# Patient Record
Sex: MALE | Race: WHITE | NOT HISPANIC OR LATINO | Employment: OTHER | URBAN - METROPOLITAN AREA
[De-identification: names, ages, dates, MRNs, and addresses within clinical notes are randomized per-mention and may not be internally consistent; named-entity substitution may affect disease eponyms.]

---

## 2019-02-01 ENCOUNTER — OFFICE VISIT (OUTPATIENT)
Dept: FAMILY MEDICINE | Facility: CLINIC | Age: 64
End: 2019-02-01
Payer: COMMERCIAL

## 2019-02-01 VITALS
RESPIRATION RATE: 20 BRPM | HEART RATE: 71 BPM | WEIGHT: 190.2 LBS | HEIGHT: 68 IN | OXYGEN SATURATION: 98 % | DIASTOLIC BLOOD PRESSURE: 96 MMHG | TEMPERATURE: 98.1 F | SYSTOLIC BLOOD PRESSURE: 153 MMHG | BODY MASS INDEX: 28.82 KG/M2

## 2019-02-01 DIAGNOSIS — H01.021 SQUAMOUS BLEPHARITIS OF RIGHT UPPER EYELID: ICD-10-CM

## 2019-02-01 DIAGNOSIS — Z13.220 SCREENING FOR HYPERLIPIDEMIA: ICD-10-CM

## 2019-02-01 DIAGNOSIS — Z87.898 HISTORY OF PREDIABETES: ICD-10-CM

## 2019-02-01 DIAGNOSIS — Z13.9 SCREENING FOR CONDITION: ICD-10-CM

## 2019-02-01 DIAGNOSIS — I25.10 CORONARY ARTERY DISEASE INVOLVING NATIVE HEART WITHOUT ANGINA PECTORIS, UNSPECIFIED VESSEL OR LESION TYPE: ICD-10-CM

## 2019-02-01 DIAGNOSIS — L21.9 SEBORRHEIC DERMATITIS: Primary | ICD-10-CM

## 2019-02-01 DIAGNOSIS — R03.0 ELEVATED BLOOD PRESSURE READING WITHOUT DIAGNOSIS OF HYPERTENSION: ICD-10-CM

## 2019-02-01 LAB
BUN SERPL-MCNC: 14 MG/DL (ref 7–30)
CALCIUM SERPL-MCNC: 9.5 MG/DL (ref 8.5–10.4)
CHLORIDE SERPLBLD-SCNC: 102 MMOL/L (ref 94–109)
CHOLEST SERPL-MCNC: 238 MG/DL
CHOLEST/HDLC SERPL: 6.3 RATIO
CO2 SERPL-SCNC: 27 MMOL/L (ref 20–32)
CREAT SERPL-MCNC: 1 MG/DL (ref 0.8–1.5)
EGFR CALCULATED (BLACK REFERENCE): >90 ML/MIN
EGFR CALCULATED (NON BLACK REFERENCE): 80.2 ML/MIN
GLUCOSE SERPL-MCNC: 102 MG/DL (ref 60–109)
HBA1C MFR BLD: 5.7 % (ref 4.1–5.7)
HDLC SERPL-MCNC: 38 MG/DL
HIV 1+2 AB+HIV1 P24 AG SERPL QL IA: NEGATIVE
LDLC SERPL CALC-MCNC: 180 MG/DL (ref 0–99)
POTASSIUM SERPL-SCNC: 4 MMOL/L (ref 3.4–5.3)
SODIUM SERPL-SCNC: 143 MMOL/L (ref 133–144)
TRIGL SERPL-MCNC: 98 MG/DL
VLDL-CHOLESTEROL: 20 MG/DL (ref 7–32)

## 2019-02-01 RX ORDER — METOPROLOL SUCCINATE 25 MG/1
25 TABLET, EXTENDED RELEASE ORAL DAILY
Qty: 90 TABLET | Refills: 1 | Status: SHIPPED | OUTPATIENT
Start: 2019-02-01 | End: 2019-03-14

## 2019-02-01 RX ORDER — LISINOPRIL 10 MG/1
10 TABLET ORAL DAILY
Qty: 90 TABLET | Refills: 3 | Status: SHIPPED | OUTPATIENT
Start: 2019-02-01 | End: 2019-03-14

## 2019-02-01 RX ORDER — KETOCONAZOLE 20 MG/G
CREAM TOPICAL DAILY
Qty: 60 G | Refills: 1 | Status: SHIPPED | OUTPATIENT
Start: 2019-02-01 | End: 2019-02-15

## 2019-02-01 ASSESSMENT — MIFFLIN-ST. JEOR: SCORE: 1627.75

## 2019-02-01 NOTE — LETTER
February 6, 2019      Dudley Kiran  [address] [address]        Dear Dudley,    The test results from your last visit are back. You do not have diabetes, hepatitis C or HIV. Your kidney tests are normal. Your cholesterol is high, and you would benefit from being on a statin medication as we discussed, to decrease your chance of dying from a heart attack or stroke.     Please see below for your test results.    Resulted Orders   Basic Metabolic Panel ([facility]) - Results < 1 hr   Result Value Ref Range    Glucose 102.0 60.0 - 109.0 mg/dL    Urea Nitrogen 14.0 7.0 - 30.0 mg/dL    Creatinine 1.0 0.8 - 1.5 mg/dL    Sodium 143.0 133.0 - 144.0 mmol/L    Potassium 4.0 3.4 - 5.3 mmol/L    Chloride 102.0 94.0 - 109.0 mmol/L    Carbon Dioxide 27.0 20.0 - 32.0 mmol/L    Calcium 9.5 8.5 - 10.4 mg/dL    eGFR Calculated (Non Black Reference) 80.2 >60.0 mL/min    eGFR Calculated (Black Reference) >90 >60.0 mL/min   Lipid Panel ([facility]) - Results < 1 hr   Result Value Ref Range    Cholesterol 238.0 (H) <200.0 mg/dL    Triglycerides 98.0 <150.0 mg/dL    HDL Cholesterol 38.0 (L) >40.0 mg/dL      Comment:      If diabetic or CVD then reference range <100    VLDL-Cholesterol 20.0 7.0 - 32.0 mg/dL    LDL Cholesterol Direct 180.0 (H) 0.0 - 99.0 mg/dL    Cholesterol/HDL Ratio 6.3 (H) <5.0 RATIO   HIV Ag/Ab Screen [place] ([employer])   Result Value Ref Range    HIV Antigen/Antibody Negative Negative    Narrative    Test performed by:  [facility] [address] [place] [address]  Method is [employer] HIV Ag/Ab for the detection of HIV p24 antigen, HIV-1   antibodies and HIV-2 antibodies.   Hepatitis C Antibody ([employer])   Result Value Ref Range    Hepatitis C Antibody Screen Negative Negative    Narrative    Test performed by:  [facility] [address] [place] [address]   Hemoglobin A1c ([facility])   Result Value Ref Range    Hemoglobin A1C 5.7 4.1 - 5.7 %       If you  have any questions, please call the clinic to make an appointment.    Sincerely,    Veronica Lopez MD

## 2019-02-01 NOTE — NURSING NOTE
Due For  Zoster - recommend to go to pharmacy  Flu - declined  Colonoscopy - pt declined, but will think about it, offered fit will think about it

## 2019-02-01 NOTE — PATIENT INSTRUCTIONS
Use dandruff shampoo 2 times per day, leaving on for as long as possible, up to an hour, for the next 2 weeks. Use the ketoconazole cream 2 times daily on outside of eyes and ear canals.   Start taking aspirin again.   Start taking lisinopril 10 mg per day.   Start taking metoprolol again.

## 2019-02-01 NOTE — PROGRESS NOTES
"History   Dudley Kiran is a 63 year old male presenting for:  Eye Problem (Right eye swollen for 5 days, itchy, watery); Ear Problem (Ear tenderness - both); and Medication Reconciliation (completed)    Itchy watery eyes for months. Right eye swollen for 5 days. Painful to touch eyelid. No vision changes. Tried some kind of vaseline type of cream.   Right ear feels like something moving inside, itchy. Tender inside both ears. No ear pain or difficulty hearing.   Dandruff recently on scalp.     bp high. Stopped taking asa and metoprolol. Had nosebleeds last winter with waking and in shower. Not sure why he stopped metoprolol.     The patient speaks English, so no  was used for this visit.   Medical and social history and medications reviewed with patient.   Exam   BP (!) 153/96   Pulse 71   Temp 98.1  F (36.7  C) (Oral)   Resp 20   Ht 1.72 m (5' 7.72\")   Wt 86.3 kg (190 lb 3.2 oz)   SpO2 98%   BMI 29.16 kg/m    Gen: NAD  HEENT: white oily flakiness both eyebrows, mild on frontal scalp, and both external ears. Bilateral external ears have erythema, edema and mild tenderness. Left TM appears normal. Right TM unable to be visualized due to tortuous canal. Pupils equal and reactive. EOMI without pain. No conjunctivitis. Erythema and edema of right upper eyelid. Able to read 12 pt font at 15 inches clearly with each eye individually without blurriness or double vision.   Neuro: wrinkles of right side of forehead are more pronounced than left side but remainder of cranial nerve exam is normal.   Card: RRR, no murmurs  Resp: clear, no wheezing or crackles  Skin: no other rashes other than above.   Ext: no LE edema  Psych: mood normal, affect congruent  Medical Decision-Making     1. Seborrheic dermatitis  2. Squamous blepharitis of right upper eyelid  No evidence of eye involvement. Not cellulitic. Will try bid dandruff shampoo and ketoconazole cream. If not improved, would consider steroid cream.   - " ketoconazole (NIZORAL) 2 % external cream; Apply topically daily  Dispense: 60 g; Refill: 1    3. Elevated blood pressure reading without diagnosis of hypertension  - Basic Metabolic Panel ([facility]) - Results < 1 hr  - lisinopril (PRINIVIL/ZESTRIL) 10 MG tablet; Take 1 tablet (10 mg) by mouth daily  Dispense: 90 tablet; Refill: 3    4. Screening for hyperlipidemia  - Lipid Panel ([facility]) - Results < 1 hr    5. Coronary artery disease involving native heart without angina pectoris, unspecified vessel or lesion type  Agreeable to restarting asa and metoprolol. Also adding ace. Discussed statin at length and reviewed ASCVD risk of 20%. He was on pravastatin in the past and developed hip osteoarthritis about 6 months later, and attributes this to statin. precontemplative on statin initiation.   - metoprolol succinate ER (TOPROL-XL) 25 MG 24 hr tablet; Take 1 tablet (25 mg) by mouth daily  Dispense: 90 tablet; Refill: 1  - aspirin (ASA) 81 MG EC tablet; Take 1 tablet (81 mg) by mouth daily  Dispense: 90 tablet; Refill: 1    6. Screening for condition  - HIV Ag/Ab Screen [place] ([facility])  - Hepatitis C Antibody ([facility])    7. History of prediabetes  Last checked in 2016.   - Hemoglobin A1c ([facility])    The 10-year ASCVD risk score (Wolfgang SHAKIR Jr., et al., 2013) is: 20.1%    Values used to calculate the score:      Age: 63 years      Sex: Male      Is Non- : No      Diabetic: No      Tobacco smoker: No      Systolic Blood Pressure: 153 mmHg      Is BP treated: No      HDL Cholesterol: 34 mg/dL      Total Cholesterol: 224 mg/dL    Follow up: 2 wks. Skin, BMP, BP    Veronica Lopez MD, MPH  [facility] Medicine Resident     Precepted patient with Carlos Marmolejo MD    Options for treatment and follow-up care were reviewed with the patient and/or guardian. Dudley Kiran and/or guardian engaged in the decision making process and verbalized understanding of the options discussed and agreed with  the final plan.

## 2019-02-02 LAB — HCV AB SER QL: NEGATIVE

## 2019-02-04 NOTE — PROGRESS NOTES
I have personally reviewed the history and examination as documented by Dr. Lopez.  I was present during key portions of the visit and agree with the assessment and plan as documented for 63 yr old male with CAD, HT, pre-DM here for seborrheic dermatitis/ blepharitis. Topical tx given. BP elevated - meds adjusted. Declined statin. Precautions given. Anticipatory guidance given.     Carlos Marmolejo MD  February 4, 2019  9:38 AM

## 2019-02-15 ENCOUNTER — OFFICE VISIT (OUTPATIENT)
Dept: FAMILY MEDICINE | Facility: CLINIC | Age: 64
End: 2019-02-15
Payer: COMMERCIAL

## 2019-02-15 VITALS
SYSTOLIC BLOOD PRESSURE: 126 MMHG | HEART RATE: 62 BPM | BODY MASS INDEX: 29.03 KG/M2 | DIASTOLIC BLOOD PRESSURE: 86 MMHG | OXYGEN SATURATION: 97 % | HEIGHT: 67 IN | TEMPERATURE: 98.6 F | WEIGHT: 185 LBS | RESPIRATION RATE: 16 BRPM

## 2019-02-15 DIAGNOSIS — I25.10 ATHEROSCLEROSIS OF CORONARY ARTERY OF NATIVE HEART WITHOUT ANGINA PECTORIS, UNSPECIFIED VESSEL OR LESION TYPE: ICD-10-CM

## 2019-02-15 DIAGNOSIS — H01.021 SQUAMOUS BLEPHARITIS OF RIGHT UPPER EYELID: ICD-10-CM

## 2019-02-15 DIAGNOSIS — R03.0 ELEVATED BLOOD PRESSURE READING WITHOUT DIAGNOSIS OF HYPERTENSION: Primary | ICD-10-CM

## 2019-02-15 DIAGNOSIS — E78.00 ELEVATED CHOLESTEROL: ICD-10-CM

## 2019-02-15 LAB
BUN SERPL-MCNC: 11 MG/DL (ref 7–30)
CALCIUM SERPL-MCNC: 9.3 MG/DL (ref 8.5–10.4)
CHLORIDE SERPLBLD-SCNC: 105 MMOL/L (ref 94–109)
CO2 SERPL-SCNC: 29 MMOL/L (ref 20–32)
CREAT SERPL-MCNC: 1.1 MG/DL (ref 0.8–1.5)
EGFR CALCULATED (BLACK REFERENCE): 86.9 ML/MIN
EGFR CALCULATED (NON BLACK REFERENCE): 71.9 ML/MIN
GLUCOSE SERPL-MCNC: 100 MG/DL (ref 60–109)
POTASSIUM SERPL-SCNC: 4.2 MMOL/L (ref 3.4–5.3)
SODIUM SERPL-SCNC: 145 MMOL/L (ref 133–144)

## 2019-02-15 RX ORDER — CETIRIZINE HYDROCHLORIDE 10 MG/1
10 TABLET ORAL DAILY
Qty: 90 TABLET | Refills: 0 | Status: SHIPPED | OUTPATIENT
Start: 2019-02-15 | End: 2019-03-14

## 2019-02-15 RX ORDER — EMOLLIENT BASE
CREAM (GRAM) TOPICAL PRN
Qty: 453 G | Refills: 3 | Status: SHIPPED | OUTPATIENT
Start: 2019-02-15 | End: 2020-02-15

## 2019-02-15 ASSESSMENT — MIFFLIN-ST. JEOR: SCORE: 1588.52

## 2019-02-15 NOTE — NURSING NOTE
"Chief Complaint   Patient presents with     RECHECK     eye problem. Dried around the eyelids and feels tender swollen. Now on the left eye.      RECHECK     blood pressure     Medication Reconciliation     completed.        /86   Pulse 62   Temp 98.6  F (37  C) (Oral)   Resp 16   Ht 1.695 m (5' 6.73\")   Wt 83.9 kg (185 lb)   SpO2 97%   BMI 29.21 kg/m        "

## 2019-02-15 NOTE — PROGRESS NOTES
"History   Dudley Kiran is a 63 year old male presenting for:  RECHECK (eye problem. Dried around the eyelids and feels tender swollen. Now on the left eye. ); RECHECK (blood pressure); and Medication Reconciliation (completed. )    HTN  - compliance: good; took meds this morning  - denies orthostasis, headaches, blurry vision, LE edema  - doesn't think he has htn    Eyes: tried ketoconazole around right for a week: tredness and swelling worsened. Now both swollen. Now off it a week. wateromg all the time. Clear drainage. Not sticking. Itchy. No runny nose. No pain with eye movement.     The patient speaks English, so no  was used for this visit.   Medical and social history and medications reviewed with patient.   Exam   /86   Pulse 62   Temp 98.6  F (37  C) (Oral)   Resp 16   Ht 1.695 m (5' 6.73\")   Wt 83.9 kg (185 lb)   SpO2 97%   BMI 29.21 kg/m    Gen: NAD  HEENT: bilateral eyelid edema R>L, medial,>lateral. Slightly improved. No discharge or matting. No conjunctivits or pain with eye movement.   Card: RRR, no murmurs  Resp: clear, no wheezing or crackles  Ext: no LE edema  Medical Decision-Making     1. Elevated blood pressure reading without diagnosis of hypertension  At goal today. He does not think bp was persistently elevated last visit and says it was not rechecked. Would be unusual for lisinopril 10 mg to not have had any effect. Discussed cardioprotective role of ACE regardless of BP and he is tolerating it, so he agrees to continue.   - Basic Metabolic Panel ([facility]) - Results < 1 hr    2. Squamous blepharitis of right upper eyelid  Still consistent with seborrheic dermatitis though also possible allergic component. Does not look like contact dermatitis. May not have used ketoconazole long enough but not interested in restarting now. Also would avoid topical steroids so close to eye.   - cetirizine (ZYRTEC) 10 MG tablet; Take 1 tablet (10 mg) by mouth daily  Dispense: 90 " tablet; Refill: 0  - emollient (VANICREAM) external cream; Apply topically as needed for other  Dispense: 453 g; Refill: 3  - cold compresses    3. Atherosclerosis of coronary artery of native heart without angina pectoris, unspecified vessel or lesion type  ascvd 20% with bp last visit, 14 % today with improved BP. On asa and betablocker and ACE    4. Elevated cholesterol  Again recommended strongly for statin. Reviewed elevated cholesterol. He is precontemplative. Has heard about bad side effects.     Follow up: 2 wk for eyes    Veronica Lopez MD, MPH  [facility] Family Medicine Resident     Precepted patient with Nydia Ramirez DO    Options for treatment and follow-up care were reviewed with the patient and/or guardian. Dudley Kiran and/or guardian engaged in the decision making process and verbalized understanding of the options discussed and agreed with the final plan.

## 2019-02-15 NOTE — PATIENT INSTRUCTIONS
Start taking cetirizine daily.   Use vanicream or cerave or other thick moisturizer around eyes.   Cool compresses on eyes.

## 2019-02-15 NOTE — LETTER
February 18, 2019      Dudley Kiran  [address] [address]        Dear Dudley,    The test results from your last visit are back. Your kidney function and electrolytes are normal. I recommend continuing your current medications.    Please see below for your test results.    Resulted Orders   Basic Metabolic Panel ([facility]) - Results < 1 hr   Result Value Ref Range    Glucose 100.0 60.0 - 109.0 mg/dL    Urea Nitrogen 11.0 7.0 - 30.0 mg/dL    Creatinine 1.1 0.8 - 1.5 mg/dL    Sodium 145.0 (H) 133.0 - 144.0 mmol/L    Potassium 4.2 3.4 - 5.3 mmol/L    Chloride 105.0 94.0 - 109.0 mmol/L    Carbon Dioxide 29.0 20.0 - 32.0 mmol/L    Calcium 9.3 8.5 - 10.4 mg/dL    eGFR Calculated (Non Black Reference) 71.9 >60.0 mL/min    eGFR Calculated (Black Reference) 86.9 >60.0 mL/min       If you have any questions, please call the clinic to make an appointment.    Sincerely,    Veronica Lopez MD

## 2019-02-19 NOTE — PROGRESS NOTES
Preceptor Attestation:   Patient seen, evaluated and discussed with the resident. I have verified the content of the note, which accurately reflects my assessment of the patient and the plan of care.  Supervising Physician:Nydia Ramirez DO [facility]

## 2019-03-01 ENCOUNTER — TELEPHONE (OUTPATIENT)
Dept: FAMILY MEDICINE | Facility: CLINIC | Age: 64
End: 2019-03-01

## 2019-03-01 ENCOUNTER — OFFICE VISIT (OUTPATIENT)
Dept: FAMILY MEDICINE | Facility: CLINIC | Age: 64
End: 2019-03-01
Payer: COMMERCIAL

## 2019-03-01 VITALS
SYSTOLIC BLOOD PRESSURE: 125 MMHG | TEMPERATURE: 98.4 F | DIASTOLIC BLOOD PRESSURE: 81 MMHG | OXYGEN SATURATION: 98 % | HEIGHT: 67 IN | BODY MASS INDEX: 28.85 KG/M2 | RESPIRATION RATE: 16 BRPM | HEART RATE: 59 BPM | WEIGHT: 183.8 LBS

## 2019-03-01 DIAGNOSIS — S39.012A STRAIN OF LUMBAR REGION, INITIAL ENCOUNTER: Primary | ICD-10-CM

## 2019-03-01 DIAGNOSIS — R03.0 ELEVATED BLOOD PRESSURE READING WITHOUT DIAGNOSIS OF HYPERTENSION: ICD-10-CM

## 2019-03-01 ASSESSMENT — MIFFLIN-ST. JEOR: SCORE: 1579.4

## 2019-03-01 NOTE — TELEPHONE ENCOUNTER
[facility] phone call message- general phone call:    Reason for call: Patient states that in his last visit with Dr. Lopez they discussed that medication could affect his kidneys, so they did a lab in order to rule out this.   Patient stated he thinks lisinopril is affecting his kidneys, he reports lower back pain, no more symptoms.   Patient reports taking Cetrizine 10MG, Metoprolol succinate ER 25MG, and Lisinopril 10MG.   He would like a call back. Please call and advise.     Return call needed: Yes    OK to leave a message on voice mail? Yes    Primary language: English      needed? No    Call taken on March 1, 2019 at 9:52 AM by Lori Palmer

## 2019-03-01 NOTE — PROGRESS NOTES
"Pt is a 63 year old male last seen on 2/15/19 by Dr Lopez here today for:     HTN - well-controlled on lisinopril; was concerned that the medication was causing his back pain because Dr Lopez said it can affect his kidneys    BP Readings from Last 6 Encounters:   03/01/19 125/81   02/15/19 126/86   02/01/19 (!) 153/96   05/06/16 121/77   03/27/15 116/80   07/30/14 125/81     2) Back pain:   Location: lower - bilateral    Duration: 2 days; started as an ache;    Radiation: NO   Numbness/ Tingling? NO   Weakness? No   Flexion or Extension bias: flexion   Red flags? No   Meds tried? No   PT? NO   Imaging? 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     Past Medical History:   Diagnosis Date     HTN (hypertension)       Past Surgical History:   Procedure Laterality Date     OTHER SURGICAL HISTORY  2010    Bypass      Current Outpatient Medications   Medication Sig Dispense Refill     aspirin (ASA) 81 MG EC tablet Take 1 tablet (81 mg) by mouth daily 90 tablet 1     cetirizine (ZYRTEC) 10 MG tablet Take 1 tablet (10 mg) by mouth daily 90 tablet 0     emollient (VANICREAM) external cream Apply topically as needed for other 453 g 3     lisinopril (PRINIVIL/ZESTRIL) 10 MG tablet Take 1 tablet (10 mg) by mouth daily 90 tablet 3     metoprolol succinate ER (TOPROL-XL) 25 MG 24 hr tablet Take 1 tablet (25 mg) by mouth daily 90 tablet 1      No Known Allergies     ROS:   Gen- no weight change, no fevers/chills   Head/ Eyes- no blurred vision, no headaches   ENT- no cough, no congestion, no URI symptoms   Cardiac - no palpitations, no chest pain   Respiratory - no shortness of breath , no wheezing   GI - no nausea, no vomiting, no diarrhea, no constipation   Urinary - no dysuria, no polyuria   Neuro - no numbness, no tingling   Remainder of ROS negative.     Exam:   /81   Pulse 59   Temp 98.4  F (36.9  C) (Oral)   Resp 16   Ht 1.689 m (5' 6.5\")   Wt 83.4 kg (183 lb 12.8 oz)   SpO2 98%   BMI 29.22 kg/m   "   Alert and oriented x 3; No acute distress     BACK:   ROM: flexion -full /extension -limited/lateral rotation- full /side bend- full   Bony tenderness: None   Paraspinal tenderness: None.   Sensation: intact in b/l lower extremities.   Strength: 5/5 w/ dorsiflexion/ plantarflexion/ inversion/ eversion/ knee flexion/ extension.   Maneuvers: Neg straight leg raise b/l. Neg Slump b/l + ZAHIDA/FADIR on L        (S39.012A) Strain of lumbar region, initial encounter  (primary encounter diagnosis)  Comment:   Plan: reassurance given that his lisinopril was not causing his back pain; pt declined handout w/ exercises; f/u prn    (R03.0) Elevated blood pressure reading without diagnosis of hypertension  Comment:   Plan: stable; f/u prn    Carlos Marmolejo MD  March 1, 2019  11:40 AM

## 2019-03-01 NOTE — TELEPHONE ENCOUNTER
Recommend Dudley be seen in clinic for further assessment and recommendations. An appt has been scheduled for this morning. Magdaleno ABURTO

## 2019-03-08 ENCOUNTER — OFFICE VISIT (OUTPATIENT)
Dept: FAMILY MEDICINE | Facility: CLINIC | Age: 64
End: 2019-03-08
Payer: COMMERCIAL

## 2019-03-08 VITALS
WEIGHT: 180 LBS | HEIGHT: 67 IN | DIASTOLIC BLOOD PRESSURE: 75 MMHG | SYSTOLIC BLOOD PRESSURE: 118 MMHG | TEMPERATURE: 98.5 F | HEART RATE: 73 BPM | OXYGEN SATURATION: 96 % | BODY MASS INDEX: 28.25 KG/M2 | RESPIRATION RATE: 20 BRPM

## 2019-03-08 DIAGNOSIS — L30.9 PERIORBITAL DERMATITIS: Primary | ICD-10-CM

## 2019-03-08 LAB — ERYTHROCYTE [SEDIMENTATION RATE] IN BLOOD: 12 MM/HR (ref 0–15)

## 2019-03-08 RX ORDER — BETAMETHASONE VALERATE 1.2 MG/G
CREAM TOPICAL 2 TIMES DAILY
Qty: 45 G | Refills: 0 | Status: SHIPPED | OUTPATIENT
Start: 2019-03-08 | End: 2019-04-11

## 2019-03-08 ASSESSMENT — MIFFLIN-ST. JEOR: SCORE: 1562.16

## 2019-03-08 NOTE — PATIENT INSTRUCTIONS
Apply a thin layer of steroid cream 2 times per day around eye. Be careful not to get it inside your eye.

## 2019-03-08 NOTE — PROGRESS NOTES
"History   Dudley Kiran is a 63 year old male presenting for:  RECHECK (eye swelling, got worst) and Medication Reconciliation    eyes  - overall not better. People at work asking about it.   - vanicream several times per day, 5-6x. Feels good. No cold compresses.   - no other illness or joint pain.   - no blurry vision    The patient speaks English, so no  was used for this visit.   Medical and social history and medications reviewed with patient.   Exam   /75   Pulse 73   Temp 98.5  F (36.9  C)   Resp 20   Ht 1.689 m (5' 6.5\")   Wt 81.6 kg (180 lb)   SpO2 96%   BMI 28.62 kg/m    Gen: NAD  HEENT: bilateral periorbital erythema and darkening of skin with signfiicant edema of eyelids. No conjunctivitis. Mild flaking of eyelashes. No malar rash.  Card: RRR, no murmurs  Resp: clear, no wheezing or crackles  Psych: mood normal, affect congruent  Medical Decision-Making     1. Periorbital dermatitis  May still have initially been seborrheic dermatitis but currently appears more consistent with contact dermatitis. Will rule out systemic disease such as SLE or other rheumatologic condition. Have been hesitant to try steroid cream given proximity to eyes, but discussed risk of affecting vision and patient agrees to be cautious to avoid eyes. Will use low-moderate intensity steroid for short duration. If not effective at follow up next week, plan to refer to derm.   - betamethasone valerate (VALISONE) 0.1 % external cream; Apply topically 2 times daily  Dispense: 45 g; Refill: 0  - Erythrocyte Sedimentation Rate ([facility])  - Antinuclear Ab [place] ([facility])    Follow up: 1 wk for eyes    Veronica Lopez MD, MPH  [facility] Medicine Resident     Precepted patient with John Espitia MD    Options for treatment and follow-up care were reviewed with the patient and/or guardian. Dudley Kiran and/or guardian engaged in the decision making process and verbalized understanding of the options " discussed and agreed with the final plan.

## 2019-03-08 NOTE — PROGRESS NOTES
Preceptor Attestation:   Patient seen, evaluated and discussed with the resident. I have verified the content of the note, which accurately reflects my assessment of the patient and the plan of care.  Supervising Physician:John Espitia MD  [facility]

## 2019-03-11 LAB — ANA SER QL: 0.3 U

## 2019-03-14 ENCOUNTER — OFFICE VISIT (OUTPATIENT)
Dept: FAMILY MEDICINE | Facility: CLINIC | Age: 64
End: 2019-03-14
Payer: COMMERCIAL

## 2019-03-14 VITALS
SYSTOLIC BLOOD PRESSURE: 99 MMHG | RESPIRATION RATE: 17 BRPM | HEIGHT: 67 IN | WEIGHT: 186 LBS | HEART RATE: 60 BPM | DIASTOLIC BLOOD PRESSURE: 65 MMHG | TEMPERATURE: 98.4 F | BODY MASS INDEX: 29.19 KG/M2 | OXYGEN SATURATION: 98 %

## 2019-03-14 DIAGNOSIS — H01.021 SQUAMOUS BLEPHARITIS OF RIGHT UPPER EYELID: ICD-10-CM

## 2019-03-14 DIAGNOSIS — R03.0 ELEVATED BLOOD PRESSURE READING WITHOUT DIAGNOSIS OF HYPERTENSION: ICD-10-CM

## 2019-03-14 DIAGNOSIS — L30.9 PERIORBITAL DERMATITIS: Primary | ICD-10-CM

## 2019-03-14 RX ORDER — CICLOPIROX OLAMINE 7.7 MG/G
CREAM TOPICAL 2 TIMES DAILY
Qty: 30 G | Refills: 1 | Status: SHIPPED | OUTPATIENT
Start: 2019-03-14 | End: 2019-04-11

## 2019-03-14 RX ORDER — LISINOPRIL 5 MG/1
5 TABLET ORAL DAILY
Qty: 90 TABLET | Refills: 1 | Status: SHIPPED | OUTPATIENT
Start: 2019-03-14 | End: 2020-06-16

## 2019-03-14 RX ORDER — METOPROLOL SUCCINATE 25 MG/1
12.5 TABLET, EXTENDED RELEASE ORAL DAILY
COMMUNITY
Start: 2019-03-14 | End: 2020-06-15

## 2019-03-14 ASSESSMENT — MIFFLIN-ST. JEOR: SCORE: 1589.93

## 2019-03-14 NOTE — PATIENT INSTRUCTIONS
Decreased metoprolol to 12.5 mg (cut in half) and decrease lisinopril to 5 mg (new dose sent to pharmacy).   Use steroid cream 2 more days around eyes, then stop.   Start using ciclopirox cream around eyes and on scalp dandruff 2 times per day until flakiness is gone, then as needed.   Continue using dandruff shampoo.   Continue using vanicream for dry skin as often as needed.   Come back in 4-6 weeks, sooner if lightheaded/dizzy with standing.

## 2019-03-14 NOTE — PROGRESS NOTES
I have personally reviewed the history and examination as documented by Dr. Lopez.  I was present during key portions of the visit and agree with the assessment and plan as documented for 63 yr old male with HTN, periorbital dermatitis here for follow-up. Rash greatly improved w/ topical steroid crm. Will adjust BP meds as BP and HR are low today. Precautions given. Anticipatory guidance given.     Carlos Marmolejo MD  March 14, 2019  9:13 AM

## 2019-03-14 NOTE — PROGRESS NOTES
"History   Dudley Kiran is a 63 year old male presenting for:  Eye Problem (Here for follow up eye problem) and Medication Reconciliation (Completed)    Eyes improving compared to last week. Started steroid cream 4 days ago, 80% improvement by now. Careful not to get in eyes. No blurry vision. Very satisfied with this. Still lots of flaky dryness.     bp low today. Just got off working overnight. No orthostasis.     The patient speaks English, so no  was used for this visit.   Medical and social history and medications reviewed with patient.   Exam   BP 99/65 (BP Location: Right arm, Patient Position: Sitting, Cuff Size: Adult Large)   Pulse 60   Temp 98.4  F (36.9  C) (Oral)   Resp 17   Ht 1.69 m (5' 6.54\")   Wt 84.4 kg (186 lb)   SpO2 98%   BMI 29.54 kg/m    Gen: NAD  HEENT: periorbital erythema mostly resolved. No atrophy of skin noted. Prior discoloration slightly darker than skin tone is significantly improved since last visit. Periorbital edema nearly completely resolved. Some flaking of skin remains.   Card: RRR, no murmurs  Resp: clear, no wheezing or crackles  Psych: mood normal, affect congruent  Medical Decision-Making     1. Periorbital dermatitis  2. Squamous blepharitis of right upper eyelid  Consistent with seborrheic dermatitis. Suspect it either needed both steroid and antifungal from onset due to severity, may not have used ketoconazole cream for long enough initially, or may have had contact dermatitis from azole cream. Will change classes of antifungal cream.   - ciclopirox (LOPROX) 0.77 % cream; Apply topically 2 times daily  Dispense: 30 g; Refill: 1    3. Elevated blood pressure reading without diagnosis of hypertension  Low today. HR also low.   - lisinopril (PRINIVIL/ZESTRIL) 5 MG tablet; Take 1 tablet (5 mg) by mouth daily  Dispense: 90 tablet; Refill: 1    Patient Instructions   Decreased metoprolol to 12.5 mg (cut in half) and decrease lisinopril to 5 mg (new dose " sent to pharmacy).   Use steroid cream 2 more days around eyes, then stop.   Start using ciclopirox cream around eyes and on scalp dandruff 2 times per day until flakiness is gone, then as needed.   Continue using dandruff shampoo.   Continue using vanicream for dry skin as often as needed.   Come back in 4-6 weeks, sooner if lightheaded/dizzy with standing.     Follow up: 4-6 wks for  BP recheck, sooner if orthostasis    Veronica Lopez MD, MPH  [facility] Medicine Resident     Precepted patient with Carlos Marmolejo MD    Options for treatment and follow-up care were reviewed with the patient and/or guardian. uDdley Kiran and/or guardian engaged in the decision making process and verbalized understanding of the options discussed and agreed with the final plan.

## 2019-04-08 ENCOUNTER — OFFICE VISIT (OUTPATIENT)
Dept: FAMILY MEDICINE | Facility: CLINIC | Age: 64
End: 2019-04-08
Payer: COMMERCIAL

## 2019-04-08 VITALS
SYSTOLIC BLOOD PRESSURE: 109 MMHG | RESPIRATION RATE: 16 BRPM | TEMPERATURE: 97.8 F | OXYGEN SATURATION: 97 % | WEIGHT: 185 LBS | HEIGHT: 67 IN | DIASTOLIC BLOOD PRESSURE: 75 MMHG | BODY MASS INDEX: 29.03 KG/M2 | HEART RATE: 66 BPM

## 2019-04-08 DIAGNOSIS — E78.00 ELEVATED CHOLESTEROL: ICD-10-CM

## 2019-04-08 DIAGNOSIS — I25.10 ATHEROSCLEROSIS OF CORONARY ARTERY OF NATIVE HEART WITHOUT ANGINA PECTORIS, UNSPECIFIED VESSEL OR LESION TYPE: ICD-10-CM

## 2019-04-08 DIAGNOSIS — R03.0 ELEVATED BLOOD PRESSURE READING WITHOUT DIAGNOSIS OF HYPERTENSION: Primary | ICD-10-CM

## 2019-04-08 ASSESSMENT — MIFFLIN-ST. JEOR: SCORE: 1592.78

## 2019-04-08 NOTE — PATIENT INSTRUCTIONS
"Your blood pressure is great. Keep taking your medications at same dose. Let us know if you are having side effects.     If you are not willing to be on a statin medication for your cholesterol and to reduce risk of heart attack and stroke, please consider ezetimibe to lower cholesterol. It is usually very well tolerated.     You are due for a colonoscopy. Please call clinic if we can get this set up for you. If you are not willing to do a colonoscopy, then I recommend doing a \"Fit\" test, which looks for signs of blood in your stool that could be a sign of colon cancer.   "

## 2019-04-08 NOTE — PROGRESS NOTES
Preceptor Attestation:   Patient seen, evaluated and discussed with the resident. I have verified the content of the note, which accurately reflects my assessment of the patient and the plan of care.   Supervising Physician:  Henry Kingston MD

## 2019-12-18 ENCOUNTER — OFFICE VISIT - HEALTHEAST (OUTPATIENT)
Dept: FAMILY MEDICINE | Facility: CLINIC | Age: 64
End: 2019-12-18

## 2019-12-18 DIAGNOSIS — L30.9 PERIORBITAL DERMATITIS: ICD-10-CM

## 2019-12-18 DIAGNOSIS — R73.01 IMPAIRED FASTING GLUCOSE: ICD-10-CM

## 2019-12-18 DIAGNOSIS — E78.00 HYPERCHOLESTEREMIA: ICD-10-CM

## 2019-12-18 DIAGNOSIS — I25.10 CORONARY ARTERY DISEASE INVOLVING NATIVE CORONARY ARTERY OF NATIVE HEART WITHOUT ANGINA PECTORIS: ICD-10-CM

## 2019-12-18 DIAGNOSIS — Z23 NEED FOR VACCINATION: ICD-10-CM

## 2019-12-18 DIAGNOSIS — E66.3 OVERWEIGHT (BMI 25.0-29.9): ICD-10-CM

## 2019-12-18 DIAGNOSIS — I10 ESSENTIAL HYPERTENSION, BENIGN: ICD-10-CM

## 2019-12-18 DIAGNOSIS — M16.0 PRIMARY OSTEOARTHRITIS OF BOTH HIPS: ICD-10-CM

## 2019-12-18 LAB
ANION GAP SERPL CALCULATED.3IONS-SCNC: 5 MMOL/L (ref 5–18)
BUN SERPL-MCNC: 14 MG/DL (ref 8–22)
CALCIUM SERPL-MCNC: 9.7 MG/DL (ref 8.5–10.5)
CHLORIDE BLD-SCNC: 109 MMOL/L (ref 98–107)
CO2 SERPL-SCNC: 28 MMOL/L (ref 22–31)
CREAT SERPL-MCNC: 0.86 MG/DL (ref 0.7–1.3)
GFR SERPL CREATININE-BSD FRML MDRD: >60 ML/MIN/1.73M2
GLUCOSE BLD-MCNC: 100 MG/DL (ref 70–125)
HBA1C MFR BLD: 5.8 % (ref 3.5–6)
POTASSIUM BLD-SCNC: 4.3 MMOL/L (ref 3.5–5)
SODIUM SERPL-SCNC: 142 MMOL/L (ref 136–145)

## 2019-12-18 ASSESSMENT — MIFFLIN-ST. JEOR: SCORE: 1573.71

## 2019-12-19 ENCOUNTER — COMMUNICATION - HEALTHEAST (OUTPATIENT)
Dept: FAMILY MEDICINE | Facility: CLINIC | Age: 64
End: 2019-12-19

## 2020-06-15 DIAGNOSIS — R03.0 ELEVATED BLOOD PRESSURE READING WITHOUT DIAGNOSIS OF HYPERTENSION: ICD-10-CM

## 2020-06-16 RX ORDER — METOPROLOL SUCCINATE 25 MG/1
12.5 TABLET, EXTENDED RELEASE ORAL DAILY
Qty: 90 TABLET | Refills: 0 | Status: SHIPPED | OUTPATIENT
Start: 2020-06-16 | End: 2021-08-10

## 2020-06-16 RX ORDER — LISINOPRIL 5 MG/1
5 TABLET ORAL DAILY
Qty: 90 TABLET | Refills: 1 | Status: SHIPPED | OUTPATIENT
Start: 2020-06-16 | End: 2021-08-10

## 2021-05-27 ENCOUNTER — RECORDS - HEALTHEAST (OUTPATIENT)
Dept: ADMINISTRATIVE | Facility: CLINIC | Age: 66
End: 2021-05-27

## 2021-06-01 ENCOUNTER — RECORDS - HEALTHEAST (OUTPATIENT)
Dept: ADMINISTRATIVE | Facility: CLINIC | Age: 66
End: 2021-06-01

## 2021-06-04 VITALS
HEIGHT: 67 IN | SYSTOLIC BLOOD PRESSURE: 130 MMHG | BODY MASS INDEX: 28.72 KG/M2 | HEART RATE: 83 BPM | OXYGEN SATURATION: 97 % | DIASTOLIC BLOOD PRESSURE: 70 MMHG | WEIGHT: 183 LBS

## 2021-06-04 NOTE — PROGRESS NOTES
Assessment/Plan:    1. Coronary artery disease involving native coronary artery of native heart without angina pectoris  Establishment of [facility].  Coronary artery disease history with four-vessel bypass March 2010.  Has not seen cardiologist recently.  Continues metoprolol succinate 25 mg using half tablet daily plus lisinopril 5 mg daily.  No exertional chest pain etc.  No history of cardiomyopathy noted.  Patient non-smoker.  Has been hesitant to statin therapy after initially being on medicine for perhaps 6 months to a year and then noting hip pain that he thought perhaps was associated.  After long discussion does agree to atorvastatin 40 mg as high intensity statin therapy trial until upcoming welcome to Medicare physical next fall.  - metoprolol succinate (TOPROL-XL) 25 MG; Take 0.5 tablets (12.5 mg total) by mouth daily.  Dispense: 90 tablet; Refill: 1  - atorvastatin (LIPITOR) 40 MG tablet; Take 1 tablet (40 mg total) by mouth daily.  Dispense: 90 tablet; Refill: 3    2. Essential hypertension, benign  Hypertension stable.  Continues metoprolol succinate and lisinopril as noted above.  Base metabolic panel for med monitoring.  - Basic Metabolic Panel    3. Overweight (BMI 25.0-29.9)  Overweight status.  Weight goal less than 180 pounds initially, less than 175 pounds ideally.    4. Primary osteoarthritis of both hips  Left greater than right hip osteoarthritis described.  Considering hip replacement surgery after custodial at age 65.    5. Periorbital dermatitis  History of periorbital dermatitis.  Also has had nasal symptoms consistent with allergy.  Cetirizine 10 mg daily.  Instructed patient to avoid betamethasone use on face which had been used previously on as-needed basis.    6. Need for vaccination  Tetanus booster provided.  Declines pneumonia shot today and will offer Prevnar at age 65.  Declines flu shot.  - Td, Preservative Free (green label)    7. Impaired fasting glucose  History of impaired  fasting glucose.  Prior A1c improving from 5.9% down to 5.7% February 1, 2019.  Repeat A1c today.  Nonfasting.  Therapeutic lifestyle changes reviewed.  - Basic Metabolic Panel  - Glycosylated Hemoglobin A1c    8. Hypercholesteremia  Cholesterol results from February 1, 2019 with total cholesterol 238, triglycerides 98, HDL 38 and .  Initiate atorvastatin 40 mg daily as noted above for high intensity statin therapy with known history of CAD.      The following high BMI interventions were performed this visit: encouragement to exercise, weight monitoring, weight loss from baseline weight and lifestyle education regarding diet .  Ensure ongoing efforts to achieve weight goal < 180 pounds initially, < 175 pounds ideally.         Subjective:    Dudley Kiran is seen today for establishment of cares.  Known history of CAD.  Four-vessel coronary artery bypass grafting performed March 2010.  Was on metoprolol succinate 25 mg using half tablet daily.  Addition of lisinopril 5 mg daily around February 1, 2019 due to elevated blood pressure.  Has been resistant to cholesterol medicine because of prior concerns with associated left hip pain according to patient however now describes left greater than right hip osteoarthritic changes which will likely result in total hip arthroplasty sometime after longterm at age 65.  Does have some rash around eyes at times.  Runny nose.  Question allergy component.  Impaired fasting glucose history.  Has had a high cholesterol however has elected diet control.  Is never had a flu shot.  Resistant to pneumonia shot etc. at this time.  Needs tetanus booster however.  Past medical social and family history reviewed and updated as noted below.  Comprehensive review of systems as above otherwise all negative.       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 No pertinent surgical history.     No family history on file.     History reviewed. No pertinent past medical history.     Social History     Tobacco Use     Smoking status: Never Smoker     Smokeless tobacco: Never Used   Substance Use Topics     Alcohol use: Not on file     Drug use: Not on file        Current Outpatient Medications   Medication Sig Dispense Refill     aspirin 81 MG EC tablet Take 81 mg by mouth.       betamethasone valerate (VALISONE) 0.1 % cream ELENA EXT AA BID       cetirizine (ZYRTEC) 10 MG tablet TK 1 T PO ONCE DAILY.       lisinopril (PRINIVIL,ZESTRIL) 5 MG tablet Take 5 mg by mouth.       metoprolol succinate (TOPROL-XL) 25 MG Take 0.5 tablets (12.5 mg total) by mouth daily. 90 tablet 1     atorvastatin (LIPITOR) 40 MG tablet Take 1 tablet (40 mg total) by mouth daily. 90 tablet 3     No current facility-administered medications for this visit.           Objective:    Vitals:    19 1441   BP: 130/70   Pulse: 83   SpO2: 97%   Weight: 183 lb (83 kg)   Height: 5' 7\" (1.702 m)      Body mass index is 28.66 kg/m .    Alert.  No apparent distress.  HEENT exam with some cerumen right external auditory canal removed with curette.  No significant kelechi-orbital dermatitis.  HEENT exam otherwise appears unremarkable.  Neck supple.  Chest clear.  Cardiac exam regular.  Extremities warm and dry.  No rash.  No significant peripheral edema.      This note has been dictated using voice recognition software and as a result may contain minor grammatical errors and unintended word substitutions.   "

## 2021-06-20 NOTE — LETTER
"Letter by Tima Davis MD at      Author: Tima Davis MD Service: -- Author Type: --    Filed:  Encounter Date: 12/19/2019 Status: Signed         Dudley Kiran  [address] [place] [address]             December 19, 2019         Dear Mr. Kiran,    Below are the results from your recent visit:    Resulted Orders   Basic Metabolic Panel   Result Value Ref Range    Sodium 142 136 - 145 mmol/L    Potassium 4.3 3.5 - 5.0 mmol/L    Chloride 109 (H) 98 - 107 mmol/L    CO2 28 22 - 31 mmol/L    Anion Gap, Calculation 5 5 - 18 mmol/L    Glucose 100 70 - 125 mg/dL    Calcium 9.7 8.5 - 10.5 mg/dL    BUN 14 8 - 22 mg/dL    Creatinine 0.86 0.70 - 1.30 mg/dL    GFR MDRD Af Amer >60 >60 mL/min/1.73m2    GFR MDRD Non Af Amer >60 >60 mL/min/1.73m2    Narrative    Fasting Glucose reference range is 70-99 mg/dL per  American Diabetes Association (ADA) guidelines.   Glycosylated Hemoglobin A1c   Result Value Ref Range    Hemoglobin A1c 5.8 3.5 - 6.0 %       Your labs suggest borderline \"pre-diabetes\".  Goal fasting glucose is < 100.  Your fasting glucose was 100.  Goal \"average blood sugar\" (i.e. A1c) is < 5.7%.  Your A1c was 5.8%. Ensure regular exercise, healthy diet, and weight loss modifications in order to further improve.  Weight goal < 180 pounds initially, < 175 pounds ideally.  We will continue to follow closely.      Your kidney function tests (i.e. Basic Metabolic Panel) were normal.     Please call with questions or contact us using [employer].    Sincerely,        Electronically signed by Tima Davis MD       "

## 2021-07-25 ENCOUNTER — HEALTH MAINTENANCE LETTER (OUTPATIENT)
Age: 66
End: 2021-07-25

## 2021-08-10 ENCOUNTER — ANCILLARY PROCEDURE (OUTPATIENT)
Dept: GENERAL RADIOLOGY | Facility: CLINIC | Age: 66
End: 2021-08-10
Attending: FAMILY MEDICINE
Payer: COMMERCIAL

## 2021-08-10 ENCOUNTER — OFFICE VISIT (OUTPATIENT)
Dept: FAMILY MEDICINE | Facility: CLINIC | Age: 66
End: 2021-08-10
Payer: COMMERCIAL

## 2021-08-10 VITALS
DIASTOLIC BLOOD PRESSURE: 80 MMHG | SYSTOLIC BLOOD PRESSURE: 120 MMHG | HEART RATE: 84 BPM | WEIGHT: 208 LBS | BODY MASS INDEX: 32.58 KG/M2 | OXYGEN SATURATION: 95 %

## 2021-08-10 DIAGNOSIS — I10 ESSENTIAL HYPERTENSION, BENIGN: ICD-10-CM

## 2021-08-10 DIAGNOSIS — R03.0 ELEVATED BLOOD PRESSURE READING WITHOUT DIAGNOSIS OF HYPERTENSION: ICD-10-CM

## 2021-08-10 DIAGNOSIS — E66.811 CLASS 1 OBESITY DUE TO EXCESS CALORIES WITH SERIOUS COMORBIDITY AND BODY MASS INDEX (BMI) OF 32.0 TO 32.9 IN ADULT: ICD-10-CM

## 2021-08-10 DIAGNOSIS — M25.552 HIP PAIN, LEFT: ICD-10-CM

## 2021-08-10 DIAGNOSIS — M16.0 PRIMARY OSTEOARTHRITIS OF BOTH HIPS: ICD-10-CM

## 2021-08-10 DIAGNOSIS — R73.01 IMPAIRED FASTING GLUCOSE: ICD-10-CM

## 2021-08-10 DIAGNOSIS — E66.09 CLASS 1 OBESITY DUE TO EXCESS CALORIES WITH SERIOUS COMORBIDITY AND BODY MASS INDEX (BMI) OF 32.0 TO 32.9 IN ADULT: ICD-10-CM

## 2021-08-10 DIAGNOSIS — I25.10 CORONARY ARTERY DISEASE INVOLVING NATIVE CORONARY ARTERY OF NATIVE HEART WITHOUT ANGINA PECTORIS: ICD-10-CM

## 2021-08-10 DIAGNOSIS — M25.552 HIP PAIN, LEFT: Primary | ICD-10-CM

## 2021-08-10 DIAGNOSIS — E78.00 HYPERCHOLESTEREMIA: ICD-10-CM

## 2021-08-10 PROBLEM — E66.3 OVERWEIGHT (BMI 25.0-29.9): Status: ACTIVE | Noted: 2019-12-18

## 2021-08-10 PROCEDURE — 99214 OFFICE O/P EST MOD 30 MIN: CPT | Performed by: FAMILY MEDICINE

## 2021-08-10 PROCEDURE — 73522 X-RAY EXAM HIPS BI 3-4 VIEWS: CPT | Mod: FY | Performed by: RADIOLOGY

## 2021-08-10 RX ORDER — LISINOPRIL 5 MG/1
5 TABLET ORAL DAILY
Qty: 90 TABLET | Refills: 3 | Status: SHIPPED | OUTPATIENT
Start: 2021-08-10 | End: 2022-08-31

## 2021-08-10 RX ORDER — METOPROLOL SUCCINATE 25 MG/1
12.5 TABLET, EXTENDED RELEASE ORAL DAILY
Qty: 90 TABLET | Refills: 3 | Status: SHIPPED | OUTPATIENT
Start: 2021-08-10 | End: 2022-08-31

## 2021-08-10 RX ORDER — ATORVASTATIN CALCIUM 40 MG/1
40 TABLET, FILM COATED ORAL
COMMUNITY
Start: 2019-12-18 | End: 2021-08-10

## 2021-08-10 NOTE — PROGRESS NOTES
Assessment/Plan:    Hip pain, left  Left greater than right hip pain.  Advanced osteoarthritis bilateral hips left greater than right noted.  Referred to orthopedic specialist.  Patient has heard good things about Dr. Little with [facility] orthopedics previously.  Will need preoperative clearance and should complete at least 2 weeks prior to scheduled surgery in order to complete further assessment and cardiac clearance.  - XR Hip Bilateral 2 Views Each  - Orthopedic  Referral    Class 1 obesity due to excess calories with serious comorbidity and body mass index (BMI) of 32.0 to 32.9 in adult  Dietary and exercise modification for weight goal less than 200 pounds initially, less than 190 pounds ideally.    Coronary artery disease involving native coronary artery of native heart without angina pectoris  CAD history with four-vessel coronary artery bypass surgery March 2010 noted.  Can have episodes of shortness of breath in which he awakens or can happen during the day often associated with sneezing.  Did discuss need for further assessment however patient declines currently.    Essential hypertension, benign  We will resume metoprolol succinate 25 mg using half tablet daily and lisinopril 5 mg daily with CAD history.    Primary osteoarthritis of both hips  As above.  - XR Hip Bilateral 2 Views Each  - Orthopedic  Referral    Impaired fasting glucose  History of impaired fasting glucose with prior A1c of 5.8% December 18, 2019.    Hypercholesteremia  Patient declines statin therapy and is no longer on atorvastatin 40 mg daily stating that he had bright red blood in his stool as well as bloody nose.  Seem to make his arthritis worse.  Does admit to continuing aspirin however this never caused problems with bleeding per patient.  Bleeding resolved after discontinuation of statin in distant past.    Elevated blood pressure reading without diagnosis of hypertension  As above, refills provided.  -  "lisinopril (ZESTRIL) 5 MG tablet  Dispense: 90 tablet; Refill: 3  - metoprolol succinate ER (TOPROL-XL) 25 MG 24 hr tablet  Dispense: 90 tablet; Refill: 3      The following high BMI interventions were performed this visit: encouragement to exercise, weight monitoring, weight loss from baseline weight and lifestyle education regarding diet .  Ensure ongoing efforts to achieve weight goal < 200 pounds initially, < 190 pounds ideally.         Subjective:    Dudley Kiran is seen today for hip pain.  Left more so than right.  History of arthritis of hips likely.  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  Does have history of four-vessel CABG 2010.  Does have episodes of shortness of breath in which she might awaken feeling short of breath or have episodes during the day in which she will also sneeze several times.  Uncertain allergy history.  Has not been taking lisinopril 5 mg daily metoprolol succinate 25 mg using half tablet daily since running out.  Declines statin therapy after using previously and causing \"blood in my stool\" as well as bloody nose.  Pipestem that it made his arthritis worse.  Continues aspirin 81 mg daily.  No ankle swelling.  Comprehensive review of systems as above otherwise all negative.       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    Past Surgical History:   Procedure Laterality Date     OTHER SURGICAL HISTORY  2010    Bypass        Family History   Problem Relation Age of Onset     Diabetes No family hx of      Diabetes No family hx of      " Breast Cancer No family hx of      Colon Cancer No family hx of      Prostate Cancer No family hx of      Hypertension No family hx of         Past Medical History:   Diagnosis Date     HTN (hypertension)         Social History     Tobacco Use     Smoking status: Never Smoker     Smokeless tobacco: Never Used   Substance Use Topics     Alcohol use: No     Alcohol/week: 0.0 standard drinks     Drug use: None        Current Outpatient Medications   Medication Sig Dispense Refill     aspirin (ASA) 81 MG EC tablet Take 1 tablet (81 mg) by mouth daily 90 tablet 1     lisinopril (ZESTRIL) 5 MG tablet Take 1 tablet (5 mg) by mouth daily 90 tablet 3     metoprolol succinate ER (TOPROL-XL) 25 MG 24 hr tablet Take 0.5 tablets (12.5 mg) by mouth daily 90 tablet 3          Objective:    Vitals:    08/10/21 0822   BP: 120/80   Pulse: 84   SpO2: 95%   Weight: 94.3 kg (208 lb)      Body mass index is 32.58 kg/m .    Alert.  No apparent distress.  Chest clear.  Cardiac exam regular rate and rhythm.  No cardiac ectopy or murmur.  Extremities warm and dry without peripheral edema.  Significant restriction in hip flexion as well as internal and external range of motion of hips left greater than right.      This note has been dictated using voice recognition software and as a result may contain minor grammatical errors and unintended word substitutions.

## 2021-09-19 ENCOUNTER — HEALTH MAINTENANCE LETTER (OUTPATIENT)
Age: 66
End: 2021-09-19

## 2021-10-01 DIAGNOSIS — Z11.59 ENCOUNTER FOR SCREENING FOR OTHER VIRAL DISEASES: ICD-10-CM

## 2021-10-14 ENCOUNTER — OFFICE VISIT (OUTPATIENT)
Dept: FAMILY MEDICINE | Facility: CLINIC | Age: 66
End: 2021-10-14
Payer: COMMERCIAL

## 2021-10-14 VITALS
BODY MASS INDEX: 32.25 KG/M2 | TEMPERATURE: 98.6 F | SYSTOLIC BLOOD PRESSURE: 118 MMHG | RESPIRATION RATE: 20 BRPM | HEART RATE: 71 BPM | WEIGHT: 205.5 LBS | DIASTOLIC BLOOD PRESSURE: 70 MMHG | OXYGEN SATURATION: 95 % | HEIGHT: 67 IN

## 2021-10-14 DIAGNOSIS — E78.00 ELEVATED CHOLESTEROL: ICD-10-CM

## 2021-10-14 DIAGNOSIS — Z01.818 PREOPERATIVE EXAMINATION: Primary | ICD-10-CM

## 2021-10-14 DIAGNOSIS — I10 ESSENTIAL HYPERTENSION, BENIGN: ICD-10-CM

## 2021-10-14 DIAGNOSIS — Z23 HIGH PRIORITY FOR 2019-NCOV VACCINE: ICD-10-CM

## 2021-10-14 DIAGNOSIS — M16.12 PRIMARY OSTEOARTHRITIS OF LEFT HIP: ICD-10-CM

## 2021-10-14 DIAGNOSIS — E66.09 CLASS 1 OBESITY DUE TO EXCESS CALORIES WITH SERIOUS COMORBIDITY AND BODY MASS INDEX (BMI) OF 32.0 TO 32.9 IN ADULT: ICD-10-CM

## 2021-10-14 DIAGNOSIS — E66.811 CLASS 1 OBESITY DUE TO EXCESS CALORIES WITH SERIOUS COMORBIDITY AND BODY MASS INDEX (BMI) OF 32.0 TO 32.9 IN ADULT: ICD-10-CM

## 2021-10-14 DIAGNOSIS — I25.10 CORONARY ARTERY DISEASE INVOLVING NATIVE CORONARY ARTERY OF NATIVE HEART WITHOUT ANGINA PECTORIS: ICD-10-CM

## 2021-10-14 PROBLEM — R03.0 ELEVATED BLOOD PRESSURE READING WITHOUT DIAGNOSIS OF HYPERTENSION: Status: RESOLVED | Noted: 2019-02-01 | Resolved: 2021-10-14

## 2021-10-14 PROBLEM — E66.3 OVERWEIGHT (BMI 25.0-29.9): Status: RESOLVED | Noted: 2019-12-18 | Resolved: 2021-10-14

## 2021-10-14 LAB
ANION GAP SERPL CALCULATED.3IONS-SCNC: 9 MMOL/L (ref 5–18)
BUN SERPL-MCNC: 11 MG/DL (ref 8–22)
CALCIUM SERPL-MCNC: 9.7 MG/DL (ref 8.5–10.5)
CHLORIDE BLD-SCNC: 107 MMOL/L (ref 98–107)
CO2 SERPL-SCNC: 24 MMOL/L (ref 22–31)
CREAT SERPL-MCNC: 0.94 MG/DL (ref 0.7–1.3)
ERYTHROCYTE [DISTWIDTH] IN BLOOD BY AUTOMATED COUNT: 13 % (ref 10–15)
GFR SERPL CREATININE-BSD FRML MDRD: 85 ML/MIN/1.73M2
GLUCOSE BLD-MCNC: 102 MG/DL (ref 70–125)
HCT VFR BLD AUTO: 41.5 % (ref 40–53)
HGB BLD-MCNC: 14.6 G/DL (ref 13.3–17.7)
MCH RBC QN AUTO: 30.3 PG (ref 26.5–33)
MCHC RBC AUTO-ENTMCNC: 35.2 G/DL (ref 31.5–36.5)
MCV RBC AUTO: 86 FL (ref 78–100)
PLATELET # BLD AUTO: 310 10E3/UL (ref 150–450)
POTASSIUM BLD-SCNC: 4.3 MMOL/L (ref 3.5–5)
RBC # BLD AUTO: 4.82 10E6/UL (ref 4.4–5.9)
SODIUM SERPL-SCNC: 140 MMOL/L (ref 136–145)
WBC # BLD AUTO: 9.9 10E3/UL (ref 4–11)

## 2021-10-14 PROCEDURE — 91300 COVID-19,PF,PFIZER (12+ YRS): CPT | Performed by: FAMILY MEDICINE

## 2021-10-14 PROCEDURE — 93010 ELECTROCARDIOGRAM REPORT: CPT | Performed by: GENERAL ACUTE CARE HOSPITAL

## 2021-10-14 PROCEDURE — 93005 ELECTROCARDIOGRAM TRACING: CPT | Performed by: FAMILY MEDICINE

## 2021-10-14 PROCEDURE — 85027 COMPLETE CBC AUTOMATED: CPT | Performed by: FAMILY MEDICINE

## 2021-10-14 PROCEDURE — 0004A COVID-19,PF,PFIZER (12+ YRS): CPT | Performed by: FAMILY MEDICINE

## 2021-10-14 PROCEDURE — 36415 COLL VENOUS BLD VENIPUNCTURE: CPT | Performed by: FAMILY MEDICINE

## 2021-10-14 PROCEDURE — 99214 OFFICE O/P EST MOD 30 MIN: CPT | Performed by: FAMILY MEDICINE

## 2021-10-14 PROCEDURE — 80048 BASIC METABOLIC PNL TOTAL CA: CPT | Performed by: FAMILY MEDICINE

## 2021-10-14 ASSESSMENT — MIFFLIN-ST. JEOR: SCORE: 1675.77

## 2021-10-14 NOTE — H&P (VIEW-ONLY)
[facility]  [address]  [facility] [address]  Phone: [phone]  Fax: [phone]  Primary Provider: Charlene Loredo        PREOPERATIVE EVALUATION:  Today's date: 10/14/2021     Dudley Kiran is a 65 year old male who presents for a preoperative evaluation.    Surgical Information:  Surgery/Procedure: Left total hip arthroplasty  Surgery Location: [facility]  Surgeon: Dr Gray  Surgery Date: 10-27-21  Time of Surgery: TBD  Where patient plans to recover: At home with family  Fax number for surgical facility: Note does not need to be faxed, will be available electronically in Epic.    Type of Anesthesia Anticipated: to be determined    Preoperative examination  Preoperative examination completed.  Will complete COVID-19 PCR testing 2 to 4 days prior to scheduled surgery.  CBC completed today without evidence for anemia.  Okay for scheduled surgery as noted October 27, 2021.  - CBC with platelets  - CBC with platelets  - Asymptomatic COVID-19 Virus (Coronavirus) by PCR    Primary osteoarthritis of left hip  Bilateral hip osteoarthritis noted.  Scheduled left total hip arthroplasty secondary to primary osteoarthritis of left hip.    Coronary artery disease involving native coronary artery of native heart without angina pectoris  CAD history status post four-vessel CABG March 2010.  Asymptomatic.  Normal electrocardiogram today.  No activity restrictions noted.    Essential hypertension, benign  Continues use of metoprolol succinate 25 mg using half tablet daily and lisinopril 5 mg daily.  Okay to take morning of surgery.  Med monitoring completed.  - EKG 12-lead, tracing only  - Basic metabolic panel  - Basic metabolic panel    Elevated cholesterol  Patient declines statin therapy and is no longer on atorvastatin 40 mg daily stating that he had bright red blood in his stool as well as bloody nose.  Seem to make his arthritis worse.  Does admit to continuing aspirin  "however this never caused problems with bleeding per patient.  Bleeding resolved after discontinuation of statin in distant past.    Class 1 obesity due to excess calories with serious comorbidity and body mass index (BMI) of 32.0 to 32.9 in adult  Dietary and exercise modifications for weight goal less than 200 pounds initially, less than 190 pounds ideally.    High priority for 2019-nCoV vaccine  Covid-19 Pfizer third shot booster provided today.  - COVID-19,PF,PFIZER       Subjective     HPI related to upcoming procedure: Preoperative examination completed.  Primary osteoarthritis left hip with scheduled total hip arthroplasty as noted.  History of CAD reviewed with four-vessel CABG 2010.  Asymptomatic.  Doing well since this time.  Underlying hypertension and hypercholesterolemia reviewed with medications including lisinopril 5 mg daily and metoprolol succinate 25 mg using half tablet daily continuing for hypertension management.  Aspirin 81 mg daily continues.  Had been prescribed statin therapy previously however discontinued due to side effects described and elects to remain off statin therapy.  Patient unaware of concerns for obstructive sleep apnea.  Occasional lightheadedness lasting a few hours with 3 episodes in the last year without described palpitations.  No orthopnea or PND.  No peripheral edema concerns.  Past medical social and family history reviewed and updated as noted below.  Comprehensive review of systems as above otherwise all negative.       1 son - Dudley (31)   2 daughters - Stephanie (27) and Saundra (24)   Etoh - none   Smoking - never   Surgeries - 4 vessel bypass at [facility] in    Hospitalizations: for above surgery   Mother -  - unknown history   Father -  age 68 due to DM and EtOHism   5 sisters -   1 brother - [place]   Cousin - \"Guillaume\"   Retired (10/23/20) - [employer] - 40-50 hours a week      Preop Questions 10/8/2021   1. " Have you ever had a heart attack or stroke? No   2. Have you ever had surgery on your heart or blood vessels, such as a stent placement, a coronary artery bypass, or surgery on an artery in your head, neck, heart, or legs? YES - 4 vessel CABG March, 2010   3. Do you have chest pain with activity? No   4. Do you have a history of  heart failure? No   5. Do you currently have a cold, bronchitis or symptoms of other infection? No   6. Do you have a cough, shortness of breath, or wheezing? YES - occasional (? Allergy)   7. Do you or anyone in your family have previous history of blood clots? No   8. Do you or does anyone in your family have a serious bleeding problem such as prolonged bleeding following surgeries or cuts? No   9. Have you ever had problems with anemia or been told to take iron pills? No   10. Have you had any abnormal blood loss such as black, tarry or bloody stools? No   11. Have you ever had a blood transfusion? No   12. Are you willing to have a blood transfusion if it is medically needed before, during, or after your surgery? Yes   13. Have you or any of your relatives ever had problems with anesthesia? No   14. Do you have sleep apnea, excessive snoring or daytime drowsiness? UNKNOWN   15. Do you have any artifical heart valves or other implanted medical devices like a pacemaker, defibrillator, or continuous glucose monitor? No   16. Do you have artificial joints? No   17. Are you allergic to latex? No       Health Care Directive:  Patient does not have a Health Care Directive or Living Will: Discussed advance care planning with patient; however, patient declined at this time.    Preoperative Review of :   reviewed - no record of controlled substances prescribed.      Status of Chronic Conditions:  See problem list for active medical problems.  Problems all longstanding and stable, except as noted/documented.  See ROS for pertinent symptoms related to these conditions.    CAD - Patient has a  longstanding history of moderate-severe CAD. Patient denies recent chest pain or NTG use, denies exercise induced dyspnea or PND. Last Stress test unknown, EKG today.     HYPERTENSION - Patient has longstanding history of HTN , currently denies any symptoms referable to elevated blood pressure. Specifically denies chest pain, palpitations, dyspnea, orthopnea, PND or peripheral edema. Blood pressure readings have been in normal range. Current medication regimen is as listed below. Patient denies any side effects of medication.       Review of Systems  CONSTITUTIONAL: NEGATIVE for fever, chills, change in weight  INTEGUMENTARY/SKIN: NEGATIVE for worrisome rashes, moles or lesions  EYES: NEGATIVE for vision changes or irritation  ENT/MOUTH: NEGATIVE for ear, mouth and throat problems  RESP: NEGATIVE for significant cough or SOB  CV: NEGATIVE for chest pain, palpitations or peripheral edema  GI: NEGATIVE for nausea, abdominal pain, heartburn, or change in bowel habits  : NEGATIVE for frequency, dysuria, or hematuria  MUSCULOSKELETAL: NEGATIVE for significant arthralgias or myalgia  NEURO: NEGATIVE for weakness, dizziness or paresthesias  ENDOCRINE: NEGATIVE for temperature intolerance, skin/hair changes  HEME: NEGATIVE for bleeding problems  PSYCHIATRIC: NEGATIVE for changes in mood or affect    Patient Active Problem List    Diagnosis Date Noted     Class 1 obesity due to excess calories with serious comorbidity and body mass index (BMI) of 32.0 to 32.9 in adult 08/10/2021     Priority: Medium     Essential hypertension, benign 12/18/2019     Priority: Medium     Primary osteoarthritis of both hips 12/18/2019     Priority: Medium     Elevated cholesterol 02/15/2019     Priority: Medium     Squamous blepharitis of right upper eyelid 02/01/2019     Priority: Medium     Periorbital dermatitis 02/01/2019     Priority: Medium     History of prediabetes 02/01/2019     Priority: Medium     Coronary atherosclerosis  "12/28/2012     Priority: Medium     Problem list name updated by automated process. Provider to review       Health Care Home 12/28/2012     Priority: Medium     Tier 0  DX V65.8 REPLACED WITH [id number] [facility] CARE HOME (04/08/2013)        Past Medical History:   Diagnosis Date     HTN (hypertension)      Past Surgical History:   Procedure Laterality Date     OTHER SURGICAL HISTORY  2010    Bypass     Current Outpatient Medications   Medication Sig Dispense Refill     aspirin (ASA) 81 MG EC tablet Take 1 tablet (81 mg) by mouth daily 90 tablet 1     lisinopril (ZESTRIL) 5 MG tablet Take 1 tablet (5 mg) by mouth daily 90 tablet 3     metoprolol succinate ER (TOPROL-XL) 25 MG 24 hr tablet Take 0.5 tablets (12.5 mg) by mouth daily 90 tablet 3       Allergies   Allergen Reactions     No Known Allergies         Social History     Tobacco Use     Smoking status: Never Smoker     Smokeless tobacco: Never Used   Substance Use Topics     Alcohol use: No     Alcohol/week: 0.0 standard drinks     Family History   Problem Relation Age of Onset     Diabetes No family hx of      Diabetes No family hx of      Breast Cancer No family hx of      Colon Cancer No family hx of      Prostate Cancer No family hx of      Hypertension No family hx of      History   Drug Use Not on file         Objective     /70   Pulse 71   Temp 98.6  F (37  C) (Oral)   Resp 20   Ht 1.702 m (5' 7\")   Wt 93.2 kg (205 lb 8 oz)   SpO2 95%   BMI 32.19 kg/m      Physical Exam    GENERAL APPEARANCE: healthy, alert and no distress.  BMI 32.19.     EYES: EOMI,  PERRL     HENT: ear canals and TM's normal and nose and mouth without ulcers or lesions     NECK: no adenopathy, no asymmetry, masses, or scars and thyroid normal to palpation     RESP: lungs clear to auscultation - no rales, rhonchi or wheezes     CV: regular rates and rhythm, normal S1 S2, no S3 or S4 and no murmur, click or rub     ABDOMEN:  soft, nontender, no HSM or masses and bowel sounds " normal     MS: extremities normal- no gross deformities noted, no evidence of inflammation in joints, bilateral hip osteoarthritis with decreased range of motion previously noted.     SKIN: no suspicious lesions or rashes     NEURO: Normal strength and tone, sensory exam grossly normal, mentation intact and speech normal     PSYCH: mentation appears normal. and affect normal/bright     LYMPHATICS: No cervical adenopathy    Recent Labs   Lab Test 12/18/19  1550      POTASSIUM 4.3   CR 0.86   A1C 5.8        Diagnostics:  Labs pending at this time.  Results will be reviewed when available.  Recent Results (from the past 24 hour(s))   CBC with platelets    Collection Time: 10/14/21  4:55 PM   Result Value Ref Range    WBC Count 9.9 4.0 - 11.0 10e3/uL    RBC Count 4.82 4.40 - 5.90 10e6/uL    Hemoglobin 14.6 13.3 - 17.7 g/dL    Hematocrit 41.5 40.0 - 53.0 %    MCV 86 78 - 100 fL    MCH 30.3 26.5 - 33.0 pg    MCHC 35.2 31.5 - 36.5 g/dL    RDW 13.0 10.0 - 15.0 %    Platelet Count 310 150 - 450 10e3/uL      EKG required for known coronary heart disease and not completed in the last 90 days.     Revised Cardiac Risk Index (RCRI):  The patient has the following serious cardiovascular risks for perioperative complications:   - High risk surgery (>5% cardiac complication risk) = 1 point   - Coronary Artery Disease (MI, positive stress test, angina, Qs on EKG) = 1 point     RCRI Interpretation: 2 points: Class III (moderate risk - 6.6% complication rate)     Estimated Functional Capacity: Performs 4 METS exercise without symptoms (e.g., light housework, stairs, 4 mph walk, 7 mph bike, slow step dance)           Signed Electronically by: Tima Davis MD  Copy of this evaluation report is provided to requesting physician.

## 2021-10-14 NOTE — PATIENT INSTRUCTIONS

## 2021-10-14 NOTE — PROGRESS NOTES
[facility]  [address]  [facility] [address]  Phone: [phone]  Fax: [phone]  Primary Provider: Charlene Loredo        PREOPERATIVE EVALUATION:  Today's date: 10/14/2021     Dudley Kiran is a 65 year old male who presents for a preoperative evaluation.    Surgical Information:  Surgery/Procedure: Left total hip arthroplasty  Surgery Location: [facility]  Surgeon: Dr Gray  Surgery Date: 10-27-21  Time of Surgery: TBD  Where patient plans to recover: At home with family  Fax number for surgical facility: Note does not need to be faxed, will be available electronically in Epic.    Type of Anesthesia Anticipated: to be determined    Preoperative examination  Preoperative examination completed.  Will complete COVID-19 PCR testing 2 to 4 days prior to scheduled surgery.  CBC completed today without evidence for anemia.  Okay for scheduled surgery as noted October 27, 2021.  - CBC with platelets  - CBC with platelets  - Asymptomatic COVID-19 Virus (Coronavirus) by PCR    Primary osteoarthritis of left hip  Bilateral hip osteoarthritis noted.  Scheduled left total hip arthroplasty secondary to primary osteoarthritis of left hip.    Coronary artery disease involving native coronary artery of native heart without angina pectoris  CAD history status post four-vessel CABG March 2010.  Asymptomatic.  Normal electrocardiogram today.  No activity restrictions noted.    Essential hypertension, benign  Continues use of metoprolol succinate 25 mg using half tablet daily and lisinopril 5 mg daily.  Okay to take morning of surgery.  Med monitoring completed.  - EKG 12-lead, tracing only  - Basic metabolic panel  - Basic metabolic panel    Elevated cholesterol  Patient declines statin therapy and is no longer on atorvastatin 40 mg daily stating that he had bright red blood in his stool as well as bloody nose.  Seem to make his arthritis worse.  Does admit to continuing aspirin  "however this never caused problems with bleeding per patient.  Bleeding resolved after discontinuation of statin in distant past.    Class 1 obesity due to excess calories with serious comorbidity and body mass index (BMI) of 32.0 to 32.9 in adult  Dietary and exercise modifications for weight goal less than 200 pounds initially, less than 190 pounds ideally.    High priority for 2019-nCoV vaccine  Covid-19 Pfizer third shot booster provided today.  - COVID-19,PF,PFIZER       Subjective     HPI related to upcoming procedure: Preoperative examination completed.  Primary osteoarthritis left hip with scheduled total hip arthroplasty as noted.  History of CAD reviewed with four-vessel CABG 2010.  Asymptomatic.  Doing well since this time.  Underlying hypertension and hypercholesterolemia reviewed with medications including lisinopril 5 mg daily and metoprolol succinate 25 mg using half tablet daily continuing for hypertension management.  Aspirin 81 mg daily continues.  Had been prescribed statin therapy previously however discontinued due to side effects described and elects to remain off statin therapy.  Patient unaware of concerns for obstructive sleep apnea.  Occasional lightheadedness lasting a few hours with 3 episodes in the last year without described palpitations.  No orthopnea or PND.  No peripheral edema concerns.  Past medical social and family history reviewed and updated as noted below.  Comprehensive review of systems as above otherwise all negative.       1 son - Dudley (31)   2 daughters - Stephanie (27) and Saundra (24)   Etoh - none   Smoking - never   Surgeries - 4 vessel bypass at [facility] in    Hospitalizations: for above surgery   Mother -  - unknown history   Father -  age 68 due to DM and EtOHism   5 sisters -   1 brother - [place]   Cousin - \"Guillaume\"   Retired (10/23/20) - [employer] - 40-50 hours a week      Preop Questions 10/8/2021   1. " Have you ever had a heart attack or stroke? No   2. Have you ever had surgery on your heart or blood vessels, such as a stent placement, a coronary artery bypass, or surgery on an artery in your head, neck, heart, or legs? YES - 4 vessel CABG March, 2010   3. Do you have chest pain with activity? No   4. Do you have a history of  heart failure? No   5. Do you currently have a cold, bronchitis or symptoms of other infection? No   6. Do you have a cough, shortness of breath, or wheezing? YES - occasional (? Allergy)   7. Do you or anyone in your family have previous history of blood clots? No   8. Do you or does anyone in your family have a serious bleeding problem such as prolonged bleeding following surgeries or cuts? No   9. Have you ever had problems with anemia or been told to take iron pills? No   10. Have you had any abnormal blood loss such as black, tarry or bloody stools? No   11. Have you ever had a blood transfusion? No   12. Are you willing to have a blood transfusion if it is medically needed before, during, or after your surgery? Yes   13. Have you or any of your relatives ever had problems with anesthesia? No   14. Do you have sleep apnea, excessive snoring or daytime drowsiness? UNKNOWN   15. Do you have any artifical heart valves or other implanted medical devices like a pacemaker, defibrillator, or continuous glucose monitor? No   16. Do you have artificial joints? No   17. Are you allergic to latex? No       Health Care Directive:  Patient does not have a Health Care Directive or Living Will: Discussed advance care planning with patient; however, patient declined at this time.    Preoperative Review of :   reviewed - no record of controlled substances prescribed.      Status of Chronic Conditions:  See problem list for active medical problems.  Problems all longstanding and stable, except as noted/documented.  See ROS for pertinent symptoms related to these conditions.    CAD - Patient has a  longstanding history of moderate-severe CAD. Patient denies recent chest pain or NTG use, denies exercise induced dyspnea or PND. Last Stress test unknown, EKG today.     HYPERTENSION - Patient has longstanding history of HTN , currently denies any symptoms referable to elevated blood pressure. Specifically denies chest pain, palpitations, dyspnea, orthopnea, PND or peripheral edema. Blood pressure readings have been in normal range. Current medication regimen is as listed below. Patient denies any side effects of medication.       Review of Systems  CONSTITUTIONAL: NEGATIVE for fever, chills, change in weight  INTEGUMENTARY/SKIN: NEGATIVE for worrisome rashes, moles or lesions  EYES: NEGATIVE for vision changes or irritation  ENT/MOUTH: NEGATIVE for ear, mouth and throat problems  RESP: NEGATIVE for significant cough or SOB  CV: NEGATIVE for chest pain, palpitations or peripheral edema  GI: NEGATIVE for nausea, abdominal pain, heartburn, or change in bowel habits  : NEGATIVE for frequency, dysuria, or hematuria  MUSCULOSKELETAL: NEGATIVE for significant arthralgias or myalgia  NEURO: NEGATIVE for weakness, dizziness or paresthesias  ENDOCRINE: NEGATIVE for temperature intolerance, skin/hair changes  HEME: NEGATIVE for bleeding problems  PSYCHIATRIC: NEGATIVE for changes in mood or affect    Patient Active Problem List    Diagnosis Date Noted     Class 1 obesity due to excess calories with serious comorbidity and body mass index (BMI) of 32.0 to 32.9 in adult 08/10/2021     Priority: Medium     Essential hypertension, benign 12/18/2019     Priority: Medium     Primary osteoarthritis of both hips 12/18/2019     Priority: Medium     Elevated cholesterol 02/15/2019     Priority: Medium     Squamous blepharitis of right upper eyelid 02/01/2019     Priority: Medium     Periorbital dermatitis 02/01/2019     Priority: Medium     History of prediabetes 02/01/2019     Priority: Medium     Coronary atherosclerosis  "12/28/2012     Priority: Medium     Problem list name updated by automated process. Provider to review       Health Care Home 12/28/2012     Priority: Medium     Tier 0  DX V65.8 REPLACED WITH [id number] [facility] CARE HOME (04/08/2013)        Past Medical History:   Diagnosis Date     HTN (hypertension)      Past Surgical History:   Procedure Laterality Date     OTHER SURGICAL HISTORY  2010    Bypass     Current Outpatient Medications   Medication Sig Dispense Refill     aspirin (ASA) 81 MG EC tablet Take 1 tablet (81 mg) by mouth daily 90 tablet 1     lisinopril (ZESTRIL) 5 MG tablet Take 1 tablet (5 mg) by mouth daily 90 tablet 3     metoprolol succinate ER (TOPROL-XL) 25 MG 24 hr tablet Take 0.5 tablets (12.5 mg) by mouth daily 90 tablet 3       Allergies   Allergen Reactions     No Known Allergies         Social History     Tobacco Use     Smoking status: Never Smoker     Smokeless tobacco: Never Used   Substance Use Topics     Alcohol use: No     Alcohol/week: 0.0 standard drinks     Family History   Problem Relation Age of Onset     Diabetes No family hx of      Diabetes No family hx of      Breast Cancer No family hx of      Colon Cancer No family hx of      Prostate Cancer No family hx of      Hypertension No family hx of      History   Drug Use Not on file         Objective     /70   Pulse 71   Temp 98.6  F (37  C) (Oral)   Resp 20   Ht 1.702 m (5' 7\")   Wt 93.2 kg (205 lb 8 oz)   SpO2 95%   BMI 32.19 kg/m      Physical Exam    GENERAL APPEARANCE: healthy, alert and no distress.  BMI 32.19.     EYES: EOMI,  PERRL     HENT: ear canals and TM's normal and nose and mouth without ulcers or lesions     NECK: no adenopathy, no asymmetry, masses, or scars and thyroid normal to palpation     RESP: lungs clear to auscultation - no rales, rhonchi or wheezes     CV: regular rates and rhythm, normal S1 S2, no S3 or S4 and no murmur, click or rub     ABDOMEN:  soft, nontender, no HSM or masses and bowel sounds " normal     MS: extremities normal- no gross deformities noted, no evidence of inflammation in joints, bilateral hip osteoarthritis with decreased range of motion previously noted.     SKIN: no suspicious lesions or rashes     NEURO: Normal strength and tone, sensory exam grossly normal, mentation intact and speech normal     PSYCH: mentation appears normal. and affect normal/bright     LYMPHATICS: No cervical adenopathy    Recent Labs   Lab Test 12/18/19  1550      POTASSIUM 4.3   CR 0.86   A1C 5.8        Diagnostics:  Labs pending at this time.  Results will be reviewed when available.  Recent Results (from the past 24 hour(s))   CBC with platelets    Collection Time: 10/14/21  4:55 PM   Result Value Ref Range    WBC Count 9.9 4.0 - 11.0 10e3/uL    RBC Count 4.82 4.40 - 5.90 10e6/uL    Hemoglobin 14.6 13.3 - 17.7 g/dL    Hematocrit 41.5 40.0 - 53.0 %    MCV 86 78 - 100 fL    MCH 30.3 26.5 - 33.0 pg    MCHC 35.2 31.5 - 36.5 g/dL    RDW 13.0 10.0 - 15.0 %    Platelet Count 310 150 - 450 10e3/uL      EKG required for known coronary heart disease and not completed in the last 90 days.     Revised Cardiac Risk Index (RCRI):  The patient has the following serious cardiovascular risks for perioperative complications:   - High risk surgery (>5% cardiac complication risk) = 1 point   - Coronary Artery Disease (MI, positive stress test, angina, Qs on EKG) = 1 point     RCRI Interpretation: 2 points: Class III (moderate risk - 6.6% complication rate)     Estimated Functional Capacity: Performs 4 METS exercise without symptoms (e.g., light housework, stairs, 4 mph walk, 7 mph bike, slow step dance)           Signed Electronically by: Tima Davis MD  Copy of this evaluation report is provided to requesting physician.

## 2021-10-20 NOTE — PROVIDER NOTIFICATION
Plans to discharge on POD 1 in the morning with his son, Dudley. His son will be staying with him and he also lives with 2 cousins.       10/20/21 1434   Discharge Planning   Patient/Family Anticipates Transition to home   Concerns to be Addressed no discharge needs identified   Living Arrangements   People in home other relative(s)  (Cousins live with him)   Type of Residence Private Residence   Is your private residence a single family home or apartment? Single family home   Number of Stairs, Within Home, Primary 3   Stair Railings, Within Home, Primary railings safe and in good condition   Once home, are you able to live on one level? Yes   Which level? Main Level   Bathroom Shower/Tub Walk-in shower   Equipment Currently Used at Home grab bar, tub/shower;shower chair  (Has a cane and walker to use after surgery)   Support System   Support Systems Children;Family Members  (Son, Dudley, and Cousins, Schuyler and Dina)   Do you have someone available to stay with you one or two nights once you are home? Yes   Education   Patient attended total joint pre-op class/received pre-op teaching  email/phone call

## 2021-10-24 ENCOUNTER — LAB (OUTPATIENT)
Dept: URGENT CARE | Facility: URGENT CARE | Age: 66
End: 2021-10-24
Attending: ORTHOPAEDIC SURGERY
Payer: COMMERCIAL

## 2021-10-24 DIAGNOSIS — Z11.59 ENCOUNTER FOR SCREENING FOR OTHER VIRAL DISEASES: ICD-10-CM

## 2021-10-24 LAB — SARS-COV-2 RNA RESP QL NAA+PROBE: NEGATIVE

## 2021-10-24 PROCEDURE — U0003 INFECTIOUS AGENT DETECTION BY NUCLEIC ACID (DNA OR RNA); SEVERE ACUTE RESPIRATORY SYNDROME CORONAVIRUS 2 (SARS-COV-2) (CORONAVIRUS DISEASE [COVID-19]), AMPLIFIED PROBE TECHNIQUE, MAKING USE OF HIGH THROUGHPUT TECHNOLOGIES AS DESCRIBED BY CMS-2020-01-R: HCPCS

## 2021-10-24 PROCEDURE — U0005 INFEC AGEN DETEC AMPLI PROBE: HCPCS

## 2021-10-25 NOTE — TREATMENT PLAN
Orthopedic Surgery Pre-Op Plan: Dudley Kiran  pre-op review. This is NOT an H&P   Surgeon: Dr. Little   [facility]: [facility]  Name of Surgery: Left Total Hip Arthroplasty   Date of Surgery: 10/27/21   H&P: Completed 10/14/21 by Dr. Tima Davis at [facility]  History of ASA, NSAIDS, vitamin and/or herbal supplements within 10 days: Yes- on ASA 81 mg daily for CAD- S/P CABG in 2010. Instructed to hold ASA for 7 days before surgery (last took 10/21/21).   History of blood thinners: No    Plan:   1) Discharge Plan: Home morning of POD 1 with assist of his Son, Dudley and his Cousins (who live with him). Please see Discharge Planning section near bottom of this note for further details.     2) Coronary Artery Disease: S/P CABG (4 vessel) in March 2010: Doing well post-CABG. Denies any chest pain/chest discomfort, COONEY, PND, palpitations, dizziness or syncope. Reportedly had normal EKG at pre-op exam but we have not yet received the actual tracing- this has been requested. On ASA 81 mg daily (currently holding for surgery) but should be resumed afterwards when safe per Dr. Little. Not on statin due to side effects.    3) Hypercholesterolemia: Patient self-discontinued statin several years ago following side effects of bloody stools and bloody nose. Does not wish to restart on statin therapy.     4) Hypertension: well-controlled on lisinopril and metoprolol. Instructed to hold lisinopril on the morning of surgery but should continue taking metoprolol.     5) Prediabetes: Last A1C 5.8 on 12/18/2019. Blood glucose normal at 102 on 10/14/21 at pre-op exam. Appears well-controlled without any medications but will check BG and A1C on day of surgery.     Patient appears medically optimized for upcoming surgery. I would recommend Hospitalist Consult to assist with medical management. Please call me below with any questions on this patient.       Review of Systems Notable for: Coronary artery disease-s/p  CABG in 2010, hypercholesterolemia, hypertension, prediabetes.    Past Medical History:   Past Medical History:   Diagnosis Date     Arthritis      Coronary artery disease      HTN (hypertension)      Prediabetes      Past Surgical History:   Procedure Laterality Date     BYPASS GRAFT ARTERY CORONARY  2010       Current Medications:  Patient's Medications   New Prescriptions    No medications on file   Previous Medications    ASPIRIN (ASA) 81 MG EC TABLET    Take 1 tablet (81 mg) by mouth daily    LISINOPRIL (ZESTRIL) 5 MG TABLET    Take 1 tablet (5 mg) by mouth daily    METOPROLOL SUCCINATE ER (TOPROL-XL) 25 MG 24 HR TABLET    Take 0.5 tablets (12.5 mg) by mouth daily   Modified Medications    No medications on file   Discontinued Medications    No medications on file       ALLERGIES:  Allergies   Allergen Reactions     No Known Allergies        Social History  Social History     Tobacco Use     Smoking status: Never Smoker     Smokeless tobacco: Never Used   Substance Use Topics     Alcohol use: No     Alcohol/week: 0.0 standard drinks     Drug use: Never       Any Abnormal Recent Diagnostics? No  History of prediabetes but no A1C since 2019: Last A1C 5.8 on 12/18/2019.  on 10/14/21 at pre-op exam. Will check BG and A1C on day of surgery.     Discharge Planning:   Plans to discharge on POD 1 in the morning with his son, Dudley. His son will be staying with him and he also lives with 2 cousins.          10/20/21 1434   Discharge Planning   Patient/Family Anticipates Transition to home   Concerns to be Addressed no discharge needs identified   Living Arrangements   People in home other relative(s)  (Cousins live with him)   Type of Residence Private Residence   Is your private residence a single family home or apartment? Single family home   Number of Stairs, Within Home, Primary 3   Stair Railings, Within Home, Primary railings safe and in good condition   Once home, are you able to live on one level? Yes    Which level? Main Level   Bathroom Shower/Tub Walk-in shower   Equipment Currently Used at Home grab bar, tub/shower;shower chair  (Has a cane and walker to use after surgery)   Support System   Support Systems Children;Family Members  (Son, Dudley, and Cousins, Schuyler and Dina)   Do you have someone available to stay with you one or two nights once you are home? Yes   Education   Patient attended total joint pre-op class/received pre-op teaching  email/phone call            AMADA Celeste, CNP   Advanced Practice Nurse Navigator- Orthopedics  [facility]   Phone: [phone]

## 2021-10-27 ENCOUNTER — HOSPITAL ENCOUNTER (OUTPATIENT)
Facility: CLINIC | Age: 66
Discharge: HOME | End: 2021-10-28
Attending: ORTHOPAEDIC SURGERY | Admitting: ORTHOPAEDIC SURGERY
Payer: COMMERCIAL

## 2021-10-27 ENCOUNTER — APPOINTMENT (OUTPATIENT)
Dept: RADIOLOGY | Facility: CLINIC | Age: 66
End: 2021-10-27
Attending: ORTHOPAEDIC SURGERY
Payer: COMMERCIAL

## 2021-10-27 ENCOUNTER — ANESTHESIA EVENT (OUTPATIENT)
Dept: SURGERY | Facility: CLINIC | Age: 66
End: 2021-10-27
Payer: COMMERCIAL

## 2021-10-27 ENCOUNTER — ANESTHESIA (OUTPATIENT)
Dept: SURGERY | Facility: CLINIC | Age: 66
End: 2021-10-27
Payer: COMMERCIAL

## 2021-10-27 DIAGNOSIS — M16.12 PRIMARY OSTEOARTHRITIS OF LEFT HIP: Primary | ICD-10-CM

## 2021-10-27 PROBLEM — M19.90 OA (OSTEOARTHRITIS): Status: ACTIVE | Noted: 2021-10-27

## 2021-10-27 LAB
GLUCOSE BLDC GLUCOMTR-MCNC: 86 MG/DL (ref 70–99)
HBA1C MFR BLD: 5.7 %

## 2021-10-27 PROCEDURE — 250N000011 HC RX IP 250 OP 636: Performed by: ORTHOPAEDIC SURGERY

## 2021-10-27 PROCEDURE — 250N000013 HC RX MED GY IP 250 OP 250 PS 637: Performed by: PHYSICIAN ASSISTANT

## 2021-10-27 PROCEDURE — 360N000084 HC SURGERY LEVEL 4 W/ FLUORO, PER MIN: Performed by: ORTHOPAEDIC SURGERY

## 2021-10-27 PROCEDURE — C1713 ANCHOR/SCREW BN/BN,TIS/BN: HCPCS | Performed by: ORTHOPAEDIC SURGERY

## 2021-10-27 PROCEDURE — 999N000181 XR SURGERY CARM FLUORO GREATER THAN 5 MIN W STILLS

## 2021-10-27 PROCEDURE — 99207 PR CDG-CODE CATEGORY CHANGED: CPT | Performed by: INTERNAL MEDICINE

## 2021-10-27 PROCEDURE — 250N000011 HC RX IP 250 OP 636: Performed by: PHYSICIAN ASSISTANT

## 2021-10-27 PROCEDURE — 82962 GLUCOSE BLOOD TEST: CPT

## 2021-10-27 PROCEDURE — 710N000010 HC RECOVERY PHASE 1, LEVEL 2, PER MIN: Performed by: ORTHOPAEDIC SURGERY

## 2021-10-27 PROCEDURE — 999N000141 HC STATISTIC PRE-PROCEDURE NURSING ASSESSMENT: Performed by: ORTHOPAEDIC SURGERY

## 2021-10-27 PROCEDURE — 250N000013 HC RX MED GY IP 250 OP 250 PS 637: Performed by: ORTHOPAEDIC SURGERY

## 2021-10-27 PROCEDURE — C1776 JOINT DEVICE (IMPLANTABLE): HCPCS | Performed by: ORTHOPAEDIC SURGERY

## 2021-10-27 PROCEDURE — 250N000009 HC RX 250: Performed by: ANESTHESIOLOGY

## 2021-10-27 PROCEDURE — 250N000011 HC RX IP 250 OP 636: Performed by: NURSE ANESTHETIST, CERTIFIED REGISTERED

## 2021-10-27 PROCEDURE — 83036 HEMOGLOBIN GLYCOSYLATED A1C: CPT | Mod: GZ | Performed by: NURSE PRACTITIONER

## 2021-10-27 PROCEDURE — 258N000003 HC RX IP 258 OP 636: Performed by: ANESTHESIOLOGY

## 2021-10-27 PROCEDURE — 370N000017 HC ANESTHESIA TECHNICAL FEE, PER MIN: Performed by: ORTHOPAEDIC SURGERY

## 2021-10-27 PROCEDURE — 99204 OFFICE O/P NEW MOD 45 MIN: CPT | Performed by: INTERNAL MEDICINE

## 2021-10-27 PROCEDURE — 250N000009 HC RX 250: Performed by: PHYSICIAN ASSISTANT

## 2021-10-27 PROCEDURE — 272N000001 HC OR GENERAL SUPPLY STERILE: Performed by: ORTHOPAEDIC SURGERY

## 2021-10-27 PROCEDURE — 36415 COLL VENOUS BLD VENIPUNCTURE: CPT | Performed by: NURSE PRACTITIONER

## 2021-10-27 PROCEDURE — 258N000003 HC RX IP 258 OP 636: Performed by: NURSE ANESTHETIST, CERTIFIED REGISTERED

## 2021-10-27 DEVICE — BIOLOX DELTA CERAMIC FEMORAL HEAD +1.5 36MM DIA 12/14 TAPER
Type: IMPLANTABLE DEVICE | Site: HIP | Status: FUNCTIONAL
Brand: BIOLOX DELTA

## 2021-10-27 DEVICE — PINNACLE HIP SOLUTIONS ALTRX POLYETHYLENE ACETABULAR LINER +4 NEUTRAL 36MM ID 54MM OD
Type: IMPLANTABLE DEVICE | Site: HIP | Status: FUNCTIONAL
Brand: PINNACLE ALTRX

## 2021-10-27 DEVICE — PINNACLE CANCELLOUS BONE SCREW 6.5MM X 30MM
Type: IMPLANTABLE DEVICE | Site: HIP | Status: FUNCTIONAL
Brand: PINNACLE

## 2021-10-27 DEVICE — PINNACLE GRIPTION ACETABULAR SHELL SECTOR 54MM OD
Type: IMPLANTABLE DEVICE | Site: HIP | Status: FUNCTIONAL
Brand: PINNACLE GRIPTION

## 2021-10-27 DEVICE — IMP STEM FEM DEPUY ACTIS HI COLLAR OFFSET SZ 5MM 1010-11-050: Type: IMPLANTABLE DEVICE | Site: HIP | Status: FUNCTIONAL

## 2021-10-27 RX ORDER — CEFAZOLIN SODIUM 2 G/100ML
2 INJECTION, SOLUTION INTRAVENOUS
Status: COMPLETED | OUTPATIENT
Start: 2021-10-27 | End: 2021-10-27

## 2021-10-27 RX ORDER — ACETAMINOPHEN 325 MG/1
975 TABLET ORAL EVERY 8 HOURS
Status: DISCONTINUED | OUTPATIENT
Start: 2021-10-27 | End: 2021-10-28 | Stop reason: HOSPADM

## 2021-10-27 RX ORDER — FENTANYL CITRATE 50 UG/ML
25 INJECTION, SOLUTION INTRAMUSCULAR; INTRAVENOUS EVERY 5 MIN PRN
Status: DISCONTINUED | OUTPATIENT
Start: 2021-10-27 | End: 2021-10-27 | Stop reason: HOSPADM

## 2021-10-27 RX ORDER — SODIUM CHLORIDE, SODIUM LACTATE, POTASSIUM CHLORIDE, CALCIUM CHLORIDE 600; 310; 30; 20 MG/100ML; MG/100ML; MG/100ML; MG/100ML
INJECTION, SOLUTION INTRAVENOUS CONTINUOUS
Status: DISCONTINUED | OUTPATIENT
Start: 2021-10-27 | End: 2021-10-27 | Stop reason: HOSPADM

## 2021-10-27 RX ORDER — ACETAMINOPHEN 325 MG/1
650 TABLET ORAL EVERY 4 HOURS PRN
Qty: 60 TABLET | Refills: 0 | Status: SHIPPED | OUTPATIENT
Start: 2021-10-27 | End: 2022-02-11

## 2021-10-27 RX ORDER — POLYETHYLENE GLYCOL 3350 17 G/17G
17 POWDER, FOR SOLUTION ORAL DAILY
Status: DISCONTINUED | OUTPATIENT
Start: 2021-10-28 | End: 2021-10-28 | Stop reason: HOSPADM

## 2021-10-27 RX ORDER — CEFAZOLIN SODIUM 2 G/100ML
2 INJECTION, SOLUTION INTRAVENOUS SEE ADMIN INSTRUCTIONS
Status: DISCONTINUED | OUTPATIENT
Start: 2021-10-27 | End: 2021-10-27

## 2021-10-27 RX ORDER — ONDANSETRON 2 MG/ML
4 INJECTION INTRAMUSCULAR; INTRAVENOUS EVERY 6 HOURS PRN
Status: DISCONTINUED | OUTPATIENT
Start: 2021-10-27 | End: 2021-10-28 | Stop reason: HOSPADM

## 2021-10-27 RX ORDER — TRANEXAMIC ACID 650 MG/1
1950 TABLET ORAL ONCE
Status: COMPLETED | OUTPATIENT
Start: 2021-10-27 | End: 2021-10-27

## 2021-10-27 RX ORDER — OXYCODONE HYDROCHLORIDE 5 MG/1
5 TABLET ORAL EVERY 4 HOURS PRN
Status: DISCONTINUED | OUTPATIENT
Start: 2021-10-27 | End: 2021-10-28 | Stop reason: HOSPADM

## 2021-10-27 RX ORDER — ONDANSETRON 4 MG/1
4 TABLET, ORALLY DISINTEGRATING ORAL EVERY 30 MIN PRN
Status: DISCONTINUED | OUTPATIENT
Start: 2021-10-27 | End: 2021-10-27 | Stop reason: HOSPADM

## 2021-10-27 RX ORDER — BUPIVACAINE HYDROCHLORIDE 5 MG/ML
INJECTION, SOLUTION EPIDURAL; INTRACAUDAL
Status: COMPLETED | OUTPATIENT
Start: 2021-10-27 | End: 2021-10-27

## 2021-10-27 RX ORDER — NALOXONE HYDROCHLORIDE 0.4 MG/ML
0.4 INJECTION, SOLUTION INTRAMUSCULAR; INTRAVENOUS; SUBCUTANEOUS
Status: DISCONTINUED | OUTPATIENT
Start: 2021-10-27 | End: 2021-10-28 | Stop reason: HOSPADM

## 2021-10-27 RX ORDER — ASPIRIN 81 MG/1
81 TABLET ORAL 2 TIMES DAILY
Qty: 60 TABLET | Refills: 0 | Status: SHIPPED | OUTPATIENT
Start: 2021-10-27 | End: 2021-10-28

## 2021-10-27 RX ORDER — BISACODYL 10 MG
10 SUPPOSITORY, RECTAL RECTAL DAILY PRN
Status: DISCONTINUED | OUTPATIENT
Start: 2021-10-27 | End: 2021-10-28 | Stop reason: HOSPADM

## 2021-10-27 RX ORDER — PROPOFOL 10 MG/ML
INJECTION, EMULSION INTRAVENOUS CONTINUOUS PRN
Status: DISCONTINUED | OUTPATIENT
Start: 2021-10-27 | End: 2021-10-27

## 2021-10-27 RX ORDER — ONDANSETRON 2 MG/ML
4 INJECTION INTRAMUSCULAR; INTRAVENOUS EVERY 30 MIN PRN
Status: DISCONTINUED | OUTPATIENT
Start: 2021-10-27 | End: 2021-10-27 | Stop reason: HOSPADM

## 2021-10-27 RX ORDER — ONDANSETRON 2 MG/ML
INJECTION INTRAMUSCULAR; INTRAVENOUS PRN
Status: DISCONTINUED | OUTPATIENT
Start: 2021-10-27 | End: 2021-10-27

## 2021-10-27 RX ORDER — SODIUM CHLORIDE, SODIUM LACTATE, POTASSIUM CHLORIDE, CALCIUM CHLORIDE 600; 310; 30; 20 MG/100ML; MG/100ML; MG/100ML; MG/100ML
INJECTION, SOLUTION INTRAVENOUS CONTINUOUS
Status: DISCONTINUED | OUTPATIENT
Start: 2021-10-27 | End: 2021-10-28 | Stop reason: HOSPADM

## 2021-10-27 RX ORDER — LIDOCAINE 40 MG/G
CREAM TOPICAL
Status: DISCONTINUED | OUTPATIENT
Start: 2021-10-27 | End: 2021-10-28 | Stop reason: HOSPADM

## 2021-10-27 RX ORDER — HYDROMORPHONE HCL IN WATER/PF 6 MG/30 ML
0.4 PATIENT CONTROLLED ANALGESIA SYRINGE INTRAVENOUS
Status: DISCONTINUED | OUTPATIENT
Start: 2021-10-27 | End: 2021-10-28 | Stop reason: HOSPADM

## 2021-10-27 RX ORDER — CEFAZOLIN SODIUM 2 G/100ML
2 INJECTION, SOLUTION INTRAVENOUS EVERY 8 HOURS
Status: COMPLETED | OUTPATIENT
Start: 2021-10-27 | End: 2021-10-28

## 2021-10-27 RX ORDER — ACETAMINOPHEN 325 MG/1
650 TABLET ORAL EVERY 4 HOURS PRN
Status: DISCONTINUED | OUTPATIENT
Start: 2021-10-30 | End: 2021-10-28 | Stop reason: HOSPADM

## 2021-10-27 RX ORDER — NALOXONE HYDROCHLORIDE 0.4 MG/ML
0.2 INJECTION, SOLUTION INTRAMUSCULAR; INTRAVENOUS; SUBCUTANEOUS
Status: DISCONTINUED | OUTPATIENT
Start: 2021-10-27 | End: 2021-10-28 | Stop reason: HOSPADM

## 2021-10-27 RX ORDER — ASPIRIN 81 MG/1
81 TABLET ORAL 2 TIMES DAILY
Status: DISCONTINUED | OUTPATIENT
Start: 2021-10-27 | End: 2021-10-28 | Stop reason: HOSPADM

## 2021-10-27 RX ORDER — OXYCODONE HYDROCHLORIDE 5 MG/1
10 TABLET ORAL EVERY 4 HOURS PRN
Status: DISCONTINUED | OUTPATIENT
Start: 2021-10-27 | End: 2021-10-28 | Stop reason: HOSPADM

## 2021-10-27 RX ORDER — HYDROMORPHONE HCL IN WATER/PF 6 MG/30 ML
0.2 PATIENT CONTROLLED ANALGESIA SYRINGE INTRAVENOUS EVERY 5 MIN PRN
Status: DISCONTINUED | OUTPATIENT
Start: 2021-10-27 | End: 2021-10-27 | Stop reason: HOSPADM

## 2021-10-27 RX ORDER — ACETAMINOPHEN 325 MG/1
975 TABLET ORAL ONCE
Status: COMPLETED | OUTPATIENT
Start: 2021-10-27 | End: 2021-10-27

## 2021-10-27 RX ORDER — OXYCODONE HYDROCHLORIDE 5 MG/1
5 TABLET ORAL EVERY 4 HOURS PRN
Status: DISCONTINUED | OUTPATIENT
Start: 2021-10-27 | End: 2021-10-27

## 2021-10-27 RX ORDER — ONDANSETRON 4 MG/1
4 TABLET, ORALLY DISINTEGRATING ORAL EVERY 6 HOURS PRN
Status: DISCONTINUED | OUTPATIENT
Start: 2021-10-27 | End: 2021-10-28 | Stop reason: HOSPADM

## 2021-10-27 RX ORDER — HYDROMORPHONE HCL IN WATER/PF 6 MG/30 ML
0.2 PATIENT CONTROLLED ANALGESIA SYRINGE INTRAVENOUS
Status: DISCONTINUED | OUTPATIENT
Start: 2021-10-27 | End: 2021-10-28 | Stop reason: HOSPADM

## 2021-10-27 RX ORDER — AMOXICILLIN 250 MG
1 CAPSULE ORAL 2 TIMES DAILY
Status: DISCONTINUED | OUTPATIENT
Start: 2021-10-27 | End: 2021-10-28 | Stop reason: HOSPADM

## 2021-10-27 RX ORDER — PROCHLORPERAZINE MALEATE 5 MG
5 TABLET ORAL EVERY 6 HOURS PRN
Status: DISCONTINUED | OUTPATIENT
Start: 2021-10-27 | End: 2021-10-28 | Stop reason: HOSPADM

## 2021-10-27 RX ADMIN — SODIUM CHLORIDE, POTASSIUM CHLORIDE, SODIUM LACTATE AND CALCIUM CHLORIDE: 600; 310; 30; 20 INJECTION, SOLUTION INTRAVENOUS at 10:03

## 2021-10-27 RX ADMIN — LIDOCAINE HYDROCHLORIDE 50 MG: 10 INJECTION, SOLUTION INFILTRATION; PERINEURAL at 10:55

## 2021-10-27 RX ADMIN — SODIUM CHLORIDE, POTASSIUM CHLORIDE, SODIUM LACTATE AND CALCIUM CHLORIDE: 600; 310; 30; 20 INJECTION, SOLUTION INTRAVENOUS at 13:09

## 2021-10-27 RX ADMIN — ACETAMINOPHEN 975 MG: 325 TABLET ORAL at 09:43

## 2021-10-27 RX ADMIN — ACETAMINOPHEN 975 MG: 325 TABLET ORAL at 18:20

## 2021-10-27 RX ADMIN — PROPOFOL 100 MCG/KG/MIN: 10 INJECTION, EMULSION INTRAVENOUS at 10:55

## 2021-10-27 RX ADMIN — PHENYLEPHRINE HYDROCHLORIDE 0.4 MCG/KG/MIN: 10 INJECTION INTRAVENOUS at 11:11

## 2021-10-27 RX ADMIN — PHENYLEPHRINE HYDROCHLORIDE 100 MCG: 10 INJECTION INTRAVENOUS at 11:00

## 2021-10-27 RX ADMIN — TRANEXAMIC ACID 1950 MG: 650 TABLET ORAL at 09:43

## 2021-10-27 RX ADMIN — BUPIVACAINE HYDROCHLORIDE 3 ML: 5 INJECTION, SOLUTION EPIDURAL; INTRACAUDAL; PERINEURAL at 10:46

## 2021-10-27 RX ADMIN — ONDANSETRON 4 MG: 2 INJECTION INTRAMUSCULAR; INTRAVENOUS at 11:19

## 2021-10-27 RX ADMIN — MIDAZOLAM 2 MG: 1 INJECTION INTRAMUSCULAR; INTRAVENOUS at 10:33

## 2021-10-27 RX ADMIN — CEFAZOLIN SODIUM 2 G: 2 INJECTION, SOLUTION INTRAVENOUS at 10:33

## 2021-10-27 RX ADMIN — CEFAZOLIN SODIUM 2 G: 2 INJECTION, SOLUTION INTRAVENOUS at 18:20

## 2021-10-27 RX ADMIN — PHENYLEPHRINE HYDROCHLORIDE 100 MCG: 10 INJECTION INTRAVENOUS at 11:11

## 2021-10-27 RX ADMIN — DOCUSATE SODIUM 50MG AND SENNOSIDES 8.6MG 1 TABLET: 8.6; 5 TABLET, FILM COATED ORAL at 21:20

## 2021-10-27 RX ADMIN — ASPIRIN 81 MG: 81 TABLET, COATED ORAL at 21:20

## 2021-10-27 ASSESSMENT — MIFFLIN-ST. JEOR: SCORE: 1629.05

## 2021-10-27 NOTE — ANESTHESIA POSTPROCEDURE EVALUATION
Patient: Dudley Kiran    Procedure: Procedure(s):  LEFT TOTAL HIP ARTHROPLASTY DIRECT ANTERIOR APPROACH       Diagnosis:Osteoarthritis of left hip [M16.12]  Diagnosis Additional Information: No value filed.    Anesthesia Type:  Spinal    Note:  Disposition: Inpatient   Postop Pain Control: Uneventful            Sign Out: Well controlled pain   PONV: No   Neuro/Psych: Uneventful            Sign Out: Acceptable/Baseline neuro status   Airway/Respiratory: Uneventful            Sign Out: Acceptable/Baseline resp. status   CV/Hemodynamics: Uneventful            Sign Out: Acceptable CV status; No obvious hypovolemia; No obvious fluid overload   Other NRE: NONE   DID A NON-ROUTINE EVENT OCCUR? No           Last vitals:  Vitals Value Taken Time   /70 10/27/21 1301   Temp 36.4  C (97.6  F) 10/27/21 1219   Pulse 84 10/27/21 1307   Resp 18 10/27/21 1307   SpO2 92 % 10/27/21 1307   Vitals shown include unvalidated device data.    Electronically Signed By: Timmy Forrester MD  October 27, 2021  1:07 PM

## 2021-10-27 NOTE — CONSULTS
INTERNAL MEDICINE CONSULT    Physician requesting consult: Dr. Steven  Reason for consult: Hypertension, CAD    Assessment and Plan:  Hypertension:  Blood pressures are borderline  Risk of hypotension  Continue metoprolol with holding parameters  Hold off on lisinopril    CAD CABG 2010  Continue aspirin, metoprolol    Dyslipidemia  History of statin intolerance.    History of prediabetes  Follow Up outpatient.    Left hip osteoarthritis status post left SEDA direct anterior approach: Postop day #0.  Continue PT OT, pain control, DVT prophylaxis per orthopedic surgery.  Encourage incentive spirometry, monitor hemoglobin    HPI:     Dudley Kiran is a 65 year old old male with past history significant for hypertension, diabetes mellitus type 2, dyslipidemia admitted postoperatively after she underwent a total hip arthroplasty.  Tolerated procedure well.  Estimated blood loss 200ml. Currently denies any complaints such as nausea vomiting shortness of breath chest pain abdominal pain. Denies any previous history of blood clots, heart issues.  Preop history and physical reviewed.     Medical History    Past Medical History:   Diagnosis Date     Arthritis      Coronary artery disease      HTN (hypertension)      Prediabetes       Patient Active Problem List    Diagnosis Date Noted     OA (osteoarthritis) 10/27/2021     Priority: Medium     Class 1 obesity due to excess calories with serious comorbidity and body mass index (BMI) of 32.0 to 32.9 in adult 08/10/2021     Priority: Medium     Essential hypertension, benign 12/18/2019     Priority: Medium     Primary osteoarthritis of both hips 12/18/2019     Priority: Medium     Elevated cholesterol 02/15/2019     Priority: Medium     Squamous blepharitis of right upper eyelid 02/01/2019     Priority: Medium     Periorbital dermatitis 02/01/2019     Priority: Medium     History of prediabetes 02/01/2019     Priority: Medium     Coronary atherosclerosis 12/28/2012      "Priority: Medium     Problem list name updated by automated process. Provider to review       Health Care Home 12/28/2012     Priority: Medium     Tier 0  DX V65.8 REPLACED WITH [id number] (04/08/2013)          Surgical History  He  has a past surgical history that includes Bypass graft artery coronary (2010).     Past Surgical History:   Procedure Laterality Date     BYPASS GRAFT ARTERY CORONARY  2010       Allergies  Allergies   Allergen Reactions     No Known Allergies        Prior to Admission Medications   Medications Prior to Admission   Medication Sig Dispense Refill Last Dose     aspirin (ASA) 81 MG EC tablet Take 1 tablet (81 mg) by mouth daily (Patient taking differently: Take 81 mg by mouth daily Stopped 10/21/21) 90 tablet 1 10/21/2021     lisinopril (ZESTRIL) 5 MG tablet Take 1 tablet (5 mg) by mouth daily 90 tablet 3 10/26/2021 at Unknown time     metoprolol succinate ER (TOPROL-XL) 25 MG 24 hr tablet Take 0.5 tablets (12.5 mg) by mouth daily 90 tablet 3 10/26/2021 at Unknown time       Social History  Reviewed, and he  reports that he has never smoked. He has never used smokeless tobacco. He reports that he does not drink alcohol and does not use drugs.  Social History     Tobacco Use     Smoking status: Never Smoker     Smokeless tobacco: Never Used   Substance Use Topics     Alcohol use: No     Alcohol/week: 0.0 standard drinks       Family History  Reviewed, and father with history of alcoholism, diabetes    Review Of Systems  As per admission HPI, all others reviewed and negative.     Physical Exam:  /63   Pulse 70   Temp 98.1  F (36.7  C) (Oral)   Resp 16   Ht 1.702 m (5' 7\")   Wt 88.5 kg (195 lb 3.2 oz)   SpO2 95%   BMI 30.57 kg/m    General Appearance:  Awake Alert, orientedx3, not in any apparent distress   Head:  Normocephalic, without obvious abnormality   Eyes:  PERRL, conjunctiva/corneas clear   Throat: Oral mucosa moist   Neck: Supple,  no JVD   Lungs:   Clear to " auscultation bilaterally, respirations unlabored   Chest Wall:  No tenderness   Heart:  Regular rate and rhythm, S1, S2 normal,no murmur   Abdomen:   Soft, non-tender, bowel sounds present,  no masses, no organomegaly   Extremities:  Status post left SEDA   Skin: Skin color, texture, turgor normal, no rashes or lesions   Neurologic: Alert and oriented X 3, exam on operated extremity defer to surgery     Results:  Results for orders placed or performed during the hospital encounter of 10/27/21   Glucose by meter     Status: Normal   Result Value Ref Range    GLUCOSE BY METER POCT 86 70 - 99 mg/dL   Results for orders placed or performed in visit on 10/27/21   Spinal Block     Status: None    Narrative    Timmy Forrester MD     10/27/2021 10:52 AM  Intrathecal injection Procedure Note    Pre-Procedure   Staff -        Anesthesiologist:  Timmy Forrester MD       Performed By: anesthesiologist       Location: OR       Procedure Start/Stop Times: 10/27/2021 10:42 AM and 10/27/2021 10:47   AM       Pre-Anesthestic Checklist: patient identified, IV checked, risks and   benefits discussed, informed consent, monitors and equipment checked,   pre-op evaluation, at physician/surgeon's request and post-op pain   management  Timeout:       Correct Patient: Yes        Correct Procedure: Yes        Correct Site: Yes        Correct Position: Yes   Procedure Documentation  Procedure: intrathecal injection       Patient Position: sitting       Skin prep: Chloraprep       Insertion Site: L3-4. (midline approach).       Needle Gauge: 24.        Needle Length (Inches): 4        Spinal Needle Type: Pencan       Introducer used      # of attempts: 2 and  # of redirects:     Assessment/Narrative         Paresthesias: No.       CSF fluid: clear.    Medication(s) Administered   0.5% Bupivacaine PF (Intrathecal), 3 mL  Medication Administration Time: 10/27/2021 10:46 AM         Imaging:   No results found.      Lissy Sewell  M.D  [facility]  Internal Medicine    10/27/2021  2:29 PM

## 2021-10-27 NOTE — OP NOTE
DATE OF SERVICE:10/27/2021    PREOPERATIVE DIAGNOSIS: Osteoarthritis of left hip [M16.12]    POSTOPERATIVE DIAGNOSIS: Osteoarthritis of left hip [M16.12]     OPERATION: left total hip arthroplasty, direct anterior approach.     ANESTHESIA: Choice     PREP: Routine.     SURGEON: Bon Little MD.     ASSISTANT: Dudley Albright PA-C.     FITO was needed for this case secondary to exposure needs.     INDICATION: Dudley Kiran is a 65 year old year old  with extensive arthritis there left hip. They have failed nonsurgical treatment, tried injections, modification of   activities, as well as anti-inflammatory medications. They has night pain, ADLs symptoms daily, and limited range of motion and limited function.     Patient brought to the operating room, placed supine, given a spinal anesthetic, was given perioperative antibiotics. The patient then placed on a hana table after   Capps catheter had been placed. left hip was then prepped and draped in normal  sterile fashion. Appropriate timeout was completed. At this time, fluoroscopy was brought in the pelvis rotated appropriately and an AP x-ray taken of the left and right hip for postoperative templating. Next, a 10 cm incision was made distal and  lateral to the ASIS, carried down to the tensor fascia. Tensor retracted laterally. The circumflex was identified and coagulated. Retractor was placed over the anterior brim of the acetabulum and superior femoral neck and anterior capsulotomy was then completed. A capsule was then placed intracapsular and a femoral neck cut was completed and femoral head removed without difficulty. She had extensive osteophytes and grade 4 changes. At this point in time, retractors both anteriorly and posteriorly from the acetabulum showed relatively anteverted acetabulum.   Removed some osteophytes. Went ahead and reamed to 53 mm outer diameter, impacted a 54 mm Gription [place] cup in the appropriate amount of anteversion and    inclination, confirmed by fluoroscopy. Next a 30 mm screw was placed in a upper outer quadrant and a +4   neutral liner for a 36 mm head was impacted.        Next, attention was brought to the  femoral side. The leg was rotated 120 degrees, fully adducted and extended. Went   and broached to a size 5 broach with a size 5 broach and a  1.5 mm head. I reduced the hip. Offset and leg lengths were essentially identical   to her right hip. There was good fit and fill throughout. Trial implants were   removed. I went ahead and impacted a size 5 Actis stem. A 1.5 x 36   mm ceramic head was impacted. Hip was then located. Final fluoroscopy films showed concentric reduction, appropriate offset and leg lengths. No fracture identified. The wound was then copiously irrigated. The capsule repaired with a Vicryl suture.  The kelechi-capsular area was injected with a Orthopedic injection. The fascia with a PDS suture, 2-0 Vicryl and a Monocryl stitch. Sterile compressive dressing. The patient brought to recovery in stable condition. All sponge and needle counts correct. No intraoperative complication identified. No specimens.    Blood loss approximately 200 mL.     ISMAEL MCGUIRE MD

## 2021-10-27 NOTE — ANESTHESIA PREPROCEDURE EVALUATION
Anesthesia Pre-Procedure Evaluation    Patient: Dudley Kiran   MRN: [id number] : 1955        Preoperative Diagnosis: Osteoarthritis of left hip [M16.12]    Procedure : Procedure(s):  LEFT TOTAL HIP ARTHROPLASTY DIRECT ANTERIOR APPROACH          Past Medical History:   Diagnosis Date     Arthritis      Coronary artery disease      HTN (hypertension)      Prediabetes       Past Surgical History:   Procedure Laterality Date     BYPASS GRAFT ARTERY CORONARY  2010      Allergies   Allergen Reactions     No Known Allergies       Social History     Tobacco Use     Smoking status: Never Smoker     Smokeless tobacco: Never Used   Substance Use Topics     Alcohol use: No     Alcohol/week: 0.0 standard drinks      Wt Readings from Last 1 Encounters:   10/14/21 93.2 kg (205 lb 8 oz)        Anesthesia Evaluation   Pt has had prior anesthetic. Type: General.    No history of anesthetic complications       ROS/MED HX  ENT/Pulmonary:  - neg pulmonary ROS     Neurologic:  - neg neurologic ROS     Cardiovascular:     (+) hypertension--CAD -CABG--    METS/Exercise Tolerance:     Hematologic:  - neg hematologic  ROS     Musculoskeletal:  - neg musculoskeletal ROS     GI/Hepatic:  - neg GI/hepatic ROS     Renal/Genitourinary:  - neg Renal ROS     Endo:     (+) Obesity,     Psychiatric/Substance Use:       Infectious Disease:  - neg infectious disease ROS     Malignancy:  - neg malignancy ROS     Other:            Physical Exam    Airway  airway exam normal        Neck ROM: full     Respiratory Devices and Support         Dental           Cardiovascular   cardiovascular exam normal       Rhythm and rate: regular and normal     Pulmonary   pulmonary exam normal        breath sounds clear to auscultation           OUTSIDE LABS:  CBC:   Lab Results   Component Value Date    WBC 9.9 10/14/2021    HGB 14.6 10/14/2021    HCT 41.5 10/14/2021     10/14/2021     BMP:   Lab Results   Component Value Date     10/14/2021      12/18/2019    POTASSIUM 4.3 10/14/2021    POTASSIUM 4.3 12/18/2019    CHLORIDE 107 10/14/2021    CHLORIDE 109 (H) 12/18/2019    CO2 24 10/14/2021    CO2 28 12/18/2019    BUN 11 10/14/2021    BUN 14 12/18/2019    CR 0.94 10/14/2021    CR 0.86 12/18/2019    GLC 86 10/27/2021     10/14/2021     COAGS: No results found for: PTT, INR, FIBR  POC: No results found for: BGM, HCG, HCGS  HEPATIC:   Lab Results   Component Value Date    ALBUMIN 4.0 07/11/2011    PROTTOTAL 6.9 07/11/2011    BILITOTAL 0.7 07/11/2011    BILIDIRECT 0.2 07/11/2011     OTHER:   Lab Results   Component Value Date    A1C 5.8 12/18/2019    MELONY 9.7 10/14/2021    SED 12 03/08/2019       Anesthesia Plan    ASA Status:  3      Anesthesia Type: Spinal.              Consents    Anesthesia Plan(s) and associated risks, benefits, and realistic alternatives discussed. Questions answered and patient/representative(s) expressed understanding.     - Discussed with:  Patient         Postoperative Care            Comments:                Timmy Forrester MD

## 2021-10-27 NOTE — PHARMACY-ADMISSION MEDICATION HISTORY
Pharmacy Note - Admission Medication History    Pertinent Provider Information: none     ______________________________________________________________________    Prior To Admission (PTA) med list completed and updated in EMR.       PTA Med List   Medication Sig Last Dose     aspirin (ASA) 81 MG EC tablet Take 1 tablet (81 mg) by mouth daily (Patient taking differently: Take 81 mg by mouth daily Stopped 10/21/21) 10/21/2021     lisinopril (ZESTRIL) 5 MG tablet Take 1 tablet (5 mg) by mouth daily 10/26/2021 at Unknown time     metoprolol succinate ER (TOPROL-XL) 25 MG 24 hr tablet Take 0.5 tablets (12.5 mg) by mouth daily 10/26/2021 at Unknown time       Information source(s): Patient  Method of interview communication: in-person    Summary of Changes to PTA Med List  New: none      Patient was asked about OTC/herbal products specifically.  PTA med list reflects this.    In the past week, patient estimated taking medication this percent of the time:  greater than 90%.    Allergies were reviewed, assessed, and updated with the patient.      Patient does not anticipate needing any multi-use medications during admission.    The information provided in this note is only as accurate as the sources available at the time of the update(s).    Thank you for the opportunity to participate in the care of this patient.    Evelio Kingsley RPH  10/27/2021 9:07 AM

## 2021-10-27 NOTE — ANESTHESIA CARE TRANSFER NOTE
Patient: Dudley Kiran    Procedure: Procedure(s):  LEFT TOTAL HIP ARTHROPLASTY DIRECT ANTERIOR APPROACH       Diagnosis: Osteoarthritis of left hip [M16.12]  Diagnosis Additional Information: No value filed.    Anesthesia Type:   Spinal     Note:    Oropharynx: oropharynx clear of all foreign objects and spontaneously breathing  Level of Consciousness: drowsy  Oxygen Supplementation: face mask  Level of Supplemental Oxygen (L/min / FiO2): 8  Independent Airway: airway patency satisfactory and stable  Dentition: dentition unchanged  Vital Signs Stable: post-procedure vital signs reviewed and stable  Report to RN Given: handoff report given  Patient transferred to: PACU    Handoff Report: Identifed the Patient, Identified the Reponsible Provider, Reviewed the pertinent medical history, Discussed the surgical course, Reviewed Intra-OP anesthesia mangement and issues during anesthesia, Set expectations for post-procedure period and Allowed opportunity for questions and acknowledgement of understanding      Vitals:  Vitals Value Taken Time   BP 90/52 10/27/21 1221   Temp 36.4  C (97.6  F) 10/27/21 1219   Pulse 92 10/27/21 1222   Resp 17 10/27/21 1222   SpO2 97 % 10/27/21 1222   Vitals shown include unvalidated device data.    Electronically Signed By: AMADA Adorno CRNA  October 27, 2021  12:22 PM

## 2021-10-27 NOTE — ANESTHESIA PROCEDURE NOTES
Intrathecal injection Procedure Note    Pre-Procedure   Staff -        Anesthesiologist:  Timmy Forrester MD       Performed By: anesthesiologist       Location: OR       Procedure Start/Stop Times: 10/27/2021 10:42 AM and 10/27/2021 10:47 AM       Pre-Anesthestic Checklist: patient identified, IV checked, risks and benefits discussed, informed consent, monitors and equipment checked, pre-op evaluation, at physician/surgeon's request and post-op pain management  Timeout:       Correct Patient: Yes        Correct Procedure: Yes        Correct Site: Yes        Correct Position: Yes   Procedure Documentation  Procedure: intrathecal injection       Patient Position: sitting       Skin prep: Chloraprep       Insertion Site: L3-4. (midline approach).       Needle Gauge: 24.        Needle Length (Inches): 4        Spinal Needle Type: Pencan       Introducer used      # of attempts: 2 and  # of redirects:     Assessment/Narrative         Paresthesias: No.       CSF fluid: clear.    Medication(s) Administered   0.5% Bupivacaine PF (Intrathecal), 3 mL  Medication Administration Time: 10/27/2021 10:46 AM

## 2021-10-27 NOTE — PLAN OF CARE
Patient vital signs are at baseline: Yes  Patient able to ambulate as they were prior to admission or with assist devices provided by therapies during their stay:  Yes  Patient MUST void prior to discharge:  Yes  Patient able to tolerate oral intake:  Yes  Pain has adequate pain control using Oral analgesics:  Yes    Pt complains of mild pain to the left hip described as aching; controlled with scheduled medications. No new skin issues noted. Dressing is CDI. Assist of 1 with walker, and gait belt for transferring. Voiding adequate amounts of urine. Education on medication administration and use of call-light to reduce risk for falls and injury.  No chest pain, shortness of breath, lightheadedness, or dizziness.

## 2021-10-28 ENCOUNTER — APPOINTMENT (OUTPATIENT)
Dept: OCCUPATIONAL THERAPY | Facility: CLINIC | Age: 66
End: 2021-10-28
Attending: ORTHOPAEDIC SURGERY
Payer: COMMERCIAL

## 2021-10-28 ENCOUNTER — APPOINTMENT (OUTPATIENT)
Dept: PHYSICAL THERAPY | Facility: CLINIC | Age: 66
End: 2021-10-28
Attending: ORTHOPAEDIC SURGERY
Payer: COMMERCIAL

## 2021-10-28 VITALS
OXYGEN SATURATION: 93 % | WEIGHT: 195.2 LBS | HEIGHT: 67 IN | DIASTOLIC BLOOD PRESSURE: 61 MMHG | RESPIRATION RATE: 16 BRPM | TEMPERATURE: 99 F | SYSTOLIC BLOOD PRESSURE: 99 MMHG | HEART RATE: 86 BPM | BODY MASS INDEX: 30.64 KG/M2

## 2021-10-28 LAB
GLUCOSE BLDC GLUCOMTR-MCNC: 142 MG/DL (ref 70–99)
HGB BLD-MCNC: 11.3 G/DL (ref 13.3–17.7)

## 2021-10-28 PROCEDURE — 97110 THERAPEUTIC EXERCISES: CPT | Mod: GP

## 2021-10-28 PROCEDURE — 97166 OT EVAL MOD COMPLEX 45 MIN: CPT | Mod: GO

## 2021-10-28 PROCEDURE — 97116 GAIT TRAINING THERAPY: CPT | Mod: GP

## 2021-10-28 PROCEDURE — 36415 COLL VENOUS BLD VENIPUNCTURE: CPT | Performed by: ORTHOPAEDIC SURGERY

## 2021-10-28 PROCEDURE — 250N000013 HC RX MED GY IP 250 OP 250 PS 637: Performed by: ORTHOPAEDIC SURGERY

## 2021-10-28 PROCEDURE — 99213 OFFICE O/P EST LOW 20 MIN: CPT | Performed by: INTERNAL MEDICINE

## 2021-10-28 PROCEDURE — 99207 PR CDG-CODE CATEGORY CHANGED: CPT | Performed by: INTERNAL MEDICINE

## 2021-10-28 PROCEDURE — 97161 PT EVAL LOW COMPLEX 20 MIN: CPT | Mod: GP

## 2021-10-28 PROCEDURE — 250N000011 HC RX IP 250 OP 636: Performed by: ORTHOPAEDIC SURGERY

## 2021-10-28 PROCEDURE — 85018 HEMOGLOBIN: CPT | Performed by: ORTHOPAEDIC SURGERY

## 2021-10-28 PROCEDURE — 82962 GLUCOSE BLOOD TEST: CPT

## 2021-10-28 PROCEDURE — 97535 SELF CARE MNGMENT TRAINING: CPT | Mod: GO

## 2021-10-28 RX ORDER — ONDANSETRON 4 MG/1
4 TABLET, ORALLY DISINTEGRATING ORAL EVERY 6 HOURS PRN
Qty: 15 TABLET | Refills: 0 | Status: SHIPPED | OUTPATIENT
Start: 2021-10-28 | End: 2021-11-09

## 2021-10-28 RX ORDER — OXYCODONE HYDROCHLORIDE 5 MG/1
5-10 TABLET ORAL EVERY 4 HOURS PRN
Qty: 28 TABLET | Refills: 0 | Status: SHIPPED | OUTPATIENT
Start: 2021-10-28 | End: 2021-11-09

## 2021-10-28 RX ORDER — ASPIRIN 81 MG/1
81 TABLET ORAL 2 TIMES DAILY
Qty: 60 TABLET | Refills: 0 | COMMUNITY
Start: 2021-10-28

## 2021-10-28 RX ORDER — POLYETHYLENE GLYCOL 3350 17 G/17G
17 POWDER, FOR SOLUTION ORAL DAILY
Qty: 510 G | Refills: 0 | COMMUNITY
Start: 2021-10-28 | End: 2022-02-11

## 2021-10-28 RX ORDER — AMOXICILLIN 250 MG
1 CAPSULE ORAL 2 TIMES DAILY
COMMUNITY
Start: 2021-10-28 | End: 2022-02-11

## 2021-10-28 RX ADMIN — DOCUSATE SODIUM 50MG AND SENNOSIDES 8.6MG 1 TABLET: 8.6; 5 TABLET, FILM COATED ORAL at 09:27

## 2021-10-28 RX ADMIN — POLYETHYLENE GLYCOL 3350 17 G: 17 POWDER, FOR SOLUTION ORAL at 09:26

## 2021-10-28 RX ADMIN — ACETAMINOPHEN 975 MG: 325 TABLET ORAL at 03:01

## 2021-10-28 RX ADMIN — OXYCODONE HYDROCHLORIDE 5 MG: 5 TABLET ORAL at 05:49

## 2021-10-28 RX ADMIN — ASPIRIN 81 MG: 81 TABLET, COATED ORAL at 09:27

## 2021-10-28 RX ADMIN — CEFAZOLIN SODIUM 2 G: 2 INJECTION, SOLUTION INTRAVENOUS at 03:00

## 2021-10-28 RX ADMIN — ACETAMINOPHEN 975 MG: 325 TABLET ORAL at 09:27

## 2021-10-28 NOTE — PLAN OF CARE
Physical Therapy Discharge Summary    Reason for therapy discharge:    Discharged to home.    Progress towards therapy goal(s). See goals on Care Plan in [facility] electronic health record for goal details.  Goals met    Therapy recommendation(s):    Continue home exercise program.

## 2021-10-28 NOTE — PROGRESS NOTES
"[facility] Orthopaedics Progress Note      Post-operative Day: POD 1  Procedure(s):  LEFT TOTAL HIP ARTHROPLASTY DIRECT ANTERIOR APPROACH      Plan: Anticoagulation protocol: ASA 81mg BID x 30 days            Pain medications: Tyelnol, oxycodone.             Weight bearing status:  WBAT             Disposition:  Home today after therapies             Continue cares and rehabilitation     Subjective:  Patient reports mild lightheadedness with motion today. Seen lying in bed, fully clothed. Denies lightheadedness at rest.   Pain: moderate and severe  Chest pain, SOB:  No  Denies nausea/ vomiting  Passing flatus  voiding well    Objective:  Blood pressure 118/68, pulse 88, temperature 98.2  F (36.8  C), temperature source Oral, resp. rate 16, height 1.702 m (5' 7\"), weight 88.5 kg (195 lb 3.2 oz), SpO2 94 %.    BP 96/55 (BP Location: Left arm)   Pulse 97   Temp 99.1  F (37.3  C)   Resp 16   Ht 1.702 m (5' 7\")   Wt 88.5 kg (195 lb 3.2 oz)   SpO2 92%   BMI 30.57 kg/m      Wt Readings from Last 4 Encounters:   10/27/21 88.5 kg (195 lb 3.2 oz)   10/14/21 93.2 kg (205 lb 8 oz)   08/10/21 94.3 kg (208 lb)   12/18/19 83 kg (183 lb)         Motor function, sensation, and circulation intact   Yes  Wound status: Aquacel is clean, without shadowing, dry, and intact. Yes  Calf tenderness: Bilateral  No    Pertinent Labs   Lab Results: personally reviewed.     Recent Labs   Lab Test 10/14/21  1655 10/14/21  1654 12/18/19  1550 02/15/19  0913   HGB 14.6  --   --   --    HCT 41.5  --   --   --    MCV 86  --   --   --      --   --   --    NA  --  140 142 145.0*       Report completed by:  Francisco Hartley NP  Date: 10/28/21      "

## 2021-10-28 NOTE — PROGRESS NOTES
[facility]      OUTPATIENT OCCUPATIONAL THERAPY  EVALUATION  PLAN OF TREATMENT FOR OUTPATIENT REHABILITATION  (COMPLETE FOR INITIAL CLAIMS ONLY)  Patient's Last Name, First Name, M.I.  YOB: 1955  MagnoDudley DE LA TORRE                          Provider's Name  [facility] Medical Record No.  [id number]                               Onset Date:  10/27/21   Start of Care Date:  10/28/21     Type:     ___PT   _X_OT   ___SLP Medical Diagnosis:  SEDA                        OT Diagnosis:  decreased indep with ADLs due to SEDA   Visits from SOC:  1   _________________________________________________________________________________  Plan of Treatment/Functional Goals    Planned Interventions: ADL retraining   Goals: See Occupational Therapy Goals on Care Plan in [facility] electronic health record.    Therapy Frequency: Daily  Predicted Duration of Therapy Intervention: 1 day  _________________________________________________________________________________    I CERTIFY THE NEED FOR THESE SERVICES FURNISHED UNDER        THIS PLAN OF TREATMENT AND WHILE UNDER MY CARE     (Physician co-signature of this document indicates review and certification of the therapy plan).                Certification date from: 10/28/21, Certification date to: 11/28/21    Referring Physician: Dr. Bon Little            Initial Assessment        See Occupational Therapy evaluation dated 10/28/21 in Epic electronic health record.

## 2021-10-28 NOTE — PLAN OF CARE
Discharge instructions discussed with patient at bedside by discharge RN. Discharging to Home with Family to transport. Patient is stable at the time of discharge.

## 2021-10-28 NOTE — PROGRESS NOTES
[facility] Daily Progress Note    Assessment/Plan:  Hypertension:  Resume home meds     CAD CABG 2010  Continue aspirin, metoprolol     Dyslipidemia  History of statin intolerance.     History of prediabetes  Follow Up outpatient.    Fever:  No Sign of infection  Likely Reactive fever    Left hip osteoarthritis status post left SEDA direct anterior approach: Postop day #1.  Continue PT OT, pain control, DVT prophylaxis per orthopedic surgery.  Encourage incentive spirometry,    Acute blood loss anemia  Monitor hemoglobin      Active Problems:    OA (osteoarthritis)     LOS: 0 days     Subjective:  Doing well.  Complains of pain.  No other shortness of breath chest pain dizziness nausea vomiting abdominal pain, urinary complaints.    acetaminophen  975 mg Oral Q8H     aspirin  81 mg Oral BID     metoprolol succinate ER  12.5 mg Oral Daily     polyethylene glycol  17 g Oral Daily     senna-docusate  1 tablet Oral BID     sodium chloride (PF)  3 mL Intracatheter Q8H     sodium chloride (PF)  3 mL Intracatheter Q8H       Objective:  Vital signs in last 24 hours:  Temp:  [97  F (36.1  C)-101  F (38.3  C)] 99.1  F (37.3  C)  Pulse:  [70-99] 97  Resp:  [16-24] 16  BP: ()/(52-83) 96/55  SpO2:  [92 %-98 %] 92 %  Weight:   [unfilled]    Intake/Output last 3 shifts:  I/O last 3 completed shifts:  In: 100 [I.V.:100]  Out: 1000 [Urine:600; Blood:400]  Intake/Output this shift:  No intake/output data recorded.    Review of Systems:   As per subjective, all others negative.    Physical Exam:    General Appearance:  Alert, cooperative, no distress, appears stated age   Head:  Normocephalic, without obvious abnormality, atraumatic   Lungs:   Clear to auscultation bilaterally, respirations unlabored   Chest Wall:  No tenderness or deformity   Heart:  Regular rate and rhythm, S1, S2 normal,no murmur   Abdomen:   Soft, non tender, non distended, bowel sounds present, no guarding   Extremities:  left SEDA   Skin: Skin color, texture,  turgor normal, no rashes or lesions   Neurologic: Alert and oriented X 3, Moves all 4 extremities       Lab Results:  Personally Reviewed.   Recent Results (from the past 24 hour(s))   Hemoglobin A1c    Collection Time: 10/27/21  9:40 AM   Result Value Ref Range    Hemoglobin A1C 5.7 (H) <=5.6 %   Glucose by meter    Collection Time: 10/27/21  9:41 AM   Result Value Ref Range    GLUCOSE BY METER POCT 86 70 - 99 mg/dL   Hemoglobin    Collection Time: 10/28/21  5:28 AM   Result Value Ref Range    Hemoglobin 11.3 (L) 13.3 - 17.7 g/dL   Glucose by meter    Collection Time: 10/28/21  6:31 AM   Result Value Ref Range    GLUCOSE BY METER POCT 142 (H) 70 - 99 mg/dL         Lissy Sewell M.D  [facility] Service  Internal Medicine

## 2021-10-28 NOTE — PROGRESS NOTES
10/28/21 0730   Quick Adds   Type of Visit Initial Occupational Therapy Evaluation   Living Environment   People in home child(fariha), adult;other (see comments)  (cousins.)   Current Living Arrangements house  (1 level)   Living Environment Comments WIS with GB and shower chair, Std toilet with vanity on the R.   Self-Care   Usual Activity Tolerance good   Current Activity Tolerance moderate   Equipment Currently Used at Home walker, rolling   Disability/Function   Hearing Difficulty or Deaf no   Wear Glasses or Blind yes   Vision Management reading glasses   General Information   Onset of Illness/Injury or Date of Surgery 10/27/21   Referring Physician Dr. Bon Little   Patient/Family Therapy Goal Statement (OT) To return home with help from family   Left Lower Extremity (Weight-bearing Status) weight-bearing as tolerated (WBAT)   Cognitive Status Examination   Orientation Status orientation to person, place and time   Follows Commands WNL   Bed Mobility   Bed Mobility supine-sit;sit-supine   Supine-Sit [place] (Bed Mobility) contact guard   Sit-Supine [place] (Bed Mobility) contact guard   Assistive Device (Bed Mobility) lift device   Transfers   Transfers bed-chair transfer;sit-stand transfer;toilet transfer;shower transfer   Transfer Skill: Bed to Chair/Chair to Bed   Bed-Chair [place] (Transfers) supervision;verbal cues  (hand placement)   Assistive Device (Bed-Chair Transfers) rolling walker   Sit-Stand Transfer   Sit-Stand [place] (Transfers) supervision;verbal cues  (hand placement)   Assistive Device (Sit-Stand Transfers) walker, front-wheeled   Shower Transfer   Type (Shower Transfer)   ( ther demo using shower chair.  Pt understood technique)   Toilet Transfer   Type (Toilet Transfer) sit-stand;stand-sit   [place] Level (Toilet Transfer) supervision;verbal cues  (hand placement)   Assistive Device (Toilet Transfer) walker, front-wheeled   Balance   Balance Assessment  standing balance: dynamic   Standing Balance: Dynamic WFL   Activities of Daily Living   BADL Assessment upper body dressing;lower body dressing;grooming   Upper Body Dressing Assessment   [place] Level (Upper Body Dressing) independent   Position (Upper Body Dressing) supported sitting   Lower Body Dressing Assessment   [place] Level (Lower Body Dressing) minimum assist (75% patient effort)   Assistive Devices (Lower Body Dressing) reacher;sock-aid   Position (Lower Body Dressing) supported sitting;supported standing   Grooming Assessment   [place] Level (Grooming) supervision;verbal cues   Position (Grooming) sink side;supported standing   Clinical Impression   Criteria for Skilled Therapeutic Interventions Met (OT) yes   OT Diagnosis decreased indep with ADLs due to SEDA   OT Problem List-Impairments impacting ADL mobility   Assessment of Occupational Performance 1-3 Performance Deficits   Identified Performance Deficits dressing, toileting, G/H   Planned Therapy Interventions (OT) ADL retraining   Clinical Decision Making Complexity (OT) moderate complexity   Therapy Frequency (OT) Daily   Predicted Duration of Therapy 1 day   Risk & Benefits of therapy have been explained patient   OT Discharge Planning    OT Discharge Recommendation (DC Rec) Home with assist   OT Rationale for DC Rec Will have assist from family at home.   Therapy Certification   Start of Care Date 10/28/21   Certification date from 10/28/21   Certification date to 11/28/21   Medical Diagnosis SEDA   Total Evaluation Time (Minutes)   Total Evaluation Time (Minutes) 15

## 2021-10-28 NOTE — PLAN OF CARE
Problem: Pain (Hip Arthroplasty)  Goal: Acceptable Pain Control  Outcome: Improving   Pt is alert and oriented, voiding and using the call light appropriately.  Vital signs are stable.  Pt has both scheduled and prn medications available.  While he is not moving, he really is having no pain.  We will continue to assess and monitor .

## 2021-10-28 NOTE — PROGRESS NOTES
10/28/21 0820   Quick Adds   Quick Adds Certification   Type of Visit Initial PT Evaluation   Living Environment   People in home child(fariha), adult   Current Living Arrangements house   Number of Stairs, Within Home, Primary 3  (platform)   Stair Railings, Within Home, Primary railing on left side (ascending)   Self-Care   Equipment Currently Used at Home walker, rolling   General Information   Onset of Illness/Injury or Date of Surgery 10/27/21   Patient/Family Therapy Goals Statement (PT) go home   Existing Precautions/Restrictions no hip hyperextension   Weight-Bearing Status - LLE weight-bearing as tolerated   Transfers   Transfers No deficits identified   Gait/Stairs (Locomotion)   [place] Level (Gait) modified independence   Assistive Device (Gait) walker, front-wheeled   Distance in Feet (Required for LE Total Joints) 125   Pattern (Gait) step-through   Deviations/Abnormal Patterns (Gait) gait speed decreased;stride length decreased;weight shifting decreased   Negotiation (Stairs) stairs independence;stairs assistive device;handrail location;number of steps;ascending technique;descending technique   [place] Level (Stairs) modified independence   Assistive Device (Stairs)   (FWW on platform steps)   Handrail Location (Stairs) left side (descending)   Number of Steps (Stairs) 4   Ascending Technique (Stairs) step-to-step   Descending Technique (Stairs) step-to-step   Clinical Impression   Criteria for Skilled Therapeutic Intervention yes, treatment indicated   PT Diagnosis (PT) impaired functional mobility   Influenced by the following impairments gait, stairs   Functional limitations due to impairments weakness   Clinical Presentation Stable/Uncomplicated   Clinical Presentation Rationale pt presents as medically diagnosed   Clinical Decision Making (Complexity) low complexity   Therapy Frequency (PT) One time eval and treatment only   Predicted Duration of Therapy Intervention (days/wks) 1 day    Planned Therapy Interventions (PT) gait training;home exercise program;stair training;strengthening;transfer training   Anticipated Equipment Needs at Discharge (PT) walker, rolling   Risk & Benefits of therapy have been explained evaluation/treatment results reviewed;care plan/treatment goals reviewed;patient   PT Discharge Planning    PT Discharge Recommendation (DC Rec) home with assist   PT Rationale for DC Rec TH rehab   Therapy Certification   Start of care date 10/28/21   Certification date from 10/28/21   Certification date to 11/27/21   Medical Diagnosis TH   Total Evaluation Time   Total Evaluation Time (Minutes) 10

## 2021-10-28 NOTE — DISCHARGE INSTRUCTIONS
Bon Little MD    Attending    Since 10/27/2021    [phone]   To be seen in 2 weeks      Charlene Loredo MD    [facility]    [phone]   It is recommended that you follow up with your primary care provider in 7 days

## 2021-10-28 NOTE — DISCHARGE SUMMARY
[facility] Orthopedics Discharge Summary                                  [facility]     JASON SUE [id number]   Age: 65 year old  PCP: Charlene Loredo, [phone] 1955     Date of Admission:  10/27/2021  Date of Discharge::  10/28/2021  Discharge Provider:  Francisco Hartley NP    Code status:  Full Code    Admission Information:  Admission Diagnosis:  Osteoarthritis of left hip [M16.12]  OA (osteoarthritis) [M19.90]    Post-Operative Day: Day of Surgery     Reason for admission:  The patient was admitted for the following:Procedure(s) (LRB):  LEFT TOTAL HIP ARTHROPLASTY DIRECT ANTERIOR APPROACH (Left)    Active Problems:    OA (osteoarthritis)      Allergies:  No known allergies    Following the procedure noted above the patient was transferred to the post-op floor and started on:    Therapy:  physical therapy and occupational therapy  Anticoagulation Plan:ASA 81mg BID x 30 days   Pain Management: oxycodone, Tylenol.   Weight Bearing status: WBAT.   The patient was followed by Orthopedics during the inpatient treatment course:  Complications: Mild nausea and lightheadedness after oxycodone overnight. Mild Acute Blood Loss Anemia: no longer symptomatic    Additional consultations:  [facility]     Pertinent Labs   Lab Results: personally reviewed.     Recent Labs   Lab Test 10/14/21  1655 10/14/21  1654 12/18/19  1550 02/15/19  0913   HGB 14.6  --   --   --    HCT 41.5  --   --   --    MCV 86  --   --   --      --   --   --    NA  --  140 142 145.0*          Discharge Information:  Condition at discharge: Stable  Discharge destination:  Discharged to home      Medications at discharge:  Current Discharge Medication List        START taking these medications    Details   acetaminophen (TYLENOL) 325 MG tablet Take 2 tablets (650 mg) by mouth every 4 hours as needed for other (mild pain)  Qty: 60 tablet, Refills: 0    Associated Diagnoses: Primary osteoarthritis of left hip      ondansetron  (ZOFRAN-ODT) 4 MG ODT tab Take 1 tablet (4 mg) by mouth every 6 hours as needed for nausea or vomiting  Qty: 15 tablet, Refills: 0      oxyCODONE (ROXICODONE) 5 MG tablet Take 1-2 tablets (5-10 mg) by mouth every 4 hours as needed for breakthrough pain or moderate to severe pain  Qty: 28 tablet, Refills: 0    Comments: Take 1 pill for moderate pain (4-6/10) and 2 pills for severe pain (7-10/10). Alternate with extra strength tylenol.      polyethylene glycol (MIRALAX) 17 GM/Dose powder Take 17 g by mouth daily  Qty: 510 g, Refills: 0    Comments: Take while taking narcotics medications and until bowels are back to normal routine. Hold for loose stools      senna-docusate (SENOKOT-S/PERICOLACE) 8.6-50 MG tablet Take 1 tablet by mouth 2 times daily    Comments: Take while taking narcotics medications and until bowels are back to normal routine. Hold for loose stools           CONTINUE these medications which have CHANGED    Details   aspirin 81 MG EC tablet Take 1 tablet (81 mg) by mouth 2 times daily  Qty: 60 tablet, Refills: 0    Comments: Take 1 aspirin twice a day for 30 days to prevent blood clots. Take with food. Do not take at same time as other anti-inflammatories, like ibuprofen or celebrex. After 30 days, resume 81 mg aspirin daily.  Associated Diagnoses: Primary osteoarthritis of left hip           CONTINUE these medications which have NOT CHANGED    Details   lisinopril (ZESTRIL) 5 MG tablet Take 1 tablet (5 mg) by mouth daily  Qty: 90 tablet, Refills: 3    Associated Diagnoses: Elevated blood pressure reading without diagnosis of hypertension      metoprolol succinate ER (TOPROL-XL) 25 MG 24 hr tablet Take 0.5 tablets (12.5 mg) by mouth daily  Qty: 90 tablet, Refills: 3    Associated Diagnoses: Elevated blood pressure reading without diagnosis of hypertension                      Follow-Up Care:  Patient should be seen in the office in 10-14 days by the Orthopedic Surgeon/Physician Assistant.  Call  [phone] for appointment or questions.    It was my pleasure to take care of the above patient.  Francisco Hartley NP

## 2021-10-28 NOTE — PROGRESS NOTES
[facility]      OUTPATIENT PHYSICAL THERAPY EVALUATION  PLAN OF TREATMENT FOR OUTPATIENT REHABILITATION  (COMPLETE FOR INITIAL CLAIMS ONLY)  Patient's Last Name, First Name, M.I.  YOB: 1955  Dudley Kiran                        Provider's Name  [facility] Medical Record No.  [id number]                               Onset Date:  (P) 10/27/21   Start of Care Date:  (P) 10/28/21      Type:     _X_PT   ___OT   ___SLP Medical Diagnosis:  (P) TH                        PT Diagnosis:  (P) impaired functional mobility   Visits from SOC:  1   _________________________________________________________________________________  Plan of Treatment/Functional Goals    Planned Interventions: (P) gait training, home exercise program, stair training, strengthening, transfer training     Goals: See Physical Therapy Goals on Care Plan in [employer] electronic health record.    Therapy Frequency: (P) One time eval and treatment only  Predicted Duration of Therapy Intervention: (P) 1 day  _________________________________________________________________________________    I CERTIFY THE NEED FOR THESE SERVICES FURNISHED UNDER        THIS PLAN OF TREATMENT AND WHILE UNDER MY CARE     (Physician co-signature of this document indicates review and certification of the therapy plan).                Certification date from: (P) 10/28/21, Certification date to: (P) 11/27/21                 Initial Assessment        See Physical Therapy evaluation dated (P) 10/28/21 in Epic electronic health record.

## 2021-10-28 NOTE — PLAN OF CARE
Occupational Therapy Discharge Summary    Reason for therapy discharge:    All goals and outcomes met, no further needs identified.    Progress towards therapy goal(s). See goals on Care Plan in [facility] electronic health record for goal details.  Goals met    Therapy recommendation(s):    No further therapy is recommended.

## 2021-10-29 ENCOUNTER — MYC MEDICAL ADVICE (OUTPATIENT)
Dept: FAMILY MEDICINE | Facility: CLINIC | Age: 66
End: 2021-10-29

## 2021-11-04 LAB
ATRIAL RATE - MUSE: 65 BPM
DIASTOLIC BLOOD PRESSURE - MUSE: NORMAL MMHG
INTERPRETATION ECG - MUSE: NORMAL
P AXIS - MUSE: -1 DEGREES
PR INTERVAL - MUSE: 176 MS
QRS DURATION - MUSE: 108 MS
QT - MUSE: 404 MS
QTC - MUSE: 420 MS
R AXIS - MUSE: -15 DEGREES
SYSTOLIC BLOOD PRESSURE - MUSE: NORMAL MMHG
T AXIS - MUSE: 2 DEGREES
VENTRICULAR RATE- MUSE: 65 BPM

## 2021-11-04 NOTE — PLAN OF CARE
Problem: Pain (Hip Arthroplasty)  Goal: Acceptable Pain Control  10/28/2021 0516 by Kimberly Siu, RN  Outcome: Improving  10/27/2021 2250 by Kimberly Siu RN  Outcome: Improving  Intervention: Prevent or Manage Pain  Recent Flowsheet Documentation  Taken 10/27/2021 2140 by Kimberly Siu RN  Pain Management Interventions: medication (see MAR)    Pt is doing well .  He is alert and oriented, voiding and using the call light appropriately. Pain is being managed by scheduled meds at this time. Vitals have been stable except a temp of 101.0 earlier in the shift. Pt is planning on discharging today.   Patient vital signs are at baseline: Yes  Patient able to ambulate as they were prior to admission or with assist devices provided by therapies during their stay:  Yes  Patient MUST void prior to discharge:  Yes  Patient able to tolerate oral intake:  Yes  Pain has adequate pain control using Oral analgesics:  Yes     Include Body Diagram: no Include Summary: yes Letter Template: [id number]

## 2021-11-09 ENCOUNTER — OFFICE VISIT (OUTPATIENT)
Dept: FAMILY MEDICINE | Facility: CLINIC | Age: 66
End: 2021-11-09
Payer: COMMERCIAL

## 2021-11-09 VITALS
WEIGHT: 197 LBS | OXYGEN SATURATION: 96 % | BODY MASS INDEX: 30.85 KG/M2 | DIASTOLIC BLOOD PRESSURE: 72 MMHG | HEART RATE: 100 BPM | SYSTOLIC BLOOD PRESSURE: 136 MMHG

## 2021-11-09 DIAGNOSIS — R73.01 IMPAIRED FASTING GLUCOSE: ICD-10-CM

## 2021-11-09 DIAGNOSIS — D62 ANEMIA DUE TO BLOOD LOSS, ACUTE: ICD-10-CM

## 2021-11-09 DIAGNOSIS — I10 ESSENTIAL HYPERTENSION, BENIGN: ICD-10-CM

## 2021-11-09 DIAGNOSIS — R11.0 NAUSEA: ICD-10-CM

## 2021-11-09 DIAGNOSIS — I25.10 CORONARY ARTERY DISEASE INVOLVING NATIVE CORONARY ARTERY OF NATIVE HEART WITHOUT ANGINA PECTORIS: ICD-10-CM

## 2021-11-09 DIAGNOSIS — M16.12 PRIMARY OSTEOARTHRITIS OF LEFT HIP: Primary | ICD-10-CM

## 2021-11-09 PROCEDURE — 99214 OFFICE O/P EST MOD 30 MIN: CPT | Performed by: FAMILY MEDICINE

## 2021-11-09 RX ORDER — OXYCODONE HYDROCHLORIDE 5 MG/1
5-10 TABLET ORAL EVERY 4 HOURS PRN
Qty: 12 TABLET | Refills: 0 | Status: SHIPPED | OUTPATIENT
Start: 2021-11-09 | End: 2022-02-11

## 2021-11-09 RX ORDER — ONDANSETRON 4 MG/1
4 TABLET, ORALLY DISINTEGRATING ORAL EVERY 6 HOURS PRN
Qty: 15 TABLET | Refills: 0 | Status: SHIPPED | OUTPATIENT
Start: 2021-11-09 | End: 2022-02-11

## 2021-11-09 NOTE — PROGRESS NOTES
Hospital Follow-up Visit:    Hospital/Nursing Home/IP Rehab Facility: [facility]  Date of Admission: 10/27/21  Date of Discharge: 10/28/21  Reason(s) for Admission: left hip replacement      Was your hospitalization related to COVID-19? No   Problems taking medications regularly:  None  Medication changes since discharge: None  Problems adhering to non-medication therapy:  None    Summary of hospitalization:  [facility] discharge summary reviewed      Hospital follow-up reviewed.  Left total hip arthroplasty 2021 with Dr. Little.  Initial sharp postoperative pain has resolved.  Moderate left lateral hip and thigh discomfort remain however ongoing improvement noted.  Using a walker to assist with ambulation.  Able to shower.  Family is helping with home care needs.  Preop hemoglobin 14.6 decrease in the 11.3 following surgery.  No ongoing bleeding concerns identified.  Remains on lisinopril 5 mg daily metoprolol succinate 25 mg using half tablet daily for hypertension management.  Told to use aspirin 81 mg twice daily x30 days then resume once daily dosing.      Following the procedure noted above the patient was transferred to the post-op floor and started on:     Therapy:  physical therapy and occupational therapy  Anticoagulation Plan:ASA 81mg BID x 30 days   Pain Management: oxycodone, Tylenol.   Weight Bearing status: WBAT.   The patient was followed by Orthopedics during the inpatient treatment course:  Complications: Mild nausea and lightheadedness after oxycodone overnight. 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  Follow up needed: f/u Dr. Little on 11/11/21  Other Healthcare Providers Involved in Patient s Care:         None  Update since discharge: improved.       Primary osteoarthritis of left hip  Status post left total hip arthroplasty October 27, 2021 doing well.  Did provide refill on oxycodone 5 mg to use as directed number 12 tablets without additional refill due to acute postoperative pain.  - oxyCODONE (ROXICODONE) 5 MG tablet  Dispense: 12 tablet; Refill: 0    Essential hypertension, benign  Hypertension with blood pressure 136/72 on recheck.  Continues lisinopril 5 mg daily metoprolol succinate 25 mg using half tablet daily.  Reassess at annual wellness visit in 3 months.    Coronary artery disease involving native coronary artery of native heart without angina pectoris  CAD history, stable.    Impaired fasting glucose  Impaired fasting glucose reviewed.  Recent A1c improving from 5.9% down to 5.7% October 27, 2021.    Nausea  Ondansetron on as-needed basis for nausea associated with postoperative pain management.  - ondansetron (ZOFRAN-ODT) 4 MG ODT tab  Dispense: 15 tablet; Refill: 0    Anemia due to blood loss, acute  Hemoglobin decreasing from 14.6 down to 11.3 following left total hip arthroplasty and will recheck at annual wellness visit in 3 months in order to ensure that it resolved.         Post Discharge Medication Reconciliation: discharge medications reconciled, continue medications without change.  Plan of care communicated with patient and family (Dudley - son)              "

## 2022-02-08 ASSESSMENT — ACTIVITIES OF DAILY LIVING (ADL): CURRENT_FUNCTION: NO ASSISTANCE NEEDED

## 2022-02-11 ENCOUNTER — OFFICE VISIT (OUTPATIENT)
Dept: FAMILY MEDICINE | Facility: CLINIC | Age: 67
End: 2022-02-11
Payer: COMMERCIAL

## 2022-02-11 VITALS
DIASTOLIC BLOOD PRESSURE: 70 MMHG | WEIGHT: 199 LBS | HEART RATE: 72 BPM | BODY MASS INDEX: 31.23 KG/M2 | OXYGEN SATURATION: 96 % | SYSTOLIC BLOOD PRESSURE: 100 MMHG | HEIGHT: 67 IN

## 2022-02-11 DIAGNOSIS — R20.2 ARM PARESTHESIA, LEFT: ICD-10-CM

## 2022-02-11 DIAGNOSIS — I25.10 CORONARY ARTERY DISEASE INVOLVING NATIVE CORONARY ARTERY OF NATIVE HEART WITHOUT ANGINA PECTORIS: ICD-10-CM

## 2022-02-11 DIAGNOSIS — E66.09 CLASS 1 OBESITY DUE TO EXCESS CALORIES WITH SERIOUS COMORBIDITY AND BODY MASS INDEX (BMI) OF 30.0 TO 30.9 IN ADULT: ICD-10-CM

## 2022-02-11 DIAGNOSIS — R73.01 IMPAIRED FASTING GLUCOSE: ICD-10-CM

## 2022-02-11 DIAGNOSIS — E66.811 CLASS 1 OBESITY DUE TO EXCESS CALORIES WITH SERIOUS COMORBIDITY AND BODY MASS INDEX (BMI) OF 30.0 TO 30.9 IN ADULT: ICD-10-CM

## 2022-02-11 DIAGNOSIS — I10 ESSENTIAL HYPERTENSION, BENIGN: ICD-10-CM

## 2022-02-11 DIAGNOSIS — D62 ANEMIA DUE TO BLOOD LOSS, ACUTE: ICD-10-CM

## 2022-02-11 DIAGNOSIS — Z23 ENCOUNTER FOR IMMUNIZATION: ICD-10-CM

## 2022-02-11 DIAGNOSIS — Z00.00 ENCOUNTER FOR MEDICARE ANNUAL WELLNESS EXAM: Primary | ICD-10-CM

## 2022-02-11 DIAGNOSIS — Z12.5 SCREENING FOR PROSTATE CANCER: ICD-10-CM

## 2022-02-11 DIAGNOSIS — E78.00 HYPERCHOLESTEREMIA: ICD-10-CM

## 2022-02-11 DIAGNOSIS — Z12.11 COLON CANCER SCREENING: ICD-10-CM

## 2022-02-11 LAB
ANION GAP SERPL CALCULATED.3IONS-SCNC: 9 MMOL/L (ref 5–18)
BUN SERPL-MCNC: 9 MG/DL (ref 8–22)
CALCIUM SERPL-MCNC: 9.7 MG/DL (ref 8.5–10.5)
CHLORIDE BLD-SCNC: 105 MMOL/L (ref 98–107)
CHOLEST SERPL-MCNC: 245 MG/DL
CO2 SERPL-SCNC: 27 MMOL/L (ref 22–31)
CREAT SERPL-MCNC: 0.93 MG/DL (ref 0.7–1.3)
ERYTHROCYTE [DISTWIDTH] IN BLOOD BY AUTOMATED COUNT: 13.8 % (ref 10–15)
FASTING STATUS PATIENT QL REPORTED: YES
GFR SERPL CREATININE-BSD FRML MDRD: >90 ML/MIN/1.73M2
GLUCOSE BLD-MCNC: 99 MG/DL (ref 70–125)
HBA1C MFR BLD: 5.7 % (ref 0–5.6)
HCT VFR BLD AUTO: 43.5 % (ref 40–53)
HDLC SERPL-MCNC: 28 MG/DL
HGB BLD-MCNC: 14.7 G/DL (ref 13.3–17.7)
LDLC SERPL CALC-MCNC: 185 MG/DL
MCH RBC QN AUTO: 28.4 PG (ref 26.5–33)
MCHC RBC AUTO-ENTMCNC: 33.8 G/DL (ref 31.5–36.5)
MCV RBC AUTO: 84 FL (ref 78–100)
PLATELET # BLD AUTO: 303 10E3/UL (ref 150–450)
POTASSIUM BLD-SCNC: 4.6 MMOL/L (ref 3.5–5)
PSA SERPL-MCNC: 0.58 UG/L (ref 0–4.5)
RBC # BLD AUTO: 5.17 10E6/UL (ref 4.4–5.9)
SODIUM SERPL-SCNC: 141 MMOL/L (ref 136–145)
TRIGL SERPL-MCNC: 161 MG/DL
WBC # BLD AUTO: 10.3 10E3/UL (ref 4–11)

## 2022-02-11 PROCEDURE — 90471 IMMUNIZATION ADMIN: CPT | Performed by: FAMILY MEDICINE

## 2022-02-11 PROCEDURE — 90670 PCV13 VACCINE IM: CPT | Performed by: FAMILY MEDICINE

## 2022-02-11 PROCEDURE — 85027 COMPLETE CBC AUTOMATED: CPT | Performed by: FAMILY MEDICINE

## 2022-02-11 PROCEDURE — 36415 COLL VENOUS BLD VENIPUNCTURE: CPT | Performed by: FAMILY MEDICINE

## 2022-02-11 PROCEDURE — 83036 HEMOGLOBIN GLYCOSYLATED A1C: CPT | Performed by: FAMILY MEDICINE

## 2022-02-11 PROCEDURE — 80061 LIPID PANEL: CPT | Performed by: FAMILY MEDICINE

## 2022-02-11 PROCEDURE — G0438 PPPS, INITIAL VISIT: HCPCS | Performed by: FAMILY MEDICINE

## 2022-02-11 PROCEDURE — 80048 BASIC METABOLIC PNL TOTAL CA: CPT | Performed by: FAMILY MEDICINE

## 2022-02-11 PROCEDURE — 99214 OFFICE O/P EST MOD 30 MIN: CPT | Mod: 25 | Performed by: FAMILY MEDICINE

## 2022-02-11 PROCEDURE — G0103 PSA SCREENING: HCPCS | Performed by: FAMILY MEDICINE

## 2022-02-11 RX ORDER — METHYLPREDNISOLONE 4 MG
TABLET, DOSE PACK ORAL
Qty: 21 TABLET | Refills: 0 | Status: SHIPPED | OUTPATIENT
Start: 2022-02-11 | End: 2022-08-11

## 2022-02-11 ASSESSMENT — MIFFLIN-ST. JEOR: SCORE: 1645.25

## 2022-02-11 ASSESSMENT — ACTIVITIES OF DAILY LIVING (ADL): CURRENT_FUNCTION: NO ASSISTANCE NEEDED

## 2022-02-11 NOTE — PROGRESS NOTES
SUBJECTIVE:     Dudley Kiran is a 66 year old male who presents for Preventive Visit.      Annual wellness visit completed.  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  Patient has had coronary artery disease history of four-vessel bypass at [facility] 2010.  No longer being followed by cardiologist.  Does describe some left posterior lateral neck pain and some arm pain in the left axilla.  Some numbness involving left index finger distally.  Happens three quarters of the time over the past couple weeks and not associated with shortness of breath, nausea, diaphoresis etc.  Patient has underlying hypertension managed with lisinopril 5 mg daily as well as benefits of metoprolol succinate 25 mg using half tablet daily.  Continues aspirin 81 mg daily.  Impaired fasting glucose.  Has lost some weight since prior exam.  Acute blood loss anemia following prior left total hip arthroplasty.  Does have right hip osteoarthritis and likely will need future hip replacement as well.  Patient feels that statin therapy historically caused arthritis in his hips as well as nosebleeds and bleeding with bowel movement.  Admits that he was on aspirin at that time as well and that arthritis often seen in patients not on statins of course.  Has not had Prevnar immunization previously.  Has not completed colonoscopy or colon cancer screening but does agree to Cologuard today.  Has not completed healthcare directives historically but does agree to take paper and review and complete and return to this office at earliest convenience.  Comprehensive review of systems as above otherwise all negative.         1 son - Dudley (31)   2 daughters - Stephanie (27) and Saundra (24)   Etoh - none   Smoking - never   Surgeries - 4 vessel bypass at [facility] in    Hospitalizations: for above surgery   Mother -  - unknown history   Father -  age 68 due to  "DM and EtOHism   5 sisters -   1 brother - Cuate Holdersin - \"Guillaume\"   Retired (10/23/20) - [employer] - 40-50 hours a week      Patient has been advised of split billing requirements and indicates understanding: Yes  Are you in the first 12 months of your Medicare coverage?  No    Healthy Habits:     In general, how would you rate your overall health?  Fair    Frequency of exercise:  1 day/week    Duration of exercise:  Less than 15 minutes    Do you usually eat at least 4 servings of fruit and vegetables a day, include whole grains    & fiber and avoid regularly eating high fat or \"junk\" foods?  No    Taking medications regularly:  Yes    Ability to successfully perform activities of daily living:  No assistance needed    Home Safety:  No safety concerns identified    Hearing Impairment:  No hearing concerns    In the past 6 months, have you been bothered by leaking of urine?  No    In general, how would you rate your overall mental or emotional health?  Good      PHQ-2 Total Score: 0    Additional concerns today:  Yes    Do you feel safe in your environment? Yes    Have you ever done Advance Care Planning? (For example, a Health Directive, POLST, or a discussion with a medical provider or your loved ones about your wishes): No, advance care planning information given to patient to review.  Advanced care planning was discussed at today's visit.       Fall risk  Fallen 2 or more times in the past year?: No  Any fall with injury in the past year?: No    Cognitive Screening   1) Repeat 3 items (Leader, Season, Table)    2) Clock draw: NORMAL  3) 3 item recall: Recalls 2 objects   Results: NORMAL clock, 1-2 items recalled: COGNITIVE IMPAIRMENT LESS LIKELY    Mini-CogTM Copyright ANDREW Castro. Licensed by the author for use in [facility]; reprinted with permission (luda@.[facility]). All rights reserved.      Do you have sleep apnea, excessive snoring or daytime drowsiness?: no    Reviewed and updated as " needed this visit by clinical staff  Tobacco  Allergies  Meds  Problems  Med Hx  Surg Hx  Fam Hx         Reviewed and updated as needed this visit by Provider  Tobacco  Allergies  Meds  Problems  Med Hx  Surg Hx  Fam Hx        Social History     Tobacco Use     Smoking status: Never Smoker     Smokeless tobacco: Never Used   Substance Use Topics     Alcohol use: No     Alcohol/week: 0.0 standard drinks     If you drink alcohol do you typically have >3 drinks per day or >7 drinks per week? No    Alcohol Use 2/11/2022   Prescreen: >3 drinks/day or >7 drinks/week? -   Prescreen: >3 drinks/day or >7 drinks/week? No               Current providers sharing in care for this patient include:   Patient Care Team:  Tima Davis MD as PCP - General (Family Medicine)  Tima Davis MD as Assigned PCP    The following health maintenance items are reviewed in Epic and correct as of today:  Health Maintenance Due   Topic Date Due     COLORECTAL CANCER SCREENING  Never done     ZOSTER IMMUNIZATION (1 of 2) Never done     AORTIC ANEURYSM SCREENING (SYSTEM ASSIGNED)  Never done     Lab work is in process  Labs reviewed in EPIC  BP Readings from Last 3 Encounters:   02/11/22 100/70   11/09/21 136/72   10/28/21 99/61    Wt Readings from Last 3 Encounters:   02/11/22 90.3 kg (199 lb)   11/09/21 89.4 kg (197 lb)   10/27/21 88.5 kg (195 lb 3.2 oz)                  Patient Active Problem List   Diagnosis     Coronary atherosclerosis     Health Care Home     Squamous blepharitis of right upper eyelid     Periorbital dermatitis     History of prediabetes     Elevated cholesterol     Essential hypertension, benign     Primary osteoarthritis of both hips     Class 1 obesity due to excess calories with serious comorbidity and body mass index (BMI) of 32.0 to 32.9 in adult     OA (osteoarthritis)     Past Surgical History:   Procedure Laterality Date     ARTHROPLASTY, HIP, TOTAL, DIRECT ANTERIOR APPROACH, USING HANA TABLE  "Left 10/27/2021    Procedure: LEFT TOTAL HIP ARTHROPLASTY DIRECT ANTERIOR APPROACH;  Surgeon: Bon Little MD;  Location: [facility] Main OR     BYPASS GRAFT ARTERY CORONARY  2010       Social History     Tobacco Use     Smoking status: Never Smoker     Smokeless tobacco: Never Used   Substance Use Topics     Alcohol use: No     Alcohol/week: 0.0 standard drinks     Family History   Problem Relation Age of Onset     Diabetes No family hx of      Diabetes No family hx of      Breast Cancer No family hx of      Colon Cancer No family hx of      Prostate Cancer No family hx of      Hypertension No family hx of          Current Outpatient Medications   Medication Sig Dispense Refill     aspirin 81 MG EC tablet Take 1 tablet (81 mg) by mouth 2 times daily 60 tablet 0     lisinopril (ZESTRIL) 5 MG tablet Take 1 tablet (5 mg) by mouth daily 90 tablet 3     methylPREDNISolone (MEDROL DOSEPAK) 4 MG tablet therapy pack Follow Package Directions 21 tablet 0     metoprolol succinate ER (TOPROL-XL) 25 MG 24 hr tablet Take 0.5 tablets (12.5 mg) by mouth daily 90 tablet 3     Allergies   Allergen Reactions     No Known Allergies        Prevnar 13 immunization provided.  Recommend Pneumovax immunization greater than 1 year from now.  Patient declines seasonal flu shot.  Received third shot Pfizer Covid-19 booster October 14, 2021.        Review of Systems  Constitutional, HEENT, cardiovascular, pulmonary, GI, , musculoskeletal, neuro, skin, endocrine and psych systems are negative, except as otherwise noted.    OBJECTIVE:   /70   Pulse 72   Ht 1.708 m (5' 7.25\")   Wt 90.3 kg (199 lb)   SpO2 96%   BMI 30.94 kg/m   Estimated body mass index is 30.94 kg/m  as calculated from the following:    Height as of this encounter: 1.708 m (5' 7.25\").    Weight as of this encounter: 90.3 kg (199 lb).     Physical Exam  GENERAL: healthy, alert and no distress.  BMI - 30.94.  EYES: Eyes grossly normal to inspection, PERRL " and conjunctivae and sclerae normal  HENT: ear canals and TM's normal, nose and mouth without ulcers or lesions  NECK: no adenopathy, no asymmetry, masses, or scars and thyroid normal to palpation  RESP: lungs clear to auscultation - no rales, rhonchi or wheezes  CV: regular rate and rhythm, normal S1 S2, no S3 or S4, no murmur, click or rub, no peripheral edema and peripheral pulses strong  ABDOMEN: soft, nontender, no hepatosplenomegaly, no masses and bowel sounds normal   (male): normal male genitalia without lesions or urethral discharge, no hernia  RECTAL: normal sphincter tone, no rectal masses, prostate normal size, smooth, nontender without nodules or masses  MS: no gross musculoskeletal defects noted, no edema  SKIN: no suspicious lesions or rashes  NEURO: Normal strength and tone, mentation intact and speech normal  PSYCH: mentation appears normal, affect normal/bright    Diagnostic Test Results:  Labs reviewed in Epic  No results found for this or any previous visit (from the past 24 hour(s)).      EXAM: XR HIP BILATERAL 2 VIEWS EACH  LOCATION: [facility]  DATE/TIME: 8/10/2021 8:59 AM     INDICATION: Hip pain.  COMPARISON: None.                                                                      IMPRESSION: Severe end-stage degenerative change at the left hip joint with bone-on-bone contact and extensive sclerosis and subchondral cystic change on both sides of the joint. Less advanced but still significant degenerative change right hip joint. No   evidence for fracture. Pelvis negative for fracture.       ASSESSMENT / PLAN:     Encounter for Medicare annual wellness exam  Annual wellness visit completed.  Risk associated with fair health, lack of consistent exercise and suboptimal diet.  Annual wellness visits to continue.  Provided healthcare directive paperwork for patient to review, complete and return to this office.  - REVIEW OF HEALTH MAINTENANCE PROTOCOL ORDERS    Coronary  artery disease involving native coronary artery of native heart without angina pectoris  CAD history status post four-vessel CABG 2010.  Has not been followed by cardiologist recently.  Most recent EKG normal October 14, 2021.  Describes current achiness involving left posterior lateral neck and left axillary as well as left index finger paresthesias.  Declines repeat stress testing at this time and will notify of persistent concerns or if activity related correlation etc.  Continues metoprolol succinate 25 mg using half tablet daily along with aspirin 81 mg daily.    Essential hypertension, benign  Lisinopril 5 mg daily metoprolol succinate 25 mg using half tablet daily continues.  - Basic metabolic panel  - Basic metabolic panel    Hypercholesteremia  Patient elects to remain off statin therapy due to prior described intolerance with associated nosebleeds and blood in stool as well as subsequent arthritis in hips per patient.  Check lipid cascade today while fasting.  - Lipid panel reflex to direct LDL Fasting  - Lipid panel reflex to direct LDL Fasting    Impaired fasting glucose  A1c and fasting glucose obtained with weight goal remaining less than 190 pounds initially, less than 185 pounds ideally.  - Basic metabolic panel  - Hemoglobin A1c  - Basic metabolic panel  - Hemoglobin A1c    Anemia due to blood loss, acute  Update CBC to ensure resolved acute blood loss anemia following prior total hip arthroplasty as noted on October 27, 2021.  - CBC with platelets  - CBC with platelets    Class 1 obesity due to excess calories with serious comorbidity and body mass index (BMI) of 30.0 to 30.9 in adult  Dietary and exercise modification for weight goal less than 190 pounds initially, less than 185 pounds ideally.    Encounter for immunization  Prevnar immunization provided today.  Declines seasonal flu shot.  Would recommend Pneumovax immunization greater than 1 year from now.  - PCV13, IM (6+ WK) -  "[id number]    Colon cancer screening  Patient declines colonoscopy.  Does agree to Cologuard completion in order was placed.  - COLOGUARD([employer] SCIENC[employer])    Screening for prostate cancer  PSA for prostate cancer screening based on age criteria.  - Prostate Specific Antigen Screen  - Prostate Specific Antigen Screen    Arm paresthesia, left  Describes current achiness involving left posterior lateral neck and left axillary as well as left index finger paresthesias.  Declines repeat stress testing at this time and will notify of persistent concerns or if activity related correlation etc. will utilize Medrol Dosepak.  Notify persistent concern or if worsening.  - methylPREDNISolone (MEDROL DOSEPAK) 4 MG tablet therapy pack  Dispense: 21 tablet; Refill: 0       Patient has been advised of split billing requirements and indicates understanding: No    COUNSELING:  Reviewed preventive health counseling, as reflected in patient instructions       Regular exercise       Healthy diet/nutrition       Vision screening       Hearing screening       Dental care       Bladder control       Fall risk prevention       Immunizations    Vaccinated for: Pneumococcal             Aspirin prophylaxis        Colon cancer screening       Prostate cancer screening    Estimated body mass index is 30.94 kg/m  as calculated from the following:    Height as of this encounter: 1.708 m (5' 7.25\").    Weight as of this encounter: 90.3 kg (199 lb).    Weight management plan: Discussed healthy diet and exercise guidelines    He reports that he has never smoked. He has never used smokeless tobacco.      Appropriate preventive services were discussed with this patient, including applicable screening as appropriate for cardiovascular disease, diabetes, osteopenia/osteoporosis, and glaucoma.  As appropriate for age/gender, discussed screening for colorectal cancer, prostate cancer, breast cancer, and cervical cancer. Checklist reviewing preventive " services available has been given to the patient.    Reviewed patients plan of care and provided an AVS. The Intermediate Care Plan ( asthma action plan, low back pain action plan, and migraine action plan) for Dudley meets the Care Plan requirement. This Care Plan has been established and reviewed with the Patient.    Counseling Resources:  ATP IV Guidelines  Pooled Cohorts Equation Calculator  Breast Cancer Risk Calculator  Breast Cancer: Medication to Reduce Risk  FRAX Risk Assessment  ICSI Preventive Guidelines  Dietary Guidelines for Americans, 2010  [facility]'s MyPlate  ASA Prophylaxis  Lung CA Screening    Tima Davis MD  [facility]    Identified Health Risks:    The patient was provided with suggestions to help him develop a healthy physical lifestyle.  He is at risk for lack of exercise and has been provided with information to increase physical activity for the benefit of his well-being.  The patient was counseled and encouraged to consider modifying their diet and eating habits. He was provided with information on recommended healthy diet options.

## 2022-02-11 NOTE — PATIENT INSTRUCTIONS
Patient Education   Personalized Prevention Plan  You are due for the preventive services outlined below.  Your care team is available to assist you in scheduling these services.  If you have already completed any of these items, please share that information with your care team to update in your medical record.  Health Maintenance Due   Topic Date Due     ANNUAL REVIEW OF HM ORDERS  Never done     Discuss Advance Care Planning  Never done     Colorectal Cancer Screening  Never done     Zoster (Shingles) Vaccine (1 of 2) Never done     FALL RISK ASSESSMENT  Never done     Pneumococcal Vaccine (1 of 1 - PPSV23) Never done     AORTIC ANEURYSM SCREENING (SYSTEM ASSIGNED)  Never done     Flu Vaccine (1) Never done     Your Health Risk Assessment indicates you feel you are not in good health    A healthy lifestyle helps keep the body fit and the mind alert. It helps protect you from disease, helps you fight disease, and helps prevent chronic disease (disease that doesn't go away) from getting worse. This is important as you get older and begin to notice twinges in muscles and joints and a decline in the strength and stamina you once took for granted. A healthy lifestyle includes good healthcare, good nutrition, weight control, recreation, and regular exercise. Avoid harmful substances and do what you can to keep safe. Another part of a healthy lifestyle is stay mentally active and socially involved.    Good healthcare     Have a wellness visit every year.     If you have new symptoms, let us know right away. Don't wait until the next checkup.     Take medicines exactly as prescribed and keep your medicines in a safe place. Tell us if your medicine causes problems.   Healthy diet and weight control     Eat 3 or 4 small, nutritious, low-fat, high-fiber meals a day. Include a variety of fruits, vegetables, and whole-grain foods.     Make sure you get enough calcium in your diet. Calcium, vitamin D, and exercise help  prevent osteoporosis (bone thinning).     If you live alone, try eating with others when you can. That way you get a good meal and have company while you eat it.     Try to keep a healthy weight. If you eat more calories than your body uses for energy, it will be stored as fat and you will gain weight.     Recreation   Recreation is not limited to sports and team events. It includes any activity that provides relaxation, interest, enjoyment, and exercise. Recreation provides an outlet for physical, mental, and social energy. It can give a sense of worth and achievement. It can help you stay healthy.    Mental Exercise and Social Involvement  Mental and emotional health is as important as physical health. Keep in touch with friends and family. Stay as active as possible. Continue to learn and challenge yourself.   Things you can do to stay mentally active are:    Learn something new, like a foreign language or musical instrument.     Play SCRABBLE or do crossword puzzles. If you cannot find people to play these games with you at home, you can play them with others on your computer through the Internet.     Join a games club--anything from card games to chess or checkers or lawn bowling.     Start a new hobby.     Go back to school.     Volunteer.     Read.   Keep up with world events.    Exercise for a Healthier Heart  You may wonder how you can improve the health of your heart. If you re thinking about exercise, you re on the right track. You don t need to become an athlete. But you do need a certain amount of brisk exercise to help strengthen your heart. If you have been diagnosed with a heart condition, your healthcare provider may advise exercise to help stabilize your condition. To help make exercise a habit, choose safe, fun activities.      Exercise with a friend. When activity is fun, you're more likely to stick with it.   Before you start  Check with your healthcare provider before starting an exercise  program. This is especially important if you have not been active for a while. It's also important if you have a long-term (chronic) health problem such as heart disease, diabetes, or obesity. Or if you are at high risk for having these problems.   Why exercise?  Exercising regularly offers many healthy rewards. It can help you do all of the following:     Improve your blood cholesterol level to help prevent further heart trouble    Lower your blood pressure to help prevent a stroke or heart attack    Control diabetes, or reduce your risk of getting this disease    Improve your heart and lung function    Reach and stay at a healthy weight    Make your muscles stronger so you can stay active    Prevent falls and fractures by slowing the loss of bone mass (osteoporosis)    Manage stress better    Reduce your blood pressure    Improve your sense of self and your body image  Exercise tips      Ease into your routine. Set small goals. Then build on them. If you are not sure what your activity level should be, talk with your healthcare provider first before starting an exercise routine.    Exercise on most days. Aim for a total of 150 minutes (2 hours and 30 minutes) or more of moderate-intensity aerobic activity each week. Or 75 minutes (1 hour and 15 minutes) or more of vigorous-intensity aerobic activity each week. Or try for a combination of both. Moderate activity means that you breathe heavier and your heart rate increases but you can still talk. Think about doing 40 minutes of moderate exercise, 3 to 4 times a week. For best results, activity should last for about 40 minutes to lower blood pressure and cholesterol. It's OK to work up to the 40-minute period over time. Examples of moderate-intensity activity are walking 1 mile in 15 minutes. Or doing 30 to 45 minutes of yard work.    Step up your daily activity level.  Along with your exercise program, try being more active the whole day. Walk instead of drive. Or  park further away so that you take more steps each day. Do more household tasks or yard work. You may not be able to meet the advised mount of physical activity. But doing some moderate- or vigorous-intensity aerobic activity can help reduce your risk for heart disease. Your healthcare provider can help you figure out what is best for you.    Choose 1 or more activities you enjoy.  Walking is one of the easiest things you can do. You can also try swimming, riding a bike, dancing, or taking an exercise class.    When to call your healthcare provider  Call your healthcare provider if you have any of these:     Chest pain or feel dizzy or lightheaded    Burning, tightness, pressure, or heaviness in your chest, neck, shoulders, back, or arms    Abnormal shortness of breath    More joint or muscle pain    A very fast or irregular heartbeat (palpitations)  [employer] last reviewed this educational content on 7/1/2019 2000-2021 [employer]. All rights reserved. This information is not intended as a substitute for professional medical care. Always follow your healthcare professional's instructions.          Understanding USDA MyPlate  The USDA has guidelines to help you make healthy food choices. These are called MyPlate. MyPlate shows the food groups that make up healthy meals using the image of a place setting. Before you eat, think about the healthiest choices for what to put on your plate or in your cup or bowl. To learn more about building a healthy plate, visit www.choosemyplate.gov.    The food groups    Fruits. Any fruit or 100% fruit juice counts as part of the Fruit Group. Fruits may be fresh, canned, frozen, or dried, and may be whole, cut-up, or pureed. Make 1/2 of your plate fruits and vegetables.    Vegetables. Any vegetable or 100% vegetable juice counts as a member of the Vegetable Group. Vegetables may be fresh, frozen, canned, or dried. They can be served raw or cooked and may be whole,  cut-up, or mashed. Make 1/2 of your plate fruits and vegetables.    Grains. All foods made from grains are part of the Grains Group. These include wheat, rice, oats, cornmeal, and barley. Grains are often used to make foods such as bread, pasta, oatmeal, cereal, tortillas, and grits. Grains should be no more than 1/4 of your plate. At least half of your grains should be whole grains.    Protein. This group includes meat, poultry, seafood, beans and peas, eggs, processed soy products (such as tofu), nuts (including nut butters), and seeds. Make protein choices no more than 1/4 of your plate. Meat and poultry choices should be lean or low fat.    Dairy. The Dairy Group includes all fluid milk products and foods made from milk that contain calcium, such as yogurt and cheese. (Foods that have little calcium, such as cream, butter, and cream cheese, are not part of this group.) Most dairy choices should be low-fat or fat-free.    Oils. Oils aren't a food group, but they do contain essential nutrients. However it's important to watch your intake of oils. These are fats that are liquid at room temperature. They include canola, corn, olive, soybean, vegetable, and sunflower oil. Foods that are mainly oil include mayonnaise, certain salad dressings, and soft margarines. You likely already get your daily oil allowance from the foods you eat.  Things to limit  Eating healthy also means limiting these things in your diet:       Salt (sodium). Many processed foods have a lot of sodium. To keep sodium intake down, eat fresh vegetables, meats, poultry, and seafood when possible. Purchase low-sodium, reduced-sodium, or no-salt-added food products at the store. And don't add salt to your meals at home. Instead, season them with herbs and spices such as dill, oregano, cumin, and paprika. Or try adding flavor with lemon or lime zest and juice.    Saturated fat. Saturated fats are most often found in animal products such as beef, pork,  and chicken. They are often solid at room temperature, such as butter. To reduce your saturated fat intake, choose leaner cuts of meat and poultry. And try healthier cooking methods such as grilling, broiling, roasting, or baking. For a simple lower-fat swap, use plain nonfat yogurt instead of mayonnaise when making potato salad or macaroni salad.    Added sugars. These are sugars added to foods. They are in foods such as ice cream, candy, soda, fruit drinks, sports drinks, energy drinks, cookies, pastries, jams, and syrups. Cut down on added sugars by sharing sweet treats with a family member or friend. You can also choose fruit for dessert, and drink water or other unsweetened beverages.     [employer] last reviewed this educational content on 6/1/2020 2000-2021 [employer]. All rights reserved. This information is not intended as a substitute for professional medical care. Always follow your healthcare professional's instructions.

## 2022-08-11 ENCOUNTER — OFFICE VISIT (OUTPATIENT)
Dept: FAMILY MEDICINE | Facility: CLINIC | Age: 67
End: 2022-08-11
Payer: COMMERCIAL

## 2022-08-11 VITALS
SYSTOLIC BLOOD PRESSURE: 120 MMHG | WEIGHT: 212 LBS | OXYGEN SATURATION: 95 % | BODY MASS INDEX: 32.96 KG/M2 | HEART RATE: 91 BPM | DIASTOLIC BLOOD PRESSURE: 80 MMHG

## 2022-08-11 DIAGNOSIS — E78.00 HYPERCHOLESTEREMIA: ICD-10-CM

## 2022-08-11 DIAGNOSIS — E66.09 CLASS 1 OBESITY DUE TO EXCESS CALORIES WITH SERIOUS COMORBIDITY AND BODY MASS INDEX (BMI) OF 33.0 TO 33.9 IN ADULT: ICD-10-CM

## 2022-08-11 DIAGNOSIS — I10 ESSENTIAL HYPERTENSION, BENIGN: ICD-10-CM

## 2022-08-11 DIAGNOSIS — R73.01 IMPAIRED FASTING GLUCOSE: ICD-10-CM

## 2022-08-11 DIAGNOSIS — Z12.11 SCREEN FOR COLON CANCER: ICD-10-CM

## 2022-08-11 DIAGNOSIS — E66.811 CLASS 1 OBESITY DUE TO EXCESS CALORIES WITH SERIOUS COMORBIDITY AND BODY MASS INDEX (BMI) OF 33.0 TO 33.9 IN ADULT: ICD-10-CM

## 2022-08-11 DIAGNOSIS — I25.10 CORONARY ARTERY DISEASE INVOLVING NATIVE CORONARY ARTERY OF NATIVE HEART WITHOUT ANGINA PECTORIS: Primary | ICD-10-CM

## 2022-08-11 LAB
ANION GAP SERPL CALCULATED.3IONS-SCNC: 9 MMOL/L (ref 7–15)
BUN SERPL-MCNC: 12.7 MG/DL (ref 8–23)
CALCIUM SERPL-MCNC: 9.2 MG/DL (ref 8.8–10.2)
CHLORIDE SERPL-SCNC: 105 MMOL/L (ref 98–107)
CREAT SERPL-MCNC: 0.95 MG/DL (ref 0.67–1.17)
DEPRECATED HCO3 PLAS-SCNC: 24 MMOL/L (ref 22–29)
GFR SERPL CREATININE-BSD FRML MDRD: 88 ML/MIN/1.73M2
GLUCOSE SERPL-MCNC: 104 MG/DL (ref 70–99)
HBA1C MFR BLD: 5.7 % (ref 0–5.6)
POTASSIUM SERPL-SCNC: 4.2 MMOL/L (ref 3.4–5.3)
SODIUM SERPL-SCNC: 138 MMOL/L (ref 136–145)

## 2022-08-11 PROCEDURE — 80048 BASIC METABOLIC PNL TOTAL CA: CPT | Performed by: FAMILY MEDICINE

## 2022-08-11 PROCEDURE — 83036 HEMOGLOBIN GLYCOSYLATED A1C: CPT | Performed by: FAMILY MEDICINE

## 2022-08-11 PROCEDURE — 36415 COLL VENOUS BLD VENIPUNCTURE: CPT | Performed by: FAMILY MEDICINE

## 2022-08-11 PROCEDURE — 99214 OFFICE O/P EST MOD 30 MIN: CPT | Performed by: FAMILY MEDICINE

## 2022-08-11 ASSESSMENT — PAIN SCALES - GENERAL: PAINLEVEL: NO PAIN (0)

## 2022-08-11 NOTE — PROGRESS NOTES
Assessment/Plan:    Coronary artery disease involving native coronary artery of native heart without angina pectoris  CAD history status post four-vessel CABG in 2010.  Continuing to do well without recurrent exertional chest pain etc.  Reassess at annual wellness visit in 6 months.  Continuing aspirin 81 mg daily and metoprolol succinate 25 mg using half tablet daily.    Essential hypertension, benign  Hypertension appears well controlled with lisinopril 5 mg daily metoprolol succinate 25 mg using half tablet daily.  - Basic metabolic panel    Hypercholesteremia  Patient declined statin therapy.  States statins caused excessive bleeding and arthritis etc.  - STATIN NOT PRESCRIBED (INTENTIONAL)    Impaired fasting glucose  Impaired fasting glucose.  13 pound weight gain since February 11, 2022 wellness visit.  Repeat fasting glucose and A1c.  Weight goal remains less than 200 pounds initially, less than 190 pounds ideally.  - Basic metabolic panel  - Hemoglobin A1c    Class 1 obesity due to excess calories with serious comorbidity and body mass index (BMI) of 33.0 to 33.9 in adult  Therapeutic lifestyle changes reviewed for weight goal less than 200 pounds initially, less than 190 pounds ideally.    Screen for colon cancer  Patient declines colonoscopy.  Does agree to consider Cologuard.  Order placed.  - COLOGUARD(EXACT SCIENCES)          Subjective:    Dudley Kiran is seen today for follow-up assessment.  CAD history.  Four-vessel CABG dating back to 2010.  Asymptomatic.  Did have a left hip replacement October 27, 2021 and continuing to do well.  Feels that statins caused arthritis as well as excess bleeding previously and declines further use of.  Has history of hyperlipidemia.  Hypertension treated with lisinopril 5 mg daily metoprolol succinate 25 mg using half tablet daily.  Denies recent illness.  Did have COVID-19 Pfizer first booster October 14, 2021 and does agree to receive updated COVID-19  "vaccination once available this fall.  Comprehensive review of systems as above otherwise all negative.         1 son - Dudley (31)   2 daughters - Stephanie (27) and Saundra (24)   Etoh - none   Smoking - never   Surgeries - 4 vessel bypass at [facility] in    Hospitalizations: for above surgery   Mother -  - unknown history   Father -  age 68 due to DM and EtOHism   5 sisters -   1 brother - [place]   Cousin - \"Guillaume\"   Retired (10/23/20) - [employer] - 40-50 hours a week      Past Surgical History:   Procedure Laterality Date     ARTHROPLASTY, HIP, TOTAL, DIRECT ANTERIOR APPROACH, USING HANA TABLE Left 10/27/2021    Procedure: LEFT TOTAL HIP ARTHROPLASTY DIRECT ANTERIOR APPROACH;  Surgeon: Bon Little MD;  Location: [facility] Main OR     BYPASS GRAFT ARTERY CORONARY          Family History   Problem Relation Age of Onset     Diabetes No family hx of      Diabetes No family hx of      Breast Cancer No family hx of      Colon Cancer No family hx of      Prostate Cancer No family hx of      Hypertension No family hx of         Past Medical History:   Diagnosis Date     Arthritis      Coronary artery disease      HTN (hypertension)      Prediabetes         Social History     Tobacco Use     Smoking status: Never Smoker     Smokeless tobacco: Never Used   Substance Use Topics     Alcohol use: No     Alcohol/week: 0.0 standard drinks     Drug use: Never        Current Outpatient Medications   Medication Sig Dispense Refill     aspirin 81 MG EC tablet Take 1 tablet (81 mg) by mouth 2 times daily 60 tablet 0     lisinopril (ZESTRIL) 5 MG tablet Take 1 tablet (5 mg) by mouth daily 90 tablet 3     metoprolol succinate ER (TOPROL-XL) 25 MG 24 hr tablet Take 0.5 tablets (12.5 mg) by mouth daily 90 tablet 3     STATIN NOT PRESCRIBED (INTENTIONAL) Please choose reason not prescribed from choices below.       methylPREDNISolone (MEDROL DOSEPAK) 4 MG tablet therapy " pack Follow Package Directions 21 tablet 0          Objective:    Vitals:    08/11/22 0906   BP: 120/80   Pulse: 91   SpO2: 95%   Weight: 96.2 kg (212 lb)      Body mass index is 32.96 kg/m .    Alert.  No apparent distress.  Chest clear.  Cardiac exam regular.  Extremities warm and dry.  No evidence for peripheral edema.      This note has been dictated using voice recognition software and as a result may contain minor grammatical errors and unintended word substitutions.       Answers for HPI/ROS submitted by the patient on 8/4/2022  What is the reason for your visit today? : Health check up  How many servings of fruits and vegetables do you eat daily?: 0-1  On average, how many sweetened beverages do you drink each day (Examples: soda, juice, sweet tea, etc.  Do NOT count diet or artificially sweetened beverages)?: 3  How many minutes a day do you exercise enough to make your heart beat faster?: 9 or less  How many days a week do you exercise enough to make your heart beat faster?: 3 or less  How many days per week do you miss taking your medication?: 2  What makes it hard for you to take your medication every day?: remembering to take

## 2022-08-31 DIAGNOSIS — R03.0 ELEVATED BLOOD PRESSURE READING WITHOUT DIAGNOSIS OF HYPERTENSION: ICD-10-CM

## 2022-08-31 RX ORDER — METOPROLOL SUCCINATE 25 MG/1
TABLET, EXTENDED RELEASE ORAL
Qty: 90 TABLET | Refills: 3 | Status: SHIPPED | OUTPATIENT
Start: 2022-08-31 | End: 2024-03-13

## 2022-08-31 RX ORDER — LISINOPRIL 5 MG/1
TABLET ORAL
Qty: 90 TABLET | Refills: 3 | Status: SHIPPED | OUTPATIENT
Start: 2022-08-31 | End: 2024-03-13

## 2022-08-31 NOTE — TELEPHONE ENCOUNTER
"Last Written Prescription Date:  8/10/21  Last Fill Quantity: 90,  # refills: 3   Last office visit provider:  8/11/22     Requested Prescriptions   Pending Prescriptions Disp Refills     metoprolol succinate ER (TOPROL XL) 25 MG 24 hr tablet [Pharmacy Med Name: METOPROLOL ER SUCCINATE 25MG TABS] 90 tablet 3     Sig: TAKE 0.5 TABLETS(12.5 MG) BY MOUTH DAILY       Beta-Blockers Protocol Passed - 8/31/2022  3:07 PM        Passed - Blood pressure under 140/90 in past 12 months     BP Readings from Last 3 Encounters:   08/11/22 120/80   02/11/22 100/70   11/09/21 136/72                 Passed - Patient is age 6 or older        Passed - Recent (12 mo) or future (30 days) visit within the authorizing provider's specialty     Patient has had an office visit with the authorizing provider or a provider within the authorizing providers department within the previous 12 mos or has a future within next 30 days. See \"Patient Info\" tab in inbasket, or \"Choose Columns\" in Meds & Orders section of the refill encounter.              Passed - Medication is active on med list           lisinopril (ZESTRIL) 5 MG tablet [Pharmacy Med Name: LISINOPRIL 5MG TABLETS] 90 tablet 3     Sig: TAKE 1 TABLET(5 MG) BY MOUTH DAILY       ACE Inhibitors (Including Combos) Protocol Passed - 8/31/2022  3:07 PM        Passed - Blood pressure under 140/90 in past 12 months     BP Readings from Last 3 Encounters:   08/11/22 120/80   02/11/22 100/70   11/09/21 136/72                 Passed - Recent (12 mo) or future (30 days) visit within the authorizing provider's specialty     Patient has had an office visit with the authorizing provider or a provider within the authorizing providers department within the previous 12 mos or has a future within next 30 days. See \"Patient Info\" tab in inbasket, or \"Choose Columns\" in Meds & Orders section of the refill encounter.              Passed - Medication is active on med list        Passed - Patient is age 18 or " older        Passed - Normal serum creatinine on file in past 12 months     Recent Labs   Lab Test 08/11/22  0938   CR 0.95       Ok to refill medication if creatinine is low          Passed - Normal serum potassium on file in past 12 months     Recent Labs   Lab Test 08/11/22  0938   POTASSIUM 4.2                  Ramos Olea RN 08/31/22 3:07 PM

## 2022-10-05 ENCOUNTER — IMMUNIZATION (OUTPATIENT)
Dept: FAMILY MEDICINE | Facility: CLINIC | Age: 67
End: 2022-10-05
Payer: COMMERCIAL

## 2022-10-05 PROCEDURE — 91312 COVID-19,PF,PFIZER BOOSTER BIVALENT: CPT

## 2022-10-05 PROCEDURE — 0124A COVID-19,PF,PFIZER BOOSTER BIVALENT: CPT

## 2022-11-21 ENCOUNTER — HEALTH MAINTENANCE LETTER (OUTPATIENT)
Age: 67
End: 2022-11-21

## 2023-03-01 ASSESSMENT — ENCOUNTER SYMPTOMS
PARESTHESIAS: 0
JOINT SWELLING: 0
ABDOMINAL PAIN: 0
FEVER: 0
NAUSEA: 0
MYALGIAS: 0
NERVOUS/ANXIOUS: 0
HEARTBURN: 0
PALPITATIONS: 0
HEMATOCHEZIA: 0
CHILLS: 0
DIZZINESS: 0
SORE THROAT: 0
FREQUENCY: 0
HEADACHES: 0
HEMATURIA: 0
WEAKNESS: 0
CONSTIPATION: 0
ARTHRALGIAS: 0
SHORTNESS OF BREATH: 0
DIARRHEA: 0
EYE PAIN: 0
DYSURIA: 0
COUGH: 1

## 2023-03-01 ASSESSMENT — ACTIVITIES OF DAILY LIVING (ADL): CURRENT_FUNCTION: NO ASSISTANCE NEEDED

## 2023-03-07 ENCOUNTER — ANCILLARY PROCEDURE (OUTPATIENT)
Dept: GENERAL RADIOLOGY | Facility: CLINIC | Age: 68
End: 2023-03-07
Attending: FAMILY MEDICINE
Payer: COMMERCIAL

## 2023-03-07 ENCOUNTER — LAB (OUTPATIENT)
Dept: FAMILY MEDICINE | Facility: CLINIC | Age: 68
End: 2023-03-07

## 2023-03-07 ENCOUNTER — OFFICE VISIT (OUTPATIENT)
Dept: FAMILY MEDICINE | Facility: CLINIC | Age: 68
End: 2023-03-07
Attending: FAMILY MEDICINE
Payer: COMMERCIAL

## 2023-03-07 VITALS
DIASTOLIC BLOOD PRESSURE: 70 MMHG | BODY MASS INDEX: 32.49 KG/M2 | HEART RATE: 82 BPM | RESPIRATION RATE: 15 BRPM | HEIGHT: 67 IN | WEIGHT: 207 LBS | TEMPERATURE: 98.4 F | OXYGEN SATURATION: 97 % | SYSTOLIC BLOOD PRESSURE: 110 MMHG

## 2023-03-07 DIAGNOSIS — I10 ESSENTIAL HYPERTENSION, BENIGN: ICD-10-CM

## 2023-03-07 DIAGNOSIS — Z23 ENCOUNTER FOR IMMUNIZATION: ICD-10-CM

## 2023-03-07 DIAGNOSIS — N52.9 ERECTILE DYSFUNCTION, UNSPECIFIED ERECTILE DYSFUNCTION TYPE: ICD-10-CM

## 2023-03-07 DIAGNOSIS — I25.10 ATHEROSCLEROSIS OF CORONARY ARTERY OF NATIVE HEART WITHOUT ANGINA PECTORIS, UNSPECIFIED VESSEL OR LESION TYPE: ICD-10-CM

## 2023-03-07 DIAGNOSIS — R05.2 SUBACUTE COUGH: ICD-10-CM

## 2023-03-07 DIAGNOSIS — R73.01 IMPAIRED FASTING GLUCOSE: ICD-10-CM

## 2023-03-07 DIAGNOSIS — Z12.5 SCREENING FOR PROSTATE CANCER: ICD-10-CM

## 2023-03-07 DIAGNOSIS — Z12.11 SCREEN FOR COLON CANCER: ICD-10-CM

## 2023-03-07 DIAGNOSIS — E78.00 ELEVATED CHOLESTEROL: ICD-10-CM

## 2023-03-07 DIAGNOSIS — Z87.898 HISTORY OF PREDIABETES: ICD-10-CM

## 2023-03-07 DIAGNOSIS — E66.811 CLASS 1 OBESITY DUE TO EXCESS CALORIES WITH SERIOUS COMORBIDITY AND BODY MASS INDEX (BMI) OF 32.0 TO 32.9 IN ADULT: ICD-10-CM

## 2023-03-07 DIAGNOSIS — E66.09 CLASS 1 OBESITY DUE TO EXCESS CALORIES WITH SERIOUS COMORBIDITY AND BODY MASS INDEX (BMI) OF 32.0 TO 32.9 IN ADULT: ICD-10-CM

## 2023-03-07 DIAGNOSIS — Z00.00 ENCOUNTER FOR MEDICARE ANNUAL WELLNESS EXAM: Primary | ICD-10-CM

## 2023-03-07 PROCEDURE — 99214 OFFICE O/P EST MOD 30 MIN: CPT | Mod: 25 | Performed by: FAMILY MEDICINE

## 2023-03-07 PROCEDURE — G0439 PPPS, SUBSEQ VISIT: HCPCS | Performed by: FAMILY MEDICINE

## 2023-03-07 PROCEDURE — 71046 X-RAY EXAM CHEST 2 VIEWS: CPT | Mod: TC | Performed by: RADIOLOGY

## 2023-03-07 PROCEDURE — G0009 ADMIN PNEUMOCOCCAL VACCINE: HCPCS | Performed by: FAMILY MEDICINE

## 2023-03-07 PROCEDURE — 90732 PPSV23 VACC 2 YRS+ SUBQ/IM: CPT | Performed by: FAMILY MEDICINE

## 2023-03-07 RX ORDER — TADALAFIL 10 MG/1
10 TABLET ORAL DAILY PRN
Qty: 30 TABLET | Refills: 5 | Status: SHIPPED | OUTPATIENT
Start: 2023-03-07 | End: 2024-03-13

## 2023-03-07 RX ORDER — TADALAFIL 10 MG/1
10 TABLET ORAL DAILY PRN
Qty: 30 TABLET | Refills: 5 | Status: SHIPPED | OUTPATIENT
Start: 2023-03-07 | End: 2023-03-07

## 2023-03-07 RX ORDER — AZITHROMYCIN 250 MG/1
TABLET, FILM COATED ORAL
Qty: 6 TABLET | Refills: 0 | Status: SHIPPED | OUTPATIENT
Start: 2023-03-07 | End: 2023-03-12

## 2023-03-07 ASSESSMENT — ENCOUNTER SYMPTOMS
HEADACHES: 0
SHORTNESS OF BREATH: 0
PALPITATIONS: 0
HEMATURIA: 0
JOINT SWELLING: 0
SORE THROAT: 0
NERVOUS/ANXIOUS: 0
ABDOMINAL PAIN: 0
PARESTHESIAS: 0
FEVER: 0
DIARRHEA: 0
HEMATOCHEZIA: 0
CHILLS: 0
CONSTIPATION: 0
HEARTBURN: 0
MYALGIAS: 0
ARTHRALGIAS: 0
DIZZINESS: 0
COUGH: 1
WEAKNESS: 0
EYE PAIN: 0
FREQUENCY: 0
NAUSEA: 0
DYSURIA: 0

## 2023-03-07 ASSESSMENT — PAIN SCALES - GENERAL: PAINLEVEL: NO PAIN (0)

## 2023-03-07 ASSESSMENT — ACTIVITIES OF DAILY LIVING (ADL): CURRENT_FUNCTION: NO ASSISTANCE NEEDED

## 2023-03-07 NOTE — PROGRESS NOTES
SUBJECTIVE:     Lobo is a 67 year old who presents for Preventive Visit.    Patient has been advised of split billing requirements and indicates understanding: Yes  Are you in the first 12 months of your Medicare coverage?  No      Annual wellness visit completed.  Risk questionnaire reviewed noted for lack of consistent exercise, hearing loss as well as suboptimal diet.  CAD history.  Four-vessel CABG dating back to 2010.  Asymptomatic.  Did have a left hip replacement October 27, 2021 and continuing to do well. Does have right hip osteoarthritis and likely will need future hip replacement as well.  Feels that statins caused arthritis as well as excess bleeding previously and declines further use of.  Has history of hyperlipidemia.  Non-fasting today.  Patient has had coronary artery disease history of four-vessel bypass at [facility] March 2010.  No longer being followed by cardiologist.  Patient has underlying hypertension managed with lisinopril 5 mg daily as well as benefits of metoprolol succinate 25 mg using half tablet daily.  Continues aspirin 81 mg daily.  Impaired fasting glucose.  Patient feels that statin therapy historically caused arthritis in his hips as well as nosebleeds and bleeding with bowel movement.  Admits that he was on aspirin at that time as well and that arthritis often seen in patients not on statins of course.  Had Prevnar immunization previously and needs pneumococcal 23 booster today.  Has not completed colonoscopy or colon cancer screening but does agree to Cologuard today.  Has not completed healthcare directives historically but does agree to take paper and review and complete and return to this office at earliest convenience.  Comprehensive review of systems as above otherwise all negative.           1 son - Dudley (31)   2 daughters - Stephanie (27) and Saundra (24)   Etoh - none   Smoking - never   Surgeries - 4 vessel bypass at [facility] in March, 2010  "  Hospitalizations: for above surgery   Mother -  - unknown history   Father -  age 68 due to DM and EtOHism   5 sisters -   1 brother - [place]   Cousin - \"Guillaume\"   Retired (10/23/20) - [employer] - 40-50 hours a week        Healthy Habits:     In general, how would you rate your overall health?  Good    Frequency of exercise:  None    Do you usually eat at least 4 servings of fruit and vegetables a day, include whole grains    & fiber and avoid regularly eating high fat or \"junk\" foods?  No    Taking medications regularly:  Yes    Medication side effects:  None    Ability to successfully perform activities of daily living:  No assistance needed    Home Safety:  No safety concerns identified    Hearing Impairment:  Difficulty understanding soft or whispered speech    In the past 6 months, have you been bothered by leaking of urine?  No    In general, how would you rate your overall mental or emotional health?  Good      PHQ-2 Total Score: 0    Additional concerns today:  No      Have you ever done Advance Care Planning? (For example, a Health Directive, POLST, or a discussion with a medical provider or your loved ones about your wishes): No, advance care planning information given to patient to review.  Advanced care planning was discussed at today's visit.       Fall risk  Fallen 2 or more times in the past year?: No  Any fall with injury in the past year?: No    Cognitive Screening   1) Repeat 3 items (Leader, Season, Table)    2) Clock draw: NORMAL  3) 3 item recall: Recalls 2 objects   Results: NORMAL clock, 1-2 items recalled: COGNITIVE IMPAIRMENT LESS LIKELY    Mini-CogTM Copyright ANDREW Castro. Licensed by the author for use in [facility]; reprinted with permission (luda@.[facility]). All rights reserved.      Do you have sleep apnea, excessive snoring or daytime drowsiness?: no    Reviewed and updated as needed this visit by clinical staff   Tobacco  Allergies  Meds  Problems  " Med Hx  Surg Hx  Fam Hx          Reviewed and updated as needed this visit by Provider   Tobacco  Allergies  Meds  Problems  Med Hx  Surg Hx  Fam Hx         Social History     Tobacco Use     Smoking status: Never     Smokeless tobacco: Never   Substance Use Topics     Alcohol use: No     Alcohol/week: 0.0 standard drinks         Alcohol Use 3/1/2023   Prescreen: >3 drinks/day or >7 drinks/week? Not Applicable   No flowsheet data found.            Current providers sharing in care for this patient include:   Patient Care Team:  Tima Davis MD as PCP - General (Family Medicine)  Tima Davis MD as Assigned PCP    The following health maintenance items are reviewed in Epic and correct as of today:  Health Maintenance   Topic Date Due     COLORECTAL CANCER SCREENING  Never done     ZOSTER IMMUNIZATION (1 of 2) Never done     AORTIC ANEURYSM SCREENING (SYSTEM ASSIGNED)  Never done     INFLUENZA VACCINE (1) Never done     MEDICARE ANNUAL WELLNESS VISIT  03/07/2024     ANNUAL REVIEW OF HM ORDERS  03/07/2024     FALL RISK ASSESSMENT  03/07/2024     LIPID  02/11/2027     ADVANCE CARE PLANNING  03/07/2028     DTAP/TDAP/TD IMMUNIZATION (3 - Td or Tdap) 12/18/2029     HEPATITIS C SCREENING  Completed     PHQ-2 (once per calendar year)  Completed     Pneumococcal Vaccine: 65+ Years  Completed     COVID-19 Vaccine  Completed     IPV IMMUNIZATION  Aged Out     MENINGITIS IMMUNIZATION  Aged Out     Lab work is in process  Labs reviewed in EPIC  BP Readings from Last 3 Encounters:   03/07/23 110/70   08/11/22 120/80   02/11/22 100/70    Wt Readings from Last 3 Encounters:   03/07/23 93.9 kg (207 lb)   08/11/22 96.2 kg (212 lb)   02/11/22 90.3 kg (199 lb)                  Patient Active Problem List   Diagnosis     Coronary atherosclerosis     Health Care Home     Squamous blepharitis of right upper eyelid     Periorbital dermatitis     History of prediabetes     Elevated cholesterol     Essential hypertension,  benign     Primary osteoarthritis of both hips     Class 1 obesity due to excess calories with serious comorbidity and body mass index (BMI) of 32.0 to 32.9 in adult     OA (osteoarthritis)     Past Surgical History:   Procedure Laterality Date     ARTHROPLASTY, HIP, TOTAL, DIRECT ANTERIOR APPROACH, USING HANA TABLE Left 10/27/2021    Procedure: LEFT TOTAL HIP ARTHROPLASTY DIRECT ANTERIOR APPROACH;  Surgeon: Bon Little MD;  Location: [facility] Main OR     BYPASS GRAFT ARTERY CORONARY  2010       Social History     Tobacco Use     Smoking status: Never     Smokeless tobacco: Never   Substance Use Topics     Alcohol use: No     Alcohol/week: 0.0 standard drinks     Family History   Problem Relation Age of Onset     Diabetes No family hx of      Diabetes No family hx of      Breast Cancer No family hx of      Colon Cancer No family hx of      Prostate Cancer No family hx of      Hypertension No family hx of          Current Outpatient Medications   Medication Sig Dispense Refill     aspirin 81 MG EC tablet Take 1 tablet (81 mg) by mouth 2 times daily 60 tablet 0     azithromycin (ZITHROMAX) 250 MG tablet Take 2 tablets (500 mg) by mouth daily for 1 day, THEN 1 tablet (250 mg) daily for 4 days. 6 tablet 0     lisinopril (ZESTRIL) 5 MG tablet TAKE 1 TABLET(5 MG) BY MOUTH DAILY 90 tablet 3     metoprolol succinate ER (TOPROL XL) 25 MG 24 hr tablet TAKE 0.5 TABLETS(12.5 MG) BY MOUTH DAILY 90 tablet 3     STATIN NOT PRESCRIBED (INTENTIONAL) Please choose reason not prescribed from choices below.       tadalafil (CIALIS) 10 MG tablet Take 1 tablet (10 mg) by mouth daily as needed (erectile difficulties) 30 tablet 5     Allergies   Allergen Reactions     No Known Allergies      Pneumococcal 23 booster provided today.  Declines flu shot which she has never had previously.  Immunizations reviewed and otherwise up-to-date.        Review of Systems   Constitutional: Negative for chills and fever.   HENT: Positive  "for congestion. Negative for ear pain, hearing loss and sore throat.    Eyes: Negative for pain and visual disturbance.   Respiratory: Positive for cough. Negative for shortness of breath.    Cardiovascular: Negative for chest pain, palpitations and peripheral edema.   Gastrointestinal: Negative for abdominal pain, constipation, diarrhea, heartburn, hematochezia and nausea.   Genitourinary: Positive for impotence. Negative for dysuria, frequency, genital sores, hematuria, penile discharge and urgency.   Musculoskeletal: Negative for arthralgias, joint swelling and myalgias.   Skin: Negative for rash.   Neurological: Negative for dizziness, weakness, headaches and paresthesias.   Psychiatric/Behavioral: Negative for mood changes. The patient is not nervous/anxious.      Constitutional, HEENT, cardiovascular, pulmonary, GI, , musculoskeletal, neuro, skin, endocrine and psych systems are negative, except as otherwise noted.    OBJECTIVE:   /70   Pulse 82   Temp 98.4  F (36.9  C)   Resp 15   Ht 1.702 m (5' 7\")   Wt 93.9 kg (207 lb)   SpO2 97%   BMI 32.42 kg/m   Estimated body mass index is 32.42 kg/m  as calculated from the following:    Height as of this encounter: 1.702 m (5' 7\").    Weight as of this encounter: 93.9 kg (207 lb).       Physical Exam  GENERAL: healthy, alert and no distress  EYES: Eyes grossly normal to inspection, PERRL and conjunctivae and sclerae normal  HENT: ear canals and TM's normal, nose and mouth without ulcers or lesions  NECK: no adenopathy, no asymmetry, masses, or scars and thyroid normal to palpation  RESP: lungs clear to auscultation - no rales, rhonchi or wheezes  CV: regular rate and rhythm, normal S1 S2, no S3 or S4, no murmur, click or rub, no peripheral edema and peripheral pulses strong  ABDOMEN: soft, nontender, no hepatosplenomegaly, no masses and bowel sounds normal   (male): normal male genitalia without lesions or urethral discharge, no hernia  RECTAL: normal " sphincter tone, no rectal masses, prostate normal size, smooth, nontender without nodules or masses  MS: no gross musculoskeletal defects noted, no edema  SKIN: no suspicious lesions or rashes  NEURO: Normal strength and tone, mentation intact and speech normal  PSYCH: mentation appears normal, affect normal/bright    Diagnostic Test Results:  Labs reviewed in Epic  No results found for this or any previous visit (from the past 24 hour(s)).    ASSESSMENT / PLAN:     Encounter for Medicare annual wellness exam  Annual wellness visit completed.  Risk questionnaire reviewed.  Lack of consistent exercise, suboptimal diet as well as hearing loss.  Annual wellness visit to continue.    Screen for colon cancer  Cologuard for colon cancer screening to be completed at earliest convenience  - [employer])    Class 1 obesity due to excess calories with serious comorbidity and body mass index (BMI) of 32.0 to 32.9 in adult  Dietary and exercise modification for weight goal less than 200 pounds initially, 190 pounds ideally.    Atherosclerosis of coronary artery of native heart without angina pectoris, unspecified vessel or lesion type  History of ASCVD status post four-vessel CABG in 2010.  Continues aspirin 81 mg daily metoprolol succinate 25 mg using half tablet daily.  No longer seeing cardiologist on a consistent basis.  Asymptomatic.  Continue cardiovascular risk factor reduction.  Has not tolerated statins historically and therefore remains off high intensity statin therapy.    Essential hypertension, benign  Blood pressure appears well controlled with lisinopril 5 mg daily metoprolol succinate 25 mg using half tablet daily.  - Comprehensive metabolic panel    Elevated cholesterol  Does not tolerate statin therapy.  Check lipid cascade likely tomorrow morning while fasting  - Lipid panel reflex to direct LDL Fasting    History of prediabetes  We will check fasting glucose and A1c in a.m.  - Hemoglobin  A1c    Encounter for immunization  Pneumococcal 23 immunization provided as booster today having previously received Prevnar 13.  - PPSV23, IM/SUBQ (2+ YRS) - [id number]    Subacute cough  Subacute cough noted.  Chest x-ray reviewed.  Will have radiologist confirm.  Question left upper lobe infiltrate versus osteoarthritis findings.  Z-Enrrique was prescribed due to persistent concerns over past 4 to 5 months with pulmonary referral placed.  - XR Chest 2 Views  - CBC with platelets  - Adult Pulmonary Medicine Referral  - azithromycin (ZITHROMAX) 250 MG tablet  Dispense: 6 tablet; Refill: 0    Screening for prostate cancer  PSA for prostate cancer screening.  - Prostate Specific Antigen Screen    Impaired fasting glucose  A1c as above.  Dietary and exercise modification for weight goal less than 200 pounds initially, less than 190 pounds ideally.  - Hemoglobin A1c    Erectile dysfunction, unspecified erectile dysfunction type  Trial Cialis 10 mg use half tablet to 1 tablet daily on as-needed basis.  Good Rx coupon sent to patient's cell phone for fill at local [facility] pharmacy.  - tadalafil (CIALIS) 10 MG tablet  Dispense: 30 tablet; Refill: 5       Patient has been advised of split billing requirements and indicates understanding: Yes      COUNSELING:  Reviewed preventive health counseling, as reflected in patient instructions       Regular exercise       Healthy diet/nutrition       Vision screening       Hearing screening       Dental care       Bladder control       Fall risk prevention       Aspirin prophylaxis        Colon cancer screening       Prostate cancer screening        He reports that he has never smoked. He has never used smokeless tobacco.      Appropriate preventive services were discussed with this patient, including applicable screening as appropriate for cardiovascular disease, diabetes, osteopenia/osteoporosis, and glaucoma.  As appropriate for age/gender, discussed screening for colorectal cancer,  prostate cancer, breast cancer, and cervical cancer. Checklist reviewing preventive services available has been given to the patient.    Reviewed patients plan of care and provided an AVS. The Intermediate Care Plan ( asthma action plan, low back pain action plan, and migraine action plan) for Dudley meets the Care Plan requirement. This Care Plan has been established and reviewed with the Patient.          Tima Davis MD  [facility]    Identified Health Risks:    He is at risk for lack of exercise and has been provided with information to increase physical activity for the benefit of his well-being.  The patient was counseled and encouraged to consider modifying their diet and eating habits. He was provided with information on recommended healthy diet options.  The patient was provided with written information regarding signs of hearing loss.

## 2023-03-07 NOTE — PATIENT INSTRUCTIONS
Patient Education   Personalized Prevention Plan  You are due for the preventive services outlined below.  Your care team is available to assist you in scheduling these services.  If you have already completed any of these items, please share that information with your care team to update in your medical record.  Health Maintenance Due   Topic Date Due     Colorectal Cancer Screening  Never done     Zoster (Shingles) Vaccine (1 of 2) Never done     AORTIC ANEURYSM SCREENING (SYSTEM ASSIGNED)  Never done     Pneumococcal Vaccine (2 - PPSV23 if available, else PCV20) 04/08/2022     Flu Vaccine (1) Never done     ANNUAL REVIEW OF HM ORDERS  02/11/2023       Exercise for a Healthier Heart  You may wonder how you can improve the health of your heart. If you re thinking about exercise, you re on the right track. You don t need to become an athlete. But you do need a certain amount of brisk exercise to help strengthen your heart. If you have been diagnosed with a heart condition, your healthcare provider may advise exercise to help stabilize your condition. To help make exercise a habit, choose safe, fun activities.      Exercise with a friend. When activity is fun, you're more likely to stick with it.   Before you start  Check with your healthcare provider before starting an exercise program. This is especially important if you have not been active for a while. It's also important if you have a long-term (chronic) health problem such as heart disease, diabetes, or obesity. Or if you are at high risk for having these problems.   Why exercise?  Exercising regularly offers many healthy rewards. It can help you do all of the following:     Improve your blood cholesterol level to help prevent further heart trouble    Lower your blood pressure to help prevent a stroke or heart attack    Control diabetes, or reduce your risk of getting this disease    Improve your heart and lung function    Reach and stay at a healthy  weight    Make your muscles stronger so you can stay active    Prevent falls and fractures by slowing the loss of bone mass (osteoporosis)    Manage stress better    Reduce your blood pressure    Improve your sense of self and your body image  Exercise tips      Ease into your routine. Set small goals. Then build on them. If you are not sure what your activity level should be, talk with your healthcare provider first before starting an exercise routine.    Exercise on most days. Aim for a total of 150 minutes (2 hours and 30 minutes) or more of moderate-intensity aerobic activity each week. Or 75 minutes (1 hour and 15 minutes) or more of vigorous-intensity aerobic activity each week. Or try for a combination of both. Moderate activity means that you breathe heavier and your heart rate increases but you can still talk. Think about doing 40 minutes of moderate exercise, 3 to 4 times a week. For best results, activity should last for about 40 minutes to lower blood pressure and cholesterol. It's OK to work up to the 40-minute period over time. Examples of moderate-intensity activity are walking 1 mile in 15 minutes. Or doing 30 to 45 minutes of yard work.    Step up your daily activity level.  Along with your exercise program, try being more active the whole day. Walk instead of drive. Or park further away so that you take more steps each day. Do more household tasks or yard work. You may not be able to meet the advised mount of physical activity. But doing some moderate- or vigorous-intensity aerobic activity can help reduce your risk for heart disease. Your healthcare provider can help you figure out what is best for you.    Choose 1 or more activities you enjoy.  Walking is one of the easiest things you can do. You can also try swimming, riding a bike, dancing, or taking an exercise class.    When to call your healthcare provider  Call your healthcare provider if you have any of these:     Chest pain or feel dizzy  or lightheaded    Burning, tightness, pressure, or heaviness in your chest, neck, shoulders, back, or arms    Abnormal shortness of breath    More joint or muscle pain    A very fast or irregular heartbeat (palpitations)  [employer] last reviewed this educational content on 7/1/2019 2000-2021 [employer]. All rights reserved. This information is not intended as a substitute for professional medical care. Always follow your healthcare professional's instructions.          Understanding USDA MyPlate  The USDA has guidelines to help you make healthy food choices. These are called MyPlate. MyPlate shows the food groups that make up healthy meals using the image of a place setting. Before you eat, think about the healthiest choices for what to put on your plate or in your cup or bowl. To learn more about building a healthy plate, visit www.choosemyplate.gov.    The food groups    Fruits. Any fruit or 100% fruit juice counts as part of the Fruit Group. Fruits may be fresh, canned, frozen, or dried, and may be whole, cut-up, or pureed. Make 1/2 of your plate fruits and vegetables.    Vegetables. Any vegetable or 100% vegetable juice counts as a member of the Vegetable Group. Vegetables may be fresh, frozen, canned, or dried. They can be served raw or cooked and may be whole, cut-up, or mashed. Make 1/2 of your plate fruits and vegetables.    Grains. All foods made from grains are part of the Grains Group. These include wheat, rice, oats, cornmeal, and barley. Grains are often used to make foods such as bread, pasta, oatmeal, cereal, tortillas, and grits. Grains should be no more than 1/4 of your plate. At least half of your grains should be whole grains.    Protein. This group includes meat, poultry, seafood, beans and peas, eggs, processed soy products (such as tofu), nuts (including nut butters), and seeds. Make protein choices no more than 1/4 of your plate. Meat and poultry choices should be lean or low  fat.    Dairy. The Dairy Group includes all fluid milk products and foods made from milk that contain calcium, such as yogurt and cheese. (Foods that have little calcium, such as cream, butter, and cream cheese, are not part of this group.) Most dairy choices should be low-fat or fat-free.    Oils. Oils aren't a food group, but they do contain essential nutrients. However it's important to watch your intake of oils. These are fats that are liquid at room temperature. They include canola, corn, olive, soybean, vegetable, and sunflower oil. Foods that are mainly oil include mayonnaise, certain salad dressings, and soft margarines. You likely already get your daily oil allowance from the foods you eat.  Things to limit  Eating healthy also means limiting these things in your diet:       Salt (sodium). Many processed foods have a lot of sodium. To keep sodium intake down, eat fresh vegetables, meats, poultry, and seafood when possible. Purchase low-sodium, reduced-sodium, or no-salt-added food products at the store. And don't add salt to your meals at home. Instead, season them with herbs and spices such as dill, oregano, cumin, and paprika. Or try adding flavor with lemon or lime zest and juice.    Saturated fat. Saturated fats are most often found in animal products such as beef, pork, and chicken. They are often solid at room temperature, such as butter. To reduce your saturated fat intake, choose leaner cuts of meat and poultry. And try healthier cooking methods such as grilling, broiling, roasting, or baking. For a simple lower-fat swap, use plain nonfat yogurt instead of mayonnaise when making potato salad or macaroni salad.    Added sugars. These are sugars added to foods. They are in foods such as ice cream, candy, soda, fruit drinks, sports drinks, energy drinks, cookies, pastries, jams, and syrups. Cut down on added sugars by sharing sweet treats with a family member or friend. You can also choose fruit for  dessert, and drink water or other unsweetened beverages.     [employer] last reviewed this educational content on 6/1/2020 2000-2021 [employer]. All rights reserved. This information is not intended as a substitute for professional medical care. Always follow your healthcare professional's instructions.          Signs of Hearing Loss      Hearing much better with one ear can be a sign of hearing loss.   Hearing loss is a problem shared by many people. In fact, it is one of the most common health problems, particularly as people age. Most people age 65 and older have some hearing loss. By age 80, almost everyone does. Hearing loss often occurs slowly over the years. So you may not realize your hearing has gotten worse.  Have your hearing checked  Call your healthcare provider if you:    Have to strain to hear normal conversation    Have to watch other people s faces very carefully to follow what they re saying    Need to ask people to repeat what they ve said    Often misunderstand what people are saying    Turn the volume of the television or radio up so high that others complain    Feel that people are mumbling when they re talking to you    Find that the effort to hear leaves you feeling tired and irritated    Notice, when using the phone, that you hear better with one ear than the other  [employer] last reviewed this educational content on 1/1/2020 2000-2021 [employer]. All rights reserved. This information is not intended as a substitute for professional medical care. Always follow your healthcare professional's instructions.

## 2023-03-08 ENCOUNTER — LAB (OUTPATIENT)
Dept: LAB | Facility: CLINIC | Age: 68
End: 2023-03-08
Payer: COMMERCIAL

## 2023-03-08 DIAGNOSIS — R05.2 SUBACUTE COUGH: ICD-10-CM

## 2023-03-08 DIAGNOSIS — I10 ESSENTIAL HYPERTENSION, BENIGN: ICD-10-CM

## 2023-03-08 DIAGNOSIS — Z87.898 HISTORY OF PREDIABETES: ICD-10-CM

## 2023-03-08 DIAGNOSIS — R73.01 IMPAIRED FASTING GLUCOSE: ICD-10-CM

## 2023-03-08 DIAGNOSIS — E78.00 ELEVATED CHOLESTEROL: ICD-10-CM

## 2023-03-08 DIAGNOSIS — R05.2 SUBACUTE COUGH: Primary | ICD-10-CM

## 2023-03-08 DIAGNOSIS — Z12.5 SCREENING FOR PROSTATE CANCER: ICD-10-CM

## 2023-03-08 LAB
ALBUMIN SERPL BCG-MCNC: 4.4 G/DL (ref 3.5–5.2)
ALP SERPL-CCNC: 85 U/L (ref 40–129)
ALT SERPL W P-5'-P-CCNC: 31 U/L (ref 10–50)
ANION GAP SERPL CALCULATED.3IONS-SCNC: 12 MMOL/L (ref 7–15)
AST SERPL W P-5'-P-CCNC: 30 U/L (ref 10–50)
BILIRUB SERPL-MCNC: 0.6 MG/DL
BUN SERPL-MCNC: 12 MG/DL (ref 8–23)
CALCIUM SERPL-MCNC: 9.2 MG/DL (ref 8.8–10.2)
CHLORIDE SERPL-SCNC: 108 MMOL/L (ref 98–107)
CHOLEST SERPL-MCNC: 247 MG/DL
CREAT SERPL-MCNC: 1.04 MG/DL (ref 0.67–1.17)
DEPRECATED HCO3 PLAS-SCNC: 23 MMOL/L (ref 22–29)
ERYTHROCYTE [DISTWIDTH] IN BLOOD BY AUTOMATED COUNT: 13.2 % (ref 10–15)
GFR SERPL CREATININE-BSD FRML MDRD: 79 ML/MIN/1.73M2
GLUCOSE SERPL-MCNC: 111 MG/DL (ref 70–99)
HBA1C MFR BLD: 5.8 % (ref 0–5.6)
HCT VFR BLD AUTO: 41.4 % (ref 40–53)
HDLC SERPL-MCNC: 26 MG/DL
HGB BLD-MCNC: 14.4 G/DL (ref 13.3–17.7)
LDLC SERPL CALC-MCNC: 203 MG/DL
MCH RBC QN AUTO: 29.8 PG (ref 26.5–33)
MCHC RBC AUTO-ENTMCNC: 34.8 G/DL (ref 31.5–36.5)
MCV RBC AUTO: 86 FL (ref 78–100)
NONHDLC SERPL-MCNC: 221 MG/DL
PLATELET # BLD AUTO: 275 10E3/UL (ref 150–450)
POTASSIUM SERPL-SCNC: 4 MMOL/L (ref 3.4–5.3)
PROT SERPL-MCNC: 7.7 G/DL (ref 6.4–8.3)
PSA SERPL-MCNC: 0.86 NG/ML (ref 0–4.5)
RBC # BLD AUTO: 4.83 10E6/UL (ref 4.4–5.9)
SODIUM SERPL-SCNC: 143 MMOL/L (ref 136–145)
TRIGL SERPL-MCNC: 90 MG/DL
WBC # BLD AUTO: 8.5 10E3/UL (ref 4–11)

## 2023-03-08 PROCEDURE — 85027 COMPLETE CBC AUTOMATED: CPT

## 2023-03-08 PROCEDURE — 80061 LIPID PANEL: CPT

## 2023-03-08 PROCEDURE — 36415 COLL VENOUS BLD VENIPUNCTURE: CPT

## 2023-03-08 PROCEDURE — 80053 COMPREHEN METABOLIC PANEL: CPT

## 2023-03-08 PROCEDURE — 83036 HEMOGLOBIN GLYCOSYLATED A1C: CPT

## 2023-03-08 PROCEDURE — G0103 PSA SCREENING: HCPCS

## 2023-04-03 RX ORDER — ALBUTEROL SULFATE 0.83 MG/ML
2.5 SOLUTION RESPIRATORY (INHALATION) ONCE
Status: COMPLETED | OUTPATIENT
Start: 2023-04-04 | End: 2023-04-04

## 2023-04-04 ENCOUNTER — HOSPITAL ENCOUNTER (OUTPATIENT)
Dept: RESPIRATORY THERAPY | Facility: CLINIC | Age: 68
Discharge: HOME | End: 2023-04-04
Attending: FAMILY MEDICINE | Admitting: FAMILY MEDICINE
Payer: COMMERCIAL

## 2023-04-04 DIAGNOSIS — R05.2 SUBACUTE COUGH: ICD-10-CM

## 2023-04-04 LAB
DLCOCOR-%PRED-PRE: 99 %
DLCOCOR-PRE: 23.89 ML/MIN/MMHG
DLCOUNC-%PRED-PRE: 98 %
DLCOUNC-PRE: 23.75 ML/MIN/MMHG
DLCOUNC-PRED: 24.05 ML/MIN/MMHG
ERV-%PRED-PRE: 70 %
ERV-PRE: 0.44 L
ERV-PRED: 0.63 L
EXPTIME-PRE: 6.58 SEC
FEF2575-%PRED-POST: 145 %
FEF2575-%PRED-PRE: 95 %
FEF2575-POST: 3.34 L/SEC
FEF2575-PRE: 2.18 L/SEC
FEF2575-PRED: 2.29 L/SEC
FEFMAX-%PRED-PRE: 93 %
FEFMAX-PRE: 7.44 L/SEC
FEFMAX-PRED: 7.98 L/SEC
FEV1-%PRED-PRE: 92 %
FEV1-PRE: 2.61 L
FEV1FEV6-PRE: 77 %
FEV1FEV6-PRED: 78 %
FEV1FVC-PRE: 77 %
FEV1FVC-PRED: 78 %
FEV1SVC-PRE: 76 %
FEV1SVC-PRED: 65 %
FIFMAX-PRE: 3.86 L/SEC
FRCPLETH-%PRED-PRE: 87 %
FRCPLETH-PRE: 3.07 L
FRCPLETH-PRED: 3.5 L
FVC-%PRED-PRE: 93 %
FVC-PRE: 3.4 L
FVC-PRED: 3.62 L
IC-%PRED-PRE: 80 %
IC-PRE: 2.95 L
IC-PRED: 3.68 L
RVPLETH-%PRED-PRE: 104 %
RVPLETH-PRE: 2.58 L
RVPLETH-PRED: 2.47 L
TLCPLETH-%PRED-PRE: 92 %
TLCPLETH-PRE: 6.02 L
TLCPLETH-PRED: 6.52 L
VA-%PRED-PRE: 99 %
VA-PRE: 5.78 L
VC-%PRED-PRE: 79 %
VC-PRE: 3.43 L
VC-PRED: 4.3 L

## 2023-04-04 PROCEDURE — 250N000009 HC RX 250: Performed by: INTERNAL MEDICINE

## 2023-04-04 PROCEDURE — 94729 DIFFUSING CAPACITY: CPT

## 2023-04-04 PROCEDURE — 94726 PLETHYSMOGRAPHY LUNG VOLUMES: CPT

## 2023-04-04 PROCEDURE — 94060 EVALUATION OF WHEEZING: CPT

## 2023-04-04 PROCEDURE — 94729 DIFFUSING CAPACITY: CPT | Mod: 26 | Performed by: INTERNAL MEDICINE

## 2023-04-04 PROCEDURE — 999N000157 HC STATISTIC RCP TIME EA 10 MIN

## 2023-04-04 PROCEDURE — 94060 EVALUATION OF WHEEZING: CPT | Mod: 26 | Performed by: INTERNAL MEDICINE

## 2023-04-04 PROCEDURE — 94726 PLETHYSMOGRAPHY LUNG VOLUMES: CPT | Mod: 26 | Performed by: INTERNAL MEDICINE

## 2023-04-04 RX ADMIN — ALBUTEROL SULFATE 2.5 MG: 2.5 SOLUTION RESPIRATORY (INHALATION) at 08:55

## 2023-04-04 NOTE — PROGRESS NOTES
.   RESPIRATORY CARE NOTE    Complete Pulmonary Function Test completed by patient.  Good patient effort and cooperation. Albuterol 2.5 mg neb given for bronchodilation.  The results of this test meet the ATS standards for acceptability and repeatability. PT returned to baseline and left in no distress.    Radha Bucio, RT  4/4/2023

## 2023-05-04 ENCOUNTER — LAB (OUTPATIENT)
Dept: LAB | Facility: HOSPITAL | Age: 68
End: 2023-05-04
Payer: COMMERCIAL

## 2023-05-04 ENCOUNTER — OFFICE VISIT (OUTPATIENT)
Dept: PULMONOLOGY | Facility: CLINIC | Age: 68
End: 2023-05-04
Attending: FAMILY MEDICINE
Payer: COMMERCIAL

## 2023-05-04 VITALS
HEIGHT: 67 IN | WEIGHT: 211.2 LBS | SYSTOLIC BLOOD PRESSURE: 122 MMHG | DIASTOLIC BLOOD PRESSURE: 64 MMHG | HEART RATE: 76 BPM | BODY MASS INDEX: 33.15 KG/M2 | OXYGEN SATURATION: 95 %

## 2023-05-04 DIAGNOSIS — R05.2 SUBACUTE COUGH: Primary | ICD-10-CM

## 2023-05-04 DIAGNOSIS — J34.89 RHINORRHEA: ICD-10-CM

## 2023-05-04 DIAGNOSIS — R05.2 SUBACUTE COUGH: ICD-10-CM

## 2023-05-04 DIAGNOSIS — J45.30 MILD PERSISTENT REACTIVE AIRWAY DISEASE WITHOUT COMPLICATION: ICD-10-CM

## 2023-05-04 PROCEDURE — 99214 OFFICE O/P EST MOD 30 MIN: CPT | Performed by: NURSE PRACTITIONER

## 2023-05-04 PROCEDURE — 82785 ASSAY OF IGE: CPT

## 2023-05-04 PROCEDURE — 86003 ALLG SPEC IGE CRUDE XTRC EA: CPT

## 2023-05-04 PROCEDURE — 36415 COLL VENOUS BLD VENIPUNCTURE: CPT

## 2023-05-04 RX ORDER — IPRATROPIUM BROMIDE 42 UG/1
2 SPRAY, METERED NASAL 2 TIMES DAILY
Qty: 15 ML | Refills: 4 | Status: SHIPPED | OUTPATIENT
Start: 2023-05-04

## 2023-05-04 RX ORDER — FLUTICASONE PROPIONATE AND SALMETEROL 232; 14 UG/1; UG/1
1 POWDER, METERED RESPIRATORY (INHALATION) 2 TIMES DAILY
Qty: 1 EACH | Refills: 11 | Status: SHIPPED | OUTPATIENT
Start: 2023-05-04

## 2023-05-04 RX ORDER — ALBUTEROL SULFATE 90 UG/1
2 AEROSOL, METERED RESPIRATORY (INHALATION) EVERY 6 HOURS PRN
Qty: 18 G | Refills: 4 | Status: SHIPPED | OUTPATIENT
Start: 2023-05-04

## 2023-05-04 NOTE — PATIENT INSTRUCTIONS
It was a pleasure to see you in clinic today.   Here is what we discussed:    Start Airduo inhaler, one puff twice daily, rinse/gargle after use.  Start Albuterol inhaler, every 6 hours as needed for shortness of breath or wheezing.  Start Atrovent nasal spray twice daily, to help with sinus drainage.  Please have your blood work done today, we will call you with results.  Call us with any change or worsening of your breathing.  Follow-up in one month.    Yi Melendrez, CNP  Pulmonary Medicine  [facility]  [phone]

## 2023-05-04 NOTE — PROGRESS NOTES
Patient instructed in use of AirDuo Respiclick inhaler and albuterol HFA.  Patient states good understanding of how to use each inhaler device.  Has phone numbers to call if any questions.   Instructed to rinse mouth with water, gargle, and spit after each use of the AirDuo.

## 2023-05-04 NOTE — PROGRESS NOTES
Pulmonary Clinic Follow-Up          Assessment/Plan:     67 year old male with a history of CAD s/p CABG 2010, HTN, presenting for evaluation of chronic cough.      Chronic cough  Possible reactive airways  Rhinorrhea  Chronic cough x6 months with symptoms of allergic rhinitis, rhinorrhea and reactive airways.    PFTs normal, although improvement in spirometry mid-flow rates after albuterol, and he notes feeling improvement in breathing at that time as well.  CXR WNL.  Plan:  - start Airduo (fluticasone/salmeterol) 232/14, one puff BID, rinse/gargle after use.  Nursing provided education on how to use inhaler today.  - start Albuterol q6h PRN for SOB, wheezing.  - start Atrovent nasal spray BID to assist with rhinorrhea.  - [place] Allergen Panel and IgE blood draw, we will call or message him with results.    Follow-up  - one month, okay for video visit if he elects      Yi Melendrez [facility]  [phone]       CC:     Chronic cough     HPI:     67 year old male with a history of CAD s/p CABG 2010, HTN, presenting for evaluation of chronic cough.      Cough x6 months, no URI prior.  Started as itchy ears, watery eyes, then cough.  Never had these symptoms prior.  Every morning: wakes up, coughs up mucous, starts sneezing, then runny nose, then symptoms slow down, but ramps up again at night.   Intermittent SOB at night that wakes him up.    Feels sinus drainage constantly, and can feel it moving depending on which way he is laying in bed.  Wheezing in morning and with exertion, sometimes at night.  Denies chest pain, muscle aches, fever, night sweats.  Had cough with exertion (while shoveling snow).  Endorses mucous production with this cough.  No symptoms of asthma (wheezing, chronic bronchitis) as a child, or allergy symptoms in past.  No symptoms with change of seasons.  Never smoker.  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  No hobbies with inhalation exposure.  One cat, had about one year, no noted allergies/symptoms to this cat.  Lives in trailer house, no concern for mold.  No issues with acid reflux.  Felt better with albuterol during PFTs.    He saw PCP for these symptoms, CXR done but was negative aside from upper rib abnormality (confirmed this with radiologist today).  He was treated w/azithromycin at 3/7/23 PCP appt.  Didn't feel any improvement.  He has never tried any inhalers.    Medical records reviewed for this visit include PCP notes.     ROS:     A 12-system review was obtained and was negative with the exception of the symptoms endorsed in the HPI.       Medical history:       PMH:  Past Medical History:   Diagnosis Date     Arthritis      Coronary artery disease      HTN (hypertension)      Prediabetes        PSH:  Past Surgical History:   Procedure Laterality Date     ARTHROPLASTY, HIP, TOTAL, DIRECT ANTERIOR APPROACH, USING HANA TABLE Left 10/27/2021    Procedure: LEFT TOTAL HIP ARTHROPLASTY DIRECT ANTERIOR APPROACH;  Surgeon: Bon Little MD;  Location: [facility] Main OR     BYPASS GRAFT ARTERY CORONARY  2010       Allergies:  Allergies   Allergen Reactions     No Known Allergies        Family Hx:  Family History   Problem Relation Age of Onset     Diabetes No family hx of      Diabetes No family hx of      Breast Cancer No family hx of      Colon Cancer No family hx of      Prostate Cancer No family hx of      Hypertension No family hx of        Social Hx:  Social History     Socioeconomic History     Marital status:      Spouse name: Not on file     Number of children: Not on file     Years of education: Not on file     Highest education level: Not on file   Occupational History     Not on file   Tobacco Use     Smoking status: Never     Smokeless tobacco: Never   Vaping Use     Vaping status: Not on file   Substance and Sexual Activity     Alcohol use: No     Alcohol/week: 0.0  "standard drinks of alcohol     Drug use: Never     Sexual activity: Not on file   Other Topics Concern     Not on file   Social History Narrative     Not on file     Social Determinants of Health     Financial Resource Strain: Not on file   Food Insecurity: Not on file   Transportation Needs: Not on file   Physical Activity: Not on file   Stress: Not on file   Social Connections: Not on file   Intimate Partner Violence: Not on file   Housing Stability: Not on file       Current Meds:  Current Outpatient Medications   Medication Sig Dispense Refill     aspirin 81 MG EC tablet Take 1 tablet (81 mg) by mouth 2 times daily 60 tablet 0     lisinopril (ZESTRIL) 5 MG tablet TAKE 1 TABLET(5 MG) BY MOUTH DAILY 90 tablet 3     metoprolol succinate ER (TOPROL XL) 25 MG 24 hr tablet TAKE 0.5 TABLETS(12.5 MG) BY MOUTH DAILY 90 tablet 3     tadalafil (CIALIS) 10 MG tablet Take 1 tablet (10 mg) by mouth daily as needed (erectile difficulties) 30 tablet 5     STATIN NOT PRESCRIBED (INTENTIONAL) Please choose reason not prescribed from choices below.            Physical Exam:     /64 (BP Location: Left arm, Patient Position: Chair, Cuff Size: Adult Large)   Pulse 76   Ht 1.702 m (5' 7\")   Wt 95.8 kg (211 lb 3.2 oz)   SpO2 95%   BMI 33.08 kg/m    Gen: adult male , appears in NAD  HEENT: clear conjunctivae, moist mucous membranes, nasal turbinates are unremarkable, no oropharyngeal lesions  CV: RRR, no M/G/R  Resp: CTAB, scant intermittent expiratory wheezes in bilateral bases.  Respirations even and unlabored.  On RA.  Rare cough during exam, clear/white mucous.  Skin: no apparent rashes on visible skin  Ext: no cyanosis, clubbing or edema  Neuro: alert and answering questions appropriately       Data:     Labs:  reviewed    Imaging studies:  I have personally reviewed all pertinent imaging studies and PFT results unless otherwise noted.    EXAM: XR CHEST 2 VIEWS  LOCATION: [facility]  DATE/TIME: " 3/7/2023 3:39 PM  INDICATION:  Subacute cough  COMPARISON: 03/11/2010                                                  IMPRESSION: The heart is normal in size. Median sternotomy wires. No infiltrate, pleural effusion, or pneumothorax.      Pulmonary Function Testing 4/4/23:    IMPRESSION:   Normal pulmonary function.

## 2023-05-09 LAB — IGE SERPL-ACNC: 78 KU/L (ref 0–114)

## 2023-05-16 LAB
A ALTERNATA IGE QN: <0.1 KU(A)/L
A FUMIGATUS IGE QN: <0.1 KU(A)/L
BERMUDA GRASS IGE QN: <0.1 KU(A)/L
C HERBARUM IGE QN: <0.1 KU(A)/L
CAT DANDER IGG QN: 8.15 KU(A)/L
CEDAR IGE QN: <0.1 KU(A)/L
COMMON RAGWEED IGE QN: <0.1 KU(A)/L
COTTONWOOD IGE QN: <0.1 KU(A)/L
D FARINAE IGE QN: 4.16 KU(A)/L
D PTERONYSS IGE QN: 3.54 KU(A)/L
DOG DANDER+EPITH IGE QN: 0.28 KU(A)/L
IGE SERPL-ACNC: 78 KU/L (ref 0–114)
MAPLE IGE QN: <0.1 KU(A)/L
MARSH ELDER IGE QN: <0.1 KU(A)/L
MOUSE URINE PROT IGE QN: <0.1 KU(A)/L
NETTLE IGE QN: <0.1 KU(A)/L
P NOTATUM IGE QN: <0.1 KU(A)/L
ROACH IGE QN: <0.1 KU(A)/L
SALTWORT IGE QN: <0.1 KU(A)/L
SILVER BIRCH IGE QN: <0.1 KU(A)/L
TIMOTHY IGE QN: <0.1 KU(A)/L
WHITE ASH IGE QN: <0.1 KU(A)/L
WHITE ELM IGE QN: <0.1 KU(A)/L
WHITE MULBERRY IGE QN: <0.1 KU(A)/L
WHITE OAK IGE QN: <0.1 KU(A)/L

## 2023-05-31 NOTE — PROGRESS NOTES
Pulmonary Clinic Follow-Up          Assessment/Plan:     67 year old male with a history of CAD s/p CABG 2010, HTN, presenting for follow-up of chronic cough.      Chronic cough  Reactive airways  Rhinorrhea  Chronic cough x6 months with symptoms of allergic rhinitis, rhinorrhea and reactive airways.    PFTs normal, although improvement in spirometry mid-flow rates after albuterol, and he notes feeling improvement in breathing at that time as well.    CXR WNL.  Last visit initiated Airduo, Albuterol PRN, and Atrovent nasal spray.  [place] Allergen panel shows reactivity to cat and dog dander, as well as dust mites.  IgE 78.  He reports vast improvement in symptoms since initiating Airduo and nasal spray.  Plan:  - continue Airduo (fluticasone/salmeterol) 232/14, one puff BID, rinse/gargle after use.   - continue Albuterol q6h PRN for SOB, wheezing.  - continue Atrovent nasal spray BID to assist with rhinorrhea.    Follow-up  - 6 months      Yi Melendrez [facility]  [phone]       CC:     Chronic cough     HPI:     67 year old male with a history of CAD s/p CABG 2010, HTN, presenting for follow-up of chronic cough.      Previous HPI:  Cough x6 months, no URI prior.  Started as itchy ears, watery eyes, then cough.  Never had these symptoms prior.  Every morning: wakes up, coughs up mucous, starts sneezing, then runny nose, then symptoms slow down, but ramps up again at night.   Intermittent SOB at night that wakes him up.    Feels sinus drainage constantly, and can feel it moving depending on which way he is laying in bed.  Wheezing in morning and with exertion, sometimes at night.  Denies chest pain, muscle aches, fever, night sweats.  Had cough with exertion (while shoveling snow).  Endorses mucous production with this cough.  No symptoms of asthma (wheezing, chronic bronchitis) as a child, or allergy symptoms in past.  No symptoms with change of seasons.  Never  smoker.  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  No hobbies with inhalation exposure.  One cat, had about one year, no noted allergies/symptoms to this cat.  Lives in trailer house, no concern for mold.  No issues with acid reflux.  Felt better with albuterol during PFTs.  He saw PCP for these symptoms, CXR done but was negative aside from upper rib abnormality (confirmed this with radiologist today).  He was treated w/azithromycin at 3/7/23 PCP appt.  Didn't feel any improvement.  He has never tried any inhalers.    Since last visit (5/4/23), cough and wheezing has vastly improved.    He continues on Airduo twice daily and rinses mouth.  He has not required any Albuterol use since last visit.  He is now able to take longer walks and denies any COONEY.       ROS:     A 6-system review was obtained and was negative with the exception of the symptoms endorsed in the HPI.       Medical history:       PMH:  Past Medical History:   Diagnosis Date     Arthritis      Coronary artery disease      HTN (hypertension)      Prediabetes        PSH:  Past Surgical History:   Procedure Laterality Date     ARTHROPLASTY, HIP, TOTAL, DIRECT ANTERIOR APPROACH, USING HANA TABLE Left 10/27/2021    Procedure: LEFT TOTAL HIP ARTHROPLASTY DIRECT ANTERIOR APPROACH;  Surgeon: Bon Little MD;  Location: [facility] Main OR     BYPASS GRAFT ARTERY CORONARY  2010       Allergies:  Allergies   Allergen Reactions     No Known Allergies        Family Hx:  Family History   Problem Relation Age of Onset     Diabetes No family hx of      Diabetes No family hx of      Breast Cancer No family hx of      Colon Cancer No family hx of      Prostate Cancer No family hx of      Hypertension No family hx of        Social Hx:  Social History     Socioeconomic History     Marital status:      Spouse name: Not on file     Number of children: Not on file     Years of education: Not on file     Highest  education level: Not on file   Occupational History     Not on file   Tobacco Use     Smoking status: Never     Smokeless tobacco: Never   Vaping Use     Vaping status: Never Used   Substance and Sexual Activity     Alcohol use: No     Alcohol/week: 0.0 standard drinks of alcohol     Drug use: Never     Sexual activity: Not on file   Other Topics Concern     Not on file   Social History Narrative     Not on file     Social Determinants of Health     Financial Resource Strain: Not on file   Food Insecurity: Not on file   Transportation Needs: Not on file   Physical Activity: Not on file   Stress: Not on file   Social Connections: Not on file   Intimate Partner Violence: Not on file   Housing Stability: Not on file       Current Meds:  Current Outpatient Medications   Medication Sig Dispense Refill     albuterol (PROAIR HFA/PROVENTIL HFA/VENTOLIN HFA) 108 (90 Base) MCG/ACT inhaler Inhale 2 puffs into the lungs every 6 hours as needed for shortness of breath, wheezing or cough 18 g 4     aspirin 81 MG EC tablet Take 1 tablet (81 mg) by mouth 2 times daily 60 tablet 0     fluticasone-salmeterol (AIRDUO RESPICLICK) 232-14 MCG/ACT inhaler Inhale 1 puff into the lungs 2 times daily 1 each 11     ipratropium (ATROVENT) 0.06 % nasal spray Spray 2 sprays into both nostrils 2 times daily 15 mL 4     lisinopril (ZESTRIL) 5 MG tablet TAKE 1 TABLET(5 MG) BY MOUTH DAILY 90 tablet 3     metoprolol succinate ER (TOPROL XL) 25 MG 24 hr tablet TAKE 0.5 TABLETS(12.5 MG) BY MOUTH DAILY 90 tablet 3     tadalafil (CIALIS) 10 MG tablet Take 1 tablet (10 mg) by mouth daily as needed (erectile difficulties) 30 tablet 5          Physical Exam:     /70 (BP Location: Left arm, Patient Position: Chair, Cuff Size: Adult Regular)   Pulse 72   Wt 95.3 kg (210 lb)   SpO2 93%   BMI 32.89 kg/m    Gen: adult male , appears in NAD  HEENT: clear conjunctivae, moist mucous membranes  CV: RRR, no M/G/R  Resp: CTAB, no wheezes or rhonchi.  Respirations even and unlabored.  On RA.  No cough.  Skin: no apparent rashes on visible skin  Ext: no cyanosis, clubbing or edema  Neuro: alert and answering questions appropriately       Data:     Imaging studies:  I have personally reviewed all pertinent imaging studies and PFT results unless otherwise noted.    EXAM: XR CHEST 2 VIEWS  LOCATION: [facility]  DATE/TIME: 3/7/2023 3:39 PM  INDICATION:  Subacute cough  COMPARISON: 03/11/2010                                                  IMPRESSION: The heart is normal in size. Median sternotomy wires. No infiltrate, pleural effusion, or pneumothorax.      Pulmonary Function Testing 4/4/23:    IMPRESSION:   Normal pulmonary function.

## 2023-06-05 ENCOUNTER — OFFICE VISIT (OUTPATIENT)
Dept: PULMONOLOGY | Facility: CLINIC | Age: 68
End: 2023-06-05
Payer: COMMERCIAL

## 2023-06-05 VITALS
SYSTOLIC BLOOD PRESSURE: 116 MMHG | BODY MASS INDEX: 32.89 KG/M2 | WEIGHT: 210 LBS | OXYGEN SATURATION: 93 % | DIASTOLIC BLOOD PRESSURE: 70 MMHG | HEART RATE: 72 BPM

## 2023-06-05 DIAGNOSIS — J45.30 MILD PERSISTENT REACTIVE AIRWAY DISEASE WITHOUT COMPLICATION: ICD-10-CM

## 2023-06-05 DIAGNOSIS — J34.89 RHINORRHEA: ICD-10-CM

## 2023-06-05 DIAGNOSIS — R05.2 SUBACUTE COUGH: Primary | ICD-10-CM

## 2023-06-05 PROCEDURE — 99213 OFFICE O/P EST LOW 20 MIN: CPT | Performed by: NURSE PRACTITIONER

## 2023-06-05 NOTE — PATIENT INSTRUCTIONS
It was a pleasure to see you in clinic today.   Here is what we discussed:    Continue Airduo inhaler, one puff twice daily, rinse/gargle after use.  Continue Albuterol inhaler every 6 hours as needed for shortness of breath or wheezing.  Continue Atrovent nasal spray once-twice daily for runny nose.  Call us with any change or worsening of your breathing.  Follow-up in 6 months.    Yi Melendrez, CNP  Pulmonary Medicine  [facility]  [phone]

## 2023-09-03 ASSESSMENT — ASTHMA QUESTIONNAIRES: ACT_TOTALSCORE: 23

## 2023-09-07 PROBLEM — L30.9 PERIORBITAL DERMATITIS: Status: RESOLVED | Noted: 2019-02-01 | Resolved: 2023-09-07

## 2023-09-07 PROBLEM — H01.021 SQUAMOUS BLEPHARITIS OF RIGHT UPPER EYELID: Status: RESOLVED | Noted: 2019-02-01 | Resolved: 2023-09-07

## 2023-09-08 ENCOUNTER — OFFICE VISIT (OUTPATIENT)
Dept: FAMILY MEDICINE | Facility: CLINIC | Age: 68
End: 2023-09-08
Payer: COMMERCIAL

## 2023-09-08 VITALS
WEIGHT: 209.3 LBS | SYSTOLIC BLOOD PRESSURE: 96 MMHG | RESPIRATION RATE: 16 BRPM | HEIGHT: 68 IN | TEMPERATURE: 98.4 F | DIASTOLIC BLOOD PRESSURE: 68 MMHG | HEART RATE: 76 BPM | BODY MASS INDEX: 31.72 KG/M2 | OXYGEN SATURATION: 93 %

## 2023-09-08 DIAGNOSIS — I25.10 ATHEROSCLEROSIS OF CORONARY ARTERY OF NATIVE HEART WITHOUT ANGINA PECTORIS, UNSPECIFIED VESSEL OR LESION TYPE: Primary | ICD-10-CM

## 2023-09-08 DIAGNOSIS — E66.811 CLASS 1 OBESITY DUE TO EXCESS CALORIES WITH SERIOUS COMORBIDITY AND BODY MASS INDEX (BMI) OF 31.0 TO 31.9 IN ADULT: ICD-10-CM

## 2023-09-08 DIAGNOSIS — I10 ESSENTIAL HYPERTENSION, BENIGN: ICD-10-CM

## 2023-09-08 DIAGNOSIS — H60.391 INFECTIVE OTITIS EXTERNA, RIGHT: ICD-10-CM

## 2023-09-08 DIAGNOSIS — E78.00 ELEVATED CHOLESTEROL: ICD-10-CM

## 2023-09-08 DIAGNOSIS — R73.01 IMPAIRED FASTING GLUCOSE: ICD-10-CM

## 2023-09-08 DIAGNOSIS — E66.09 CLASS 1 OBESITY DUE TO EXCESS CALORIES WITH SERIOUS COMORBIDITY AND BODY MASS INDEX (BMI) OF 31.0 TO 31.9 IN ADULT: ICD-10-CM

## 2023-09-08 DIAGNOSIS — Z12.11 SCREEN FOR COLON CANCER: ICD-10-CM

## 2023-09-08 LAB
ANION GAP SERPL CALCULATED.3IONS-SCNC: 11 MMOL/L (ref 7–15)
BUN SERPL-MCNC: 12.6 MG/DL (ref 8–23)
CALCIUM SERPL-MCNC: 9.3 MG/DL (ref 8.8–10.2)
CHLORIDE SERPL-SCNC: 106 MMOL/L (ref 98–107)
CHOLEST SERPL-MCNC: 247 MG/DL
CREAT SERPL-MCNC: 1.01 MG/DL (ref 0.67–1.17)
DEPRECATED HCO3 PLAS-SCNC: 23 MMOL/L (ref 22–29)
EGFRCR SERPLBLD CKD-EPI 2021: 82 ML/MIN/1.73M2
GLUCOSE SERPL-MCNC: 107 MG/DL (ref 70–99)
HBA1C MFR BLD: 5.8 % (ref 0–5.6)
HDLC SERPL-MCNC: 26 MG/DL
LDLC SERPL CALC-MCNC: 194 MG/DL
NONHDLC SERPL-MCNC: 221 MG/DL
POTASSIUM SERPL-SCNC: 4.5 MMOL/L (ref 3.4–5.3)
SODIUM SERPL-SCNC: 140 MMOL/L (ref 136–145)
TRIGL SERPL-MCNC: 133 MG/DL

## 2023-09-08 PROCEDURE — 80048 BASIC METABOLIC PNL TOTAL CA: CPT | Performed by: FAMILY MEDICINE

## 2023-09-08 PROCEDURE — 36415 COLL VENOUS BLD VENIPUNCTURE: CPT | Performed by: FAMILY MEDICINE

## 2023-09-08 PROCEDURE — 99214 OFFICE O/P EST MOD 30 MIN: CPT | Performed by: FAMILY MEDICINE

## 2023-09-08 PROCEDURE — 83036 HEMOGLOBIN GLYCOSYLATED A1C: CPT | Performed by: FAMILY MEDICINE

## 2023-09-08 PROCEDURE — 80061 LIPID PANEL: CPT | Performed by: FAMILY MEDICINE

## 2023-09-08 RX ORDER — CIPROFLOXACIN AND DEXAMETHASONE 3; 1 MG/ML; MG/ML
4 SUSPENSION/ DROPS AURICULAR (OTIC) 2 TIMES DAILY
Qty: 7.5 ML | Refills: 0 | Status: SHIPPED | OUTPATIENT
Start: 2023-09-08 | End: 2023-09-15

## 2023-09-08 ASSESSMENT — PAIN SCALES - GENERAL: PAINLEVEL: NO PAIN (0)

## 2023-09-08 NOTE — PROGRESS NOTES
Assessment/Plan:    Atherosclerosis of coronary artery of native heart without angina pectoris, unspecified vessel or lesion type  CAD status post four-vessel CABG in 2010.  Patient continues aspirin 81 mg daily along with use of metoprolol succinate 25 mg using half tablet daily.  Did suggest seeing cardiologist for establishment of care and further evaluation for cardiovascular risk factor reduction including cholesterol elevation with prior statin intolerance noted.  Consider PCSK9 inhibitor like Repatha 140 mg subcutaneous every 2 weeks.  - Adult Cardiology Eval  Referral    Screen for colon cancer  Patient declines colonoscopy however will complete Cologuard at earliest convenience.  Patient has it at home and just needs encouragement to complete this.    Essential hypertension, benign  Utilizing lisinopril 5 mg daily along with use of metoprolol succinate 25 mg using half tablet daily.  - Basic metabolic panel    Elevated cholesterol  Prior intolerance to statin therapy.  Consider PCSK9 inhibitor like Repatha 140 mg subcutaneous every 2 weeks.  Will review options with cardiologist.  - Adult Cardiology Eval  Referral  - Lipid panel reflex to direct LDL Fasting    Impaired fasting glucose  Impaired fasting glucose with A1c and fasting glucose obtained.  - Hemoglobin A1c  - Basic metabolic panel    Class 1 obesity due to excess calories with serious comorbidity and body mass index (BMI) of 31.0 to 31.9 in adult  Dietary and exercise modifications ongoing for weight goal less than 200 pounds initially, less than 190 pounds ideally.    Infective otitis externa, right  Ciprodex 0.3/0.1 otic suspension using 4 drops right ear twice daily x7 days.  - ciprofloxacin-dexAMETHasone (CIPRODEX) 0.3-0.1 % otic suspension  Dispense: 7.5 mL; Refill: 0               Subjective:    Dudley Kiran is seen today for follow-up assessment.  CAD history status post four-vessel CABG in 2010.  Asymptomatic  "currently.  Continues aspirin and metoprolol succinate 25 mg using half tablet daily.  Lisinopril 5 mg daily as well for hypertension management.  Prior intolerance to statin therapy for cholesterol elevation.  Impaired fasting glucose historically.  Prior fasting glucose 111 and A1c of 5.8% 2023.  Patient also has right ear drainage and mild discomfort.  Some pruritus associated with it as well.  Symptoms over past couple months.  BMI 31.82 with weight goal less than 200 pounds initially, less than 190 pounds ideally.  Comprehensive review of systems as above otherwise all negative.         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       Past Surgical History:   Procedure Laterality Date    ARTHROPLASTY, HIP, TOTAL, DIRECT ANTERIOR APPROACH, USING HANA TABLE Left 10/27/2021    Procedure: LEFT TOTAL HIP ARTHROPLASTY DIRECT ANTERIOR APPROACH;  Surgeon: Bon Little MD;  Location: [facility] Main OR    BYPASS GRAFT ARTERY CORONARY          Family History   Problem Relation Age of Onset    Diabetes No family hx of     Diabetes No family hx of     Breast Cancer No family hx of     Colon Cancer No family hx of     Prostate Cancer No family hx of     Hypertension No family hx of         Past Medical History:   Diagnosis Date    Arthritis     Coronary artery disease     HTN (hypertension)     Prediabetes         Social History     Tobacco Use    Smoking status: Never    Smokeless tobacco: Never   Vaping Use    Vaping Use: Never used   Substance Use Topics    Alcohol use: No     Alcohol/week: 0.0 standard drinks of alcohol    Drug use: " "Never        Current Outpatient Medications   Medication Sig Dispense Refill    aspirin 81 MG EC tablet Take 1 tablet (81 mg) by mouth 2 times daily 60 tablet 0    ciprofloxacin-dexAMETHasone (CIPRODEX) 0.3-0.1 % otic suspension Place 4 drops into the right ear 2 times daily for 7 days 7.5 mL 0    fluticasone-salmeterol (AIRDUO RESPICLICK) 232-14 MCG/ACT inhaler Inhale 1 puff into the lungs 2 times daily 1 each 11    ipratropium (ATROVENT) 0.06 % nasal spray Spray 2 sprays into both nostrils 2 times daily 15 mL 4    lisinopril (ZESTRIL) 5 MG tablet TAKE 1 TABLET(5 MG) BY MOUTH DAILY 90 tablet 3    metoprolol succinate ER (TOPROL XL) 25 MG 24 hr tablet TAKE 0.5 TABLETS(12.5 MG) BY MOUTH DAILY 90 tablet 3    tadalafil (CIALIS) 10 MG tablet Take 1 tablet (10 mg) by mouth daily as needed (erectile difficulties) 30 tablet 5    albuterol (PROAIR HFA/PROVENTIL HFA/VENTOLIN HFA) 108 (90 Base) MCG/ACT inhaler Inhale 2 puffs into the lungs every 6 hours as needed for shortness of breath, wheezing or cough (Patient not taking: Reported on 9/8/2023) 18 g 4          Objective:    Vitals:    09/08/23 1109   BP: 96/68   BP Location: Left arm   Patient Position: Sitting   Cuff Size: Adult Large   Pulse: 76   Resp: 16   Temp: 98.4  F (36.9  C)   TempSrc: Temporal   SpO2: 93%   Weight: 94.9 kg (209 lb 4.8 oz)   Height: 1.727 m (5' 8\")      Body mass index is 31.82 kg/m .    Alert.  No apparent distress.  HEENT exam with mild otorrhea right external auditory canal with mild external canal stenosis only.  TM appears otherwise normal.  Left external auditory canal and TM appear normal.  Oropharynx moist mucous membranes.  Cardiac exam regular.  Chest clear.      This note has been dictated using voice recognition software and as a result may contain minor grammatical errors and unintended word substitutions.       Answers submitted by the patient for this visit:  General Questionnaire (Submitted on 9/3/2023)  Chief Complaint: Chronic " problems general questions HPI Form  What is the reason for your visit today? : Follow up  How many servings of fruits and vegetables do you eat daily?: 0-1  On average, how many sweetened beverages do you drink each day (Examples: soda, juice, sweet tea, etc.  Do NOT count diet or artificially sweetened beverages)?: 3  How many minutes a day do you exercise enough to make your heart beat faster?: 10 to 19  How many days a week do you exercise enough to make your heart beat faster?: 3 or less  How many days per week do you miss taking your medication?: 2  What makes it hard for you to take your medication every day?: remembering to take

## 2023-09-12 ENCOUNTER — TELEPHONE (OUTPATIENT)
Dept: FAMILY MEDICINE | Facility: CLINIC | Age: 68
End: 2023-09-12
Payer: COMMERCIAL

## 2023-09-12 DIAGNOSIS — H60.391 INFECTIVE OTITIS EXTERNA, RIGHT: Primary | ICD-10-CM

## 2023-09-12 NOTE — TELEPHONE ENCOUNTER
PA Initiation    Medication: ciprofloxacin-dexAMETHasone (CIPRODEX) 0.3-0.1 % otic suspension   Insurance Company:  [employer] MED[employer]ARE [employer]AGE   Pharmacy Filling the Rx:  [facility]   Filling Pharmacy Phone:  [phone]  Filling Pharmacy Fax:  [phone]  Start Date:  09/12/2023

## 2023-09-14 NOTE — TELEPHONE ENCOUNTER
Central Prior Authorization Team   Phone: [phone]    PA Initiation    Medication: ciprofloxacin-dexAMETHasone (CIPRODEX) 0.3-0.1 % otic suspension  Insurance Company: [employer] [place] - Phone [phone] Fax [phone]  Pharmacy Filling the Rx: [id number] - [place], [place] - [address] AT [facility] [address] [facility] [address]  Filling Pharmacy Phone: [phone]  Filling Pharmacy Fax:    Start Date: 9/14/2023

## 2023-09-15 NOTE — TELEPHONE ENCOUNTER
PRIOR AUTHORIZATION DENIED    Medication: ciprofloxacin-dexAMETHasone (CIPRODEX) 0.3-0.1 % otic suspension    Denial Date: 9/15/2023    Denial Rational: Patient needs to try and fail alternatives listed below:       Appeal Information: Review the plan's reasons for denial listed above. Please utilize that information to complete letter and provide specific, detailed clinical information/rationale of your patient's health status to address their denial reasons.

## 2023-09-18 RX ORDER — NEOMYCIN SULFATE, POLYMYXIN B SULFATE, HYDROCORTISONE 3.5; 10000; 1 MG/ML; [USP'U]/ML; MG/ML
3 SOLUTION/ DROPS AURICULAR (OTIC) 4 TIMES DAILY
Qty: 10 ML | Refills: 0 | Status: SHIPPED | OUTPATIENT
Start: 2023-09-18

## 2023-09-18 NOTE — TELEPHONE ENCOUNTER
Will use Cortisporin otic in place of noncovered treatment.  New prescription for Cortisporin otic sent to local pharmacy.

## 2023-10-04 ENCOUNTER — OFFICE VISIT (OUTPATIENT)
Dept: CARDIOLOGY | Facility: CLINIC | Age: 68
End: 2023-10-04
Payer: COMMERCIAL

## 2023-10-04 VITALS
BODY MASS INDEX: 31.81 KG/M2 | RESPIRATION RATE: 20 BRPM | HEART RATE: 82 BPM | HEIGHT: 68 IN | DIASTOLIC BLOOD PRESSURE: 76 MMHG | OXYGEN SATURATION: 98 % | SYSTOLIC BLOOD PRESSURE: 116 MMHG | WEIGHT: 209.9 LBS

## 2023-10-04 DIAGNOSIS — I10 ESSENTIAL HYPERTENSION, BENIGN: ICD-10-CM

## 2023-10-04 DIAGNOSIS — E66.09 CLASS 1 OBESITY DUE TO EXCESS CALORIES WITH SERIOUS COMORBIDITY AND BODY MASS INDEX (BMI) OF 32.0 TO 32.9 IN ADULT: ICD-10-CM

## 2023-10-04 DIAGNOSIS — Z95.1 S/P CABG (CORONARY ARTERY BYPASS GRAFT): ICD-10-CM

## 2023-10-04 DIAGNOSIS — I25.83 CORONARY ARTERIOSCLEROSIS DUE TO LIPID RICH PLAQUE: Primary | ICD-10-CM

## 2023-10-04 DIAGNOSIS — E66.811 CLASS 1 OBESITY DUE TO EXCESS CALORIES WITH SERIOUS COMORBIDITY AND BODY MASS INDEX (BMI) OF 32.0 TO 32.9 IN ADULT: ICD-10-CM

## 2023-10-04 DIAGNOSIS — E78.00 ELEVATED CHOLESTEROL: ICD-10-CM

## 2023-10-04 PROCEDURE — 99204 OFFICE O/P NEW MOD 45 MIN: CPT | Performed by: INTERNAL MEDICINE

## 2023-10-04 NOTE — LETTER
10/4/2023    Tima Davis MD  [address]  [facility] [address]    RE: Dudley Kiran       Dear Colleague,     I had the pleasure of seeing Dudley Kiran in the [facility].    [facility] Office Consult     Assessment:   (I25.10,  I25.83) Coronary arteriosclerosis due to lipid rich plaque  (primary encounter diagnosis)  Comment: Angiography March 2010 showed normal left main, proximal LAD 99% with the first diagonal ostial 99% lesion.  Circumflex had an ostial 90% lesion with the first obtuse marginal artery 95% stenosis in the third obtuse marginal artery 80% stenosis.  Right coronary artery had a distal 95% stenosis with sequential PDA 95% stenoses in 2 separate locations.  This prompted coronary artery bypass grafting.    (Z95.1) S/P CABG (coronary artery bypass graft)  Comment: March 8, 2010 he had a LIMA to the LAD, vein graft to first diagonal, vein graft to the obtuse marginal artery, and vein graft to right posterior lateral branch.  Given that the bypass was over 10 years ago we will arrange for exercise stress nuclear with him holding metoprolol the day of and the day before.    (E78.00) Elevated cholesterol  Comment: Looks to be familial hypercholesterolemia, possibly polygenic.  Total cholesterol is 247 and LDL acceptable at 194.  He vehemently refuses any statin.  If he needs repeat stenting would strongly urged PCSK9 inhibitor.      (I10) Essential hypertension, benign  Comment: Under good control currently on lisinopril as well as metoprolol.    (E66.09,  Z68.32) Class 1 obesity due to excess calories with serious comorbidity and body mass index (BMI) of 32.0 to 32.9 in adult  Comment: Continue to work on weight loss and monitor sugars.  His hemoglobin A1c was 5.8.     Plan:   1.  Exercise stress nuclear off metoprolol the day of and the day before.  If medium or large sized area ischemia will pursue angiography.  2.  Follow-up with me in 3  months, at that point time if he needs revascularization strongly urged PCSK9 inhibitor.    History of Present Illness:   Thank you for asking the [facility] team to see Dudley Kiran a 67 year old male  in consultation  to evaluate coronary artery disease status.   Patient has been in his usual state, retired, trying to be physically active going for walks at which point time he might get a little short of breath after the walk.  There is no effort related shortness of breath, syncope, dizziness, chest pains, palpitations, PND, orthopnea or peripheral edema.    Past Medical History:     Past Medical History:   Diagnosis Date    Arthritis-bilateral hip replacements     Coronary artery disease     HTN (hypertension)     Prediabetes  Pure hypercholesterolemia      Past history is negative for cancer, tuberculosis, diabetes mellitus, myocardial infarction,  rheumatic fever, cerebrovascular accident, chronic kidney disease, peptic ulcer disease, chronic obstructive pulmonary disease, or thyroid disorder.     Past Surgical History:     Past Surgical History:   Procedure Laterality Date    ARTHROPLASTY, HIP, TOTAL, DIRECT ANTERIOR APPROACH, USING HANA TABLE Left 10/27/2021    Procedure: LEFT TOTAL HIP ARTHROPLASTY DIRECT ANTERIOR APPROACH;  Surgeon: Bon Little MD;  Location: [facility] Main OR    BYPASS GRAFT ARTERY CORONARY  2010     Family History:   Family history negative for coronary artery disease.  Social History:   Retired, lives at home independently with family, reports that he has never smoked. He has never used smokeless tobacco. He reports that he does not drink alcohol and does not use drugs.  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 The primary care physician is Tima Davis    Meds:   Scheduled Meds:  Current Outpatient Medications   Medication Sig Dispense Refill    aspirin 81 MG EC tablet Take 1 tablet (81 mg) by mouth 2 times  "daily 60 tablet 0    fluticasone-salmeterol (AIRDUO RESPICLICK) 232-14 MCG/ACT inhaler Inhale 1 puff into the lungs 2 times daily 1 each 11    ipratropium (ATROVENT) 0.06 % nasal spray Spray 2 sprays into both nostrils 2 times daily 15 mL 4    lisinopril (ZESTRIL) 5 MG tablet TAKE 1 TABLET(5 MG) BY MOUTH DAILY 90 tablet 3    metoprolol succinate ER (TOPROL XL) 25 MG 24 hr tablet TAKE 0.5 TABLETS(12.5 MG) BY MOUTH DAILY 90 tablet 3    neomycin-polymyxin-hydrocortisone (CORTISPORIN) 3.5-70996-8 otic solution Place 3 drops into the right ear 4 times daily 10 mL 0    tadalafil (CIALIS) 10 MG tablet Take 1 tablet (10 mg) by mouth daily as needed (erectile difficulties) 30 tablet 5    albuterol (PROAIR HFA/PROVENTIL HFA/VENTOLIN HFA) 108 (90 Base) MCG/ACT inhaler Inhale 2 puffs into the lungs every 6 hours as needed for shortness of breath, wheezing or cough (Patient not taking: Reported on 10/4/2023) 18 g 4       PRN Meds:.    Allergies:   No known allergies    Objective:      Physical Exam  95.2 kg (209 lb 14.4 oz)  1.727 m (5' 8\")  Body mass index is 31.92 kg/m .  /76 (BP Location: Left arm, Patient Position: Sitting, Cuff Size: Adult Regular)   Pulse 82   Resp 20   Ht 1.727 m (5' 8\")   Wt 95.2 kg (209 lb 14.4 oz)   SpO2 98%   BMI 31.92 kg/m      General Appearance:   Alert, cooperative and in no acute distress.   HEENT:  No scleral icterus; the mucous membranes were pink and moist.   Neck: JVP normal. No thyromegaly. No HJR   Chest: The spine was straight. The chest was symmetric.  Midline sternotomy scar   Lungs:   Respirations unlabored; the lungs are clear to auscultation.   Cardiovascular:   S1 and S2 without murmur, clicks or rubs. Brachial, radial, carotid and posterior tibial pulses are intact and symetrical.  No carotid bruits noted   Abdomen:  No organomegaly, masses, bruits, or tenderness. Bowels sounds are present   Extremities: No cyanosis, clubbing, or edema.   Skin: No xanthelasma. " "  Neurologic: Mood and affect are appropriate.         Lab Reviewed Personally by myself  Lab Results   Component Value Date     09/08/2023    .0 02/15/2019    CO2 23 09/08/2023    CO2 27 02/11/2022    CO2 29.0 02/15/2019    BUN 12.6 09/08/2023    BUN 9 02/11/2022    BUN 11.0 02/15/2019     Lab Results   Component Value Date    WBC 8.5 03/08/2023    HGB 14.4 03/08/2023    HCT 41.4 03/08/2023    MCV 86 03/08/2023     03/08/2023     Lab Results   Component Value Date    CHOL 247 09/08/2023    CHOL 238.0 02/01/2019    TRIG 133 09/08/2023    TRIG 98.0 02/01/2019    HDL 26 09/08/2023    HDL 38.0 02/01/2019     No results found for: BNP    ECG personally reviewed by myself shows bradycardia, within normal limits.     Review of Systems:     Review of Systems:   Enc Vitals  BP: 116/76  Pulse: 82  Resp: 20  SpO2: 98 %  Weight: 95.2 kg (209 lb 14.4 oz)  Height: 172.7 cm (5' 8\")                       Thank you for allowing me to participate in the care of your patient.      Sincerely,     JUNIOR GALAVIZ MD     [facility]  cc:   Tima Davis MD  [address]  [place] [address]  "

## 2023-10-04 NOTE — PROGRESS NOTES
[facility] Office Consult     Assessment:   (I25.10,  I25.83) Coronary arteriosclerosis due to lipid rich plaque  (primary encounter diagnosis)  Comment: Angiography March 2010 showed normal left main, proximal LAD 99% with the first diagonal ostial 99% lesion.  Circumflex had an ostial 90% lesion with the first obtuse marginal artery 95% stenosis in the third obtuse marginal artery 80% stenosis.  Right coronary artery had a distal 95% stenosis with sequential PDA 95% stenoses in 2 separate locations.  This prompted coronary artery bypass grafting.    (Z95.1) S/P CABG (coronary artery bypass graft)  Comment: March 8, 2010 he had a LIMA to the LAD, vein graft to first diagonal, vein graft to the obtuse marginal artery, and vein graft to right posterior lateral branch.  Given that the bypass was over 10 years ago we will arrange for exercise stress nuclear with him holding metoprolol the day of and the day before.    (E78.00) Elevated cholesterol  Comment: Looks to be familial hypercholesterolemia, possibly polygenic.  Total cholesterol is 247 and LDL acceptable at 194.  He vehemently refuses any statin.  If he needs repeat stenting would strongly urged PCSK9 inhibitor.      (I10) Essential hypertension, benign  Comment: Under good control currently on lisinopril as well as metoprolol.    (E66.09,  Z68.32) Class 1 obesity due to excess calories with serious comorbidity and body mass index (BMI) of 32.0 to 32.9 in adult  Comment: Continue to work on weight loss and monitor sugars.  His hemoglobin A1c was 5.8.     Plan:   1.  Exercise stress nuclear off metoprolol the day of and the day before.  If medium or large sized area ischemia will pursue angiography.  2.  Follow-up with me in 3 months, at that point time if he needs revascularization strongly urged PCSK9 inhibitor.    History of Present Illness:   Thank you for asking the [facility] team to see Dudley Kiran a 67 year old male   in consultation  to evaluate coronary artery disease status.   Patient has been in his usual state, retired, trying to be physically active going for walks at which point time he might get a little short of breath after the walk.  There is no effort related shortness of breath, syncope, dizziness, chest pains, palpitations, PND, orthopnea or peripheral edema.    Past Medical History:     Past Medical History:   Diagnosis Date    Arthritis-bilateral hip replacements     Coronary artery disease     HTN (hypertension)     Prediabetes  Pure hypercholesterolemia      Past history is negative for cancer, tuberculosis, diabetes mellitus, myocardial infarction,  rheumatic fever, cerebrovascular accident, chronic kidney disease, peptic ulcer disease, chronic obstructive pulmonary disease, or thyroid disorder.     Past Surgical History:     Past Surgical History:   Procedure Laterality Date    ARTHROPLASTY, HIP, TOTAL, DIRECT ANTERIOR APPROACH, USING HANA TABLE Left 10/27/2021    Procedure: LEFT TOTAL HIP ARTHROPLASTY DIRECT ANTERIOR APPROACH;  Surgeon: Bon Little MD;  Location: [facility] Main OR    BYPASS GRAFT ARTERY CORONARY  2010     Family History:   Family history negative for coronary artery disease.  Social History:   Retired, lives at home independently with family, reports that he has never smoked. He has never used smokeless tobacco. He reports that he does not drink alcohol and does not use drugs.  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 The primary care physician is Tima Davis    Meds:   Scheduled Meds:  Current Outpatient Medications   Medication Sig Dispense Refill    aspirin 81 MG EC tablet Take 1 tablet (81 mg) by mouth 2 times daily 60 tablet 0    fluticasone-salmeterol (AIRDUO RESPICLICK) 232-14 MCG/ACT inhaler Inhale 1 puff into the lungs 2 times daily 1 each 11    ipratropium (ATROVENT) 0.06 % nasal spray Spray 2 sprays into both nostrils 2  "times daily 15 mL 4    lisinopril (ZESTRIL) 5 MG tablet TAKE 1 TABLET(5 MG) BY MOUTH DAILY 90 tablet 3    metoprolol succinate ER (TOPROL XL) 25 MG 24 hr tablet TAKE 0.5 TABLETS(12.5 MG) BY MOUTH DAILY 90 tablet 3    neomycin-polymyxin-hydrocortisone (CORTISPORIN) 3.5-16646-1 otic solution Place 3 drops into the right ear 4 times daily 10 mL 0    tadalafil (CIALIS) 10 MG tablet Take 1 tablet (10 mg) by mouth daily as needed (erectile difficulties) 30 tablet 5    albuterol (PROAIR HFA/PROVENTIL HFA/VENTOLIN HFA) 108 (90 Base) MCG/ACT inhaler Inhale 2 puffs into the lungs every 6 hours as needed for shortness of breath, wheezing or cough (Patient not taking: Reported on 10/4/2023) 18 g 4       PRN Meds:.    Allergies:   No known allergies    Objective:      Physical Exam  95.2 kg (209 lb 14.4 oz)  1.727 m (5' 8\")  Body mass index is 31.92 kg/m .  /76 (BP Location: Left arm, Patient Position: Sitting, Cuff Size: Adult Regular)   Pulse 82   Resp 20   Ht 1.727 m (5' 8\")   Wt 95.2 kg (209 lb 14.4 oz)   SpO2 98%   BMI 31.92 kg/m      General Appearance:   Alert, cooperative and in no acute distress.   HEENT:  No scleral icterus; the mucous membranes were pink and moist.   Neck: JVP normal. No thyromegaly. No HJR   Chest: The spine was straight. The chest was symmetric.  Midline sternotomy scar   Lungs:   Respirations unlabored; the lungs are clear to auscultation.   Cardiovascular:   S1 and S2 without murmur, clicks or rubs. Brachial, radial, carotid and posterior tibial pulses are intact and symetrical.  No carotid bruits noted   Abdomen:  No organomegaly, masses, bruits, or tenderness. Bowels sounds are present   Extremities: No cyanosis, clubbing, or edema.   Skin: No xanthelasma.   Neurologic: Mood and affect are appropriate.         Lab Reviewed Personally by myself  Lab Results   Component Value Date     09/08/2023    .0 02/15/2019    CO2 23 09/08/2023    CO2 27 02/11/2022    CO2 29.0 " "02/15/2019    BUN 12.6 09/08/2023    BUN 9 02/11/2022    BUN 11.0 02/15/2019     Lab Results   Component Value Date    WBC 8.5 03/08/2023    HGB 14.4 03/08/2023    HCT 41.4 03/08/2023    MCV 86 03/08/2023     03/08/2023     Lab Results   Component Value Date    CHOL 247 09/08/2023    CHOL 238.0 02/01/2019    TRIG 133 09/08/2023    TRIG 98.0 02/01/2019    HDL 26 09/08/2023    HDL 38.0 02/01/2019     No results found for: BNP    ECG personally reviewed by myself shows bradycardia, within normal limits.     Review of Systems:     Review of Systems:   Enc Vitals  BP: 116/76  Pulse: 82  Resp: 20  SpO2: 98 %  Weight: 95.2 kg (209 lb 14.4 oz)  Height: 172.7 cm (5' 8\")                                          "

## 2023-10-04 NOTE — PATIENT INSTRUCTIONS
Mr. Dduley Kiran,  It certainly was nice to meet you today.  Per our conversation you had heart bypass over 10 years ago and the reason I am checking the stress test of the heart. For the stress test hold the METOPROLOL the day of and the day before.  We will call you the results of these tests.    I will plan on seeing you in 1-2 months or sooner if need be.  Maximus Schmidt

## 2023-10-12 ENCOUNTER — HOSPITAL ENCOUNTER (OUTPATIENT)
Dept: NUCLEAR MEDICINE | Facility: CLINIC | Age: 68
Discharge: HOME | End: 2023-10-12
Attending: INTERNAL MEDICINE
Payer: COMMERCIAL

## 2023-10-12 ENCOUNTER — HOSPITAL ENCOUNTER (OUTPATIENT)
Dept: CARDIOLOGY | Facility: CLINIC | Age: 68
Discharge: HOME | End: 2023-10-12
Attending: INTERNAL MEDICINE
Payer: COMMERCIAL

## 2023-10-12 DIAGNOSIS — I25.83 CORONARY ARTERIOSCLEROSIS DUE TO LIPID RICH PLAQUE: ICD-10-CM

## 2023-10-12 LAB
CV STRESS CURRENT BP HE: NORMAL
CV STRESS CURRENT HR HE: 101
CV STRESS CURRENT HR HE: 101
CV STRESS CURRENT HR HE: 105
CV STRESS CURRENT HR HE: 109
CV STRESS CURRENT HR HE: 113
CV STRESS CURRENT HR HE: 116
CV STRESS CURRENT HR HE: 119
CV STRESS CURRENT HR HE: 122
CV STRESS CURRENT HR HE: 130
CV STRESS CURRENT HR HE: 133
CV STRESS CURRENT HR HE: 134
CV STRESS CURRENT HR HE: 134
CV STRESS CURRENT HR HE: 135
CV STRESS CURRENT HR HE: 147
CV STRESS CURRENT HR HE: 147
CV STRESS CURRENT HR HE: 148
CV STRESS CURRENT HR HE: 75
CV STRESS CURRENT HR HE: 76
CV STRESS CURRENT HR HE: 91
CV STRESS CURRENT HR HE: 92
CV STRESS CURRENT HR HE: 94
CV STRESS CURRENT HR HE: 95
CV STRESS CURRENT HR HE: 97
CV STRESS CURRENT HR HE: 98
CV STRESS CURRENT HR HE: 98
CV STRESS CURRENT HR HE: 99
CV STRESS DEVIATION TIME HE: NORMAL
CV STRESS ECHO PERCENT HR HE: NORMAL
CV STRESS EXERCISE STAGE HE: NORMAL
CV STRESS EXERCISE STAGE REACHED HE: NORMAL
CV STRESS FINAL RESTING BP HE: NORMAL
CV STRESS FINAL RESTING HR HE: 94
CV STRESS MAX HR HE: 147
CV STRESS MAX TREADMILL GRADE HE: 14
CV STRESS MAX TREADMILL SPEED HE: 3.4
CV STRESS PEAK DIA BP HE: NORMAL
CV STRESS PEAK SYS BP HE: NORMAL
CV STRESS PHASE HE: NORMAL
CV STRESS PROTOCOL HE: NORMAL
CV STRESS RESTING PT POSITION HE: NORMAL
CV STRESS RESTING PT POSITION HE: NORMAL
CV STRESS ST DEVIATION AMOUNT HE: NORMAL
CV STRESS ST DEVIATION ELEVATION HE: NORMAL
CV STRESS ST EVELATION AMOUNT HE: NORMAL
CV STRESS TEST TYPE HE: NORMAL
CV STRESS TOTAL STAGE TIME MIN 1 HE: NORMAL
NUC STRESS EJECTION FRACTION: 64 %
RATE PRESSURE PRODUCT: NORMAL
STRESS ECHO BASELINE DIASTOLIC HE: 83
STRESS ECHO BASELINE HR: 74
STRESS ECHO BASELINE SYSTOLIC BP: 142
STRESS ECHO CALCULATED PERCENT HR: 96 %
STRESS ECHO LAST STRESS DIASTOLIC BP: 86
STRESS ECHO LAST STRESS HR: 147
STRESS ECHO LAST STRESS SYSTOLIC BP: 138
STRESS ECHO POST ESTIMATED WORKLOAD: 9.7
STRESS ECHO POST EXERCISE DUR MIN: 8
STRESS ECHO POST EXERCISE DUR SEC: 0
STRESS ECHO TARGET HR: 153

## 2023-10-12 PROCEDURE — 78452 HT MUSCLE IMAGE SPECT MULT: CPT

## 2023-10-12 PROCEDURE — 78452 HT MUSCLE IMAGE SPECT MULT: CPT | Mod: 26 | Performed by: INTERNAL MEDICINE

## 2023-10-12 PROCEDURE — A9500 TC99M SESTAMIBI: HCPCS | Performed by: INTERNAL MEDICINE

## 2023-10-12 PROCEDURE — 343N000001 HC RX 343: Performed by: INTERNAL MEDICINE

## 2023-10-12 PROCEDURE — 93016 CV STRESS TEST SUPVJ ONLY: CPT | Performed by: INTERNAL MEDICINE

## 2023-10-12 PROCEDURE — 93017 CV STRESS TEST TRACING ONLY: CPT

## 2023-10-12 PROCEDURE — 93018 CV STRESS TEST I&R ONLY: CPT | Performed by: INTERNAL MEDICINE

## 2023-10-12 RX ADMIN — Medication 8.2 MILLICURIE: at 08:35

## 2023-10-12 RX ADMIN — Medication 32 MILLICURIE: at 09:35

## 2023-10-12 NOTE — PROGRESS NOTES
Pt has cardiac hx with CABG over 13 years ago.  States starting to have COONEY.  F/U to CAD hx and new sx.  Pt denies chest pain.  Christina Johns RN

## 2024-03-08 SDOH — HEALTH STABILITY: PHYSICAL HEALTH: ON AVERAGE, HOW MANY MINUTES DO YOU ENGAGE IN EXERCISE AT THIS LEVEL?: 0 MIN

## 2024-03-08 SDOH — HEALTH STABILITY: PHYSICAL HEALTH: ON AVERAGE, HOW MANY DAYS PER WEEK DO YOU ENGAGE IN MODERATE TO STRENUOUS EXERCISE (LIKE A BRISK WALK)?: 0 DAYS

## 2024-03-08 ASSESSMENT — ASTHMA QUESTIONNAIRES
QUESTION_5 LAST FOUR WEEKS HOW WOULD YOU RATE YOUR ASTHMA CONTROL: SOMEWHAT CONTROLLED
QUESTION_3 LAST FOUR WEEKS HOW OFTEN DID YOUR ASTHMA SYMPTOMS (WHEEZING, COUGHING, SHORTNESS OF BREATH, CHEST TIGHTNESS OR PAIN) WAKE YOU UP AT NIGHT OR EARLIER THAN USUAL IN THE MORNING: TWO OR THREE NIGHTS A WEEK
QUESTION_4 LAST FOUR WEEKS HOW OFTEN HAVE YOU USED YOUR RESCUE INHALER OR NEBULIZER MEDICATION (SUCH AS ALBUTEROL): NOT AT ALL
ACT_TOTALSCORE: 19
QUESTION_1 LAST FOUR WEEKS HOW MUCH OF THE TIME DID YOUR ASTHMA KEEP YOU FROM GETTING AS MUCH DONE AT WORK, SCHOOL OR AT HOME: NONE OF THE TIME
ACT_TOTALSCORE: 19
QUESTION_2 LAST FOUR WEEKS HOW OFTEN HAVE YOU HAD SHORTNESS OF BREATH: ONCE OR TWICE A WEEK

## 2024-03-08 ASSESSMENT — SOCIAL DETERMINANTS OF HEALTH (SDOH): HOW OFTEN DO YOU GET TOGETHER WITH FRIENDS OR RELATIVES?: ONCE A WEEK

## 2024-03-09 NOTE — COMMUNITY RESOURCES LIST (ENGLISH)
03/09/2024    [employer]  N/A  For questions about this resource list or additional care needs, please contact your primary care clinic or care manager.  Phone: [phone]   Email: N/A   Address: [address] [place] [address]   Hours: N/A        Exercise and Recreation       Gym or workout facility  1  [employer] [facility] Distance: 3.17 miles      In-Person   [address] [place] [address]  Language: English  Hours: Mon - Sun Open 24 Hours  Fees: Free, Insurance, Self Pay   Phone: [phone]) [phone] Website: [email or web address]://www.[employer].com/     2  [facility] Distance: 4.88 miles      In-Person   [address] [place] [address]  Language: English  Hours: Mon - Fri 9:00 AM - 9:00 PM , Sat 9:00 AM - 7:00 PM  Fees: Free, Self Pay   Phone: [phone]) [phone] Email: [email or web address]. Website: [email or web address]/facilities/[facility]          Important Numbers & Websites       Emergency Services   911  [facility] Services   311  Poison Control   (987) 954-8195  Suicide Prevention Lifeline   (590) 374-5126 (TALK)  Child Abuse Hotline   (607) 755-1521 (4-A-Child)  Sexual Assault Hotline   (627) 982-6569 (HOPE)  National Runaway Safeline   (882) 718-4261 (RUNAWAY)  All-Options Talkline   (247) 340-4889  Substance Abuse Referral   (704) 394-1549 (HELP)

## 2024-03-13 ENCOUNTER — OFFICE VISIT (OUTPATIENT)
Dept: FAMILY MEDICINE | Facility: CLINIC | Age: 69
End: 2024-03-13
Payer: COMMERCIAL

## 2024-03-13 ENCOUNTER — ORDERS ONLY (AUTO-RELEASED) (OUTPATIENT)
Dept: FAMILY MEDICINE | Facility: CLINIC | Age: 69
End: 2024-03-13

## 2024-03-13 VITALS
BODY MASS INDEX: 28.44 KG/M2 | OXYGEN SATURATION: 95 % | HEART RATE: 72 BPM | WEIGHT: 210 LBS | SYSTOLIC BLOOD PRESSURE: 116 MMHG | HEIGHT: 72 IN | DIASTOLIC BLOOD PRESSURE: 70 MMHG | RESPIRATION RATE: 14 BRPM | TEMPERATURE: 97.7 F

## 2024-03-13 DIAGNOSIS — N52.9 ERECTILE DYSFUNCTION, UNSPECIFIED ERECTILE DYSFUNCTION TYPE: ICD-10-CM

## 2024-03-13 DIAGNOSIS — Z12.11 SCREEN FOR COLON CANCER: ICD-10-CM

## 2024-03-13 DIAGNOSIS — I10 ESSENTIAL HYPERTENSION, BENIGN: ICD-10-CM

## 2024-03-13 DIAGNOSIS — Z12.5 SCREENING FOR PROSTATE CANCER: ICD-10-CM

## 2024-03-13 DIAGNOSIS — R05.3 CHRONIC COUGH: ICD-10-CM

## 2024-03-13 DIAGNOSIS — Z23 ENCOUNTER FOR IMMUNIZATION: ICD-10-CM

## 2024-03-13 DIAGNOSIS — Z95.1 S/P CABG (CORONARY ARTERY BYPASS GRAFT): ICD-10-CM

## 2024-03-13 DIAGNOSIS — R73.01 IMPAIRED FASTING GLUCOSE: ICD-10-CM

## 2024-03-13 DIAGNOSIS — E78.00 HYPERCHOLESTEREMIA: ICD-10-CM

## 2024-03-13 DIAGNOSIS — Z00.00 MEDICARE ANNUAL WELLNESS VISIT, SUBSEQUENT: Primary | ICD-10-CM

## 2024-03-13 DIAGNOSIS — I25.83 CORONARY ARTERIOSCLEROSIS DUE TO LIPID RICH PLAQUE: ICD-10-CM

## 2024-03-13 DIAGNOSIS — E66.3 OVERWEIGHT: ICD-10-CM

## 2024-03-13 LAB
ERYTHROCYTE [DISTWIDTH] IN BLOOD BY AUTOMATED COUNT: 13 % (ref 10–15)
HBA1C MFR BLD: 5.9 % (ref 0–5.6)
HCT VFR BLD AUTO: 41.5 % (ref 40–53)
HGB BLD-MCNC: 14.2 G/DL (ref 13.3–17.7)
MCH RBC QN AUTO: 29.9 PG (ref 26.5–33)
MCHC RBC AUTO-ENTMCNC: 34.2 G/DL (ref 31.5–36.5)
MCV RBC AUTO: 87 FL (ref 78–100)
PLATELET # BLD AUTO: 318 10E3/UL (ref 150–450)
RBC # BLD AUTO: 4.75 10E6/UL (ref 4.4–5.9)
WBC # BLD AUTO: 8.7 10E3/UL (ref 4–11)

## 2024-03-13 PROCEDURE — 36415 COLL VENOUS BLD VENIPUNCTURE: CPT | Performed by: FAMILY MEDICINE

## 2024-03-13 PROCEDURE — 90480 ADMN SARSCOV2 VAC 1/ONLY CMP: CPT | Performed by: FAMILY MEDICINE

## 2024-03-13 PROCEDURE — 99214 OFFICE O/P EST MOD 30 MIN: CPT | Mod: 25 | Performed by: FAMILY MEDICINE

## 2024-03-13 PROCEDURE — G0103 PSA SCREENING: HCPCS | Performed by: FAMILY MEDICINE

## 2024-03-13 PROCEDURE — 91320 SARSCV2 VAC 30MCG TRS-SUC IM: CPT | Performed by: FAMILY MEDICINE

## 2024-03-13 PROCEDURE — 85027 COMPLETE CBC AUTOMATED: CPT | Performed by: FAMILY MEDICINE

## 2024-03-13 PROCEDURE — 80053 COMPREHEN METABOLIC PANEL: CPT | Performed by: FAMILY MEDICINE

## 2024-03-13 PROCEDURE — G0439 PPPS, SUBSEQ VISIT: HCPCS | Performed by: FAMILY MEDICINE

## 2024-03-13 PROCEDURE — 83036 HEMOGLOBIN GLYCOSYLATED A1C: CPT | Performed by: FAMILY MEDICINE

## 2024-03-13 PROCEDURE — 80061 LIPID PANEL: CPT | Performed by: FAMILY MEDICINE

## 2024-03-13 RX ORDER — RESPIRATORY SYNCYTIAL VIRUS VACCINE 120MCG/0.5
0.5 KIT INTRAMUSCULAR ONCE
Qty: 1 EACH | Refills: 0 | Status: CANCELLED | OUTPATIENT
Start: 2024-03-13 | End: 2024-03-13

## 2024-03-13 RX ORDER — TADALAFIL 10 MG/1
10 TABLET ORAL DAILY PRN
Qty: 90 TABLET | Refills: 2 | Status: SHIPPED | OUTPATIENT
Start: 2024-03-13

## 2024-03-13 RX ORDER — LISINOPRIL 5 MG/1
5 TABLET ORAL DAILY
Qty: 90 TABLET | Refills: 3 | Status: SHIPPED | OUTPATIENT
Start: 2024-03-13

## 2024-03-13 RX ORDER — METOPROLOL SUCCINATE 25 MG/1
TABLET, EXTENDED RELEASE ORAL
Qty: 45 TABLET | Refills: 3 | Status: SHIPPED | OUTPATIENT
Start: 2024-03-13

## 2024-03-13 ASSESSMENT — PAIN SCALES - GENERAL: PAINLEVEL: NO PAIN (0)

## 2024-03-13 NOTE — PROGRESS NOTES
Preventive Care Visit  [facility]  Tima Davis MD, Family Medicine  Mar 13, 2024      Assessment & Plan     Medicare annual wellness visit, subsequent  Annual wellness visit completed.  Risk questionnaire reviewed in detail.  Annual wellness visits to continue.    Coronary arteriosclerosis due to lipid rich plaque  History of coronary artery disease.  Status post four-vessel CABG in 2010.  Continues aspirin 81 mg daily metoprolol succinate 25 mg using half tablet daily and lisinopril 5 mg daily.  Asymptomatic.  Had nuclear medicine stress test October 12, 2023 negative for inducible ischemia.  Remains asymptomatic.  Patient intolerant to statin therapy.  Declines Repatha utilization as PCSK9 agents.  Continue cardiovascular risk factor reduction.  Weight goal remains less than 200 pounds initially, less than 190 pounds ideally.  - CBC with platelets  - Comprehensive metabolic panel    S/P CABG (coronary artery bypass graft)  As above.  Status post four-vessel CABG in 2010.    Essential hypertension, benign  Benefits of lisinopril 5 mg daily metoprolol succinate 25 mg using half tablet daily.  Med monitoring completed.  - lisinopril (ZESTRIL) 5 MG tablet  Dispense: 90 tablet; Refill: 3  - metoprolol succinate ER (TOPROL XL) 25 MG 24 hr tablet  Dispense: 45 tablet; Refill: 3  - Comprehensive metabolic panel    Erectile dysfunction, unspecified erectile dysfunction type  Tadalafil refilled on as-needed basis.  - tadalafil (CIALIS) 10 MG tablet  Dispense: 90 tablet; Refill: 2    Impaired fasting glucose  Check A1c and fasting glucose with prior A1c of 5.8% and fasting glucose 107 September 8, 2023 with weight goal remaining less than 200 pounds initially, less than 190 pounds ideally.  - Comprehensive metabolic panel  - Hemoglobin A1c    Overweight  Overweight status as above with weight goal less than 200 pounds initially, less than 190 pounds ideally.    Screen for colon cancer  Cologuard to  be updated.  Never completed.  New order placed.  - CHERI([employer])    Encounter for immunization  Updated COVID booster provided.  - COVID-19 12+ (2023-24) (PFIZER)    Hypercholesteremia  As above, intolerant to statin will update lipid cascade with patient declining use of Repatha etc.  - Lipid panel reflex to direct LDL Fasting    Screening for prostate cancer  PSA for prostate cancer screening based on age criteria.  - Prostate Specific Antigen Screen    Chronic cough  Chronic cough and had been seen June 5, 2023 by pulmonology clinic with noted dust mite and cat and dog allergy.  Has a cat at home.  Prefers dogs.  Pulmonary function testing was normal.  Chest x-ray had been normal.  Had started AirDuo 232/14 using 1 inhalation twice daily.  Misses the second dose at times otherwise significant improvement still has some phlegm production.  Will try stopping lisinopril over next 4 weeks to see if any additional benefit otherwise did recommend following up with pulmonary clinic per prior 6-month follow-up recommendation.      Patient has been advised of split billing requirements and indicates understanding: Yes          BMI  Estimated body mass index is 28.48 kg/m  as calculated from the following:    Height as of this encounter: 1.829 m (6').    Weight as of this encounter: 95.3 kg (210 lb).       Counseling  Appropriate preventive services were discussed with this patient, including applicable screening as appropriate for fall prevention, nutrition, physical activity, Tobacco-use cessation, weight loss and cognition.  Checklist reviewing preventive services available has been given to the patient.  Reviewed patient's diet, addressing concerns and/or questions.   The patient was provided with written information regarding signs of hearing loss.           Jason   Lobo is a 68 year old, presenting for the following:  Medicare Visit (Pt is fasting)        3/13/2024     8:29 AM   Additional Questions  "  Roomed by          Health Care Directive  Patient does not have a Health Care Directive or Living Will: Discussed advance care planning with patient; information given to patient to review.      HPI    Patient seen for annual wellness visit.  In general doing well.  CAD status post four-vessel CABG in .  Low-dose aspirin plus metoprolol succinate 25 mg using half tablet daily and lisinopril 5 mg daily continuing.  Intolerant to statin therapy.  Does not want to try Repatha at this time due to prior side effects with statin use and hesitation with new medications.  Does need updated COVID booster.  Never completed Cologuard and needs updated order sent.  Impaired fasting glucose historically.  Chronic cough has improved significantly since seeing pulmonary clinic 2023 and started on Atrovent nasal spray as well as air duo 232/14 using 1 inhalation twice daily but does miss evening dose at times.  Does not require albuterol MDI.  Pulmonary function testing was normal chest x-ray normal.  Allergy testing with dust mites, cat dander and dog dander noted.  Comprehensive review of systems as above otherwise all negative.  No hemoptysis.  No shortness of breath.         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 Good   Feel stress (tense, anxious, or unable to sleep) Not at all         3/8/2024   Nutrition   Diet: Regular (no restrictions)         3/8/2024   Exercise   Days per week of moderate/strenous exercise 0 days   Average minutes spent exercising at " this level 0 min   (!) EXERCISE CONCERN      3/8/2024   Social Factors   Frequency of gathering with friends or relatives Once a week   Worry food won't last until get money to buy more No   Food not last or not have enough money for food? No   Do you have housing?  Yes   Are you worried about losing your housing? No   Lack of transportation? No   Unable to get utilities (heat,electricity)? No         3/8/2024   Activities of Daily Living- Home Safety   Needs help with the following daily activites None of the above   Safety concerns in the home None of the above         3/8/2024   Dental   Dentist two times every year? Yes         3/8/2024   Hearing Screening   Hearing concerns? (!) TROUBLE UNDERSTANDING SOFT OR WHISPERED SPEECH.         3/8/2024   Driving Risk Screening   Patient/family members have concerns about driving No         3/8/2024   General Alertness/Fatigue Screening   Have you been more tired than usual lately? No         3/8/2024   Urinary Incontinence Screening   Bothered by leaking urine in past 6 months No         3/8/2024   TB Screening   Were you born outside of [place]?  No         Today's PHQ-2 Score:       3/13/2024     8:34 AM   PHQ-2 ( 1999 Pf[employer])   Q1: Little interest or pleasure in doing things 0   Q2: Feeling down, depressed or hopeless 0   PHQ-2 Score 0   Q1: Little interest or pleasure in doing things Not at all   Q2: Feeling down, depressed or hopeless Not at all   PHQ-2 Score 0           3/8/2024   Substance Use   Alcohol more than 3/day or more than 7/wk Not Applicable   Do you have a current opioid prescription? No   How severe/bad is pain from 1 to 10? 0/10 (No Pain)   Do you use any other substances recreationally? No     Social History     Tobacco Use    Smoking status: Never    Smokeless tobacco: Never   Vaping Use    Vaping Use: Never used   Substance Use Topics    Alcohol use: No     Alcohol/week: 0.0 standard drinks of alcohol    Drug use: Never           3/8/2024   AAA  Screening   Family history of Abdominal Aortic Aneurysm (AAA)? Unsure   Last PSA:   Prostate Specific Antigen Screen   Date Value Ref Range Status   2023 0.86 0.00 - 4.50 ng/mL Final   2022 0.58 0.00 - 4.50 ug/L Final     ASCVD Risk   The 10-year ASCVD risk score (Arianna KIRBY, et al., 2019) is: 23.7%    Values used to calculate the score:      Age: 68 years      Sex: Male      Is Non- : No      Diabetic: No      Tobacco smoker: No      Systolic Blood Pressure: 116 mmHg      Is BP treated: Yes      HDL Cholesterol: 26 mg/dL      Total Cholesterol: 247 mg/dL    Fracture Risk Assessment Tool  Link to Frax Calculator  Use the information below to complete the Frax calculator  : 1955  Sex: male  Weight (kg): 95.3 kg (actual weight)  Height (cm): 182.9 cm  Previous Fragility Fracture:  No  History of parent with fractured hip:  No  Current Smoking:  No  Patient has been on glucocorticoids for more than 3 months (5mg/day or more): No  Rheumatoid Arthritis on Problem List:  No  Secondary Osteoporosis on Problem List:  No  Consumes 3 or more units of alcohol per day: No  Femoral Neck BMD (g/cm2)            Reviewed and updated as needed this visit by Provider                    Past Medical History:   Diagnosis Date    Arthritis     Coronary artery disease     HTN (hypertension)     Prediabetes      Past Surgical History:   Procedure Laterality Date    ARTHROPLASTY, HIP, TOTAL, DIRECT ANTERIOR APPROACH, USING HANA TABLE Left 10/27/2021    Procedure: LEFT TOTAL HIP ARTHROPLASTY DIRECT ANTERIOR APPROACH;  Surgeon: Bon Little MD;  Location: [facility] Main OR    BYPASS GRAFT ARTERY CORONARY  2010     Lab work is in process  Labs reviewed in EPIC  BP Readings from Last 3 Encounters:   24 116/70   10/04/23 116/76   23 96/68    Wt Readings from Last 3 Encounters:   24 95.3 kg (210 lb)   10/04/23 95.2 kg (209 lb 14.4 oz)   23 94.9 kg (209 lb  4.8 oz)                  Patient Active Problem List   Diagnosis    Coronary arteriosclerosis due to lipid rich plaque    History of prediabetes    Elevated cholesterol    Essential hypertension, benign    Primary osteoarthritis of both hips    Class 1 obesity due to excess calories with serious comorbidity and body mass index (BMI) of 32.0 to 32.9 in adult    OA (osteoarthritis)    S/P CABG (coronary artery bypass graft)     Past Surgical History:   Procedure Laterality Date    ARTHROPLASTY, HIP, TOTAL, DIRECT ANTERIOR APPROACH, USING HANA TABLE Left 10/27/2021    Procedure: LEFT TOTAL HIP ARTHROPLASTY DIRECT ANTERIOR APPROACH;  Surgeon: Bon Little MD;  Location: [facility] Main OR    BYPASS GRAFT ARTERY CORONARY  2010       Social History     Tobacco Use    Smoking status: Never    Smokeless tobacco: Never   Substance Use Topics    Alcohol use: No     Alcohol/week: 0.0 standard drinks of alcohol     Family History   Problem Relation Age of Onset    Diabetes No family hx of     Diabetes No family hx of     Breast Cancer No family hx of     Colon Cancer No family hx of     Prostate Cancer No family hx of     Hypertension No family hx of          Current Outpatient Medications   Medication Sig Dispense Refill    albuterol (PROAIR HFA/PROVENTIL HFA/VENTOLIN HFA) 108 (90 Base) MCG/ACT inhaler Inhale 2 puffs into the lungs every 6 hours as needed for shortness of breath, wheezing or cough 18 g 4    aspirin 81 MG EC tablet Take 1 tablet (81 mg) by mouth 2 times daily 60 tablet 0    fluticasone-salmeterol (AIRDUO RESPICLICK) 232-14 MCG/ACT inhaler Inhale 1 puff into the lungs 2 times daily 1 each 11    ipratropium (ATROVENT) 0.06 % nasal spray Spray 2 sprays into both nostrils 2 times daily 15 mL 4    lisinopril (ZESTRIL) 5 MG tablet Take 1 tablet (5 mg) by mouth daily 90 tablet 3    metoprolol succinate ER (TOPROL XL) 25 MG 24 hr tablet TAKE 0.5 TABLETS(12.5 MG) BY MOUTH DAILY 45 tablet 3     neomycin-polymyxin-hydrocortisone (CORTISPORIN) 3.5-44058-0 otic solution Place 3 drops into the right ear 4 times daily 10 mL 0    tadalafil (CIALIS) 10 MG tablet Take 1 tablet (10 mg) by mouth daily as needed (erectile difficulties) 90 tablet 2     Allergies   Allergen Reactions    No Known Allergies      Current providers sharing in care for this patient include:  Patient Care Team:  Tima Davis MD as PCP - General (Family Medicine)  Tima Davis MD as Assigned PCP  Maximus Schmidt MD as MD (Cardiovascular Disease)  Maximus Schmidt MD as Assigned Heart and Vascular Provider  Yi Melendrez NP as Assigned Pulmonology Provider    The following health maintenance items are reviewed in Epic and correct as of today:  Health Maintenance   Topic Date Due    ASTHMA ACTION PLAN  Never done    COLORECTAL CANCER SCREENING  Never done    RSV VACCINE (Pregnancy & 60+) (1 - 1-dose 60+ series) Never done    INFLUENZA VACCINE (1) Never done    COVID-19 Vaccine (5 - 2023-24 season) 09/01/2023    MEDICARE ANNUAL WELLNESS VISIT  03/07/2024    LIPID  09/08/2024    ASTHMA CONTROL TEST  09/13/2024    ANNUAL REVIEW OF HM ORDERS  03/13/2025    FALL RISK ASSESSMENT  03/13/2025    GLUCOSE  09/08/2026    ADVANCE CARE PLANNING  03/07/2028    DTAP/TDAP/TD IMMUNIZATION (3 - Td or Tdap) 12/18/2029    HEPATITIS C SCREENING  Completed    PHQ-2 (once per calendar year)  Completed    Pneumococcal Vaccine: 65+ Years  Completed    ZOSTER IMMUNIZATION  Completed    IPV IMMUNIZATION  Aged Out    HPV IMMUNIZATION  Aged Out    MENINGITIS IMMUNIZATION  Aged Out    RSV MONOCLONAL ANTIBODY  Aged Out         Review of Systems  Constitutional, HEENT, cardiovascular, pulmonary, GI, , musculoskeletal, neuro, skin, endocrine and psych systems are negative, except as otherwise noted.     Objective    Exam  /70   Pulse 72   Temp 97.7  F (36.5  C)   Resp 14   Ht 1.829 m (6')   Wt 95.3 kg (210 lb)   SpO2 95%   BMI 28.48 kg/m      Estimated body mass index is 28.48 kg/m  as calculated from the following:    Height as of this encounter: 1.829 m (6').    Weight as of this encounter: 95.3 kg (210 lb).    Physical Exam  GENERAL: alert and no distress  EYES: Eyes grossly normal to inspection, PERRL and conjunctivae and sclerae normal  HENT: ear canals and TM's normal, nose and mouth without ulcers or lesions  NECK: no adenopathy, no asymmetry, masses, or scars  RESP: lungs clear to auscultation - no rales, rhonchi or wheezes  CV: regular rate and rhythm, normal S1 S2, no S3 or S4, no murmur, click or rub, no peripheral edema  ABDOMEN: soft, nontender, no hepatosplenomegaly, no masses and bowel sounds normal   (male): normal male genitalia without lesions or urethral discharge, no hernia  RECTAL: normal sphincter tone, no rectal masses, prostate normal size, smooth, nontender without nodules or masses  MS: no gross musculoskeletal defects noted, no edema  SKIN: no suspicious lesions or rashes  NEURO: Normal strength and tone, mentation intact and speech normal  PSYCH: mentation appears normal, affect normal/bright            NM MPI Treadmill 10/12/23 Result Text       Exercise stress ECG negative for ischemia.    The nuclear stress test is negative for inducible myocardial ischemia or infarction.    The left ventricular ejection fraction at stress is 64%.    There is no prior study for comparison.          3/13/2024   Mini Cog   Clock Draw Score 2 Normal   3 Item Recall 3 objects recalled   Mini Cog Total Score 5              Signed Electronically by: Tima Davis MD

## 2024-03-14 LAB
ALBUMIN SERPL BCG-MCNC: 4.3 G/DL (ref 3.5–5.2)
ALP SERPL-CCNC: 96 U/L (ref 40–150)
ALT SERPL W P-5'-P-CCNC: 28 U/L (ref 0–70)
ANION GAP SERPL CALCULATED.3IONS-SCNC: 9 MMOL/L (ref 7–15)
AST SERPL W P-5'-P-CCNC: 28 U/L (ref 0–45)
BILIRUB SERPL-MCNC: 0.6 MG/DL
BUN SERPL-MCNC: 12.5 MG/DL (ref 8–23)
CALCIUM SERPL-MCNC: 9.4 MG/DL (ref 8.8–10.2)
CHLORIDE SERPL-SCNC: 107 MMOL/L (ref 98–107)
CHOLEST SERPL-MCNC: 217 MG/DL
CREAT SERPL-MCNC: 1.1 MG/DL (ref 0.67–1.17)
DEPRECATED HCO3 PLAS-SCNC: 26 MMOL/L (ref 22–29)
EGFRCR SERPLBLD CKD-EPI 2021: 73 ML/MIN/1.73M2
FASTING STATUS PATIENT QL REPORTED: YES
GLUCOSE SERPL-MCNC: 109 MG/DL (ref 70–99)
HDLC SERPL-MCNC: 24 MG/DL
LDLC SERPL CALC-MCNC: 169 MG/DL
NONHDLC SERPL-MCNC: 193 MG/DL
POTASSIUM SERPL-SCNC: 4.5 MMOL/L (ref 3.4–5.3)
PROT SERPL-MCNC: 7.8 G/DL (ref 6.4–8.3)
PSA SERPL DL<=0.01 NG/ML-MCNC: 0.59 NG/ML (ref 0–4.5)
SODIUM SERPL-SCNC: 142 MMOL/L (ref 135–145)
TRIGL SERPL-MCNC: 118 MG/DL

## 2024-09-08 ASSESSMENT — ASTHMA QUESTIONNAIRES
QUESTION_4 LAST FOUR WEEKS HOW OFTEN HAVE YOU USED YOUR RESCUE INHALER OR NEBULIZER MEDICATION (SUCH AS ALBUTEROL): NOT AT ALL
ACT_TOTALSCORE: 24
QUESTION_1 LAST FOUR WEEKS HOW MUCH OF THE TIME DID YOUR ASTHMA KEEP YOU FROM GETTING AS MUCH DONE AT WORK, SCHOOL OR AT HOME: NONE OF THE TIME
QUESTION_5 LAST FOUR WEEKS HOW WOULD YOU RATE YOUR ASTHMA CONTROL: COMPLETELY CONTROLLED
QUESTION_3 LAST FOUR WEEKS HOW OFTEN DID YOUR ASTHMA SYMPTOMS (WHEEZING, COUGHING, SHORTNESS OF BREATH, CHEST TIGHTNESS OR PAIN) WAKE YOU UP AT NIGHT OR EARLIER THAN USUAL IN THE MORNING: ONCE OR TWICE
ACT_TOTALSCORE: 24
QUESTION_2 LAST FOUR WEEKS HOW OFTEN HAVE YOU HAD SHORTNESS OF BREATH: NOT AT ALL

## 2024-09-13 ENCOUNTER — OFFICE VISIT (OUTPATIENT)
Dept: FAMILY MEDICINE | Facility: CLINIC | Age: 69
End: 2024-09-13
Payer: COMMERCIAL

## 2024-09-13 VITALS
TEMPERATURE: 97.7 F | RESPIRATION RATE: 19 BRPM | OXYGEN SATURATION: 95 % | HEIGHT: 67 IN | HEART RATE: 83 BPM | SYSTOLIC BLOOD PRESSURE: 110 MMHG | WEIGHT: 211 LBS | BODY MASS INDEX: 33.12 KG/M2 | DIASTOLIC BLOOD PRESSURE: 70 MMHG

## 2024-09-13 DIAGNOSIS — I10 ESSENTIAL HYPERTENSION, BENIGN: ICD-10-CM

## 2024-09-13 DIAGNOSIS — Z95.1 S/P CABG (CORONARY ARTERY BYPASS GRAFT): ICD-10-CM

## 2024-09-13 DIAGNOSIS — R73.01 IMPAIRED FASTING GLUCOSE: ICD-10-CM

## 2024-09-13 DIAGNOSIS — I25.83 CORONARY ARTERIOSCLEROSIS DUE TO LIPID RICH PLAQUE: Primary | ICD-10-CM

## 2024-09-13 DIAGNOSIS — E78.00 HYPERCHOLESTEREMIA: ICD-10-CM

## 2024-09-13 LAB
ANION GAP SERPL CALCULATED.3IONS-SCNC: 8 MMOL/L (ref 7–15)
BUN SERPL-MCNC: 12.8 MG/DL (ref 8–23)
CALCIUM SERPL-MCNC: 9 MG/DL (ref 8.8–10.4)
CHLORIDE SERPL-SCNC: 107 MMOL/L (ref 98–107)
CHOLEST SERPL-MCNC: 231 MG/DL
CREAT SERPL-MCNC: 1.08 MG/DL (ref 0.67–1.17)
EGFRCR SERPLBLD CKD-EPI 2021: 75 ML/MIN/1.73M2
FASTING STATUS PATIENT QL REPORTED: YES
FASTING STATUS PATIENT QL REPORTED: YES
GLUCOSE SERPL-MCNC: 112 MG/DL (ref 70–99)
HBA1C MFR BLD: 5.9 % (ref 0–5.6)
HCO3 SERPL-SCNC: 26 MMOL/L (ref 22–29)
HDLC SERPL-MCNC: 25 MG/DL
HOLD SPECIMEN: NORMAL
LDLC SERPL CALC-MCNC: 184 MG/DL
NONHDLC SERPL-MCNC: 206 MG/DL
POTASSIUM SERPL-SCNC: 4.3 MMOL/L (ref 3.4–5.3)
SODIUM SERPL-SCNC: 141 MMOL/L (ref 135–145)
TRIGL SERPL-MCNC: 109 MG/DL

## 2024-09-13 PROCEDURE — 99214 OFFICE O/P EST MOD 30 MIN: CPT | Performed by: FAMILY MEDICINE

## 2024-09-13 PROCEDURE — G2211 COMPLEX E/M VISIT ADD ON: HCPCS | Performed by: FAMILY MEDICINE

## 2024-09-13 PROCEDURE — 80061 LIPID PANEL: CPT | Performed by: FAMILY MEDICINE

## 2024-09-13 PROCEDURE — 83036 HEMOGLOBIN GLYCOSYLATED A1C: CPT | Performed by: FAMILY MEDICINE

## 2024-09-13 PROCEDURE — 80048 BASIC METABOLIC PNL TOTAL CA: CPT | Performed by: FAMILY MEDICINE

## 2024-09-13 PROCEDURE — 36415 COLL VENOUS BLD VENIPUNCTURE: CPT | Performed by: FAMILY MEDICINE

## 2024-09-13 ASSESSMENT — PAIN SCALES - GENERAL: PAINLEVEL: NO PAIN (0)

## 2024-09-13 NOTE — PROGRESS NOTES
Assessment/Plan:    Coronary arteriosclerosis due to lipid rich plaque  CAD historically status post four-vessel CABG in 2010.  Low-dose aspirin and metoprolol succinate 25 mg using half tablet daily and lisinopril 5 mg daily.  Known history of statin intolerance described.  Resistant to trial of Repatha.  - Extra Tube  - Extra Tube    S/P CABG (coronary artery bypass graft)  As above.  Asymptomatic currently.  - Extra Tube  - Extra Tube    Essential hypertension, benign  Hypertension stable with lisinopril 5 mg daily metoprolol succinate 25 mg using half tablet daily.  - Basic metabolic panel  - Basic metabolic panel  - Extra Tube  - Extra Tube    Impaired fasting glucose  .  Fasting glucose historically.  Update A1c and fasting glucose.  Weight goal remains less than 200 pounds initially, less than 190 pounds ideally.  - Basic metabolic panel  - Hemoglobin A1c  - Basic metabolic panel  - Hemoglobin A1c  - Extra Tube  - Extra Tube    Hypercholesteremia  Hypercholesterolemia.  Intolerant to statin.  Resistant to trial of Repatha.  Update lipid cascade today while fasting.  - Lipid panel reflex to direct LDL Fasting  - Lipid panel reflex to direct LDL Fasting  - Extra Tube  - Extra Tube    The longitudinal plan of care for the diagnosis(es)/condition(s) as documented were addressed during this visit. Due to the added complexity in care, I will continue to support Lobo in the subsequent management and with ongoing continuity of care.            Subjective:    Dudley Kiran is seen today for follow-up assessment.  Known history of CAD status post four-vessel CABG in 2010.  Low-dose aspirin plus metoprolol succinate 25 mg using half tablet daily and lisinopril 5 mg daily continuing.  Intolerant to statin therapy.  Does not want to try Repatha at this time due to prior side effects with statin use and hesitation with new medications.  Impaired fasting glucose historically. Chronic cough has improved significantly  "since seeing pulmonary clinic 2023 and started on Atrovent nasal spray as well as air duo 232/14 using 1 inhalation twice daily but does miss evening dose at times. Does not require albuterol MDI. Pulmonary function testing was normal chest x-ray normal. Allergy testing with dust mites, cat dander and dog dander noted.  Patient with ear canal irritation at times.  Comprehensive review of systems as above otherwise all negative.          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       Past Surgical History:   Procedure Laterality Date    ARTHROPLASTY, HIP, TOTAL, DIRECT ANTERIOR APPROACH, USING HANA TABLE Left 10/27/2021    Procedure: LEFT TOTAL HIP ARTHROPLASTY DIRECT ANTERIOR APPROACH;  Surgeon: Bon Little MD;  Location: [facility] Main OR    BYPASS GRAFT ARTERY CORONARY          Family History   Problem Relation Age of Onset    Diabetes No family hx of     Diabetes No family hx of     Breast Cancer No family hx of     Colon Cancer No family hx of     Prostate Cancer No family hx of     Hypertension No family hx of         Past Medical History:   Diagnosis Date    Arthritis     Coronary artery disease     HTN (hypertension)     Prediabetes         Social History     Tobacco Use    Smoking status: Never    Smokeless tobacco: Never   Vaping Use    Vaping status: Never Used   Substance Use Topics    Alcohol use: No     Alcohol/week: 0.0 standard drinks of alcohol    Drug use: Never        Current Outpatient Medications   Medication Sig Dispense Refill    albuterol (PROAIR HFA/PROVENTIL HFA/VENTOLIN HFA) 108 (90 Base) MCG/ACT " "inhaler Inhale 2 puffs into the lungs every 6 hours as needed for shortness of breath, wheezing or cough 18 g 4    aspirin 81 MG EC tablet Take 1 tablet (81 mg) by mouth 2 times daily 60 tablet 0    fluticasone-salmeterol (AIRDUO RESPICLICK) 232-14 MCG/ACT inhaler Inhale 1 puff into the lungs 2 times daily 1 each 11    ipratropium (ATROVENT) 0.06 % nasal spray Spray 2 sprays into both nostrils 2 times daily 15 mL 4    lisinopril (ZESTRIL) 5 MG tablet Take 1 tablet (5 mg) by mouth daily 90 tablet 3    metoprolol succinate ER (TOPROL XL) 25 MG 24 hr tablet TAKE 0.5 TABLETS(12.5 MG) BY MOUTH DAILY 45 tablet 3    neomycin-polymyxin-hydrocortisone (CORTISPORIN) 3.5-41259-4 otic solution Place 3 drops into the right ear 4 times daily 10 mL 0    tadalafil (CIALIS) 10 MG tablet Take 1 tablet (10 mg) by mouth daily as needed (erectile difficulties) 90 tablet 2          Objective:    Vitals:    09/13/24 0735   BP: 110/70   Pulse: 83   Resp: 19   Temp: 97.7  F (36.5  C)   SpO2: 95%   Weight: 95.7 kg (211 lb)   Height: 1.702 m (5' 7\")      Body mass index is 33.05 kg/m .    Alert.  No apparent distress.  Chest clear.  Cardiac exam regular.  Extremities warm and dry.      This note has been dictated using voice recognition software and as a result may contain minor grammatical errors and unintended word substitutions.         Answers submitted by the patient for this visit:  General Questionnaire (Submitted on 9/8/2024)  Chief Complaint: Chronic problems general questions HPI Form  What is the reason for your visit today? : Check up  How many servings of fruits and vegetables do you eat daily?: 0-1  On average, how many sweetened beverages do you drink each day (Examples: soda, juice, sweet tea, etc.  Do NOT count diet or artificially sweetened beverages)?: 3  How many minutes a day do you exercise enough to make your heart beat faster?: 10 to 19  How many days a week do you exercise enough to make your heart beat faster?: 3 or " less  How many days per week do you miss taking your medication?: 3  What makes it hard for you to take your medication every day?: remembering to take

## 2024-10-17 ENCOUNTER — PATIENT OUTREACH (OUTPATIENT)
Dept: CARE COORDINATION | Facility: CLINIC | Age: 69
End: 2024-10-17
Payer: COMMERCIAL

## 2024-10-31 NOTE — PROGRESS NOTES
"History   Dudley Kiran is a 63 year old male presenting for:  Medication Reconciliation (complete) and Recheck Medication (metoprolol, lisinopril)    Rash around eyes improved with steroid cream. Flakiness still there but better.     BP had decreased lisinopril to 5 mg and metoprolol to 12.5 mg.   Tolerating this well. No orthostasis.   No problem with exercise tolerance. Works doing manual labor, loading pallets. 15 minutes strenuous exertion with lifting at a time. Walks 2-4 miles per day but never for 10 minutes in one stretch. No change in exercise tolerance after starting beta blocker 2 months ago.     The patient speaks English, so no  was used for this visit.   Medical and social history and medications reviewed with patient.   Exam   /75   Pulse 66   Temp 97.8  F (36.6  C) (Oral)   Resp 16   Ht 1.702 m (5' 7\")   Wt 83.9 kg (185 lb)   SpO2 97%   BMI 28.98 kg/m    Gen: NAD  HEENT: bilateral eyes with subtle darkening of skin. No erythema  Card: RRR, no murmurs  Resp: clear, no wheezing or crackles  Ext: no LE edema  Psych: mood normal, affect congruent  Medical Decision-Making     1. Elevated blood pressure reading without diagnosis of hypertension  Tolerating 5 mg lisinopril and 12.5 mg metoprolol. Discussed symptoms of hypotension to monitor for.     2. Atherosclerosis of coronary artery of native heart without angina pectoris, unspecified vessel or lesion type  CABG 4 vessels in 2010. ASCVD risk 11.4% today. On asa.     3. Elevated cholesterol  Discussed statin. Declines again. Associated prior use with onset of left hip pain, which he has had for the past 10 years.     Patient Instructions   Your blood pressure is great. Keep taking your medications at same dose. Let us know if you are having side effects.     If you are not willing to be on a statin medication for your cholesterol and to reduce risk of heart attack and stroke, please consider ezetimibe to lower cholesterol. It " "is usually very well tolerated.     You are due for a colonoscopy. Please call clinic if we can get this set up for you. If you are not willing to do a colonoscopy, then I recommend doing a \"Fit\" test, which looks for signs of blood in your stool that could be a sign of colon cancer.     Follow up: 1 yr for cpe    Veronica Lopez MD, MPH  [facility] - [place] Resident     Precepted patient with Henry Kingston MD    Options for treatment and follow-up care were reviewed with the patient and/or guardian. Dudley Kiran and/or guardian engaged in the decision making process and verbalized understanding of the options discussed and agreed with the final plan.  " Araceli

## 2025-03-17 SDOH — HEALTH STABILITY: PHYSICAL HEALTH: ON AVERAGE, HOW MANY MINUTES DO YOU ENGAGE IN EXERCISE AT THIS LEVEL?: 10 MIN

## 2025-03-17 SDOH — HEALTH STABILITY: PHYSICAL HEALTH: ON AVERAGE, HOW MANY DAYS PER WEEK DO YOU ENGAGE IN MODERATE TO STRENUOUS EXERCISE (LIKE A BRISK WALK)?: 1 DAY

## 2025-03-17 ASSESSMENT — ASTHMA QUESTIONNAIRES
ACT_TOTALSCORE: 23
QUESTION_2 LAST FOUR WEEKS HOW OFTEN HAVE YOU HAD SHORTNESS OF BREATH: NOT AT ALL
QUESTION_3 LAST FOUR WEEKS HOW OFTEN DID YOUR ASTHMA SYMPTOMS (WHEEZING, COUGHING, SHORTNESS OF BREATH, CHEST TIGHTNESS OR PAIN) WAKE YOU UP AT NIGHT OR EARLIER THAN USUAL IN THE MORNING: ONCE OR TWICE
QUESTION_1 LAST FOUR WEEKS HOW MUCH OF THE TIME DID YOUR ASTHMA KEEP YOU FROM GETTING AS MUCH DONE AT WORK, SCHOOL OR AT HOME: NONE OF THE TIME
QUESTION_4 LAST FOUR WEEKS HOW OFTEN HAVE YOU USED YOUR RESCUE INHALER OR NEBULIZER MEDICATION (SUCH AS ALBUTEROL): NOT AT ALL
ACT_TOTALSCORE: 23
QUESTION_5 LAST FOUR WEEKS HOW WOULD YOU RATE YOUR ASTHMA CONTROL: WELL CONTROLLED

## 2025-03-17 ASSESSMENT — SOCIAL DETERMINANTS OF HEALTH (SDOH): HOW OFTEN DO YOU GET TOGETHER WITH FRIENDS OR RELATIVES?: ONCE A WEEK

## 2025-03-19 ENCOUNTER — ORDERS ONLY (AUTO-RELEASED) (OUTPATIENT)
Dept: FAMILY MEDICINE | Facility: CLINIC | Age: 70
End: 2025-03-19

## 2025-03-19 ENCOUNTER — OFFICE VISIT (OUTPATIENT)
Dept: FAMILY MEDICINE | Facility: CLINIC | Age: 70
End: 2025-03-19
Payer: COMMERCIAL

## 2025-03-19 VITALS
SYSTOLIC BLOOD PRESSURE: 111 MMHG | BODY MASS INDEX: 33.12 KG/M2 | OXYGEN SATURATION: 96 % | HEIGHT: 67 IN | DIASTOLIC BLOOD PRESSURE: 74 MMHG | TEMPERATURE: 98.1 F | RESPIRATION RATE: 13 BRPM | WEIGHT: 211 LBS | HEART RATE: 76 BPM

## 2025-03-19 DIAGNOSIS — R05.3 CHRONIC COUGH: ICD-10-CM

## 2025-03-19 DIAGNOSIS — T78.40XD ALLERGY, SUBSEQUENT ENCOUNTER: ICD-10-CM

## 2025-03-19 DIAGNOSIS — Z95.1 S/P CABG (CORONARY ARTERY BYPASS GRAFT): ICD-10-CM

## 2025-03-19 DIAGNOSIS — Z12.11 SCREEN FOR COLON CANCER: ICD-10-CM

## 2025-03-19 DIAGNOSIS — E66.811 CLASS 1 OBESITY DUE TO EXCESS CALORIES WITH SERIOUS COMORBIDITY AND BODY MASS INDEX (BMI) OF 32.0 TO 32.9 IN ADULT: ICD-10-CM

## 2025-03-19 DIAGNOSIS — Z23 ENCOUNTER FOR IMMUNIZATION: ICD-10-CM

## 2025-03-19 DIAGNOSIS — I25.83 CORONARY ARTERIOSCLEROSIS DUE TO LIPID RICH PLAQUE: ICD-10-CM

## 2025-03-19 DIAGNOSIS — E66.09 CLASS 1 OBESITY DUE TO EXCESS CALORIES WITH SERIOUS COMORBIDITY AND BODY MASS INDEX (BMI) OF 32.0 TO 32.9 IN ADULT: ICD-10-CM

## 2025-03-19 DIAGNOSIS — Z12.5 SCREENING FOR PROSTATE CANCER: ICD-10-CM

## 2025-03-19 DIAGNOSIS — I10 ESSENTIAL HYPERTENSION, BENIGN: ICD-10-CM

## 2025-03-19 DIAGNOSIS — E78.00 HYPERCHOLESTEREMIA: ICD-10-CM

## 2025-03-19 DIAGNOSIS — R73.01 IMPAIRED FASTING GLUCOSE: ICD-10-CM

## 2025-03-19 DIAGNOSIS — Z00.00 MEDICARE ANNUAL WELLNESS VISIT, SUBSEQUENT: Primary | ICD-10-CM

## 2025-03-19 DIAGNOSIS — N52.9 ERECTILE DYSFUNCTION, UNSPECIFIED ERECTILE DYSFUNCTION TYPE: ICD-10-CM

## 2025-03-19 LAB
ERYTHROCYTE [DISTWIDTH] IN BLOOD BY AUTOMATED COUNT: 13.3 % (ref 10–15)
EST. AVERAGE GLUCOSE BLD GHB EST-MCNC: 123 MG/DL
HBA1C MFR BLD: 5.9 % (ref 0–5.6)
HCT VFR BLD AUTO: 41.5 % (ref 40–53)
HGB BLD-MCNC: 14.5 G/DL (ref 13.3–17.7)
MCH RBC QN AUTO: 29.7 PG (ref 26.5–33)
MCHC RBC AUTO-ENTMCNC: 34.9 G/DL (ref 31.5–36.5)
MCV RBC AUTO: 85 FL (ref 78–100)
PLATELET # BLD AUTO: 286 10E3/UL (ref 150–450)
RBC # BLD AUTO: 4.89 10E6/UL (ref 4.4–5.9)
WBC # BLD AUTO: 7.8 10E3/UL (ref 4–11)

## 2025-03-19 PROCEDURE — 85027 COMPLETE CBC AUTOMATED: CPT | Performed by: FAMILY MEDICINE

## 2025-03-19 PROCEDURE — 36415 COLL VENOUS BLD VENIPUNCTURE: CPT | Performed by: FAMILY MEDICINE

## 2025-03-19 PROCEDURE — 83036 HEMOGLOBIN GLYCOSYLATED A1C: CPT | Performed by: FAMILY MEDICINE

## 2025-03-19 RX ORDER — METOPROLOL SUCCINATE 25 MG/1
TABLET, EXTENDED RELEASE ORAL
Qty: 45 TABLET | Refills: 3 | Status: SHIPPED | OUTPATIENT
Start: 2025-03-19

## 2025-03-19 RX ORDER — TADALAFIL 10 MG/1
10 TABLET ORAL DAILY PRN
Qty: 90 TABLET | Refills: 2 | Status: SHIPPED | OUTPATIENT
Start: 2025-03-19

## 2025-03-19 RX ORDER — LISINOPRIL 5 MG/1
5 TABLET ORAL DAILY
Qty: 90 TABLET | Refills: 3 | Status: SHIPPED | OUTPATIENT
Start: 2025-03-19

## 2025-03-19 ASSESSMENT — PAIN SCALES - GENERAL: PAINLEVEL_OUTOF10: NO PAIN (0)

## 2025-03-19 NOTE — PROGRESS NOTES
Preventive Care Visit  [facility]  Tima Davis MD, Family Medicine  Mar 19, 2025      Assessment & Plan     Medicare annual wellness visit, subsequent  Annual Wellness Visit completed.  Risk questionaire reviewed in detail.  Appropriate preventive services were discussed with this patient, including applicable screening as appropriate for fall prevention, nutrition, physical activity, tobacco-use cessation, weight loss, and cognition.  Annual Wellness Visits recommended to continue.     Class 1 obesity due to excess calories with serious comorbidity and body mass index (BMI) of 32.0 to 32.9 in adult  Dietary and exercise modifications reviewed for weight goal less than 200 pounds initially, less than 190 pounds ideally.    Coronary arteriosclerosis due to lipid rich plaque  Known history of CAD.  Status post four-vessel CABG in 2010. Continues aspirin 81 mg daily metoprolol succinate 25 mg using half tablet daily and lisinopril 5 mg daily. Asymptomatic. Had nuclear medicine stress test October 12, 2023 negative for inducible ischemia. Remains asymptomatic. Patient intolerant to statin therapy. Declines Repatha utilization as PCSK9 agents. Continue cardiovascular risk factor reduction. Weight goal remains less than 200 pounds initially, less than 190 pounds ideally.   - CBC with platelets    S/P CABG (coronary artery bypass graft)  As above. Status post four-vessel CABG in 2010.     Essential hypertension, benign  Benefits of lisinopril 5 mg daily metoprolol succinate 25 mg using half tablet daily. Med monitoring completed.   - Comprehensive metabolic panel  - lisinopril (ZESTRIL) 5 MG tablet  Dispense: 90 tablet; Refill: 3  - metoprolol succinate ER (TOPROL XL) 25 MG 24 hr tablet  Dispense: 45 tablet; Refill: 3    Hypercholesteremia  Intolerant to statin will update lipid cascade with patient declining use of Repatha etc.   - Comprehensive metabolic panel  - Lipid panel reflex to direct  "LDL Fasting    Allergy, subsequent encounter  Trial of cetirizine 10 mg daily x 14 to 30 days.  Continue with benefit otherwise notify.    Impaired fasting glucose  Update fasting glucose and A1c.  Prior fasting glucose 112 with A1c 5.9% September 13, 2024.  Weight goal less than 200 pounds initially, less than 190 pounds ideally.  - Hemoglobin A1c    Screening for prostate cancer  PSA for prostate cancer screening based on age criteria.  - Prostate Specific Antigen Screen    Screen for colon cancer  Colon cancer screening through [facility].  Declines colonoscopy.  - COLOGUARD(EX[employer] SCIENCES)    Encounter for immunization  COVID vaccination provided.  - COVID-19 12+ (PFIZER)    Erectile dysfunction, unspecified erectile dysfunction type  Tadalafil 10 mg daily on as-needed basis with benefits described.  - tadalafil (CIALIS) 10 MG tablet  Dispense: 90 tablet; Refill: 2    Chronic cough  Chronic cough and had been seen June 5, 2023 by pulmonology clinic with noted dust mite and cat and dog allergy. Has a cat at home. Prefers dogs. Pulmonary function testing was normal. Chest x-ray had been normal. Had started AirDuo 232/14 using 1 inhalation twice daily.  Deviously missed the second dose at times otherwise significant improvement still has some phlegm production.  Now utilizing on as-needed basis only.      The longitudinal plan of care for the diagnosis(es)/condition(s) as documented were addressed during this visit. Due to the added complexity in care, I will continue to support Lobo in the subsequent management and with ongoing continuity of care.       Patient has been advised of split billing requirements and indicates understanding: Yes        BMI  Estimated body mass index is 32.96 kg/m  as calculated from the following:    Height as of this encounter: 1.704 m (5' 7.09\").    Weight as of this encounter: 95.7 kg (211 lb).   Weight management plan: Discussed healthy diet and exercise " "guidelines    Counseling  Appropriate preventive services were addressed with this patient via screening, questionnaire, or discussion as appropriate for fall prevention, nutrition, physical activity, Tobacco-use cessation, social engagement, weight loss and cognition.  Checklist reviewing preventive services available has been given to the patient.  Reviewed patient's diet, addressing concerns and/or questions.   He is at risk for lack of exercise and has been provided with information to increase physical activity for the benefit of his well-being.   Discussed possible causes of fatigue. The patient was provided with written information regarding signs of hearing loss.           Jason Diamond is a 69 year old, presenting for the following:  Medicare Visit (Pt is fasting)        3/19/2025     7:43 AM   Additional Questions   Roomed by aron HANEY    Patient seen today for annual wellness visit.  Known history of CAD status post four-vessel CABG in 2010.  Low-dose aspirin plus metoprolol succinate 25 mg using half tablet daily and lisinopril 5 mg daily continuing.  Intolerant to statin therapy.  Does not want to try Repatha at this time due to prior side effects with statin use and hesitation with new medications.  Impaired fasting glucose historically.  Prior fasting glucose 112 and A1c 5.9% September 13, 2024.  Chronic cough has improved significantly since seeing pulmonary clinic June 5, 2023 and started on Atrovent nasal spray as well as Airduo 232/14.  Previously using 1 inhalation twice daily but does miss evening dose at times.  Now utilizing only when \"congestion\".  Does not require albuterol MDI. Pulmonary function testing was normal chest x-ray normal.  Allergy testing with dust mites, cat dander and dog dander noted.  Tadalafil on as-needed basis with benefits described for erectile difficulties.  Comprehensive review of systems as above otherwise all negative.           Reviewed Dr. Schmidt's " note from 10/4/23:  Assessment:   (I25.10,  I25.83) Coronary arteriosclerosis due to lipid rich plaque  (primary encounter diagnosis)  Comment: Angiography March 2010 showed normal left main, proximal LAD 99% with the first diagonal ostial 99% lesion.  Circumflex had an ostial 90% lesion with the first obtuse marginal artery 95% stenosis in the third obtuse marginal artery 80% stenosis.  Right coronary artery had a distal 95% stenosis with sequential PDA 95% stenoses in 2 separate locations.  This prompted coronary artery bypass grafting.     (Z95.1) S/P CABG (coronary artery bypass graft)  Comment: March 8, 2010 he had a LIMA to the LAD, vein graft to first diagonal, vein graft to the obtuse marginal artery, and vein graft to right posterior lateral branch.  Given that the bypass was over 10 years ago we will arrange for exercise stress nuclear with him holding metoprolol the day of and the day before.     (E78.00) Elevated cholesterol  Comment: Looks to be familial hypercholesterolemia, possibly polygenic.  Total cholesterol is 247 and LDL acceptable at 194.  He vehemently refuses any statin.  If he needs repeat stenting would strongly urged PCSK9 inhibitor.       (I10) Essential hypertension, benign  Comment: Under good control currently on lisinopril as well as metoprolol.     (E66.09,  Z68.32) Class 1 obesity due to excess calories with serious comorbidity and body mass index (BMI) of 32.0 to 32.9 in adult  Comment: Continue to work on weight loss and monitor sugars.  His hemoglobin A1c was 5.8.      Plan:   1.  Exercise stress nuclear off metoprolol the day of and the day before.  If medium or large sized area ischemia will pursue angiography.  2.  Follow-up with me in 3 months, at that point time if he needs revascularization strongly urged PCSK9 inhibitor.        NM MPI Treadmill Result 10/12/23 Text       Exercise stress ECG negative for ischemia.    The nuclear stress test is negative for inducible  "myocardial ischemia or infarction.    The left ventricular ejection fraction at stress is 64%.    There is no prior study for comparison.           1 son - Dudley (31)   2 daughters - Stephanie (27) and Saundra (24)   Etoh - none   Smoking - never   Surgeries - 4 vessel bypass at [facility] in    Hospitalizations: for above surgery   Mother -  - unknown history   Father -  age 68 due to DM and EtOHism   5 sisters -   1 brother - [place]   Cousin - \"Guillaume\"   Retired (10/23/20) - [employer] - 40-50 hours a week               Advance Care Planning  Patient does not have a Health Care Directive: Patient states has Advance Directive and will bring in a copy to clinic.      3/17/2025   General Health   How would you rate your overall physical health? (!) FAIR   Feel stress (tense, anxious, or unable to sleep) Not at all         3/17/2025   Nutrition   Diet: Regular (no restrictions)         3/17/2025   Exercise   Days per week of moderate/strenous exercise 1 day   Average minutes spent exercising at this level 10 min   (!) EXERCISE CONCERN      3/17/2025   Social Factors   Frequency of gathering with friends or relatives Once a week   Worry food won't last until get money to buy more No   Food not last or not have enough money for food? No   Do you have housing? (Housing is defined as stable permanent housing and does not include staying ouside in a car, in a tent, in an abandoned building, in an overnight shelter, or couch-surfing.) Yes   Are you worried about losing your housing? No   Lack of transportation? No   Unable to get utilities (heat,electricity)? No         3/17/2025   Fall Risk   Fallen 2 or more times in the past year? No   Trouble with walking or balance? No          3/17/2025   Activities of Daily Living- Home Safety   Needs help with the following daily activites None of the above   Safety concerns in the home None of the above         3/17/2025   Dental "   Dentist two times every year? Yes         3/17/2025   Hearing Screening   Hearing concerns? (!) TROUBLE UNDERSTANDING SOFT OR WHISPERED SPEECH.         3/17/2025   Driving Risk Screening   Patient/family members have concerns about driving No         3/17/2025   General Alertness/Fatigue Screening   Have you been more tired than usual lately? (!) YES         3/17/2025   Urinary Incontinence Screening   Bothered by leaking urine in past 6 months No           3/8/2024   TB Screening   Were you born outside of the [place]? No           Today's PHQ-2 Score:       3/19/2025     7:37 AM   PHQ-2 ( 1999 Pfizer)   Q1: Little interest or pleasure in doing things 0   Q2: Feeling down, depressed or hopeless 0   PHQ-2 Score 0    Q1: Little interest or pleasure in doing things Not at all   Q2: Feeling down, depressed or hopeless Not at all   PHQ-2 Score 0       Patient-reported           3/17/2025   Substance Use   Alcohol more than 3/day or more than 7/wk Not Applicable   Do you have a current opioid prescription? No   How severe/bad is pain from 1 to 10? 0/10 (No Pain)   Do you use any other substances recreationally? No     Social History     Tobacco Use    Smoking status: Never    Smokeless tobacco: Never   Vaping Use    Vaping status: Never Used   Substance Use Topics    Alcohol use: No     Alcohol/week: 0.0 standard drinks of alcohol    Drug use: Never           3/17/2025   AAA Screening   Family history of Abdominal Aortic Aneurysm (AAA)? Unsure   Last PSA:   Prostate Specific Antigen Screen   Date Value Ref Range Status   03/13/2024 0.59 0.00 - 4.50 ng/mL Final   02/11/2022 0.58 0.00 - 4.50 ug/L Final     ASCVD Risk   The 10-year ASCVD risk score (Arianna KIRBY, et al., 2019) is: 22.8%    Values used to calculate the score:      Age: 69 years      Sex: Male      Is Non- : No      Diabetic: No      Tobacco smoker: No      Systolic Blood Pressure: 111 mmHg      Is BP treated: Yes      HDL  Cholesterol: 25 mg/dL      Total Cholesterol: 231 mg/dL    Fracture Risk Assessment Tool  Link to Frax Calculator  Use the information below to complete the Frax calculator  : 1955  Sex: male  Weight (kg): 95.7 kg (actual weight)  Height (cm): 170.4 cm  Previous Fragility Fracture:  No  History of parent with fractured hip:  No  Current Smoking:  No  Patient has been on glucocorticoids for more than 3 months (5mg/day or more): No  Rheumatoid Arthritis on Problem List:  No  Secondary Osteoporosis on Problem List:  No  Consumes 3 or more units of alcohol per day: No  Femoral Neck BMD (g/cm2)            Reviewed and updated as needed this visit by Provider   Tobacco  Allergies  Meds  Problems  Med Hx  Surg Hx  Fam Hx            Past Medical History:   Diagnosis Date    Arthritis     Coronary artery disease     HTN (hypertension)     Prediabetes      Past Surgical History:   Procedure Laterality Date    ARTHROPLASTY, HIP, TOTAL, DIRECT ANTERIOR APPROACH, USING HANA TABLE Left 10/27/2021    Procedure: LEFT TOTAL HIP ARTHROPLASTY DIRECT ANTERIOR APPROACH;  Surgeon: Bon Little MD;  Location: [facility] OR    BYPASS GRAFT ARTERY CORONARY  2010     Lab work is in process  Labs reviewed in EPIC  BP Readings from Last 3 Encounters:   25 111/74   24 110/70   24 116/70    Wt Readings from Last 3 Encounters:   25 95.7 kg (211 lb)   24 95.7 kg (211 lb)   24 95.3 kg (210 lb)                  Patient Active Problem List   Diagnosis    Coronary arteriosclerosis due to lipid rich plaque    History of prediabetes    Elevated cholesterol    Essential hypertension, benign    Primary osteoarthritis of both hips    Class 1 obesity due to excess calories with serious comorbidity and body mass index (BMI) of 32.0 to 32.9 in adult    OA (osteoarthritis)    S/P CABG (coronary artery bypass graft)     Past Surgical History:   Procedure Laterality Date    ARTHROPLASTY, HIP,  TOTAL, DIRECT ANTERIOR APPROACH, USING HANA TABLE Left 10/27/2021    Procedure: LEFT TOTAL HIP ARTHROPLASTY DIRECT ANTERIOR APPROACH;  Surgeon: Bon Little MD;  Location: [facility] OR    BYPASS GRAFT ARTERY CORONARY  2010       Social History     Tobacco Use    Smoking status: Never    Smokeless tobacco: Never   Substance Use Topics    Alcohol use: No     Alcohol/week: 0.0 standard drinks of alcohol     Family History   Problem Relation Age of Onset    Diabetes No family hx of     Diabetes No family hx of     Breast Cancer No family hx of     Colon Cancer No family hx of     Prostate Cancer No family hx of     Hypertension No family hx of          Current Outpatient Medications   Medication Sig Dispense Refill    albuterol (PROAIR HFA/PROVENTIL HFA/VENTOLIN HFA) 108 (90 Base) MCG/ACT inhaler Inhale 2 puffs into the lungs every 6 hours as needed for shortness of breath, wheezing or cough 18 g 4    aspirin 81 MG EC tablet Take 1 tablet (81 mg) by mouth 2 times daily 60 tablet 0    fluticasone-salmeterol (AIRDUO RESPICLICK) 232-14 MCG/ACT inhaler Inhale 1 puff into the lungs 2 times daily 1 each 11    ipratropium (ATROVENT) 0.06 % nasal spray Spray 2 sprays into both nostrils 2 times daily 15 mL 4    lisinopril (ZESTRIL) 5 MG tablet Take 1 tablet (5 mg) by mouth daily. 90 tablet 3    metoprolol succinate ER (TOPROL XL) 25 MG 24 hr tablet TAKE 0.5 TABLETS(12.5 MG) BY MOUTH DAILY 45 tablet 3    neomycin-polymyxin-hydrocortisone (CORTISPORIN) 3.5-36433-5 otic solution Place 3 drops into the right ear 4 times daily 10 mL 0    tadalafil (CIALIS) 10 MG tablet Take 1 tablet (10 mg) by mouth daily as needed (erectile difficulties). 90 tablet 2     Allergies   Allergen Reactions    No Known Allergies      Current providers sharing in care for this patient include:  Patient Care Team:  Tima Davis MD as PCP - General (Family Medicine)  Tima Davis MD as Assigned PCP  Charlene Schmidt MD as MD  "(Cardiovascular Disease)  Charlene Schmidt MD as Assigned Heart and Vascular Provider    The following health maintenance items are reviewed in Epic and correct as of today:  Health Maintenance   Topic Date Due    ASTHMA ACTION PLAN  Never done    COLORECTAL CANCER SCREENING  Never done    RSV VACCINE (1 - Risk 60-74 years 1-dose series) Never done    INFLUENZA VACCINE (1) Never done    COVID-19 Vaccine (6 - 2024-25 season) 09/01/2024    MEDICARE ANNUAL WELLNESS VISIT  03/13/2025    BMP  09/13/2025    LIPID  09/13/2025    ASTHMA CONTROL TEST  09/19/2025    ANNUAL REVIEW OF HM ORDERS  03/19/2026    FALL RISK ASSESSMENT  03/19/2026    DIABETES SCREENING  09/13/2027    DTAP/TDAP/TD IMMUNIZATION (3 - Td or Tdap) 12/18/2029    ADVANCE CARE PLANNING  03/19/2030    HEPATITIS C SCREENING  Completed    PHQ-2 (once per calendar year)  Completed    Pneumococcal Vaccine: 50+ Years  Completed    ZOSTER IMMUNIZATION  Completed    HPV IMMUNIZATION  Aged Out    MENINGITIS IMMUNIZATION  Aged Out         Review of Systems  Constitutional, HEENT, cardiovascular, pulmonary, GI, , musculoskeletal, neuro, skin, endocrine and psych systems are negative, except as otherwise noted.     Objective    Exam  /74 (BP Location: Left arm, Patient Position: Sitting, Cuff Size: Adult Regular)   Pulse 76   Temp 98.1  F (36.7  C) (Temporal)   Resp 13   Ht 1.704 m (5' 7.09\")   Wt 95.7 kg (211 lb)   SpO2 96%   BMI 32.96 kg/m     Estimated body mass index is 32.96 kg/m  as calculated from the following:    Height as of this encounter: 1.704 m (5' 7.09\").    Weight as of this encounter: 95.7 kg (211 lb).    Physical Exam  GENERAL: alert and no distress  EYES: Eyes grossly normal to inspection, PERRL and conjunctivae and sclerae normal  HENT: ear canals and TM's normal, nose and mouth without ulcers or lesions  NECK: no adenopathy, no asymmetry, masses, or scars  RESP: lungs clear to auscultation - no rales, rhonchi or wheezes  CV: regular " rate and rhythm, normal S1 S2, no S3 or S4, no murmur, click or rub, no peripheral edema  ABDOMEN: soft, nontender, no hepatosplenomegaly, no masses and bowel sounds normal   (male): normal male genitalia without lesions or urethral discharge, no hernia  RECTAL: normal sphincter tone, no rectal masses, prostate normal size, smooth, nontender without nodules or masses  MS: no gross musculoskeletal defects noted, no edema  SKIN: no suspicious lesions or rashes  NEURO: Normal strength and tone, mentation intact and speech normal  PSYCH: mentation appears normal, affect normal/bright        3/19/2025   Mini Cog   Clock Draw Score 2 Normal   3 Item Recall 3 objects recalled   Mini Cog Total Score 5              Signed Electronically by: Tima Davis MD

## 2025-03-20 LAB
ALBUMIN SERPL BCG-MCNC: 4.2 G/DL (ref 3.5–5.2)
ALP SERPL-CCNC: 92 U/L (ref 40–150)
ALT SERPL W P-5'-P-CCNC: 27 U/L (ref 0–70)
ANION GAP SERPL CALCULATED.3IONS-SCNC: 14 MMOL/L (ref 7–15)
AST SERPL W P-5'-P-CCNC: 31 U/L (ref 0–45)
BILIRUB SERPL-MCNC: 0.4 MG/DL
BUN SERPL-MCNC: 12.4 MG/DL (ref 8–23)
CALCIUM SERPL-MCNC: 9.5 MG/DL (ref 8.8–10.4)
CHLORIDE SERPL-SCNC: 108 MMOL/L (ref 98–107)
CHOLEST SERPL-MCNC: 226 MG/DL
CREAT SERPL-MCNC: 1.02 MG/DL (ref 0.67–1.17)
EGFRCR SERPLBLD CKD-EPI 2021: 80 ML/MIN/1.73M2
FASTING STATUS PATIENT QL REPORTED: YES
FASTING STATUS PATIENT QL REPORTED: YES
GLUCOSE SERPL-MCNC: 116 MG/DL (ref 70–99)
HCO3 SERPL-SCNC: 21 MMOL/L (ref 22–29)
HDLC SERPL-MCNC: 26 MG/DL
LDLC SERPL CALC-MCNC: 179 MG/DL
NONHDLC SERPL-MCNC: 200 MG/DL
POTASSIUM SERPL-SCNC: 4.6 MMOL/L (ref 3.4–5.3)
PROT SERPL-MCNC: 7.8 G/DL (ref 6.4–8.3)
PSA SERPL DL<=0.01 NG/ML-MCNC: 0.83 NG/ML (ref 0–4.5)
SODIUM SERPL-SCNC: 143 MMOL/L (ref 135–145)
TRIGL SERPL-MCNC: 105 MG/DL

## 2025-03-26 ENCOUNTER — APPOINTMENT (OUTPATIENT)
Dept: CT IMAGING | Facility: CLINIC | Age: 70
End: 2025-03-26
Payer: COMMERCIAL

## 2025-03-26 ENCOUNTER — APPOINTMENT (OUTPATIENT)
Dept: ULTRASOUND IMAGING | Facility: CLINIC | Age: 70
End: 2025-03-26
Payer: COMMERCIAL

## 2025-03-26 ENCOUNTER — NURSE TRIAGE (OUTPATIENT)
Dept: FAMILY MEDICINE | Facility: CLINIC | Age: 70
End: 2025-03-26

## 2025-03-26 ENCOUNTER — HOSPITAL ENCOUNTER (EMERGENCY)
Facility: CLINIC | Age: 70
Discharge: OTHER INSTITUTION | End: 2025-03-27
Payer: COMMERCIAL

## 2025-03-26 DIAGNOSIS — M79.662 PAIN OF LEFT LOWER LEG: ICD-10-CM

## 2025-03-26 DIAGNOSIS — I99.8 LIMB ISCHEMIA: ICD-10-CM

## 2025-03-26 DIAGNOSIS — T79.A22A TRAUMATIC COMPARTMENT SYNDROME OF LEFT LOWER EXTREMITY, INITIAL ENCOUNTER: ICD-10-CM

## 2025-03-26 DIAGNOSIS — T79.6XXA TRAUMATIC RHABDOMYOLYSIS, INITIAL ENCOUNTER: ICD-10-CM

## 2025-03-26 LAB
ANION GAP SERPL CALCULATED.3IONS-SCNC: 13 MMOL/L (ref 7–15)
BASOPHILS # BLD AUTO: 0.1 10E3/UL (ref 0–0.2)
BASOPHILS NFR BLD AUTO: 0 %
BUN SERPL-MCNC: 11.2 MG/DL (ref 8–23)
CALCIUM SERPL-MCNC: 9.4 MG/DL (ref 8.8–10.4)
CHLORIDE SERPL-SCNC: 102 MMOL/L (ref 98–107)
CK SERPL-CCNC: ABNORMAL U/L (ref 39–308)
CREAT SERPL-MCNC: 0.96 MG/DL (ref 0.67–1.17)
EGFRCR SERPLBLD CKD-EPI 2021: 86 ML/MIN/1.73M2
EOSINOPHIL # BLD AUTO: 0.8 10E3/UL (ref 0–0.7)
EOSINOPHIL NFR BLD AUTO: 6 %
ERYTHROCYTE [DISTWIDTH] IN BLOOD BY AUTOMATED COUNT: 13.8 % (ref 10–15)
GLUCOSE SERPL-MCNC: 113 MG/DL (ref 70–99)
HCO3 SERPL-SCNC: 23 MMOL/L (ref 22–29)
HCT VFR BLD AUTO: 45 % (ref 40–53)
HGB BLD-MCNC: 15.9 G/DL (ref 13.3–17.7)
IMM GRANULOCYTES # BLD: 0.1 10E3/UL
IMM GRANULOCYTES NFR BLD: 1 %
LYMPHOCYTES # BLD AUTO: 3 10E3/UL (ref 0.8–5.3)
LYMPHOCYTES NFR BLD AUTO: 21 %
MCH RBC QN AUTO: 29.6 PG (ref 26.5–33)
MCHC RBC AUTO-ENTMCNC: 35.3 G/DL (ref 31.5–36.5)
MCV RBC AUTO: 84 FL (ref 78–100)
MONOCYTES # BLD AUTO: 1.2 10E3/UL (ref 0–1.3)
MONOCYTES NFR BLD AUTO: 8 %
NEUTROPHILS # BLD AUTO: 9.2 10E3/UL (ref 1.6–8.3)
NEUTROPHILS NFR BLD AUTO: 64 %
NRBC # BLD AUTO: 0 10E3/UL
NRBC BLD AUTO-RTO: 0 /100
PLATELET # BLD AUTO: 292 10E3/UL (ref 150–450)
POTASSIUM SERPL-SCNC: 3.8 MMOL/L (ref 3.4–5.3)
RBC # BLD AUTO: 5.37 10E6/UL (ref 4.4–5.9)
SODIUM SERPL-SCNC: 138 MMOL/L (ref 135–145)
WBC # BLD AUTO: 14.4 10E3/UL (ref 4–11)

## 2025-03-26 PROCEDURE — 99285 EMERGENCY DEPT VISIT HI MDM: CPT | Mod: 25

## 2025-03-26 PROCEDURE — 258N000003 HC RX IP 258 OP 636

## 2025-03-26 PROCEDURE — 85025 COMPLETE CBC W/AUTO DIFF WBC: CPT

## 2025-03-26 PROCEDURE — 82310 ASSAY OF CALCIUM: CPT

## 2025-03-26 PROCEDURE — 96376 TX/PRO/DX INJ SAME DRUG ADON: CPT

## 2025-03-26 PROCEDURE — 36415 COLL VENOUS BLD VENIPUNCTURE: CPT

## 2025-03-26 PROCEDURE — 96361 HYDRATE IV INFUSION ADD-ON: CPT

## 2025-03-26 PROCEDURE — 96375 TX/PRO/DX INJ NEW DRUG ADDON: CPT | Mod: 59

## 2025-03-26 PROCEDURE — 75635 CT ANGIO ABDOMINAL ARTERIES: CPT

## 2025-03-26 PROCEDURE — 82550 ASSAY OF CK (CPK): CPT

## 2025-03-26 PROCEDURE — 80048 BASIC METABOLIC PNL TOTAL CA: CPT

## 2025-03-26 PROCEDURE — 93971 EXTREMITY STUDY: CPT | Mod: LT

## 2025-03-26 PROCEDURE — 96374 THER/PROPH/DIAG INJ IV PUSH: CPT | Mod: 59

## 2025-03-26 PROCEDURE — 250N000011 HC RX IP 250 OP 636

## 2025-03-26 RX ORDER — HYDROMORPHONE HYDROCHLORIDE 1 MG/ML
0.5 INJECTION, SOLUTION INTRAMUSCULAR; INTRAVENOUS; SUBCUTANEOUS ONCE
Status: COMPLETED | OUTPATIENT
Start: 2025-03-26 | End: 2025-03-26

## 2025-03-26 RX ORDER — ASPIRIN 81 MG/1
81 TABLET ORAL DAILY
Status: ON HOLD | COMMUNITY

## 2025-03-26 RX ORDER — HEPARIN SODIUM 10000 [USP'U]/100ML
0-5000 INJECTION, SOLUTION INTRAVENOUS CONTINUOUS
Status: DISCONTINUED | OUTPATIENT
Start: 2025-03-27 | End: 2025-03-27 | Stop reason: HOSPADM

## 2025-03-26 RX ORDER — OXYCODONE HYDROCHLORIDE 5 MG/1
5 TABLET ORAL ONCE
Status: COMPLETED | OUTPATIENT
Start: 2025-03-26 | End: 2025-03-26

## 2025-03-26 RX ORDER — IOPAMIDOL 755 MG/ML
90 INJECTION, SOLUTION INTRAVASCULAR ONCE
Status: COMPLETED | OUTPATIENT
Start: 2025-03-26 | End: 2025-03-26

## 2025-03-26 RX ORDER — HYDROMORPHONE HYDROCHLORIDE 1 MG/ML
0.5 INJECTION, SOLUTION INTRAMUSCULAR; INTRAVENOUS; SUBCUTANEOUS EVERY 30 MIN PRN
Status: DISCONTINUED | OUTPATIENT
Start: 2025-03-26 | End: 2025-03-27 | Stop reason: HOSPADM

## 2025-03-26 RX ORDER — ACETAMINOPHEN 500 MG
500-1000 TABLET ORAL EVERY 6 HOURS PRN
Status: ON HOLD | COMMUNITY

## 2025-03-26 RX ORDER — KETOROLAC TROMETHAMINE 15 MG/ML
15 INJECTION, SOLUTION INTRAMUSCULAR; INTRAVENOUS ONCE
Status: COMPLETED | OUTPATIENT
Start: 2025-03-26 | End: 2025-03-26

## 2025-03-26 RX ORDER — ONDANSETRON 2 MG/ML
4 INJECTION INTRAMUSCULAR; INTRAVENOUS ONCE
Status: COMPLETED | OUTPATIENT
Start: 2025-03-26 | End: 2025-03-26

## 2025-03-26 RX ADMIN — ONDANSETRON 4 MG: 2 INJECTION, SOLUTION INTRAMUSCULAR; INTRAVENOUS at 22:08

## 2025-03-26 RX ADMIN — KETOROLAC TROMETHAMINE 15 MG: 15 INJECTION, SOLUTION INTRAMUSCULAR; INTRAVENOUS at 20:51

## 2025-03-26 RX ADMIN — HYDROMORPHONE HYDROCHLORIDE 0.5 MG: 1 INJECTION, SOLUTION INTRAMUSCULAR; INTRAVENOUS; SUBCUTANEOUS at 23:34

## 2025-03-26 RX ADMIN — SODIUM CHLORIDE 1000 ML: 0.9 INJECTION, SOLUTION INTRAVENOUS at 22:08

## 2025-03-26 RX ADMIN — SODIUM CHLORIDE 1000 ML: 0.9 INJECTION, SOLUTION INTRAVENOUS at 23:34

## 2025-03-26 RX ADMIN — IOPAMIDOL 90 ML: 755 INJECTION, SOLUTION INTRAVENOUS at 22:53

## 2025-03-26 RX ADMIN — HYDROMORPHONE HYDROCHLORIDE 0.5 MG: 1 INJECTION, SOLUTION INTRAMUSCULAR; INTRAVENOUS; SUBCUTANEOUS at 22:08

## 2025-03-26 ASSESSMENT — COLUMBIA-SUICIDE SEVERITY RATING SCALE - C-SSRS
2. HAVE YOU ACTUALLY HAD ANY THOUGHTS OF KILLING YOURSELF IN THE PAST MONTH?: NO
1. IN THE PAST MONTH, HAVE YOU WISHED YOU WERE DEAD OR WISHED YOU COULD GO TO SLEEP AND NOT WAKE UP?: NO
6. HAVE YOU EVER DONE ANYTHING, STARTED TO DO ANYTHING, OR PREPARED TO DO ANYTHING TO END YOUR LIFE?: NO

## 2025-03-26 ASSESSMENT — ACTIVITIES OF DAILY LIVING (ADL)
ADLS_ACUITY_SCORE: 51

## 2025-03-26 NOTE — TELEPHONE ENCOUNTER
Nurse Triage SBAR    Is this a 2nd Level Triage? YES, LICENSED PRACTITIONER REVIEW IS REQUIRED, sending to PCP as an FYI as pt is agreeable to go to the ER for evaluation.     Situation:  swollen and pain of both legs, but more on the left leg for 2 days.     Background:  heart bypass surgery , taking baby aspirin. Hip surgery 3 years ago.      Pt went for a walk a months ago, this pain was present but went away shortly. He went on a longer another day, was feeling the same thing which went a way.     Last walk was on Monday, did 1/2 around the track and he was unable to walk. He is not using cane, but he can barely stand and walk.    Assessment:      1. ONSET: Monday.   2. LOCATION:  both leg, but left is more painful. Hamstring and wrapping around his calf.   3. SEVERITY: pain is localized.    Swelling is moderate.   4. REDNESS: Denies  5. PAIN: 5-7/10, hurt more with streches and movements   6. FEVER: denies   7. CAUSE: walk   8. MEDICAL HISTORY: Denies history of blood clots (e.g., DVT), cancer, heart failure, kidney disease, or liver failure   9. RECURRENT SYMPTOM: have this muscle pain before and it went away within a day.   10. OTHER SYMPTOMS: Denies chest pain, difficulty breathing     Does report having some Numbness of the left foot. He can move his toes and leg, but it hurt.   Protocol Recommended Disposition:   Go To ED/UCC Now (Or To Office With PCP Approval)    Recommendation: Care advise and red flags reviewed. Pt was agreeable to go to ED for evaluation. He will be there in about an hour as he has to get ready.     No further questions at this time.     Routed to provider    Does the patient meet one of the following criteria for ADS visit consideration? 16+ years old, with an [facility] PCP     TIP  Providers, please consider if this condition is appropriate for management at one of our Acute and Diagnostic Services sites.     If patient is a good candidate, please use dotphrase <dot>triageresponse and  select Refer to ADS to document.     Reason for Disposition   Can't walk or can barely stand (new-onset)    Additional Information   Negative: Sounds like a life-threatening emergency to the triager   Negative: Chest pain   Negative: Followed an insect bite and has localized swelling (e.g., small area of puffy or swollen skin)   Negative: Followed a knee injury   Negative: Ankle injury   Negative: Pregnant with leg swelling or edema   Negative: Difficulty breathing at rest   Negative: Entire foot is cool or blue in comparison to other side   Negative: Cast on leg or ankle and has increasing pain    Protocols used: Leg Swelling and Edema-A-OH

## 2025-03-27 ENCOUNTER — APPOINTMENT (OUTPATIENT)
Dept: CARDIOLOGY | Facility: HOSPITAL | Age: 70
End: 2025-03-27
Attending: INTERNAL MEDICINE
Payer: COMMERCIAL

## 2025-03-27 ENCOUNTER — APPOINTMENT (OUTPATIENT)
Dept: ULTRASOUND IMAGING | Facility: CLINIC | Age: 70
End: 2025-03-27
Attending: STUDENT IN AN ORGANIZED HEALTH CARE EDUCATION/TRAINING PROGRAM
Payer: COMMERCIAL

## 2025-03-27 ENCOUNTER — PREP FOR PROCEDURE (OUTPATIENT)
Dept: VASCULAR SURGERY | Facility: CLINIC | Age: 70
End: 2025-03-27
Payer: COMMERCIAL

## 2025-03-27 ENCOUNTER — ANESTHESIA (OUTPATIENT)
Dept: SURGERY | Facility: HOSPITAL | Age: 70
End: 2025-03-27
Payer: COMMERCIAL

## 2025-03-27 ENCOUNTER — ANESTHESIA EVENT (OUTPATIENT)
Dept: SURGERY | Facility: HOSPITAL | Age: 70
End: 2025-03-27
Payer: COMMERCIAL

## 2025-03-27 ENCOUNTER — HOSPITAL ENCOUNTER (INPATIENT)
Facility: HOSPITAL | Age: 70
End: 2025-03-27
Attending: INTERNAL MEDICINE | Admitting: INTERNAL MEDICINE
Payer: COMMERCIAL

## 2025-03-27 VITALS
RESPIRATION RATE: 18 BRPM | OXYGEN SATURATION: 94 % | DIASTOLIC BLOOD PRESSURE: 67 MMHG | HEART RATE: 90 BPM | BODY MASS INDEX: 32.96 KG/M2 | HEIGHT: 67 IN | SYSTOLIC BLOOD PRESSURE: 140 MMHG | TEMPERATURE: 99.5 F | WEIGHT: 210 LBS

## 2025-03-27 DIAGNOSIS — M16.0 PRIMARY OSTEOARTHRITIS OF BOTH HIPS: ICD-10-CM

## 2025-03-27 DIAGNOSIS — I25.83 CORONARY ARTERIOSCLEROSIS DUE TO LIPID RICH PLAQUE: ICD-10-CM

## 2025-03-27 DIAGNOSIS — Z95.1 S/P CABG (CORONARY ARTERY BYPASS GRAFT): ICD-10-CM

## 2025-03-27 DIAGNOSIS — I10 ESSENTIAL HYPERTENSION, BENIGN: ICD-10-CM

## 2025-03-27 DIAGNOSIS — Z87.898 HISTORY OF PREDIABETES: ICD-10-CM

## 2025-03-27 DIAGNOSIS — M79.A22 NONTRAUMATIC COMPARTMENT SYNDROME OF LEFT LOWER EXTREMITY: Primary | ICD-10-CM

## 2025-03-27 DIAGNOSIS — K59.00 CONSTIPATION, UNSPECIFIED CONSTIPATION TYPE: ICD-10-CM

## 2025-03-27 DIAGNOSIS — T14.8XXA OPEN WOUND: Primary | ICD-10-CM

## 2025-03-27 DIAGNOSIS — I99.8 ACUTE LOWER LIMB ISCHEMIA: ICD-10-CM

## 2025-03-27 DIAGNOSIS — E66.811 CLASS 1 OBESITY DUE TO EXCESS CALORIES WITH SERIOUS COMORBIDITY AND BODY MASS INDEX (BMI) OF 32.0 TO 32.9 IN ADULT: ICD-10-CM

## 2025-03-27 DIAGNOSIS — R11.2 NAUSEA AND VOMITING, UNSPECIFIED VOMITING TYPE: ICD-10-CM

## 2025-03-27 DIAGNOSIS — E78.00 ELEVATED CHOLESTEROL: ICD-10-CM

## 2025-03-27 DIAGNOSIS — E66.09 CLASS 1 OBESITY DUE TO EXCESS CALORIES WITH SERIOUS COMORBIDITY AND BODY MASS INDEX (BMI) OF 32.0 TO 32.9 IN ADULT: ICD-10-CM

## 2025-03-27 PROBLEM — M79.605 LEFT LEG PAIN: Status: ACTIVE | Noted: 2025-03-27

## 2025-03-27 LAB
ABO + RH BLD: NORMAL
ACT BLD: 231 SECONDS (ref 74–150)
ALBUMIN SERPL BCG-MCNC: 3.7 G/DL (ref 3.5–5.2)
ALBUMIN UR-MCNC: NEGATIVE MG/DL
ALP SERPL-CCNC: 73 U/L (ref 40–150)
ALT SERPL W P-5'-P-CCNC: 50 U/L (ref 0–70)
ANION GAP SERPL CALCULATED.3IONS-SCNC: 10 MMOL/L (ref 7–15)
ANION GAP SERPL CALCULATED.3IONS-SCNC: 10 MMOL/L (ref 7–15)
ANION GAP SERPL CALCULATED.3IONS-SCNC: 11 MMOL/L (ref 7–15)
APPEARANCE UR: CLEAR
AST SERPL W P-5'-P-CCNC: 255 U/L (ref 0–45)
ATRIAL RATE - MUSE: 89 BPM
BACTERIA #/AREA URNS HPF: ABNORMAL /HPF
BASOPHILS # BLD AUTO: 0 10E3/UL (ref 0–0.2)
BASOPHILS NFR BLD AUTO: 0 %
BILIRUB SERPL-MCNC: 1.1 MG/DL
BILIRUB UR QL STRIP: NEGATIVE
BLD GP AB SCN SERPL QL: NEGATIVE
BUN SERPL-MCNC: 10 MG/DL (ref 8–23)
BUN SERPL-MCNC: 10.1 MG/DL (ref 8–23)
BUN SERPL-MCNC: 10.3 MG/DL (ref 8–23)
CALCIUM SERPL-MCNC: 7.9 MG/DL (ref 8.8–10.4)
CALCIUM SERPL-MCNC: 8 MG/DL (ref 8.8–10.4)
CALCIUM SERPL-MCNC: 8.2 MG/DL (ref 8.8–10.4)
CHLORIDE SERPL-SCNC: 107 MMOL/L (ref 98–107)
CHLORIDE SERPL-SCNC: 109 MMOL/L (ref 98–107)
CHLORIDE SERPL-SCNC: 109 MMOL/L (ref 98–107)
CK SERPL-CCNC: 7206 U/L (ref 39–308)
CK SERPL-CCNC: 8397 U/L (ref 39–308)
CK SERPL-CCNC: 9306 U/L (ref 39–308)
CK SERPL-CCNC: ABNORMAL U/L (ref 39–308)
COLOR UR AUTO: ABNORMAL
CREAT SERPL-MCNC: 0.76 MG/DL (ref 0.67–1.17)
CREAT SERPL-MCNC: 0.84 MG/DL (ref 0.67–1.17)
CREAT SERPL-MCNC: 0.86 MG/DL (ref 0.67–1.17)
DIASTOLIC BLOOD PRESSURE - MUSE: NORMAL MMHG
EGFRCR SERPLBLD CKD-EPI 2021: >90 ML/MIN/1.73M2
EOSINOPHIL # BLD AUTO: 1 10E3/UL (ref 0–0.7)
EOSINOPHIL NFR BLD AUTO: 8 %
ERYTHROCYTE [DISTWIDTH] IN BLOOD BY AUTOMATED COUNT: 13.6 % (ref 10–15)
ERYTHROCYTE [DISTWIDTH] IN BLOOD BY AUTOMATED COUNT: 13.6 % (ref 10–15)
ERYTHROCYTE [DISTWIDTH] IN BLOOD BY AUTOMATED COUNT: 13.7 % (ref 10–15)
FACTOR V INTERPRETATION: NORMAL
GLUCOSE BLDC GLUCOMTR-MCNC: 131 MG/DL (ref 70–99)
GLUCOSE BLDC GLUCOMTR-MCNC: 146 MG/DL (ref 70–99)
GLUCOSE BLDC GLUCOMTR-MCNC: 150 MG/DL (ref 70–99)
GLUCOSE SERPL-MCNC: 114 MG/DL (ref 70–99)
GLUCOSE SERPL-MCNC: 131 MG/DL (ref 70–99)
GLUCOSE SERPL-MCNC: 155 MG/DL (ref 70–99)
GLUCOSE UR STRIP-MCNC: NEGATIVE MG/DL
HCO3 SERPL-SCNC: 20 MMOL/L (ref 22–29)
HCO3 SERPL-SCNC: 20 MMOL/L (ref 22–29)
HCO3 SERPL-SCNC: 21 MMOL/L (ref 22–29)
HCT VFR BLD AUTO: 36.3 % (ref 40–53)
HCT VFR BLD AUTO: 37.5 % (ref 40–53)
HCT VFR BLD AUTO: 37.8 % (ref 40–53)
HGB BLD-MCNC: 12.6 G/DL (ref 13.3–17.7)
HGB BLD-MCNC: 12.8 G/DL (ref 13.3–17.7)
HGB BLD-MCNC: 13.2 G/DL (ref 13.3–17.7)
HGB UR QL STRIP: ABNORMAL
HOLD SPECIMEN: NORMAL
HOLD SPECIMEN: NORMAL
IMM GRANULOCYTES # BLD: 0.1 10E3/UL
IMM GRANULOCYTES NFR BLD: 0 %
INR PPP: 1.17 (ref 0.85–1.15)
INTERPRETATION ECG - MUSE: NORMAL
KETONES UR STRIP-MCNC: 20 MG/DL
LAB DIRECTOR COMMENTS: NORMAL
LAB DIRECTOR DISCLAIMER: NORMAL
LAB DIRECTOR INTERPRETATION: NORMAL
LAB DIRECTOR METHODOLOGY: NORMAL
LAB DIRECTOR RESULTS: NORMAL
LACTATE SERPL-SCNC: 1.2 MMOL/L (ref 0.7–2)
LACTATE SERPL-SCNC: 1.5 MMOL/L (ref 0.7–2)
LEUKOCYTE ESTERASE UR QL STRIP: ABNORMAL
LVEF ECHO: NORMAL
LYMPHOCYTES # BLD AUTO: 3.8 10E3/UL (ref 0.8–5.3)
LYMPHOCYTES NFR BLD AUTO: 29 %
MCH RBC QN AUTO: 28.8 PG (ref 26.5–33)
MCH RBC QN AUTO: 29.6 PG (ref 26.5–33)
MCH RBC QN AUTO: 30 PG (ref 26.5–33)
MCHC RBC AUTO-ENTMCNC: 34.1 G/DL (ref 31.5–36.5)
MCHC RBC AUTO-ENTMCNC: 34.7 G/DL (ref 31.5–36.5)
MCHC RBC AUTO-ENTMCNC: 34.9 G/DL (ref 31.5–36.5)
MCV RBC AUTO: 85 FL (ref 78–100)
MCV RBC AUTO: 85 FL (ref 78–100)
MCV RBC AUTO: 86 FL (ref 78–100)
MONOCYTES # BLD AUTO: 1.2 10E3/UL (ref 0–1.3)
MONOCYTES NFR BLD AUTO: 10 %
MUCOUS THREADS #/AREA URNS LPF: PRESENT /LPF
NEUTROPHILS # BLD AUTO: 7 10E3/UL (ref 1.6–8.3)
NEUTROPHILS NFR BLD AUTO: 53 %
NITRATE UR QL: NEGATIVE
NRBC # BLD AUTO: 0 10E3/UL
NRBC BLD AUTO-RTO: 0 /100
P AXIS - MUSE: 53 DEGREES
PH UR STRIP: 6 [PH] (ref 5–7)
PLATELET # BLD AUTO: 208 10E3/UL (ref 150–450)
PLATELET # BLD AUTO: 231 10E3/UL (ref 150–450)
PLATELET # BLD AUTO: 233 10E3/UL (ref 150–450)
POTASSIUM SERPL-SCNC: 3.7 MMOL/L (ref 3.4–5.3)
POTASSIUM SERPL-SCNC: 4.1 MMOL/L (ref 3.4–5.3)
POTASSIUM SERPL-SCNC: 4.2 MMOL/L (ref 3.4–5.3)
PR INTERVAL - MUSE: 196 MS
PROT SERPL-MCNC: 6.8 G/DL (ref 6.4–8.3)
QRS DURATION - MUSE: 106 MS
QT - MUSE: 386 MS
QTC - MUSE: 469 MS
R AXIS - MUSE: -17 DEGREES
RBC # BLD AUTO: 4.25 10E6/UL (ref 4.4–5.9)
RBC # BLD AUTO: 4.4 10E6/UL (ref 4.4–5.9)
RBC # BLD AUTO: 4.44 10E6/UL (ref 4.4–5.9)
RBC URINE: 6 /HPF
SODIUM SERPL-SCNC: 138 MMOL/L (ref 135–145)
SODIUM SERPL-SCNC: 139 MMOL/L (ref 135–145)
SODIUM SERPL-SCNC: 140 MMOL/L (ref 135–145)
SP GR UR STRIP: 1.01 (ref 1–1.03)
SPECIMEN EXP DATE BLD: NORMAL
SPECIMEN TYPE: NORMAL
SYSTOLIC BLOOD PRESSURE - MUSE: NORMAL MMHG
T AXIS - MUSE: 16 DEGREES
UFH PPP CHRO-ACNC: 0.58 IU/ML
UFH PPP CHRO-ACNC: 0.62 IU/ML
UFH PPP CHRO-ACNC: 0.93 IU/ML
UROBILINOGEN UR STRIP-MCNC: NORMAL MG/DL
VENTRICULAR RATE- MUSE: 89 BPM
WBC # BLD AUTO: 11.3 10E3/UL (ref 4–11)
WBC # BLD AUTO: 12.8 10E3/UL (ref 4–11)
WBC # BLD AUTO: 13.1 10E3/UL (ref 4–11)
WBC URINE: 6 /HPF

## 2025-03-27 PROCEDURE — 272N000001 HC OR GENERAL SUPPLY STERILE: Performed by: STUDENT IN AN ORGANIZED HEALTH CARE EDUCATION/TRAINING PROGRAM

## 2025-03-27 PROCEDURE — 93306 TTE W/DOPPLER COMPLETE: CPT | Mod: 26 | Performed by: INTERNAL MEDICINE

## 2025-03-27 PROCEDURE — 96365 THER/PROPH/DIAG IV INF INIT: CPT | Mod: 59

## 2025-03-27 PROCEDURE — 250N000013 HC RX MED GY IP 250 OP 250 PS 637: Performed by: INTERNAL MEDICINE

## 2025-03-27 PROCEDURE — 85613 RUSSELL VIPER VENOM DILUTED: CPT | Performed by: STUDENT IN AN ORGANIZED HEALTH CARE EDUCATION/TRAINING PROGRAM

## 2025-03-27 PROCEDURE — 93925 LOWER EXTREMITY STUDY: CPT

## 2025-03-27 PROCEDURE — 250N000009 HC RX 250: Performed by: NURSE ANESTHETIST, CERTIFIED REGISTERED

## 2025-03-27 PROCEDURE — 83874 ASSAY OF MYOGLOBIN: CPT | Performed by: STUDENT IN AN ORGANIZED HEALTH CARE EDUCATION/TRAINING PROGRAM

## 2025-03-27 PROCEDURE — 81241 F5 GENE: CPT | Performed by: STUDENT IN AN ORGANIZED HEALTH CARE EDUCATION/TRAINING PROGRAM

## 2025-03-27 PROCEDURE — 85303 CLOT INHIBIT PROT C ACTIVITY: CPT | Performed by: STUDENT IN AN ORGANIZED HEALTH CARE EDUCATION/TRAINING PROGRAM

## 2025-03-27 PROCEDURE — 250N000011 HC RX IP 250 OP 636: Performed by: EMERGENCY MEDICINE

## 2025-03-27 PROCEDURE — 250N000011 HC RX IP 250 OP 636: Mod: JZ | Performed by: NURSE ANESTHETIST, CERTIFIED REGISTERED

## 2025-03-27 PROCEDURE — 85014 HEMATOCRIT: CPT | Performed by: STUDENT IN AN ORGANIZED HEALTH CARE EDUCATION/TRAINING PROGRAM

## 2025-03-27 PROCEDURE — 85730 THROMBOPLASTIN TIME PARTIAL: CPT | Performed by: STUDENT IN AN ORGANIZED HEALTH CARE EDUCATION/TRAINING PROGRAM

## 2025-03-27 PROCEDURE — 250N000011 HC RX IP 250 OP 636: Performed by: PAIN MEDICINE

## 2025-03-27 PROCEDURE — 93922 UPR/L XTREMITY ART 2 LEVELS: CPT

## 2025-03-27 PROCEDURE — 96361 HYDRATE IV INFUSION ADD-ON: CPT

## 2025-03-27 PROCEDURE — 93005 ELECTROCARDIOGRAM TRACING: CPT

## 2025-03-27 PROCEDURE — 86147 CARDIOLIPIN ANTIBODY EA IG: CPT | Performed by: STUDENT IN AN ORGANIZED HEALTH CARE EDUCATION/TRAINING PROGRAM

## 2025-03-27 PROCEDURE — 250N000013 HC RX MED GY IP 250 OP 250 PS 637: Performed by: STUDENT IN AN ORGANIZED HEALTH CARE EDUCATION/TRAINING PROGRAM

## 2025-03-27 PROCEDURE — 250N000011 HC RX IP 250 OP 636: Mod: JZ | Performed by: STUDENT IN AN ORGANIZED HEALTH CARE EDUCATION/TRAINING PROGRAM

## 2025-03-27 PROCEDURE — 200N000001 HC R&B ICU

## 2025-03-27 PROCEDURE — 96366 THER/PROPH/DIAG IV INF ADDON: CPT

## 2025-03-27 PROCEDURE — 258N000003 HC RX IP 258 OP 636: Performed by: NURSE ANESTHETIST, CERTIFIED REGISTERED

## 2025-03-27 PROCEDURE — 370N000017 HC ANESTHESIA TECHNICAL FEE, PER MIN: Performed by: STUDENT IN AN ORGANIZED HEALTH CARE EDUCATION/TRAINING PROGRAM

## 2025-03-27 PROCEDURE — 250N000011 HC RX IP 250 OP 636: Performed by: STUDENT IN AN ORGANIZED HEALTH CARE EDUCATION/TRAINING PROGRAM

## 2025-03-27 PROCEDURE — 93010 ELECTROCARDIOGRAM REPORT: CPT | Mod: RTG | Performed by: STUDENT IN AN ORGANIZED HEALTH CARE EDUCATION/TRAINING PROGRAM

## 2025-03-27 PROCEDURE — 85027 COMPLETE CBC AUTOMATED: CPT | Performed by: INTERNAL MEDICINE

## 2025-03-27 PROCEDURE — 81001 URINALYSIS AUTO W/SCOPE: CPT | Performed by: STUDENT IN AN ORGANIZED HEALTH CARE EDUCATION/TRAINING PROGRAM

## 2025-03-27 PROCEDURE — 258N000003 HC RX IP 258 OP 636: Performed by: STUDENT IN AN ORGANIZED HEALTH CARE EDUCATION/TRAINING PROGRAM

## 2025-03-27 PROCEDURE — 710N000010 HC RECOVERY PHASE 1, LEVEL 2, PER MIN: Performed by: STUDENT IN AN ORGANIZED HEALTH CARE EDUCATION/TRAINING PROGRAM

## 2025-03-27 PROCEDURE — 85347 COAGULATION TIME ACTIVATED: CPT

## 2025-03-27 PROCEDURE — 93010 ELECTROCARDIOGRAM REPORT: CPT | Performed by: STUDENT IN AN ORGANIZED HEALTH CARE EDUCATION/TRAINING PROGRAM

## 2025-03-27 PROCEDURE — 85390 FIBRINOLYSINS SCREEN I&R: CPT | Mod: 26 | Performed by: PATHOLOGY

## 2025-03-27 PROCEDURE — 99222 1ST HOSP IP/OBS MODERATE 55: CPT | Mod: 57 | Performed by: STUDENT IN AN ORGANIZED HEALTH CARE EDUCATION/TRAINING PROGRAM

## 2025-03-27 PROCEDURE — 85025 COMPLETE CBC W/AUTO DIFF WBC: CPT | Performed by: EMERGENCY MEDICINE

## 2025-03-27 PROCEDURE — 360N000075 HC SURGERY LEVEL 2, PER MIN: Performed by: STUDENT IN AN ORGANIZED HEALTH CARE EDUCATION/TRAINING PROGRAM

## 2025-03-27 PROCEDURE — 86900 BLOOD TYPING SEROLOGIC ABO: CPT | Performed by: STUDENT IN AN ORGANIZED HEALTH CARE EDUCATION/TRAINING PROGRAM

## 2025-03-27 PROCEDURE — 83605 ASSAY OF LACTIC ACID: CPT | Performed by: STUDENT IN AN ORGANIZED HEALTH CARE EDUCATION/TRAINING PROGRAM

## 2025-03-27 PROCEDURE — 99223 1ST HOSP IP/OBS HIGH 75: CPT | Performed by: INTERNAL MEDICINE

## 2025-03-27 PROCEDURE — 250N000012 HC RX MED GY IP 250 OP 636 PS 637: Performed by: INTERNAL MEDICINE

## 2025-03-27 PROCEDURE — 85014 HEMATOCRIT: CPT | Performed by: EMERGENCY MEDICINE

## 2025-03-27 PROCEDURE — 85300 ANTITHROMBIN III ACTIVITY: CPT | Performed by: STUDENT IN AN ORGANIZED HEALTH CARE EDUCATION/TRAINING PROGRAM

## 2025-03-27 PROCEDURE — 82550 ASSAY OF CK (CPK): CPT | Performed by: STUDENT IN AN ORGANIZED HEALTH CARE EDUCATION/TRAINING PROGRAM

## 2025-03-27 PROCEDURE — 85520 HEPARIN ASSAY: CPT | Performed by: STUDENT IN AN ORGANIZED HEALTH CARE EDUCATION/TRAINING PROGRAM

## 2025-03-27 PROCEDURE — 272N000004 HC RX 272: Performed by: STUDENT IN AN ORGANIZED HEALTH CARE EDUCATION/TRAINING PROGRAM

## 2025-03-27 PROCEDURE — 36415 COLL VENOUS BLD VENIPUNCTURE: CPT | Performed by: EMERGENCY MEDICINE

## 2025-03-27 PROCEDURE — 36415 COLL VENOUS BLD VENIPUNCTURE: CPT | Performed by: STUDENT IN AN ORGANIZED HEALTH CARE EDUCATION/TRAINING PROGRAM

## 2025-03-27 PROCEDURE — 82550 ASSAY OF CK (CPK): CPT | Performed by: HOSPITALIST

## 2025-03-27 PROCEDURE — 80048 BASIC METABOLIC PNL TOTAL CA: CPT | Performed by: STUDENT IN AN ORGANIZED HEALTH CARE EDUCATION/TRAINING PROGRAM

## 2025-03-27 PROCEDURE — 82310 ASSAY OF CALCIUM: CPT | Performed by: STUDENT IN AN ORGANIZED HEALTH CARE EDUCATION/TRAINING PROGRAM

## 2025-03-27 PROCEDURE — 250N000013 HC RX MED GY IP 250 OP 250 PS 637: Performed by: HOSPITALIST

## 2025-03-27 PROCEDURE — 0KNT0ZZ RELEASE LEFT LOWER LEG MUSCLE, OPEN APPROACH: ICD-10-PCS | Performed by: STUDENT IN AN ORGANIZED HEALTH CARE EDUCATION/TRAINING PROGRAM

## 2025-03-27 PROCEDURE — 36415 COLL VENOUS BLD VENIPUNCTURE: CPT | Performed by: INTERNAL MEDICINE

## 2025-03-27 PROCEDURE — 86146 BETA-2 GLYCOPROTEIN ANTIBODY: CPT | Performed by: STUDENT IN AN ORGANIZED HEALTH CARE EDUCATION/TRAINING PROGRAM

## 2025-03-27 PROCEDURE — 999N000141 HC STATISTIC PRE-PROCEDURE NURSING ASSESSMENT: Performed by: STUDENT IN AN ORGANIZED HEALTH CARE EDUCATION/TRAINING PROGRAM

## 2025-03-27 PROCEDURE — 85610 PROTHROMBIN TIME: CPT | Performed by: STUDENT IN AN ORGANIZED HEALTH CARE EDUCATION/TRAINING PROGRAM

## 2025-03-27 PROCEDURE — 255N000002 HC RX 255 OP 636: Performed by: INTERNAL MEDICINE

## 2025-03-27 PROCEDURE — 27602 DECOMPRESSION OF LOWER LEG: CPT | Mod: LT | Performed by: STUDENT IN AN ORGANIZED HEALTH CARE EDUCATION/TRAINING PROGRAM

## 2025-03-27 PROCEDURE — 250N000025 HC SEVOFLURANE, PER MIN: Performed by: STUDENT IN AN ORGANIZED HEALTH CARE EDUCATION/TRAINING PROGRAM

## 2025-03-27 PROCEDURE — 250N000009 HC RX 250: Performed by: STUDENT IN AN ORGANIZED HEALTH CARE EDUCATION/TRAINING PROGRAM

## 2025-03-27 PROCEDURE — 85306 CLOT INHIBIT PROT S FREE: CPT | Performed by: STUDENT IN AN ORGANIZED HEALTH CARE EDUCATION/TRAINING PROGRAM

## 2025-03-27 PROCEDURE — 82040 ASSAY OF SERUM ALBUMIN: CPT | Performed by: INTERNAL MEDICINE

## 2025-03-27 PROCEDURE — G0452 MOLECULAR PATHOLOGY INTERPR: HCPCS | Mod: 26 | Performed by: PATHOLOGY

## 2025-03-27 RX ORDER — HYDROMORPHONE HCL IN WATER/PF 6 MG/30 ML
0.4 PATIENT CONTROLLED ANALGESIA SYRINGE INTRAVENOUS EVERY 5 MIN PRN
Status: DISCONTINUED | OUTPATIENT
Start: 2025-03-27 | End: 2025-03-27 | Stop reason: HOSPADM

## 2025-03-27 RX ORDER — ACETAMINOPHEN 325 MG/1
975 TABLET ORAL EVERY 8 HOURS
Status: DISCONTINUED | OUTPATIENT
Start: 2025-03-27 | End: 2025-04-02

## 2025-03-27 RX ORDER — ONDANSETRON 4 MG/1
4 TABLET, ORALLY DISINTEGRATING ORAL EVERY 30 MIN PRN
Status: DISCONTINUED | OUTPATIENT
Start: 2025-03-27 | End: 2025-03-27

## 2025-03-27 RX ORDER — NICOTINE POLACRILEX 4 MG
15-30 LOZENGE BUCCAL
Status: DISCONTINUED | OUTPATIENT
Start: 2025-03-27 | End: 2025-04-15 | Stop reason: HOSPADM

## 2025-03-27 RX ORDER — NALOXONE HYDROCHLORIDE 0.4 MG/ML
0.2 INJECTION, SOLUTION INTRAMUSCULAR; INTRAVENOUS; SUBCUTANEOUS
Status: DISCONTINUED | OUTPATIENT
Start: 2025-03-27 | End: 2025-04-15 | Stop reason: HOSPADM

## 2025-03-27 RX ORDER — OXYCODONE HYDROCHLORIDE 5 MG/1
10 TABLET ORAL EVERY 4 HOURS PRN
Status: DISCONTINUED | OUTPATIENT
Start: 2025-03-27 | End: 2025-03-29

## 2025-03-27 RX ORDER — CEFAZOLIN SODIUM/WATER 2 G/20 ML
2 SYRINGE (ML) INTRAVENOUS
Status: COMPLETED | OUTPATIENT
Start: 2025-03-27 | End: 2025-03-27

## 2025-03-27 RX ORDER — PROPOFOL 10 MG/ML
INJECTION, EMULSION INTRAVENOUS PRN
Status: DISCONTINUED | OUTPATIENT
Start: 2025-03-27 | End: 2025-03-27

## 2025-03-27 RX ORDER — BISACODYL 10 MG
10 SUPPOSITORY, RECTAL RECTAL DAILY PRN
Status: DISCONTINUED | OUTPATIENT
Start: 2025-03-30 | End: 2025-04-01

## 2025-03-27 RX ORDER — SODIUM CHLORIDE, SODIUM LACTATE, POTASSIUM CHLORIDE, CALCIUM CHLORIDE 600; 310; 30; 20 MG/100ML; MG/100ML; MG/100ML; MG/100ML
INJECTION, SOLUTION INTRAVENOUS CONTINUOUS PRN
Status: DISCONTINUED | OUTPATIENT
Start: 2025-03-27 | End: 2025-03-27

## 2025-03-27 RX ORDER — ONDANSETRON 2 MG/ML
4 INJECTION INTRAMUSCULAR; INTRAVENOUS EVERY 30 MIN PRN
Status: DISCONTINUED | OUTPATIENT
Start: 2025-03-27 | End: 2025-03-27 | Stop reason: HOSPADM

## 2025-03-27 RX ORDER — DEXTROSE MONOHYDRATE 25 G/50ML
25-50 INJECTION, SOLUTION INTRAVENOUS
Status: DISCONTINUED | OUTPATIENT
Start: 2025-03-27 | End: 2025-04-15 | Stop reason: HOSPADM

## 2025-03-27 RX ORDER — HYDROMORPHONE HCL IN WATER/PF 6 MG/30 ML
0.2 PATIENT CONTROLLED ANALGESIA SYRINGE INTRAVENOUS
Status: DISCONTINUED | OUTPATIENT
Start: 2025-03-27 | End: 2025-04-02

## 2025-03-27 RX ORDER — METOPROLOL SUCCINATE 25 MG/1
25 TABLET, EXTENDED RELEASE ORAL DAILY
Status: DISCONTINUED | OUTPATIENT
Start: 2025-03-27 | End: 2025-04-15 | Stop reason: HOSPADM

## 2025-03-27 RX ORDER — HYDROMORPHONE HCL IN WATER/PF 6 MG/30 ML
0.2 PATIENT CONTROLLED ANALGESIA SYRINGE INTRAVENOUS
Status: DISCONTINUED | OUTPATIENT
Start: 2025-03-27 | End: 2025-03-28

## 2025-03-27 RX ORDER — DEXAMETHASONE SODIUM PHOSPHATE 4 MG/ML
4 INJECTION, SOLUTION INTRA-ARTICULAR; INTRALESIONAL; INTRAMUSCULAR; INTRAVENOUS; SOFT TISSUE
Status: DISCONTINUED | OUTPATIENT
Start: 2025-03-27 | End: 2025-03-27

## 2025-03-27 RX ORDER — NALOXONE HYDROCHLORIDE 0.4 MG/ML
0.1 INJECTION, SOLUTION INTRAMUSCULAR; INTRAVENOUS; SUBCUTANEOUS
Status: DISCONTINUED | OUTPATIENT
Start: 2025-03-27 | End: 2025-03-27

## 2025-03-27 RX ORDER — SODIUM CHLORIDE, SODIUM LACTATE, POTASSIUM CHLORIDE, CALCIUM CHLORIDE 600; 310; 30; 20 MG/100ML; MG/100ML; MG/100ML; MG/100ML
INJECTION, SOLUTION INTRAVENOUS CONTINUOUS
Status: DISCONTINUED | OUTPATIENT
Start: 2025-03-27 | End: 2025-03-27 | Stop reason: HOSPADM

## 2025-03-27 RX ORDER — LIDOCAINE 40 MG/G
CREAM TOPICAL
Status: DISCONTINUED | OUTPATIENT
Start: 2025-03-27 | End: 2025-04-15 | Stop reason: HOSPADM

## 2025-03-27 RX ORDER — LIDOCAINE HYDROCHLORIDE 20 MG/ML
INJECTION, SOLUTION INFILTRATION; PERINEURAL PRN
Status: DISCONTINUED | OUTPATIENT
Start: 2025-03-27 | End: 2025-03-27

## 2025-03-27 RX ORDER — DEXAMETHASONE SODIUM PHOSPHATE 10 MG/ML
INJECTION, SOLUTION INTRAMUSCULAR; INTRAVENOUS PRN
Status: DISCONTINUED | OUTPATIENT
Start: 2025-03-27 | End: 2025-03-27

## 2025-03-27 RX ORDER — CEFAZOLIN SODIUM/WATER 2 G/20 ML
2 SYRINGE (ML) INTRAVENOUS SEE ADMIN INSTRUCTIONS
Status: DISCONTINUED | OUTPATIENT
Start: 2025-03-27 | End: 2025-03-27 | Stop reason: HOSPADM

## 2025-03-27 RX ORDER — HEPARIN SODIUM 10000 [USP'U]/100ML
0-5000 INJECTION, SOLUTION INTRAVENOUS CONTINUOUS
Status: DISPENSED | OUTPATIENT
Start: 2025-03-27 | End: 2025-03-29

## 2025-03-27 RX ORDER — ALBUTEROL SULFATE 90 UG/1
2 INHALANT RESPIRATORY (INHALATION) EVERY 6 HOURS PRN
Status: DISCONTINUED | OUTPATIENT
Start: 2025-03-27 | End: 2025-04-15 | Stop reason: HOSPADM

## 2025-03-27 RX ORDER — FLUTICASONE FUROATE AND VILANTEROL 200; 25 UG/1; UG/1
1 POWDER RESPIRATORY (INHALATION) DAILY
Status: DISCONTINUED | OUTPATIENT
Start: 2025-03-27 | End: 2025-03-29

## 2025-03-27 RX ORDER — CALCIUM CARBONATE 500 MG/1
1000 TABLET, CHEWABLE ORAL 4 TIMES DAILY PRN
Status: DISCONTINUED | OUTPATIENT
Start: 2025-03-27 | End: 2025-04-15 | Stop reason: HOSPADM

## 2025-03-27 RX ORDER — FENTANYL CITRATE 50 UG/ML
50 INJECTION, SOLUTION INTRAMUSCULAR; INTRAVENOUS EVERY 5 MIN PRN
Status: DISCONTINUED | OUTPATIENT
Start: 2025-03-27 | End: 2025-03-27 | Stop reason: HOSPADM

## 2025-03-27 RX ORDER — NALOXONE HYDROCHLORIDE 0.4 MG/ML
0.4 INJECTION, SOLUTION INTRAMUSCULAR; INTRAVENOUS; SUBCUTANEOUS
Status: DISCONTINUED | OUTPATIENT
Start: 2025-03-27 | End: 2025-04-15 | Stop reason: HOSPADM

## 2025-03-27 RX ORDER — OXYCODONE HYDROCHLORIDE 5 MG/1
5 TABLET ORAL
Status: DISCONTINUED | OUTPATIENT
Start: 2025-03-27 | End: 2025-03-27

## 2025-03-27 RX ORDER — OXYCODONE HYDROCHLORIDE 5 MG/1
5 TABLET ORAL EVERY 4 HOURS PRN
Status: DISCONTINUED | OUTPATIENT
Start: 2025-03-27 | End: 2025-03-29

## 2025-03-27 RX ORDER — SODIUM CHLORIDE 9 MG/ML
INJECTION, SOLUTION INTRAVENOUS CONTINUOUS
Status: DISCONTINUED | OUTPATIENT
Start: 2025-03-27 | End: 2025-03-27

## 2025-03-27 RX ORDER — SODIUM CHLORIDE 9 MG/ML
INJECTION, SOLUTION INTRAVENOUS CONTINUOUS
Status: DISCONTINUED | OUTPATIENT
Start: 2025-03-27 | End: 2025-03-28

## 2025-03-27 RX ORDER — HYDROMORPHONE HCL IN WATER/PF 6 MG/30 ML
0.2 PATIENT CONTROLLED ANALGESIA SYRINGE INTRAVENOUS EVERY 5 MIN PRN
Status: DISCONTINUED | OUTPATIENT
Start: 2025-03-27 | End: 2025-03-27 | Stop reason: HOSPADM

## 2025-03-27 RX ORDER — FENTANYL CITRATE 50 UG/ML
INJECTION, SOLUTION INTRAMUSCULAR; INTRAVENOUS PRN
Status: DISCONTINUED | OUTPATIENT
Start: 2025-03-27 | End: 2025-03-27

## 2025-03-27 RX ORDER — DOCUSATE SODIUM 100 MG/1
100 CAPSULE, LIQUID FILLED ORAL 2 TIMES DAILY
Status: DISCONTINUED | OUTPATIENT
Start: 2025-03-27 | End: 2025-03-29

## 2025-03-27 RX ORDER — METOPROLOL SUCCINATE 25 MG/1
25 TABLET, EXTENDED RELEASE ORAL DAILY
Status: DISCONTINUED | OUTPATIENT
Start: 2025-03-27 | End: 2025-03-27

## 2025-03-27 RX ORDER — MAGNESIUM HYDROXIDE 1200 MG/15ML
LIQUID ORAL PRN
Status: DISCONTINUED | OUTPATIENT
Start: 2025-03-27 | End: 2025-03-27 | Stop reason: HOSPADM

## 2025-03-27 RX ORDER — ACETAMINOPHEN 325 MG/1
975 TABLET ORAL ONCE
Status: CANCELLED | OUTPATIENT
Start: 2025-03-27 | End: 2025-03-27

## 2025-03-27 RX ORDER — LIDOCAINE 40 MG/G
CREAM TOPICAL
Status: DISCONTINUED | OUTPATIENT
Start: 2025-03-27 | End: 2025-03-27 | Stop reason: HOSPADM

## 2025-03-27 RX ORDER — KETAMINE HYDROCHLORIDE 10 MG/ML
INJECTION INTRAMUSCULAR; INTRAVENOUS PRN
Status: DISCONTINUED | OUTPATIENT
Start: 2025-03-27 | End: 2025-03-27

## 2025-03-27 RX ORDER — OXYCODONE HYDROCHLORIDE 5 MG/1
10 TABLET ORAL
Status: DISCONTINUED | OUTPATIENT
Start: 2025-03-27 | End: 2025-03-27

## 2025-03-27 RX ORDER — HYDROMORPHONE HCL IN WATER/PF 6 MG/30 ML
0.4 PATIENT CONTROLLED ANALGESIA SYRINGE INTRAVENOUS
Status: DISCONTINUED | OUTPATIENT
Start: 2025-03-27 | End: 2025-03-28

## 2025-03-27 RX ORDER — FENTANYL CITRATE 50 UG/ML
25 INJECTION, SOLUTION INTRAMUSCULAR; INTRAVENOUS EVERY 5 MIN PRN
Status: DISCONTINUED | OUTPATIENT
Start: 2025-03-27 | End: 2025-03-27 | Stop reason: HOSPADM

## 2025-03-27 RX ORDER — ONDANSETRON 2 MG/ML
4 INJECTION INTRAMUSCULAR; INTRAVENOUS EVERY 6 HOURS PRN
Status: DISCONTINUED | OUTPATIENT
Start: 2025-03-27 | End: 2025-04-15 | Stop reason: HOSPADM

## 2025-03-27 RX ORDER — SODIUM CHLORIDE, SODIUM LACTATE, POTASSIUM CHLORIDE, CALCIUM CHLORIDE 600; 310; 30; 20 MG/100ML; MG/100ML; MG/100ML; MG/100ML
INJECTION, SOLUTION INTRAVENOUS CONTINUOUS
Status: DISCONTINUED | OUTPATIENT
Start: 2025-03-27 | End: 2025-03-27

## 2025-03-27 RX ORDER — LISINOPRIL 2.5 MG/1
2.5 TABLET ORAL DAILY
Status: DISCONTINUED | OUTPATIENT
Start: 2025-03-27 | End: 2025-03-28

## 2025-03-27 RX ORDER — ONDANSETRON 2 MG/ML
4 INJECTION INTRAMUSCULAR; INTRAVENOUS EVERY 30 MIN PRN
Status: DISCONTINUED | OUTPATIENT
Start: 2025-03-27 | End: 2025-03-27

## 2025-03-27 RX ORDER — NALOXONE HYDROCHLORIDE 0.4 MG/ML
0.1 INJECTION, SOLUTION INTRAMUSCULAR; INTRAVENOUS; SUBCUTANEOUS
Status: DISCONTINUED | OUTPATIENT
Start: 2025-03-27 | End: 2025-03-27 | Stop reason: HOSPADM

## 2025-03-27 RX ORDER — DEXAMETHASONE SODIUM PHOSPHATE 10 MG/ML
4 INJECTION, SOLUTION INTRAMUSCULAR; INTRAVENOUS
Status: DISCONTINUED | OUTPATIENT
Start: 2025-03-27 | End: 2025-03-27 | Stop reason: HOSPADM

## 2025-03-27 RX ORDER — ASPIRIN 81 MG/1
81 TABLET ORAL DAILY
Status: DISCONTINUED | OUTPATIENT
Start: 2025-03-28 | End: 2025-04-15 | Stop reason: HOSPADM

## 2025-03-27 RX ORDER — SODIUM CHLORIDE 9 MG/ML
INJECTION, SOLUTION INTRAVENOUS CONTINUOUS
Status: DISCONTINUED | OUTPATIENT
Start: 2025-03-27 | End: 2025-03-27 | Stop reason: HOSPADM

## 2025-03-27 RX ORDER — LIDOCAINE 40 MG/G
CREAM TOPICAL
Status: DISCONTINUED | OUTPATIENT
Start: 2025-03-27 | End: 2025-03-28

## 2025-03-27 RX ORDER — POLYETHYLENE GLYCOL 3350 17 G/17G
17 POWDER, FOR SOLUTION ORAL DAILY
Status: DISCONTINUED | OUTPATIENT
Start: 2025-03-28 | End: 2025-03-29

## 2025-03-27 RX ORDER — ONDANSETRON 4 MG/1
4 TABLET, ORALLY DISINTEGRATING ORAL EVERY 6 HOURS PRN
Status: DISCONTINUED | OUTPATIENT
Start: 2025-03-27 | End: 2025-04-15 | Stop reason: HOSPADM

## 2025-03-27 RX ORDER — HYDROMORPHONE HCL IN WATER/PF 6 MG/30 ML
0.4 PATIENT CONTROLLED ANALGESIA SYRINGE INTRAVENOUS
Status: DISCONTINUED | OUTPATIENT
Start: 2025-03-27 | End: 2025-04-02

## 2025-03-27 RX ORDER — OXYCODONE HYDROCHLORIDE 5 MG/1
5 TABLET ORAL EVERY 4 HOURS PRN
Status: DISCONTINUED | OUTPATIENT
Start: 2025-03-27 | End: 2025-03-27

## 2025-03-27 RX ORDER — AMOXICILLIN 250 MG
1 CAPSULE ORAL 2 TIMES DAILY
Status: DISCONTINUED | OUTPATIENT
Start: 2025-03-27 | End: 2025-03-29

## 2025-03-27 RX ORDER — ONDANSETRON 4 MG/1
4 TABLET, ORALLY DISINTEGRATING ORAL EVERY 30 MIN PRN
Status: DISCONTINUED | OUTPATIENT
Start: 2025-03-27 | End: 2025-03-27 | Stop reason: HOSPADM

## 2025-03-27 RX ORDER — PROCHLORPERAZINE MALEATE 5 MG/1
5 TABLET ORAL EVERY 6 HOURS PRN
Status: DISCONTINUED | OUTPATIENT
Start: 2025-03-27 | End: 2025-04-15 | Stop reason: HOSPADM

## 2025-03-27 RX ORDER — HEPARIN SODIUM 10000 [USP'U]/100ML
0-5000 INJECTION, SOLUTION INTRAVENOUS CONTINUOUS
Status: DISCONTINUED | OUTPATIENT
Start: 2025-03-27 | End: 2025-03-27

## 2025-03-27 RX ORDER — ACETAMINOPHEN 325 MG/1
975 TABLET ORAL ONCE
Status: COMPLETED | OUTPATIENT
Start: 2025-03-27 | End: 2025-03-27

## 2025-03-27 RX ORDER — CEFAZOLIN SODIUM 2 G/50ML
2 SOLUTION INTRAVENOUS EVERY 8 HOURS
Status: COMPLETED | OUTPATIENT
Start: 2025-03-27 | End: 2025-03-28

## 2025-03-27 RX ADMIN — FENTANYL CITRATE 100 MCG: 50 INJECTION, SOLUTION INTRAMUSCULAR; INTRAVENOUS at 03:53

## 2025-03-27 RX ADMIN — PHENYLEPHRINE HYDROCHLORIDE 0.3 MCG/KG/MIN: 10 INJECTION INTRAVENOUS at 04:01

## 2025-03-27 RX ADMIN — DEXAMETHASONE SODIUM PHOSPHATE 10 MG: 10 INJECTION, SOLUTION INTRAMUSCULAR; INTRAVENOUS at 04:23

## 2025-03-27 RX ADMIN — PERFLUTREN 3 ML: 6.52 INJECTION, SUSPENSION INTRAVENOUS at 10:15

## 2025-03-27 RX ADMIN — LISINOPRIL 2.5 MG: 2.5 TABLET ORAL at 11:20

## 2025-03-27 RX ADMIN — CEFAZOLIN SODIUM 2 G: 2 SOLUTION INTRAVENOUS at 17:50

## 2025-03-27 RX ADMIN — KETAMINE HYDROCHLORIDE 10 MG: 10 INJECTION INTRAMUSCULAR; INTRAVENOUS at 05:37

## 2025-03-27 RX ADMIN — HYDROMORPHONE HYDROCHLORIDE 0.4 MG: 1 INJECTION, SOLUTION INTRAMUSCULAR; INTRAVENOUS; SUBCUTANEOUS at 04:21

## 2025-03-27 RX ADMIN — SODIUM CHLORIDE: 0.9 INJECTION, SOLUTION INTRAVENOUS at 21:16

## 2025-03-27 RX ADMIN — SODIUM CHLORIDE, POTASSIUM CHLORIDE, SODIUM LACTATE AND CALCIUM CHLORIDE: 600; 310; 30; 20 INJECTION, SOLUTION INTRAVENOUS at 03:52

## 2025-03-27 RX ADMIN — ONDANSETRON 4 MG: 2 INJECTION, SOLUTION INTRAMUSCULAR; INTRAVENOUS at 23:09

## 2025-03-27 RX ADMIN — SODIUM CHLORIDE 200 ML: 0.9 INJECTION, SOLUTION INTRAVENOUS at 17:30

## 2025-03-27 RX ADMIN — KETAMINE HYDROCHLORIDE 20 MG: 10 INJECTION INTRAMUSCULAR; INTRAVENOUS at 04:47

## 2025-03-27 RX ADMIN — DOCUSATE SODIUM 100 MG: 100 CAPSULE, LIQUID FILLED ORAL at 11:20

## 2025-03-27 RX ADMIN — ROCURONIUM 50 MG: 50 INJECTION, SOLUTION INTRAVENOUS at 03:54

## 2025-03-27 RX ADMIN — Medication 2 G: at 04:14

## 2025-03-27 RX ADMIN — HYDROMORPHONE HYDROCHLORIDE 0.4 MG: 1 INJECTION, SOLUTION INTRAMUSCULAR; INTRAVENOUS; SUBCUTANEOUS at 05:15

## 2025-03-27 RX ADMIN — METOPROLOL SUCCINATE 25 MG: 25 TABLET, EXTENDED RELEASE ORAL at 17:29

## 2025-03-27 RX ADMIN — HEPARIN SODIUM 1500 UNITS/HR: 10000 INJECTION, SOLUTION INTRAVENOUS at 12:59

## 2025-03-27 RX ADMIN — HYDROMORPHONE HYDROCHLORIDE 0.2 MG: 1 INJECTION, SOLUTION INTRAMUSCULAR; INTRAVENOUS; SUBCUTANEOUS at 05:41

## 2025-03-27 RX ADMIN — ACETAMINOPHEN 975 MG: 325 TABLET ORAL at 17:29

## 2025-03-27 RX ADMIN — ONDANSETRON 4 MG: 2 INJECTION INTRAMUSCULAR; INTRAVENOUS at 04:23

## 2025-03-27 RX ADMIN — SODIUM CHLORIDE: 0.9 INJECTION, SOLUTION INTRAVENOUS at 08:14

## 2025-03-27 RX ADMIN — INSULIN ASPART 1 UNITS: 100 INJECTION, SOLUTION INTRAVENOUS; SUBCUTANEOUS at 13:05

## 2025-03-27 RX ADMIN — HYDROMORPHONE HYDROCHLORIDE 0.2 MG: 0.2 INJECTION, SOLUTION INTRAMUSCULAR; INTRAVENOUS; SUBCUTANEOUS at 23:09

## 2025-03-27 RX ADMIN — SODIUM CHLORIDE: 0.9 INJECTION, SOLUTION INTRAVENOUS at 00:23

## 2025-03-27 RX ADMIN — HEPARIN SODIUM 1700 UNITS/HR: 10000 INJECTION, SOLUTION INTRAVENOUS at 00:21

## 2025-03-27 RX ADMIN — SODIUM CHLORIDE: 0.9 INJECTION, SOLUTION INTRAVENOUS at 05:19

## 2025-03-27 RX ADMIN — ACETAMINOPHEN 975 MG: 325 TABLET ORAL at 03:10

## 2025-03-27 RX ADMIN — ROCURONIUM 10 MG: 50 INJECTION, SOLUTION INTRAVENOUS at 04:38

## 2025-03-27 RX ADMIN — LIDOCAINE HYDROCHLORIDE 100 MG: 20 INJECTION, SOLUTION INFILTRATION; PERINEURAL at 03:54

## 2025-03-27 RX ADMIN — PROPOFOL 200 MG: 10 INJECTION, EMULSION INTRAVENOUS at 03:54

## 2025-03-27 RX ADMIN — KETAMINE HYDROCHLORIDE 10 MG: 10 INJECTION INTRAMUSCULAR; INTRAVENOUS at 05:21

## 2025-03-27 ASSESSMENT — ACTIVITIES OF DAILY LIVING (ADL)
ADLS_ACUITY_SCORE: 29
HEARING_DIFFICULTY_OR_DEAF: NO
ADLS_ACUITY_SCORE: 28
ADLS_ACUITY_SCORE: 33
ADLS_ACUITY_SCORE: 51
WEAR_GLASSES_OR_BLIND: YES
ADLS_ACUITY_SCORE: 28
ADLS_ACUITY_SCORE: 29
DRESSING/BATHING_DIFFICULTY: NO
ADLS_ACUITY_SCORE: 29
ADLS_ACUITY_SCORE: 28
ADLS_ACUITY_SCORE: 29
CHANGE_IN_FUNCTIONAL_STATUS_SINCE_ONSET_OF_CURRENT_ILLNESS/INJURY: NO
ADLS_ACUITY_SCORE: 28
ADLS_ACUITY_SCORE: 29
ADLS_ACUITY_SCORE: 28
CONCENTRATING,_REMEMBERING_OR_MAKING_DECISIONS_DIFFICULTY: NO
FALL_HISTORY_WITHIN_LAST_SIX_MONTHS: NO
ADLS_ACUITY_SCORE: 29
TOILETING_ISSUES: NO
DIFFICULTY_EATING/SWALLOWING: NO
WALKING_OR_CLIMBING_STAIRS_DIFFICULTY: NO
ADLS_ACUITY_SCORE: 33
ADLS_ACUITY_SCORE: 28
ADLS_ACUITY_SCORE: 28
ADLS_ACUITY_SCORE: 51
ADLS_ACUITY_SCORE: 28
ADLS_ACUITY_SCORE: 29
ADLS_ACUITY_SCORE: 29
DIFFICULTY_COMMUNICATING: NO
ADLS_ACUITY_SCORE: 33
DOING_ERRANDS_INDEPENDENTLY_DIFFICULTY: NO
ADLS_ACUITY_SCORE: 29
ADLS_ACUITY_SCORE: 28

## 2025-03-27 ASSESSMENT — COPD QUESTIONNAIRES: COPD: 1

## 2025-03-27 NOTE — ANESTHESIA PROCEDURE NOTES
Arterial Line Procedure Note    Pre-Procedure   Staff -        Anesthesiologist:  Jama Garcia MD       Performed By: anesthesiologist       Location: OR       Pre-Anesthestic Checklist: patient identified, IV checked, risks and benefits discussed, informed consent, monitors and equipment checked, pre-op evaluation and at physician/surgeon's request  Timeout:       Correct Patient: Yes        Correct Procedure: Yes        Correct Site: Yes        Correct Position: Yes   Line Placement:   This line was placed Post Induction starting at 3/27/2025 3:58 AM and ending at 3/27/2025 4:00 AM  Procedure   Procedure: arterial line       Laterality: left       Insertion Site: radial.  Sterile Prep        Standard elements of sterile barrier followed       Skin prep: Chloraprep  Insertion/Injection        Technique: ultrasound guided        1. Ultrasound was used to evaluate the access site.       2. Artery evaluated via ultrasound for patency/adequacy.       3. Using real-time ultrasound the needle/catheter was observed entering the artery/vein.       Catheter Type/Size: 20 G, 1.75 in/4.5 cm quick cath (integral wire)  Narrative        Biopatch and Tegaderm dressing used.       Complications: None apparent,        Arterial waveform: Yes        IBP within 10% of NIBP: Yes

## 2025-03-27 NOTE — MEDICATION SCRIBE - ADMISSION MEDICATION HISTORY
Medication Scribe Admission Medication History    Admission medication history is complete. The information provided in this note is only as accurate as the sources available at the time of the update.    Information Source(s): Patient and CareEverywhere/SureScripts via in-person    Pertinent Information: Patient normally takes lisinopril 5 mg, aspirin 81 mg, and metoprolol succinate 12.5 mg daily. Last taken on Sunday and were forgotten the last few days.     All other medications including the inahlers, nasal sprays, etc are taken as PRN. Patient reported they have not been used in the last month.     Changes made to PTA medication list:  Added:   Acetaminophen 500 mg  Deleted:   Neomycin-polymyxin-hydrocortisone otic solution  Changed:   Aspirin 81 mg BID --> once daily  Airduo BID --> BID PRN  Ipratropium Bid --> BID PRN    Allergies reviewed with patient and updates made in EHR: yes    Medication History Completed By: Popeye Pickering 3/26/2025 9:39 PM    PTA Med List   Medication Sig Last Dose/Taking    acetaminophen (TYLENOL) 500 MG tablet Take 500-1,000 mg by mouth every 6 hours as needed for mild pain. 3/26/2025 at 12:00 PM    albuterol (PROAIR HFA/PROVENTIL HFA/VENTOLIN HFA) 108 (90 Base) MCG/ACT inhaler Inhale 2 puffs into the lungs every 6 hours as needed for shortness of breath, wheezing or cough Unknown    aspirin 81 MG EC tablet Take 81 mg by mouth daily. 3/23/2025    fluticasone-salmeterol (AIRDUO RESPICLICK) 232-14 MCG/ACT inhaler Inhale 1 puff into the lungs 2 times daily (Patient taking differently: Inhale 1 puff into the lungs 2 times daily as needed.) More than a month    ipratropium (ATROVENT) 0.06 % nasal spray Spray 2 sprays into both nostrils 2 times daily (Patient taking differently: Spray 2 sprays into both nostrils 2 times daily as needed.) More than a month    lisinopril (ZESTRIL) 5 MG tablet Take 1 tablet (5 mg) by mouth daily. 3/23/2025    metoprolol succinate ER (TOPROL XL) 25 MG 24  hr tablet TAKE 0.5 TABLETS(12.5 MG) BY MOUTH DAILY 3/23/2025    tadalafil (CIALIS) 10 MG tablet Take 1 tablet (10 mg) by mouth daily as needed (erectile difficulties). More than a month

## 2025-03-27 NOTE — ANESTHESIA CARE TRANSFER NOTE
Patient: Dudley Kiran    Procedure: Procedure(s):  FASCIOTOMY, LOWER EXTREMITY       Diagnosis: Nontraumatic compartment syndrome of left lower extremity [M79.A22]  Diagnosis Additional Information: No value filed.    Anesthesia Type:   General     Note:    Oropharynx: oropharynx clear of all foreign objects  Level of Consciousness: drowsy  Oxygen Supplementation: face mask  Level of Supplemental Oxygen (L/min / FiO2): 10  Independent Airway: airway patency satisfactory and stable  Dentition: dentition unchanged  Vital Signs Stable: post-procedure vital signs reviewed and stable  Report to RN Given: handoff report given  Patient transferred to: PACU    Handoff Report: Identifed the Patient, Identified the Reponsible Provider, Reviewed the pertinent medical history, Discussed the surgical course, Reviewed Intra-OP anesthesia mangement and issues during anesthesia, Set expectations for post-procedure period and Allowed opportunity for questions and acknowledgement of understanding      Vitals:  Vitals Value Taken Time   /66 03/27/25 0600   Temp 36.6  C (97.8  F) 03/27/25 0600   Pulse 85 03/27/25 0606   Resp 15 03/27/25 0606   SpO2 95 % 03/27/25 0606   Vitals shown include unfiled device data.    Electronically Signed By: Sohail Enrique  March 27, 2025  6:07 AM

## 2025-03-27 NOTE — PROGRESS NOTES
[facility] - ICU Admission Note       Dual Skin Assessment:     Patient transferred from OR/PACU to ICU. (From P3 to OR to PACU to ICU)     Comprehensive skin inspection completed by myself and Kimberly Cowan RN.      Abnormal skin assessment findings: No      If yes above, LDA initiated for skin breakdown/non-blanchable erythema:  No     Provider notified: N/A     WOC consult order obtained: N/A

## 2025-03-27 NOTE — ANESTHESIA PROCEDURE NOTES
Airway       Patient location during procedure: OR       Procedure Start/Stop Times: 3/27/2025 3:56 AM  Staff -        CRNA: Sohail Enrique       Performed By: CRNAIndications and Patient Condition       Indications for airway management: kelechi-procedural       Induction type:intravenous       Mask difficulty assessment: 1 - vent by mask    Final Airway Details       Final airway type: endotracheal airway       Successful airway: ETT - single  Endotracheal Airway Details        ETT size (mm): 7.5       Cuffed: yes       Successful intubation technique: direct laryngoscopy       DL Blade Type: Gonzalez 2       Grade View of Cords: 1       Adjucts: stylet       Position: Right       Measured from: lips       Bite block used: None    Post intubation assessment        Placement verified by: capnometry, equal breath sounds and chest rise        Number of attempts at approach: 1       Number of other approaches attempted: 0       Secured with: tape       Ease of procedure: easy       Dentition: Intact       Dental guard used and removed. Dental Guard Type: Standard White.    Medication(s) Administered   Medication Administration Time: 3/27/2025 3:56 AM

## 2025-03-27 NOTE — ED TRIAGE NOTES
PT states he went for a walk on Monday and developed bialteral leg cramping causing him to lay on the ground and crawl into his sons truck as he could not stand on his legs due to cramping/pain.     PT states since then his left calf has become 10/10 pain and he is not able to tolerate weight on it.        Triage Assessment (Adult)       Row Name 03/26/25 4585          Triage Assessment    Airway WDL WDL        Respiratory WDL    Respiratory WDL WDL        Cardiac WDL    Cardiac WDL X  HTN        Peripheral/Neurovascular WDL    Peripheral Neurovascular WDL neurovascular assessment lower        Cognitive/Neuro/Behavioral WDL    Cognitive/Neuro/Behavioral WDL WDL        LLE Neurovascular Assessment    Temperature LLE warm     Color LLE no discoloration     Sensation LLE tenderness present

## 2025-03-27 NOTE — ED NOTES
EMERGENCY DEPARTMENT SIGN OUT NOTE        ED COURSE AND MEDICAL DECISION MAKING  Patient was signed out to me by Dr Vic Manzano at 11:30 PM.   9:54 PM I spoke with Dr. Coelho, vascular surgery.   12:33 AM I spoke with Dr. Sharma (hospitalist team) at [facility].     In brief, Dudley Kiran is a 69 year old male who initially presented to this emergency department for evaluation of leg pain.      The patient took a walk on Monday (3/24/2025), after which both of his legs began to seize up. He can't bear weight on his left calf. He is experiencing more pain in his left calf than his right. There is also some numbness to his left foot and pain in his right shin. He has taken tylenol with no pain relief.     At time of sign out, disposition was pending CT with runoff.    Update: CT resulted, and it actually looks like there are vascular occlusions in the bilateral legs and what it looks like is this probably started in the aorta and there was showering to the lower extremities.  I went and evaluated the patient myself and he does have a significantly tender and firm left posterior calf compartment and while he still has bilateral leg pain, the left is clearly greater than the right in terms of pain.  It looks like what happened is as he has had distal reconstitution of blood flow it caused both a compartment syndrome and with the elevated CK and rhabdomyolysis.  I spoke with vascular surgery numerous times who evaluated the imaging and they suspect that he will need operative management.  We are starting him on a high-dose heparin drip, and he needs transfer to [facility] for higher level of care and operative management.  Fortunately, they were able to find an [facility] bed MP3 for him and I have discussed the case with hospitalist at [facility].  I did speak specifically with vascular surgery and they state they can manage the compartment syndrome primarily as well, and thus I did not consult any other surgical  services.    Critical Care  Performed by: Nell Patel MD  Authorized by: Nell Patel MD  Total critical care time: 90 minutes  Critical care time was exclusive of separately billable procedures and treating other patients.  Critical care was necessary to treat or prevent imminent or life-threatening deterioration of the following conditions: Rhabdomyolysis, acute limb arterial thrombosis and ischemia requiring heparinization in preparation for transfer, compartment syndrome  Critical care was time spent personally by me on the following activities: development of treatment plan with patient or surrogate, discussions with consultants, examination of patient, evaluation of patient's response to treatment, obtaining history from patient or surrogate, ordering and performing treatments and interventions, ordering and review of laboratory studies, ordering and review of radiographic studies and re-evaluation of patient's condition, this excludes any separately billable procedures.        FINAL IMPRESSION    1. Traumatic rhabdomyolysis, initial encounter    2. Pain of left lower leg    3. Limb ischemia        ED MEDS  Medications   HYDROmorphone (PF) (DILAUDID) injection 0.5 mg (0.5 mg Intravenous $Given 3/26/25 2334)   heparin 25,000 units in 0.45% NaCl 250 mL ANTICOAGULANT infusion ( Intravenous Not Given 3/27/25 0022)   sodium chloride 0.9 % infusion ( Intravenous $New Bag 3/27/25 0023)   oxyCODONE (ROXICODONE) tablet 5 mg (5 mg Oral Not Given 3/26/25 2053)   ketorolac (TORADOL) injection 15 mg (15 mg Intravenous $Given 3/26/25 2051)   sodium chloride 0.9% BOLUS 1,000 mL (0 mLs Intravenous Stopped 3/26/25 2334)   ondansetron (ZOFRAN) injection 4 mg (4 mg Intravenous $Given 3/26/25 2208)   HYDROmorphone (PF) (DILAUDID) injection 0.5 mg (0.5 mg Intravenous $Given 3/26/25 2208)   iopamidol (ISOVUE-370) solution 90 mL (90 mLs Intravenous $Given 3/26/25 2253)   sodium chloride 0.9% BOLUS 1,000 mL (0 mLs Intravenous  Stopped 3/27/25 0015)   heparin ANTICOAGULANT Loading dose for HIGH INTENSITY TREATMENT * Give BEFORE starting heparin infusion (7,600 Units Intravenous $Given 3/27/25 0020)       LAB  Labs Ordered and Resulted from Time of ED Arrival to Time of ED Departure   BASIC METABOLIC PANEL - Abnormal       Result Value    Sodium 138      Potassium 3.8      Chloride 102      Carbon Dioxide (CO2) 23      Anion Gap 13      Urea Nitrogen 11.2      Creatinine 0.96      GFR Estimate 86      Calcium 9.4      Glucose 113 (*)    CK TOTAL - Abnormal    CK 13,296 (*)    CBC WITH PLATELETS AND DIFFERENTIAL - Abnormal    WBC Count 14.4 (*)     RBC Count 5.37      Hemoglobin 15.9      Hematocrit 45.0      MCV 84      MCH 29.6      MCHC 35.3      RDW 13.8      Platelet Count 292      % Neutrophils 64      % Lymphocytes 21      % Monocytes 8      % Eosinophils 6      % Basophils 0      % Immature Granulocytes 1      NRBCs per 100 WBC 0      Absolute Neutrophils 9.2 (*)     Absolute Lymphocytes 3.0      Absolute Monocytes 1.2      Absolute Eosinophils 0.8 (*)     Absolute Basophils 0.1      Absolute Immature Granulocytes 0.1      Absolute NRBCs 0.0     CBC WITH PLATELETS - Abnormal    WBC Count 12.8 (*)     RBC Count 4.40      Hemoglobin 13.2 (*)     Hematocrit 37.8 (*)     MCV 86      MCH 30.0      MCHC 34.9      RDW 13.7      Platelet Count 231     HEPARIN UNFRACTIONATED ANTI XA LEVEL   LACTIC ACID WHOLE BLOOD       EKG      RADIOLOGY    CTA Abdomen Pelvis Runoff w Contrast   Final Result   IMPRESSION:   1.  Mild aneurysmal dilatation of the infrarenal abdominal aorta with large amount of irregular soft atherosclerotic plaque. There is also mild fusiform aneurysmal dilatation of the right common iliac artery.      2.  Abrupt cut off of the left posterior tibial artery at the level of the mid tibia. While there is distal reconstitution, a thromboembolic occlusion is not excludable.      3.  On the initial image set, there is no significant  arterial enhancement of the infrapopliteal arteries in the right leg. On delayed images, there is enhancement of the infrapopliteal vessels, though there is gradual absence of opacification of the    anterior tibial and peroneal arteries. Only the posterior tibial artery is enhancing at the level of the ankle mortise. No causative explanation for delayed enhancement is apparent. There is only mild atherosclerotic calcification, and no abrupt    truncation of vessels.      US Lower Extremity Venous Duplex Left   Final Result   IMPRESSION:   1.  No deep venous thrombosis in the left lower extremity.      US MAUNEL Doppler No Exercise 1-2 Levels Bilateral    (Results Pending)   US Lower Extremity Arterial Duplex Bilateral    (Results Pending)       DISCHARGE MEDS  New Prescriptions    No medications on file       Nell Patel M.D.  [facility] EMERGENCY ROOM  [address] [place] [id number] [phone]         Nell Patel MD  03/27/25 0043

## 2025-03-27 NOTE — H&P
[facility]    History and Physical - Hospitalist Service       Date of Admission:  3/27/2025    Assessment & Plan      Dudley Kiran is a 69 year old male with a medical history significant for coronary artery disease, hypertension, prediabetes, arthritis, erectile dysfunction and other medical comorbidities who was admitted with acute limb ischemia versus compartment syndrome.  Patient was seen status post vascular surgery intervention.    Patient Active Problem List   Diagnosis    Coronary arteriosclerosis due to lipid rich plaque    History of prediabetes    Elevated cholesterol    Essential hypertension, benign    Primary osteoarthritis of both hips    Class 1 obesity due to excess calories with serious comorbidity and body mass index (BMI) of 32.0 to 32.9 in adult    OA (osteoarthritis)    S/P CABG (coronary artery bypass graft)    Left leg pain        1. Left Leg Pain:    Likely due to an embolic event from the aorta.  Vascular surgery was consulted.  Patient underwent left lower extremity fasciotomy due to concern for compartment syndrome.  Pain control is being managed by vascular surgery.  Continue hepain drip   Appreciate vascular input.  Doppler check of pulses.   Given ancef in the OR    2. Arterial Thromboembolism:  Status post heparin initiation for anticoagulation.  Continue monitoring and management in collaboration with vascular surgery.    3. Peripheral Vascular Disease with Acute Limb Ischemia vs. Compartment Syndrome:  Status post revascularization attempt.  Appreciate management and follow-up from vascular surgery.  Per vascular surgery, normal Ankle-Brachial Index (MANUEL) post-procedure.  Cotninue ACE, ASA, BB. Consider statin    4. Hypertension:    Continue monitoring and controlling blood pressure.  Adjust antihypertensive medications as needed.    5. Hyperlipidemia:    Initiate statin therapy as previously advised.  Monitor lipid levels and liver function  "tests.  Consider statin when rhabdo stable    6. Prediabetes:    Monitor blood glucose with Accu-Cheks.  Educate patient on lifestyle modifications and dietary changes.    7. Coronary Artery Disease:    Status post CABG in 2010.  Continue current cardiac medications and follow-up with cardiology as needed.    8. Obesity:    Encourage lifestyle modifications.  Plan for outpatient follow-up for weight management.    9. Rhabdomyolysis:  Ensure adequate hydration.  Monitor renal function and urine output closely.  Edwards pending    10. Possible Benign Prostatic Hyperplasia (BPH) vrs urethral stenosis  Urology consulted; unable to pass a edwards for urine monitoring  AM team to follow up on urology recommendations and further management.    11. Other Medical Problems:  Continue current management for any other existing medical conditions.      Regular follow-ups with vascular surgery, cardiology, and primary care to ensure comprehensive management of the   Diet: NPO for Medical/Clinical Reasons Except for: Meds    DVT Prophylaxis: Pneumatic Compression Devices  Edwards Catheter: Not present  Lines: None     Cardiac Monitoring: ACTIVE order. Indication: Tachyarrhythmias, acute (48 hours)  Code Status: Full Code      Clinically Significant Risk Factors Present on Admission                 # Drug Induced Platelet Defect: home medication list includes an antiplatelet medication   # Hypertension: Noted on problem list           # Obesity: Estimated body mass index is 32.89 kg/m  as calculated from the following:    Height as of this encounter: 1.702 m (5' 7\").    Weight as of an earlier encounter on 3/27/25: 95.3 kg (210 lb).        # History of CABG: noted on surgical history       Disposition Plan     Medically Ready for Discharge: Anticipated in 2-4 Days           Jo Sharma MD  Hospitalist Service  [facility]  Securely message with [employer] (more info)  Text page via [facility] Paging/Directory "     ______________________________________________________________________    Chief Complaint   B/l leg pain        History of Present Illness     Dudley Kiran is a 69 year old male with a medical history significant for coronary artery disease, hypertension, prediabetes, arthritis, erectile dysfunction and other medical comorbidities who was admitted with acute limb ischemia versus compartment syndrome.  Patient was seen status post procedure in [place].     Dudley Kiran, a 69-year-old male with a medical history significant for hypertension, uncontrolled hyperlipidemia, coronary artery disease status post coronary artery bypass grafting (CABG) x3 in 2010, and a 3 cm infrarenal aortic aneurysm, presented to the emergency department due to worsening and severe pain in his left lower extremity. The pain began acutely on 3/24 while he was walking, causing bilateral lower extremity calf pain. The right leg pain resolved quickly, but the left calf pain intensified, accompanied by numbness in the left toe, which later resolved. Over the next 24 hours, the left leg pain initially improved but then worsened, limiting his ability to walk for 48 hours and escalating to a 10/10 severity.    The patient decided to come to the emergency department because the pain became intolerable. He was brought in by his son after he experienced severe bilateral leg cramping during a walk, which forced him to crawl into his son's truck. Upon arrival at the ER, a detailed evaluation was conducted, revealing a thrombus in the mid to distal posterior tibial artery and severe ischemic event in the left posterior compartment, leading to compartment syndrome. Laboratory results showed a creatine kinase (CK) level of approximately 13,000, indicative of muscle damage and rhabdomyolysis.    In the ER, the patient received heparin to address the thromboembolic event and was evaluated by the vascular surgery team. The decision was made to proceed  with a fasciotomy to relieve the compartment syndrome. He was admitted to the Cardiac Intensive Care Unit (ICU) for further management, including monitoring of compartment syndrome, rhabdomyolysis, and potential renal complications. The vascular surgery team will continue to manage his care, focusing on the treatment of the compartment syndrome and associated conditions.    Patient was seen after his left fasciotomy.  He reportedly had good pulses.  He has been admitted to the ICU for closer monitoring of his urine output and vascularization.    Patient rated his pain as 3 out of 10 when I saw him in PACU.  He was AOx3 with no distress    Past Medical History    Past Medical History:   Diagnosis Date    Arthritis     Coronary artery disease     HTN (hypertension)     Prediabetes        Past Surgical History   Past Surgical History:   Procedure Laterality Date    ARTHROPLASTY, HIP, TOTAL, DIRECT ANTERIOR APPROACH, USING HANA TABLE Left 10/27/2021    Procedure: LEFT TOTAL HIP ARTHROPLASTY DIRECT ANTERIOR APPROACH;  Surgeon: Bon Little MD;  Location: [facility] Main OR    BYPASS GRAFT ARTERY CORONARY  2010       Prior to Admission Medications   Prior to Admission Medications   Prescriptions Last Dose Informant Patient Reported? Taking?   acetaminophen (TYLENOL) 500 MG tablet   Yes No   Sig: Take 500-1,000 mg by mouth every 6 hours as needed for mild pain.   albuterol (PROAIR HFA/PROVENTIL HFA/VENTOLIN HFA) 108 (90 Base) MCG/ACT inhaler   No No   Sig: Inhale 2 puffs into the lungs every 6 hours as needed for shortness of breath, wheezing or cough   aspirin 81 MG EC tablet   Yes No   Sig: Take 81 mg by mouth daily.   fluticasone-salmeterol (AIRDUO RESPICLICK) 232-14 MCG/ACT inhaler   No No   Sig: Inhale 1 puff into the lungs 2 times daily   Patient taking differently: Inhale 1 puff into the lungs 2 times daily as needed.   ipratropium (ATROVENT) 0.06 % nasal spray   No No   Sig: Spray 2 sprays into both  nostrils 2 times daily   Patient taking differently: Spray 2 sprays into both nostrils 2 times daily as needed.   lisinopril (ZESTRIL) 5 MG tablet   No No   Sig: Take 1 tablet (5 mg) by mouth daily.   metoprolol succinate ER (TOPROL XL) 25 MG 24 hr tablet   No No   Sig: TAKE 0.5 TABLETS(12.5 MG) BY MOUTH DAILY   tadalafil (CIALIS) 10 MG tablet   No No   Sig: Take 1 tablet (10 mg) by mouth daily as needed (erectile difficulties).      Facility-Administered Medications: None        Review of Systems    The 10 point Review of Systems is negative other than noted in the HPI or here.     Social History   I have reviewed this patient's social history and updated it with pertinent information if needed.  Social History     Tobacco Use    Smoking status: Never    Smokeless tobacco: Never   Vaping Use    Vaping status: Never Used   Substance Use Topics    Alcohol use: No     Alcohol/week: 0.0 standard drinks of alcohol    Drug use: Never         Family History           Allergies   Allergies   Allergen Reactions    No Known Allergies      Potentially cats        Physical Exam   Vital Signs: Temp: 99.4  F (37.4  C) Temp src: Oral BP: 134/74 Pulse: 96   Resp: 16 SpO2: 92 % O2 Device: None (Room air)    Weight: 0 lbs 0 oz      General Aox3, appropriate affect, NAD, on RA  HEENT  MMM, EOMI, PERRL  Chest Adeq E b/l, No wheezing  Heart RRR, No M/R/G  Abd- Soft, NT, BS+  - Deferred,   Extremity- Moving all extremities, No digital clubbing,   No edema  Neuro- Aox3, C moving all extremities, gait not checked  Skin  Has no tattoo, No skin rash , LLE with pulses and no numbness or tingling    Medical Decision Making       85 MINUTES SPENT BY ME on the date of service doing chart review, history, exam, documentation & further activities per the note.      ------------------ MEDICAL DECISION MAKING ------------------------------------------------------------------------------------------------------  MANAGEMENT DISCUSSED with the  following over the past 24 hours: patient and care team       Data   ------------------------- PAST 24 HR DATA REVIEWED -----------------------------------------------    I have personally reviewed the following data over the past 24 hrs:    12.8 (H)  \   13.2 (L)   / 231     138 102 11.2 /  113 (H)   3.8 23 0.96 \     Procal: N/A CRP: N/A Lactic Acid: 1.5         Imaging results reviewed over the past 24 hrs:   Recent Results (from the past 24 hours)   US Lower Extremity Venous Duplex Left    Narrative    EXAM: US LOWER EXTREMITY VENOUS DUPLEX LEFT  LOCATION: [facility]  DATE: 3/26/2025    INDICATION: Leg pain and swelling, evaluate for DVT  COMPARISON: None.  TECHNIQUE: Venous Duplex ultrasound of the left lower extremity with and without compression, augmentation and duplex. Color flow and spectral Doppler with waveform analysis performed.    FINDINGS: Exam includes the common femoral, femoral, popliteal, and contralateral common femoral veins as well as segmentally visualized deep calf veins and greater saphenous vein.     LEFT: No deep vein thrombosis. No superficial thrombophlebitis. No popliteal cyst. Of note, there were no focal sonographic abnormalities in the region of patient's calf pain.      Impression    IMPRESSION:  1.  No deep venous thrombosis in the left lower extremity.   CTA Abdomen Pelvis Runoff w Contrast    Narrative    EXAM: CTA ABDOMEN PELVIS RUNOFF W CONTRAST  LOCATION: [facility]  DATE: 3/26/2025    INDICATION: Lower extremity cramping, wrist pain, swelling, evaluate for critical limb ischemia  COMPARISON: None.  TECHNIQUE: Helical acquisition through the abdomen, pelvis, and bilateral lower extremities was performed during the arterial phase of contrast enhancement using IV Contrast. 2D and 3D reconstructions were performed by the CT technologist. Dose reduction   techniques were used.   CONTRAST: Isovue 370 90mL    FINDINGS:  AORTA:  Mild fusiform aneurysmal dilatation of the infrarenal abdominal aorta which measures 3.1 x 3.1 cm in diameter on image 104 of series 6. There is a large amount of irregular soft and calcified atherosclerotic plaque in the infrarenal abdominal   aorta. The celiac, superior mesenteric, and bilateral renal arteries are patent without significant stenosis area. There is no enhancement of the origin of the inferior mesenteric artery though the THOMAS trunk is enhancing distal to this, possibly filling   via collaterals. 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 The iliac arteries are patent.    RIGHT LEG: Mild atherosclerotic change of the common femoral artery. The CFA, superficial femoral, and deep femoral arteries are widely patent. The popliteal artery is patent, though there is no significant infrapopliteal enhancement on the initial image   set. A subsequent image acquisition demonstrates patency of the anterior and posterior tibial arteries as well as the peroneal arteries, though only the posterior tibial artery is enhancing at the level of the ankle mortise.    LEFT LEG: The common femoral, superficial femoral, and deep femoral arteries are patent. The popliteal artery is patent. There is a normal popliteal trifurcation. There is abrupt cut off of the posterior tibial artery at the level of the mid tibia as   seen on image 72 of sequence 13. There is distal reconstitution, and a three-vessel runoff at the level of the ankle on both the initial and delayed images.    LUNG BASES: Coronary artery calcification. Bibasilar atelectasis or scarring.    ABDOMEN: Mild hepatic steatosis. No calcified gallstones. No biliary ductal dilatation. Mild pancreatic parenchymal atrophy. Normal spleen and adrenals. There are small, nonobstructing bilateral renal stones. Normal caliber small and large  bowel. Normal   appendix.    PELVIS: No acute findings.    MUSCULOSKELETAL: Left hip arthroplasty in place. Moderate to severe right hip arthritis.      Impression    IMPRESSION:  1.  Mild aneurysmal dilatation of the infrarenal abdominal aorta with large amount of irregular soft atherosclerotic plaque. There is also mild fusiform aneurysmal dilatation of the right common iliac artery.    2.  Abrupt cut off of the left posterior tibial artery at the level of the mid tibia. While there is distal reconstitution, a thromboembolic occlusion is not excludable.    3.  On the initial image set, there is no significant arterial enhancement of the infrapopliteal arteries in the right leg. On delayed images, there is enhancement of the infrapopliteal vessels, though there is gradual absence of opacification of the   anterior tibial and peroneal arteries. Only the posterior tibial artery is enhancing at the level of the ankle mortise. No causative explanation for delayed enhancement is apparent. There is only mild atherosclerotic calcification, and no abrupt   truncation of vessels.   US MANUEL Doppler No Exercise 1-2 Levels Bilateral    Narrative    EXAM: RESTING ANKLE-BRACHIAL INDICES (ABIs)  LOCATION: [facility]  DATE: 3/27/2025    INDICATION: Acute limb ischemia  COMPARISON: None.    MANUEL FINDINGS:  RIGHT  Brachial: 110  Ankle (PT): 164 Index: 1.24  Ankle (DP): 149 Index: 1.13  Digit: 128 Index: 0.97    LEFT  Brachial: 132  Ankle (PT): 81 Index: 0.61  Ankle (DP): 115 Index: 0.87  Digit: 48 Index: 0.36    The right MANUEL at rest is 1.13. The left MANUEL at rest is 0.61.          Impression    IMPRESSION:  1.  RIGHT LOWER EXTREMITY: MANUEL at rest is normal.  2.  LEFT LOWER EXTREMITY: Abnormal MANUEL at rest compatible with moderate arterial disease. Consultation with vascular surgery service recommended.   US Lower Extremity Arterial Duplex Bilateral    Narrative    EXAM: US LOWER EXTREMITY ARTERIAL DUPLEX  BILATERAL  LOCATION: [facility]  DATE: 3/27/2025    INDICATION: Concern for acute limb ischemia.  COMPARISON: None.  TECHNIQUE: Duplex utilizing 2D gray-scale imaging, Doppler interrogation with color-flow and spectral waveform analysis.    FINDINGS:    RIGHT LOWER EXTREMITY ARTERIAL ASSESSMENT:  Femoral popliteal arteries not evaluated at the request of ordering physician who was present during the examination.  Posterior tibial artery: 84/0 cm/s, triphasic  Anterior tibial artery: 26/5 cm/s, triphasic  Dorsalis pedis artery: 23/0 cm/s, biphasic    LEFT LOWER EXTREMITY ARTERIAL ASSESSMENT:  External iliac artery 136/3 cm/s, triphasic  Common femoral artery: 137/13 cm/s, triphasic  Profunda femoris artery: 126/11 cm/s, triphasic  SFA (proximal): 152/12 cm/s, triphasic  SFA (mid): 136/11 cm/s, triphasic  SFA (distal): 112/10 cm/s, triphasic  Popliteal artery: 107/12 cm/s, triphasic  Posterior tibial artery: Occluded in the lower calf with reconstitution at the ankle measuring 7/4 cm/s, monophasic with parvus tardus waveform due to collateral flow.  Anterior tibial artery: 82/14 cm/s, biphasic  Dorsalis pedis artery: 39/0 cm/s, biphasic        Impression    IMPRESSION:    1.  Occlusion of the left posterior tibial artery in the lower calf with distal reconstitution at the ankle. The reconstituted segment demonstrates decreased velocity with parvus tardus waveform due to collateral flow.    2.  Patent right lower extremity runoff vessels to the ankle.

## 2025-03-27 NOTE — ANESTHESIA POSTPROCEDURE EVALUATION
Patient: Dudley Kiran    Procedure: Procedure(s):  FASCIOTOMY, LOWER EXTREMITY       Anesthesia Type:  General    Note:  Disposition: Inpatient   Postop Pain Control: Uneventful            Sign Out: Well controlled pain   PONV: No   Neuro/Psych: Uneventful            Sign Out: Acceptable/Baseline neuro status   Airway/Respiratory: Uneventful            Sign Out: Acceptable/Baseline resp. status   CV/Hemodynamics: Uneventful            Sign Out: Acceptable CV status; No obvious hypovolemia; No obvious fluid overload   Other NRE: NONE   DID A NON-ROUTINE EVENT OCCUR? No           Last vitals:  Vitals Value Taken Time   /75 03/27/25 0615   Temp 36.6  C (97.8  F) 03/27/25 0600   Pulse 90 03/27/25 0624   Resp 19 03/27/25 0624   SpO2 93 % 03/27/25 0624   Vitals shown include unfiled device data.    Electronically Signed By: Jama Garcia MD  March 27, 2025  6:25 AM

## 2025-03-27 NOTE — OP NOTE
VASCULAR SURGERY OPERATIVE REPORT     LOCATION:    [facility]    Dudley Kiran  Medical Record #:  [id number]  YOB: 1955  Age:  69 year old       Date of Service: 3/27/2025     Preoperative diagnosis    Compartment syndrome left leg  Thromboembolism to left lower extremity  CAD s/p CABG in 2010  HTN  HLD    Postoperative diagnosis    same    Surgeon: Wanda Coelho DO RPVI         Procedures:  Fasciotomy left lower extremity    Findings:   Bulging muscles in posterior superficial, deep, and anterior compartments. Normal muscles in lateral compartment. Punctate areas of ischemia noted in anterior tibials muscle. All identified muscles appeared viable and with strong reactivity. Palpable popliteal pulse below knee, palpable anterior tibial artery mid calf, biphasic PT signals mid calf and at ankle, strong monophasic DP at conclusion of case.    Indication for procedure:    Mr. Kiran is a 69 year old male who presented to with left leg pain.  History and workup were suspicious for acute ischemic event to bilateral lower extremity earlier this week with symptoms in the left leg significantly worse than right with initial improvement in pain in left lower extremity with development of progressive and motion of left foot and CK greater than 13,000 all suspicious for development of compartment syndrome in left lower extremity.  There was also evidence of thrombus in distal left posterior tibial artery with reconstitution in foot with improvement of signals throughout the night after initiation of heparin, sensory intact and motor significantly limited on exam due to calf pain with any movement of left foot.  Decision was made to proceed with fasciotomy of left lower extremity with the understanding that he may require further embolectomy if symptoms worsen or fail to improve.  Risks, benefits, alternatives and indications including but not limited to death, anesthetic  or cardiopulmonary complications, bleeding, infection, lymphatic leak, acute renal insufficiency requiring temporary or permanent dialysis, and failure to heal as well as worsening ischemia, need for future operation, recurrent thromboembolic disease, and significant ongoing risk of limb loss.  We discussed operative conduct including team approach, potential need for blood transfusion.  The patient and his son understand the risks and would like to proceed with fasciotomy left leg.           Description of procedure:    Operative details:    The patient was brought to the operating room.  Under satisfactory general anesthesia, he was placed in supine position with all pressure points padded in standard fashion.  The left lower extremity was widely prepped circumferentially and draped in sterile, standard fashion.  A surgical pause was performed with all members of the surgical team to verify patient, medical record number, birth date, planned surgical procedure, surgical site, allergies, fire risk and perioperative antibiotics.    A medial longitudinal incision was made in the standard fashion two fingerbreadth's below the inferior margin of the tibia. Multiple small venous branches were encountered with evidence of venous hypertension and these veins were ligated with combination of silk ties and vascular clips .  Fascia was identified and incised to the posterior superficial compartment. There was immediate bulging noted of the gastrocnemius muscle which did appear viable and reactive. The soleus was identified and also appeared viable. The proximal soleus was released from the tibia and posterior deep fascia was identified and incised. There was slight bulging of muscle in the deep compartment. There were strong signals noted in both posterior tibial artery and peroneal artery at this location.  Medial fascia was released from the level of the knee down to just above the ankle with Metzenbaum scissors and  electrocautery. Hemostasis was achieved.  We then turned our attention to the lateral compartment. There was significant bulging in the anterior compartment with firmness noted on palpation. Laterally a longitudinal incision was made two fingerbreadths lateral to the tibia and distal to the fibular head.  This fascia was incised over the anterior compartment and it was released from below the fibula head plateau to above the malleolus with Metzenbaum scissors. There was significant bulging of tibialis anterior muscle after fascial release but overall compartment appeared viable and reactive. A small flap was made posteriorly over the fascia and the lateral compartment fascia was incised and opened in a similar fashion. There was minmal bulging of lateral compartment and muscles appeared viable. Hemostasis was achieved in all compartments. Incisions were irrigated with normal saline. Wounds were dressed with surgicel, xeroform, gauze, and abd padding and left leg was wrapped with kerlix fluffs, kerlix wrap, and ace wrap.      The patient had strong triphasic signals in right foot and strong biphasic PT and DP in left foot at ankle which was an improvement from pre op examination. Pt was extubated and transferred to PACU in stable condition. Operative counts were correct x2.    Estimated blood loss: 50 ml     Specimens: none     Complications: none    Plan:  -cont q1h vascular checks and motor sensory exams to BLE  -vascular team to provide dressing change on 3/28 with possible wound vac placement at bedside  -please contact vascular team STAT for any change in exam  -unable to place edwards in OR due to anatomy- urology consult for edwards placement for strict Is and Os and close surveillance of urine output with rhabdomyolysis  -cont NS at 200cc, monitor UOP and follow up myoglobin and trend CK. Can decrease fluid if these are all improving.  -cont heparin infusion  -will need CTA chest tomorrow  -echo with bubble  study  -activity and diet as tolerated  -appreciate medical team assistance with care           Wanda Coelho DO VI  VASCULAR AND ENDOVASCULAR SURGERY   [facility]

## 2025-03-27 NOTE — PROGRESS NOTES
Patient admitted early this morning by eloisaist.  See notes for further details.  Briefly patient was admitted for acute left leg pain and found to have acute limb ischemia.  He underwent emergent 4 compartment fasciotomy with vascular and is being monitored in the ICU postoperatively.  Seen and examined.  He feels markedly improved since surgery.  Minimal pain at this point.  No numbness or tingling.  Continue cares per vascular surgery for postop monitoring.

## 2025-03-27 NOTE — ANESTHESIA PREPROCEDURE EVALUATION
Anesthesia Pre-Procedure Evaluation    Patient: Dudley Kiran   MRN: [id number] : 1955        Procedure : Procedure(s):  FASCIOTOMY, LOWER EXTREMITY  THROMBECTOMY, LOWER EXTREMITY          Past Medical History:   Diagnosis Date    Arthritis     Coronary artery disease     HTN (hypertension)     Hypercholesteremia     Obesity     Prediabetes     Primary osteoarthritis of both hips       Past Surgical History:   Procedure Laterality Date    ARTHROPLASTY, HIP, TOTAL, DIRECT ANTERIOR APPROACH, USING HANA TABLE Left 10/27/2021    Procedure: LEFT TOTAL HIP ARTHROPLASTY DIRECT ANTERIOR APPROACH;  Surgeon: Bon Little MD;  Location: [facility] Main OR    BYPASS GRAFT ARTERY CORONARY  2010      Allergies   Allergen Reactions    No Known Allergies      Potentially cats      Social History     Tobacco Use    Smoking status: Never    Smokeless tobacco: Never   Substance Use Topics    Alcohol use: No     Alcohol/week: 0.0 standard drinks of alcohol      Wt Readings from Last 1 Encounters:   25 95.3 kg (210 lb)        Anesthesia Evaluation   Pt has had prior anesthetic.     No history of anesthetic complications       ROS/MED HX  ENT/Pulmonary:     (+)                          COPD,              Neurologic:  - neg neurologic ROS     Cardiovascular: Comment: EKG today    Sinus rhythm  Incomplete right bundle branch block  Nonspecific ST abnormality        (+) Dyslipidemia hypertension- -  CAD -  CABG-date: four-vessel CABG in . -                                      METS/Exercise Tolerance:     Hematologic:     (+) History of blood clots,               Musculoskeletal: Comment: Rhabdomyolysis      GI/Hepatic:  - neg GI/hepatic ROS     Renal/Genitourinary:  - neg Renal ROS     Endo:  - neg endo ROS   (+)               Obesity,       Psychiatric/Substance Use:  - neg psychiatric ROS     Infectious Disease:       Malignancy:  - neg malignancy ROS     Other:  - neg other ROS          Physical  "Exam    Airway        Mallampati: III   TM distance: > 3 FB   Neck ROM: full   Mouth opening: > 3 cm    Respiratory Devices and Support         Dental       (+) Modest Abnormalities - crowns, retainers, 1 or 2 missing teeth      Cardiovascular   cardiovascular exam normal          Pulmonary   pulmonary exam normal            Other findings: 18 g IV in R AC  20 g IV in L AC    OUTSIDE LABS:  CBC:   Lab Results   Component Value Date    WBC 12.8 (H) 03/27/2025    WBC 14.4 (H) 03/26/2025    HGB 13.2 (L) 03/27/2025    HGB 15.9 03/26/2025    HCT 37.8 (L) 03/27/2025    HCT 45.0 03/26/2025     03/27/2025     03/26/2025     BMP:   Lab Results   Component Value Date     03/26/2025     03/19/2025    POTASSIUM 3.8 03/26/2025    POTASSIUM 4.6 03/19/2025    CHLORIDE 102 03/26/2025    CHLORIDE 108 (H) 03/19/2025    CO2 23 03/26/2025    CO2 21 (L) 03/19/2025    BUN 11.2 03/26/2025    BUN 12.4 03/19/2025    CR 0.96 03/26/2025    CR 1.02 03/19/2025     (H) 03/26/2025     (H) 03/19/2025     COAGS: No results found for: \"PTT\", \"INR\", \"FIBR\"  POC: No results found for: \"BGM\", \"HCG\", \"HCGS\"  HEPATIC:   Lab Results   Component Value Date    ALBUMIN 4.2 03/19/2025    PROTTOTAL 7.8 03/19/2025    ALT 27 03/19/2025    AST 31 03/19/2025    ALKPHOS 92 03/19/2025    BILITOTAL 0.4 03/19/2025    BILIDIRECT 0.2 07/11/2011     OTHER:   Lab Results   Component Value Date    LACT 1.5 03/27/2025    A1C 5.9 (H) 03/19/2025    MELONY 9.4 03/26/2025    SED 12 03/08/2019       Anesthesia Plan    ASA Status:  3, emergent    NPO Status:  NPO Appropriate    Anesthesia Type: General.     - Airway: ETT   Induction: Propofol, Intravenous.   Maintenance: Balanced.   Techniques and Equipment:     - Lines/Monitors: 2nd IV, Arterial Line     Consents    Anesthesia Plan(s) and associated risks, benefits, and realistic alternatives discussed. Questions answered and patient/representative(s) expressed understanding.     - Discussed: " "Risks, Benefits and Alternatives for BOTH SEDATION and the PROCEDURE were discussed     - Discussed with:  Patient       - Patient is DNR/DNI Status: No          Postoperative Care    Pain management: IV analgesics, Oral pain medications.   PONV prophylaxis: Ondansetron (or other 5HT-3)     Comments:               Jama Garcia MD    Clinically Significant Risk Factors Present on Admission                 # Drug Induced Platelet Defect: home medication list includes an antiplatelet medication   # Hypertension: Noted on problem list           # Obesity: Estimated body mass index is 32.89 kg/m  as calculated from the following:    Height as of this encounter: 1.702 m (5' 7\").    Weight as of an earlier encounter on 3/27/25: 95.3 kg (210 lb).        # History of CABG: noted on surgical history         "

## 2025-03-27 NOTE — CONSULTS
VASCULAR SURGERY CLINIC CONSULTATION    VASCULAR SURGEON: Wanda Coelho DO RPVI     LOCATION:  [facility]    Dudley Kiran   Medical Record #:  [id number]  YOB: 1955  Age:  69 year old     Date of Service: 3/27/2025    PRIMARY CARE PROVIDER: Tima Davis      Reason for consult: Left leg pain with concern for acute limb ischemia versus compartment syndrome    IMPRESSION: 69-year-old male with history of hypertension, very uncontrolled hyperlipidemia, CAD status post CABG x 3 in 2010, 3 cm infrarenal aortic aneurysm who presented with worsening and severe pain of left lower extremity with workup concerning for thromboembolic disease of left possible right lower extremity but with severe ischemic event to left posterior compartment followed by reperfusion and now development of compartment syndrome.  Physical exam including firm posterior compartment, severe pain on palpation of left posterior compartment, and severe pain on passive movement of left foot are all very suspicious for left lower extremity compartment syndrome in this clinical setting.  Additionally labs were significant for CK of 13,000 signifying muscle damage and imaging did reveal thrombus in mid to distal posterior tibial artery and left leg.  No previous history of claudication, rest pain, or wounds.  Denies any numbness or weakness at this time.  No clear signs of acute limb ischemia on this exam although difficult to ascertain in setting of severe pain in left calf limiting mobility.    RECOMMENDATION/RISKS: Overall clinical picture concerning for acute thromboembolic event which likely occurred on 3/24 followed by initial improvement of symptoms signifying likely distal displacement of embolism and allowing for reperfusion of left posterior compartment and subsequent development of compartment syndrome.  It is difficult to explain the pain in his right lower extremity as he has multiphasic flow throughout as  well as normal MANUEL, normal toe pressure, and palpable pulse and triphasic signal in right foot.  At this time he does not have notable evidence of acute limb ischemia as he denies any numbness or weakness in left foot but is not fully able to participate in exam due to severe pain on any movement of left lower extremity felt in left calf.  He does have signals in left foot with biphasic signal in dorsalis pedis and reconstitution of distal posterior tibial artery, no discoloration or mottling noted either.    Although he continues to have evidence of thrombus in distal left posterior tibial artery it does not seem that this is causing significant symptoms at this time as his initial symptoms had nearly completely resolved prior to developing symptoms related to suspected compartment syndrome.  It seems the most pressing factor is the likely injured muscle and left posterior compartment in the setting of elevated CK, severe pain on palpation, and severe pain with any passive movement of the left foot.  The left posterior compartment is also more firm on palpation when compared to the right.  The anterior and lateral compartment also do feel firm however he only notes very mild tenderness on palpation in these regions.    Long discussion held with patient and his son at bedside.  At this time we will plan for left lower extremity fasciotomy.  Will plan to reevaluate distal signals after fasciotomy has been performed and if it seems there is adequate perfusion into foot we will plan to continue to monitor signs of ischemia from this focal thrombus in his left posterior tibial artery.  If this does become clinically significant we discussed need to return to the operating room for thrombectomy and patient did voiced understanding to this and is agreeable with plan.  He will also need complete thromboembolic workup including CTA chest and echocardiogram with bubble study with possible DOMINGA.  We will also initiate  hypercoagulable workup and continue heparin infusion with plan transition to oral anticoagulation prior to discharge.      We discussed the risk, benefits, alternatives of the procedure including but not limited to death, anesthetic or cardiopulmonary complications, bleeding, infection, lymphatic leak, dissection, embolization, vessel perforation or other injury, worsening ischemia with resulting limb loss, compartment syndrome, for additional fasciotomy, and acute renal insufficiency due to contrast exposure requiring temporary or permanent dialysis.  I did stress the high risk of limb loss in the setting of any acute limb ischemia which is exacerbated by the compartment syndrome component however this will largely depend on his postop recovery.  The patient and his son understand the risks and would like to proceed with left leg fasciotomy possible thrombectomy.       HPI:  Dudley Kiran is a 69 year old male who was seen today in consultation for left leg pain. Patient reported acute onset bilateral lower extremity calf pain occurring while walking on 3/24.  The pain in right lower extremity improved quickly however pain in left calf became more severe and was associated with numbness in left toe.  Over the following 24 hours the numbness resolved and the pain in his left leg actually improved.  He then developed slow onset and worsening pain in left calf muscle that limited his ambulation to the point that he has been unable to walk for the past 48 hours.  He reports very severe pain in left calf and over the past 24 hours experience severe pain with any movement of left foot.  He denies any numbness or weakness in foot however difficult to  on exam due to severe pain.  He initially plan to present to the emergency department in a.m. however pain became intolerable prompting his presentation last evening. Workup was also significant for focal thrombus in mid left posterior tibial artery with distal  reconstitution and delayed flow in infrapopliteal vessels in right lower extremity with soft mural thrombus noted and infrarenal abdominal aortic aneurysm which measured 3.0 cm.  No CTA chest or echocardiogram has been performed.  No history of thromboembolic disease or arrhythmia, compliant with ASA 1 mg daily and unable to tolerate statin therapy.  Non-smoker.    Of note patient has had 2 other episodes of severe lower extremity pain in the past month that have occurred at very short distances.  He has never experienced prior to this past month and does have a history of walking multiple miles a day.  He has currently able to complete his daily tasks including walking around the grocery store without any pain in bilateral lower extremities.  Previous episodes pain resolved spontaneously in a very short timeframe of less than 1 hour.    He did undergo an exercise stress test in 2023 as part of his cardiac surveillance and this was negative for inducible ischemia with a normal EF.  He also was recently evaluated by his primary care last week and is advised to initiate statin therapy but otherwise did not have any concerning findings to report.        REVIEW OF SYSTEMS:    A 12 point ROS was reviewed and is negative except for noted in HPI.  Most notably denies any chest pain, trouble breathing, weakness, numbness, dark stool, blood in stool, hematuria, or abdominal pain.    PHH:    Past Medical History:   Diagnosis Date    Arthritis     Coronary artery disease     HTN (hypertension)     Hypercholesteremia     Obesity     Prediabetes     Primary osteoarthritis of both hips          Past Surgical History:   Procedure Laterality Date    ARTHROPLASTY, HIP, TOTAL, DIRECT ANTERIOR APPROACH, USING HANA TABLE Left 10/27/2021    Procedure: LEFT TOTAL HIP ARTHROPLASTY DIRECT ANTERIOR APPROACH;  Surgeon: Bon Little MD;  Location: [facility] Main OR    BYPASS GRAFT ARTERY CORONARY  2010        ALLERGIES:      Allergies   Allergen Reactions    No Known Allergies      Potentially cats        MEDS:    Current Facility-Administered Medications   Medication Dose Route Frequency Provider Last Rate Last Admin    calcium carbonate (TUMS) chewable tablet 1,000 mg  1,000 mg Oral 4x Daily PRN Jo Sharma MD        docusate sodium (COLACE) capsule 100 mg  100 mg Oral BID Jo Sharma MD        heparin 25,000 units in 0.45% NaCl 250 mL ANTICOAGULANT infusion  0-5,000 Units/hr Intravenous Continuous Wanda Coelho DO        HYDROmorphone (DILAUDID) injection 0.2 mg  0.2 mg Intravenous Q2H PRN Jo Sharma MD        HYDROmorphone (DILAUDID) injection 0.4 mg  0.4 mg Intravenous Q2H PRN Jo Sharma MD        lidocaine (LMX4) cream   Topical Q1H PRN Jo Sharma MD        lidocaine 1 % 0.1-1 mL  0.1-1 mL Other Q1H PRJo Swan MD        naloxone (NARCAN) injection 0.2 mg  0.2 mg Intravenous Q2 Min PRN Jo Sharma MD        Or    naloxone (NARCAN) injection 0.4 mg  0.4 mg Intravenous Q2 Min PRN Jo Sharma MD        Or    naloxone (NARCAN) injection 0.2 mg  0.2 mg Intramuscular Q2 Min PRN Jo Sharma MD        Or    naloxone (NARCAN) injection 0.4 mg  0.4 mg Intramuscular Q2 Min PRJo Swan MD        oxyCODONE (ROXICODONE) tablet 5 mg  5 mg Oral Q4H PRN Jo Sharma MD        oxyCODONE IR (ROXICODONE) half-tab 2.5 mg  2.5 mg Oral Q4H PRN Jo Sharma MD        sodium chloride (PF) 0.9% PF flush 3 mL  3 mL Intracatheter Q8H CATIA Jo Sharma MD        sodium chloride (PF) 0.9% PF flush 3 mL  3 mL Intracatheter q1 min prn Jo Sharma MD        sodium chloride 0.9 % infusion   Intravenous Continuous Wanda Coelho DO            SOCIAL HABITS:    Tobacco Use      Smoking status: Never      Smokeless tobacco: Never       Alcohol Use: Not on file       History   Drug Use Unknown        FAMILY HISTORY:    Family History   Problem Relation Age of Onset    Diabetes No family hx of   "   Diabetes No family hx of     Breast Cancer No family hx of     Colon Cancer No family hx of     Prostate Cancer No family hx of     Hypertension No family hx of        PE:  /74   Pulse 96   Temp 99.4  F (37.4  C) (Oral)   Resp 16   Ht 1.702 m (5' 7\")   SpO2 92%   BMI 32.89 kg/m    Wt Readings from Last 1 Encounters:   03/27/25 95.3 kg (210 lb)     Body mass index is 32.89 kg/m .    EXAM:  GENERAL: This is a well-developed 69 year old male who appears his stated age  EYES: Grossly normal.  CARDIAC:  RRR  CHEST/LUNG:  normal work of breathing on room air  GASTROINTESINAL (ABDOMEN):soft, non tender, non distended, no pulsatile abdominal mass  MUSCULOSKELETAL: Grossly normal and both lower extremities are intact.  Left lower extremity below-knee region severely tender to palpation and firm along calf muscle with severe pain on passive motion of left foot both plantar and dorsiflexion.  Sensation intact throughout bilateral lower extremities, able to wiggle toes equally in bilateral feet.  No skin changes noted.  HEME/LYMPH: No lymphedema  NEUROLOGIC: Focally intact, Alert and oriented x 3.   PSYCH: appropriate affect  INTEGUMENT: No open lesions or ulcers  Pulse Exam: Right leg with 2+ posterior tibial artery, very strong triphasic dorsalis pedis and posterior tibial signal.  2+ femoral pulse.  Left lower extremity 2+ femoral pulse, strong biphasic dorsalis pedis artery, weakly biphasic distal posterior tibial artery secondary to reconstitution which has improved since my initial evaluation earlier in the evening.            DIAGNOSTIC STUDIES:     Images:  US Lower Extremity Arterial Duplex Bilateral    Result Date: 3/27/2025  EXAM: US LOWER EXTREMITY ARTERIAL DUPLEX BILATERAL LOCATION: [facility] DATE: 3/27/2025 INDICATION: Concern for acute limb ischemia. COMPARISON: None. TECHNIQUE: Duplex utilizing 2D gray-scale imaging, Doppler interrogation with color-flow and spectral " waveform analysis. FINDINGS: RIGHT LOWER EXTREMITY ARTERIAL ASSESSMENT: Femoral popliteal arteries not evaluated at the request of ordering physician who was present during the examination. Posterior tibial artery: 84/0 cm/s, triphasic Anterior tibial artery: 26/5 cm/s, triphasic Dorsalis pedis artery: 23/0 cm/s, biphasic LEFT LOWER EXTREMITY ARTERIAL ASSESSMENT: External iliac artery 136/3 cm/s, triphasic Common femoral artery: 137/13 cm/s, triphasic Profunda femoris artery: 126/11 cm/s, triphasic SFA (proximal): 152/12 cm/s, triphasic SFA (mid): 136/11 cm/s, triphasic SFA (distal): 112/10 cm/s, triphasic Popliteal artery: 107/12 cm/s, triphasic Posterior tibial artery: Occluded in the lower calf with reconstitution at the ankle measuring 7/4 cm/s, monophasic with parvus tardus waveform due to collateral flow. Anterior tibial artery: 82/14 cm/s, biphasic Dorsalis pedis artery: 39/0 cm/s, biphasic     IMPRESSION: 1.  Occlusion of the left posterior tibial artery in the lower calf with distal reconstitution at the ankle. The reconstituted segment demonstrates decreased velocity with parvus tardus waveform due to collateral flow. 2.  Patent right lower extremity runoff vessels to the ankle.    US MANUEL Doppler No Exercise 1-2 Levels Bilateral    Result Date: 3/27/2025  EXAM: RESTING ANKLE-BRACHIAL INDICES (ABIs) LOCATION: [facility] DATE: 3/27/2025 INDICATION: Acute limb ischemia COMPARISON: None. MANUEL FINDINGS: RIGHT Brachial: 110 Ankle (PT): 164 Index: 1.24 Ankle (DP): 149 Index: 1.13 Digit: 128 Index: 0.97 LEFT Brachial: 132 Ankle (PT): 81 Index: 0.61 Ankle (DP): 115 Index: 0.87 Digit: 48 Index: 0.36 The right MANUEL at rest is 1.13. The left MANUEL at rest is 0.61.      IMPRESSION: 1.  RIGHT LOWER EXTREMITY: MANUEL at rest is normal. 2.  LEFT LOWER EXTREMITY: Abnormal MANUEL at rest compatible with moderate arterial disease. Consultation with vascular surgery service recommended.    CTA Abdomen Pelvis Runoff  w Contrast    Result Date: 3/26/2025  EXAM: CTA ABDOMEN PELVIS RUNOFF W CONTRAST LOCATION: [facility] DATE: 3/26/2025 INDICATION: Lower extremity cramping, wrist pain, swelling, evaluate for critical limb ischemia COMPARISON: None. TECHNIQUE: Helical acquisition through the abdomen, pelvis, and bilateral lower extremities was performed during the arterial phase of contrast enhancement using IV Contrast. 2D and 3D reconstructions were performed by the CT technologist. Dose reduction  techniques were used. CONTRAST: Isovue 370 90mL FINDINGS: AORTA: Mild fusiform aneurysmal dilatation of the infrarenal abdominal aorta which measures 3.1 x 3.1 cm in diameter on image 104 of series 6. There is a large amount of irregular soft and calcified atherosclerotic plaque in the infrarenal abdominal aorta. The celiac, superior mesenteric, and bilateral renal arteries are patent without significant stenosis area. There is no enhancement of the origin of the inferior mesenteric artery though the THOMAS trunk is enhancing distal to this, possibly filling via collaterals. 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 The iliac arteries are patent. RIGHT LEG: Mild atherosclerotic change of the common femoral artery. The CFA, superficial femoral, and deep femoral arteries are widely patent. The popliteal artery is patent, though there is no significant infrapopliteal enhancement on the initial image  set. A subsequent image acquisition demonstrates patency of the anterior and posterior tibial arteries as well as the peroneal arteries, though only the posterior tibial artery is enhancing at the level of the ankle mortise. LEFT LEG: The common femoral, superficial femoral, and deep femoral arteries are patent. The popliteal artery is patent. There is a normal popliteal trifurcation. There  is abrupt cut off of the posterior tibial artery at the level of the mid tibia as seen on image 72 of sequence 13. There is distal reconstitution, and a three-vessel runoff at the level of the ankle on both the initial and delayed images. LUNG BASES: Coronary artery calcification. Bibasilar atelectasis or scarring. ABDOMEN: Mild hepatic steatosis. No calcified gallstones. No biliary ductal dilatation. Mild pancreatic parenchymal atrophy. Normal spleen and adrenals. There are small, nonobstructing bilateral renal stones. Normal caliber small and large bowel. Normal appendix. PELVIS: No acute findings. MUSCULOSKELETAL: Left hip arthroplasty in place. Moderate to severe right hip arthritis.     IMPRESSION: 1.  Mild aneurysmal dilatation of the infrarenal abdominal aorta with large amount of irregular soft atherosclerotic plaque. There is also mild fusiform aneurysmal dilatation of the right common iliac artery. 2.  Abrupt cut off of the left posterior tibial artery at the level of the mid tibia. While there is distal reconstitution, a thromboembolic occlusion is not excludable. 3.  On the initial image set, there is no significant arterial enhancement of the infrapopliteal arteries in the right leg. On delayed images, there is enhancement of the infrapopliteal vessels, though there is gradual absence of opacification of the anterior tibial and peroneal arteries. Only the posterior tibial artery is enhancing at the level of the ankle mortise. No causative explanation for delayed enhancement is apparent. There is only mild atherosclerotic calcification, and no abrupt truncation of vessels.    US Lower Extremity Venous Duplex Left    Result Date: 3/26/2025  EXAM: US LOWER EXTREMITY VENOUS DUPLEX LEFT LOCATION: [facility] DATE: 3/26/2025 INDICATION: Leg pain and swelling, evaluate for DVT COMPARISON: None. TECHNIQUE: Venous Duplex ultrasound of the left lower extremity with and without  compression, augmentation and duplex. Color flow and spectral Doppler with waveform analysis performed. FINDINGS: Exam includes the common femoral, femoral, popliteal, and contralateral common femoral veins as well as segmentally visualized deep calf veins and greater saphenous vein. LEFT: No deep vein thrombosis. No superficial thrombophlebitis. No popliteal cyst. Of note, there were no focal sonographic abnormalities in the region of patient's calf pain.     IMPRESSION: 1.  No deep venous thrombosis in the left lower extremity.      I personally reviewed the images and my interpretation is focal occlusion in distal left posterior tibial artery which may represent thromboembolic disease with unclear source at this time although with suspicious mural thrombus that may be high risk of distal embolization in distal abdominal aorta.  No other area of aneurysm noted, no evidence of hemodynamically significant stenosis in any other region of right or left lower extremity.  Normal MANUEL and toe pressure on right, slightly reduced MANUEL on left of 0.8 with toe pressure of 48.    LABS:      Sodium   Date Value Ref Range Status   03/26/2025 138 135 - 145 mmol/L Final   03/19/2025 143 135 - 145 mmol/L Final   09/13/2024 141 135 - 145 mmol/L Final   02/15/2019 145.0 (H) 133.0 - 144.0 mmol/L Final   02/01/2019 143.0 133.0 - 144.0 mmol/L Final   05/06/2016 139.0 133.0 - 144.0 mmol/L Final     Urea Nitrogen   Date Value Ref Range Status   03/26/2025 11.2 8.0 - 23.0 mg/dL Final   03/19/2025 12.4 8.0 - 23.0 mg/dL Final   09/13/2024 12.8 8.0 - 23.0 mg/dL Final   02/11/2022 9 8 - 22 mg/dL Final   10/14/2021 11 8 - 22 mg/dL Final   12/18/2019 14 8 - 22 mg/dL Final   02/15/2019 11.0 7.0 - 30.0 mg/dL Final   02/01/2019 14.0 7.0 - 30.0 mg/dL Final   05/06/2016 13.0 7.0 - 30.0 mg/dL Final     Hemoglobin   Date Value Ref Range Status   03/27/2025 13.2 (L) 13.3 - 17.7 g/dL Final   03/26/2025 15.9 13.3 - 17.7 g/dL Final   03/19/2025 14.5 13.3 -  17.7 g/dL Final     Platelet Count   Date Value Ref Range Status   03/27/2025 231 150 - 450 10e3/uL Final   03/26/2025 292 150 - 450 10e3/uL Final   03/19/2025 286 150 - 450 10e3/uL Final       120 minutes spent on the day of encounter doing chart review, history and exam, documentation, and further activities as noted.     Wanda Coelho DO, RPVI  VASCULAR SURGERY

## 2025-03-27 NOTE — BRIEF OP NOTE
[facility]    Brief Operative Note    Pre-operative diagnosis: Nontraumatic compartment syndrome of left lower extremity [M79.A22]  Post-operative diagnosis Same as pre-operative diagnosis    Procedure: FASCIOTOMY, LOWER EXTREMITY, Left - Leg    Surgeon: Surgeons and Role:     * Wanda Coelho, DO - Primary  Anesthesia: General   Estimated Blood Loss: 50cc    Drains: None  Specimens: * No specimens in log *  Findings:   Bulging muscle in posterior superior, posterior deep, and anterior compartments with areas of punctate ischemia noted in tibials anterior muscle but overall muscle appeared grossly viable in left leg. Strong biphasic PT at ankle and DP on foot at conclusion of case .  Complications: None.  Implants: * No implants in log *

## 2025-03-27 NOTE — PROGRESS NOTES
"VASCULAR SURGERY PROGRESS NOTE    Subjective:  Patient was seen and evaluated at the bedside for surgical follow up. Feels very tired this morning and left leg is sore. On heparin gtt. No other concerns.     Objective:  Intake/Output Summary (Last 24 hours) at 3/27/2025 0909  Last data filed at 3/27/2025 0800  Gross per 24 hour   Intake 1195 ml   Output 550 ml   Net 645 ml     PHYSICAL EXAM:  BP (!) 140/81   Pulse 89   Temp 97.8  F (36.6  C) (Temporal)   Resp 13   Ht 1.702 m (5' 7\")   SpO2 97%   BMI 32.89 kg/m    General: The patient is alert and oriented. Appropriate. No acute distress  Psych: Pleasant affect, answers questions appropriately  Skin: Color appropriate for race, warm, dry  Respiratory: Normal respiratory effort   Extremities: Clean and dry dressing intact to LLE, able to wiggle toes with intact sensation. Strong biphasic DP signal with doppler      Imaging:   Pertinent imaging reviewed    ASSESSMENT:  69 year old male who presented to with left leg pain.  History and workup were suspicious for acute ischemic event to bilateral lower extremity earlier this week with symptoms in the left leg significantly worse than right with initial improvement in pain in left lower extremity with development of progressive and motion of left foot and CK greater than 13,000 all suspicious for development of compartment syndrome in left lower extremity.    S/P left lower extremity fasciotomy     PLAN:  -cont q1h vascular checks and motor sensory exams to BLE  -vascular team to provide dressing change on 3/28 with possible wound vac placement at bedside  -please contact vascular team STAT for any change in exam  -edwards placed, strict Is and Os and close surveillance of urine output with rhabdomyolysis   -cont NS at 200cc, monitor UOP and follow up myoglobin and trend CK. Can decrease fluid if these are all improving.  -cont heparin infusion  -will need CTA chest tomorrow  -echo with bubble study  -activity and " diet as tolerated  -appreciate medical team assistance with care     Discussed pt history, exam, assessment and plan with Dr. Coelho of the vascular surgery service, who is in agreement with the above.    HIMANSHU Healy-C  VASCULAR SURGERY

## 2025-03-27 NOTE — ED PROVIDER NOTES
EMERGENCY DEPARTMENT ENCOUNTER      NAME: Dudley Kiran  AGE: 69 year old male  YOB: 1955  MRN: [id number]  EVALUATION DATE & TIME: No admission date for patient encounter.    PCP: Tima Davis    ED PROVIDER: Vic Manzano MD    FINAL IMPRESSION:  1. Traumatic rhabdomyolysis, initial encounter    2. Pain of left lower leg    3. Limb ischemia    4. Traumatic compartment syndrome of left lower extremity, initial encounter        ED COURSE & MEDICAL DECISION MAKING:    Pertinent Labs & Imaging studies reviewed. (See chart for details)  69 year old male presents to the Emergency Department for evaluation of leg pain.  Differential diagnosis considered .  DVT, cellulitis, necrotizing fasciitis, rhabdomyolysis, compartment syndrome, critical limb ischemia.     Triage Note: PT states he went for a walk on Monday and developed bialteral leg cramping causing him to lay on the ground and crawl into his sons truck as he could not stand on his legs due to cramping/pain.     PT states since then his left calf has become 10/10 pain and he is not able to tolerate weight on it.             ED Course as of 03/27/25 1236   Wed Mar 26, 2025   2100 Patient history of coronary artery bypass graft, prediabetes, hypertension, hyperlipidemia presenting with left leg pain.  He states he went for a walk on Monday developed bilateral leg cramping has not crawled in the ground.  Since this time he has been having worsening leg pain.  Left leg is more swollen tender to the touch however intact sensation distally.  Can move his ankle up and down.  Foot is warm and well-perfused though has difficulty finding dorsalis pedis pulse.  3-second capillary refill.  Concern for DVT or critical limb ischemia.  Will order ultrasound and CTA abdomen pelvis with runoff.    2305 CK total(!!)  Labs resulted of elevated CK, concern for muscular injury.   2306 Rest of the labs unremarkable outside of mild leukocytosis however no other  infectious symptoms.  Lower concern for necrotizing fasciitis based on history and exam.  DVT ultrasound was negative.  High concern for compartment syndrome or critical limb ischemia.  CTA is pending.  Reevaluated patient at bedside.  Still having pain in his left calf and the compartment is tender.   2309 Case will be signed out to oncoming pending CTA.  The patient will be admitted for further workup and evaluation after CTA result and further consultation with specialist pending CTA.       Not Applicable    Case was signed out to oncoming ED provider, Dr. Patel with CTA and admission pending.     At the conclusion of the encounter I discussed the results of the tests and the disposition. The questions were answered. The patient or family acknowledged understanding and was agreeable with the care plan.     MEDICATIONS GIVEN IN THE EMERGENCY:  Medications   oxyCODONE (ROXICODONE) tablet 5 mg (5 mg Oral Not Given 3/26/25 2053)   ketorolac (TORADOL) injection 15 mg (15 mg Intravenous $Given 3/26/25 2051)   sodium chloride 0.9% BOLUS 1,000 mL (0 mLs Intravenous Stopped 3/26/25 2334)   ondansetron (ZOFRAN) injection 4 mg (4 mg Intravenous $Given 3/26/25 2208)   HYDROmorphone (PF) (DILAUDID) injection 0.5 mg (0.5 mg Intravenous $Given 3/26/25 2208)   iopamidol (ISOVUE-370) solution 90 mL (90 mLs Intravenous $Given 3/26/25 2253)   sodium chloride 0.9% BOLUS 1,000 mL (0 mLs Intravenous Stopped 3/27/25 0015)   heparin ANTICOAGULANT Loading dose for HIGH INTENSITY TREATMENT * Give BEFORE starting heparin infusion (7,600 Units Intravenous $Given 3/27/25 0020)       NEW PRESCRIPTIONS STARTED AT TODAY'S ER VISIT  Discharge Medication List as of 3/27/2025  2:20 AM        Discharge Medication List as of 3/27/2025  2:20 AM          =================================================================    HPI    Dudley WIL Kiran is a 69 year old male with a pertinent history of Hypertension and CAD, Arthritis, prediabetes, and CABG  "who presents to this ED for evaluation of leg pain.     Use of : N/A     Per chart review, patient was seen at [facility] on 03/19/25 for wellness visit. Plan for patient states: 1.  Exercise stress nuclear off metoprolol the day of and the day before.  If medium or large sized area ischemia will pursue angiography.  2.  Follow-up with me in 3 months, at that point time if he needs revascularization strongly urged PCSK9 inhibitor.    Patient reports he went for a walk on Monday (02/24/25) and his bilateral leg muscles started \"seizing up\".  He had to lay on the ground and crawl into his sons car due to the pain. Patient cannot tolerate weight on his left calve and has had minimal movement since then. He states that the left leg is worse than the right. Patient currently endorses severe pain to the left calve and hamstring. He also reports some numbness in the left foot and mild pain on the right shin. Patient cannot lay/set is left leg straight. The pain has become more intense since then. Patient has been taking tylenol with no relief.     Patient has history of hip surgery and a bypass for his heart. He takes 2 aspirin per day. Patient does not have any appetite today. Denies any shortness of breath, chest pain or any other symptoms at this time.     PHYSICAL EXAM    BP (!) 140/67   Pulse 90   Temp 99.5  F (37.5  C) (Temporal)   Resp 18   Ht 1.702 m (5' 7\")   Wt 95.3 kg (210 lb)   SpO2 94%   BMI 32.89 kg/m    Constitutional: Sitting in a wheelchair and talking   Head: Normocephalic, atraumatic, mucous membranes moist, nose normal.   Neck: Supple, gross ROM intact.   Eyes: Pupils mid-range, sclera white.  Respiratory: Clear to auscultation bilaterally, no respiratory distress, no wheezing, speaks full sentences easily.  Cardiovascular: Normal heart rate, regular rhythm, no murmurs. No lower extremity edema.   GI: Soft, no tenderness to deep palpation in all quadrants.  Musculoskeletal:Left " calve is bigger, tender, and swollen. No numbness. 3 second capillary refill in the bilateral toes. warm and well perfused. Limited range of motion in the Left lower extremity. Right lower extremity is full range of motion.   Skin: Warm, dry, no rash.  Neurologic: Alert & oriented x 3, speech clear, moving all extremities spontaneously   Psychiatric: Affect normal, cooperative.      LAB:  All pertinent labs reviewed and interpreted.  Results for orders placed or performed during the hospital encounter of 03/26/25   US Lower Extremity Venous Duplex Left    Impression    IMPRESSION:  1.  No deep venous thrombosis in the left lower extremity.   CTA Abdomen Pelvis Runoff w Contrast    Impression    IMPRESSION:  1.  Mild aneurysmal dilatation of the infrarenal abdominal aorta with large amount of irregular soft atherosclerotic plaque. There is also mild fusiform aneurysmal dilatation of the right common iliac artery.    2.  Abrupt cut off of the left posterior tibial artery at the level of the mid tibia. While there is distal reconstitution, a thromboembolic occlusion is not excludable.    3.  On the initial image set, there is no significant arterial enhancement of the infrapopliteal arteries in the right leg. On delayed images, there is enhancement of the infrapopliteal vessels, though there is gradual absence of opacification of the   anterior tibial and peroneal arteries. Only the posterior tibial artery is enhancing at the level of the ankle mortise. No causative explanation for delayed enhancement is apparent. There is only mild atherosclerotic calcification, and no abrupt   truncation of vessels.   US MANUEL Doppler No Exercise 1-2 Levels Bilateral    Impression    IMPRESSION:  1.  RIGHT LOWER EXTREMITY: MANUEL at rest is normal.  2.  LEFT LOWER EXTREMITY: Abnormal MANUEL at rest compatible with moderate arterial disease. Consultation with vascular surgery service recommended.   US Lower Extremity Arterial Duplex Bilateral     Impression    IMPRESSION:    1.  Occlusion of the left posterior tibial artery in the lower calf with distal reconstitution at the ankle. The reconstituted segment demonstrates decreased velocity with parvus tardus waveform due to collateral flow.    2.  Patent right lower extremity runoff vessels to the ankle.   Basic metabolic panel   Result Value Ref Range    Sodium 138 135 - 145 mmol/L    Potassium 3.8 3.4 - 5.3 mmol/L    Chloride 102 98 - 107 mmol/L    Carbon Dioxide (CO2) 23 22 - 29 mmol/L    Anion Gap 13 7 - 15 mmol/L    Urea Nitrogen 11.2 8.0 - 23.0 mg/dL    Creatinine 0.96 0.67 - 1.17 mg/dL    GFR Estimate 86 >60 mL/min/1.73m2    Calcium 9.4 8.8 - 10.4 mg/dL    Glucose 113 (H) 70 - 99 mg/dL   Result Value Ref Range    CK 13,296 (HH) 39 - 308 U/L   CBC with platelets and differential   Result Value Ref Range    WBC Count 14.4 (H) 4.0 - 11.0 10e3/uL    RBC Count 5.37 4.40 - 5.90 10e6/uL    Hemoglobin 15.9 13.3 - 17.7 g/dL    Hematocrit 45.0 40.0 - 53.0 %    MCV 84 78 - 100 fL    MCH 29.6 26.5 - 33.0 pg    MCHC 35.3 31.5 - 36.5 g/dL    RDW 13.8 10.0 - 15.0 %    Platelet Count 292 150 - 450 10e3/uL    % Neutrophils 64 %    % Lymphocytes 21 %    % Monocytes 8 %    % Eosinophils 6 %    % Basophils 0 %    % Immature Granulocytes 1 %    NRBCs per 100 WBC 0 <1 /100    Absolute Neutrophils 9.2 (H) 1.6 - 8.3 10e3/uL    Absolute Lymphocytes 3.0 0.8 - 5.3 10e3/uL    Absolute Monocytes 1.2 0.0 - 1.3 10e3/uL    Absolute Eosinophils 0.8 (H) 0.0 - 0.7 10e3/uL    Absolute Basophils 0.1 0.0 - 0.2 10e3/uL    Absolute Immature Granulocytes 0.1 <=0.4 10e3/uL    Absolute NRBCs 0.0 10e3/uL   CBC with platelets   Result Value Ref Range    WBC Count 12.8 (H) 4.0 - 11.0 10e3/uL    RBC Count 4.40 4.40 - 5.90 10e6/uL    Hemoglobin 13.2 (L) 13.3 - 17.7 g/dL    Hematocrit 37.8 (L) 40.0 - 53.0 %    MCV 86 78 - 100 fL    MCH 30.0 26.5 - 33.0 pg    MCHC 34.9 31.5 - 36.5 g/dL    RDW 13.7 10.0 - 15.0 %    Platelet Count 231 150 - 450  10e3/uL   Lactic acid whole blood   Result Value Ref Range    Lactic Acid 1.5 0.7 - 2.0 mmol/L       RADIOLOGY:  Reviewed all pertinent imaging. Please see official radiology report.  US Lower Extremity Arterial Duplex Bilateral   Final Result   IMPRESSION:      1.  Occlusion of the left posterior tibial artery in the lower calf with distal reconstitution at the ankle. The reconstituted segment demonstrates decreased velocity with parvus tardus waveform due to collateral flow.      2.  Patent right lower extremity runoff vessels to the ankle.      US MANUEL Doppler No Exercise 1-2 Levels Bilateral   Final Result   IMPRESSION:   1.  RIGHT LOWER EXTREMITY: MANUEL at rest is normal.   2.  LEFT LOWER EXTREMITY: Abnormal MANUEL at rest compatible with moderate arterial disease. Consultation with vascular surgery service recommended.      CTA Abdomen Pelvis Runoff w Contrast   Final Result   IMPRESSION:   1.  Mild aneurysmal dilatation of the infrarenal abdominal aorta with large amount of irregular soft atherosclerotic plaque. There is also mild fusiform aneurysmal dilatation of the right common iliac artery.      2.  Abrupt cut off of the left posterior tibial artery at the level of the mid tibia. While there is distal reconstitution, a thromboembolic occlusion is not excludable.      3.  On the initial image set, there is no significant arterial enhancement of the infrapopliteal arteries in the right leg. On delayed images, there is enhancement of the infrapopliteal vessels, though there is gradual absence of opacification of the    anterior tibial and peroneal arteries. Only the posterior tibial artery is enhancing at the level of the ankle mortise. No causative explanation for delayed enhancement is apparent. There is only mild atherosclerotic calcification, and no abrupt    truncation of vessels.      US Lower Extremity Venous Duplex Left   Final Result   IMPRESSION:   1.  No deep venous thrombosis in the left lower  extremity.            PROCEDURES:   Critical Care  Performed by: Vic Manzano MD  Authorized by: Vic Manzano MD    Total critical care time: 45 minutes  Critical care time was exclusive of separately billable procedures and treating other patients.  Critical care was necessary to treat or prevent imminent or life-threatening deterioration of the following conditions:  Critical limb ischemia and posterior compartment syndrome  Critical care was time spent personally by me on the following activities: development of treatment plan with patient or surrogate, discussions with consultants, examination of patient, evaluation of patient's response to treatment, obtaining history from patient or surrogate, ordering and performing treatments and interventions, ordering and review of laboratory studies, ordering and review of radiographic studies and re-evaluation of patient's condition, this excludes any separately billable procedures.        Vic Manzano MD  [facility] EMERGENCY ROOM  [facility] [address]  [phone]   =================================================================    BILLING:  Data  Category 1  Non-ED record review, if applicable. External record reviewed: Reviewed recent office visit Patient was seen at [facility] on 03/19/25      Clinical information was obtained from an independent historian. History was obtained from: Patient     The following testing was considered but ultimately not selected after discussion with patient/family: N/A     Category 2  My independent interpretation of EKG, rhythm strip, radiology study: Ultrasound of lower extremity did not reveal DVT.  CTA of the abdomen did not reveal large AAA     Category 3  Discussion of management with other physician/healthcare provider/other source: N/A       Risk  Prescription medication was considered, but ultimately not given after discussion with patient/family: N/A      Chronic conditions affecting care: Hypertension and CAD, Arthritis, prediabetes, and CABG      Consideration of Admission/Observation: Admitted to hospital           I, Sally Zavala, am serving as a scribe to document services personally performed by Vic Manzano MD based on my observation and the provider's statements to me. I, Vic Manzano MD, attest that Sally Zavala is acting in a scribe capacity, has observed my performance of the services and has documented them in accordance with my direction.     Vic Manzano MD  03/27/25 3503

## 2025-03-27 NOTE — MEDICATION SCRIBE - ADMISSION MEDICATION HISTORY
Admission medication history completed at [facility]. Please see Medication Scribe Admission Medication History note from 3/26/2025.     Marta Somers, PharmD on 3/27/2025 at 2:53 AM

## 2025-03-27 NOTE — PROGRESS NOTES
The patient, a 69-year-old male, reported that he went for a walk on Monday and developed bilateral leg cramping, which was so severe that he had to crawl into his son's truck because he couldn't stand due to the pain. Since then, his left calf pain has escalated to a 10/10 severity, and he is unable to bear weight on it.    Upon evaluation, it was determined that the patient likely has a clot in his aorta, resulting in thrombus showering into both legs. This has led to distal reconstitution via collateral vessels, causing compartment syndrome and rhabdomyolysis, with a creatine kinase (CK) level of approximately 13,000. The patient is experiencing pain out of proportion, indicative of compartment syndrome.    The vascular surgery team plans to take him to the operating room to address these issues. The patient has been started on heparin and is being transferred to the cardiac ICU for further management. Vascular surgery will handle the compartment syndrome without the need for additional surgical specialties. He is being transferred to [facility] from [facility] for this procedure.     Call the hospitalist team on arrival

## 2025-03-28 ENCOUNTER — APPOINTMENT (OUTPATIENT)
Dept: PHYSICAL THERAPY | Facility: HOSPITAL | Age: 70
End: 2025-03-28
Attending: STUDENT IN AN ORGANIZED HEALTH CARE EDUCATION/TRAINING PROGRAM
Payer: COMMERCIAL

## 2025-03-28 ENCOUNTER — APPOINTMENT (OUTPATIENT)
Dept: OCCUPATIONAL THERAPY | Facility: HOSPITAL | Age: 70
End: 2025-03-28
Attending: STUDENT IN AN ORGANIZED HEALTH CARE EDUCATION/TRAINING PROGRAM
Payer: COMMERCIAL

## 2025-03-28 ENCOUNTER — APPOINTMENT (OUTPATIENT)
Dept: CT IMAGING | Facility: HOSPITAL | Age: 70
End: 2025-03-28
Attending: STUDENT IN AN ORGANIZED HEALTH CARE EDUCATION/TRAINING PROGRAM
Payer: COMMERCIAL

## 2025-03-28 VITALS
SYSTOLIC BLOOD PRESSURE: 155 MMHG | HEIGHT: 67 IN | HEART RATE: 74 BPM | RESPIRATION RATE: 20 BRPM | DIASTOLIC BLOOD PRESSURE: 62 MMHG | BODY MASS INDEX: 32.89 KG/M2 | OXYGEN SATURATION: 96 % | TEMPERATURE: 98.9 F

## 2025-03-28 LAB
ALBUMIN SERPL BCG-MCNC: 3 G/DL (ref 3.5–5.2)
ALP SERPL-CCNC: 62 U/L (ref 40–150)
ALT SERPL W P-5'-P-CCNC: 44 U/L (ref 0–70)
ANION GAP SERPL CALCULATED.3IONS-SCNC: 8 MMOL/L (ref 7–15)
AST SERPL W P-5'-P-CCNC: 160 U/L (ref 0–45)
AT III ACT/NOR PPP CHRO: 83 % (ref 85–135)
BILIRUB SERPL-MCNC: 0.4 MG/DL
BUN SERPL-MCNC: 11.6 MG/DL (ref 8–23)
CALCIUM SERPL-MCNC: 7.6 MG/DL (ref 8.8–10.4)
CHLORIDE SERPL-SCNC: 108 MMOL/L (ref 98–107)
CK SERPL-CCNC: 5381 U/L (ref 39–308)
CK SERPL-CCNC: 6164 U/L (ref 39–308)
CREAT SERPL-MCNC: 0.84 MG/DL (ref 0.67–1.17)
EGFRCR SERPLBLD CKD-EPI 2021: >90 ML/MIN/1.73M2
GLUCOSE BLDC GLUCOMTR-MCNC: 100 MG/DL (ref 70–99)
GLUCOSE BLDC GLUCOMTR-MCNC: 109 MG/DL (ref 70–99)
GLUCOSE BLDC GLUCOMTR-MCNC: 157 MG/DL (ref 70–99)
GLUCOSE BLDC GLUCOMTR-MCNC: 94 MG/DL (ref 70–99)
GLUCOSE SERPL-MCNC: 122 MG/DL (ref 70–99)
HCO3 SERPL-SCNC: 22 MMOL/L (ref 22–29)
POTASSIUM SERPL-SCNC: 3.6 MMOL/L (ref 3.4–5.3)
PROT SERPL-MCNC: 5.6 G/DL (ref 6.4–8.3)
SODIUM SERPL-SCNC: 138 MMOL/L (ref 135–145)
UFH PPP CHRO-ACNC: 0.49 IU/ML

## 2025-03-28 PROCEDURE — 250N000013 HC RX MED GY IP 250 OP 250 PS 637: Performed by: STUDENT IN AN ORGANIZED HEALTH CARE EDUCATION/TRAINING PROGRAM

## 2025-03-28 PROCEDURE — 250N000011 HC RX IP 250 OP 636: Mod: JZ | Performed by: STUDENT IN AN ORGANIZED HEALTH CARE EDUCATION/TRAINING PROGRAM

## 2025-03-28 PROCEDURE — 250N000013 HC RX MED GY IP 250 OP 250 PS 637: Performed by: HOSPITALIST

## 2025-03-28 PROCEDURE — 97162 PT EVAL MOD COMPLEX 30 MIN: CPT | Mod: GP

## 2025-03-28 PROCEDURE — 97530 THERAPEUTIC ACTIVITIES: CPT | Mod: GO

## 2025-03-28 PROCEDURE — 71275 CT ANGIOGRAPHY CHEST: CPT

## 2025-03-28 PROCEDURE — 258N000003 HC RX IP 258 OP 636: Performed by: STUDENT IN AN ORGANIZED HEALTH CARE EDUCATION/TRAINING PROGRAM

## 2025-03-28 PROCEDURE — 97166 OT EVAL MOD COMPLEX 45 MIN: CPT | Mod: GO

## 2025-03-28 PROCEDURE — 82550 ASSAY OF CK (CPK): CPT | Performed by: STUDENT IN AN ORGANIZED HEALTH CARE EDUCATION/TRAINING PROGRAM

## 2025-03-28 PROCEDURE — 97606 NEG PRS WND THER DME>50 SQCM: CPT

## 2025-03-28 PROCEDURE — 97530 THERAPEUTIC ACTIVITIES: CPT | Mod: GP

## 2025-03-28 PROCEDURE — 99233 SBSQ HOSP IP/OBS HIGH 50: CPT | Performed by: HOSPITALIST

## 2025-03-28 PROCEDURE — 120N000004 HC R&B MS OVERFLOW

## 2025-03-28 PROCEDURE — 85520 HEPARIN ASSAY: CPT | Performed by: STUDENT IN AN ORGANIZED HEALTH CARE EDUCATION/TRAINING PROGRAM

## 2025-03-28 PROCEDURE — 80053 COMPREHEN METABOLIC PANEL: CPT | Performed by: HOSPITALIST

## 2025-03-28 PROCEDURE — 250N000011 HC RX IP 250 OP 636: Performed by: STUDENT IN AN ORGANIZED HEALTH CARE EDUCATION/TRAINING PROGRAM

## 2025-03-28 RX ORDER — IOPAMIDOL 755 MG/ML
90 INJECTION, SOLUTION INTRAVASCULAR ONCE
Status: COMPLETED | OUTPATIENT
Start: 2025-03-28 | End: 2025-03-28

## 2025-03-28 RX ORDER — LISINOPRIL 5 MG/1
5 TABLET ORAL DAILY
Status: DISCONTINUED | OUTPATIENT
Start: 2025-03-29 | End: 2025-03-28

## 2025-03-28 RX ORDER — LABETALOL HYDROCHLORIDE 5 MG/ML
20 INJECTION, SOLUTION INTRAVENOUS EVERY 4 HOURS PRN
Status: DISCONTINUED | OUTPATIENT
Start: 2025-03-28 | End: 2025-04-15 | Stop reason: HOSPADM

## 2025-03-28 RX ORDER — LISINOPRIL 5 MG/1
5 TABLET ORAL DAILY
Status: DISCONTINUED | OUTPATIENT
Start: 2025-03-28 | End: 2025-04-07

## 2025-03-28 RX ADMIN — SODIUM CHLORIDE: 0.9 INJECTION, SOLUTION INTRAVENOUS at 02:43

## 2025-03-28 RX ADMIN — ACETAMINOPHEN 975 MG: 325 TABLET ORAL at 00:50

## 2025-03-28 RX ADMIN — HEPARIN SODIUM 1500 UNITS/HR: 10000 INJECTION, SOLUTION INTRAVENOUS at 21:52

## 2025-03-28 RX ADMIN — HYDROMORPHONE HYDROCHLORIDE 0.4 MG: 0.2 INJECTION, SOLUTION INTRAMUSCULAR; INTRAVENOUS; SUBCUTANEOUS at 13:57

## 2025-03-28 RX ADMIN — ASPIRIN 81 MG: 81 TABLET, COATED ORAL at 08:24

## 2025-03-28 RX ADMIN — METOPROLOL SUCCINATE 25 MG: 25 TABLET, EXTENDED RELEASE ORAL at 13:57

## 2025-03-28 RX ADMIN — HYDROMORPHONE HYDROCHLORIDE 0.4 MG: 0.2 INJECTION, SOLUTION INTRAMUSCULAR; INTRAVENOUS; SUBCUTANEOUS at 02:40

## 2025-03-28 RX ADMIN — HEPARIN SODIUM 1500 UNITS/HR: 10000 INJECTION, SOLUTION INTRAVENOUS at 04:14

## 2025-03-28 RX ADMIN — CEFAZOLIN SODIUM 2 G: 2 SOLUTION INTRAVENOUS at 00:52

## 2025-03-28 RX ADMIN — HYDROMORPHONE HYDROCHLORIDE 0.2 MG: 0.2 INJECTION, SOLUTION INTRAMUSCULAR; INTRAVENOUS; SUBCUTANEOUS at 22:38

## 2025-03-28 RX ADMIN — HYDROMORPHONE HYDROCHLORIDE 0.4 MG: 0.2 INJECTION, SOLUTION INTRAMUSCULAR; INTRAVENOUS; SUBCUTANEOUS at 08:33

## 2025-03-28 RX ADMIN — ACETAMINOPHEN 975 MG: 325 TABLET ORAL at 08:24

## 2025-03-28 RX ADMIN — IOPAMIDOL 90 ML: 755 INJECTION, SOLUTION INTRAVENOUS at 10:16

## 2025-03-28 RX ADMIN — LISINOPRIL 2.5 MG: 2.5 TABLET ORAL at 08:24

## 2025-03-28 RX ADMIN — HYDROMORPHONE HYDROCHLORIDE 0.4 MG: 0.2 INJECTION, SOLUTION INTRAMUSCULAR; INTRAVENOUS; SUBCUTANEOUS at 17:34

## 2025-03-28 RX ADMIN — ACETAMINOPHEN 975 MG: 325 TABLET ORAL at 17:34

## 2025-03-28 ASSESSMENT — ACTIVITIES OF DAILY LIVING (ADL)
ADLS_ACUITY_SCORE: 33
IADL_COMMENTS: IND FOR IADLS
ADLS_ACUITY_SCORE: 33
DEPENDENT_IADLS:: INDEPENDENT
ADLS_ACUITY_SCORE: 33
ADLS_ACUITY_SCORE: 39
ADLS_ACUITY_SCORE: 33
ADLS_ACUITY_SCORE: 39
ADLS_ACUITY_SCORE: 33
ADLS_ACUITY_SCORE: 33

## 2025-03-28 NOTE — CONSULTS
[facility]  WO Nurse Inpatient Assessment     Consulted for: Left leg fasciotomy    Summary: Wound VAC applied and patient alert and engaged    WO nurse follow-up plan: Tuesday/Friday    Patient History (according to provider note(s):      Per Op note:  Date of Service: 3/27/2025      Preoperative diagnosis     Compartment syndrome left leg  Thromboembolism to left lower extremity  CAD s/p CABG in 2010  HTN  HLD     Postoperative diagnosis     same     Surgeon: Wanda Coelho DO RPVI           Procedures:  Fasciotomy left lower extremity      Assessment:      Areas visualized during today's visit:  left leg    Negative pressure wound therapy applied to: Left leg   Last photo: 3/28      Lateral 3/28      Medial 3/28    Wound due to: Surgical Wound   Wound history/plan of care:    Surgical date: 3/27   Service following: Vascular  Date Negative Pressure Wound Therapy initiated: 3/28   Interventions in place: elevation  Is patient s nutritional status compromised? no   If yes, what interventions are in place? N/A  Reason for initiating vac therapy? Need for accelerated granulation tissue  Which?of?the?following?co-morbidities?apply? Obesity  If diabetic is patient on a diabetic management program? N/A   Is osteomyelitis present in wound? no   If yes what treatments are in place? N/A    Wound base: Muscle/fascia/tendon with some granulation along posterior edges     Palpation of the wound bed: normal       Drainage: moderate      Volume in cannister: NA - new 3/28     Last cannister change date: 3/28     Description of drainage: serosanguinous      Measurements (length x width x depth, in cm)   Lateral 26 x 5.5 x 1cm  Medial 24 x 5.5 x 2.5cm     Tunneling N/A      Undermining N/A   Periwound skin: Intact       Color: normal and consistent with surrounding tissue       Temperature: normal    Odor: none   Pain: mild, tender   Pain intervention prior to dressing change: patient tolerated well  "and slow and gentle cares   Treatment goal: Increase granulation  STATUS: initial assessment   Supplies ordered: gathered    Number of foam pieces removed from a wound (excluding foam for bridge) :  NA    Verified this matched the number of foam pieces applied last dressing change: N/A   Number of foam pieces packed into wound (excluding foam for bridge) :  2 GranuFoam Black and 2 Oil emulsion dressing        Treatment Plan:     Negative pressure wound therapy plan:  Wound location: LLE fasciotomy sites   Change Days: Tues/ Fri by WOC RN    Supplies (including all accessories) used: large Black foam , Adaptic/Curity oil emulsion contact layer , and extra trac pad and Y-connector  Cleanse with Vashe prior to replacing NPWT  Suction setting: -125   Methods used: Window paned all periwound skin with vac drape prior to applying sponge    Staff RN to assess integrity of dressing and ensure suction is set at appropriate level every shift.   Date canister. Chart canister output every shift. Change cannister weekly and PRN if full/occluded     Remove foam dressing and replace with BID normal saline moist gauze dressing if:   -a dressing failure which cannot be repaired within 2 hours   -patient is discharging to home without a home pump   -patient is discharging to a facility outside the local area   -if a dressing is a \"Silver Foam\", remove before Radiation Therapy or MRI     The hospital VAC pump is not to be discharged with the patient. Please disconnect the patient from the machine prior to discharge.  If a home VAC pump has been delivered, connect the home cannister to dressing tubing and the cannister to the home pump, turn on home pump  If the patient is transferring to a nearby facility with a VAC, the tubing can be disconnected, clamp tubing and cover the end with a glove, then can be reconnected if within 2 hours  If transfer will be longer than 2 hours, dressing must be removed and placed with a wet to moist " gauze dressing for transfer        Orders: Written    RECOMMEND PRIMARY TEAM ORDER: None, at this time  Education provided: plan of care  Discussed plan of care with: Patient and Nurse  Notify [facility] if wound(s) deteriorate.  Nursing to notify the Provider(s) and re-consult the [facility] Nurse if new skin concern.    DATA:     Current support surface: Standard  Low air loss (BOB pump, Isolibrium, Pulsate)  Containment of urine/stool: Continent of bladder and Continent of bowel  BMI: Body mass index is 35.07 kg/m .   Active diet order: Orders Placed This Encounter      Regular Diet Adult     Output: I/O last 3 completed shifts:  In: 3526 [P.O.:400; I.V.:3126]  Out: 2150 [Urine:2150]     Labs:   Recent Labs   Lab 03/28/25  0413 03/27/25  0624   ALBUMIN 3.0*  --    HGB  --  12.6*   INR  --  1.17*   WBC  --  11.3*     Pressure injury risk assessment:   Sensory Perception: 4-->no impairment  Moisture: 4-->rarely moist  Activity: 1-->bedfast  Mobility: 3-->slightly limited  Nutrition: 3-->adequate  Friction and Shear: 2-->potential problem  Gary Score: 17    KYLIE TesfayeN, RN, PHN, HNB-BC, CWOCN  Pager no longer is use, please contact through [employer] group: [facility] [employer]

## 2025-03-28 NOTE — PROGRESS NOTES
03/28/25 1045   Appointment Info   Signing Clinician's Name / Credentials (OT) Daylin Gonzalez, OTR/L   Living Environment   People in Home other relative(s)  (cousins x2)   Current Living Arrangements house   Living Environment Comments walk-in shower at home, considering going to sister's upon discharge who has a tub shower   Self-Care   Usual Activity Tolerance excellent   Current Activity Tolerance fair   Equipment Currently Used at Home none  (using SPC last few days d/t pain)   Fall history within last six months no   Activity/Exercise/Self-Care Comment IND for BADLs   Instrumental Activities of Daily Living (IADL)   IADL Comments IND for IADLs   General Information   Onset of Illness/Injury or Date of Surgery 03/27/25   Referring Physician Nell Patel MD   Patient/Family Therapy Goal Statement (OT) less pain, return to PLOF   Additional Occupational Profile Info/Pertinent History of Current Problem presents with LLE pain, findings compartment syndrome in left lower extremity s/p left lower extremity fasciotomy   Existing Precautions/Restrictions fall  (potential wound vac placement)   Left Lower Extremity (Weight-bearing Status) weight-bearing as tolerated (WBAT)   Cognitive Status Examination   Orientation Status orientation to person, place and time   Affect/Mental Status (Cognitive) WFL   Follows Commands WFL   Pain Assessment   Patient Currently in Pain Yes, see Vital Sign flowsheet   Range of Motion Comprehensive   General Range of Motion bilateral upper extremity ROM WFL   Strength Comprehensive (MMT)   Comment, General Manual Muscle Testing (MMT) Assessment WFL   Bed Mobility   Bed Mobility supine-sit   Supine-Sit [place] (Bed Mobility) supervision;verbal cues   Assistive Device (Bed Mobility) bed rails   Transfers   Transfers sit-stand transfer;toilet transfer;shower transfer   Sit-Stand Transfer   Sit-Stand [place] (Transfers) contact guard;verbal cues   Assistive Device (Sit-Stand  Transfers) walker, front-wheeled   Shower Transfer   Shower Transfer Comments per clinical judgement unable to complete d/t pain with increased weightbearing   Toilet Transfer   Type (Toilet Transfer) sit-stand;stand-sit   [place] Level (Toilet Transfer) contact guard   Assistive Device (Toilet Transfer) walker, front-wheeled   Toilet Transfer Comments per clinical judgement   Balance   Balance Assessment standing static balance   Standing Balance: Static contact guard;verbal cues   Position/Device Used, Standing Balance walker, front-wheeled   Activities of Daily Living   BADL Assessment/Intervention lower body dressing;toileting   Lower Body Dressing Assessment/Training   [place] Level (Lower Body Dressing) maximum assist (25% patient effort)   Toileting   Comment, (Toileting) per clinical judgement   [place] Level (Toileting) contact guard assist   Clinical Impression   Criteria for Skilled Therapeutic Interventions Met (OT) Yes, treatment indicated   OT Diagnosis dec BADL IND   OT Problem List-Impairments impacting ADL problems related to;activity tolerance impaired;mobility;strength;pain;post-surgical precautions   Assessment of Occupational Performance 5 or more Performance Deficits   Identified Performance Deficits dressing, toileting, bathing, household mobility, functional transfers, household management, meal prep, community mobility   Planned Therapy Interventions (OT) ADL retraining;bed mobility training;balance training;transfer training;home program guidelines;progressive activity/exercise   Clinical Decision Making Complexity (OT) detailed assessment/moderate complexity   Risk & Benefits of therapy have been explained evaluation/treatment results reviewed;participants included;patient   OT Total Evaluation Time   OT Eval, Moderate Complexity Minutes (01665) 10   OT Goals   Therapy Frequency (OT) 5 times/week   OT Predicted Duration/Target Date for Goal Attainment 04/04/25   OT Goals  Lower Body Dressing;Toilet Transfer/Toileting;Transfers   OT: Lower Body Dressing Supervision/stand-by assist;using adaptive equipment   OT: Transfer Supervision/stand-by assist  (tub)   OT: Toilet Transfer/Toileting Supervision/stand-by assist;toilet transfer;cleaning and garment management;within precautions   Therapeutic Activities   Therapeutic Activity Minutes (86528) 15   Symptoms noted during/after treatment increased pain   Treatment Detail/Skilled Intervention Cues for BUE placement using bedrails for bed mob to increase ease. Educ on hand placement options for transfers to reduce pain. With cues and CGA, sit > stand with CGA and FWW. Extended time standing to increase tolerance for WB through LLE, heavily using FWW, progressing to increased tolerance in static standing but maintains NWB/TTWB through LLE to pivot to recliner with CGA and FWW. Cues for hand placement, CGA stand > sit. Additional sit <> stand with CGA to review tech. Increased time for line management. Pt endorses high levels of fatigue following.   OT Discharge Planning   OT Plan LB dressing - AE? tub transfer if going to [facility], toileting/transfers   OT Discharge Recommendation (DC Rec) [facility] patient will benefit from intensive, interdisciplinary therapy.  Anticipate will be able to tolerate 3 hours of therapy per day   OT Rationale for DC Rec requires hands on assist for all BADLs, anticipate pt to make quick progress and has options to discharge to [facility] who is available 24/7 to assist.   OT Brief overview of current status CGA mobility, mod A ADLs   OT Total Distance Amb During Session (feet) 0   Total Session Time   Timed Code Treatment Minutes 15   Total Session Time (sum of timed and untimed services) 25

## 2025-03-28 NOTE — PROGRESS NOTES
Vascular surgery staff    Patient seen and examined at bedside, feels much better today and notes overall pain in left leg has significantly improved, strength normal and sensation intact.    Media tab pictures reviewed-does appear to have some evidence of muscle necrosis in gastrocnemius muscle, will monitor.  Okay for wound care to place wound VAC today if patient is able to tolerate this.  CK is trending down to near 5000, will stop IV fluids and remove Capps catheter.  Unfortunately CTA chest was changed to CT PE protocol which was not timed correctly for arterial evaluation, however does not appear to be any additional aortic thrombus noted.    Discussion held regarding patient refusal for statin medication.  He does report hematuria, hematochezia, and bleeding from nares 1 month after CABG in 2010.  At this time he had been taking his statin therapy for 1 month but stopped this once he noted the bleeding and states that the bleeding did stop as well so he correlated these to events together.  I advised him that this is not a known side effect of statin medication and I have not personally witnessed this is a side effect myself.  I strongly encouraged him to consider high-dose statin therapy as this has significant benefit for overall cardiovascular health and would also provide significant benefit for plaque stabilization of high risk mural thrombus and infrarenal aorta.  Patient did state he would consider this therapy.    We will continue to monitor lower extremity wounds and plan for next evaluation on Monday.  Patient may require debridement of muscle versus attempted closure pending clinical status at that time.    Plan:  -Remove Capps  -Stop IV fluids and encourage p.o. intake with p.o. hydration  -A.m. labs  -Pharmacy consult for DOAC  -Switch heparin infusion to therapeutic Lovenox tonight  -No activity restrictions  -Please contact vascular surgery stat for any new complaints in left lower extremity  or change in exam.  Patient continues to remain high risk for distal analyzation.  We also discussed that if this were to occur despite therapeutic anticoagulation then he would require intervention infrarenal abdominal aorta which may be via endovascular or open approach.  Patient did voice understanding.  -Okay to downgrade to intermediate floor  -Appreciate medical team's assistance of care    Wanda Coelho DO [facility]  Vascular Surgery

## 2025-03-28 NOTE — PROGRESS NOTES
[facility] Progress Note - Hospitalist Service    Date of Admission:  3/27/2025    Assessment & Plan   69 male with history of CAD, hypertension is admitted for acute left lower extremity pain and found to have acute limb ischemia, underwent 4 compartment fasciotomy urgently on admission.  Monitored in the ICU and downgraded clinically 3/28.    #Left lower extremity pain  #Rhabdomyolysis  #Acute compartment syndrome status post 4 compartment fasciotomy  #Acute limb ischemia  #Peripheral arterial disease  -Presenting with progressive claudication symptoms to his left leg  -Workup notable for runoff CTA 3/26 showing abrupt cut off to left PT artery, mild infrarenal aneurysmal dilation  -Echo 3/27 showing moderately reduced EF 45 to 50%, no septal defects  -ABIs 3/27 normal on the right, moderate on the left  -Continue heparin drip and aspirin  -CK downtrending, continue fluids and lab monitoring  -Postoperative cares per vascular surgery  -Wound care consulted for possible wound VAC 3/28  -Vascular surgery considering returning to OR over the weekend for more washout  -PT and OT  -Hypercoagulable workup pending    #Chronic heart failure with mildly reduced ejection fraction  -Echo this admission with EF 45 to 50%  -Not clinically hypervolemic, monitor for edema    #CAD  -Remote CABG, no anginal chest pain, monitor clinically  -Continues on aspirin  -Not routinely on statin, start when CK further resolved    #Essential hypertension  -BP high normal  -Resumed usual lisinopril 5 mg daily, metoprolol succinate 12.5 mg daily          Diet: Regular Diet Adult    DVT Prophylaxis: Lovenox    Capps Catheter: PRESENT, indication: ICU only: hourly urine output needed for patient care  Lines: PRESENT      Arterial Line 03/27/25 Radial-Site Assessment: WDL      Cardiac Monitoring: None  Code Status: Full Code      Clinically Significant Risk Factors          # Hyperchloremia: Highest Cl = 109  "mmol/L in last 2 days, will monitor as appropriate          # Hypoalbuminemia: Lowest albumin = 3 g/dL at 3/28/2025  4:13 AM, will monitor as appropriate  # Coagulation Defect: INR = 1.17 (Ref range: 0.85 - 1.15) and/or PTT = N/A, will monitor for bleeding    # Hypertension: Noted on problem list            # Obesity: Estimated body mass index is 35.07 kg/m  as calculated from the following:    Height as of this encounter: 1.702 m (5' 7\").    Weight as of this encounter: 101.6 kg (223 lb 14.4 oz)., PRESENT ON ADMISSION      # History of CABG: noted on surgical history       Social Drivers of Health    Physical Activity: Insufficiently Active (3/17/2025)    Exercise Vital Sign     Days of Exercise per Week: 1 day     Minutes of Exercise per Session: 10 min   Social Connections: Unknown (3/17/2025)    Social Connection and Isolation Panel [NHANES]     Frequency of Social Gatherings with Friends and Family: Once a week          Disposition Plan     Medically Ready for Discharge: Anticipated in 5+ Days             Tomas West MD  Hospitalist Service  [facility]  Securely message with [employer] (more info)  Text page via [facility] Paging/Directory   ______________________________________________________________________    Interval History   No new complaints, pain improving.    Physical Exam   Vital Signs: Temp: 98.9  F (37.2  C) Temp src: Oral BP: (!) 155/62 Pulse: 76   Resp: 28 SpO2: 94 % O2 Device: Nasal cannula Oxygen Delivery: 1 LPM  Weight: 223 lbs 14.4 oz    Alert, no distress, heart rate regular, soft systolic murmur, lungs clear, abdomen soft, left lower extremity in bulky bandaging    Medical Decision Making       52 MINUTES SPENT BY ME on the date of service doing chart review, history, exam, documentation & further activities per the note.  NOTE(S)/MEDICAL RECORDS REVIEWED over the past 24 hours: Vitals, labs, new imaging, documentation and medication administrations       Data     I " have personally reviewed the following data over the past 24 hrs:    N/A  \   N/A   / N/A     138 108 (H) 11.6 /  100 (H)   3.6 22 0.84 \     ALT: 44 AST: 160 (H) AP: 62 TBILI: 0.4   ALB: 3.0 (L) TOT PROTEIN: 5.6 (L) LIPASE: N/A       Imaging results reviewed over the past 24 hrs:   Recent Results (from the past 24 hours)   CT Chest Pulmonary Embolism w Contrast    Narrative    EXAM: CT CHEST PULMONARY EMBOLISM W CONTRAST  LOCATION: [facility]  DATE: 3/28/2025    INDICATION: embolic workup  COMPARISON: CT angiogram of the abdomen and pelvis 3/26/2025  TECHNIQUE: CT chest pulmonary angiogram during arterial phase injection of IV contrast. Multiplanar reformats and MIP reconstructions were performed. Dose reduction techniques were used.   CONTRAST: IsoVue 370 90mL    FINDINGS:  ANGIOGRAM CHEST: Pulmonary arteries are normal caliber and negative for pulmonary emboli. Thoracic aorta is negative for dissection.    LUNGS AND PLEURA: Lungs are incompletely expanded. Foci of subsegmental atelectasis are present in both lower lobes along the posterior costal pleura adjacent to minimal pleural fluid crowding of the bronchovascular structures around the shelby and limited   atelectasis in the upper lobes along the upper major fissures. A few, sparse groundglass opacities are present in the upper lobes consistent with nonspecific inflammation or edema. Subtle bronchial wall thickening of airways around the shelby and in the   lower lobes. No endoluminal airway debris or bronchiectasis.    MEDIASTINUM: Cardiac chambers are normal in size. No pericardial effusion. Previous median sternotomy and coronary revascularization. No lymphadenopathy. Esophagus is decompressed.    CORONARY ARTERY CALCIFICATION: Previous intervention (stents or CABG).    UPPER ABDOMEN: Vicarious excretion of contrast in the gallbladder. Few nonobstructing calculi are present in the upper poles of both kidneys.    MUSCULOSKELETAL: Disc  space narrowing and degenerative osteophytes of the thoracic spine from T3 through the thoracolumbar junction. No compression deformities. Solid osseous union of the median sternotomy.      Impression    IMPRESSION:    1.  No acute pulmonary embolism.  2.  Shallow lung inflation with atelectasis in the bases and around the shelby. Minimal upper lobe opacities consistent with minimal nonspecific alveolar inflammation/edema.  3.  Bilateral nonobstructive nephrolithiasis.

## 2025-03-28 NOTE — DISCHARGE INSTRUCTIONS
"[facility] DISCHARGE INSTRUCTIONS:  Negative pressure wound therapy plan:  Wound location: LLE fasciotomy sites   Change Days: Tues/ Fri by [facility] RN    Supplies (including all accessories) used: large Black foam , Adaptic/Curity oil emulsion contact layer , and extra trac pad and Y-connector  Cleanse with Vashe prior to replacing NPWT  Suction setting: -125   Methods used: Window paned all periwound skin with vac drape prior to applying sponge    Staff RN to assess integrity of dressing and ensure suction is set at appropriate level every shift.   Date canister. Chart canister output every shift. Change cannister weekly and PRN if full/occluded     Remove foam dressing and replace with BID normal saline moist gauze dressing if:   -a dressing failure which cannot be repaired within 2 hours   -patient is discharging to home without a home pump   -patient is discharging to a facility outside the local area   -if a dressing is a \"Silver Foam\", remove before Radiation Therapy or MRI     The hospital VAC pump is not to be discharged with the patient. Please disconnect the patient from the machine prior to discharge.  If a home VAC pump has been delivered, connect the home cannister to dressing tubing and the cannister to the home pump, turn on home pump  If the patient is transferring to a nearby facility with a VAC, the tubing can be disconnected, clamp tubing and cover the end with a glove, then can be reconnected if within 2 hours  If transfer will be longer than 2 hours, dressing must be removed and placed with a wet to moist gauze dressing for transfer  " "\"Silver Foam\", remove before Radiation Therapy or MRI   The hospital VAC pump is not to be discharged with the patient. Please disconnect the patient from the machine prior to discharge.  If a home VAC pump has been delivered, connect the home cannister to dressing tubing and the cannister to the home pump, turn on home pump  If the patient is transferring to a nearby facility with a VAC, the tubing can be disconnected, clamp tubing and cover the end with a glove, then can be reconnected if within 2 hours  If transfer will be longer than 2 hours, dressing must be removed and placed with a wet to moist gauze dressing for transfer  "

## 2025-03-28 NOTE — PROGRESS NOTES
03/28/25 1050   Appointment Info   Signing Clinician's Name / Credentials (PT) Alejandra Cummings DPT   Living Environment   People in Home other relative(s)  (Cousins)   Current Living Arrangements house   Home Accessibility stairs to enter home   Number of Stairs, Main Entrance 3   Stair Railings, Main Entrance railings safe and in good condition   Transportation Anticipated health plan transportation;family or friend will provide   Self-Care   Usual Activity Tolerance excellent   Current Activity Tolerance fair   Equipment Currently Used at Home none  (owns SEC- was using over the last few days)   General Information   Onset of Illness/Injury or Date of Surgery 03/27/25   Referring Physician Wanda Coelho,    Patient/Family Therapy Goals Statement (PT) none   Pertinent History of Current Problem (include personal factors and/or comorbidities that impact the POC) 69 year old male who presented to with left leg pain.  History and workup were suspicious for acute ischemic event to bilateral lower extremity earlier this week with symptoms in the left leg significantly worse than right with initial improvement in pain in left lower extremity with development of progressive and motion of left foot and CK greater than 13,000 all suspicious for development of compartment syndrome in left lower extremity.     Underwent left lower extremity fasciotomy 3/27   Weight-Bearing Status - LLE weight-bearing as tolerated   Strength (Manual Muscle Testing)   Strength (Manual Muscle Testing) Deficits observed during functional mobility   Strength Comments Due to pain.   Bed Mobility   Bed Mobility supine-sit   Supine-Sit [place] (Bed Mobility) supervision;verbal cues;1 person to manage equipment   Assistive Device (Bed Mobility) bed rails   Transfers   Transfers sit-stand transfer   Sit-Stand Transfer   Sit-Stand [place] (Transfers) contact guard;1 person to manage equipment   Assistive Device (Sit-Stand Transfers)  walker, front-wheeled   Gait/Stairs (Locomotion)   Comment, (Gait/Stairs) n/a- pt did not amb functional distance today.   Clinical Impression   Criteria for Skilled Therapeutic Intervention Yes, treatment indicated   PT Diagnosis (PT) Impaired functional mobility   Influenced by the following impairments Pain, weakness, faitgue   Functional limitations due to impairments Impaired strength, transfers, gait, endurance   Clinical Presentation (PT Evaluation Complexity) evolving   Clinical Presentation Rationale Presents as diagnosed   Clinical Decision Making (Complexity) moderate complexity   Planned Therapy Interventions (PT) balance training;bed mobility training;gait training;home exercise program;stair training;strengthening;transfer training   Risk & Benefits of therapy have been explained patient   PT Total Evaluation Time   PT Eval, Moderate Complexity Minutes (94387) 10   Physical Therapy Goals   PT Frequency Daily   PT Predicted Duration/Target Date for Goal Attainment 04/04/25   PT Goals Bed Mobility;Transfers;Gait;Stairs   PT: Bed Mobility Independent;Supine to/from sit   PT: Transfers Modified independent;Sit to/from stand;Bed to/from chair;Assistive device   PT: Gait Supervision/stand-by assist;Rolling walker;50 feet   PT: Stairs Supervision/stand-by assist;3 stairs   PT Discharge Planning   PT Plan progress transfers, amb with chair follow, LE strength/ROM   PT Discharge Recommendation (DC Rec) [facility]-Motivated patient will benefit from intensive, interdisciplinary therapy.  Anticipate will be able to tolerate 3 hours of therapy per day   PT Rationale for DC Rec Pt independent/very active at baseline. Recommend continued therpies to regain strength/mobility.   PT Brief overview of current status Bed > recliner, CGA with FWW. Ax2 present to manage lines today.   PT Total Distance Amb During Session (feet) 5   PT Equipment Needed at Discharge walker, rolling   Physical Therapy Time and  Intention   Total Session Time (sum of timed and untimed services) 10       Alejandra Cummings, PT, DPT  3/28/2025

## 2025-03-28 NOTE — PLAN OF CARE
Goal Outcome Evaluation:      Plan of Care Reviewed With: patient    Overall Patient Progress: improvingOverall Patient Progress: improving    Outcome Evaluation: Patient having more pain this shift on lateral side of L leg/ankle but able to control with pain meds. Pulses dopplerable on feet--occasionally able to palpate the dorsalis pedis. Some drainage noted from wound. Vitals stable.

## 2025-03-28 NOTE — PROGRESS NOTES
"VASCULAR SURGERY PROGRESS NOTE    Subjective:  Patient was seen and evaluated at the bedside for surgical follow up. Pain is controlled with current medications. Tolerates bedside dressing change. Continues on heparin gtt. No events overnight or new concerns.     Objective:  Intake/Output Summary (Last 24 hours) at 3/28/2025 0735  Last data filed at 3/28/2025 0600  Gross per 24 hour   Intake 3968.6 ml   Output 2575 ml   Net 1393.6 ml     PHYSICAL EXAM:  BP (!) 155/62   Pulse 73   Temp 98.9  F (37.2  C) (Oral)   Resp 15   Ht 1.702 m (5' 7\")   Wt 101.6 kg (223 lb 14.4 oz)   SpO2 96%   BMI 35.07 kg/m    General: The patient is alert and oriented. Appropriate. No acute distress  Psych: Pleasant affect, answers questions appropriately  Skin: Color appropriate for race, warm, dry  Respiratory: Normal respiratory effort   Extremities: Able to wiggle toes with intact sensation. Strong biphasic DP and PT signal with doppler. Medial fasciotomy site with some dusky appearing muscle tissue, tender to touch. Lateral fasciotomy site with healthy, viable appearing tissue and sero-sanguinous drainage. Images uploaded to chart     Imaging:     Echocardiogram - 3/28/2025    Interpretation Summary     The left ventricle is normal in size.  The visual ejection fraction is 45-50%.  Grade II or moderate diastolic dysfunction.  There is mild global hypokinesia of the left ventricle.  Normal right ventricle size and systolic function.  The left atrium is mildly dilated.  No hemodynamically ignificant valvular abnormalities on 2D or color Doppler  images.     No previous study for comparison.     ASSESSMENT:  69 year old male who presented to with left leg pain.  History and workup were suspicious for acute ischemic event to bilateral lower extremity earlier this week with symptoms in the left leg significantly worse than right with initial improvement in pain in left lower extremity with development of progressive and motion of left " foot and CK greater than 13,000 all suspicious for development of compartment syndrome in left lower extremity.     Underwent left lower extremity fasciotomy 3/27    CK downtrending, 5,381 this AM.     PLAN:  Continue vascular checks and motor sensory exams to BLE   Dressing changed at bedside today, will review findings with attending for possible wound VAC placement with WOC vs debridement in OR over the weekend.  NS at 150cc, monitor UOP    Trend CK daily  CTA chest ordered for today  Activity and diet as tolerated  Appreciate medical team assistance with care     Discussed pt history, exam, assessment and plan with Dr. Coelho of the vascular surgery service, who is in agreement with the above.    Marga Seth PA-C  VASCULAR SURGERY

## 2025-03-28 NOTE — CONSULTS
3/28 Test claim for DOAC'S- Eliquis $148 for 30 days, Xarelto $146 for 30 days supply. If when the patient pays $2000 out of pocket the cost will be $0 for the remainder of the year. First 30 day vouchers avail in the discharge pharmacy if needed. Thank you for allowing me to help with your patient  Sanjuana Durham [facility]  Pharmacy Discharge Liaison   [facility] locations  Available on [facility] and [facility]

## 2025-03-28 NOTE — CONSULTS
Care Management Initial Consult    General Information  Assessment completed with: Patient,    Type of CM/SW Visit: Initial Assessment    Primary Care Provider verified and updated as needed: Yes   Readmission within the last 30 days: no previous admission in last 30 days         Advance Care Planning: Advance Care Planning Reviewed:  (no HCD)          Communication Assessment  Patient's communication style: spoken language (English or Bilingual)    Hearing Difficulty or Deaf: no   Wear Glasses or Blind: yes    Cognitive  Cognitive/Neuro/Behavioral: WDL                      Living Environment:   People in home:  (2 cousins)     Current living Arrangements: mobile home      Able to return to prior arrangements: yes       Family/Social Support:  Care provided by: self  Provides care for: no one     Support system: Children, Sibling(s) (cousins)          Description of Support System: Supportive, Involved         Current Resources:   Patient receiving home care services: No        Community Resources: None  Equipment currently used at home: cane, straight  Supplies currently used at home: None    Employment/Financial:  Employment Status: retired        Financial Concerns:             Does the patient's insurance plan have a 3 day qualifying hospital stay waiver?  Yes     Which insurance plan 3 day waiver is available? Alternative insurance waiver    Will the waiver be used for post-acute placement? No    Lifestyle & Psychosocial Needs:  Social Drivers of Health     Food Insecurity: Low Risk  (3/17/2025)    Food Insecurity     Within the past 12 months, did you worry that your food would run out before you got money to buy more?: No     Within the past 12 months, did the food you bought just not last and you didn t have money to get more?: No   Depression: Not at risk (3/19/2025)    PHQ-2     PHQ-2 Score: 0   Housing Stability: Low Risk  (3/17/2025)    Housing Stability     Do you have housing? : Yes     Are you worried  about losing your housing?: No   Tobacco Use: Low Risk  (3/19/2025)    Patient History     Smoking Tobacco Use: Never     Smokeless Tobacco Use: Never     Passive Exposure: Not on file   Financial Resource Strain: Low Risk  (3/17/2025)    Financial Resource Strain     Within the past 12 months, have you or your family members you live with been unable to get utilities (heat, electricity) when it was really needed?: No   Alcohol Use: Not on file   Transportation Needs: Low Risk  (3/17/2025)    Transportation Needs     Within the past 12 months, has lack of transportation kept you from medical appointments, getting your medicines, non-medical meetings or appointments, work, or from getting things that you need?: No   Physical Activity: Insufficiently Active (3/17/2025)    Exercise Vital Sign     Days of Exercise per Week: 1 day     Minutes of Exercise per Session: 10 min   Interpersonal Safety: Low Risk  (3/27/2025)    Interpersonal Safety     Do you feel physically and emotionally safe where you currently live?: Yes     Within the past 12 months, have you been hit, slapped, kicked or otherwise physically hurt by someone?: No     Within the past 12 months, have you been humiliated or emotionally abused in other ways by your partner or ex-partner?: No   Stress: No Stress Concern Present (3/17/2025)    [employer] - Occupational Stress Questionnaire     Feeling of Stress : Not at all   Social Connections: Unknown (3/17/2025)    Social Connection and Isolation Panel [NHANES]     Frequency of Communication with Friends and Family: Not on file     Frequency of Social Gatherings with Friends and Family: Once a week     Attends Ep[employer]alian Services: Not on file     Active Member of Clubs or Organizations: Not on file     Attends Club or Organization Meetings: Not on file     Marital Status: Not on file   Health Literacy: Not on file       Functional Status:  Prior to admission patient needed  assistance:   Dependent ADLs:: Independent  Dependent IADLs:: Independent       Mental Health Status:          Chemical Dependency Status:                Values/Beliefs:  Spiritual, Cultural Beliefs, Gnosticist Practices, Values that affect care:                 Discussed  Partnership in Safe Discharge Planning  document with patient/family: No    Additional Information:  Assessment completed with patient. Patient reports he lives in his mobile home with two cousins. He is independent with ADLs/IADLs, ambulates without devices and has no services in community. Son Dudley is primary  family contact. Family willing to transport at discharge.        Next Steps:   Follow for progression and recommendations.        Maria Fernanda Hercules RN

## 2025-03-29 ENCOUNTER — APPOINTMENT (OUTPATIENT)
Dept: PHYSICAL THERAPY | Facility: HOSPITAL | Age: 70
End: 2025-03-29
Attending: INTERNAL MEDICINE
Payer: COMMERCIAL

## 2025-03-29 LAB
CK SERPL-CCNC: 3605 U/L (ref 39–308)
GLUCOSE BLDC GLUCOMTR-MCNC: 104 MG/DL (ref 70–99)
GLUCOSE BLDC GLUCOMTR-MCNC: 112 MG/DL (ref 70–99)
GLUCOSE BLDC GLUCOMTR-MCNC: 114 MG/DL (ref 70–99)
GLUCOSE BLDC GLUCOMTR-MCNC: 118 MG/DL (ref 70–99)
GLUCOSE BLDC GLUCOMTR-MCNC: 98 MG/DL (ref 70–99)
HOLD SPECIMEN: NORMAL
MYOGLOBIN UR-MCNC: <1 MG/L
UFH PPP CHRO-ACNC: 0.33 IU/ML

## 2025-03-29 PROCEDURE — 250N000013 HC RX MED GY IP 250 OP 250 PS 637: Performed by: STUDENT IN AN ORGANIZED HEALTH CARE EDUCATION/TRAINING PROGRAM

## 2025-03-29 PROCEDURE — 250N000011 HC RX IP 250 OP 636: Performed by: STUDENT IN AN ORGANIZED HEALTH CARE EDUCATION/TRAINING PROGRAM

## 2025-03-29 PROCEDURE — 250N000013 HC RX MED GY IP 250 OP 250 PS 637: Performed by: INTERNAL MEDICINE

## 2025-03-29 PROCEDURE — 250N000011 HC RX IP 250 OP 636: Mod: JZ | Performed by: STUDENT IN AN ORGANIZED HEALTH CARE EDUCATION/TRAINING PROGRAM

## 2025-03-29 PROCEDURE — 97110 THERAPEUTIC EXERCISES: CPT | Mod: GP

## 2025-03-29 PROCEDURE — 84550 ASSAY OF BLOOD/URIC ACID: CPT | Performed by: INTERNAL MEDICINE

## 2025-03-29 PROCEDURE — 36415 COLL VENOUS BLD VENIPUNCTURE: CPT | Performed by: STUDENT IN AN ORGANIZED HEALTH CARE EDUCATION/TRAINING PROGRAM

## 2025-03-29 PROCEDURE — 85520 HEPARIN ASSAY: CPT | Performed by: STUDENT IN AN ORGANIZED HEALTH CARE EDUCATION/TRAINING PROGRAM

## 2025-03-29 PROCEDURE — 99233 SBSQ HOSP IP/OBS HIGH 50: CPT | Performed by: INTERNAL MEDICINE

## 2025-03-29 PROCEDURE — 97530 THERAPEUTIC ACTIVITIES: CPT | Mod: GP

## 2025-03-29 PROCEDURE — 82550 ASSAY OF CK (CPK): CPT | Performed by: STUDENT IN AN ORGANIZED HEALTH CARE EDUCATION/TRAINING PROGRAM

## 2025-03-29 PROCEDURE — 120N000004 HC R&B MS OVERFLOW

## 2025-03-29 RX ORDER — HYDROMORPHONE HYDROCHLORIDE 2 MG/1
2 TABLET ORAL
Status: DISCONTINUED | OUTPATIENT
Start: 2025-03-29 | End: 2025-04-02

## 2025-03-29 RX ORDER — DOCUSATE SODIUM 100 MG/1
100 CAPSULE, LIQUID FILLED ORAL 2 TIMES DAILY PRN
Status: DISCONTINUED | OUTPATIENT
Start: 2025-03-29 | End: 2025-04-15 | Stop reason: HOSPADM

## 2025-03-29 RX ORDER — MULTIPLE VITAMINS W/ MINERALS TAB 9MG-400MCG
1 TAB ORAL DAILY
Status: DISCONTINUED | OUTPATIENT
Start: 2025-03-29 | End: 2025-04-15 | Stop reason: HOSPADM

## 2025-03-29 RX ORDER — AMOXICILLIN 250 MG
1 CAPSULE ORAL 2 TIMES DAILY PRN
Status: DISCONTINUED | OUTPATIENT
Start: 2025-03-29 | End: 2025-03-30

## 2025-03-29 RX ORDER — POLYETHYLENE GLYCOL 3350 17 G/17G
17 POWDER, FOR SOLUTION ORAL DAILY PRN
Status: DISCONTINUED | OUTPATIENT
Start: 2025-03-29 | End: 2025-03-31

## 2025-03-29 RX ADMIN — RIVAROXABAN 20 MG: 10 TABLET, FILM COATED ORAL at 16:50

## 2025-03-29 RX ADMIN — Medication 1 TABLET: at 16:50

## 2025-03-29 RX ADMIN — ACETAMINOPHEN 975 MG: 325 TABLET ORAL at 00:18

## 2025-03-29 RX ADMIN — ACETAMINOPHEN 975 MG: 325 TABLET ORAL at 08:44

## 2025-03-29 RX ADMIN — HEPARIN SODIUM 1500 UNITS/HR: 10000 INJECTION, SOLUTION INTRAVENOUS at 14:56

## 2025-03-29 RX ADMIN — ASPIRIN 81 MG: 81 TABLET, COATED ORAL at 09:04

## 2025-03-29 RX ADMIN — LISINOPRIL 5 MG: 5 TABLET ORAL at 09:04

## 2025-03-29 RX ADMIN — SENNOSIDES AND DOCUSATE SODIUM 1 TABLET: 50; 8.6 TABLET ORAL at 09:04

## 2025-03-29 RX ADMIN — HYDROMORPHONE HYDROCHLORIDE 0.4 MG: 0.2 INJECTION, SOLUTION INTRAMUSCULAR; INTRAVENOUS; SUBCUTANEOUS at 09:29

## 2025-03-29 RX ADMIN — HYDROMORPHONE HYDROCHLORIDE 0.2 MG: 0.2 INJECTION, SOLUTION INTRAMUSCULAR; INTRAVENOUS; SUBCUTANEOUS at 12:32

## 2025-03-29 RX ADMIN — ACETAMINOPHEN 975 MG: 325 TABLET ORAL at 16:50

## 2025-03-29 ASSESSMENT — ACTIVITIES OF DAILY LIVING (ADL)
ADLS_ACUITY_SCORE: 40
ADLS_ACUITY_SCORE: 39
ADLS_ACUITY_SCORE: 39
ADLS_ACUITY_SCORE: 40
ADLS_ACUITY_SCORE: 39
ADLS_ACUITY_SCORE: 40
ADLS_ACUITY_SCORE: 39
ADLS_ACUITY_SCORE: 40
ADLS_ACUITY_SCORE: 39
ADLS_ACUITY_SCORE: 40

## 2025-03-29 NOTE — PROGRESS NOTES
Care Management Follow Up    Length of Stay (days): 2    Expected Discharge Date: 04/03/2025    Anticipated Discharge Plan:  Home, Home Care, Acute Rehab    Transportation: TBD    PT Recommendations: Acute Rehab Center-Motivated patient will benefit from intensive, interdisciplinary therapy.  Anticipate will be able to tolerate 3 hours of therapy per day  OT Recommendations:  Acute Rehab Center-Motivated patient will benefit from intensive, interdisciplinary therapy.  Anticipate will be able to tolerate 3 hours of therapy per day     Barriers to Discharge: medical stability    Prior Living Situation: mobile home with  (2 cousins)    Discussed  Partnership in Safe Discharge Planning  document with patient/family: No     Handoff Completed: No, handoff not indicated or clinically appropriate    Patient/Spokesperson Updated: Yes. Who? Pt, pts brother, son, and sister    Additional Information:  SW met with pt to discuss ongoing plan of care. Pts brother, son and sister present and remained for the conversation with the permission of the pt. Discussed PT/OT recommendation for ARU. SW provided education about this level of care. Pt asks about his ability to instead discharge to his sister's home in [place] with outpatient therapy. SW provided information on the differences between these levels of care including discussion if pt would be able to safely navigate his sister's home at time of discharge. Pt reports his sister can physically assist him if needed. Discussed that the amount of therapy would be much less as well. Pt reports understanding and after discussion is agreeable to SW send referrals to ARU facilities and a referral for home care (PT/OT and RN for wound vac care). Pt concurs that going to outpatient therapy and outpatient wound care for his wound vac would likely be too much after discharge. Discussed plan for SW to follow pt progression and check in with him about discharge plan when we are closer to  discharge. SW also informed him of plan to update PT on his question about being able to discharge to his sister's home. Pt agreeable. SW provided aRU brochure to review for more information.     KELI sent initial ARU referrals to [facility] and [facility]. KELI sent initial home care referral. KELI sent message to PT scheduled to see pt on 3/30 with update.    Next Steps: medical stability, vascular plan, further discussion with pt about plan: ARU vs home with home care, ordering home wound vac if pt will discharge to home    LUPE Wisdom

## 2025-03-29 NOTE — PROGRESS NOTES
[facility]    Medicine Progress Note - Hospitalist Service    Date of Admission:  3/27/2025    Assessment & Plan   69 male with history of CAD, hypertension is admitted for acute left lower extremity pain and found to have acute limb ischemia, underwent 4 compartment fasciotomy urgently on admission.  Monitored in the ICU and downgraded clinically 3/28.    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  - Continue aspirin  - Trend CK daily  - Wound vac placed 3/28/2025 with plans for next change 3/31/2025  - Postoperative cares per vascular surgery    Chronic heart failure with mildly reduced ejection fraction  Echo this admission with EF 45 to 50%  - Not clinically hypervolemic, monitor for edema    CAD  Remote CABG, no anginal chest pain, monitor clinically  - Continues on aspirin  - Not routinely on statin, start when CK further resolved    Essential hypertension  - Resumed usual lisinopril 5 mg daily, metoprolol succinate 12.5 mg daily    Diet: Low Saturated Fat Na <2400 mg  Snacks/Supplements Adult: Ensure Max Protein (bariatric); Between Meals    DVT Prophylaxis: Lovenox  Capps Catheter: Not present  Lines: None   Cardiac Monitoring: None  Code Status: Full Code      Disposition Plan     Medically Ready for Discharge: Anticipated in 5+ Days pending management by vascular surgery and placement afterwards    Luis Britt DO  Hospitalist Service  [facility]  Securely message with [employer] (more info)  Text page via  [employer] Paging/Directory   ______________________________________________________________________    Interval History   No overnight events.    On eval this am, is sitting on the chair next to bed and accompanied by son, sister, brother in law. Wound vac in place. While pain is still present (2/10 on pain scale), is drastically improved compared to initial admission. No fevers, chills.    Physical Exam   Vital Signs: Temp: 98  F (36.7  C) Temp src: Oral BP: 104/58 Pulse: 83   Resp: 18 SpO2: 94 % O2 Device: None (Room air)    Weight: 227 lbs 1.18 oz    Consitutional: Alert, no distress  CV: heart rate regular, soft systolic murmur  Lungs: lungs clear with no tripoding  GI: abdomen soft  MSK: left lower extremity with wound vac    Medical Decision Making       52 MINUTES SPENT BY ME on the date of service doing chart review, history, exam, documentation & further activities per the note.  NOTE(S)/MEDICAL RECORDS REVIEWED over the past 24 hours: Vitals, labs, new imaging, documentation and medication administrations       Data         Imaging results reviewed over the past 24 hrs:   No results found for this or any previous visit (from the past 24 hours).

## 2025-03-29 NOTE — PLAN OF CARE
Goal Outcome Evaluation:      Plan of Care Reviewed With: patient    Overall Patient Progress: improving Overall Patient Progress: improving     Pt came tonight from ICU. VSS on RA. Pt is not tele status. Reporting pain at 4-5/10, prn dilaudid given. Wound vac in place to LLE, no drainage in canister. CMS intact and pulses present with doppler.

## 2025-03-29 NOTE — CONSULTS
"CLINICAL NUTRITION SERVICES - ASSESSMENT NOTE    RECOMMENDATIONS FOR MDs/PROVIDERS TO ORDER:      Registered Dietitian Interventions:  Provided pt wound healing nutrition education -including handouts. Encouraged him to eat more than once per day as well.  Ensure Max daily, vary flavors  Expedite daily   Multivitamin daily with minerals per Staff Recommendation    Future/Additional Recommendations:  Will monitor intake, wt, healing per providers and WOC     REASON FOR ASSESSMENT  Discharging to home with wound vac post fasciotomy. Needs dietary recommendations for wound healing     Per chart, 69 male with history of CAD, CABG 2023, pre DM, hypertension, CHF is admitted for acute left lower extremity pain and found to have acute limb ischemia, underwent 4 compartment fasciotomy urgently on admission      INFORMATION OBTAINED  Assessed patient in room. Pt with family at bedside both visits today.    NUTRITION HISTORY  Usually eats one meal daily at home  He likes some vegetables but, a limited amount  No DM per pt    CURRENT NUTRITION ORDERS  Diet: Low Saturated Fat/2400 mg Sodium    CURRENT INTAKE/TOLERANCE  Pt reports he doesn't have much of an appetite  Pt ate a Egg Mc Muffin from [employer] this morning.  He did not order lunch    LABS  Nutrition-relevant labs:   CK total 3605 H  FSBG 104, 112, 114 - no insulin needed    MEDICATIONS  Nutrition-relevant medications:   Sliding scale insulin  Cont, IV heparin    ANTHROPOMETRICS  Height: 170.2 cm (5' 7\")  Admission Weight: 101.6 kg (223 lb 14.4 oz) (03/28/25 0600)   Most Recent Weight: 103 kg (227 lb 1.2 oz) (03/29/25 0422)  IBW: 67.2 kg  BMI: Body mass index is 35.56 kg/m .   Weight History:   Wt Readings from Last 10 Encounters:   03/29/25 103 kg (227 lb 1.2 oz)   03/27/25 95.3 kg (210 lb)   03/19/25 95.7 kg (211 lb)   09/13/24 95.7 kg (211 lb)   03/13/24 95.3 kg (210 lb)   10/04/23 95.2 kg (209 lb 14.4 oz)   09/08/23 94.9 kg (209 lb 4.8 oz)   06/05/23 95.3 kg (210 " lb)   05/04/23 95.8 kg (211 lb 3.2 oz)   03/07/23 93.9 kg (207 lb)      Dosing Weight: 95.3 kg, based on  usual BW    ASSESSED NUTRITION NEEDS  Estimated Energy Needs: 2000 -2100+ kcals/day ([place] Jeor)x 1.2+  Justification: Increased needs  Estimated Protein Needs: 110 -125 grams protein/day (1.2 - 1.5 grams of pro/kg)  Justification: Wound healing  Estimated Fluid Needs: 2000 -2100 mL/day (1 mL/kcal)  Justification: Maintenance, CHF    SYSTEM AND PHYSICAL FINDINGS    GI symptoms:  Last BM 3/25/25 per pt  Skin/wounds: surgical incisions noted to left leg, edema noted, wound vac in place    MALNUTRITION  % Intake: Decreased intake does not meet criteria  % Weight Loss: None noted  Subcutaneous Fat Loss: None observed  Muscle Loss: None observed  Fluid Accumulation/Edema: Mild, 1+  Malnutrition Diagnosis: Patient does not meet two of the established criteria necessary for diagnosing malnutrition but is at risk for malnutrition  Malnutrition Present on Admission: Yes    NUTRITION DIAGNOSIS  Food and nutrition related knowledge deficit  Increased nutrient needs    related to wound healing as evidenced by wound vac post fasciotomy    INTERVENTIONS  Medical food supplement therapy  Nutrition education content  Vitamin and mineral supplement therapy    GOALS  Patient to consume % of nutritionally adequate meal trays TID, or the equivalent with supplements/snacks.  Wound healing    MONITORING/EVALUATION  Progress toward goals will be monitored and evaluated per policy.

## 2025-03-29 NOTE — PLAN OF CARE
Goal Outcome Evaluation:      Plan of Care Reviewed With: patient    Overall Patient Progress: improvingOverall Patient Progress: improving    Outcome Evaluation: Pt assisted to chair with assist of 2 and gait belt this morning.  Pt ambulated with therapy and walker and gait belt.  Therapy feels like pt can ambulate to bathroom if needed.  Pt refused bowel meds as he feels like he will have a BM today.  RN left note for md about refusal of bowel meds.   Pt wound vac patent.  IV dilaudid helps with pain, pt does not tolerated oxycodone po.  Note left for MD to address po pain meds.  Heparin drip infusing.  XA  lab in AM.

## 2025-03-29 NOTE — PROGRESS NOTES
The patient is seen at the bedside and is doing well, reporting no new complaints. The wound VAC was changed yesterday, and the vital signs are stable. The Capps catheter has been removed. The patient is currently on a heparin drip for anticoagulation, but today we plan to discontinue the heparin drip and start full-dose oral anticoagulation instead. We will continue with optimal medical management therapy.    Dr. Coelho discussed the possibility of aortic reconstruction with the patient in case embolic events persist, and the patient understands this plan. In the meantime, we will continue medical management and monitor the patient peripherally. The next wound VAC change is scheduled for Monday.

## 2025-03-29 NOTE — PLAN OF CARE
Problem: VTE (Venous Thromboembolism)  Goal: Tissue Perfusion  Outcome: Progressing     Problem: Pain Acute  Goal: Optimal Pain Control and Function  Outcome: Progressing     Problem: Surgery Nonspecified  Goal: Absence of Bleeding  Outcome: Progressing  Goal: Blood Glucose Level Within Targeted Range  Outcome: Progressing  Goal: Absence of Infection Signs and Symptoms  Outcome: Progressing     Goal Outcome Evaluation:         Denied any pain. Left lower extremity fasciotomy sites has wound vac in place. Weak pedal pulses bilaterally and doppler for left posterior tibial pulse. Denied any numbness. Heparin drip infusing.

## 2025-03-29 NOTE — PLAN OF CARE
Goal Outcome Evaluation:      Plan of Care Reviewed With: patient    Overall Patient Progress: improvingOverall Patient Progress: improving    Outcome Evaluation: No drainage to wound vac.    [facility] - ICU    RN Progress Note:            Pertinent Assessments:      Please refer to flowsheet rows for full assessment     Pt is alert and oriented x4. VSS on RA. Declines PRN pain medication at this time. 3/10 pain in LLE. No drainage from wound vac.            Key Events - This Shift:       Report called to Deena ABURTO on P3 and pt transferred at 2215 with belongings including cane, phone and , clothing, and meds.     Uneventful.      RN Managed Protocols Ordered:  Yes  Protocols:Heparin  PRN'S:  Protocols Status: Reviewed with Oncoming RN                Barriers to Discharge / Downgrade:     Previously downgraded--transferred          Point of Contact Update: YES-OR-NO: Yes, family at bedside

## 2025-03-29 NOTE — PROGRESS NOTES
Chart review only:  Received a consult regarding this 69-year-old male with arterial thromboembolism.  Appears to have significant cardiovascular disease history.  With acute left lower extremity limb ischemia.  Concern for compartment syndrome and status post fasciotomy.  Vascular surgery on board.  Currently on heparin.  Aneurysmal dilatation of the infrarenal abdominal aorta noted on the CT angio of the abdomen with large amount of irregular soft tissue plaque.  Looks like hypercoagulability workup was ordered by vascular surgery.  Typically inherited thrombophilia are not associated with arterial thrombosis.  Perhaps APLS is only hypercoagulability state that is known to cause both arterial and venous clots.  And labs are pending.  Currently on heparin.  I am not sure when the lupus anticoagulant was ordered.  If it was done while he was on heparin then results can be inconclusive.  Irrespective of the workup he will need anticoagulation anyway.  Typically hematology does not follow patients with arterial thromboembolism with clear-cut cardiovascular disease risk factors.  If lupus anticoagulant comes back positive then he will have to probably go on warfarin.  Otherwise any DOAC will do.  We will sign off.  Call us with any questions.    Sandy Short MD  Hematology and Medical Oncology  [facility]

## 2025-03-30 ENCOUNTER — APPOINTMENT (OUTPATIENT)
Dept: PHYSICAL THERAPY | Facility: HOSPITAL | Age: 70
DRG: 501 | End: 2025-03-30
Attending: INTERNAL MEDICINE
Payer: COMMERCIAL

## 2025-03-30 LAB
CK SERPL-CCNC: 1737 U/L (ref 39–308)
GLUCOSE BLDC GLUCOMTR-MCNC: 116 MG/DL (ref 70–99)
GLUCOSE BLDC GLUCOMTR-MCNC: 122 MG/DL (ref 70–99)
GLUCOSE BLDC GLUCOMTR-MCNC: 123 MG/DL (ref 70–99)
GLUCOSE BLDC GLUCOMTR-MCNC: 123 MG/DL (ref 70–99)
GLUCOSE BLDC GLUCOMTR-MCNC: 164 MG/DL (ref 70–99)
HGB BLD-MCNC: 11.9 G/DL (ref 13.3–17.7)
URATE SERPL-MCNC: 5.2 MG/DL (ref 3.4–7)

## 2025-03-30 PROCEDURE — 250N000013 HC RX MED GY IP 250 OP 250 PS 637: Performed by: STUDENT IN AN ORGANIZED HEALTH CARE EDUCATION/TRAINING PROGRAM

## 2025-03-30 PROCEDURE — 85018 HEMOGLOBIN: CPT | Performed by: INTERNAL MEDICINE

## 2025-03-30 PROCEDURE — 82550 ASSAY OF CK (CPK): CPT | Performed by: STUDENT IN AN ORGANIZED HEALTH CARE EDUCATION/TRAINING PROGRAM

## 2025-03-30 PROCEDURE — 97116 GAIT TRAINING THERAPY: CPT | Mod: GP

## 2025-03-30 PROCEDURE — 250N000013 HC RX MED GY IP 250 OP 250 PS 637: Performed by: INTERNAL MEDICINE

## 2025-03-30 PROCEDURE — 250N000011 HC RX IP 250 OP 636: Mod: JZ | Performed by: STUDENT IN AN ORGANIZED HEALTH CARE EDUCATION/TRAINING PROGRAM

## 2025-03-30 PROCEDURE — 97110 THERAPEUTIC EXERCISES: CPT | Mod: GP

## 2025-03-30 PROCEDURE — 99233 SBSQ HOSP IP/OBS HIGH 50: CPT | Performed by: INTERNAL MEDICINE

## 2025-03-30 PROCEDURE — 36415 COLL VENOUS BLD VENIPUNCTURE: CPT | Performed by: HOSPITALIST

## 2025-03-30 PROCEDURE — 120N000004 HC R&B MS OVERFLOW

## 2025-03-30 RX ORDER — POLYETHYLENE GLYCOL 3350 17 G/17G
17 POWDER, FOR SOLUTION ORAL 2 TIMES DAILY
Status: DISCONTINUED | OUTPATIENT
Start: 2025-03-30 | End: 2025-04-13

## 2025-03-30 RX ORDER — AMOXICILLIN 250 MG
1 CAPSULE ORAL 2 TIMES DAILY
Status: DISCONTINUED | OUTPATIENT
Start: 2025-03-30 | End: 2025-04-07

## 2025-03-30 RX ADMIN — ACETAMINOPHEN 975 MG: 325 TABLET ORAL at 16:30

## 2025-03-30 RX ADMIN — INSULIN ASPART 1 UNITS: 100 INJECTION, SOLUTION INTRAVENOUS; SUBCUTANEOUS at 12:24

## 2025-03-30 RX ADMIN — SENNOSIDES AND DOCUSATE SODIUM 1 TABLET: 50; 8.6 TABLET ORAL at 13:11

## 2025-03-30 RX ADMIN — ACETAMINOPHEN 975 MG: 325 TABLET ORAL at 01:08

## 2025-03-30 RX ADMIN — LISINOPRIL 5 MG: 5 TABLET ORAL at 08:37

## 2025-03-30 RX ADMIN — ASPIRIN 81 MG: 81 TABLET, COATED ORAL at 08:34

## 2025-03-30 RX ADMIN — HYDROMORPHONE HYDROCHLORIDE 2 MG: 2 TABLET ORAL at 08:37

## 2025-03-30 RX ADMIN — HYDROMORPHONE HYDROCHLORIDE 2 MG: 2 TABLET ORAL at 13:11

## 2025-03-30 RX ADMIN — Medication 1 TABLET: at 08:37

## 2025-03-30 RX ADMIN — Medication 60 ML: at 08:35

## 2025-03-30 RX ADMIN — ACETAMINOPHEN 975 MG: 325 TABLET ORAL at 08:36

## 2025-03-30 RX ADMIN — RIVAROXABAN 20 MG: 10 TABLET, FILM COATED ORAL at 16:31

## 2025-03-30 RX ADMIN — METOPROLOL SUCCINATE 25 MG: 25 TABLET, EXTENDED RELEASE ORAL at 08:36

## 2025-03-30 RX ADMIN — HYDROMORPHONE HYDROCHLORIDE 0.2 MG: 0.2 INJECTION, SOLUTION INTRAMUSCULAR; INTRAVENOUS; SUBCUTANEOUS at 10:14

## 2025-03-30 ASSESSMENT — ACTIVITIES OF DAILY LIVING (ADL)
ADLS_ACUITY_SCORE: 42
ADLS_ACUITY_SCORE: 40
ADLS_ACUITY_SCORE: 42
ADLS_ACUITY_SCORE: 40
ADLS_ACUITY_SCORE: 42
ADLS_ACUITY_SCORE: 40
ADLS_ACUITY_SCORE: 42
ADLS_ACUITY_SCORE: 40
ADLS_ACUITY_SCORE: 40
ADLS_ACUITY_SCORE: 42
ADLS_ACUITY_SCORE: 40
ADLS_ACUITY_SCORE: 42

## 2025-03-30 NOTE — PLAN OF CARE
Goal Outcome Evaluation:      Plan of Care Reviewed With: patient    Overall Patient Progress: improvingOverall Patient Progress: improving    Outcome Evaluation: Pt up in chair most of the day.  Pt with no bm so far.  Pt tolerating dilaudid po.  Pt continues on room air.    Right knuckle swollen and red.  MD aware and ice applied.

## 2025-03-30 NOTE — PROGRESS NOTES
Vascular progress note    The patient is doing well this morning.  Transition to Xarelto 20 mg a day.    On exam,  Left DP and PT are biphasic  Wound VAC in place with good seal and no bleeding    Impression: Left lower extremity compartment syndrome status post 4 compartment fasciotomy    Continue neurovascular check, contact us if there is any questions or concerns or any concerning changes  Continue Xarelto  Okay for any activities  Appreciate help from walk service for wound VAC changes  Disposition plan    Irais Tao MD  Vascular Surgery Fellow

## 2025-03-30 NOTE — PLAN OF CARE
Problem: VTE (Venous Thromboembolism)  Goal: Tissue Perfusion  Outcome: Progressing     Problem: Pain Acute  Goal: Optimal Pain Control and Function  Outcome: Progressing  Intervention: Develop Pain Management Plan  Recent Flowsheet Documentation  Taken 3/30/2025 2493 by Elizabeth Foley RN  Pain Management Interventions:   emotional support   repositioned     Problem: Surgery Nonspecified  Goal: Absence of Bleeding  Outcome: Progressing  Goal: Absence of Infection Signs and Symptoms  Outcome: Progressing     Goal Outcome Evaluation:         Transitioned to xarelto now. Very mild pain, rated 2 out of 10. Scheduled tylenol given. Wound vac in place. No leaking noted.

## 2025-03-30 NOTE — PROGRESS NOTES
[facility]    Medicine Progress Note - Hospitalist Service    Date of Admission:  3/27/2025    Assessment & Plan   69 male with history of CAD, hypertension is admitted for acute left lower extremity pain and found to have acute limb ischemia, underwent 4 compartment fasciotomy urgently on admission.  Monitored in the ICU and downgraded clinically 3/28.    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 If without improvement, can proceed with gout treatment    Chronic heart failure with mildly reduced ejection fraction  Echo this admission with EF 45 to 50%  - Not clinically hypervolemic, monitor for edema    CAD  Remote CABG, no anginal chest pain, monitor clinically  - Continues on aspirin  - Not routinely on statin, start when CK further resolved    Essential hypertension  - Resumed usual lisinopril 5 mg daily, metoprolol succinate 12.5 mg daily    PT/OT: TCU  Diet: Low Saturated Fat Na <2400 mg  Snacks/Supplements Adult: Ensure Max Protein (bariatric);  Between Meals    DVT Prophylaxis: Lovenox  Capps Catheter: Not present  Lines: None   Cardiac Monitoring: None  Code Status: Full Code      Disposition Plan     Medically Ready for Discharge: Anticipated in 5+ Days pending management by vascular surgery and placement afterwards    Luis Britt DO  Hospitalist Service  [facility]  Securely message with [employer] (more info)  Text page via [employer] Paging/Directory   ______________________________________________________________________    Interval History   No overnight events.    On eval this am, is sitting on the chair next to bed. LLE aching a bit more today compared to yesterday as he just finished working with PT before my arrival. But he notes LLE still slowly getting better compared to initial admission. He also is experiencing slight pain to R 3rd finger PIP joint since yesterday afternoon. States he may have bumped it on something yesterday prompting the pain. Informed him we can apply ice to the area to see if it helps. His last BM was 5 days ago with typical regimen being every 3-4 days.    Physical Exam   Vital Signs: Temp: 98  F (36.7  C) Temp src: Oral BP: 115/69 Pulse: 84   Resp: 16 SpO2: 95 % O2 Device: None (Room air)    Weight: 218 lbs 14.67 oz    Consitutional: Alert, no distress  CV: heart rate regular, soft systolic murmur  Lungs: lungs clear with no tripoding  GI: abdomen soft  MSK: left lower extremity with wound vac. Erythema/warmth/ttp to dorsal aspect of R 3rd finger PIP joint. Good ROM without pain to this finger    Medical Decision Making       52 MINUTES SPENT BY ME on the date of service doing chart review, history, exam, documentation & further activities per the note.  NOTE(S)/MEDICAL RECORDS REVIEWED over the past 24 hours: Vitals, labs, new imaging, documentation and medication administrations       Data     I have personally reviewed the following data over the past 24 hrs:    N/A  \   11.9 (L)   / N/A     N/A  N/A N/A /  123 (H)   N/A N/A N/A \       Imaging results reviewed over the past 24 hrs:   No results found for this or any previous visit (from the past 24 hours).

## 2025-03-31 ENCOUNTER — APPOINTMENT (OUTPATIENT)
Dept: OCCUPATIONAL THERAPY | Facility: HOSPITAL | Age: 70
End: 2025-03-31
Attending: INTERNAL MEDICINE
Payer: COMMERCIAL

## 2025-03-31 ENCOUNTER — APPOINTMENT (OUTPATIENT)
Dept: RADIOLOGY | Facility: HOSPITAL | Age: 70
End: 2025-03-31
Attending: INTERNAL MEDICINE
Payer: COMMERCIAL

## 2025-03-31 ENCOUNTER — APPOINTMENT (OUTPATIENT)
Dept: PHYSICAL THERAPY | Facility: HOSPITAL | Age: 70
End: 2025-03-31
Attending: STUDENT IN AN ORGANIZED HEALTH CARE EDUCATION/TRAINING PROGRAM
Payer: COMMERCIAL

## 2025-03-31 LAB
ANION GAP SERPL CALCULATED.3IONS-SCNC: 7 MMOL/L (ref 7–15)
B2 GLYCOPROT1 IGG SERPL IA-ACNC: 1.4 U/ML
BUN SERPL-MCNC: 11.5 MG/DL (ref 8–23)
CALCIUM SERPL-MCNC: 8.6 MG/DL (ref 8.8–10.4)
CARDIOLIPIN IGG SER IA-ACNC: 2.4 GPL-U/ML
CARDIOLIPIN IGG SER IA-ACNC: NEGATIVE
CARDIOLIPIN IGM SER IA-ACNC: <2 MPL-U/ML
CARDIOLIPIN IGM SER IA-ACNC: NEGATIVE
CHLORIDE SERPL-SCNC: 105 MMOL/L (ref 98–107)
CK SERPL-CCNC: 945 U/L (ref 39–308)
CREAT SERPL-MCNC: 0.8 MG/DL (ref 0.67–1.17)
CRP SERPL-MCNC: 198.9 MG/L
DRVVT SCREEN RATIO: 0.71
EGFRCR SERPLBLD CKD-EPI 2021: >90 ML/MIN/1.73M2
ERYTHROCYTE [DISTWIDTH] IN BLOOD BY AUTOMATED COUNT: 13.6 % (ref 10–15)
GLUCOSE BLDC GLUCOMTR-MCNC: 112 MG/DL (ref 70–99)
GLUCOSE BLDC GLUCOMTR-MCNC: 116 MG/DL (ref 70–99)
GLUCOSE BLDC GLUCOMTR-MCNC: 131 MG/DL (ref 70–99)
GLUCOSE BLDC GLUCOMTR-MCNC: 139 MG/DL (ref 70–99)
GLUCOSE BLDC GLUCOMTR-MCNC: 160 MG/DL (ref 70–99)
GLUCOSE SERPL-MCNC: 114 MG/DL (ref 70–99)
HCO3 SERPL-SCNC: 27 MMOL/L (ref 22–29)
HCT VFR BLD AUTO: 32 % (ref 40–53)
HEPZYMED PTT RATIO: 1.2
HEPZYMED PTT-LA: 42 SECONDS (ref 31–45)
HEPZYMED THROMBIN TIME: 19.9 SECONDS (ref 15.7–21.7)
HGB BLD-MCNC: 11.5 G/DL (ref 13.3–17.7)
INR PPP: 1.16 (ref 0.85–1.15)
LA PPP-IMP: NEGATIVE
LUPUS INTERPRETATION: ABNORMAL
MCH RBC QN AUTO: 30.1 PG (ref 26.5–33)
MCHC RBC AUTO-ENTMCNC: 35.9 G/DL (ref 31.5–36.5)
MCV RBC AUTO: 84 FL (ref 78–100)
PATIENT PTT-LA: 96 SECONDS (ref 31–45)
PLATELET # BLD AUTO: 280 10E3/UL (ref 150–450)
POTASSIUM SERPL-SCNC: 3.3 MMOL/L (ref 3.4–5.3)
POTASSIUM SERPL-SCNC: 3.8 MMOL/L (ref 3.4–5.3)
PROT C ACT/NOR PPP CHRO: 99 % (ref 70–170)
PROT S FREE AG ACT/NOR PPP IA: 72 % (ref 70–148)
RBC # BLD AUTO: 3.82 10E6/UL (ref 4.4–5.9)
SODIUM SERPL-SCNC: 139 MMOL/L (ref 135–145)
THROMBIN TIME: >60 SECONDS (ref 13–19)
URATE SERPL-MCNC: 5.3 MG/DL (ref 3.4–7)
WBC # BLD AUTO: 11.5 10E3/UL (ref 4–11)

## 2025-03-31 PROCEDURE — 250N000013 HC RX MED GY IP 250 OP 250 PS 637: Performed by: STUDENT IN AN ORGANIZED HEALTH CARE EDUCATION/TRAINING PROGRAM

## 2025-03-31 PROCEDURE — 97535 SELF CARE MNGMENT TRAINING: CPT | Mod: GO

## 2025-03-31 PROCEDURE — 250N000013 HC RX MED GY IP 250 OP 250 PS 637: Performed by: INTERNAL MEDICINE

## 2025-03-31 PROCEDURE — 73120 X-RAY EXAM OF HAND: CPT | Mod: RT

## 2025-03-31 PROCEDURE — 120N000004 HC R&B MS OVERFLOW

## 2025-03-31 PROCEDURE — 250N000011 HC RX IP 250 OP 636: Mod: JZ | Performed by: STUDENT IN AN ORGANIZED HEALTH CARE EDUCATION/TRAINING PROGRAM

## 2025-03-31 PROCEDURE — 86140 C-REACTIVE PROTEIN: CPT | Performed by: INTERNAL MEDICINE

## 2025-03-31 PROCEDURE — 82550 ASSAY OF CK (CPK): CPT | Performed by: STUDENT IN AN ORGANIZED HEALTH CARE EDUCATION/TRAINING PROGRAM

## 2025-03-31 PROCEDURE — 84132 ASSAY OF SERUM POTASSIUM: CPT | Performed by: INTERNAL MEDICINE

## 2025-03-31 PROCEDURE — 97110 THERAPEUTIC EXERCISES: CPT | Mod: GP

## 2025-03-31 PROCEDURE — 36415 COLL VENOUS BLD VENIPUNCTURE: CPT | Performed by: INTERNAL MEDICINE

## 2025-03-31 PROCEDURE — 84550 ASSAY OF BLOOD/URIC ACID: CPT | Performed by: INTERNAL MEDICINE

## 2025-03-31 PROCEDURE — 97530 THERAPEUTIC ACTIVITIES: CPT | Mod: GP

## 2025-03-31 PROCEDURE — 80048 BASIC METABOLIC PNL TOTAL CA: CPT | Performed by: INTERNAL MEDICINE

## 2025-03-31 PROCEDURE — 85041 AUTOMATED RBC COUNT: CPT | Performed by: INTERNAL MEDICINE

## 2025-03-31 PROCEDURE — 82310 ASSAY OF CALCIUM: CPT | Performed by: INTERNAL MEDICINE

## 2025-03-31 PROCEDURE — 99232 SBSQ HOSP IP/OBS MODERATE 35: CPT | Performed by: INTERNAL MEDICINE

## 2025-03-31 PROCEDURE — 250N000012 HC RX MED GY IP 250 OP 636 PS 637: Performed by: INTERNAL MEDICINE

## 2025-03-31 RX ORDER — PREDNISONE 5 MG/1
10 TABLET ORAL DAILY
Status: COMPLETED | OUTPATIENT
Start: 2025-03-31 | End: 2025-04-04

## 2025-03-31 RX ORDER — PANTOPRAZOLE SODIUM 40 MG/1
40 TABLET, DELAYED RELEASE ORAL
Status: DISCONTINUED | OUTPATIENT
Start: 2025-03-31 | End: 2025-04-15 | Stop reason: HOSPADM

## 2025-03-31 RX ORDER — CEFDINIR 300 MG/1
300 CAPSULE ORAL EVERY 12 HOURS SCHEDULED
Status: DISCONTINUED | OUTPATIENT
Start: 2025-03-31 | End: 2025-04-04

## 2025-03-31 RX ORDER — POTASSIUM CHLORIDE 1500 MG/1
40 TABLET, EXTENDED RELEASE ORAL ONCE
Status: COMPLETED | OUTPATIENT
Start: 2025-03-31 | End: 2025-03-31

## 2025-03-31 RX ADMIN — Medication 1 TABLET: at 09:08

## 2025-03-31 RX ADMIN — HYDROMORPHONE HYDROCHLORIDE 0.2 MG: 0.2 INJECTION, SOLUTION INTRAMUSCULAR; INTRAVENOUS; SUBCUTANEOUS at 16:33

## 2025-03-31 RX ADMIN — ACETAMINOPHEN 975 MG: 325 TABLET ORAL at 08:56

## 2025-03-31 RX ADMIN — SENNOSIDES AND DOCUSATE SODIUM 1 TABLET: 50; 8.6 TABLET ORAL at 20:42

## 2025-03-31 RX ADMIN — DOCUSATE SODIUM 100 MG: 100 CAPSULE, LIQUID FILLED ORAL at 15:22

## 2025-03-31 RX ADMIN — HYDROMORPHONE HYDROCHLORIDE 2 MG: 2 TABLET ORAL at 05:00

## 2025-03-31 RX ADMIN — POLYETHYLENE GLYCOL 3350 17 G: 17 POWDER, FOR SOLUTION ORAL at 09:09

## 2025-03-31 RX ADMIN — POTASSIUM CHLORIDE 40 MEQ: 1500 TABLET, EXTENDED RELEASE ORAL at 11:32

## 2025-03-31 RX ADMIN — HYDROMORPHONE HYDROCHLORIDE 0.2 MG: 0.2 INJECTION, SOLUTION INTRAMUSCULAR; INTRAVENOUS; SUBCUTANEOUS at 09:05

## 2025-03-31 RX ADMIN — HYDROMORPHONE HYDROCHLORIDE 2 MG: 2 TABLET ORAL at 11:32

## 2025-03-31 RX ADMIN — POLYETHYLENE GLYCOL 3350 17 G: 17 POWDER, FOR SOLUTION ORAL at 20:42

## 2025-03-31 RX ADMIN — CEFDINIR 300 MG: 300 CAPSULE ORAL at 20:42

## 2025-03-31 RX ADMIN — ACETAMINOPHEN 975 MG: 325 TABLET ORAL at 01:24

## 2025-03-31 RX ADMIN — HYDROMORPHONE HYDROCHLORIDE 0.2 MG: 0.2 INJECTION, SOLUTION INTRAMUSCULAR; INTRAVENOUS; SUBCUTANEOUS at 08:56

## 2025-03-31 RX ADMIN — PREDNISONE 10 MG: 5 TABLET ORAL at 15:23

## 2025-03-31 RX ADMIN — HYDROMORPHONE HYDROCHLORIDE 2 MG: 2 TABLET ORAL at 15:22

## 2025-03-31 RX ADMIN — Medication 60 ML: at 09:18

## 2025-03-31 RX ADMIN — SENNOSIDES AND DOCUSATE SODIUM 1 TABLET: 50; 8.6 TABLET ORAL at 09:08

## 2025-03-31 RX ADMIN — ASPIRIN 81 MG: 81 TABLET, COATED ORAL at 09:08

## 2025-03-31 RX ADMIN — HYDROMORPHONE HYDROCHLORIDE 0.2 MG: 0.2 INJECTION, SOLUTION INTRAMUSCULAR; INTRAVENOUS; SUBCUTANEOUS at 12:30

## 2025-03-31 RX ADMIN — PANTOPRAZOLE SODIUM 40 MG: 40 TABLET, DELAYED RELEASE ORAL at 15:23

## 2025-03-31 RX ADMIN — RIVAROXABAN 20 MG: 10 TABLET, FILM COATED ORAL at 16:33

## 2025-03-31 RX ADMIN — ACETAMINOPHEN 975 MG: 325 TABLET ORAL at 16:34

## 2025-03-31 RX ADMIN — METOPROLOL SUCCINATE 25 MG: 25 TABLET, EXTENDED RELEASE ORAL at 09:08

## 2025-03-31 RX ADMIN — LISINOPRIL 5 MG: 5 TABLET ORAL at 09:08

## 2025-03-31 RX ADMIN — HYDROMORPHONE HYDROCHLORIDE 0.2 MG: 0.2 INJECTION, SOLUTION INTRAMUSCULAR; INTRAVENOUS; SUBCUTANEOUS at 13:43

## 2025-03-31 ASSESSMENT — ACTIVITIES OF DAILY LIVING (ADL)
ADLS_ACUITY_SCORE: 42
ADLS_ACUITY_SCORE: 40
ADLS_ACUITY_SCORE: 40
ADLS_ACUITY_SCORE: 39
ADLS_ACUITY_SCORE: 40
ADLS_ACUITY_SCORE: 40
ADLS_ACUITY_SCORE: 42
ADLS_ACUITY_SCORE: 40
ADLS_ACUITY_SCORE: 40
ADLS_ACUITY_SCORE: 42
ADLS_ACUITY_SCORE: 40
ADLS_ACUITY_SCORE: 40
ADLS_ACUITY_SCORE: 42
ADLS_ACUITY_SCORE: 40
ADLS_ACUITY_SCORE: 42
ADLS_ACUITY_SCORE: 40
ADLS_ACUITY_SCORE: 42
ADLS_ACUITY_SCORE: 40
ADLS_ACUITY_SCORE: 40
ADLS_ACUITY_SCORE: 42

## 2025-03-31 NOTE — PROGRESS NOTES
"VASCULAR SURGERY PROGRESS NOTE    Subjective:  Patient was seen and evaluated at the bedside for surgical follow-up. Pain is controlled, tolerates dressing change at the bedside. Worked with PT over the weekend who recommend discharge to ARU. Now on oral anticoagulation. No other concerns.    Objective:  Intake/Output Summary (Last 24 hours) at 3/31/2025 0905  Last data filed at 3/31/2025 0722  Gross per 24 hour   Intake --   Output 895 ml   Net -895 ml     PHYSICAL EXAM:  /66 (BP Location: Right arm)   Pulse 76   Temp 99.9  F (37.7  C) (Oral)   Resp 18   Ht 5' 7\" (1.702 m)   Wt 219 lb 5.7 oz (99.5 kg)   SpO2 95%   BMI 34.36 kg/m    General: The patient is alert and oriented. Appropriate. No acute distress  Psych: Pleasant affect, answers questions appropriately  Skin: Color appropriate for race, warm, dry  Respiratory: Normal respiratory effort   Extremities: Biphasic left DP and PT. Fasciotomy sites with healthy, viable tissue at wound base, minimal bloody drainage. Continued edema. Images uploaded to chart      Imaging:   Pertinent imaging reviewed     ASSESSMENT:  69 year old male who presented to with left leg pain.  History and workup were suspicious for acute ischemic event to bilateral lower extremity earlier this week with symptoms in the left leg significantly worse than right with initial improvement in pain in left lower extremity with development of progressive and motion of left foot and CK greater than 13,000 all suspicious for development of compartment syndrome in left lower extremity.     Underwent left lower extremity fasciotomy 3/27    PLAN:  Continue Xarelto  Wound VAC change with WOC today. Change MWF  Activity as tolerated, PT following  Continue neurovascular check, contact us if there is any questions or concerns or any concerning changes   Disposition plan     Discussed pt history, exam, assessment and plan with Dr. Coelho of the vascular surgery service, who is in agreement " with the above.    Marga Seth PA-C  VASCULAR SURGERY

## 2025-03-31 NOTE — PLAN OF CARE
Problem: Pain Acute  Goal: Optimal Pain Control and Function  Outcome: Progressing  Intervention: Develop Pain Management Plan  Recent Flowsheet Documentation  Taken 3/30/2025 1630 by Marcelo Almonte RN  Pain Management Interventions: declines     Problem: VTE (Venous Thromboembolism)  Goal: Tissue Perfusion  Outcome: Progressing  Goal: Right Ventricular Function  Outcome: Progressing   Goal Outcome Evaluation:       Pain has been well controlled with scheduled apap.  PT has wound vac with good seal.  Pt had good appetite and resting in bad tonight visiting with family.

## 2025-03-31 NOTE — PROVIDER NOTIFICATION
RN updated hospitalist of increasing redness and swelling in pt's 3rd right knuckle and pain.  Ice helps pain.       MD came and saw pt.  Labs, antibx and xray ordered.

## 2025-03-31 NOTE — CONSULTS
Care Management Follow Up    Length of Stay (days): 4    Expected Discharge Date: 04/02/2025    Anticipated Discharge Plan:  Home, Home Care, Acute Rehab    Transportation: TBD    PT Recommendations: Acute Rehab Center-Motivated patient will benefit from intensive, interdisciplinary therapy.  Anticipate will be able to tolerate 3 hours of therapy per day  OT Recommendations:  Acute Rehab Center-Motivated patient will benefit from intensive, interdisciplinary therapy.  Anticipate will be able to tolerate 3 hours of therapy per day     Barriers to Discharge: medical stability    Prior Living Situation: mobile home with  (2 cousins)    Discussed  Partnership in Safe Discharge Planning  document with patient/family: No     Handoff Completed: No, handoff not indicated or clinically appropriate    Patient/Spokesperson Updated: Yes. Who? Patient updated at bedside.     Additional Information:  Care management following for discharge planning.   [facility] has reviewed referral and are currently reviewing 2 concerns for ARU :  1) would need to make sure he can tolerate a wound vac change with no IV pain meds  2) need to see a bit more with therapies to determine if he can tolerate      At baseline, Patient reports he lives in his mobile home with two cousins. He is independent with ADLs/IADLs, ambulates without devices and has no services in community. Son Dudley is primary family contact.     Next Steps: Cm following for rehab / post acute care.    Peace Denney, JEANSW

## 2025-03-31 NOTE — PLAN OF CARE
Goal Outcome Evaluation:      Plan of Care Reviewed With: patient    Overall Patient Progress: improvingOverall Patient Progress: improving    Outcome Evaluation: 0930-Pt with extreme pain after vascular provider removed wound vac.  Pt rating pain 10/10 throbbing.  RN gave 0.4 IV dilaudid.  RN was unaware wound vac was being removed and unable to premedicate prior to wound vac removal.  RN educated on pt to use call light and to verbalize to any provider that he wants premeds before incision checks and dressing changes.  Pt verbalizes understanding.  Pt rates pain 3/10 after IV dilaudid.  RN checked with wound nurse to find out when wound vac would be replaced.  Wound nurse stated that they were not able to come today.  The plan was to replace wound vac tomorrow 4/1.  RN updated vascular provider.    Pt wants to rest this morning and is willing to be up in chair this afternoon.    Pt feels like his right 3rd knuckle is a lot worse today.  RN notes that redness is much worse and swelling is worse.  Pt has been icing knuckle as ordered by provider yesterday.  RN  notified provider.  See previous note.      Pain control has been biggest issue today.  Pt willing to try ice packs.  [facility] surgery okay'd ice to use.

## 2025-03-31 NOTE — PROGRESS NOTES
CLINICAL NUTRITION SERVICES - BRIEF NOTE     INFORMATION OBTAINED  Assessed patient in room.  Pt reports ongoing decreased appetite and decreased oral intakes. Pt notes forcing self to eat here. Ongoing constipation, taking scheduled bowel meds today. Pt taking 50% ensure max 3/29 and 100% expedite bottle 3/30. Pt declines additional supplements at this time.     CURRENT NUTRITION ORDERS  Diet: Low Saturated Fat/2400 mg Sodium  Snacks/Supplements: Ensure Max daily and Expedite bottle daily       CURRENT INTAKE/TOLERANCE  3/29: 100% [employer] egg McMuffin (est. 455 kcal, 19 g protien, 7 g saturated fat, 1090 mg Na), refused dinner + 50% ensure max   3/30:  100% po at breakfast and dinner   Pt consuming <1000 kcal and <55 g protein/day- meeting <50% nutrition needs     NEW FINDINGS  GI symptoms: Last BM reported PTA 3/25  Skin/wounds: L-leg surgical wound, wound vac in place - WOC following   1+/2+ edema legs, L-foot  Nutrition-relevant labs: K 3.3(L), -164 last 24 hrs   Pt on K replacement protocols   Nutrition-relevant medications: Scheduled novolog, MVI/M, miralax, senna, expedite  Weight: CBW remains up from baseline    MALNUTRITION  Malnutrition Diagnosis: Patient does not meet two of the established criteria necessary for diagnosing malnutrition but is at risk for malnutrition     INTERVENTIONS  Medical food supplement therapy- Continue   Encouraged increased oral intakes to meet increased nutrition needs   OK to order double portion protein      MONITORING/EVALUATION  Progress toward goals will be monitored and evaluated per policy.

## 2025-03-31 NOTE — PLAN OF CARE
Problem: VTE (Venous Thromboembolism)  Goal: Tissue Perfusion  Outcome: Progressing     Problem: Surgery Nonspecified  Goal: Absence of Bleeding  Outcome: Progressing  Goal: Blood Glucose Level Within Targeted Range  Outcome: Progressing  Goal: Absence of Infection Signs and Symptoms  Outcome: Progressing     Problem: Constipation  Goal: Effective Bowel Elimination  Outcome: Not Progressing     Goal Outcome Evaluation:        No stool since admit. On bowel regimen. Left leg pain. On scheduled tylenol. Got PRN oral dilaudid tonight. Xarelto for anticoagulation. Wound vac has 350 ml in the canister. PT/OT following.

## 2025-03-31 NOTE — PROGRESS NOTES
[facility]    Medicine Progress Note - Hospitalist Service    Date of Admission:  3/27/2025    Assessment & Plan   69 male with history of CAD, hypertension is admitted for acute left lower extremity pain and found to have acute limb ischemia, underwent 4 compartment fasciotomy urgently on admission.  Monitored in the ICU and downgraded clinically 3/28.    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  - Continue rivaraxoban 20mg daily (started 3/29/2025)  - Continue aspirin  - Trend CK daily  - Wound vac placed 3/28/2025 with plans for next change 3/31/2025  - Postoperative cares per vascular surgery  - Follow hypercoag workup ordered by vascular (Heme/onc signed off 3/29/2025)    Constipation  Last bowel movement 5 days ago; Baseline BM q3-4 days  - Start Senakot BID and Miralax BID  - Suppository PRN    ~Painful erythema and swelling at the level of third right proximal interphalangeal joint  Suspect trauma induced (pt thinks he may have bumped it accidentally) vs gout versus cellulitis  -Cefdinir 500 mg twice daily for now  -Follow-up CRP, uric acid level and x-ray  -Analgesics/ice pack as needed  -Consider starting steroid for possible gout if symptoms persist    Addendum:   CRP came back markedly elevated at 198.9 - concerning for inflammatory arthropathy ? Gout vs pseudogout. Will start prednisone 10 mg daily for 5 days. Follow up hand x ray and consider hand surgery evaluation    Chronic heart failure with mildly reduced ejection fraction  Echo this admission with EF 45 to 50%  -  Not clinically hypervolemic, monitor for edema    CAD  Remote CABG, no anginal chest pain, monitor clinically  - Continues on aspirin  - Not routinely on statin, start when CK further resolved    Essential hypertension  - Resumed usual lisinopril 5 mg daily, metoprolol succinate 12.5 mg daily    PT/OT: TCU  Diet: Low Saturated Fat Na <2400 mg  Snacks/Supplements Adult: Ensure Max Protein (bariatric); Between Meals    DVT Prophylaxis: Lovenox  Capps Catheter: Not present  Lines: None   Cardiac Monitoring: None  Code Status: Full Code      Disposition Plan     Medically Ready for Discharge: Anticipated in 5+ Days pending management by vascular surgery and placement afterwards    Jose Lofton MD  Hospitalist Service  [facility]  Securely message with [employer] (more info)  Text page via [employer] Paging/Directory   ______________________________________________________________________    Interval History   Bedside RN reports increasing painful redness and swelling in the 3rd right knuckle. Wound dressing in the left leg appears clean and dry; will wrapped with bandage. Otherwise no acute issues.     Physical Exam   Vital Signs: Temp: 99.5  F (37.5  C) Temp src: Oral BP: 106/62 Pulse: 79   Resp: 18 SpO2: 96 % O2 Device: None (Room air)    Weight: 219 lbs 5.72 oz    Consitutional: Alert, no distress  CV: heart rate regular, soft systolic murmur  Lungs: lungs clear with no tripoding  GI: abdomen soft  MSK: left lower extremity with wound vac. Erythema/warmth/ttp to dorsal aspect of R 3rd finger PIP joint. Good ROM without pain to this finger    Medical Decision Making       52 MINUTES SPENT BY ME on the date of service doing chart review, history, exam, documentation & further activities per the note.  NOTE(S)/MEDICAL RECORDS REVIEWED over the past 24 hours: Vitals, labs, new imaging, documentation and medication administrations       Data     I have personally reviewed the following data over the  past 24 hrs:    11.5 (H)  \   11.5 (L)   / 280     139 105 11.5 /  112 (H)   3.3 (L) 27 0.80 \       Imaging results reviewed over the past 24 hrs:   No results found for this or any previous visit (from the past 24 hours).

## 2025-04-01 LAB
CK SERPL-CCNC: 550 U/L (ref 39–308)
CRP SERPL-MCNC: 190.6 MG/L
ERYTHROCYTE [DISTWIDTH] IN BLOOD BY AUTOMATED COUNT: 13.7 % (ref 10–15)
GLUCOSE BLDC GLUCOMTR-MCNC: 124 MG/DL (ref 70–99)
GLUCOSE BLDC GLUCOMTR-MCNC: 125 MG/DL (ref 70–99)
GLUCOSE BLDC GLUCOMTR-MCNC: 127 MG/DL (ref 70–99)
GLUCOSE BLDC GLUCOMTR-MCNC: 129 MG/DL (ref 70–99)
GLUCOSE BLDC GLUCOMTR-MCNC: 139 MG/DL (ref 70–99)
HCT VFR BLD AUTO: 34.9 % (ref 40–53)
HGB BLD-MCNC: 12.1 G/DL (ref 13.3–17.7)
MCH RBC QN AUTO: 29.1 PG (ref 26.5–33)
MCHC RBC AUTO-ENTMCNC: 34.7 G/DL (ref 31.5–36.5)
MCV RBC AUTO: 84 FL (ref 78–100)
PLATELET # BLD AUTO: 381 10E3/UL (ref 150–450)
POTASSIUM SERPL-SCNC: 3.6 MMOL/L (ref 3.4–5.3)
PROCALCITONIN SERPL IA-MCNC: 0.17 NG/ML
RBC # BLD AUTO: 4.16 10E6/UL (ref 4.4–5.9)
WBC # BLD AUTO: 13.1 10E3/UL (ref 4–11)

## 2025-04-01 PROCEDURE — 97606 NEG PRS WND THER DME>50 SQCM: CPT

## 2025-04-01 PROCEDURE — 84145 PROCALCITONIN (PCT): CPT | Performed by: INTERNAL MEDICINE

## 2025-04-01 PROCEDURE — 36415 COLL VENOUS BLD VENIPUNCTURE: CPT | Performed by: STUDENT IN AN ORGANIZED HEALTH CARE EDUCATION/TRAINING PROGRAM

## 2025-04-01 PROCEDURE — 84132 ASSAY OF SERUM POTASSIUM: CPT | Performed by: INTERNAL MEDICINE

## 2025-04-01 PROCEDURE — 250N000012 HC RX MED GY IP 250 OP 636 PS 637: Performed by: INTERNAL MEDICINE

## 2025-04-01 PROCEDURE — 250N000013 HC RX MED GY IP 250 OP 250 PS 637: Performed by: STUDENT IN AN ORGANIZED HEALTH CARE EDUCATION/TRAINING PROGRAM

## 2025-04-01 PROCEDURE — 85014 HEMATOCRIT: CPT | Performed by: INTERNAL MEDICINE

## 2025-04-01 PROCEDURE — 120N000001 HC R&B MED SURG/OB

## 2025-04-01 PROCEDURE — 250N000011 HC RX IP 250 OP 636: Mod: JZ | Performed by: STUDENT IN AN ORGANIZED HEALTH CARE EDUCATION/TRAINING PROGRAM

## 2025-04-01 PROCEDURE — 86140 C-REACTIVE PROTEIN: CPT | Performed by: INTERNAL MEDICINE

## 2025-04-01 PROCEDURE — 250N000013 HC RX MED GY IP 250 OP 250 PS 637: Performed by: INTERNAL MEDICINE

## 2025-04-01 PROCEDURE — G0463 HOSPITAL OUTPT CLINIC VISIT: HCPCS | Mod: 25

## 2025-04-01 PROCEDURE — 99233 SBSQ HOSP IP/OBS HIGH 50: CPT | Performed by: INTERNAL MEDICINE

## 2025-04-01 PROCEDURE — 36415 COLL VENOUS BLD VENIPUNCTURE: CPT | Performed by: INTERNAL MEDICINE

## 2025-04-01 PROCEDURE — 82550 ASSAY OF CK (CPK): CPT | Performed by: STUDENT IN AN ORGANIZED HEALTH CARE EDUCATION/TRAINING PROGRAM

## 2025-04-01 RX ORDER — BISACODYL 10 MG
10 SUPPOSITORY, RECTAL RECTAL DAILY
Status: DISCONTINUED | OUTPATIENT
Start: 2025-04-01 | End: 2025-04-11

## 2025-04-01 RX ADMIN — Medication 60 ML: at 08:34

## 2025-04-01 RX ADMIN — CEFDINIR 300 MG: 300 CAPSULE ORAL at 08:33

## 2025-04-01 RX ADMIN — POLYETHYLENE GLYCOL 3350 17 G: 17 POWDER, FOR SOLUTION ORAL at 22:26

## 2025-04-01 RX ADMIN — RIVAROXABAN 20 MG: 10 TABLET, FILM COATED ORAL at 16:45

## 2025-04-01 RX ADMIN — HYDROMORPHONE HYDROCHLORIDE 0.2 MG: 0.2 INJECTION, SOLUTION INTRAMUSCULAR; INTRAVENOUS; SUBCUTANEOUS at 20:34

## 2025-04-01 RX ADMIN — HYDROMORPHONE HYDROCHLORIDE 2 MG: 2 TABLET ORAL at 10:59

## 2025-04-01 RX ADMIN — PREDNISONE 10 MG: 5 TABLET ORAL at 08:31

## 2025-04-01 RX ADMIN — METOPROLOL SUCCINATE 25 MG: 25 TABLET, EXTENDED RELEASE ORAL at 08:33

## 2025-04-01 RX ADMIN — POLYETHYLENE GLYCOL 3350 17 G: 17 POWDER, FOR SOLUTION ORAL at 08:33

## 2025-04-01 RX ADMIN — Medication 1 TABLET: at 08:33

## 2025-04-01 RX ADMIN — LISINOPRIL 5 MG: 5 TABLET ORAL at 08:33

## 2025-04-01 RX ADMIN — ACETAMINOPHEN 975 MG: 325 TABLET ORAL at 16:45

## 2025-04-01 RX ADMIN — SENNOSIDES AND DOCUSATE SODIUM 1 TABLET: 50; 8.6 TABLET ORAL at 08:31

## 2025-04-01 RX ADMIN — ACETAMINOPHEN 975 MG: 325 TABLET ORAL at 08:31

## 2025-04-01 RX ADMIN — ASPIRIN 81 MG: 81 TABLET, COATED ORAL at 08:31

## 2025-04-01 RX ADMIN — HYDROMORPHONE HYDROCHLORIDE 0.2 MG: 0.2 INJECTION, SOLUTION INTRAMUSCULAR; INTRAVENOUS; SUBCUTANEOUS at 00:35

## 2025-04-01 RX ADMIN — ACETAMINOPHEN 975 MG: 325 TABLET ORAL at 00:12

## 2025-04-01 RX ADMIN — MAGNESIUM HYDROXIDE 30 ML: 400 SUSPENSION ORAL at 08:29

## 2025-04-01 RX ADMIN — PANTOPRAZOLE SODIUM 40 MG: 40 TABLET, DELAYED RELEASE ORAL at 06:51

## 2025-04-01 ASSESSMENT — ACTIVITIES OF DAILY LIVING (ADL)
ADLS_ACUITY_SCORE: 39

## 2025-04-01 NOTE — PROGRESS NOTES
[facility]    Medicine Progress Note - Hospitalist Service    Date of Admission:  3/27/2025    Assessment & Plan   69 male with history of CAD, hypertension is admitted for acute left lower extremity pain and found to have acute limb ischemia, underwent 4 compartment fasciotomy urgently on admission.  Monitored in the ICU and downgraded clinically 3/28.    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  - Continue rivaraxoban 20mg daily (started 3/29/2025)  - Continue aspirin  - Trend CK daily  - Wound vac placed 3/28/2025 with plans for next change 3/31/2025  - Postoperative cares per vascular surgery  - Follow hypercoag workup ordered by vascular (Heme/onc signed off 3/29/2025)    Constipation  Last bowel movement 5 days ago; Baseline BM q3-4 days  - Start Senakot BID and Miralax BID  - Suppository PRN    Suspected inflammatory arthropathy  Painful erythema and swelling at the level of third right proximal interphalangeal joint  Suspect trauma induced (pt thinks he may have bumped it accidentally) vs gout versus cellulitis  CRP came back markedly elevated at 198.9 - concerning for inflammatory arthropathy ? Gout vs pseudogout.  Improving following initiation of prednisone 10 mg daily     Continue prednisone 10 mg daily for 5 days-started on 3/31/2025.  Hand x-ray showed DJD.  Hold Cefdinir 500 mg twice daily for now  Analgesics/ice pack as needed      Chronic heart failure with mildly reduced ejection fraction  Echo this admission with EF 45 to 50%  - Not clinically hypervolemic,  monitor for edema    CAD  Remote CABG, no anginal chest pain, monitor clinically  - Continues on aspirin  - Not routinely on statin, start when CK further resolved    Essential hypertension  -Continue lisinopril 5 mg daily, metoprolol succinate 12.5 mg daily    PT/OT: TCU  Diet: Low Saturated Fat Na <2400 mg  Snacks/Supplements Adult: Ensure Max Protein (bariatric); Between Meals    DVT Prophylaxis: Lovenox  Capps Catheter: Not present  Lines: None   Cardiac Monitoring: None  Code Status: Full Code      Disposition Plan     Medically Ready for Discharge: Anticipated in 5+ Days pending management by vascular surgery and placement afterwards    Jose Lofton MD  Hospitalist Service  [facility]  Securely message with [employer] (more info)  Text page via [employer] Paging/Directory   ______________________________________________________________________    Interval History   No new complaints today and no acute events overnight.  Erythema and tenderness at the level of right PIP joints has improved significantly.  Now able to flex joints without any significant difficulty.  Feels better.    Physical Exam   Vital Signs: Temp: 99.1  F (37.3  C) Temp src: Oral BP: 132/82 Pulse: 93   Resp: 20 SpO2: 95 % O2 Device: None (Room air)    Weight: 218 lbs 4.09 oz    Consitutional: Alert, no distress  CV: heart rate regular, soft systolic murmur  Lungs: lungs clear with no tripoding  GI: abdomen soft  MSK: left lower extremity wrapped with clean bandage and moderately soaked with serous fluid.  Erythema/warmth/ttp to dorsal aspect of R 3rd finger PIP joint- improved     Medical Decision Making       51 MINUTES SPENT BY ME on the date of service doing chart review, history, exam, documentation & further activities per the note.  NOTE(S)/MEDICAL RECORDS REVIEWED over the past 24 hours: Vitals, labs, new imaging, documentation and medication administrations       Data     I have personally reviewed the following  data over the past 24 hrs:    13.1 (H)  \   12.1 (L)   / 381     N/A N/A N/A /  127 (H)   3.6 N/A N/A \     Procal: 0.17 CRP: 190.60 (H) Lactic Acid: N/A         Imaging results reviewed over the past 24 hrs:   Recent Results (from the past 24 hours)   XR Hand Right 2 Views    Narrative    EXAM: XR HAND RIGHT 2 VIEWS  LOCATION: [facility]  DATE: 3/31/2025    INDICATION: Right proximal interphalangeal joint swelling and tenderness following recent trauma  COMPARISON: None.      Impression    IMPRESSION: Degenerative change at the DRUJ. Degenerative change at multiple IP joints of the right hand. No evidence for acute fracture or dislocation.

## 2025-04-01 NOTE — PROGRESS NOTES
"VASCULAR SURGERY PROGRESS NOTE    Subjective:  Patient was seen and evaluated at the bedside for surgical follow-up. Notes increased pain to his left leg following his dressing change yesterday.  WOC was unable to reapply the wound VAC.  Continues to work with physical therapy with recommendations to discharge to ARU.  No other concerns.    Objective:  Intake/Output Summary (Last 24 hours) at 4/1/2025 0715  Last data filed at 3/31/2025 2100  Gross per 24 hour   Intake 480 ml   Output 730 ml   Net -250 ml     PHYSICAL EXAM:  /56 (BP Location: Left arm)   Pulse 77   Temp 98.9  F (37.2  C) (Oral)   Resp 20   Ht 1.702 m (5' 7\")   Wt 99 kg (218 lb 4.1 oz)   SpO2 98%   BMI 34.18 kg/m    General: The patient is alert and oriented. Appropriate. No acute distress  Psych: Pleasant affect, answers questions appropriately  Skin: Color appropriate for race, warm, dry  Respiratory: Normal respiratory effort   Extremities:  Biphasic left DP and PT. Clean dressing intact to LLE        Imaging:   Pertinent imaging reviewed      ASSESSMENT:  69 year old male who presented to with left leg pain.  History and workup were suspicious for acute ischemic event to bilateral lower extremity earlier this week with symptoms in the left leg significantly worse than right with initial improvement in pain in left lower extremity with development of progressive and motion of left foot and CK greater than 13,000 all suspicious for development of compartment syndrome in left lower extremity.     Underwent left lower extremity fasciotomy 3/27     PLAN:  Continue Xarelto  Wound VAC change with WOC today. Change T/F  Activity as tolerated, PT following  Continue neurovascular check, contact us if there is any questions or concerns or any concerning changes   Disposition plan     HENNA HealyC  VASCULAR SURGERY                   "

## 2025-04-01 NOTE — PLAN OF CARE
Problem: Adult Inpatient Plan of Care  Goal: Plan of Care Review  Description: The Plan of Care Review/Shift note should be completed every shift.  The Outcome Evaluation is a brief statement about your assessment that the patient is improving, declining, or no change.  This information will be displayed automatically on your shiftnote.  Outcome: Progressing     Goal Outcome Evaluation:    Pt calm and pleasant overnight. Dressing changed done to left lower leg. IV dilaudid given prior to dressing with relief.  Pt still on bowel regime, no BM yet. Pt passing gas.  Plan for wound vac placement today.

## 2025-04-01 NOTE — PROGRESS NOTES
Care Management Follow Up    Length of Stay (days): 5    Expected Discharge Date: 04/02/2025    Anticipated Discharge Plan:  Acute Rehab- [place]    Transportation: TBD    PT Recommendations: Acute Rehab Center-Motivated patient will benefit from intensive, interdisciplinary therapy.  Anticipate will be able to tolerate 3 hours of therapy per day  OT Recommendations:  Acute Rehab Center-Motivated patient will benefit from intensive, interdisciplinary therapy.  Anticipate will be able to tolerate 3 hours of therapy per day     Barriers to Discharge: IV pain meds    Prior Living Situation: mobile home with  (2 cousins)    Discussed  Partnership in Safe Discharge Planning  document with patient/family: No     Handoff Completed: No, handoff not indicated or clinically appropriate    Patient/Spokesperson Updated: Yes. Who? Patient updated at bedside     Additional Information:  SW met with patient at bedside to provide update on [facility] review. Pt states understanding- pain management is a barrier. Patient agreeable to acute rehab at this time.     [facility] has reviewed referral:  1) would need to make sure he can tolerate a wound vac change with no IV pain meds  2) need to see a bit more with therapies to determine if he can tolerate    Next Steps: Care management will follow for discharge planning    Peace Denney, JEANSW

## 2025-04-01 NOTE — PLAN OF CARE
"  Problem: Adult Inpatient Plan of Care  Goal: Plan of Care Review  Description: The Plan of Care Review/Shift note should be completed every shift.  The Outcome Evaluation is a brief statement about your assessment that the patient is improving, declining, or no change.  This information will be displayed automatically on your shiftnote.  Outcome: Progressing  Goal: Patient-Specific Goal (Individualized)  Description: You can add care plan individualizations to a care plan. Examples of Individualization might be:  \"Parent requests to be called daily at 9am for status\", \"I have a hard time hearing out of my right ear\", or \"Do not touch me to wake me up as it startlesme\".  Outcome: Progressing  Goal: Absence of Hospital-Acquired Illness or Injury  Outcome: Progressing  Intervention: Identify and Manage Fall Risk  Recent Flowsheet Documentation  Taken 4/1/2025 1648 by Bibiana Felix RN  Safety Promotion/Fall Prevention: activity supervised  Taken 4/1/2025 1311 by Bibiana Felix RN  Safety Promotion/Fall Prevention: activity supervised  Taken 4/1/2025 0836 by Bibiana Felix RN  Safety Promotion/Fall Prevention: activity supervised  Intervention: Prevent Skin Injury  Recent Flowsheet Documentation  Taken 4/1/2025 1648 by Bibiana Felix RN  Body Position:   position changed independently   weight shifting  Taken 4/1/2025 1600 by Bibiana Felix RN  Body Position: heels elevated  Taken 4/1/2025 1311 by Bibiana Felix RN  Body Position:   position changed independently   weight shifting  Taken 4/1/2025 1300 by Bibiana Felix RN  Body Position:   heels elevated   supine   sitting up in bed  Taken 4/1/2025 0836 by Bibiana Felix RN  Body Position:   position changed independently   weight shifting  Goal: Optimal Comfort and Wellbeing  Outcome: Progressing  Intervention: Provide Person-Centered Care  Recent Flowsheet Documentation  Taken 4/1/2025 1648 by Bibiana Felix RN  Trust " Relationship/Rapport:   care explained   questions answered  Taken 4/1/2025 1311 by Bibiana Felix, RN  Trust Relationship/Rapport:   care explained   questions answered  Taken 4/1/2025 0836 by Bibiana Felix, RN  Trust Relationship/Rapport:   care explained   questions answered   Goal Outcome Evaluation:       Given dilaudid 2 mg PO before WOCN replaced the wound vacuum . But despite of the premedication  patient declined to do some activity    Able to get up and walk to the bathroom  with assist of 1 with a walker and a transfer belt .

## 2025-04-01 NOTE — PROGRESS NOTES
[facility]  WO Nurse Inpatient Assessment     Consulted for: Left leg fasciotomy    Summary: Wound VAC applied and patient alert and engaged    WO nurse follow-up plan: Tuesday/Friday    Patient History (according to provider note(s):      Per Op note:  Date of Service: 3/27/2025      Preoperative diagnosis     Compartment syndrome left leg  Thromboembolism to left lower extremity  CAD s/p CABG in 2010  HTN  HLD     Postoperative diagnosis     same     Surgeon: Wanda Coelho DO RPVI           Procedures:  Fasciotomy left lower extremity      Assessment:      Areas visualized during today's visit:  left leg    Negative pressure wound therapy applied to: Left leg   Last photo: 3/28         Lateral 3/28      Medial 3/28                                         medial 4/1                                                lateral 4/1    Wound due to: Surgical Wound   Wound history/plan of care:    Surgical date: 3/27   Service following: Vascular  Date Negative Pressure Wound Therapy initiated: 3/28   Interventions in place: elevation  Is patient s nutritional status compromised? no   If yes, what interventions are in place? N/A  Reason for initiating vac therapy? Need for accelerated granulation tissue  Which?of?the?following?co-morbidities?apply? Obesity  If diabetic is patient on a diabetic management program? N/A   Is osteomyelitis present in wound? no   If yes what treatments are in place? N/A    Wound base: Muscle/fascia/tendon with some granulation along posterior edges     Palpation of the wound bed: normal       Drainage: moderate      Volume in cannister: 400 ml total     Last cannister change date: 4/1     Description of drainage: serosanguinous      Measurements (length x width x depth, in cm)   Lateral 26 x 5.5 x 1cm  Medial 24 x 5.5 x 2.5cm     Tunneling N/A      Undermining N/A   Periwound skin: Intact- lateral wound kelechi skin showing some signs of purple-alek hue and peeling  "(applied Cavilon to help protect from adhesive)      Color: normal and consistent with surrounding tissue and purple       Temperature: normal    Odor: none   Pain: mild, tender   Pain intervention prior to dressing change: patient tolerated well, IV per orders, and slow and gentle cares   Treatment goal: Increase granulation  STATUS: stable   Supplies ordered: gathered    Number of foam pieces removed from a wound (excluding foam for bridge) :  NA    Verified this matched the number of foam pieces applied last dressing change: N/A   Number of foam pieces packed into wound (excluding foam for bridge) :  2 GranuFoam Black and 2 Oil emulsion dressing        Treatment Plan:     Negative pressure wound therapy plan:  Wound location: LLE fasciotomy sites   Change Days: Tues/ Fri by WOC RN    Supplies (including all accessories) used: large Black foam , Adaptic/Curity oil emulsion contact layer , and extra trac pad and Y-connector  Cleanse with Vashe prior to replacing NPWT  Suction setting: -125   Methods used: Window paned all periwound skin with vac drape prior to applying sponge    Staff RN to assess integrity of dressing and ensure suction is set at appropriate level every shift.   Date canister. Chart canister output every shift. Change cannister weekly and PRN if full/occluded     Remove foam dressing and replace with BID normal saline moist gauze dressing if:   -a dressing failure which cannot be repaired within 2 hours   -patient is discharging to home without a home pump   -patient is discharging to a facility outside the local area   -if a dressing is a \"Silver Foam\", remove before Radiation Therapy or MRI     The hospital VAC pump is not to be discharged with the patient. Please disconnect the patient from the machine prior to discharge.  If a home VAC pump has been delivered, connect the home cannister to dressing tubing and the cannister to the home pump, turn on home pump  If the patient is transferring to " a nearby facility with a VAC, the tubing can be disconnected, clamp tubing and cover the end with a glove, then can be reconnected if within 2 hours  If transfer will be longer than 2 hours, dressing must be removed and placed with a wet to moist gauze dressing for transfer        Orders: Reviewed    RECOMMEND PRIMARY TEAM ORDER: None, at this time  Education provided: plan of care  Discussed plan of care with: Patient and Nurse  Notify [facility] if wound(s) deteriorate.  Nursing to notify the Provider(s) and re-consult the [facility] Nurse if new skin concern.    DATA:     Current support surface: Standard  Low air loss (BOB pump, Isolibrium, Pulsate)  Containment of urine/stool: Continent of bladder and Continent of bowel  BMI: Body mass index is 34.18 kg/m .   Active diet order: Orders Placed This Encounter      Low Saturated Fat Na <2400 mg     Output: I/O last 3 completed shifts:  In: 480 [P.O.:480]  Out: 730 [Urine:730]     Labs:   Recent Labs   Lab 04/01/25  0958 03/30/25  0533 03/28/25  0413 03/27/25  1302   ALBUMIN  --   --  3.0*  --    HGB 12.1*   < >  --   --    INR  --   --   --  1.16*   WBC 13.1*   < >  --   --     < > = values in this interval not displayed.     Pressure injury risk assessment:   Sensory Perception: 4-->no impairment  Moisture: 4-->rarely moist  Activity: 2-->chairfast  Mobility: 3-->slightly limited  Nutrition: 3-->adequate  Friction and Shear: 2-->potential problem  Gary Score: 18    KYLIE SilvermanN RN CWOCN  Pager no longer in use, please contact through [employer] group: [facility]

## 2025-04-02 ENCOUNTER — APPOINTMENT (OUTPATIENT)
Dept: PHYSICAL THERAPY | Facility: HOSPITAL | Age: 70
End: 2025-04-02
Attending: INTERNAL MEDICINE
Payer: COMMERCIAL

## 2025-04-02 ENCOUNTER — APPOINTMENT (OUTPATIENT)
Dept: OCCUPATIONAL THERAPY | Facility: HOSPITAL | Age: 70
End: 2025-04-02
Attending: INTERNAL MEDICINE
Payer: COMMERCIAL

## 2025-04-02 LAB
GLUCOSE BLDC GLUCOMTR-MCNC: 114 MG/DL (ref 70–99)
GLUCOSE BLDC GLUCOMTR-MCNC: 115 MG/DL (ref 70–99)
GLUCOSE BLDC GLUCOMTR-MCNC: 119 MG/DL (ref 70–99)
GLUCOSE BLDC GLUCOMTR-MCNC: 143 MG/DL (ref 70–99)
GLUCOSE BLDC GLUCOMTR-MCNC: 149 MG/DL (ref 70–99)
HOLD SPECIMEN: NORMAL
POTASSIUM SERPL-SCNC: 3.5 MMOL/L (ref 3.4–5.3)

## 2025-04-02 PROCEDURE — 250N000013 HC RX MED GY IP 250 OP 250 PS 637: Performed by: STUDENT IN AN ORGANIZED HEALTH CARE EDUCATION/TRAINING PROGRAM

## 2025-04-02 PROCEDURE — 250N000011 HC RX IP 250 OP 636: Mod: JZ | Performed by: NURSE PRACTITIONER

## 2025-04-02 PROCEDURE — 99222 1ST HOSP IP/OBS MODERATE 55: CPT | Performed by: NURSE PRACTITIONER

## 2025-04-02 PROCEDURE — 36415 COLL VENOUS BLD VENIPUNCTURE: CPT | Performed by: INTERNAL MEDICINE

## 2025-04-02 PROCEDURE — 99232 SBSQ HOSP IP/OBS MODERATE 35: CPT | Performed by: INTERNAL MEDICINE

## 2025-04-02 PROCEDURE — 250N000013 HC RX MED GY IP 250 OP 250 PS 637: Performed by: INTERNAL MEDICINE

## 2025-04-02 PROCEDURE — 120N000001 HC R&B MED SURG/OB

## 2025-04-02 PROCEDURE — 250N000013 HC RX MED GY IP 250 OP 250 PS 637: Performed by: NURSE PRACTITIONER

## 2025-04-02 PROCEDURE — 84132 ASSAY OF SERUM POTASSIUM: CPT | Performed by: INTERNAL MEDICINE

## 2025-04-02 PROCEDURE — 97110 THERAPEUTIC EXERCISES: CPT | Mod: GP

## 2025-04-02 PROCEDURE — 97530 THERAPEUTIC ACTIVITIES: CPT | Mod: GP

## 2025-04-02 PROCEDURE — 250N000012 HC RX MED GY IP 250 OP 636 PS 637: Performed by: INTERNAL MEDICINE

## 2025-04-02 PROCEDURE — 97535 SELF CARE MNGMENT TRAINING: CPT | Mod: GO

## 2025-04-02 RX ORDER — HYDROMORPHONE HCL IN WATER/PF 6 MG/30 ML
0.2 PATIENT CONTROLLED ANALGESIA SYRINGE INTRAVENOUS EVERY 4 HOURS PRN
Status: DISCONTINUED | OUTPATIENT
Start: 2025-04-02 | End: 2025-04-04

## 2025-04-02 RX ORDER — ACETAMINOPHEN 325 MG/1
975 TABLET ORAL EVERY 6 HOURS
Status: DISCONTINUED | OUTPATIENT
Start: 2025-04-02 | End: 2025-04-15 | Stop reason: HOSPADM

## 2025-04-02 RX ORDER — METHOCARBAMOL 500 MG/1
500 TABLET, FILM COATED ORAL 4 TIMES DAILY
Status: DISCONTINUED | OUTPATIENT
Start: 2025-04-02 | End: 2025-04-13

## 2025-04-02 RX ORDER — HYDROMORPHONE HYDROCHLORIDE 2 MG/1
2-4 TABLET ORAL
Status: DISCONTINUED | OUTPATIENT
Start: 2025-04-02 | End: 2025-04-14

## 2025-04-02 RX ADMIN — RIVAROXABAN 20 MG: 10 TABLET, FILM COATED ORAL at 17:39

## 2025-04-02 RX ADMIN — METHOCARBAMOL 500 MG: 500 TABLET ORAL at 14:01

## 2025-04-02 RX ADMIN — Medication 1 TABLET: at 09:16

## 2025-04-02 RX ADMIN — ACETAMINOPHEN 975 MG: 325 TABLET, FILM COATED ORAL at 17:39

## 2025-04-02 RX ADMIN — METHOCARBAMOL 500 MG: 500 TABLET ORAL at 17:39

## 2025-04-02 RX ADMIN — PANTOPRAZOLE SODIUM 40 MG: 40 TABLET, DELAYED RELEASE ORAL at 06:47

## 2025-04-02 RX ADMIN — ASPIRIN 81 MG: 81 TABLET, COATED ORAL at 09:17

## 2025-04-02 RX ADMIN — INSULIN ASPART 1 UNITS: 100 INJECTION, SOLUTION INTRAVENOUS; SUBCUTANEOUS at 17:39

## 2025-04-02 RX ADMIN — METHOCARBAMOL 500 MG: 500 TABLET ORAL at 20:58

## 2025-04-02 RX ADMIN — ACETAMINOPHEN 975 MG: 325 TABLET ORAL at 09:17

## 2025-04-02 RX ADMIN — POLYETHYLENE GLYCOL 3350 17 G: 17 POWDER, FOR SOLUTION ORAL at 09:19

## 2025-04-02 RX ADMIN — ACETAMINOPHEN 975 MG: 325 TABLET ORAL at 00:41

## 2025-04-02 RX ADMIN — LISINOPRIL 5 MG: 5 TABLET ORAL at 09:16

## 2025-04-02 RX ADMIN — PREDNISONE 10 MG: 5 TABLET ORAL at 09:17

## 2025-04-02 RX ADMIN — Medication 60 ML: at 09:20

## 2025-04-02 RX ADMIN — HYDROMORPHONE HYDROCHLORIDE 0.2 MG: 0.2 INJECTION, SOLUTION INTRAMUSCULAR; INTRAVENOUS; SUBCUTANEOUS at 11:10

## 2025-04-02 RX ADMIN — SENNOSIDES AND DOCUSATE SODIUM 1 TABLET: 50; 8.6 TABLET ORAL at 09:16

## 2025-04-02 RX ADMIN — HYDROMORPHONE HYDROCHLORIDE 2 MG: 2 TABLET ORAL at 09:17

## 2025-04-02 RX ADMIN — MAGNESIUM HYDROXIDE 30 ML: 400 SUSPENSION ORAL at 09:19

## 2025-04-02 RX ADMIN — METOPROLOL SUCCINATE 25 MG: 25 TABLET, EXTENDED RELEASE ORAL at 09:16

## 2025-04-02 ASSESSMENT — ACTIVITIES OF DAILY LIVING (ADL)
ADLS_ACUITY_SCORE: 39

## 2025-04-02 NOTE — PLAN OF CARE
Problem: VTE (Venous Thromboembolism)  Goal: Tissue Perfusion  Intervention: Optimize Tissue Perfusion  Recent Flowsheet Documentation  Taken 4/2/2025 0036 by Florian Meza RN  VTE Prevention/Management: SCDs off (sequential compression devices)     Problem: Pain Acute  Goal: Optimal Pain Control and Function  Intervention: Optimize Psychosocial Wellbeing  Recent Flowsheet Documentation  Taken 4/2/2025 0036 by Florian Meza RN  Supportive Measures:   active listening utilized   decision-making supported   positive reinforcement provided  Intervention: Prevent or Manage Pain  Recent Flowsheet Documentation  Taken 4/2/2025 0036 by Florian Meza RN  Sensory Stimulation Regulation: care clustered  Sleep/Rest Enhancement:   consistent schedule promoted   family presence promoted  Bowel Elimination Promotion:   adequate fluid intake promoted   privacy promoted  Medication Review/Management: medications reviewed     Problem: Constipation  Goal: Effective Bowel Elimination  Intervention: Promote Effective Bowel Elimination  Recent Flowsheet Documentation  Taken 4/2/2025 0036 by Florian Meza RN  Bowel Elimination Management:   other (see comments)   toileting offered     Problem: Comorbidity Management  Goal: Blood Pressure in Desired Range  Intervention: Maintain Blood Pressure Management  Recent Flowsheet Documentation  Taken 4/2/2025 0036 by Florian Meza RN  Medication Review/Management: medications reviewed     Problem: Adult Inpatient Plan of Care  Goal: Absence of Hospital-Acquired Illness or Injury  Intervention: Identify and Manage Fall Risk  Recent Flowsheet Documentation  Taken 4/2/2025 0036 by Florian Meza RN  Safety Promotion/Fall Prevention:   activity supervised   clutter free environment maintained   increased rounding and observation   increase visualization of patient   lighting adjusted   nonskid shoes/slippers when out of bed   room door open   room near  nurse's station   room organization consistent   Goal Outcome Evaluation:        Patient c/o mid left leg pain 1/10, schedule Tylenol given , wound vac inact 125, CMS intact, able to move all extremities. Patient makes needs known. Pt used bedside urinal.    Florian CARRANZA RN.

## 2025-04-02 NOTE — CONSULTS
[facility] ACUTE PAIN SERVICE CONSULTATION   [facility], [facility], [facility], [facility], [place]     Date of Admission:  3/27/2025  Date of Consult (When I saw the patient): 04/02/25  Physician requesting consult: Wanda Coelho      Assessment/Plan:     Dudley Kiran is a 69 year old male who was admitted on 3/27/2025.  Pain team was asked to see the patient for uncontrolled post op pain and assistance with weaning from IV pain medications. Admitted for acute left lower extremity pain and found to have acute limb ischemic and underwent 4 compartment fasciotomy urgently on admission. History of CAD, HTN, prediabetes, arthritis, and erectile dysfunction.  Describes pain as 4/10 and aching, tight in the R calf. The patient does not smoke and does not have a chemical dependency history.     Post op day: 6 Days Post-Op fasciotomy of left lower extremity.     In the last 24 hours, patient has utilized 2 mg of PO hydromorphone,  0.2 mg of IV hydromorphone, for an MME 12 mg (yesterday MME was 12 mg).    PLAN:   1) Pain is consistent with post op pain, wound pain- LLE Acute compartment syndrome status post 4 compartment fasciotomy, Acute limb ischemia  Multimodal Medication Therapy  Topical: None  NSAID'S: CrCl 97.7 mL/min. None  Steroids: prednisone 10 mg daily x 5 days (3/31-4/5/25)  Muscle Relaxants: add methocarbamol 500 mg QID  Adjuvants: acetaminophen 975 mg q6h, aspirin 81 mg daily  Antidepressants/anxiolytics: None  Opioids: Hydromorphone 2-4 mg q3h PRN   IV Pain medication: Hydromorphone 0.2 mg q4h PRN   Non-medication interventions: Ice, Rest, PT, and OT  Constipation Prophylaxis: Milk of Magnesia daily, Miralax BID, senna-docusate BID, bisacodyl 10 mg daily    -MN  pulled from system on 4/2/2025. No results found for the patient and with no opioid/controlled medications per [employer].   Discharge Recommendations - We recommend prescribing the following at the time of discharge: TBD.       History  of Present Illness (HPI):       Dudley Kiran is a 69 year old male who presented for acute left lower extremity pain s/p urgently 4 compartment fasciotomy due to acute limb ischemia. History and workup were suspicious for acute ischemic event to bilateral lower extremity earlier this week with symptoms in the left leg significantly worse than right with initial improvement in pain in left lower extremity with development of progressive and motion of left foot and CK greater than 13,000 all suspicious for development of compartment syndrome in left lower extremity. Underwent left lower extremity fasciotomy 3/27.     The pain is reported to be acute, located in the LLE. Current pain is rated at 2-4/10 and goal is 0-2/10.  The patient denies nausea, vomiting, constipation, diarrhea, chest pain, shortness of breath, dizziness, fever, and chills.      Per MN  review, the patient does not have an opioid tolerance. Opioid induced side effects noted and include: none     Reviewed medical record, labs, imaging, ED note, and care everywhere.     Past pain treatments have included: N/A      Home pain medications/psych medications/anticoagulation medications include: N/A    Last UDS: N/A     Medical History   PAST MEDICAL HISTORY:   Past Medical History:   Diagnosis Date    Arthritis     Coronary artery disease     ED (erectile dysfunction)     HTN (hypertension)     Hypercholesteremia     Obesity     Prediabetes     Primary osteoarthritis of both hips        PAST SURGICAL HISTORY:   Past Surgical History:   Procedure Laterality Date    ARTHROPLASTY, HIP, TOTAL, DIRECT ANTERIOR APPROACH, USING HANA TABLE Left 10/27/2021    Procedure: LEFT TOTAL HIP ARTHROPLASTY DIRECT ANTERIOR APPROACH;  Surgeon: Bon Little MD;  Location: [facility] Main OR    BYPASS GRAFT ARTERY CORONARY  2010    FASCIOTOMY LOWER EXTREMITY Left 3/27/2025    Procedure: FASCIOTOMY, LEFT LOWER EXTREMITY;  Surgeon: Wanda Coelho DO;   Location: [facility] Main OR       FAMILY HISTORY:   Family History   Problem Relation Age of Onset    Diabetes No family hx of     Diabetes No family hx of     Breast Cancer No family hx of     Colon Cancer No family hx of     Prostate Cancer No family hx of     Hypertension No family hx of        SOCIAL HISTORY:   Social History     Tobacco Use    Smoking status: Never    Smokeless tobacco: Never   Substance Use Topics    Alcohol use: No     Alcohol/week: 0.0 standard drinks of alcohol        [facility]  Tobacco:  reports that he has never smoked. He has never used smokeless tobacco.  Alcohol:  reports no history of alcohol use.  Illicit drugs:  reports no history of drug use.    Allergies  Allergies   Allergen Reactions    No Known Allergies      Potentially cats       Problem List  Patient Active Problem List    Diagnosis Date Noted    Left leg pain 03/27/2025     Priority: Medium    S/P CABG (coronary artery bypass graft) 10/04/2023     Priority: Medium    OA (osteoarthritis) 10/27/2021     Priority: Medium    Class 1 obesity due to excess calories with serious comorbidity and body mass index (BMI) of 32.0 to 32.9 in adult 08/10/2021     Priority: Medium    Essential hypertension, benign 12/18/2019     Priority: Medium    Primary osteoarthritis of both hips 12/18/2019     Priority: Medium    Elevated cholesterol 02/15/2019     Priority: Medium    History of prediabetes 02/01/2019     Priority: Medium    Coronary arteriosclerosis due to lipid rich plaque 12/28/2012     Priority: Medium     Problem list name updated by automated process. Provider to review         Prior to Admission Medications   Medications Prior to Admission   Medication Sig Dispense Refill Last Dose/Taking    acetaminophen (TYLENOL) 500 MG tablet Take 500-1,000 mg by mouth every 6 hours as needed for mild pain.   3/26/2025    albuterol (PROAIR HFA/PROVENTIL HFA/VENTOLIN HFA) 108 (90 Base) MCG/ACT inhaler Inhale 2 puffs into the  "lungs every 6 hours as needed for shortness of breath, wheezing or cough 18 g 4 More than a month    aspirin 81 MG EC tablet Take 81 mg by mouth daily.   Past Week    fluticasone-salmeterol (AIRDUO RESPICLICK) 232-14 MCG/ACT inhaler Inhale 1 puff into the lungs 2 times daily (Patient taking differently: Inhale 1 puff into the lungs 2 times daily as needed.) 1 each 11 More than a month    ipratropium (ATROVENT) 0.06 % nasal spray Spray 2 sprays into both nostrils 2 times daily (Patient taking differently: Spray 2 sprays into both nostrils 2 times daily as needed.) 15 mL 4 More than a month    lisinopril (ZESTRIL) 5 MG tablet Take 1 tablet (5 mg) by mouth daily. 90 tablet 3 Past Week    metoprolol succinate ER (TOPROL XL) 25 MG 24 hr tablet TAKE 0.5 TABLETS(12.5 MG) BY MOUTH DAILY 45 tablet 3 Past Week    tadalafil (CIALIS) 10 MG tablet Take 1 tablet (10 mg) by mouth daily as needed (erectile difficulties). 90 tablet 2 More than a month       Review of Systems  Complete ROS reviewed, unless noted in HPI, all other systems reviewed (with patient) and all others found to be negative.      Objective:     Physical Exam:  /63   Pulse 82   Temp 98.6  F (37  C) (Oral)   Resp 20   Ht 1.702 m (5' 7\")   Wt 99 kg (218 lb 4.1 oz)   SpO2 94%   BMI 34.18 kg/m    Weight:   Vitals:    03/30/25 0609 03/31/25 0121 04/01/25 0626   Weight: 99.3 kg (218 lb 14.7 oz) 99.5 kg (219 lb 5.7 oz) 99 kg (218 lb 4.1 oz)      Body mass index is 34.18 kg/m .    General Appearance:  Alert, cooperative, no distress   Head:  Normocephalic, without obvious abnormality, atraumatic   Eyes:  PERRL, conjunctiva/corneas clear, EOM's intact   ENT/Throat: Lips, mucosa, and tongue normal; teeth and gums normal   Lymph/Neck: Supple, symmetrical, trachea midline   Lungs:   Clear to auscultation bilaterally, respirations unlabored   Chest Wall:  No tenderness or deformity   Cardiovascular/Heart:  Regular rate and rhythm, S1, S2 normal   Abdomen:   " Soft, non-tender   Musculoskeletal: Wound vac to LLE   Skin: Skin warm, dry    Neurologic: Alert and oriented X 3, Moves all 4 extremities     Psych: Affect is appropriate      Imaging: Reviewed I have personally reviewed pertinent notes, labs, tests, and radiologic imaging in patient's chart.  Labs: Reviewed I have personally reviewed pertinent notes, labs, tests, and radiologic imaging in patient's chart.  Notes: Reviewed I have personally reviewed pertinent notes, labs, tests, and radiologic imaging in patient's chart.    Total time spent 65 minutes with greater than 50% in consultation, education and coordination of care.   Also discussed with RN and Hospital Medicine Service.   Treatment plan includes: multimodal pain approach, Hospital Medicine Service for medical management, Vascular, WOC.   Patient educated regarding: multimodal pain approach and medications as listed above.   Elements of Medical Decision Making as described above. High level of decision making required due to 1 or more chronic illness with severe exacerbation, progression, and side effects of treatment. Acute or chronic illness or injury or surgery. High risk therapy including opioids, high risk drug therapy including oral and/or parenteral controlled substances.    Patient is understanding of the plan. All questions and concerns addressed to patient's satisfaction.     Thank you for this consultation.    MARCELINO Staples-SOLANGE  Acute Care Inpatient Pain Management Program  [facility]  Hours of coverage Monday-Friday [id number]. After hours please contact Primary team   Page via Epic chat or Vocera Messaging

## 2025-04-02 NOTE — PROGRESS NOTES
[facility]    Medicine Progress Note - Hospitalist Service    Date of Admission:  3/27/2025    Assessment & Plan   69 male with history of CAD, hypertension is admitted for acute left lower extremity pain and found to have acute limb ischemia, underwent 4 compartment fasciotomy urgently on admission.  Monitored in the ICU and downgraded clinically 3/28.    Wound vac exchanged on 4/1/25.     He complained of painful erythema and swelling at the level of third right proximal interphalangeal joint. CRP came back markedly elevated at 198.9 - concerning for inflammatory arthropathy ? Gout vs pseudogout.  Improving following initiation of prednisone 10 mg daily.     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 Gout vs pseudogout   Painful erythema and swelling at the level of third right proximal interphalangeal joint  CRP came back markedly elevated at 198.9 - concerning for inflammatory arthropathy ? Gout vs pseudogout.  Improving following initiation of prednisone 10 mg daily.     Continue prednisone 10 mg daily for 5  days-started on 3/31/2025.  Hand x-ray showed DJD.  Discontinued Cefdinir 500 mg twice daily   Analgesics/ice pack as needed  Outpatient follow up with rheumatologist     Chronic heart failure with mildly reduced ejection fraction  Echo this admission with EF 45 to 50%  - Not clinically hypervolemic, monitor for edema    CAD  Remote CABG, no anginal chest pain, monitor clinically  - Continues on aspirin  - Not routinely on statin, start when CK further resolved    Essential hypertension  -Continue lisinopril 5 mg daily, metoprolol succinate 12.5 mg daily    PT/OT: TCU  Diet: Low Saturated Fat Na <2400 mg  Snacks/Supplements Adult: Ensure Max Protein (bariatric); Between Meals    DVT Prophylaxis: Lovenox  Capps Catheter: Not present  Lines: None   Cardiac Monitoring: None  Code Status: Full Code      Disposition Plan     Medically Ready for Discharge: Anticipated in 5+ Days pending management by vascular surgery and placement afterwards    Jose Lofton MD  Hospitalist Service  [facility]  Securely message with [employer] (more info)  Text page via [employer] Paging/[email or web address]   ______________________________________________________________________    Interval History   No new complaints today and no acute events overnight.  Wound VAC placed yesterday.  Feels better.  Awaiting placement.  Physical Exam   Vital Signs: Temp: 98.4  F (36.9  C) Temp src: Oral BP: 135/65 Pulse: 80   Resp: 20 SpO2: 95 % O2 Device: None (Room air)    Weight: 218 lbs 4.09 oz    Consitutional: Alert, no distress  CV: heart rate regular, soft systolic murmur  Lungs: lungs clear with no tripoding  GI: abdomen soft  MSK: Wound VAC in place in the left leg draining serous fluid.        Medical Decision Making       45.  MINUTES SPENT BY ME on the date of service doing chart review, history, exam, documentation & further activities per the note.  NOTE(S)/MEDICAL RECORDS REVIEWED over the past 24 hours: Vitals, labs, new  imaging, documentation and medication administrations       Data     I have personally reviewed the following data over the past 24 hrs:    N/A  \   N/A   / N/A     N/A N/A N/A /  119 (H)   3.5 N/A N/A \       Imaging results reviewed over the past 24 hrs:   No results found for this or any previous visit (from the past 24 hours).

## 2025-04-02 NOTE — PROGRESS NOTES
"VASCULAR SURGERY PROGRESS NOTE    Subjective:  Patient was seen and evaluated bedside for surgical follow-up.  Pain controlled with current regimen, tolerated wound VAC change at the bedside yesterday.  Continues working with therapies, determining if he will be an ARU candidate.  No other concerns.    Objective:  Intake/Output Summary (Last 24 hours) at 4/2/2025 0802  Last data filed at 4/2/2025 0049  Gross per 24 hour   Intake 120 ml   Output 220 ml   Net -100 ml     PHYSICAL EXAM:  /59 (BP Location: Right arm)   Pulse 76   Temp 98.5  F (36.9  C) (Oral)   Resp 20   Ht 1.702 m (5' 7\")   Wt 99 kg (218 lb 4.1 oz)   SpO2 95%   BMI 34.18 kg/m    General: The patient is alert and oriented. Appropriate. No acute distress  Psych: Pleasant affect, answers questions appropriately  Skin: Color appropriate for race, warm, dry.  Respiratory:  Normal respiratory effort   Extremities:  Palpable distal pulses, wound VAC intact with good seal to LLE        Imaging:   Pertinent imaging reviewed      ASSESSMENT:  69 year old male who presented to with left leg pain.  History and workup were suspicious for acute ischemic event to bilateral lower extremity earlier this week with symptoms in the left leg significantly worse than right with initial improvement in pain in left lower extremity with development of progressive and motion of left foot and CK greater than 13,000 all suspicious for development of compartment syndrome in left lower extremity.     Underwent left lower extremity fasciotomy 3/27     PLAN:  Continue Xarelto and ASA  Resume statin once when CK further resolved   Wound VAC change with WOC Tues/Fri  Activity as tolerated, PT following  Continue neurovascular check, contact us if there is any questions or concerns or any concerning changes   OK for discharge from vascular standpoint once pain controlled on oral meds, wound VAC arranged, and safe discharge plan in place  Follow up in vascular clinic in 2 " weeks for wound check     Marga Seth PA-C  VASCULAR SURGERY

## 2025-04-02 NOTE — PLAN OF CARE
Goal Outcome Evaluation:                        Problem: Pain Acute  Goal: Optimal Pain Control and Function  Outcome: Progressing  Intervention: Develop Pain Management Plan  Recent Flowsheet Documentation  Taken 4/2/2025 1058 by Amita Muñoz, RN  Pain Management Interventions: medication (see MAR)  Intervention: Prevent or Manage Pain  Recent Flowsheet Documentation  Taken 4/2/2025 0930 by Amita Muñoz, RN  Sleep/Rest Enhancement: consistent schedule promoted  Bowel Elimination Promotion:   adequate fluid intake promoted   ambulation promoted  Medication Review/Management: medications reviewed     Problem: Constipation  Goal: Effective Bowel Elimination  Outcome: Progressing     Problem: Surgery Nonspecified  Goal: Absence of Bleeding  Outcome: Progressing  Goal: Blood Glucose Level Within Targeted Range  Outcome: Progressing  Goal: Absence of Infection Signs and Symptoms  Outcome: Progressing   Good pain control prn IV dilaudid needed after PT, effective. +doppler pulses present LLE. Pt up to br with assist one. Denied N/T to LLE, wound vac intact with bloody drainage.

## 2025-04-02 NOTE — PLAN OF CARE
Problem: Skin Injury Risk Increased  Goal: Skin Health and Integrity  Outcome: Progressing  Intervention: Plan: Nurse Driven Intervention: Moisture Management  Intervention: Plan: Nurse Driven Intervention: Friction and Shear  Intervention: Optimize Skin Protection  Problem: VTE (Venous Thromboembolism)  Goal: Tissue Perfusion  Outcome: Progressing  Problem: Pain Acute  Goal: Optimal Pain Control and Function  Outcome: Progressing  Intervention: Develop Pain Management Plan  Intervention: Prevent or Manage Pain  Transfer from P3.    Patient reports mild pain. Declined further PRN medication. CMS intact. Able to move all extremities. Wound Vac intact.    Patient educated on plan of care. Answered all questions and concerns. Verbalized understanding, plan of care ongoing.

## 2025-04-03 ENCOUNTER — APPOINTMENT (OUTPATIENT)
Dept: PHYSICAL THERAPY | Facility: HOSPITAL | Age: 70
End: 2025-04-03
Attending: INTERNAL MEDICINE
Payer: COMMERCIAL

## 2025-04-03 VITALS
HEIGHT: 67 IN | DIASTOLIC BLOOD PRESSURE: 61 MMHG | HEART RATE: 72 BPM | OXYGEN SATURATION: 96 % | RESPIRATION RATE: 18 BRPM | SYSTOLIC BLOOD PRESSURE: 95 MMHG | BODY MASS INDEX: 34.26 KG/M2 | WEIGHT: 218.26 LBS | TEMPERATURE: 98.2 F

## 2025-04-03 LAB
GLUCOSE BLDC GLUCOMTR-MCNC: 113 MG/DL (ref 70–99)
GLUCOSE BLDC GLUCOMTR-MCNC: 132 MG/DL (ref 70–99)
GLUCOSE BLDC GLUCOMTR-MCNC: 142 MG/DL (ref 70–99)
GLUCOSE BLDC GLUCOMTR-MCNC: 145 MG/DL (ref 70–99)
HOLD SPECIMEN: NORMAL
POTASSIUM SERPL-SCNC: 3.8 MMOL/L (ref 3.4–5.3)

## 2025-04-03 PROCEDURE — 250N000013 HC RX MED GY IP 250 OP 250 PS 637: Performed by: INTERNAL MEDICINE

## 2025-04-03 PROCEDURE — 97110 THERAPEUTIC EXERCISES: CPT | Mod: GP | Performed by: PHYSICAL THERAPIST

## 2025-04-03 PROCEDURE — 250N000013 HC RX MED GY IP 250 OP 250 PS 637: Performed by: STUDENT IN AN ORGANIZED HEALTH CARE EDUCATION/TRAINING PROGRAM

## 2025-04-03 PROCEDURE — 97530 THERAPEUTIC ACTIVITIES: CPT | Mod: GP | Performed by: PHYSICAL THERAPIST

## 2025-04-03 PROCEDURE — 36415 COLL VENOUS BLD VENIPUNCTURE: CPT | Performed by: INTERNAL MEDICINE

## 2025-04-03 PROCEDURE — 99232 SBSQ HOSP IP/OBS MODERATE 35: CPT | Performed by: NURSE PRACTITIONER

## 2025-04-03 PROCEDURE — 84132 ASSAY OF SERUM POTASSIUM: CPT | Performed by: INTERNAL MEDICINE

## 2025-04-03 PROCEDURE — 250N000012 HC RX MED GY IP 250 OP 636 PS 637: Performed by: INTERNAL MEDICINE

## 2025-04-03 PROCEDURE — 250N000011 HC RX IP 250 OP 636: Mod: JZ | Performed by: NURSE PRACTITIONER

## 2025-04-03 PROCEDURE — 120N000001 HC R&B MED SURG/OB

## 2025-04-03 PROCEDURE — 250N000013 HC RX MED GY IP 250 OP 250 PS 637: Performed by: NURSE PRACTITIONER

## 2025-04-03 PROCEDURE — 99232 SBSQ HOSP IP/OBS MODERATE 35: CPT | Performed by: INTERNAL MEDICINE

## 2025-04-03 RX ORDER — MULTIVIT WITH MINERALS/LUTEIN
500 TABLET ORAL DAILY
Status: DISCONTINUED | OUTPATIENT
Start: 2025-04-03 | End: 2025-04-15 | Stop reason: HOSPADM

## 2025-04-03 RX ORDER — ZINC SULFATE 50(220)MG
220 CAPSULE ORAL DAILY
Status: COMPLETED | OUTPATIENT
Start: 2025-04-03 | End: 2025-04-12

## 2025-04-03 RX ADMIN — METHOCARBAMOL 500 MG: 500 TABLET ORAL at 12:51

## 2025-04-03 RX ADMIN — METHOCARBAMOL 500 MG: 500 TABLET ORAL at 21:34

## 2025-04-03 RX ADMIN — METHOCARBAMOL 500 MG: 500 TABLET ORAL at 18:03

## 2025-04-03 RX ADMIN — ACETAMINOPHEN 975 MG: 325 TABLET, FILM COATED ORAL at 00:50

## 2025-04-03 RX ADMIN — HYDROMORPHONE HYDROCHLORIDE 2 MG: 2 TABLET ORAL at 00:51

## 2025-04-03 RX ADMIN — PREDNISONE 10 MG: 5 TABLET ORAL at 08:26

## 2025-04-03 RX ADMIN — ASPIRIN 81 MG: 81 TABLET, COATED ORAL at 08:27

## 2025-04-03 RX ADMIN — ACETAMINOPHEN 975 MG: 325 TABLET, FILM COATED ORAL at 12:51

## 2025-04-03 RX ADMIN — PANTOPRAZOLE SODIUM 40 MG: 40 TABLET, DELAYED RELEASE ORAL at 06:11

## 2025-04-03 RX ADMIN — Medication 60 ML: at 08:29

## 2025-04-03 RX ADMIN — METHOCARBAMOL 500 MG: 500 TABLET ORAL at 08:27

## 2025-04-03 RX ADMIN — HYDROMORPHONE HYDROCHLORIDE 0.2 MG: 0.2 INJECTION, SOLUTION INTRAMUSCULAR; INTRAVENOUS; SUBCUTANEOUS at 10:28

## 2025-04-03 RX ADMIN — ACETAMINOPHEN 975 MG: 325 TABLET, FILM COATED ORAL at 18:03

## 2025-04-03 RX ADMIN — METOPROLOL SUCCINATE 25 MG: 25 TABLET, EXTENDED RELEASE ORAL at 08:26

## 2025-04-03 RX ADMIN — Medication 1 TABLET: at 08:27

## 2025-04-03 RX ADMIN — Medication 500 MG: at 18:05

## 2025-04-03 RX ADMIN — ACETAMINOPHEN 975 MG: 325 TABLET, FILM COATED ORAL at 06:09

## 2025-04-03 RX ADMIN — ZINC SULFATE 220 MG (50 MG) CAPSULE 220 MG: CAPSULE at 18:04

## 2025-04-03 RX ADMIN — HYDROMORPHONE HYDROCHLORIDE 2 MG: 2 TABLET ORAL at 08:31

## 2025-04-03 RX ADMIN — LISINOPRIL 5 MG: 5 TABLET ORAL at 08:27

## 2025-04-03 RX ADMIN — RIVAROXABAN 20 MG: 10 TABLET, FILM COATED ORAL at 18:03

## 2025-04-03 ASSESSMENT — ACTIVITIES OF DAILY LIVING (ADL)
ADLS_ACUITY_SCORE: 39

## 2025-04-03 NOTE — PROGRESS NOTES
[facility]    Medicine Progress Note - Hospitalist Service    Date of Admission:  3/27/2025    Assessment & Plan     69 male with history of CAD, hypertension is admitted for acute left lower extremity pain and found to have acute limb ischemia, underwent 4 compartment fasciotomy urgently on admission.  Monitored in the ICU and downgraded clinically 3/28.    Wound vac exchanged on 4/1/25.     He complained of painful erythema and swelling at the level of third right proximal interphalangeal joint. CRP came back markedly elevated at 198.9 - concerning for inflammatory arthropathy ? Gout vs pseudogout.  Improving following initiation of prednisone 10 mg daily.     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 Gout vs pseudogout   Painful erythema and swelling at the level of third right proximal interphalangeal joint  CRP came back markedly elevated at 198.9 - concerning for inflammatory arthropathy ? Gout vs pseudogout.  Improving following initiation of prednisone 10 mg daily.     Continue prednisone 10 mg daily for 5  "days-started on 3/31/2025.  Hand x-ray showed DJD.  Discontinued Cefdinir 500 mg twice daily   Analgesics/ice pack as needed  Outpatient follow up with rheumatologist     Chronic heart failure with mildly reduced ejection fraction  Echo this admission with EF 45 to 50%  - Not clinically hypervolemic, monitor for edema    CAD  Remote CABG, no anginal chest pain, monitor clinically  - Continues on aspirin  - Not routinely on statin, start when CK further resolved    Essential hypertension  -Continue lisinopril 5 mg daily, metoprolol succinate 12.5 mg daily          Diet: Low Saturated Fat Na <2400 mg  Snacks/Supplements Adult: Ensure Max Protein (bariatric); Between Meals    DVT Prophylaxis: DOAC  Capps Catheter: Not present  Lines: None     Cardiac Monitoring: None  Code Status: Full Code      Clinically Significant Risk Factors               # Hypoalbuminemia: Lowest albumin = 3 g/dL at 3/28/2025  4:13 AM, will monitor as appropriate     # Hypertension: Noted on problem list            # Obesity: Estimated body mass index is 34.18 kg/m  as calculated from the following:    Height as of this encounter: 1.702 m (5' 7\").    Weight as of this encounter: 99 kg (218 lb 4.1 oz).       # History of CABG: noted on surgical history       Social Drivers of Health    Physical Activity: Insufficiently Active (3/17/2025)    Exercise Vital Sign     Days of Exercise per Week: 1 day     Minutes of Exercise per Session: 10 min   Social Connections: Unknown (3/17/2025)    Social Connection and Isolation Panel [NHANES]     Frequency of Social Gatherings with Friends and Family: Once a week          Disposition Plan     Medically Ready for Discharge: Anticipated in 2-4 Days             Robert Ward MD  Hospitalist Service  [facility]  Securely message with [employer] (more info)  Text page via [facility] Paging/Directory   ______________________________________________________________________    Interval History "   Patient reports that the pain from his right middle finger has improved. Now he can bend it. He otherwise feeling OK.    Physical Exam   Vital Signs: Temp: 97.8  F (36.6  C) Temp src: Oral BP: 126/77 Pulse: 78   Resp: 16 SpO2: 94 % O2 Device: None (Room air)    Weight: 218 lbs 4.09 oz    General appearance: not in acute distress  HEENT: PERRL, EOMI  Lungs: Clear breath sounds in bilateral lung fields  Cardiovascular: Regular rate and rhythm, normal S1-S2  Abdomen: Soft, non tender, no distension  Musculoskeletal: No joint swelling. Right middle finger has mild erythema and swelling.   Skin: No rash and no edema  Neurology: AAO ×3.  Cranial nerves II - XII normal.  Normal muscle strength in all four extremities.     Medical Decision Making       48 MINUTES SPENT BY ME on the date of service doing chart review, history, exam, documentation & further activities per the note.      Data     I have personally reviewed the following data over the past 24 hrs:    N/A  \   N/A   / N/A     N/A N/A N/A /  132 (H)   3.8 N/A N/A \       Imaging results reviewed over the past 24 hrs:   No results found for this or any previous visit (from the past 24 hours).

## 2025-04-03 NOTE — PLAN OF CARE
Goal Outcome Evaluation:       Pt tolerating PT with increased pain after. IV Dilaudid admin'd with good relief. Wound vac intact, drainage in container. Up in chair visiting with family this afternoon.

## 2025-04-03 NOTE — PLAN OF CARE
Problem: Pain Acute  Goal: Optimal Pain Control and Function  Outcome: Progressing  Intervention: Prevent or Manage Pain  Recent Flowsheet Documentation  Taken 4/3/2025 0053 by Anuradha Gao RN  Medication Review/Management: medications reviewed   Goal Outcome Evaluation: Pain controlled with scheduled Tylenol and prn hydromorphone    Wound vac dressings in place LLE. Vac running 125mmHg, no leaks. Pedal pulses with doppler.

## 2025-04-03 NOTE — PLAN OF CARE
"  Problem: Adult Inpatient Plan of Care  Goal: Plan of Care Review  Description: The Plan of Care Review/Shift note should be completed every shift.  The Outcome Evaluation is a brief statement about your assessment that the patient is improving, declining, or no change.  This information will be displayed automatically on your shiftnote.  Outcome: Progressing  Goal: Patient-Specific Goal (Individualized)  Description: You can add care plan individualizations to a care plan. Examples of Individualization might be:  \"Parent requests to be called daily at 9am for status\", \"I have a hard time hearing out of my right ear\", or \"Do not touch me to wake me up as it startlesme\".  Outcome: Progressing  Goal: Absence of Hospital-Acquired Illness or Injury  Outcome: Progressing  Intervention: Identify and Manage Fall Risk  Recent Flowsheet Documentation  Taken 4/2/2025 1740 by Neil Quintana RN  Safety Promotion/Fall Prevention:   activity supervised   assistive device/personal items within reach   clutter free environment maintained   lighting adjusted   nonskid shoes/slippers when out of bed   patient and family education   room door open   safety round/check completed   supervised activity  Goal: Optimal Comfort and Wellbeing  Outcome: Progressing  Intervention: Monitor Pain and Promote Comfort  Recent Flowsheet Documentation  Taken 4/2/2025 2058 by Neil Quintana, RN  Pain Management Interventions:   medication (see MAR)   repositioned   rest  Taken 4/2/2025 1830 by Neil Quintana, RN  Pain Management Interventions: (elevate)   repositioned   rest  Taken 4/2/2025 1739 by Neil Quintana, RN  Pain Management Interventions: (LLE elevation)   medication (see MAR)   repositioned   rest  Goal: Readiness for Transition of Care  Outcome: Progressing     Problem: Delirium  Goal: Optimal Coping  Outcome: Progressing  Goal: Improved Behavioral Control  Outcome: Progressing  Intervention: Minimize Safety Risk  Recent Flowsheet " Documentation  Taken 4/2/2025 1740 by Neil Quintana RN  Enhanced Safety Measures:   assistive devices when indicated   pain management   review medications for side effects with activity  Goal: Improved Attention and Thought Clarity  Outcome: Progressing  Goal: Improved Sleep  Outcome: Progressing     Problem: Risk for Delirium  Goal: Optimal Coping  Outcome: Progressing  Goal: Improved Behavioral Control  Outcome: Progressing  Intervention: Minimize Safety Risk  Recent Flowsheet Documentation  Taken 4/2/2025 1740 by Neil Quintana RN  Enhanced Safety Measures:   assistive devices when indicated   pain management   review medications for side effects with activity  Goal: Improved Attention and Thought Clarity  Outcome: Progressing  Goal: Improved Sleep  Outcome: Progressing     Problem: Skin Injury Risk Increased  Goal: Skin Health and Integrity  Outcome: Progressing  Intervention: Plan: Nurse Driven Intervention: Moisture Management  Recent Flowsheet Documentation  Taken 4/2/2025 1740 by Neil Quinatna RN  Moisture Interventions: Encourage regular toileting  Intervention: Plan: Nurse Driven Intervention: Friction and Shear  Recent Flowsheet Documentation  Taken 4/2/2025 1740 by Neil Quintana RN  Friction/Shear Interventions: HOB 30 degrees or less     Problem: Comorbidity Management  Goal: Blood Pressure in Desired Range  Outcome: Progressing     Problem: VTE (Venous Thromboembolism)  Goal: Tissue Perfusion  Outcome: Progressing  Goal: Right Ventricular Function  Outcome: Progressing     Problem: Fall Injury Risk  Goal: Absence of Fall and Fall-Related Injury  Outcome: Progressing  Intervention: Promote Injury-Free Environment  Recent Flowsheet Documentation  Taken 4/2/2025 1740 by Neil Quintana RN  Safety Promotion/Fall Prevention:   activity supervised   assistive device/personal items within reach   clutter free environment maintained   lighting adjusted   nonskid shoes/slippers when out of bed   patient and  family education   room door open   safety round/check completed   supervised activity     Problem: Pain Acute  Goal: Optimal Pain Control and Function  Outcome: Progressing  Intervention: Develop Pain Management Plan  Recent Flowsheet Documentation  Taken 4/2/2025 2058 by Neil Quintana RN  Pain Management Interventions:   medication (see MAR)   repositioned   rest  Taken 4/2/2025 1830 by Neil Quintana, RN  Pain Management Interventions: (elevate)   repositioned   rest  Taken 4/2/2025 1739 by Neil Quintana RN  Pain Management Interventions: (LLE elevation)   medication (see MAR)   repositioned   rest     Problem: Surgery Nonspecified  Goal: Absence of Bleeding  Outcome: Progressing  Goal: Blood Glucose Level Within Targeted Range  Outcome: Progressing  Goal: Absence of Infection Signs and Symptoms  Outcome: Progressing     Problem: Constipation  Goal: Effective Bowel Elimination  Outcome: Progressing    Patient was alert and oriented. Patient stated his LLE dull pain was 2/10. Scheduled acetaminophen and methocarbamol given this shift. LLE remains swollen. Wound vac intact to left calf surgical incision with 200 mL of sanguinous drainage inside wound vac cannister. Patient elevating LLE. Doppler US utilized to assess left pedal and posterior tibial pulses, which were present. Patient with loose stools on the start of the shift; BM meds held per patient request. Patient was a stand by assist with pivot transfers.

## 2025-04-03 NOTE — PROGRESS NOTES
[facility] ACUTE PAIN SERVICE    [facility], [facility], [facility], [facility], [place]   PAIN Progress Note    Assessment/Plan:  Dudley Kiran is a 69 year old male who was admitted on 3/27/2025.  Pain team was asked to see the patient for uncontrolled post op pain and assistance with weaning from IV pain medications. Admitted for acute left lower extremity pain and found to have acute limb ischemic and underwent 4 compartment fasciotomy urgently on admission. History of CAD, HTN, prediabetes, arthritis, and erectile dysfunction. The patient does not smoke and does not have a chemical dependency history.      Post op day: 7 Days Post-Op fasciotomy of left lower extremity.      In the last 24 hours, patient has utilized 4 mg of PO hydromorphone,  0.2 mg of IV hydromorphone     PLAN:   1) Pain is consistent with post op pain, wound pain- LLE Acute compartment syndrome status post 4 compartment fasciotomy, Acute limb ischemia  Multimodal Medication Therapy  Topical: None  NSAID'S: CrCl 97.7 mL/min. None  Steroids: prednisone 10 mg daily x 5 days (3/31-4/5/25)  Muscle Relaxants: methocarbamol 500 mg QID  Adjuvants: acetaminophen 975 mg q6h, aspirin 81 mg daily  Antidepressants/anxiolytics: None  Opioids: Hydromorphone 2-4 mg q3h PRN   IV Pain medication: Hydromorphone 0.2 mg q4h PRN   Non-medication interventions: Ice, Rest, PT, and OT  Constipation Prophylaxis: Milk of Magnesia daily, Miralax BID, senna-docusate BID, bisacodyl 10 mg daily     -MN  pulled from system on 4/2/2025. No results found for the patient and with no opioid/controlled medications per [employer].   Discharge Recommendations - We recommend prescribing the following at the time of discharge: TBD.      Subjective:  Describes pain as 2-4/10 and aching, sharp, tight in the LLE. The patient denies nausea, vomiting, constipation, diarrhea, chest pain, shortness of breath, dizziness, fever, and chills.        Principal Problem:  Left leg pain  "    Patient Active Problem List   Diagnosis    Coronary arteriosclerosis due to lipid rich plaque    History of prediabetes    Elevated cholesterol    Essential hypertension, benign    Primary osteoarthritis of both hips    Class 1 obesity due to excess calories with serious comorbidity and body mass index (BMI) of 32.0 to 32.9 in adult    OA (osteoarthritis)    S/P CABG (coronary artery bypass graft)    Left leg pain        Objective:    History   Drug Use Unknown          Tobacco Use      Smoking status: Never      Smokeless tobacco: Never      Vital signs in last 24 hours:  /63 (BP Location: Left arm)   Pulse 78   Temp 98.1  F (36.7  C) (Oral)   Resp 20   Ht 1.702 m (5' 7\")   Wt 99 kg (218 lb 4.1 oz)   SpO2 95%   BMI 34.18 kg/m      Weight:     Vitals:    03/28/25 0600 03/29/25 0422 03/30/25 0609 03/31/25 0121   Weight: 101.6 kg (223 lb 14.4 oz) 103 kg (227 lb 1.2 oz) 99.3 kg (218 lb 14.7 oz) 99.5 kg (219 lb 5.7 oz)    04/01/25 0626   Weight: 99 kg (218 lb 4.1 oz)      Weight change:   Body mass index is 34.18 kg/m .    Intake/Output last 3 shifts:  I/O last 3 completed shifts:  In: 963 [P.O.:960; I.V.:3]  Out: 1025 [Urine:1025]  Intake/Output this shift:  I/O this shift:  In: 420 [P.O.:420]  Out: 200 [Urine:200]    Review of Systems:   As per subjective, all others negative.    Physical Exam:  General Appearance:  Alert, cooperative, no distress, appears stated age   Head:  Normocephalic, without obvious abnormality, atraumatic   Eyes:  PERRL, conjunctiva/corneas clear, EOM's intact   Nose: Nares normal, septum midline   Throat: Lips, mucosa, and tongue normal; teeth and gums normal   Neck: Supple, symmetrical, trachea midline   Back:   Symmetric, no curvature, ROM normal   Lungs:   Clear to auscultation bilaterally, respirations unlabored, room air    Chest Wall:  No tenderness or deformity   Heart:  Regular rate and rhythm, S1, S2 normal    Abdomen:   Soft, non-tender   Extremities: Wound vac to " LLE with good seal, palpable distal pulses    Skin: Skin warm, dry    Neurologic: Alert and oriented X 3, Moves all 4 extremities   Psych: Affect is appropriate      Imaging: Reviewed I have personally reviewed pertinent notes, labs, tests, and radiologic imaging in patient's chart.  Labs: Reviewed I have personally reviewed pertinent notes, labs, tests, and radiologic imaging in patient's chart.  Notes: Reviewed I have personally reviewed pertinent notes, labs, tests, and radiologic imaging in patient's chart.    Total time spent 35 minutes with greater than 50% in consultation, education and coordination of care.   Also discussed with RN.   Treatment plan includes: multimodal pain approach, Hospital Medicine Service for medical management, Vascular, PT, OT.   Patient educated regarding: multimodal pain approach and medications as listed above.   Elements of Medical Decision Making as described above. Acute or chronic illness or injury or surgery. High risk therapy including opioids, high risk drug therapy including oral and/or parenteral controlled substances.    Patient is understanding of the plan. All questions and concerns addressed to patient's satisfaction.     MARCELINO Staples-SOLANGE  Acute Care Inpatient Pain Management Program  [facility]  Hours of coverage Monday-Friday [phone]. After hours please contact Primary team.   Page via Epic chat or ZapHour

## 2025-04-03 NOTE — PROGRESS NOTES
RNCM rcv'd call from [facility] after 4p stating they need therapy notes for pt to be faxed to [phone].   RNCM sending request to morning CM.

## 2025-04-03 NOTE — PROGRESS NOTES
"CLINICAL NUTRITION SERVICES - REASSESSMENT NOTE     RECOMMENDATIONS FOR MDs/PROVIDERS TO ORDER:  None    Registered Dietitian Interventions:  -Add vit C 500 mg daily and zinc sulfate 220 mg x 10 days d/t not meeting RDIs with inadequate intake, pt eats very little vegetables and fruit, to promote wound healing  - Provided cardiac room service menu and saturated fat and Na listed room service menu to aid in ordering   -ok to order 2 x protein portions as long as it fits in his diet limits    Future/Additional Recommendations:  Adjust supplements pending intake, weight, tolerance, acceptance, wound healing, labs       INFORMATION OBTAINED  Assessed patient in room. Pt reports he is eating \"way less\" than baseline d/t diet restriction and smaller portions than he is used to. He reports his appetite is very good today and he \"could eat a horse\" . He is not fond of the ensure max but is willing to try a different flavor. He makes sure to take the expedite daily   Pt reports he has not received any outside food the past couple of days  Pt states severe pain after PT is a barrier to intake-lunch specifically. He is unable to do anything during this time but is improved in about an hour. Pt was unaware he could order a late lunch.   Pt is interested in ordering double protein portions    CURRENT NUTRITION ORDERS  Diet: Orders Placed This Encounter      Low Saturated Fat Na <2400 mg    Snacks/Supplements: ensure max daily, expedite daily     CURRENT INTAKE/TOLERANCE  Poor.   Pt only ate 1 meal yesterday, 100% of supper, with expedite at 626 kcal, 30 g protein- pt reports decreased appetite d/t pain  2 days prior pt ordered 2 meals, 1 meal intake documented at 100%. Ordered 1170 kcal, 44 g protein       NEW FINDINGS  GI symptoms:   1 soft, 1 loose BM yesterday  Skin/wounds: LE fasciotomy - stable per WOC 4/1  Nutrition-relevant labs: Reviewed  Nutrition-relevant medications:   Dulcolax suppository daily, ssi, milk of mag " daily, mvi with minerals, protonix, miralax bid, prednisone, pericolace   Weight: 1+ BLE edema. Wt fluid up from baseline  Date/Time Weight Weight Method   04/01/25 0626 99 kg (218 lb 4.1 oz) Bed scale   03/31/25 0121 99.5 kg (219 lb 5.7 oz) Bed scale   03/30/25 0609 99.3 kg (218 lb 14.7 oz) Bed scale   03/29/25 0422 103 kg (227 lb 1.2 oz) Bed scale   03/28/25 0600 101.6 kg (223 lb 14.4 oz) Bed scale     03/27/25 95.3 kg (210 lb)   03/19/25 95.7 kg (211 lb)- clinic   09/13/24 95.7 kg (211 lb)   03/13/24 95.3 kg (210 lb)     Dosing Weight: 95.3 kg, based on  usual BW     ASSESSED NUTRITION NEEDS  Estimated Energy Needs: 2000 -2100+ kcals/day ([place] Jeor)x 1.2+  Justification: Increased needs  Estimated Protein Needs: 110 -125 grams protein/day (1.2 - 1.5 grams of pro/kg)  Justification: Wound healing  Estimated Fluid Needs: 2000 -2100 mL/day (1 mL/kcal)  Justification: Maintenance, CHF    MALNUTRITION  % Intake: Decreased intake does not meet criteria  % Weight Loss: None noted  Subcutaneous Fat Loss: None observed  Muscle Loss: None observed  Fluid Accumulation/Edema: Mild, 1+  Malnutrition Diagnosis: Patient does not meet two of the established criteria necessary for diagnosing malnutrition but is at risk for malnutrition  Malnutrition Present on Admission: Yes    EVALUATION OF THE PROGRESS TOWARD GOALS     NUTRITION DIAGNOSIS  Food and nutrition related knowledge deficit r/t CHF needs evidenced by pt desire for higher Na items  Increased nutrient needs related to wound healing as evidenced by wound vac post fasciotomy    GOALS  -Patient to consume % of nutritionally adequate meal trays TID, or the equivalent with supplements/snacks.- not met- progressing  -Wound healing- in progress     MONITORING/EVALUATION  Progress toward goals will be monitored and evaluated per policy.

## 2025-04-04 LAB
GLUCOSE BLDC GLUCOMTR-MCNC: 100 MG/DL (ref 70–99)
GLUCOSE BLDC GLUCOMTR-MCNC: 100 MG/DL (ref 70–99)
GLUCOSE BLDC GLUCOMTR-MCNC: 116 MG/DL (ref 70–99)
GLUCOSE BLDC GLUCOMTR-MCNC: 122 MG/DL (ref 70–99)
GLUCOSE BLDC GLUCOMTR-MCNC: 122 MG/DL (ref 70–99)
POTASSIUM SERPL-SCNC: 3.7 MMOL/L (ref 3.4–5.3)

## 2025-04-04 PROCEDURE — 120N000001 HC R&B MED SURG/OB

## 2025-04-04 PROCEDURE — 84132 ASSAY OF SERUM POTASSIUM: CPT | Performed by: INTERNAL MEDICINE

## 2025-04-04 PROCEDURE — 250N000013 HC RX MED GY IP 250 OP 250 PS 637: Performed by: INTERNAL MEDICINE

## 2025-04-04 PROCEDURE — 250N000012 HC RX MED GY IP 250 OP 636 PS 637: Performed by: INTERNAL MEDICINE

## 2025-04-04 PROCEDURE — 99232 SBSQ HOSP IP/OBS MODERATE 35: CPT | Performed by: INTERNAL MEDICINE

## 2025-04-04 PROCEDURE — 250N000013 HC RX MED GY IP 250 OP 250 PS 637: Performed by: NURSE PRACTITIONER

## 2025-04-04 PROCEDURE — 99232 SBSQ HOSP IP/OBS MODERATE 35: CPT | Performed by: NURSE PRACTITIONER

## 2025-04-04 PROCEDURE — 250N000013 HC RX MED GY IP 250 OP 250 PS 637: Performed by: STUDENT IN AN ORGANIZED HEALTH CARE EDUCATION/TRAINING PROGRAM

## 2025-04-04 PROCEDURE — 97606 NEG PRS WND THER DME>50 SQCM: CPT

## 2025-04-04 PROCEDURE — 36415 COLL VENOUS BLD VENIPUNCTURE: CPT | Performed by: INTERNAL MEDICINE

## 2025-04-04 PROCEDURE — 250N000011 HC RX IP 250 OP 636: Mod: JZ | Performed by: NURSE PRACTITIONER

## 2025-04-04 RX ORDER — HYDROMORPHONE HCL IN WATER/PF 6 MG/30 ML
0.2 PATIENT CONTROLLED ANALGESIA SYRINGE INTRAVENOUS 4 TIMES DAILY PRN
Status: DISCONTINUED | OUTPATIENT
Start: 2025-04-04 | End: 2025-04-10

## 2025-04-04 RX ADMIN — ACETAMINOPHEN 975 MG: 325 TABLET, FILM COATED ORAL at 18:08

## 2025-04-04 RX ADMIN — METHOCARBAMOL 500 MG: 500 TABLET ORAL at 12:59

## 2025-04-04 RX ADMIN — METHOCARBAMOL 500 MG: 500 TABLET ORAL at 21:48

## 2025-04-04 RX ADMIN — HYDROMORPHONE HYDROCHLORIDE 2 MG: 2 TABLET ORAL at 15:10

## 2025-04-04 RX ADMIN — HYDROMORPHONE HYDROCHLORIDE 0.2 MG: 0.2 INJECTION, SOLUTION INTRAMUSCULAR; INTRAVENOUS; SUBCUTANEOUS at 09:32

## 2025-04-04 RX ADMIN — PREDNISONE 10 MG: 5 TABLET ORAL at 08:20

## 2025-04-04 RX ADMIN — ACETAMINOPHEN 975 MG: 325 TABLET, FILM COATED ORAL at 12:59

## 2025-04-04 RX ADMIN — METHOCARBAMOL 500 MG: 500 TABLET ORAL at 17:06

## 2025-04-04 RX ADMIN — HYDROMORPHONE HYDROCHLORIDE 2 MG: 2 TABLET ORAL at 21:58

## 2025-04-04 RX ADMIN — PANTOPRAZOLE SODIUM 40 MG: 40 TABLET, DELAYED RELEASE ORAL at 08:20

## 2025-04-04 RX ADMIN — HYDROMORPHONE HYDROCHLORIDE 2 MG: 2 TABLET ORAL at 02:07

## 2025-04-04 RX ADMIN — METHOCARBAMOL 500 MG: 500 TABLET ORAL at 08:19

## 2025-04-04 RX ADMIN — Medication 1 TABLET: at 08:19

## 2025-04-04 RX ADMIN — ASPIRIN 81 MG: 81 TABLET, COATED ORAL at 08:19

## 2025-04-04 RX ADMIN — Medication 500 MG: at 08:19

## 2025-04-04 RX ADMIN — Medication 60 ML: at 10:26

## 2025-04-04 RX ADMIN — HYDROMORPHONE HYDROCHLORIDE 0.2 MG: 0.2 INJECTION, SOLUTION INTRAMUSCULAR; INTRAVENOUS; SUBCUTANEOUS at 00:57

## 2025-04-04 RX ADMIN — HYDROMORPHONE HYDROCHLORIDE 4 MG: 2 TABLET ORAL at 10:23

## 2025-04-04 RX ADMIN — ZINC SULFATE 220 MG (50 MG) CAPSULE 220 MG: CAPSULE at 09:31

## 2025-04-04 RX ADMIN — RIVAROXABAN 20 MG: 10 TABLET, FILM COATED ORAL at 17:06

## 2025-04-04 ASSESSMENT — ACTIVITIES OF DAILY LIVING (ADL)
ADLS_ACUITY_SCORE: 39

## 2025-04-04 NOTE — PLAN OF CARE
Problem: Pain Acute  Goal: Optimal Pain Control and Function  Outcome: Progressing  Intervention: Develop Pain Management Plan  Recent Flowsheet Documentation  Taken 4/4/2025 1023 by Amita Muñoz RN  Pain Management Interventions: (waiting for wound vac replacement post WtoD drsg placed) medication (see MAR)  Taken 4/4/2025 0932 by Amita Muñoz RN  Pain Management Interventions: medication (see MAR)  Taken 4/4/2025 0813 by Amita Muñoz RN  Pain Management Interventions:   medication (see MAR)   pillow support provided  Intervention: Prevent or Manage Pain  Recent Flowsheet Documentation  Taken 4/4/2025 0840 by Amita Muñoz RN  Sleep/Rest Enhancement: consistent schedule promoted  Bowel Elimination Promotion:   adequate fluid intake promoted   ambulation promoted  Medication Review/Management: medications reviewed     Problem: Wound  Goal: Optimal Coping  Outcome: Progressing  Goal: Optimal Functional Ability  Outcome: Progressing  Intervention: Optimize Functional Ability  Recent Flowsheet Documentation  Taken 4/4/2025 0840 by Amita Muñoz RN  Activity Management:   activity adjusted per tolerance   activity encouraged  Goal: Absence of Infection Signs and Symptoms  Outcome: Progressing  Goal: Improved Oral Intake  Outcome: Progressing  Goal: Optimal Pain Control and Function  Outcome: Progressing  Intervention: Prevent or Manage Pain  Recent Flowsheet Documentation  Taken 4/4/2025 1023 by Amita Muñoz RN  Pain Management Interventions: (waiting for wound vac replacement post WtoD drsg placed) medication (see MAR)  Taken 4/4/2025 0932 by Amita Muñoz RN  Pain Management Interventions: medication (see MAR)  Taken 4/4/2025 0840 by Amita Muñoz RN  Sleep/Rest Enhancement: consistent schedule promoted  Taken 4/4/2025 0813 by Amita Muñoz RN  Pain Management Interventions:   medication (see MAR)   pillow support provided  Goal: Skin Health and  "Integrity  Outcome: Progressing  Intervention: Optimize Skin Protection  Recent Flowsheet Documentation  Taken 4/4/2025 0840 by Amita Muñoz, RN  Activity Management:   activity adjusted per tolerance   activity encouraged  Head of Bed (HOB) Positioning: HOB at 20-30 degrees  Goal: Optimal Wound Healing  Outcome: Progressing  Intervention: Promote Wound Healing  Recent Flowsheet Documentation  Taken 4/4/2025 0840 by Amita Muñoz, RN  Sleep/Rest Enhancement: consistent schedule promoted   Goal Outcome Evaluation:         Wound vac beeping early in shift, WOC RN not able change wound drsg within 2 hours. W to D drsg placed mid am. Pt had extreme pain even with Iv dilaudid admin'd prior. Oral dilaudid admin'd prior to new wound vac drsg being placed late am. Pt refusing PT/OT due to increased pain with activity. Pt pleasant and cooperative but had pain \"off the charts\" during wound dressing change to W to D drsg. Monitoring.               "

## 2025-04-04 NOTE — PROGRESS NOTES
[facility] Progress Note - Hospitalist Service    Date of Admission:  3/27/2025    Assessment & Plan     69 male with history of CAD, hypertension is admitted for acute left lower extremity pain and found to have acute limb ischemia, underwent 4 compartment fasciotomy urgently on admission.  Monitored in the ICU and downgraded clinically 3/28.    Waiting for placement.     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 Continue care per WOC.  - Postoperative cares per vascular surgery  - Follow hypercoag workup ordered by vascular (Heme/onc signed off 3/29/2025)    Constipation  Last bowel movement 5 days ago; Baseline BM q3-4 days  - Senakot BID and Miralax BID  - Suppository PRN    Suspected inflammatory arthropathy ? Gout vs pseudogout   Painful erythema and swelling at the level of third right proximal interphalangeal joint  CRP came back markedly elevated at 198.9 - concerning for inflammatory arthropathy ? Gout vs pseudogout.   Hand x-ray showed DJD.  - Resolved with prednisone 10 mg daily x 5 days.     Chronic heart failure with mildly reduced ejection fraction  Echo this admission with EF 45 to 50%  - Not clinically hypervolemic, monitor for edema    CAD  Remote CABG, no anginal chest pain, monitor clinically  - Continues on aspirin  - Not routinely on statin, start when CK further resolved    Essential hypertension  -Continue lisinopril 5 mg daily, metoprolol  "succinate 12.5 mg daily          Diet: Low Saturated Fat Na <2400 mg  Snacks/Supplements Adult: Ensure Max Protein (bariatric); Between Meals    DVT Prophylaxis: DOAC  Capps Catheter: Not present  Lines: None     Cardiac Monitoring: None  Code Status: Full Code      Clinically Significant Risk Factors               # Hypoalbuminemia: Lowest albumin = 3 g/dL at 3/28/2025  4:13 AM, will monitor as appropriate     # Hypertension: Noted on problem list            # Obesity: Estimated body mass index is 34.18 kg/m  as calculated from the following:    Height as of this encounter: 1.702 m (5' 7\").    Weight as of this encounter: 99 kg (218 lb 4.1 oz).       # History of CABG: noted on surgical history       Social Drivers of Health    Physical Activity: Insufficiently Active (3/17/2025)    Exercise Vital Sign     Days of Exercise per Week: 1 day     Minutes of Exercise per Session: 10 min   Social Connections: Unknown (3/17/2025)    Social Connection and Isolation Panel [NHANES]     Frequency of Social Gatherings with Friends and Family: Once a week          Disposition Plan     Medically Ready for Discharge: Anticipated in 2-4 Days    Barrier for discharge: TCU placement pending.         Robert Ward MD  Hospitalist Service  [facility]  Securely message with [employer] (more info)  Text page via [employer] Paging/Directory   ______________________________________________________________________    Interval History   Overnight event noted.  The pain and swelling from his right index finger has resolved. He reports having left leg pain with physical therapy.    Physical Exam   Vital Signs: Temp: 98.6  F (37  C) Temp src: Oral BP: 102/61 Pulse: 78   Resp: 16 SpO2: 94 % O2 Device: None (Room air)    Weight: 218 lbs 4.09 oz    General appearance: not in acute distress  HEENT: PERRL, EOMI  Lungs: Clear breath sounds in bilateral lung fields  Cardiovascular: Regular rate and rhythm, normal S1-S2  Abdomen: Soft, " non tender, no distension  Musculoskeletal: No joint swelling. Left lower leg on wound vac.  Skin: No rash and no edema  Neurology: AAO ×3.  Cranial nerves II - XII normal.  Normal muscle strength in all four extremities.     Medical Decision Making       40 MINUTES SPENT BY ME on the date of service doing chart review, history, exam, documentation & further activities per the note.      Data     I have personally reviewed the following data over the past 24 hrs:    N/A  \   N/A   / N/A     N/A N/A N/A /  122 (H)   3.7 N/A N/A \       Imaging results reviewed over the past 24 hrs:   No results found for this or any previous visit (from the past 24 hours).

## 2025-04-04 NOTE — PLAN OF CARE
"Problem: Adult Inpatient Plan of Care  Goal: Plan of Care Review  Description: The Plan of Care Review/Shift note should be completed every shift.  The Outcome Evaluation is a brief statement about your assessment that the patient is improving, declining, or no change.  This information will be displayed automatically on your shiftnote.  Outcome: Progressing  Goal: Patient-Specific Goal (Individualized)  Description: You can add care plan individualizations to a care plan. Examples of Individualization might be:  \"Parent requests to be called daily at 9am for status\", \"I have a hard time hearing out of my right ear\", or \"Do not touch me to wake me up as it startlesme\".  Outcome: Progressing  Goal: Absence of Hospital-Acquired Illness or Injury  Outcome: Progressing  Intervention: Identify and Manage Fall Risk  Recent Flowsheet Documentation  Taken 4/3/2025 1804 by Neil Quintana RN  Safety Promotion/Fall Prevention:   activity supervised   assistive device/personal items within reach   clutter free environment maintained   lighting adjusted   nonskid shoes/slippers when out of bed   patient and family education   room near nurse's station   safety round/check completed   supervised activity  Goal: Optimal Comfort and Wellbeing  Outcome: Progressing  Intervention: Monitor Pain and Promote Comfort  Recent Flowsheet Documentation  Taken 4/3/2025 1900 by Neil Quintana RN  Pain Management Interventions:   medication offered but refused   repositioned   rest  Taken 4/3/2025 1803 by Neil Quintana, RN  Pain Management Interventions: medication (see MAR)  Goal: Readiness for Transition of Care  Outcome: Progressing     Problem: Delirium  Goal: Optimal Coping  Outcome: Progressing  Goal: Improved Behavioral Control  Outcome: Progressing  Intervention: Minimize Safety Risk  Recent Flowsheet Documentation  Taken 4/3/2025 1804 by Neil Quintana RN  Enhanced Safety Measures:   assistive devices when indicated   pain management   " review medications for side effects with activity  Goal: Improved Attention and Thought Clarity  Outcome: Progressing  Goal: Improved Sleep  Outcome: Progressing     Problem: Risk for Delirium  Goal: Optimal Coping  Outcome: Progressing  Goal: Improved Behavioral Control  Outcome: Progressing  Intervention: Minimize Safety Risk  Recent Flowsheet Documentation  Taken 4/3/2025 1804 by Neil Quintana RN  Enhanced Safety Measures:   assistive devices when indicated   pain management   review medications for side effects with activity  Goal: Improved Attention and Thought Clarity  Outcome: Progressing  Goal: Improved Sleep  Outcome: Progressing     Problem: Skin Injury Risk Increased  Goal: Skin Health and Integrity  Outcome: Progressing  Intervention: Plan: Nurse Driven Intervention: Moisture Management  Recent Flowsheet Documentation  Taken 4/3/2025 1804 by Neil Quintana RN  Moisture Interventions:   Encourage regular toileting   Incontinence pad  Intervention: Plan: Nurse Driven Intervention: Friction and Shear  Recent Flowsheet Documentation  Taken 4/3/2025 1804 by Neil Quintana RN  Friction/Shear Interventions: HOB 30 degrees or less     Problem: Comorbidity Management  Goal: Blood Pressure in Desired Range  Outcome: Progressing     Problem: VTE (Venous Thromboembolism)  Goal: Tissue Perfusion  Outcome: Progressing  Goal: Right Ventricular Function  Outcome: Progressing     Problem: Fall Injury Risk  Goal: Absence of Fall and Fall-Related Injury  Outcome: Progressing  Intervention: Promote Injury-Free Environment  Recent Flowsheet Documentation  Taken 4/3/2025 1804 by Neil Quintana RN  Safety Promotion/Fall Prevention:   activity supervised   assistive device/personal items within reach   clutter free environment maintained   lighting adjusted   nonskid shoes/slippers when out of bed   patient and family education   room near nurse's station   safety round/check completed   supervised activity     Problem: Pain  Acute  Goal: Optimal Pain Control and Function  Outcome: Progressing  Intervention: Develop Pain Management Plan  Recent Flowsheet Documentation  Taken 4/3/2025 1900 by Neil Quintana RN  Pain Management Interventions:   medication offered but refused   repositioned   rest  Taken 4/3/2025 1803 by Neil Quintana RN  Pain Management Interventions: medication (see MAR)     Problem: Surgery Nonspecified  Goal: Absence of Bleeding  Outcome: Progressing  Goal: Blood Glucose Level Within Targeted Range  Outcome: Progressing  Goal: Absence of Infection Signs and Symptoms  Outcome: Progressing     Problem: Constipation  Goal: Effective Bowel Elimination  Outcome: Progressing     Problem: Wound  Goal: Optimal Coping  Outcome: Progressing  Goal: Optimal Functional Ability  Outcome: Progressing  Goal: Absence of Infection Signs and Symptoms  Outcome: Progressing  Goal: Improved Oral Intake  Outcome: Progressing  Goal: Optimal Pain Control and Function  Outcome: Progressing  Intervention: Prevent or Manage Pain  Recent Flowsheet Documentation  Taken 4/3/2025 1900 by Neil Quintana RN  Pain Management Interventions:   medication offered but refused   repositioned   rest  Taken 4/3/2025 1803 by Neil Quintana RN  Pain Management Interventions: medication (see MAR)  Goal: Skin Health and Integrity  Outcome: Progressing  Goal: Optimal Wound Healing  Outcome: Progressing    Patient was alert and oriented. Left leg discomfort was 2/10. Scheduled acetaminophen and methocarbamol given this shift. Wound vac (x2) intact to left medial and lateral lower leg with about 200 mL of sanguinous drainage inside the wound vac canister. Left pedal and posterior tibial pulse present via Doppler US. Patient was an assist of 1/stand by assist with pivot transfers.

## 2025-04-04 NOTE — PROGRESS NOTES
Care Management Follow Up    Length of Stay (days): 8    Expected Discharge Date: 04/05/2025    Anticipated Discharge Plan:  Acute Rehab    Transportation: TBD    PT Recommendations: Acute Rehab Center-Motivated patient will benefit from intensive, interdisciplinary therapy.  Anticipate will be able to tolerate 3 hours of therapy per day, Per plan established by the PT  OT Recommendations:  Acute Rehab Center-Motivated patient will benefit from intensive, interdisciplinary therapy.  Anticipate will be able to tolerate 3 hours of therapy per day     Barriers to Discharge: medical stability, placement    Prior Living Situation: mobile home with  (2 cousins)    Discussed  Partnership in Safe Discharge Planning  document with patient/family: No     Handoff Completed: No, handoff not indicated or clinically appropriate    Patient/Spokesperson Updated: No    Additional Information:  Therapy rec is Acute Rehab. [facility] is following and states:    1) need to make sure he can tolerate a wound vac change with no IV pain meds  2) need to see a bit more with therapies to determine if he can tolerate    Teresa barragan was submitted. CM faxed them updated therapy notes.     Next Steps: teresa barragan, placement    Barbara Archibald, DENISE

## 2025-04-04 NOTE — PROGRESS NOTES
[facility]  WOC Nurse Inpatient Assessment     Consulted for: Left leg fasciotomy    Summary: Wound VAC applied and patient alert and engaged    WOC nurse follow-up plan: Tuesday/Friday    Patient History (according to provider note(s):      Per Op note:  Date of Service: 3/27/2025      Preoperative diagnosis     Compartment syndrome left leg  Thromboembolism to left lower extremity  CAD s/p CABG in 2010  HTN  HLD     Postoperative diagnosis     same     Surgeon: Wanda Coelho DO RPVI           Procedures:  Fasciotomy left lower extremity      Assessment:      Areas visualized during today's visit:  left leg    Negative pressure wound therapy applied to: Left leg   Last photo: 3/28         Lateral 3/28      Medial 3/28                                         medial 4/1                                                lateral 4/1 4/4 Lateral      Medial  Wound due to: Surgical Wound   Wound history/plan of care:    Surgical date: 3/27   Service following: Vascular  Date Negative Pressure Wound Therapy initiated: 3/28   Interventions in place: elevation  Is patient s nutritional status compromised? no   If yes, what interventions are in place? N/A  Reason for initiating vac therapy? Need for accelerated granulation tissue  Which?of?the?following?co-morbidities?apply? Obesity  If diabetic is patient on a diabetic management program? N/A   Is osteomyelitis present in wound? no   If yes what treatments are in place? N/A    Wound base: Muscle/fascia/tendon with some granulation throughout wound beds     Palpation of the wound bed: normal       Drainage: moderate      Volume in cannister: unknown, cannister was disposed of when nursing removed VAC dressing prior to WOC visit due to tube blockage     Last cannister change date: 4/4     Description of drainage: serosanguinous      Measurements (length x width x depth, in cm)   Lateral 25.5 x 5.5 x up to 1cm  Medial 24 x 5.5 x 2cm along  "posterior     Tunneling N/A      Undermining N/A   Periwound skin: Intact- lateral wound kelechi skin no discoloration or peeling today      Color: normal and consistent with surrounding tissue and purple       Temperature: normal    Odor: none   Pain: mild, tender   Pain intervention prior to dressing change: patient tolerated well, oral med per orders, and slow and gentle cares   Treatment goal: Increase granulation  STATUS: improving   Supplies ordered: gathered    Number of foam pieces removed from a wound (excluding foam for bridge) :  2 GranuFoam Black and 2 Oil emulsion dressing   Verified this matched the number of foam pieces applied last dressing change: N/A   Number of foam pieces packed into wound (excluding foam for bridge) : Medial 2 black foam, 2 oil emulsion gauze            Lateral 3 black foam, 3 oil emulsion        Treatment Plan:     Negative pressure wound therapy plan:  Wound location: LLE fasciotomy sites   Change Days: Tues/ Fri by WOC RN    Supplies (including all accessories) used: large Black foam , Adaptic/Curity oil emulsion contact layer , and extra trac pad and Y-connector  Cleanse with Vashe prior to replacing NPWT  Suction setting: -125   Methods used: Window paned all periwound skin with vac drape prior to applying sponge    Staff RN to assess integrity of dressing and ensure suction is set at appropriate level every shift.   Date canister. Chart canister output every shift. Change cannister weekly and PRN if full/occluded     Remove foam dressing and replace with BID normal saline moist gauze dressing if:   -a dressing failure which cannot be repaired within 2 hours   -patient is discharging to home without a home pump   -patient is discharging to a facility outside the local area   -if a dressing is a \"Silver Foam\", remove before Radiation Therapy or MRI     The hospital VAC pump is not to be discharged with the patient. Please disconnect the patient from the machine prior to " discharge.  If a home VAC pump has been delivered, connect the home cannister to dressing tubing and the cannister to the home pump, turn on home pump  If the patient is transferring to a nearby facility with a VAC, the tubing can be disconnected, clamp tubing and cover the end with a glove, then can be reconnected if within 2 hours  If transfer will be longer than 2 hours, dressing must be removed and placed with a wet to moist gauze dressing for transfer        Orders: Reviewed    RECOMMEND PRIMARY TEAM ORDER: None, at this time  Education provided: plan of care  Discussed plan of care with: Patient and Nurse  Notify [facility] if wound(s) deteriorate.  Nursing to notify the Provider(s) and re-consult the [facility] Nurse if new skin concern.    DATA:     Current support surface: Standard  Low air loss (BOB pump, Isolibrium, Pulsate)  Containment of urine/stool: Continent of bladder and Continent of bowel  BMI: Body mass index is 34.18 kg/m .   Active diet order: Orders Placed This Encounter      Low Saturated Fat Na <2400 mg     Output: I/O last 3 completed shifts:  In: 483 [P.O.:480; I.V.:3]  Out: 1600 [Urine:1200; Drains:400]     Labs:   Recent Labs   Lab 04/01/25  0958   HGB 12.1*   WBC 13.1*     Pressure injury risk assessment:   Sensory Perception: 4-->no impairment  Moisture: 4-->rarely moist  Activity: 2-->chairfast  Mobility: 3-->slightly limited  Nutrition: 3-->adequate  Friction and Shear: 3-->no apparent problem  Gary Score: 19    KYLIE TesfayeN, RN, PHN, HNB-BC, CWOCN  Pager no longer is use, please contact through [employer] group: [facility] [employer]

## 2025-04-04 NOTE — PROGRESS NOTES
[facility] ACUTE PAIN SERVICE    [facility], [facility], [facility], [facility], [place]   PAIN Progress Note    Assessment/Plan:  Dudley Kiran is a 69 year old male who was admitted on 3/27/2025.  Pain team was asked to see the patient for uncontrolled post op pain and assistance with weaning from IV pain medications. Admitted for acute left lower extremity pain and found to have acute limb ischemic and underwent 4 compartment fasciotomy urgently on admission. History of CAD, HTN, prediabetes, arthritis, and erectile dysfunction. The patient does not smoke and does not have a chemical dependency history.      Post op day: fasciotomy of left lower extremity 3/27/25     PLAN:   1) Pain is consistent with post op pain, wound pain- LLE Acute compartment syndrome status post 4 compartment fasciotomy, Acute limb ischemia  Multimodal Medication Therapy  Topical: None  NSAID'S: CrCl 97.7 mL/min. None  Steroids: prednisone 10 mg daily x 5 days (3/31-4/5/25)  Muscle Relaxants: methocarbamol 500 mg QID  Adjuvants: acetaminophen 975 mg q6h, aspirin 81 mg daily  Antidepressants/anxiolytics: None  Opioids: Hydromorphone 2-4 mg q3h PRN   IV Pain medication: Hydromorphone 0.2 mg q4h PRN - decrease to qid prn   Non-medication interventions: Ice, Rest, PT, and OT  Constipation Prophylaxis: Milk of Magnesia daily, Miralax BID, senna-docusate BID, bisacodyl 10 mg daily     -MN  pulled from system on 4/2/2025. No results found for the patient and with no opioid/controlled medications per [employer].   Discharge Recommendations - We recommend prescribing the following at the time of discharge: TBD.      Subjective:  Describes pain as 2-4/10 and aching, sharp, tight in the LLE. Pain worsens with PT and wound care. The patient denies nausea, vomiting, constipation, diarrhea, chest pain, shortness of breath, dizziness, fever, and chills.      Principal Problem:  Left leg pain     Patient Active Problem List   Diagnosis    Coronary  "arteriosclerosis due to lipid rich plaque    History of prediabetes    Elevated cholesterol    Essential hypertension, benign    Primary osteoarthritis of both hips    Class 1 obesity due to excess calories with serious comorbidity and body mass index (BMI) of 32.0 to 32.9 in adult    OA (osteoarthritis)    S/P CABG (coronary artery bypass graft)    Left leg pain        Objective:    History   Drug Use Unknown          Tobacco Use      Smoking status: Never      Smokeless tobacco: Never      Vital signs in last 24 hours:  /62 (BP Location: Left arm, Cuff Size: Adult Regular)   Pulse 80   Temp 99  F (37.2  C) (Oral)   Resp 16   Ht 1.702 m (5' 7\")   Wt 99 kg (218 lb 4.1 oz)   SpO2 94%   BMI 34.18 kg/m      Weight:     Vitals:    03/28/25 0600 03/29/25 0422 03/30/25 0609 03/31/25 0121   Weight: 101.6 kg (223 lb 14.4 oz) 103 kg (227 lb 1.2 oz) 99.3 kg (218 lb 14.7 oz) 99.5 kg (219 lb 5.7 oz)    04/01/25 0626   Weight: 99 kg (218 lb 4.1 oz)      Weight change:   Body mass index is 34.18 kg/m .    Intake/Output last 3 shifts:  I/O last 3 completed shifts:  In: 663 [P.O.:660; I.V.:3]  Out: 1375 [Urine:1175; Drains:200]  Intake/Output this shift:  I/O this shift:  In: -   Out: 425 [Urine:225; Drains:200]    Review of Systems:   As per subjective, all others negative.    Physical Exam:  General Appearance:  Alert, cooperative, no distress, appears stated age   Head:  Normocephalic, without obvious abnormality, atraumatic   Eyes:  PERRL, conjunctiva/corneas clear, EOM's intact   Nose: Nares normal, septum midline   Throat: Lips, mucosa, and tongue normal; teeth and gums normal   Neck: Supple, symmetrical, trachea midline   Back:   Symmetric, no curvature, ROM normal   Lungs:   Clear to auscultation bilaterally, respirations unlabored, room air    Chest Wall:  No tenderness or deformity   Heart:  Regular rate and rhythm, S1, S2 normal    Abdomen:   Soft, non-tender   Extremities: Wound vac to LLE with good seal, " palpable distal pulses    Skin: Skin warm, dry    Neurologic: Alert and oriented X 3, Moves all 4 extremities   Psych: Affect is appropriate      Imaging: Reviewed I have personally reviewed pertinent notes, labs, tests, and radiologic imaging in patient's chart.  Labs: Reviewed I have personally reviewed pertinent notes, labs, tests, and radiologic imaging in patient's chart.  Notes: Reviewed I have personally reviewed pertinent notes, labs, tests, and radiologic imaging in patient's chart.    Total time spent 35 minutes with greater than 50% in consultation, education and coordination of care.   Also discussed with RN.   Treatment plan includes: multimodal pain approach, Hospital Medicine Service for medical management, Vascular, PT, OT.   Patient educated regarding: multimodal pain approach and medications as listed above.   Elements of Medical Decision Making as described above. Acute or chronic illness or injury or surgery. High risk therapy including opioids, high risk drug therapy including oral and/or parenteral controlled substances.    Patient is understanding of the plan. All questions and concerns addressed to patient's satisfaction.     JUSTO StaplesP-SOLANGE  Acute Care Inpatient Pain Management Program  [facility] ([facility])  Hours of coverage Monday-Friday [id number]. After hours please contact Primary team.   Page via Epic chat or [employer]

## 2025-04-04 NOTE — PLAN OF CARE
Problem: Pain Acute  Goal: Optimal Pain Control and Function  Outcome: Progressing  Intervention: Prevent or Manage Pain  Recent Flowsheet Documentation  Taken 4/4/2025 0030 by Anuradha Gao RN  Sensory Stimulation Regulation:   lighting decreased   television on   auditory stimulation minimized  Medication Review/Management: medications reviewed   Goal Outcome Evaluation: Pain controlled with prn IV and oral hydromorphone.    Wound vac LLE not hold seal overnight. Track pad on lateral dressing replaced and vacuum then remained at 125mm Hg. Blood sugar=100.

## 2025-04-05 ENCOUNTER — APPOINTMENT (OUTPATIENT)
Dept: PHYSICAL THERAPY | Facility: HOSPITAL | Age: 70
DRG: 501 | End: 2025-04-05
Attending: INTERNAL MEDICINE
Payer: COMMERCIAL

## 2025-04-05 LAB — GLUCOSE BLDC GLUCOMTR-MCNC: 106 MG/DL (ref 70–99)

## 2025-04-05 PROCEDURE — 99233 SBSQ HOSP IP/OBS HIGH 50: CPT | Performed by: HOSPITALIST

## 2025-04-05 PROCEDURE — 250N000013 HC RX MED GY IP 250 OP 250 PS 637: Performed by: INTERNAL MEDICINE

## 2025-04-05 PROCEDURE — 97110 THERAPEUTIC EXERCISES: CPT | Mod: GP

## 2025-04-05 PROCEDURE — 120N000001 HC R&B MED SURG/OB

## 2025-04-05 PROCEDURE — 250N000013 HC RX MED GY IP 250 OP 250 PS 637: Performed by: STUDENT IN AN ORGANIZED HEALTH CARE EDUCATION/TRAINING PROGRAM

## 2025-04-05 PROCEDURE — 97530 THERAPEUTIC ACTIVITIES: CPT | Mod: GP

## 2025-04-05 PROCEDURE — 250N000011 HC RX IP 250 OP 636: Mod: JZ | Performed by: NURSE PRACTITIONER

## 2025-04-05 PROCEDURE — 250N000013 HC RX MED GY IP 250 OP 250 PS 637: Performed by: NURSE PRACTITIONER

## 2025-04-05 RX ADMIN — ACETAMINOPHEN 975 MG: 325 TABLET, FILM COATED ORAL at 11:03

## 2025-04-05 RX ADMIN — ZINC SULFATE 220 MG (50 MG) CAPSULE 220 MG: CAPSULE at 08:25

## 2025-04-05 RX ADMIN — Medication 500 MG: at 08:24

## 2025-04-05 RX ADMIN — LISINOPRIL 5 MG: 5 TABLET ORAL at 08:25

## 2025-04-05 RX ADMIN — RIVAROXABAN 20 MG: 10 TABLET, FILM COATED ORAL at 17:01

## 2025-04-05 RX ADMIN — ACETAMINOPHEN 975 MG: 325 TABLET, FILM COATED ORAL at 06:30

## 2025-04-05 RX ADMIN — Medication 60 ML: at 08:26

## 2025-04-05 RX ADMIN — ASPIRIN 81 MG: 81 TABLET, COATED ORAL at 08:25

## 2025-04-05 RX ADMIN — ACETAMINOPHEN 975 MG: 325 TABLET, FILM COATED ORAL at 17:59

## 2025-04-05 RX ADMIN — HYDROMORPHONE HYDROCHLORIDE 4 MG: 2 TABLET ORAL at 06:30

## 2025-04-05 RX ADMIN — HYDROMORPHONE HYDROCHLORIDE 4 MG: 2 TABLET ORAL at 14:41

## 2025-04-05 RX ADMIN — METHOCARBAMOL 500 MG: 500 TABLET ORAL at 12:32

## 2025-04-05 RX ADMIN — HYDROMORPHONE HYDROCHLORIDE 4 MG: 2 TABLET ORAL at 00:52

## 2025-04-05 RX ADMIN — METOPROLOL SUCCINATE 25 MG: 25 TABLET, EXTENDED RELEASE ORAL at 08:25

## 2025-04-05 RX ADMIN — HYDROMORPHONE HYDROCHLORIDE 4 MG: 2 TABLET ORAL at 09:24

## 2025-04-05 RX ADMIN — METHOCARBAMOL 500 MG: 500 TABLET ORAL at 20:29

## 2025-04-05 RX ADMIN — HYDROMORPHONE HYDROCHLORIDE 0.2 MG: 0.2 INJECTION, SOLUTION INTRAMUSCULAR; INTRAVENOUS; SUBCUTANEOUS at 11:04

## 2025-04-05 RX ADMIN — PANTOPRAZOLE SODIUM 40 MG: 40 TABLET, DELAYED RELEASE ORAL at 06:30

## 2025-04-05 RX ADMIN — HYDROMORPHONE HYDROCHLORIDE 2 MG: 2 TABLET ORAL at 21:55

## 2025-04-05 RX ADMIN — ACETAMINOPHEN 975 MG: 325 TABLET, FILM COATED ORAL at 00:13

## 2025-04-05 RX ADMIN — POLYETHYLENE GLYCOL 3350 17 G: 17 POWDER, FOR SOLUTION ORAL at 08:24

## 2025-04-05 RX ADMIN — METHOCARBAMOL 500 MG: 500 TABLET ORAL at 17:01

## 2025-04-05 RX ADMIN — METHOCARBAMOL 500 MG: 500 TABLET ORAL at 08:25

## 2025-04-05 RX ADMIN — SENNOSIDES AND DOCUSATE SODIUM 1 TABLET: 50; 8.6 TABLET ORAL at 08:25

## 2025-04-05 RX ADMIN — Medication 1 TABLET: at 08:25

## 2025-04-05 ASSESSMENT — ACTIVITIES OF DAILY LIVING (ADL)
ADLS_ACUITY_SCORE: 39

## 2025-04-05 NOTE — PROGRESS NOTES
[facility] Progress Note - Hospitalist Service    Date of Admission:  3/27/2025    Assessment & Plan   69 male with history of CAD, hypertension is admitted for acute left lower extremity pain and found to have acute limb ischemia, underwent 4 compartment fasciotomy urgently on admission.  Monitored in the ICU and downgraded clinically 3/28.     Waiting for placement.      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 Continue care per WOC.  -Postoperative cares per vascular surgery  -Follow hypercoag workup ordered by vascular (Heme/onc signed off 3/29/2025)  -PTOT, will likely need TCU     Constipation  Last bowel movement 5 days ago; Baseline BM q3-4 days  -Senakot BID and Miralax BID  -Suppository PRN     Suspected inflammatory arthropathy ? Gout vs pseudogout   Painful erythema and swelling at the level of third right proximal interphalangeal joint  CRP came back markedly elevated at 198.9 - concerning for inflammatory arthropathy ? Gout vs pseudogout.   Hand x-ray showed DJD.  -Resolved with prednisone 10 mg daily x 5 days     Chronic heart failure with mildly reduced ejection fraction  Echo this admission with EF 45 to 50%  -Not clinically hypervolemic, monitor for edema     CAD  Remote CABG, no anginal chest pain, monitor clinically  -Continues on aspirin  -Not routinely on statin, start when CK further resolved     Essential hypertension  -Continue lisinopril 5 mg  "daily, metoprolol succinate 12.5 mg daily          Diet: Low Saturated Fat Na <2400 mg  Snacks/Supplements Adult: Ensure Max Protein (bariatric); Between Meals    DVT Prophylaxis: DOAC  Capps Catheter: Not present  Lines: None     Cardiac Monitoring: None  Code Status: Full Code      Clinically Significant Risk Factors               # Hypoalbuminemia: Lowest albumin = 3 g/dL at 3/28/2025  4:13 AM, will monitor as appropriate     # Hypertension: Noted on problem list            # Obesity: Estimated body mass index is 34.18 kg/m  as calculated from the following:    Height as of this encounter: 1.702 m (5' 7\").    Weight as of this encounter: 99 kg (218 lb 4.1 oz).       # History of CABG: noted on surgical history       Social Drivers of Health    Physical Activity: Insufficiently Active (3/17/2025)    Exercise Vital Sign     Days of Exercise per Week: 1 day     Minutes of Exercise per Session: 10 min   Social Connections: Unknown (3/17/2025)    Social Connection and Isolation Panel [NHANES]     Frequency of Social Gatherings with Friends and Family: Once a week          Disposition Plan     Medically Ready for Discharge: Ready Now, awaiting placement             Tomas West MD  Hospitalist Service  [facility]  Securely message with "[employer] (more info)  Text page via [employer] Paging/Directory   ______________________________________________________________________    Interval History   Severe pain off-and-on with wound care, otherwise no new complaints.    Physical Exam   Vital Signs: Temp: 97.6  F (36.4  C) Temp src: Oral BP: 126/69 Pulse: 64   Resp: 16 SpO2: 96 % O2 Device: None (Room air)    Weight: 218 lbs 4.09 oz    Alert, no distress, heart and lungs normal, abdomen soft, left lower extremity with 1+ edema and benign wound VAC    Medical Decision Making       53 MINUTES SPENT BY ME on the date of service doing chart review, history, exam, documentation & further activities per the " note.  NOTE(S)/MEDICAL RECORDS REVIEWED over the past 24 hours: Vitals, labs, new imaging, documentation and medication administrations       Data         Imaging results reviewed over the past 24 hrs:   No results found for this or any previous visit (from the past 24 hours).

## 2025-04-05 NOTE — PROGRESS NOTES
Care Management Follow Up    Length of Stay (days): 9    Expected Discharge Date: 04/08/2025    Anticipated Discharge Plan:  Acute Rehab    Transportation: Anticipate medical transport    PT Recommendations: (P) Acute Rehab Center-Motivated patient will benefit from intensive, interdisciplinary therapy.  Anticipate will be able to tolerate 3 hours of therapy per day, Per plan established by the PT  OT Recommendations:  Acute Rehab Center-Motivated patient will benefit from intensive, interdisciplinary therapy.  Anticipate will be able to tolerate 3 hours of therapy per day     Barriers to Discharge: medical stability, pain-utilizing IV pain meds, need to participate with therapy, need supporting documention    Prior Living Situation: Patient reports he lives in his mobile home with two cousins. He is independent with ADLs/IADLs, ambulates without devices and has no services in community. Son Dudley is primary family contact. Family willing to transport at discharge.     Discussed  Partnership in Safe Discharge Planning  document with patient/family: No     Handoff Completed: No, handoff not indicated or clinically appropriate    Patient/Spokesperson Updated:     Additional Information:  Sam declined AR auth.  There was not enough supporting documentation for AR. Spoke with Sandra who states she will withdrawal request and once supporting documentation in and patient medially stable, CM to re-submit request.    For AR discharge:  Patient will need to be off IV pain medications at least 24 hours prior to discharge.  Show ability to participate with therapy and therapy notes supporting need for AR in order to obtain [employer] auth.    Next Steps:   Follow for progression and recommendations.   Send for [employer] auth once medically close to discharge and support therapy notes are in.      Maria Fernanda Hercules RN

## 2025-04-05 NOTE — PLAN OF CARE
"Problem: Wound  Goal: Optimal Coping  Outcome: Progressing  Intervention: Support Patient and Family Response  Recent Flowsheet Documentation  Taken 4/5/2025 0015 by Joan Oro RN  Supportive Measures:   active listening utilized   relaxation techniques promoted   verbalization of feelings encouraged  Goal: Optimal Functional Ability  Outcome: Progressing  Intervention: Optimize Functional Ability  Recent Flowsheet Documentation  Taken 4/5/2025 0015 by Joan Oro RN  Assistive Device Utilized:   walker   gait belt  Activity Management:   activity adjusted per tolerance   activity encouraged  Activity Assistance Provided: assistance, 1 person  Goal: Absence of Infection Signs and Symptoms  Outcome: Progressing  Goal: Improved Oral Intake  Outcome: Progressing  Goal: Optimal Pain Control and Function  Outcome: Progressing  Goal: Skin Health and Integrity  Outcome: Progressing  Intervention: Optimize Skin Protection  Recent Flowsheet Documentation  Taken 4/5/2025 0015 by Joan Oro RN  Activity Management:   activity adjusted per tolerance   activity encouraged  Head of Bed (HOB) Positioning: HOB at 20-30 degrees  Goal: Optimal Wound Healing  Outcome: Progressing          Patient is alert and oriented and able to make needs known.  Slept most of the night between cares.  Pain managed with scheduled Tylenol and PRN PO dilaudid.  .  Wound vac in tact.  VSS on room air.    /66 (BP Location: Left arm)   Pulse 70   Temp 97.5  F (36.4  C) (Oral)   Resp 16   Ht 1.702 m (5' 7\")   Wt 99 kg (218 lb 4.1 oz)   SpO2 94%   BMI 34.18 kg/m      Joan Oro RN        "

## 2025-04-05 NOTE — PLAN OF CARE
Problem: Adult Inpatient Plan of Care  Goal: Optimal Comfort and Wellbeing  Intervention: Provide Person-Centered Care  Recent Flowsheet Documentation  Taken 4/4/2025 1635 by Deborah Card RN  Trust Relationship/Rapport:   care explained   questions answered   thoughts/feelings acknowledged     Problem: Skin Injury Risk Increased  Goal: Skin Health and Integrity  Intervention: Plan: Nurse Driven Intervention: Moisture Management  Recent Flowsheet Documentation  Taken 4/4/2025 1635 by Deborah Card RN  Moisture Interventions: Encourage regular toileting  Intervention: Plan: Nurse Driven Intervention: Friction and Shear  Recent Flowsheet Documentation  Taken 4/4/2025 1635 by Deborah Card RN  Friction/Shear Interventions: HOB 30 degrees or less  Intervention: Optimize Skin Protection  Recent Flowsheet Documentation  Taken 4/4/2025 1653 by Deborah Card RN  Head of Bed (HOB) Positioning: HOB at 20-30 degrees   Goal Outcome Evaluation:  Patient spent a fair shift. Pleasant and cooperative with cares. Alert and oriented x 4. Blood sugars 122 at dinner time and at bedtime. Pain and discomfort managed with scheduled and prn medications. Patient declined bowel medications at bedtime. Patient stated he would rather take the bowel meds in the morning. Ate 100% supper.

## 2025-04-05 NOTE — PLAN OF CARE
Problem: Adult Inpatient Plan of Care  Goal: Plan of Care Review  Description: The Plan of Care Review/Shift note should be completed every shift.  The Outcome Evaluation is a brief statement about your assessment that the patient is improving, declining, or no change.  This information will be displayed automatically on your shiftnote.  Outcome: Progressing  Flowsheets (Taken 4/5/2025 6544)  Plan of Care Reviewed With: patient   Goal Outcome Evaluation:      Plan of Care Reviewed With: patient      Patient alert and oriented x4. Able to communicate his needs. Wound vac intact to LLE. Pain controlled with scheduled tylenol and prn dilaudid. Has increase in discomfort with mobility. Good appetite. Voiding desmond colored urine. TCU at discharge.  Raquel Barrientos RN

## 2025-04-06 ENCOUNTER — APPOINTMENT (OUTPATIENT)
Dept: PHYSICAL THERAPY | Facility: HOSPITAL | Age: 70
End: 2025-04-06
Attending: INTERNAL MEDICINE
Payer: COMMERCIAL

## 2025-04-06 LAB
ALBUMIN SERPL BCG-MCNC: 3.6 G/DL (ref 3.5–5.2)
ANION GAP SERPL CALCULATED.3IONS-SCNC: 5 MMOL/L (ref 7–15)
BASOPHILS # BLD AUTO: 0.1 10E3/UL (ref 0–0.2)
BASOPHILS NFR BLD AUTO: 1 %
BUN SERPL-MCNC: 22.9 MG/DL (ref 8–23)
CALCIUM SERPL-MCNC: 8.8 MG/DL (ref 8.8–10.4)
CHLORIDE SERPL-SCNC: 99 MMOL/L (ref 98–107)
CK SERPL-CCNC: 98 U/L (ref 39–308)
CREAT SERPL-MCNC: 1.01 MG/DL (ref 0.67–1.17)
EGFRCR SERPLBLD CKD-EPI 2021: 81 ML/MIN/1.73M2
EOSINOPHIL # BLD AUTO: 1.2 10E3/UL (ref 0–0.7)
EOSINOPHIL NFR BLD AUTO: 8 %
ERYTHROCYTE [DISTWIDTH] IN BLOOD BY AUTOMATED COUNT: 13.5 % (ref 10–15)
GLUCOSE SERPL-MCNC: 108 MG/DL (ref 70–99)
HCO3 SERPL-SCNC: 29 MMOL/L (ref 22–29)
HCT VFR BLD AUTO: 35.1 % (ref 40–53)
HGB BLD-MCNC: 11.4 G/DL (ref 13.3–17.7)
IMM GRANULOCYTES # BLD: 0.1 10E3/UL
IMM GRANULOCYTES NFR BLD: 1 %
LYMPHOCYTES # BLD AUTO: 2.6 10E3/UL (ref 0.8–5.3)
LYMPHOCYTES NFR BLD AUTO: 17 %
MCH RBC QN AUTO: 28.3 PG (ref 26.5–33)
MCHC RBC AUTO-ENTMCNC: 32.5 G/DL (ref 31.5–36.5)
MCV RBC AUTO: 87 FL (ref 78–100)
MONOCYTES # BLD AUTO: 1.3 10E3/UL (ref 0–1.3)
MONOCYTES NFR BLD AUTO: 8 %
NEUTROPHILS # BLD AUTO: 10.1 10E3/UL (ref 1.6–8.3)
NEUTROPHILS NFR BLD AUTO: 66 %
NRBC # BLD AUTO: 0 10E3/UL
NRBC BLD AUTO-RTO: 0 /100
PHOSPHATE SERPL-MCNC: 2.5 MG/DL (ref 2.5–4.5)
PLATELET # BLD AUTO: 564 10E3/UL (ref 150–450)
POTASSIUM SERPL-SCNC: 4.2 MMOL/L (ref 3.4–5.3)
RBC # BLD AUTO: 4.03 10E6/UL (ref 4.4–5.9)
SODIUM SERPL-SCNC: 133 MMOL/L (ref 135–145)
WBC # BLD AUTO: 15.4 10E3/UL (ref 4–11)

## 2025-04-06 PROCEDURE — 85004 AUTOMATED DIFF WBC COUNT: CPT | Performed by: HOSPITALIST

## 2025-04-06 PROCEDURE — 250N000013 HC RX MED GY IP 250 OP 250 PS 637: Performed by: STUDENT IN AN ORGANIZED HEALTH CARE EDUCATION/TRAINING PROGRAM

## 2025-04-06 PROCEDURE — 82550 ASSAY OF CK (CPK): CPT | Performed by: HOSPITALIST

## 2025-04-06 PROCEDURE — 99232 SBSQ HOSP IP/OBS MODERATE 35: CPT | Performed by: HOSPITALIST

## 2025-04-06 PROCEDURE — 97116 GAIT TRAINING THERAPY: CPT | Mod: GP

## 2025-04-06 PROCEDURE — 84100 ASSAY OF PHOSPHORUS: CPT | Performed by: HOSPITALIST

## 2025-04-06 PROCEDURE — 82947 ASSAY GLUCOSE BLOOD QUANT: CPT | Performed by: HOSPITALIST

## 2025-04-06 PROCEDURE — 250N000013 HC RX MED GY IP 250 OP 250 PS 637: Performed by: INTERNAL MEDICINE

## 2025-04-06 PROCEDURE — 36415 COLL VENOUS BLD VENIPUNCTURE: CPT | Performed by: HOSPITALIST

## 2025-04-06 PROCEDURE — 120N000001 HC R&B MED SURG/OB

## 2025-04-06 PROCEDURE — 82310 ASSAY OF CALCIUM: CPT | Performed by: HOSPITALIST

## 2025-04-06 PROCEDURE — 250N000013 HC RX MED GY IP 250 OP 250 PS 637: Performed by: NURSE PRACTITIONER

## 2025-04-06 PROCEDURE — 250N000013 HC RX MED GY IP 250 OP 250 PS 637: Performed by: HOSPITALIST

## 2025-04-06 PROCEDURE — 250N000011 HC RX IP 250 OP 636: Mod: JZ | Performed by: NURSE PRACTITIONER

## 2025-04-06 PROCEDURE — 97110 THERAPEUTIC EXERCISES: CPT | Mod: GP

## 2025-04-06 PROCEDURE — 97530 THERAPEUTIC ACTIVITIES: CPT | Mod: GP

## 2025-04-06 RX ORDER — ATORVASTATIN CALCIUM 40 MG/1
40 TABLET, FILM COATED ORAL EVERY EVENING
Status: DISCONTINUED | OUTPATIENT
Start: 2025-04-06 | End: 2025-04-09

## 2025-04-06 RX ADMIN — HYDROMORPHONE HYDROCHLORIDE 2 MG: 2 TABLET ORAL at 08:35

## 2025-04-06 RX ADMIN — METHOCARBAMOL 500 MG: 500 TABLET ORAL at 08:36

## 2025-04-06 RX ADMIN — HYDROMORPHONE HYDROCHLORIDE 0.2 MG: 0.2 INJECTION, SOLUTION INTRAMUSCULAR; INTRAVENOUS; SUBCUTANEOUS at 14:46

## 2025-04-06 RX ADMIN — HYDROMORPHONE HYDROCHLORIDE 2 MG: 2 TABLET ORAL at 12:43

## 2025-04-06 RX ADMIN — POLYETHYLENE GLYCOL 3350 17 G: 17 POWDER, FOR SOLUTION ORAL at 08:36

## 2025-04-06 RX ADMIN — ASPIRIN 81 MG: 81 TABLET, COATED ORAL at 08:35

## 2025-04-06 RX ADMIN — ACETAMINOPHEN 975 MG: 325 TABLET, FILM COATED ORAL at 06:27

## 2025-04-06 RX ADMIN — Medication 60 ML: at 08:36

## 2025-04-06 RX ADMIN — SENNOSIDES AND DOCUSATE SODIUM 1 TABLET: 50; 8.6 TABLET ORAL at 08:35

## 2025-04-06 RX ADMIN — PANTOPRAZOLE SODIUM 40 MG: 40 TABLET, DELAYED RELEASE ORAL at 06:30

## 2025-04-06 RX ADMIN — Medication 500 MG: at 08:35

## 2025-04-06 RX ADMIN — MAGNESIUM HYDROXIDE 30 ML: 400 SUSPENSION ORAL at 08:36

## 2025-04-06 RX ADMIN — LISINOPRIL 5 MG: 5 TABLET ORAL at 08:35

## 2025-04-06 RX ADMIN — ZINC SULFATE 220 MG (50 MG) CAPSULE 220 MG: CAPSULE at 08:36

## 2025-04-06 RX ADMIN — METOPROLOL SUCCINATE 25 MG: 25 TABLET, EXTENDED RELEASE ORAL at 08:35

## 2025-04-06 RX ADMIN — ATORVASTATIN CALCIUM 40 MG: 40 TABLET, FILM COATED ORAL at 21:03

## 2025-04-06 RX ADMIN — METHOCARBAMOL 500 MG: 500 TABLET ORAL at 21:03

## 2025-04-06 RX ADMIN — METHOCARBAMOL 500 MG: 500 TABLET ORAL at 12:43

## 2025-04-06 RX ADMIN — Medication 1 TABLET: at 08:34

## 2025-04-06 RX ADMIN — ACETAMINOPHEN 975 MG: 325 TABLET, FILM COATED ORAL at 11:52

## 2025-04-06 RX ADMIN — ACETAMINOPHEN 975 MG: 325 TABLET, FILM COATED ORAL at 17:14

## 2025-04-06 RX ADMIN — METHOCARBAMOL 500 MG: 500 TABLET ORAL at 17:14

## 2025-04-06 RX ADMIN — RIVAROXABAN 20 MG: 10 TABLET, FILM COATED ORAL at 17:13

## 2025-04-06 ASSESSMENT — ACTIVITIES OF DAILY LIVING (ADL)
ADLS_ACUITY_SCORE: 39
ADLS_ACUITY_SCORE: 43
ADLS_ACUITY_SCORE: 43
ADLS_ACUITY_SCORE: 39
ADLS_ACUITY_SCORE: 43
ADLS_ACUITY_SCORE: 39
ADLS_ACUITY_SCORE: 39
ADLS_ACUITY_SCORE: 43
ADLS_ACUITY_SCORE: 39
ADLS_ACUITY_SCORE: 39
ADLS_ACUITY_SCORE: 43
ADLS_ACUITY_SCORE: 43
ADLS_ACUITY_SCORE: 39
ADLS_ACUITY_SCORE: 43
ADLS_ACUITY_SCORE: 39

## 2025-04-06 NOTE — PLAN OF CARE
"  Problem: Adult Inpatient Plan of Care  Goal: Plan of Care Review  Description: The Plan of Care Review/Shift note should be completed every shift.  The Outcome Evaluation is a brief statement about your assessment that the patient is improving, declining, or no change.  This information will be displayed automatically on your shiftnote.  Outcome: Progressing  Goal: Patient-Specific Goal (Individualized)  Description: You can add care plan individualizations to a care plan. Examples of Individualization might be:  \"Parent requests to be called daily at 9am for status\", \"I have a hard time hearing out of my right ear\", or \"Do not touch me to wake me up as it startlesme\".  Outcome: Progressing  Goal: Absence of Hospital-Acquired Illness or Injury  Outcome: Progressing  Intervention: Identify and Manage Fall Risk  Recent Flowsheet Documentation  Taken 4/5/2025 1837 by Rabia Abreu RN  Safety Promotion/Fall Prevention:   clutter free environment maintained   activity supervised   assistive device/personal items within reach  Intervention: Prevent Skin Injury  Recent Flowsheet Documentation  Taken 4/5/2025 1837 by Rabia Abreu RN  Body Position:   heels elevated   legs elevated  Intervention: Prevent Infection  Recent Flowsheet Documentation  Taken 4/5/2025 1837 by Rabia Abreu RN  Infection Prevention: hand hygiene promoted  Goal: Optimal Comfort and Wellbeing  Outcome: Progressing  Intervention: Provide Person-Centered Care  Recent Flowsheet Documentation  Taken 4/5/2025 1837 by Rabia Abreu RN  Trust Relationship/Rapport:   care explained   reassurance provided   questions answered  Goal: Readiness for Transition of Care  Outcome: Progressing     Problem: Delirium  Goal: Optimal Coping  Outcome: Progressing  Intervention: Optimize Psychosocial Adjustment to Delirium  Recent Flowsheet Documentation  Taken 4/5/2025 1837 by Rabia Abreu RN  Supportive Measures: active listening " utilized  Goal: Improved Behavioral Control  Outcome: Progressing  Intervention: Prevent and Manage Agitation  Recent Flowsheet Documentation  Taken 4/5/2025 1837 by Rabia Abreu RN  Environment Familiarity/Consistency: daily routine followed  Intervention: Minimize Safety Risk  Recent Flowsheet Documentation  Taken 4/5/2025 1837 by Rabia Abreu RN  Enhanced Safety Measures: pain management  Trust Relationship/Rapport:   care explained   reassurance provided   questions answered  Goal: Improved Attention and Thought Clarity  Outcome: Progressing  Intervention: Maximize Cognitive Function  Recent Flowsheet Documentation  Taken 4/5/2025 1837 by Rabia Abreu RN  Reorientation Measures: clock in view  Goal: Improved Sleep  Outcome: Progressing     Problem: Risk for Delirium  Goal: Optimal Coping  Outcome: Progressing  Intervention: Optimize Psychosocial Adjustment to Delirium  Recent Flowsheet Documentation  Taken 4/5/2025 1837 by Rabia Abreu RN  Supportive Measures: active listening utilized  Goal: Improved Behavioral Control  Outcome: Progressing  Intervention: Prevent and Manage Agitation  Recent Flowsheet Documentation  Taken 4/5/2025 1837 by Rabia Abreu RN  Environment Familiarity/Consistency: daily routine followed  Intervention: Minimize Safety Risk  Recent Flowsheet Documentation  Taken 4/5/2025 1837 by Rabia Abreu RN  Enhanced Safety Measures: pain management  Trust Relationship/Rapport:   care explained   reassurance provided   questions answered  Goal: Improved Attention and Thought Clarity  Outcome: Progressing  Intervention: Maximize Cognitive Function  Recent Flowsheet Documentation  Taken 4/5/2025 1837 by Rabia Abreu RN  Reorientation Measures: clock in view  Goal: Improved Sleep  Outcome: Progressing     Problem: Skin Injury Risk Increased  Goal: Skin Health and Integrity  Outcome: Progressing  Intervention: Plan: Nurse Driven Intervention: Moisture  Management  Recent Flowsheet Documentation  Taken 4/5/2025 1837 by Rabia Abreu RN  Moisture Interventions: Encourage regular toileting  Intervention: Plan: Nurse Driven Intervention: Friction and Shear  Recent Flowsheet Documentation  Taken 4/5/2025 1837 by Rabia Abreu RN  Friction/Shear Interventions: HOB 30 degrees or less  Intervention: Optimize Skin Protection  Recent Flowsheet Documentation  Taken 4/5/2025 1837 by Rabia Abreu RN  Activity Management: activity adjusted per tolerance  Head of Bed (HOB) Positioning: HOB at 20-30 degrees     Problem: Comorbidity Management  Goal: Blood Pressure in Desired Range  Outcome: Progressing  Intervention: Maintain Blood Pressure Management  Recent Flowsheet Documentation  Taken 4/5/2025 1837 by Rabia Abreu RN  Medication Review/Management: medications reviewed     Problem: VTE (Venous Thromboembolism)  Goal: Tissue Perfusion  Outcome: Progressing  Goal: Right Ventricular Function  Outcome: Progressing     Problem: Fall Injury Risk  Goal: Absence of Fall and Fall-Related Injury  Outcome: Progressing  Intervention: Identify and Manage Contributors  Recent Flowsheet Documentation  Taken 4/5/2025 1837 by Rabia Abreu RN  Medication Review/Management: medications reviewed  Intervention: Promote Injury-Free Environment  Recent Flowsheet Documentation  Taken 4/5/2025 1837 by Rabia Abreu RN  Safety Promotion/Fall Prevention:   clutter free environment maintained   activity supervised   assistive device/personal items within reach     Problem: Pain Acute  Goal: Optimal Pain Control and Function  Outcome: Progressing  Intervention: Optimize Psychosocial Wellbeing  Recent Flowsheet Documentation  Taken 4/5/2025 1837 by Rabia Abreu RN  Supportive Measures: active listening utilized  Intervention: Prevent or Manage Pain  Recent Flowsheet Documentation  Taken 4/5/2025 1837 by Rabia Abreu RN  Bowel Elimination Promotion:  adequate fluid intake promoted  Medication Review/Management: medications reviewed     Problem: Surgery Nonspecified  Goal: Absence of Bleeding  Outcome: Progressing  Goal: Blood Glucose Level Within Targeted Range  Outcome: Progressing  Goal: Absence of Infection Signs and Symptoms  Outcome: Progressing     Problem: Constipation  Goal: Effective Bowel Elimination  Outcome: Progressing  Intervention: Promote Effective Bowel Elimination  Recent Flowsheet Documentation  Taken 4/5/2025 1837 by Rabia Abreu RN  Bowel Elimination Management: other (see comments)     Problem: Wound  Goal: Optimal Coping  Outcome: Progressing  Intervention: Support Patient and Family Response  Recent Flowsheet Documentation  Taken 4/5/2025 1837 by Rabia Abreu RN  Supportive Measures: active listening utilized  Goal: Optimal Functional Ability  Outcome: Progressing  Intervention: Optimize Functional Ability  Recent Flowsheet Documentation  Taken 4/5/2025 1837 by Rabia Abreu RN  Assistive Device Utilized:   walker   gait belt  Activity Management: activity adjusted per tolerance  Activity Assistance Provided: assistance, 1 person  Goal: Absence of Infection Signs and Symptoms  Outcome: Progressing  Goal: Improved Oral Intake  Outcome: Progressing  Goal: Optimal Pain Control and Function  Outcome: Progressing  Goal: Skin Health and Integrity  Outcome: Progressing  Intervention: Optimize Skin Protection  Recent Flowsheet Documentation  Taken 4/5/2025 1837 by Rabia Abreu RN  Activity Management: activity adjusted per tolerance  Head of Bed (HOB) Positioning: HOB at 20-30 degrees  Goal: Optimal Wound Healing  Outcome: Progressing   Goal Outcome Evaluation:         Pt is alert and oriented x4, able to make his needs known, wound vac intact, was up in the chair during the day, oral dilaudid was given before bedtime for pain after movement from chair to bed. VSS and will monitor.

## 2025-04-06 NOTE — PLAN OF CARE
[id number]    Problem: Adult Inpatient Plan of Care  Goal: Plan of Care Review  Description: The Plan of Care Review/Shift note should be completed every shift.  The Outcome Evaluation is a brief statement about your assessment that the patient is improving, declining, or no change.  This information will be displayed automatically on your shiftnote.  Outcome: Progressing     Problem: Adult Inpatient Plan of Care  Goal: Absence of Hospital-Acquired Illness or Injury  Intervention: Prevent Skin Injury  Recent Flowsheet Documentation  Taken 4/6/2025 1200 by Shayne Francisco RN  Body Position: position changed independently     Problem: Adult Inpatient Plan of Care  Goal: Optimal Comfort and Wellbeing  Outcome: Progressing  Intervention: Provide Person-Centered Care  Recent Flowsheet Documentation  Taken 4/6/2025 1200 by Shayne Francisco RN  Trust Relationship/Rapport:   care explained   questions answered   questions encouraged     Problem: Constipation  Goal: Effective Bowel Elimination  Outcome: Progressing    Patient is alert and orientated, able to make needs known. Pleasant. Reports minimal pain at rest, but this increases with movement. Premedicated for physical therapy with 2 mg of hydromorphone. Wound vac is intact and holding suction at 125 mm Hg, continuous.  +2 edema note to left foot, ankle. Extremity elevated.     Shayne Francisco, CRISELDA

## 2025-04-06 NOTE — PROGRESS NOTES
[facility] [place] Progress Note - Hospitalist Service    Date of Admission:  3/27/2025    Assessment & Plan   69 male with history of CAD, hypertension is admitted for acute left lower extremity pain and found to have acute limb ischemia, underwent 4 compartment fasciotomy urgently on admission.  Monitored in the ICU and downgraded clinically 3/28.     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 Continue care per WOC.  -Postoperative cares per vascular surgery  -Hypercoagulable panel negative; heme/onc signed off 3/29/2025  -PTOT, will likely need TCU  -Main barrier to discharge is tolerance of wound vac changes and wound cares, will need to be able to manage wound care w/o parenteral opioids prior to TCU     Constipation  Last bowel movement 5 days ago; Baseline BM q3-4 days  -Senakot BID and Miralax BID  -Suppository PRN     Suspected inflammatory arthropathy ? Gout vs pseudogout   Painful erythema and swelling at the level of third right proximal interphalangeal joint  CRP came back markedly elevated at 198.9 - concerning for inflammatory arthropathy ? Gout vs pseudogout.   Hand x-ray showed DJD.  -Resolved with prednisone 10 mg daily x 5 days     Chronic heart failure with mildly reduced ejection fraction  Echo this admission with EF 45 to 50%  -Not clinically hypervolemic, monitor for edema     CAD  Remote CABG, no anginal chest pain, monitor clinically  -Continues on  "aspirin  -Not routinely on statin, started 4/6 after CK resolved     Essential hypertension  -Continue lisinopril 5 mg daily with hold parameters, metoprolol succinate 25 mg daily          Diet: Low Saturated Fat Na <2400 mg  Snacks/Supplements Adult: Ensure Max Protein (bariatric); Between Meals    DVT Prophylaxis: DOAC  Capps Catheter: Not present  Lines: None     Cardiac Monitoring: None  Code Status: Full Code      Clinically Significant Risk Factors         # Hyponatremia: Lowest Na = 133 mmol/L in last 2 days, will monitor as appropriate       # Hypoalbuminemia: Lowest albumin = 3 g/dL at 3/28/2025  4:13 AM, will monitor as appropriate     # Hypertension: Noted on problem list            # Obesity: Estimated body mass index is 34.18 kg/m  as calculated from the following:    Height as of this encounter: 1.702 m (5' 7\").    Weight as of this encounter: 99 kg (218 lb 4.1 oz).       # History of CABG: noted on surgical history       Social Drivers of Health    Physical Activity: Insufficiently Active (3/17/2025)    Exercise Vital Sign     Days of Exercise per Week: 1 day     Minutes of Exercise per Session: 10 min   Social Connections: Unknown (3/17/2025)    Social Connection and Isolation Panel [NHANES]     Frequency of Social Gatherings with Friends and Family: Once a week          Disposition Plan     Medically Ready for Discharge: Anticipated in 2-4 Days             Tomas West MD  Hospitalist Service  [facility]  Securely message with [employer] (more info)  Text page via [employer] Paging/Directory   ______________________________________________________________________    Interval History   Complaints, intermittent severe pain.    Physical Exam   Vital Signs: Temp: 99.5  F (37.5  C) Temp src: Oral BP: 102/59 Pulse: 80   Resp: 18 SpO2: 93 % O2 Device: None (Room air)    Weight: 218 lbs 4.09 oz    Alert, no distress, heart and lungs normal, abdomen soft, left lower extremity wounds " benign, less swelling today    Medical Decision Making       48 MINUTES SPENT BY ME on the date of service doing chart review, history, exam, documentation & further activities per the note.  NOTE(S)/MEDICAL RECORDS REVIEWED over the past 24 hours: Vitals, labs, new imaging, documentation and medication administrations       Data     I have personally reviewed the following data over the past 24 hrs:    15.4 (H)  \   11.4 (L)   / 564 (H)     133 (L) 99 22.9 /  108 (H)   4.2 29 1.01 \     ALT: N/A AST: N/A AP: N/A TBILI: N/A   ALB: 3.6 TOT PROTEIN: N/A LIPASE: N/A       Imaging results reviewed over the past 24 hrs:   No results found for this or any previous visit (from the past 24 hours).

## 2025-04-06 NOTE — PLAN OF CARE
Problem: Pain Acute  Goal: Optimal Pain Control and Function  Outcome: Progressing   Goal Outcome Evaluation:       Patient resting comfortably.

## 2025-04-07 ENCOUNTER — APPOINTMENT (OUTPATIENT)
Dept: OCCUPATIONAL THERAPY | Facility: HOSPITAL | Age: 70
End: 2025-04-07
Attending: INTERNAL MEDICINE
Payer: COMMERCIAL

## 2025-04-07 ENCOUNTER — APPOINTMENT (OUTPATIENT)
Dept: PHYSICAL THERAPY | Facility: HOSPITAL | Age: 70
End: 2025-04-07
Attending: INTERNAL MEDICINE
Payer: COMMERCIAL

## 2025-04-07 PROCEDURE — 250N000013 HC RX MED GY IP 250 OP 250 PS 637: Performed by: STUDENT IN AN ORGANIZED HEALTH CARE EDUCATION/TRAINING PROGRAM

## 2025-04-07 PROCEDURE — 250N000013 HC RX MED GY IP 250 OP 250 PS 637: Performed by: HOSPITALIST

## 2025-04-07 PROCEDURE — 97110 THERAPEUTIC EXERCISES: CPT | Mod: GP

## 2025-04-07 PROCEDURE — 97535 SELF CARE MNGMENT TRAINING: CPT | Mod: GO

## 2025-04-07 PROCEDURE — 99232 SBSQ HOSP IP/OBS MODERATE 35: CPT | Performed by: HOSPITALIST

## 2025-04-07 PROCEDURE — 250N000013 HC RX MED GY IP 250 OP 250 PS 637: Performed by: NURSE PRACTITIONER

## 2025-04-07 PROCEDURE — 120N000001 HC R&B MED SURG/OB

## 2025-04-07 PROCEDURE — 97116 GAIT TRAINING THERAPY: CPT | Mod: GP

## 2025-04-07 PROCEDURE — 250N000013 HC RX MED GY IP 250 OP 250 PS 637: Performed by: INTERNAL MEDICINE

## 2025-04-07 PROCEDURE — 97110 THERAPEUTIC EXERCISES: CPT | Mod: GO

## 2025-04-07 PROCEDURE — 99233 SBSQ HOSP IP/OBS HIGH 50: CPT | Performed by: PAIN MEDICINE

## 2025-04-07 PROCEDURE — 250N000011 HC RX IP 250 OP 636: Mod: JZ | Performed by: NURSE PRACTITIONER

## 2025-04-07 RX ORDER — LISINOPRIL 2.5 MG/1
2.5 TABLET ORAL DAILY
Status: DISCONTINUED | OUTPATIENT
Start: 2025-04-07 | End: 2025-04-11

## 2025-04-07 RX ORDER — AMOXICILLIN 250 MG
2 CAPSULE ORAL 2 TIMES DAILY
Status: DISCONTINUED | OUTPATIENT
Start: 2025-04-07 | End: 2025-04-13

## 2025-04-07 RX ADMIN — ACETAMINOPHEN 975 MG: 325 TABLET, FILM COATED ORAL at 17:30

## 2025-04-07 RX ADMIN — METHOCARBAMOL 500 MG: 500 TABLET ORAL at 12:00

## 2025-04-07 RX ADMIN — LISINOPRIL 2.5 MG: 2.5 TABLET ORAL at 09:13

## 2025-04-07 RX ADMIN — HYDROMORPHONE HYDROCHLORIDE 0.2 MG: 0.2 INJECTION, SOLUTION INTRAMUSCULAR; INTRAVENOUS; SUBCUTANEOUS at 11:44

## 2025-04-07 RX ADMIN — HYDROMORPHONE HYDROCHLORIDE 4 MG: 2 TABLET ORAL at 22:59

## 2025-04-07 RX ADMIN — METOPROLOL SUCCINATE 25 MG: 25 TABLET, EXTENDED RELEASE ORAL at 09:07

## 2025-04-07 RX ADMIN — RIVAROXABAN 20 MG: 10 TABLET, FILM COATED ORAL at 17:30

## 2025-04-07 RX ADMIN — METHOCARBAMOL 500 MG: 500 TABLET ORAL at 17:30

## 2025-04-07 RX ADMIN — Medication 60 ML: at 09:08

## 2025-04-07 RX ADMIN — METHOCARBAMOL 500 MG: 500 TABLET ORAL at 09:07

## 2025-04-07 RX ADMIN — ASPIRIN 81 MG: 81 TABLET, COATED ORAL at 09:07

## 2025-04-07 RX ADMIN — ZINC SULFATE 220 MG (50 MG) CAPSULE 220 MG: CAPSULE at 09:07

## 2025-04-07 RX ADMIN — HYDROMORPHONE HYDROCHLORIDE 4 MG: 2 TABLET ORAL at 09:31

## 2025-04-07 RX ADMIN — METHOCARBAMOL 500 MG: 500 TABLET ORAL at 21:17

## 2025-04-07 RX ADMIN — Medication 500 MG: at 09:07

## 2025-04-07 RX ADMIN — ACETAMINOPHEN 975 MG: 325 TABLET, FILM COATED ORAL at 12:00

## 2025-04-07 RX ADMIN — ACETAMINOPHEN 975 MG: 325 TABLET, FILM COATED ORAL at 00:21

## 2025-04-07 RX ADMIN — Medication 1 TABLET: at 09:07

## 2025-04-07 RX ADMIN — PANTOPRAZOLE SODIUM 40 MG: 40 TABLET, DELAYED RELEASE ORAL at 06:23

## 2025-04-07 RX ADMIN — HYDROMORPHONE HYDROCHLORIDE 4 MG: 2 TABLET ORAL at 13:55

## 2025-04-07 RX ADMIN — ATORVASTATIN CALCIUM 40 MG: 40 TABLET, FILM COATED ORAL at 21:16

## 2025-04-07 RX ADMIN — ACETAMINOPHEN 975 MG: 325 TABLET, FILM COATED ORAL at 06:23

## 2025-04-07 ASSESSMENT — ACTIVITIES OF DAILY LIVING (ADL)
ADLS_ACUITY_SCORE: 42
ADLS_ACUITY_SCORE: 44
ADLS_ACUITY_SCORE: 45
ADLS_ACUITY_SCORE: 44
ADLS_ACUITY_SCORE: 45
ADLS_ACUITY_SCORE: 44
ADLS_ACUITY_SCORE: 45
ADLS_ACUITY_SCORE: 44
ADLS_ACUITY_SCORE: 44
ADLS_ACUITY_SCORE: 45
ADLS_ACUITY_SCORE: 43
ADLS_ACUITY_SCORE: 45
ADLS_ACUITY_SCORE: 43
ADLS_ACUITY_SCORE: 45
ADLS_ACUITY_SCORE: 45
ADLS_ACUITY_SCORE: 44
ADLS_ACUITY_SCORE: 45
ADLS_ACUITY_SCORE: 44

## 2025-04-07 NOTE — PROGRESS NOTES
[facility]    Medicine Progress Note - Hospitalist Service    Date of Admission:  3/27/2025    Assessment & Plan   69 male with history of CAD, hypertension is admitted for acute left lower extremity pain and found to have acute limb ischemia, underwent 4 compartment fasciotomy urgently on admission.  Monitored in the ICU and downgraded clinically 3/28.     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 Continue care per WOC.  -Postoperative cares per vascular surgery  -Hypercoagulable panel negative; heme/onc signed off 3/29/2025  -PTOT, will likely need TCU vs ARU  -Main barrier to discharge is tolerance of wound vac changes and wound cares, will need to be able to manage wound care w/o parenteral opioids prior to TCU/ARU, next due for change 4/8     Constipation  Last bowel movement 5 days ago; Baseline BM q3-4 days  -Senakot BID and Miralax BID  -Suppository PRN     Suspected inflammatory arthropathy ? Gout vs pseudogout   Painful erythema and swelling at the level of third right proximal interphalangeal joint  CRP came back markedly elevated at 198.9 - concerning for inflammatory arthropathy ? Gout vs pseudogout.   Hand x-ray showed DJD.  -Resolved with prednisone 10 mg daily x 5 days     Chronic heart failure with mildly reduced ejection fraction  Echo this admission with EF 45 to 50%  -Not clinically hypervolemic, monitor for edema     CAD  Remote CABG, no anginal chest pain,  "monitor clinically  -Continues on aspirin  -Not routinely on statin, started 4/6 after CK resolved     Essential hypertension  -Continue lisinopril at 2.5 mg daily (home dose 5 mg) with hold parameters, metoprolol succinate 25 mg daily          Diet: Low Saturated Fat Na <2400 mg  Snacks/Supplements Adult: Ensure Max Protein (bariatric); Between Meals    DVT Prophylaxis: DOAC  Capps Catheter: Not present  Lines: None     Cardiac Monitoring: None  Code Status: Full Code      Clinically Significant Risk Factors         # Hyponatremia: Lowest Na = 133 mmol/L in last 2 days, will monitor as appropriate       # Hypoalbuminemia: Lowest albumin = 3 g/dL at 3/28/2025  4:13 AM, will monitor as appropriate     # Hypertension: Noted on problem list            # Obesity: Estimated body mass index is 34.18 kg/m  as calculated from the following:    Height as of this encounter: 1.702 m (5' 7\").    Weight as of this encounter: 99 kg (218 lb 4.1 oz).       # History of CABG: noted on surgical history       Social Drivers of Health    Physical Activity: Insufficiently Active (3/17/2025)    Exercise Vital Sign     Days of Exercise per Week: 1 day     Minutes of Exercise per Session: 10 min   Social Connections: Unknown (3/17/2025)    Social Connection and Isolation Panel [NHANES]     Frequency of Social Gatherings with Friends and Family: Once a week          Disposition Plan     Medically Ready for Discharge: Anticipated in 2-4 Days             Tomas West MD  Hospitalist Service  [facility]  Securely message with [employer] (more info)  Text page via [employer] Paging/Directory   ______________________________________________________________________    Interval History   No new complaints.    Physical Exam   Vital Signs: Temp: 97.7  F (36.5  C) Temp src: Oral BP: 113/69 Pulse: 76   Resp: 20 SpO2: 95 % O2 Device: None (Room air)    Weight: 218 lbs 4.09 oz    Alert, well-appearing, no distress, less edema left " lower extremity, wound VAC benign.    Medical Decision Making       46 MINUTES SPENT BY ME on the date of service doing chart review, history, exam, documentation & further activities per the note.  NOTE(S)/MEDICAL RECORDS REVIEWED over the past 24 hours: Vitals, labs, new imaging, documentation and medication administrations       Data         Imaging results reviewed over the past 24 hrs:   No results found for this or any previous visit (from the past 24 hours).

## 2025-04-07 NOTE — PLAN OF CARE
Problem: Adult Inpatient Plan of Care  Goal: Absence of Hospital-Acquired Illness or Injury  Intervention: Identify and Manage Fall Risk  Recent Flowsheet Documentation  Taken 4/7/2025 0021 by Cadence Valdovinos RN  Safety Promotion/Fall Prevention:   activity supervised   assistive device/personal items within reach   room near nurse's station   room door open   safety round/check completed  Intervention: Prevent Skin Injury  Recent Flowsheet Documentation  Taken 4/7/2025 0021 by Cadence Valdovinos RN  Body Position: supine, legs elevated  Goal: Optimal Comfort and Wellbeing  Intervention: Monitor Pain and Promote Comfort  Recent Flowsheet Documentation  Taken 4/7/2025 0021 by Cadence Valdovinos RN  Pain Management Interventions: medication (see MAR)     Problem: Delirium  Goal: Improved Behavioral Control  Intervention: Minimize Safety Risk  Recent Flowsheet Documentation  Taken 4/7/2025 0021 by Cadence Valdovinos RN  Enhanced Safety Measures:   assistive devices when indicated   pain management   review medications for side effects with activity   room near unit station     Problem: Risk for Delirium  Goal: Improved Behavioral Control  Intervention: Minimize Safety Risk  Recent Flowsheet Documentation  Taken 4/7/2025 0021 by Cadence Valdovinos RN  Enhanced Safety Measures:   assistive devices when indicated   pain management   review medications for side effects with activity   room near unit station     Problem: Skin Injury Risk Increased  Goal: Skin Health and Integrity  Intervention: Plan: Nurse Driven Intervention: Moisture Management  Recent Flowsheet Documentation  Taken 4/7/2025 0021 by Cadence Valdovinos RN  Moisture Interventions:   Incontinence pad   Urinary collection device  Intervention: Plan: Nurse Driven Intervention: Friction and Shear  Recent Flowsheet Documentation  Taken 4/7/2025 0021 by Cadence Valdovinos RN  Friction/Shear Interventions: HOB 30 degrees or less  Intervention: Optimize Skin  Protection  Recent Flowsheet Documentation  Taken 4/7/2025 0021 by Cadence Valdovinos RN  Activity Management: activity adjusted per tolerance     Problem: Comorbidity Management  Goal: Blood Pressure in Desired Range  Intervention: Maintain Blood Pressure Management  Recent Flowsheet Documentation  Taken 4/7/2025 0021 by Cadence Valdovinos RN  Medication Review/Management: medications reviewed     Problem: Fall Injury Risk  Goal: Absence of Fall and Fall-Related Injury  Intervention: Identify and Manage Contributors  Recent Flowsheet Documentation  Taken 4/7/2025 0021 by Cadence Valdovinos RN  Medication Review/Management: medications reviewed  Intervention: Promote Injury-Free Environment  Recent Flowsheet Documentation  Taken 4/7/2025 0021 by Cadence Valdovinos RN  Safety Promotion/Fall Prevention:   activity supervised   assistive device/personal items within reach   room near nurse's station   room door open   safety round/check completed     Problem: Pain Acute  Goal: Optimal Pain Control and Function  Intervention: Develop Pain Management Plan  Recent Flowsheet Documentation  Taken 4/7/2025 0021 by Cadence Valdovinos RN  Pain Management Interventions: medication (see MAR)  Intervention: Prevent or Manage Pain  Recent Flowsheet Documentation  Taken 4/7/2025 0021 by Cadence Valdovinos RN  Medication Review/Management: medications reviewed     Problem: Wound  Goal: Optimal Functional Ability  Intervention: Optimize Functional Ability  Recent Flowsheet Documentation  Taken 4/7/2025 0021 by Cadence Valdovinos RN  Activity Management: activity adjusted per tolerance  Goal: Optimal Pain Control and Function  Intervention: Prevent or Manage Pain  Recent Flowsheet Documentation  Taken 4/7/2025 0021 by Cadence Valdovinos RN  Pain Management Interventions: medication (see MAR)  Goal: Skin Health and Integrity  Intervention: Optimize Skin Protection  Recent Flowsheet Documentation  Taken 4/7/2025 0021 by Cadence Valdovinos RN  Activity  Management: activity adjusted per tolerance   Goal Outcome Evaluation:       Aox4, calm and cooperative. Reports mild to moderate pain in left leg, sched tylenol given, slept in between cares. Wound vacc patent and with good seal, sanguinous output. CMS intact.

## 2025-04-07 NOTE — PROGRESS NOTES
"[facility] ACUTE INPATIENT PAIN SERVICE    [facility], [facility], [facility], [facility], [place]   PAIN follow up         ASSESSMENT/ PLAN:  Acute left leg pain pain secondary to fasciotomy and VAC, in the setting of advanced age.     Multimodal Medication Therapy:    Adjuvants:  tylenol & robaxin - add diclofenac to the finger  Opioids: Hydromorphone/Dilaudid2- 4mg  IV dilaudid PRN   Non-medication interventions- PT  Constipation Prophylaxis-  increase senna to 2 tabs BID, discontinue milk of mag   Follow up /Discharge Recommendations - We recommend prescribing the following at the time of discharge:  Likely 30 tabs of PO dilaudid and 20 tabs of robaxin       Subjective:  Pain better controlled today. Slept well overnight. Left leg pain increases after pt and still requiring IV dilaudid after PT. Encouraged Lobo to try 4mg PO dilaudid prior to PT, as he has been utilizing 2mg only. Offered lidocaine cream - declined for today.     Friday VAC change was \"intense\". VAC seal was open - beeping on Friday.     Yesterday had 2 doses of PO dilaudid and dose of IV dilaudid in total.     Last BM was yesterday. Declined suppository yesterday.     Right hand  - less pain today - can make a fist again.     HPI:  Dudley Kiran is a 69 year old male who was admitted on 3/27/2025.  I was asked to see the patient for left leg pain. Admitted for leg pain. Imaging indicated left leg occlusion.  History of CAD, HTN, pre-DM.    shows no results. Describes pain as improved.           -MN  pulled from system on 4/2/2025. No results found for the patient and with no opioid/controlled medications per [employer].   Discharge Recommendations - We recommend prescribing the following at the time of discharge: TBD.      History   Drug Use Unknown         Tobacco Use      Smoking status: Never      Smokeless tobacco: Never        Objective:  Vital signs in last 24 hours:  B/P: 109/61, T: 97.6, P: 69, R: 18   Blood pressure 109/61, " "pulse 69, temperature 97.6  F (36.4  C), temperature source Oral, resp. rate 18, height 1.702 m (5' 7\"), weight 99 kg (218 lb 4.1 oz), SpO2 93%.        Review of Systems:   As per subjective, all others negative.    Physical Exam    General: in no apparent distress and non-toxic    HEENT: Head normocephalic atraumatic, oral mucosa moist. Sclerae anicteric  CV: Regular rhythm, normal rate, no murmurs  Resp: No wheezes, no rales or rhonchi, no focal consolidations  GI: active   Skin: No rashes or lesions  Extremities: LLE edema and VAC in place   Psych: Normal affect, mood euthymic  Neuro: Grossly normal          Imaging:  Personally Reviewed.  CT, ultrasound with past occlusion     Lab Results:  Personally Reviewed.   WBC and Crt      Please see A&P for additional details of medical decision making.  MANAGEMENT DISCUSSED with the following over the past 24 hours: patient    NOTE(S)/MEDICAL RECORDS REVIEWED over the past 24 hours: medicine & vascular   Tests personally interpreted in the past 24 hours:  - ULTRASOUND showing occlusion   Tests REVIEWED in the past 24 hours:  - WBC  SUPPLEMENTAL HISTORY, in addition to the patient's history, over the past 24 hours obtained from:   - Pt  Medical complexity over the past 24 hours:  -------------------------- HIGH RISK FOR MORBIDITY -------------------------------------------------------------  - Parenteral (IV) CONTROLLED SUBSTANCES ordered           Viktoriya YBARRA, CNS, CNP  Acute Care Pain Management  Team  Hours of pain coverage Mon-Fri 8-1600  After hours contact the primary team  [facility] Kerri (DEDRICK, Priti, SD, RH)   Page via [employer] web console -Click for jobandtalent            "

## 2025-04-07 NOTE — PROGRESS NOTES
Care Management Follow Up    Length of Stay (days): 11    Expected Discharge Date: 04/08/2025     Concerns to be Addressed: Care progression - discharge planning     Patient plan of care discussed at interdisciplinary rounds: Yes    Anticipated Discharge Disposition: PT/OT rec Acute Rehab Center     Anticipated Discharge Services: Acute Rehab  Anticipated Discharge DME: [facility]    Patient/family educated on Medicare website which has current facility and service quality ratings: NA  Education Provided on the Discharge Plan: Yes per team  Patient/Family in Agreement with the Plan: yes    Referrals Placed by CM/SW: Homecare, Post Acute Facilities  Private pay costs discussed: Not applicable    Discussed  Partnership in Safe Discharge Planning  document with patient/family: No     Handoff Completed: No, handoff not indicated or clinically appropriate    Additional Information:  Per provider, Dr. West, patient is not ready. Maybe ready towards end of the week.   Would like to know if [facility] can give IV pain medications?    Sent a message to Bladimir at [facility]    Next Steps: RNCM to follow for medical progression, recommendations, and final discharge plan.     Camilla Malik RN     [id number] rec'd a message from Bladimir, [facility] cannot give IV pain medications.    Message sent to update Dr. West

## 2025-04-07 NOTE — PROGRESS NOTES
CLINICAL NUTRITION SERVICES - REASSESSMENT NOTE     RECOMMENDATIONS FOR MDs/PROVIDERS TO ORDER:  None    Registered Dietitian Interventions:  -Change ensure max to ensure enlive bid and magic cup bid + expedite to better meet increased nutriton needs with inadequate oral intake = 1380 kcal, 68 g protein    Future/Additional Recommendations:  -Adjust supplements pending intake, weight, tolerance, acceptance, wound healing, labs  - monitor need to liberalize diet to optimize nutrition intake for wound healing       INFORMATION OBTAINED  Assessed patient in room. Pt reports meals are going well. He feels very limited by the restrictions on the cardiac menu and is tired of having the same foods. He is taking the ensure max and the expedite. He reports he normally eats 1 meal/day at home and is trying to eat 2/day here.   Discussed current inadequate intake, though eating more than baseline.   Pt is willing to change in increase supplements to better meet increased nutrition needs     CURRENT NUTRITION ORDERS  Diet: Orders Placed This Encounter      Low Saturated Fat Na <2400 mg    Snacks/Supplements: ensure max daily, expedite daily     CURRENT INTAKE/TOLERANCE  Eating 100% of 2 meal/day  With supplements receiving 800-1210 kcal, 63-89 g protein/day  Taking expedite.   Ensure max intake not documented    Not receiving any outside food    Pt is meeting 40-60% of estimated kcal and 55-80% of estimated protein needs    Intake is limited by baseline low intake and diet restrictions       NEW FINDINGS  GI symptoms:   Last BM 4/6  Skin/wounds: LE fasciotomy - improving per WOC 4/4  Nutrition-relevant labs: Reviewed  Nutrition-relevant medications:   Dulcolax suppository daily, ssi,mvi with minerals, protonix, miralax bid, pericolace, vit C 500 mg, zinc sulfate 220 mg x 10 days  Weight: No new weight  2+ BLE edema, increased. Wt fluid up from baseline  Date/Time Weight Weight Method   04/01/25 0626 99 kg (218 lb 4.1 oz) Bed  scale   03/31/25 0121 99.5 kg (219 lb 5.7 oz) Bed scale   03/30/25 0609 99.3 kg (218 lb 14.7 oz) Bed scale   03/29/25 0422 103 kg (227 lb 1.2 oz) Bed scale   03/28/25 0600 101.6 kg (223 lb 14.4 oz) Bed scale     03/27/25 95.3 kg (210 lb)   03/19/25 95.7 kg (211 lb)- clinic   09/13/24 95.7 kg (211 lb)   03/13/24 95.3 kg (210 lb)     Dosing Weight: 95.3 kg, based on  usual BW     ASSESSED NUTRITION NEEDS  Estimated Energy Needs: 2000 -2100+ kcals/day ([place] Jeor)x 1.2+  Justification: Increased needs  Estimated Protein Needs: 110 -125 grams protein/day (1.2 - 1.5 grams of pro/kg)  Justification: Wound healing  Estimated Fluid Needs: 2000 -2100 mL/day (1 mL/kcal)  Justification: Maintenance, CHF    MALNUTRITION  % Intake: Decreased intake does not meet criteria  % Weight Loss: None noted  Subcutaneous Fat Loss: None observed  Muscle Loss: None observed  Fluid Accumulation/Edema: Mild, 1+  Malnutrition Diagnosis: Patient does not meet two of the established criteria necessary for diagnosing malnutrition but is at risk for malnutrition  Malnutrition Present on Admission: Yes    EVALUATION OF THE PROGRESS TOWARD GOALS     NUTRITION DIAGNOSIS  Food and nutrition related knowledge deficit r/t CHF needs evidenced by pt desire for higher Na items    Increased nutrient needs related to wound healing as evidenced by wound vac post fasciotomy    GOALS  -Patient to consume % of nutritionally adequate meal trays TID, or the equivalent with supplements/snacks.- not met- not progressing  -Wound healing- progressing     MONITORING/EVALUATION  Progress toward goals will be monitored and evaluated per policy.

## 2025-04-07 NOTE — PLAN OF CARE
"  Problem: Adult Inpatient Plan of Care  Goal: Plan of Care Review  Description: The Plan of Care Review/Shift note should be completed every shift.  The Outcome Evaluation is a brief statement about your assessment that the patient is improving, declining, or no change.  This information will be displayed automatically on your shiftnote.  Outcome: Progressing  Goal: Patient-Specific Goal (Individualized)  Description: You can add care plan individualizations to a care plan. Examples of Individualization might be:  \"Parent requests to be called daily at 9am for status\", \"I have a hard time hearing out of my right ear\", or \"Do not touch me to wake me up as it startlesme\".  Outcome: Progressing  Goal: Absence of Hospital-Acquired Illness or Injury  Outcome: Progressing  Intervention: Identify and Manage Fall Risk  Recent Flowsheet Documentation  Taken 4/6/2025 1903 by Rabia Abreu RN  Safety Promotion/Fall Prevention:   clutter free environment maintained   assistive device/personal items within reach   activity supervised   patient and family education   patient video monitoring   nonskid shoes/slippers when out of bed  Intervention: Prevent Skin Injury  Recent Flowsheet Documentation  Taken 4/6/2025 2053 by Rabia Abreu RN  Body Position: (positined independetly) refuses positioning  Taken 4/6/2025 1903 by Rabia Abreu RN  Body Position: position changed independently  Taken 4/6/2025 1653 by Rabia Abreu RN  Body Position: refuses positioning  Intervention: Prevent Infection  Recent Flowsheet Documentation  Taken 4/6/2025 1903 by Rabia Abreu RN  Infection Prevention:   hand hygiene promoted   rest/sleep promoted   single patient room provided  Goal: Optimal Comfort and Wellbeing  Outcome: Progressing  Intervention: Provide Person-Centered Care  Recent Flowsheet Documentation  Taken 4/6/2025 1903 by Rabia Abreu RN  Trust Relationship/Rapport:   care explained   questions " answered   questions encouraged  Goal: Readiness for Transition of Care  Outcome: Progressing     Problem: Delirium  Goal: Optimal Coping  Outcome: Progressing  Intervention: Optimize Psychosocial Adjustment to Delirium  Recent Flowsheet Documentation  Taken 4/6/2025 1903 by Rabia Abreu RN  Supportive Measures:   active listening utilized   self-care encouraged  Goal: Improved Behavioral Control  Outcome: Progressing  Intervention: Prevent and Manage Agitation  Recent Flowsheet Documentation  Taken 4/6/2025 1903 by Rabia Abreu RN  Environment Familiarity/Consistency: daily routine followed  Intervention: Minimize Safety Risk  Recent Flowsheet Documentation  Taken 4/6/2025 1903 by Rabia Abreu RN  Enhanced Safety Measures: pain management  Trust Relationship/Rapport:   care explained   questions answered   questions encouraged  Goal: Improved Attention and Thought Clarity  Outcome: Progressing  Intervention: Maximize Cognitive Function  Recent Flowsheet Documentation  Taken 4/6/2025 1903 by Rabia Abreu RN  Sensory Stimulation Regulation:   care clustered   lighting decreased  Reorientation Measures: clock in view  Goal: Improved Sleep  Outcome: Progressing     Problem: Risk for Delirium  Goal: Optimal Coping  Outcome: Progressing  Intervention: Optimize Psychosocial Adjustment to Delirium  Recent Flowsheet Documentation  Taken 4/6/2025 1903 by Rabia Abreu RN  Supportive Measures:   active listening utilized   self-care encouraged  Goal: Improved Behavioral Control  Outcome: Progressing  Intervention: Prevent and Manage Agitation  Recent Flowsheet Documentation  Taken 4/6/2025 1903 by Rabia Abreu RN  Environment Familiarity/Consistency: daily routine followed  Intervention: Minimize Safety Risk  Recent Flowsheet Documentation  Taken 4/6/2025 1903 by Rabia Abreu RN  Enhanced Safety Measures: pain management  Trust Relationship/Rapport:   care explained   questions  answered   questions encouraged  Goal: Improved Attention and Thought Clarity  Outcome: Progressing  Intervention: Maximize Cognitive Function  Recent Flowsheet Documentation  Taken 4/6/2025 1903 by Rabia Abreu RN  Sensory Stimulation Regulation:   care clustered   lighting decreased  Reorientation Measures: clock in view  Goal: Improved Sleep  Outcome: Progressing     Problem: Skin Injury Risk Increased  Goal: Skin Health and Integrity  Outcome: Progressing  Intervention: Plan: Nurse Driven Intervention: Moisture Management  Recent Flowsheet Documentation  Taken 4/6/2025 1903 by Rabia Abreu RN  Moisture Interventions: Encourage regular toileting  Intervention: Plan: Nurse Driven Intervention: Friction and Shear  Recent Flowsheet Documentation  Taken 4/6/2025 1903 by Rabia Abreu RN  Friction/Shear Interventions: HOB 30 degrees or less  Intervention: Optimize Skin Protection  Recent Flowsheet Documentation  Taken 4/6/2025 1903 by Rabia Abreu RN  Activity Management: activity adjusted per tolerance  Head of Bed (HOB) Positioning: HOB at 30-45 degrees  Taken 4/6/2025 1653 by Rabia Abreu RN  Head of Bed (HOB) Positioning: HOB at 60-90 degrees     Problem: Comorbidity Management  Goal: Blood Pressure in Desired Range  Outcome: Progressing  Intervention: Maintain Blood Pressure Management  Recent Flowsheet Documentation  Taken 4/6/2025 1903 by Rabia Abreu RN  Medication Review/Management: medications reviewed     Problem: VTE (Venous Thromboembolism)  Goal: Tissue Perfusion  Outcome: Progressing  Goal: Right Ventricular Function  Outcome: Progressing     Problem: Fall Injury Risk  Goal: Absence of Fall and Fall-Related Injury  Outcome: Progressing  Intervention: Identify and Manage Contributors  Recent Flowsheet Documentation  Taken 4/6/2025 1903 by Rabia Abreu RN  Medication Review/Management: medications reviewed  Intervention: Promote Injury-Free  Environment  Recent Flowsheet Documentation  Taken 4/6/2025 1903 by Rabia Abreu RN  Safety Promotion/Fall Prevention:   clutter free environment maintained   assistive device/personal items within reach   activity supervised   patient and family education   patient video monitoring   nonskid shoes/slippers when out of bed     Problem: Pain Acute  Goal: Optimal Pain Control and Function  Outcome: Progressing  Intervention: Optimize Psychosocial Wellbeing  Recent Flowsheet Documentation  Taken 4/6/2025 1903 by Rabia Abreu RN  Supportive Measures:   active listening utilized   self-care encouraged  Intervention: Prevent or Manage Pain  Recent Flowsheet Documentation  Taken 4/6/2025 1903 by Rabia Abreu RN  Sensory Stimulation Regulation:   care clustered   lighting decreased  Bowel Elimination Promotion:   adequate fluid intake promoted   ambulation promoted  Medication Review/Management: medications reviewed     Problem: Surgery Nonspecified  Goal: Absence of Bleeding  Outcome: Progressing  Goal: Blood Glucose Level Within Targeted Range  Outcome: Progressing  Goal: Absence of Infection Signs and Symptoms  Outcome: Progressing     Problem: Constipation  Goal: Effective Bowel Elimination  Outcome: Progressing     Problem: Wound  Goal: Optimal Coping  Outcome: Progressing  Intervention: Support Patient and Family Response  Recent Flowsheet Documentation  Taken 4/6/2025 1903 by Rabia Abreu RN  Supportive Measures:   active listening utilized   self-care encouraged  Goal: Optimal Functional Ability  Outcome: Progressing  Intervention: Optimize Functional Ability  Recent Flowsheet Documentation  Taken 4/6/2025 1903 by Rabia Abreu RN  Assistive Device Utilized:   walker   gait belt  Activity Management: activity adjusted per tolerance  Activity Assistance Provided: assistance, 1 person  Goal: Absence of Infection Signs and Symptoms  Outcome: Progressing  Goal: Improved Oral  Intake  Outcome: Progressing  Goal: Optimal Pain Control and Function  Outcome: Progressing  Goal: Skin Health and Integrity  Outcome: Progressing  Intervention: Optimize Skin Protection  Recent Flowsheet Documentation  Taken 4/6/2025 1903 by Rabia Abreu RN  Activity Management: activity adjusted per tolerance  Head of Bed (HOB) Positioning: HOB at 30-45 degrees  Taken 4/6/2025 1653 by Rabia Abreu RN  Head of Bed (HOB) Positioning: HOB at 60-90 degrees  Goal: Optimal Wound Healing  Outcome: Progressing   Goal Outcome Evaluation:         Pt is alert and oriented x4, was up in the chair during the shift, had a BM today and refused stool softener. Wound vac intact, VSS and will continue with plan of cares.

## 2025-04-07 NOTE — PLAN OF CARE
Problem: Adult Inpatient Plan of Care  Goal: Plan of Care Review  Description: The Plan of Care Review/Shift note should be completed every shift.  The Outcome Evaluation is a brief statement about your assessment that the patient is improving, declining, or no change.  This information will be displayed automatically on your shiftnote.  Outcome: Progressing     Problem: Adult Inpatient Plan of Care  Goal: Absence of Hospital-Acquired Illness or Injury  Outcome: Progressing     Problem: Adult Inpatient Plan of Care  Goal: Absence of Hospital-Acquired Illness or Injury  Intervention: Prevent Skin Injury  Recent Flowsheet Documentation  Taken 4/7/2025 1053 by Neil Olson, RN  Body Position: sitting up in bed  Taken 4/7/2025 0853 by Neil Olson, RN  Body Position: left     Problem: Delirium  Goal: Optimal Coping  Intervention: Optimize Psychosocial Adjustment to Delirium  Recent Flowsheet Documentation  Taken 4/7/2025 1453 by Neil Olson, RN  Supportive Measures: active listening utilized   Goal Outcome Evaluation:  Patient alert oriented x 4, received PRN dilaudid IV and PO which was effective, assist of 1 pivot to chair, wound vac will be changed tomorrow, discharge based of vac change tolerance.

## 2025-04-08 LAB
CK SERPL-CCNC: 88 U/L (ref 39–308)
HOLD SPECIMEN: NORMAL

## 2025-04-08 PROCEDURE — 250N000013 HC RX MED GY IP 250 OP 250 PS 637: Performed by: INTERNAL MEDICINE

## 2025-04-08 PROCEDURE — 97606 NEG PRS WND THER DME>50 SQCM: CPT

## 2025-04-08 PROCEDURE — 120N000001 HC R&B MED SURG/OB

## 2025-04-08 PROCEDURE — 250N000013 HC RX MED GY IP 250 OP 250 PS 637: Performed by: STUDENT IN AN ORGANIZED HEALTH CARE EDUCATION/TRAINING PROGRAM

## 2025-04-08 PROCEDURE — 36415 COLL VENOUS BLD VENIPUNCTURE: CPT | Performed by: HOSPITALIST

## 2025-04-08 PROCEDURE — 99232 SBSQ HOSP IP/OBS MODERATE 35: CPT | Performed by: HOSPITALIST

## 2025-04-08 PROCEDURE — 99232 SBSQ HOSP IP/OBS MODERATE 35: CPT | Performed by: PAIN MEDICINE

## 2025-04-08 PROCEDURE — 250N000011 HC RX IP 250 OP 636: Mod: JZ | Performed by: NURSE PRACTITIONER

## 2025-04-08 PROCEDURE — 250N000013 HC RX MED GY IP 250 OP 250 PS 637: Performed by: HOSPITALIST

## 2025-04-08 PROCEDURE — 82550 ASSAY OF CK (CPK): CPT | Performed by: HOSPITALIST

## 2025-04-08 PROCEDURE — 250N000013 HC RX MED GY IP 250 OP 250 PS 637: Performed by: NURSE PRACTITIONER

## 2025-04-08 RX ORDER — LIDOCAINE 50 MG/G
OINTMENT TOPICAL ONCE
Status: COMPLETED | OUTPATIENT
Start: 2025-04-08 | End: 2025-04-08

## 2025-04-08 RX ADMIN — METHOCARBAMOL 500 MG: 500 TABLET ORAL at 20:58

## 2025-04-08 RX ADMIN — HYDROMORPHONE HYDROCHLORIDE 0.2 MG: 0.2 INJECTION, SOLUTION INTRAMUSCULAR; INTRAVENOUS; SUBCUTANEOUS at 09:20

## 2025-04-08 RX ADMIN — PANTOPRAZOLE SODIUM 40 MG: 40 TABLET, DELAYED RELEASE ORAL at 06:21

## 2025-04-08 RX ADMIN — Medication 1 TABLET: at 08:21

## 2025-04-08 RX ADMIN — RIVAROXABAN 20 MG: 10 TABLET, FILM COATED ORAL at 17:05

## 2025-04-08 RX ADMIN — METHOCARBAMOL 500 MG: 500 TABLET ORAL at 17:05

## 2025-04-08 RX ADMIN — METHOCARBAMOL 500 MG: 500 TABLET ORAL at 08:22

## 2025-04-08 RX ADMIN — ACETAMINOPHEN 975 MG: 325 TABLET, FILM COATED ORAL at 06:21

## 2025-04-08 RX ADMIN — HYDROMORPHONE HYDROCHLORIDE 4 MG: 2 TABLET ORAL at 14:23

## 2025-04-08 RX ADMIN — HYDROMORPHONE HYDROCHLORIDE 4 MG: 2 TABLET ORAL at 08:18

## 2025-04-08 RX ADMIN — Medication 500 MG: at 08:22

## 2025-04-08 RX ADMIN — ASPIRIN 81 MG: 81 TABLET, COATED ORAL at 08:22

## 2025-04-08 RX ADMIN — METOPROLOL SUCCINATE 25 MG: 25 TABLET, EXTENDED RELEASE ORAL at 08:22

## 2025-04-08 RX ADMIN — METHOCARBAMOL 500 MG: 500 TABLET ORAL at 14:19

## 2025-04-08 RX ADMIN — ACETAMINOPHEN 975 MG: 325 TABLET, FILM COATED ORAL at 18:29

## 2025-04-08 RX ADMIN — ACETAMINOPHEN 975 MG: 325 TABLET, FILM COATED ORAL at 14:19

## 2025-04-08 RX ADMIN — LISINOPRIL 2.5 MG: 2.5 TABLET ORAL at 08:22

## 2025-04-08 RX ADMIN — ZINC SULFATE 220 MG (50 MG) CAPSULE 220 MG: CAPSULE at 08:21

## 2025-04-08 RX ADMIN — ACETAMINOPHEN 975 MG: 325 TABLET, FILM COATED ORAL at 00:11

## 2025-04-08 ASSESSMENT — ACTIVITIES OF DAILY LIVING (ADL)
ADLS_ACUITY_SCORE: 42
ADLS_ACUITY_SCORE: 40
ADLS_ACUITY_SCORE: 42
ADLS_ACUITY_SCORE: 40
ADLS_ACUITY_SCORE: 40
ADLS_ACUITY_SCORE: 42
ADLS_ACUITY_SCORE: 40
ADLS_ACUITY_SCORE: 42
ADLS_ACUITY_SCORE: 42
ADLS_ACUITY_SCORE: 40
ADLS_ACUITY_SCORE: 42
ADLS_ACUITY_SCORE: 40

## 2025-04-08 NOTE — PLAN OF CARE
Problem: Adult Inpatient Plan of Care  Goal: Plan of Care Review  Description: The Plan of Care Review/Shift note should be completed every shift.  The Outcome Evaluation is a brief statement about your assessment that the patient is improving, declining, or no change.  This information will be displayed automatically on your shiftnote.  Outcome: Progressing     Problem: Adult Inpatient Plan of Care  Goal: Absence of Hospital-Acquired Illness or Injury  Outcome: Progressing     Problem: Adult Inpatient Plan of Care  Goal: Absence of Hospital-Acquired Illness or Injury  Intervention: Prevent Skin Injury  Recent Flowsheet Documentation  Taken 4/8/2025 1020 by Neil Olson, RN  Body Position: position changed independently  Taken 4/8/2025 0853 by Neil Olson, RN  Body Position: sitting up in bed  Taken 4/8/2025 0653 by Neil Olson, RN  Body Position: sitting up in bed     Problem: Delirium  Goal: Optimal Coping  Intervention: Optimize Psychosocial Adjustment to Delirium  Recent Flowsheet Documentation  Taken 4/8/2025 1020 by Neil Olson, RN  Supportive Measures: active listening utilized   Goal Outcome Evaluation:       Patient alert oriented x 4, able to verbalize needs, Woc nurse changed wound vac today, Dilaudid PO and IV given which was effective during wound change.

## 2025-04-08 NOTE — PROGRESS NOTES
Care Management Follow Up    Length of Stay (days): 12    Expected Discharge Date: 04/10/2025     Concerns to be Addressed: Care progression - discharge planning     Patient plan of care discussed at interdisciplinary rounds: Yes    Anticipated Discharge Disposition: PT/OT rec Acute Rehab Center     Anticipated Discharge Services: Acute Rehab  Anticipated Discharge DME: [facility]    Patient/family educated on Medicare website which has current facility and service quality ratings: NA  Education Provided on the Discharge Plan: Yes per team  Patient/Family in Agreement with the Plan: yes    Referrals Placed by CM/SW: Homecare, Post Acute Facilities  Private pay costs discussed: Not applicable    Discussed  Partnership in Safe Discharge Planning  document with patient/family: No     Handoff Completed: No, handoff not indicated or clinically appropriate    Additional Information:  Per provider, Dr. West, patient is not ready. Will get wound vac dressing change today and see how that goes.    Next Steps: RNCM to follow for medical progression, recommendations, and final discharge plan.     Camilla Malik RN

## 2025-04-08 NOTE — PROGRESS NOTES
[facility] Progress Note - Hospitalist Service    Date of Admission:  3/27/2025    Assessment & Plan   69 male with history of CAD, hypertension is admitted for acute left lower extremity pain and found to have acute limb ischemia, underwent 4 compartment fasciotomy urgently on admission.  Monitored in the ICU and downgraded clinically 3/28.     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 Continue care per WOC.  -Postoperative cares per vascular surgery  -Hypercoagulable panel negative; heme/onc signed off 3/29/2025  -PTOT, will likely need TCU vs ARU  -Main barrier to discharge is tolerance of wound vac changes and wound cares, will need to be able to manage wound care w/o parenteral opioids prior to TCU/ARU, next due for change 4/12  - Recheck CK and liver function 4/9 following statin resumption     Constipation  Now resolved, baseline BM q3-4 days  -Senakot BID and Miralax BID  -Suppository PRN     Suspected inflammatory arthropathy ? Gout vs pseudogout   Painful erythema and swelling at the level of third right proximal interphalangeal joint  CRP came back markedly elevated at 198.9 - concerning for inflammatory arthropathy ? Gout vs pseudogout.   Hand x-ray showed DJD.  -Resolved with prednisone 10 mg daily x 5 days     Chronic heart failure with mildly reduced ejection fraction  Echo this admission with EF 45 to 50%  -Not clinically hypervolemic, monitor for edema    "  CAD  Remote CABG, no anginal chest pain, monitor clinically  -Continues on aspirin  -Not routinely on statin, started 4/6 after CK resolved     Essential hypertension  -Continue lisinopril at 2.5 mg daily (home dose 5 mg) with hold parameters, metoprolol succinate 25 mg daily          Diet: Snacks/Supplements Adult: Ensure Enlive; With Meals  Snacks/Supplements Adult: Magic Cup; With Meals  Regular Diet Adult    DVT Prophylaxis: DOAC  Capps Catheter: Not present  Lines: None     Cardiac Monitoring: None  Code Status: Full Code      Clinically Significant Risk Factors               # Hypoalbuminemia: Lowest albumin = 3 g/dL at 3/28/2025  4:13 AM, will monitor as appropriate     # Hypertension: Noted on problem list            # Obesity: Estimated body mass index is 34.18 kg/m  as calculated from the following:    Height as of this encounter: 1.702 m (5' 7\").    Weight as of this encounter: 99 kg (218 lb 4.1 oz).       # History of CABG: noted on surgical history       Social Drivers of Health    Physical Activity: Insufficiently Active (3/17/2025)    Exercise Vital Sign     Days of Exercise per Week: 1 day     Minutes of Exercise per Session: 10 min   Social Connections: Unknown (3/17/2025)    Social Connection and Isolation Panel [NHANES]     Frequency of Social Gatherings with Friends and Family: Once a week          Disposition Plan     Medically Ready for Discharge: Anticipated in 2-4 Days             Tomas West MD  Hospitalist Service  [facility]  Securely message with [employer] (more info)  Text page via [employer] Paging/Directory   ______________________________________________________________________    Interval History   Slowly improving, pain severe at times.    Physical Exam   Vital Signs: Temp: 97.7  F (36.5  C) Temp src: Oral BP: 135/77 Pulse: 74   Resp: 16 SpO2: 95 % O2 Device: None (Room air)    Weight: 218 lbs 4.09 oz    Alert, no distress, heart and lungs normal, abdomen " soft, trace edema left lower extremity, stable wound VAC    Medical Decision Making       48 MINUTES SPENT BY ME on the date of service doing chart review, history, exam, documentation & further activities per the note.  NOTE(S)/MEDICAL RECORDS REVIEWED over the past 24 hours: Vitals, labs, new imaging, documentation and medication administrations       Data         Imaging results reviewed over the past 24 hrs:   No results found for this or any previous visit (from the past 24 hours).

## 2025-04-08 NOTE — PLAN OF CARE
Problem: Adult Inpatient Plan of Care  Goal: Optimal Comfort and Wellbeing  Outcome: Progressing  Intervention: Monitor Pain and Promote Comfort  Recent Flowsheet Documentation  Taken 4/8/2025 0011 by Nydia Uriostegui RN  Pain Management Interventions: medication (see MAR)  Intervention: Provide Person-Centered Care  Recent Flowsheet Documentation  Taken 4/8/2025 0011 by Nydia Uriostegui RN  Trust Relationship/Rapport:   care explained   questions answered   questions encouraged   Goal Outcome Evaluation:  Pt had an uneventful shift.  Cares clustered per pt request.  Pain minimal overnight and managed with scheduled meds.  Pt did state he is a little anxious about dressing change today. Vac dressing intact without any leaks.

## 2025-04-08 NOTE — PROGRESS NOTES
"[facility] ACUTE INPATIENT PAIN SERVICE    [facility], [facility], [facility], [facility], [place]   PAIN follow up         ASSESSMENT/ PLAN:  Acute left leg pain pain secondary to fasciotomy and VAC, in the setting of advanced age.   Will need to create a PO medication plan for dressing changes.  Multimodal Medication Therapy:    Adjuvants:  tylenol & robaxin - cont diclofenac to the finger  Opioids: Hydromorphone/Dilaudid2- 4mg  IV dilaudid PRN   Non-medication interventions- PT  Constipation Prophylaxis-  increase senna to 2 tabs BID, discontinue milk of mag   Follow up /Discharge Recommendations - We recommend prescribing the following at the time of discharge:  Likely 30 tabs of PO dilaudid and 20 tabs of robaxin       Subjective:    Patient states pain is well-controlled.  Plan for dressing change today.  Did receive IV Dilaudid and p.o. Dilaudid in preparation for dressing change.  Offered hydroxyzine as an adjuvant.  Due to side effects of hydroxyzine patient would prefer to avoid.  Finger pain has almost completely resolved.    HPI:  Dudley Kiran is a 69 year old male who was admitted on 3/27/2025.  I was asked to see the patient for left leg pain. Admitted for leg pain. Imaging indicated left leg occlusion.  History of CAD, HTN, pre-DM.    shows no results.          -MN  pulled from system on 4/2/2025. No results found for the patient and with no opioid/controlled medications per SureScript.   Discharge Recommendations - We recommend prescribing the following at the time of discharge: TBD.      History   Drug Use Unknown         Tobacco Use      Smoking status: Never      Smokeless tobacco: Never        Objective:  Vital signs in last 24 hours:  B/P: 109/61, T: 97.6, P: 69, R: 18   Blood pressure 111/63, pulse 78, temperature 97.7  F (36.5  C), temperature source Oral, resp. rate 20, height 1.702 m (5' 7\"), weight 99 kg (218 lb 4.1 oz), SpO2 95%.        Review of Systems:   As per subjective, " all others negative.    Physical Exam    General: in no apparent distress and non-toxic    HEENT: Head normocephalic atraumatic, oral mucosa moist. Sclerae anicteric  CV: Regular rhythm, normal rate, no murmurs  Resp: No wheezes, no rales or rhonchi, no focal consolidations  GI: active   Skin: No rashes or lesions  Extremities: LLE edema and VAC in place   Psych: Normal affect, mood euthymic  Neuro: Grossly normal          Imaging:  Personally Reviewed.  CT, ultrasound with past occlusion     Lab Results:  Personally Reviewed.   WBC and Crt      Please see A&P for additional details of medical decision making.  MANAGEMENT DISCUSSED with the following over the past 24 hours: patient    NOTE(S)/MEDICAL RECORDS REVIEWED over the past 24 hours: medicine & vascular   Tests personally interpreted in the past 24 hours:  - ULTRASOUND showing occlusion   Tests REVIEWED in the past 24 hours:  - WBC  SUPPLEMENTAL HISTORY, in addition to the patient's history, over the past 24 hours obtained from:   - Pt  Medical complexity over the past 24 hours:  -------------------------- HIGH RISK FOR MORBIDITY -------------------------------------------------------------  - Parenteral (IV) CONTROLLED SUBSTANCES ordered           Viktoriya YBARRA, CNS, CNP  Acute Care Pain Management  Team  Hours of pain coverage Mon-Fri 8-1600  After hours contact the primary team  [facility] Kerri (DEDRICK, Priti, SD, RH)   Page via [employer] web console -Click for [employer]

## 2025-04-08 NOTE — PLAN OF CARE
Problem: Adult Inpatient Plan of Care  Goal: Plan of Care Review  Outcome: Progressing  Flowsheets (Taken 4/8/2025 1833)  Plan of Care Reviewed With: patient  Overall Patient Progress: improving  Goal: Patient-Specific Goal (Individualized)  Outcome: Progressing  Goal: Absence of Hospital-Acquired Illness or Injury  Outcome: Progressing  Intervention: Identify and Manage Fall Risk  Recent Flowsheet Documentation  Taken 4/8/2025 1645 by Adegun, Oluwadamilola, RN  Safety Promotion/Fall Prevention:   activity supervised   room near nurse's station  Intervention: Prevent Skin Injury  Recent Flowsheet Documentation  Taken 4/8/2025 1645 by Adegun, Oluwadamilola, RN  Body Position: position changed independently  Goal: Optimal Comfort and Wellbeing  Outcome: Progressing  Intervention: Monitor Pain and Promote Comfort  Recent Flowsheet Documentation  Taken 4/8/2025 1829 by Adegun, Oluwadamilola, RN  Pain Management Interventions: medication (see MAR)  Goal: Readiness for Transition of Care  Outcome: Progressing     Problem: Delirium  Goal: Optimal Coping  Outcome: Progressing  Goal: Improved Behavioral Control  Outcome: Progressing  Intervention: Minimize Safety Risk  Recent Flowsheet Documentation  Taken 4/8/2025 1645 by Adegun, Oluwadamilola, RN  Enhanced Safety Measures: pain management  Goal: Improved Attention and Thought Clarity  Outcome: Progressing  Goal: Improved Sleep  Outcome: Progressing     Problem: Risk for Delirium  Goal: Optimal Coping  Outcome: Progressing  Goal: Improved Behavioral Control  Outcome: Progressing  Intervention: Minimize Safety Risk  Recent Flowsheet Documentation  Taken 4/8/2025 1645 by Adegun, Oluwadamilola, RN  Enhanced Safety Measures: pain management  Goal: Improved Attention and Thought Clarity  Outcome: Progressing  Goal: Improved Sleep  Outcome: Progressing     Problem: Skin Injury Risk Increased  Goal: Skin Health and Integrity  Outcome: Progressing  Intervention: Plan: Nurse  Driven Intervention: Moisture Management  Recent Flowsheet Documentation  Taken 4/8/2025 1645 by Adegun, Oluwadamilola, RN  Moisture Interventions: Encourage regular toileting  Intervention: Plan: Nurse Driven Intervention: Friction and Shear  Recent Flowsheet Documentation  Taken 4/8/2025 1645 by Adegun, Oluwadamilola, RN  Friction/Shear Interventions: HOB 30 degrees or less  Intervention: Optimize Skin Protection  Recent Flowsheet Documentation  Taken 4/8/2025 1645 by Adegun, Oluwadamilola, RN  Activity Management: activity adjusted per tolerance  Head of Bed (HOB) Positioning: HOB at 30-45 degrees     Problem: Comorbidity Management  Goal: Blood Pressure in Desired Range  Outcome: Progressing     Problem: VTE (Venous Thromboembolism)  Goal: Tissue Perfusion  Outcome: Progressing  Goal: Right Ventricular Function  Outcome: Progressing     Problem: Fall Injury Risk  Goal: Absence of Fall and Fall-Related Injury  Outcome: Progressing  Intervention: Promote Injury-Free Environment  Recent Flowsheet Documentation  Taken 4/8/2025 1645 by Adegun, Oluwadamilola, RN  Safety Promotion/Fall Prevention:   activity supervised   room near nurse's station     Problem: Pain Acute  Goal: Optimal Pain Control and Function  Outcome: Progressing  Intervention: Develop Pain Management Plan  Recent Flowsheet Documentation  Taken 4/8/2025 1829 by Adegun, Oluwadamilola, RN  Pain Management Interventions: medication (see MAR)     Problem: Surgery Nonspecified  Goal: Absence of Bleeding  Outcome: Progressing  Goal: Blood Glucose Level Within Targeted Range  Outcome: Progressing  Goal: Absence of Infection Signs and Symptoms  Outcome: Progressing     Problem: Constipation  Goal: Effective Bowel Elimination  Outcome: Progressing     Problem: Wound  Goal: Optimal Coping  Outcome: Progressing  Goal: Optimal Functional Ability  Outcome: Progressing  Intervention: Optimize Functional Ability  Recent Flowsheet Documentation  Taken 4/8/2025 1645  by Adegun, Oluwadamilola, RN  Assistive Device Utilized: walker  Activity Management: activity adjusted per tolerance  Activity Assistance Provided: assistance, 1 person  Goal: Absence of Infection Signs and Symptoms  Outcome: Progressing  Goal: Improved Oral Intake  Outcome: Progressing  Goal: Optimal Pain Control and Function  Outcome: Progressing  Intervention: Prevent or Manage Pain  Recent Flowsheet Documentation  Taken 4/8/2025 1829 by Adegun, Oluwadamilola, RN  Pain Management Interventions: medication (see MAR)  Goal: Skin Health and Integrity  Outcome: Progressing  Intervention: Optimize Skin Protection  Recent Flowsheet Documentation  Taken 4/8/2025 1645 by Adegun, Oluwadamilola, RN  Activity Management: activity adjusted per tolerance  Head of Bed (HOB) Positioning: HOB at 30-45 degrees  Goal: Optimal Wound Healing  Outcome: Progressing   Goal Outcome Evaluation:Pt alert and oriented, able to make needs known. VSS, pain managed with PRN dilaudid and scheduled tylenol. Wound vac in place on both lateral and medial sites at 125mmHg working appropriately. +2 edema at LLE, elevated on pillow.       Plan of Care Reviewed With: patient    Overall Patient Progress: improvingOverall Patient Progress: improving

## 2025-04-08 NOTE — PLAN OF CARE
"  Problem: Adult Inpatient Plan of Care  Goal: Plan of Care Review  Description: The Plan of Care Review/Shift note should be completed every shift.  The Outcome Evaluation is a brief statement about your assessment that the patient is improving, declining, or no change.  This information will be displayed automatically on your shiftnote.  Outcome: Progressing  Goal: Patient-Specific Goal (Individualized)  Description: You can add care plan individualizations to a care plan. Examples of Individualization might be:  \"Parent requests to be called daily at 9am for status\", \"I have a hard time hearing out of my right ear\", or \"Do not touch me to wake me up as it startlesme\".  Outcome: Progressing  Goal: Absence of Hospital-Acquired Illness or Injury  Outcome: Progressing  Intervention: Identify and Manage Fall Risk  Recent Flowsheet Documentation  Taken 4/7/2025 1620 by Rabia Abreu RN  Safety Promotion/Fall Prevention: activity supervised  Intervention: Prevent Skin Injury  Recent Flowsheet Documentation  Taken 4/7/2025 1653 by Rabia Abreu RN  Body Position: turned  Goal: Optimal Comfort and Wellbeing  Outcome: Progressing  Intervention: Provide Person-Centered Care  Recent Flowsheet Documentation  Taken 4/7/2025 1620 by Rabia Abreu RN  Trust Relationship/Rapport: care explained  Goal: Readiness for Transition of Care  Outcome: Progressing     Problem: Delirium  Goal: Optimal Coping  Outcome: Progressing  Intervention: Optimize Psychosocial Adjustment to Delirium  Recent Flowsheet Documentation  Taken 4/7/2025 1620 by Rabia Abreu RN  Supportive Measures: active listening utilized  Goal: Improved Behavioral Control  Outcome: Progressing  Intervention: Minimize Safety Risk  Recent Flowsheet Documentation  Taken 4/7/2025 1620 by Rabia Abreu RN  Enhanced Safety Measures: pain management  Trust Relationship/Rapport: care explained  Goal: Improved Attention and Thought Clarity  Outcome: " Progressing  Goal: Improved Sleep  Outcome: Progressing     Problem: Risk for Delirium  Goal: Optimal Coping  Outcome: Progressing  Intervention: Optimize Psychosocial Adjustment to Delirium  Recent Flowsheet Documentation  Taken 4/7/2025 1620 by Rabia Abreu RN  Supportive Measures: active listening utilized  Goal: Improved Behavioral Control  Outcome: Progressing  Intervention: Minimize Safety Risk  Recent Flowsheet Documentation  Taken 4/7/2025 1620 by Rabia Abreu RN  Enhanced Safety Measures: pain management  Trust Relationship/Rapport: care explained  Goal: Improved Attention and Thought Clarity  Outcome: Progressing  Goal: Improved Sleep  Outcome: Progressing     Problem: Skin Injury Risk Increased  Goal: Skin Health and Integrity  Outcome: Progressing  Intervention: Plan: Nurse Driven Intervention: Moisture Management  Recent Flowsheet Documentation  Taken 4/7/2025 2000 by Rabia Abreu RN  Moisture Interventions: Encourage regular toileting  Taken 4/7/2025 1620 by Rabia Abreu RN  Moisture Interventions: Encourage regular toileting  Intervention: Plan: Nurse Driven Intervention: Friction and Shear  Recent Flowsheet Documentation  Taken 4/7/2025 1620 by Rabia Abreu RN  Friction/Shear Interventions: HOB 30 degrees or less  Intervention: Optimize Skin Protection  Recent Flowsheet Documentation  Taken 4/7/2025 1653 by Rabia Abreu RN  Head of Bed (HOB) Positioning: HOB at 60-90 degrees  Taken 4/7/2025 1620 by Rabia Abreu RN  Activity Management: activity adjusted per tolerance     Problem: Comorbidity Management  Goal: Blood Pressure in Desired Range  Outcome: Progressing  Intervention: Maintain Blood Pressure Management  Recent Flowsheet Documentation  Taken 4/7/2025 1620 by Rabia Abreu RN  Medication Review/Management: medications reviewed     Problem: VTE (Venous Thromboembolism)  Goal: Tissue Perfusion  Outcome: Progressing  Goal: Right Ventricular  Function  Outcome: Progressing     Problem: Fall Injury Risk  Goal: Absence of Fall and Fall-Related Injury  Outcome: Progressing  Intervention: Identify and Manage Contributors  Recent Flowsheet Documentation  Taken 4/7/2025 1620 by Rabia Abreu RN  Medication Review/Management: medications reviewed  Intervention: Promote Injury-Free Environment  Recent Flowsheet Documentation  Taken 4/7/2025 1620 by Rabia Abreu RN  Safety Promotion/Fall Prevention: activity supervised     Problem: Pain Acute  Goal: Optimal Pain Control and Function  Outcome: Progressing  Intervention: Optimize Psychosocial Wellbeing  Recent Flowsheet Documentation  Taken 4/7/2025 1620 by Rabia Abreu RN  Supportive Measures: active listening utilized  Intervention: Prevent or Manage Pain  Recent Flowsheet Documentation  Taken 4/7/2025 1620 by Rabia Abreu RN  Medication Review/Management: medications reviewed     Problem: Surgery Nonspecified  Goal: Absence of Bleeding  Outcome: Progressing  Goal: Blood Glucose Level Within Targeted Range  Outcome: Progressing  Goal: Absence of Infection Signs and Symptoms  Outcome: Progressing     Problem: Constipation  Goal: Effective Bowel Elimination  Outcome: Progressing     Problem: Wound  Goal: Optimal Coping  Outcome: Progressing  Intervention: Support Patient and Family Response  Recent Flowsheet Documentation  Taken 4/7/2025 1620 by Rabia Abreu RN  Supportive Measures: active listening utilized  Goal: Optimal Functional Ability  Outcome: Progressing  Intervention: Optimize Functional Ability  Recent Flowsheet Documentation  Taken 4/7/2025 1620 by Rabia Abreu RN  Assistive Device Utilized: walker  Activity Management: activity adjusted per tolerance  Activity Assistance Provided: assistance, 1 person  Goal: Absence of Infection Signs and Symptoms  Outcome: Progressing  Goal: Improved Oral Intake  Outcome: Progressing  Goal: Optimal Pain Control and  Function  Outcome: Progressing  Goal: Skin Health and Integrity  Outcome: Progressing  Intervention: Optimize Skin Protection  Recent Flowsheet Documentation  Taken 4/7/2025 1653 by Rabia Abreu, RN  Head of Bed (HOB) Positioning: HOB at 60-90 degrees  Taken 4/7/2025 1620 by Rabia Abreu, RN  Activity Management: activity adjusted per tolerance  Goal: Optimal Wound Healing  Outcome: Progressing   Goal Outcome Evaluation:       Pt is alert and oriented x4, up in the chair, scheduled med and oral dilaudid was given for pain and it was effective. VSS and will continue to monitor.

## 2025-04-08 NOTE — PROGRESS NOTES
[facility]  WO Nurse Inpatient Assessment     Consulted for: Left leg fasciotomy    Summary: 4/8 - RN at bedside, brought in a pain cream to spread to the periwound, woc declined as there was drape covering the periwound at this time. Recommended 4% lidocaine solution to be injected into the vac foam to provide some pain management pre removal ov vac foam.     WO nurse follow-up plan: Tuesday/Friday    Patient History (according to provider note(s):      Per Op note:  Date of Service: 3/27/2025      Preoperative diagnosis     Compartment syndrome left leg  Thromboembolism to left lower extremity  CAD s/p CABG in 2010  HTN  HLD     Postoperative diagnosis     same     Surgeon: Wanda Coelho DO RPVI           Procedures:  Fasciotomy left lower extremity      Assessment:      Areas visualized during today's visit:  left leg    Negative pressure wound therapy applied to: Left leg   Last photo: 3/28         Lateral 3/28      Medial 3/28                                         medial 4/1                                                lateral 4/1 4/4 Lateral      Medial      4/8 Lateral                                                    Medial    Wound due to: Surgical Wound   Wound history/plan of care:    Surgical date: 3/27   Service following: Vascular  Date Negative Pressure Wound Therapy initiated: 3/28   Interventions in place: elevation  Is patient s nutritional status compromised? no   If yes, what interventions are in place? N/A  Reason for initiating vac therapy? Need for accelerated granulation tissue  Which?of?the?following?co-morbidities?apply? Obesity  If diabetic is patient on a diabetic management program? N/A   Is osteomyelitis present in wound? no   If yes what treatments are in place? N/A    Wound base: Muscle/fascia/tendon with some granulation throughout wound beds     Palpation of the wound bed: normal       Drainage: moderate      Volume in cannister: unknown,  cannister was disposed of when nursing removed VAC dressing prior to WOC visit due to tube blockage     Last cannister change date: 4/4     Description of drainage: serosanguinous      Measurements (length x width x depth, in cm)   Lateral 25.5 x 5.5 x up to 0.8cm  Medial 24 x 5.5 x 1cm along posterior     Tunneling N/A      Undermining N/A   Periwound skin: Intact      Color: normal and consistent with surrounding tissue and purple       Temperature: normal    Odor: none   Pain: mild, tender   Pain intervention prior to dressing change: patient tolerated well, oral med per orders, and slow and gentle cares   Treatment goal: Increase granulation  STATUS: improving   Supplies ordered: gathered    Number of foam pieces removed from a wound (excluding foam for bridge) :  2 GranuFoam Black and 2 Oil emulsion dressing   Verified this matched the number of foam pieces applied last dressing change: N/A   Number of foam pieces packed into wound (excluding foam for bridge) :  2 GranuFoam Black and 2 Oil emulsion dressing     Treatment Plan:     Negative pressure wound therapy plan:  Wound location: LLE fasciotomy sites   Change Days: Tues/ Fri by WOC RN    Supplies (including all accessories) used: large Black foam , Adaptic/Curity oil emulsion contact layer , and extra trac pad and Y-connector  Cleanse with Vashe prior to replacing NPWT  Suction setting: -125   Methods used: Window paned all periwound skin with vac drape prior to applying sponge    Staff RN to assess integrity of dressing and ensure suction is set at appropriate level every shift.   Date canister. Chart canister output every shift. Change cannister weekly and PRN if full/occluded     Remove foam dressing and replace with BID normal saline moist gauze dressing if:   -a dressing failure which cannot be repaired within 2 hours   -patient is discharging to home without a home pump   -patient is discharging to a facility outside the local area   -if a dressing  "is a \"Silver Foam\", remove before Radiation Therapy or MRI     The hospital VAC pump is not to be discharged with the patient. Please disconnect the patient from the machine prior to discharge.  If a home VAC pump has been delivered, connect the home cannister to dressing tubing and the cannister to the home pump, turn on home pump  If the patient is transferring to a nearby facility with a VAC, the tubing can be disconnected, clamp tubing and cover the end with a glove, then can be reconnected if within 2 hours  If transfer will be longer than 2 hours, dressing must be removed and placed with a wet to moist gauze dressing for transfer        Orders: Reviewed    RECOMMEND PRIMARY TEAM ORDER: None, at this time  Education provided: plan of care  Discussed plan of care with: Patient and Nurse  Notify C if wound(s) deteriorate.  Nursing to notify the Provider(s) and re-consult the [facility] Nurse if new skin concern.    DATA:     Current support surface: Standard  Low air loss (BOB pump, Isolibrium, Pulsate)  Containment of urine/stool: Continent of bladder and Continent of bowel  BMI: Body mass index is 34.18 kg/m .   Active diet order: Orders Placed This Encounter      Regular Diet Adult     Output: I/O last 3 completed shifts:  In: -   Out: 1025 [Urine:1025]     Labs:   Recent Labs   Lab 04/06/25  0507   ALBUMIN 3.6   HGB 11.4*   WBC 15.4*     Pressure injury risk assessment:   Sensory Perception: 3-->slightly limited  Moisture: 3-->occasionally moist  Activity: 2-->chairfast  Mobility: 3-->slightly limited  Nutrition: 3-->adequate  Friction and Shear: 2-->potential problem  Gary Score: 16    ELADIO Ramos RN CWOCN  Pager no longer in use, please contact through [employer] group: [facility]        "

## 2025-04-09 ENCOUNTER — APPOINTMENT (OUTPATIENT)
Dept: PHYSICAL THERAPY | Facility: HOSPITAL | Age: 70
End: 2025-04-09
Attending: INTERNAL MEDICINE
Payer: COMMERCIAL

## 2025-04-09 ENCOUNTER — APPOINTMENT (OUTPATIENT)
Dept: OCCUPATIONAL THERAPY | Facility: HOSPITAL | Age: 70
End: 2025-04-09
Attending: INTERNAL MEDICINE
Payer: COMMERCIAL

## 2025-04-09 LAB
ALBUMIN SERPL BCG-MCNC: 3.5 G/DL (ref 3.5–5.2)
ALP SERPL-CCNC: 98 U/L (ref 40–150)
ALT SERPL W P-5'-P-CCNC: 49 U/L (ref 0–70)
ANION GAP SERPL CALCULATED.3IONS-SCNC: 9 MMOL/L (ref 7–15)
AST SERPL W P-5'-P-CCNC: 26 U/L (ref 0–45)
BASOPHILS # BLD AUTO: 0.1 10E3/UL (ref 0–0.2)
BASOPHILS NFR BLD AUTO: 1 %
BILIRUB SERPL-MCNC: 0.4 MG/DL
BUN SERPL-MCNC: 17.4 MG/DL (ref 8–23)
CALCIUM SERPL-MCNC: 9 MG/DL (ref 8.8–10.4)
CHLORIDE SERPL-SCNC: 102 MMOL/L (ref 98–107)
CREAT SERPL-MCNC: 0.91 MG/DL (ref 0.67–1.17)
EGFRCR SERPLBLD CKD-EPI 2021: >90 ML/MIN/1.73M2
EOSINOPHIL # BLD AUTO: 1.2 10E3/UL (ref 0–0.7)
EOSINOPHIL NFR BLD AUTO: 11 %
ERYTHROCYTE [DISTWIDTH] IN BLOOD BY AUTOMATED COUNT: 13.5 % (ref 10–15)
GLUCOSE SERPL-MCNC: 124 MG/DL (ref 70–99)
HCO3 SERPL-SCNC: 24 MMOL/L (ref 22–29)
HCT VFR BLD AUTO: 34 % (ref 40–53)
HGB BLD-MCNC: 11.2 G/DL (ref 13.3–17.7)
IMM GRANULOCYTES # BLD: 0.1 10E3/UL
IMM GRANULOCYTES NFR BLD: 1 %
LYMPHOCYTES # BLD AUTO: 2.7 10E3/UL (ref 0.8–5.3)
LYMPHOCYTES NFR BLD AUTO: 25 %
MAGNESIUM SERPL-MCNC: 2.3 MG/DL (ref 1.7–2.3)
MCH RBC QN AUTO: 28.6 PG (ref 26.5–33)
MCHC RBC AUTO-ENTMCNC: 32.9 G/DL (ref 31.5–36.5)
MCV RBC AUTO: 87 FL (ref 78–100)
MONOCYTES # BLD AUTO: 1 10E3/UL (ref 0–1.3)
MONOCYTES NFR BLD AUTO: 9 %
NEUTROPHILS # BLD AUTO: 5.7 10E3/UL (ref 1.6–8.3)
NEUTROPHILS NFR BLD AUTO: 53 %
NRBC # BLD AUTO: 0 10E3/UL
NRBC BLD AUTO-RTO: 0 /100
PLATELET # BLD AUTO: 575 10E3/UL (ref 150–450)
POTASSIUM SERPL-SCNC: 4.4 MMOL/L (ref 3.4–5.3)
PROT SERPL-MCNC: 7.2 G/DL (ref 6.4–8.3)
RBC # BLD AUTO: 3.91 10E6/UL (ref 4.4–5.9)
SODIUM SERPL-SCNC: 135 MMOL/L (ref 135–145)
WBC # BLD AUTO: 10.7 10E3/UL (ref 4–11)

## 2025-04-09 PROCEDURE — 120N000001 HC R&B MED SURG/OB

## 2025-04-09 PROCEDURE — 99232 SBSQ HOSP IP/OBS MODERATE 35: CPT | Performed by: PAIN MEDICINE

## 2025-04-09 PROCEDURE — 97116 GAIT TRAINING THERAPY: CPT | Mod: GP

## 2025-04-09 PROCEDURE — 85004 AUTOMATED DIFF WBC COUNT: CPT | Performed by: HOSPITALIST

## 2025-04-09 PROCEDURE — 99232 SBSQ HOSP IP/OBS MODERATE 35: CPT | Performed by: HOSPITALIST

## 2025-04-09 PROCEDURE — 82947 ASSAY GLUCOSE BLOOD QUANT: CPT | Performed by: HOSPITALIST

## 2025-04-09 PROCEDURE — 250N000013 HC RX MED GY IP 250 OP 250 PS 637: Performed by: STUDENT IN AN ORGANIZED HEALTH CARE EDUCATION/TRAINING PROGRAM

## 2025-04-09 PROCEDURE — 83735 ASSAY OF MAGNESIUM: CPT | Performed by: PAIN MEDICINE

## 2025-04-09 PROCEDURE — 97535 SELF CARE MNGMENT TRAINING: CPT | Mod: GO

## 2025-04-09 PROCEDURE — 36415 COLL VENOUS BLD VENIPUNCTURE: CPT | Performed by: HOSPITALIST

## 2025-04-09 PROCEDURE — 250N000013 HC RX MED GY IP 250 OP 250 PS 637: Performed by: INTERNAL MEDICINE

## 2025-04-09 PROCEDURE — 250N000013 HC RX MED GY IP 250 OP 250 PS 637: Performed by: PAIN MEDICINE

## 2025-04-09 PROCEDURE — 97110 THERAPEUTIC EXERCISES: CPT | Mod: GP

## 2025-04-09 PROCEDURE — 84155 ASSAY OF PROTEIN SERUM: CPT | Performed by: HOSPITALIST

## 2025-04-09 PROCEDURE — 250N000013 HC RX MED GY IP 250 OP 250 PS 637: Performed by: NURSE PRACTITIONER

## 2025-04-09 RX ADMIN — HYDROMORPHONE HYDROCHLORIDE 4 MG: 2 TABLET ORAL at 00:12

## 2025-04-09 RX ADMIN — METHOCARBAMOL 500 MG: 500 TABLET ORAL at 16:46

## 2025-04-09 RX ADMIN — Medication 500 MG: at 08:56

## 2025-04-09 RX ADMIN — METHOCARBAMOL 500 MG: 500 TABLET ORAL at 08:56

## 2025-04-09 RX ADMIN — METHOCARBAMOL 500 MG: 500 TABLET ORAL at 21:37

## 2025-04-09 RX ADMIN — RIVAROXABAN 20 MG: 10 TABLET, FILM COATED ORAL at 16:46

## 2025-04-09 RX ADMIN — HYDROMORPHONE HYDROCHLORIDE 4 MG: 2 TABLET ORAL at 13:32

## 2025-04-09 RX ADMIN — DICLOFENAC SODIUM 2 G: 10 GEL TOPICAL at 21:37

## 2025-04-09 RX ADMIN — Medication 1 TABLET: at 08:56

## 2025-04-09 RX ADMIN — METHOCARBAMOL 500 MG: 500 TABLET ORAL at 13:32

## 2025-04-09 RX ADMIN — ASPIRIN 81 MG: 81 TABLET, COATED ORAL at 08:56

## 2025-04-09 RX ADMIN — ACETAMINOPHEN 975 MG: 325 TABLET, FILM COATED ORAL at 06:01

## 2025-04-09 RX ADMIN — PANTOPRAZOLE SODIUM 40 MG: 40 TABLET, DELAYED RELEASE ORAL at 06:01

## 2025-04-09 RX ADMIN — HYDROMORPHONE HYDROCHLORIDE 4 MG: 2 TABLET ORAL at 08:51

## 2025-04-09 RX ADMIN — ACETAMINOPHEN 975 MG: 325 TABLET, FILM COATED ORAL at 17:41

## 2025-04-09 RX ADMIN — ZINC SULFATE 220 MG (50 MG) CAPSULE 220 MG: CAPSULE at 08:56

## 2025-04-09 RX ADMIN — ACETAMINOPHEN 975 MG: 325 TABLET, FILM COATED ORAL at 00:12

## 2025-04-09 ASSESSMENT — ACTIVITIES OF DAILY LIVING (ADL)
ADLS_ACUITY_SCORE: 42
ADLS_ACUITY_SCORE: 42
ADLS_ACUITY_SCORE: 40
ADLS_ACUITY_SCORE: 42
ADLS_ACUITY_SCORE: 40
ADLS_ACUITY_SCORE: 42
ADLS_ACUITY_SCORE: 40
ADLS_ACUITY_SCORE: 42
ADLS_ACUITY_SCORE: 40
ADLS_ACUITY_SCORE: 42
ADLS_ACUITY_SCORE: 42

## 2025-04-09 NOTE — PLAN OF CARE
Problem: Adult Inpatient Plan of Care  Goal: Optimal Comfort and Wellbeing  Outcome: Progressing  Intervention: Monitor Pain and Promote Comfort  Recent Flowsheet Documentation  Taken 4/9/2025 0100 by Nydia Uriostegui RN  Pain Management Interventions: medication (see MAR)  Taken 4/9/2025 0012 by Nydia Uriostegui RN  Pain Management Interventions: medication (see MAR)  Intervention: Provide Person-Centered Care  Recent Flowsheet Documentation  Taken 4/9/2025 0100 by Nydia Uriostegui RN  Trust Relationship/Rapport:   care explained   questions answered   questions encouraged   Goal Outcome Evaluation:  Pt had an uneventful shift.  Reported increased pain at the start of the shift.  Reports constant 6/10 pain but has occasional shooting pain in the leg increasing it to 7/10.  Dilaudid given in addition to tylenol. Wound vac intact and patent.  No leaks noted. Cares clustered this shift to promote rest per pt request.

## 2025-04-09 NOTE — PLAN OF CARE
Problem: Adult Inpatient Plan of Care  Goal: Plan of Care Review  Description: The Plan of Care Review/Shift note should be completed every shift.  The Outcome Evaluation is a brief statement about your assessment that the patient is improving, declining, or no change.  This information will be displayed automatically on your shiftnote.  Outcome: Progressing     Problem: Adult Inpatient Plan of Care  Goal: Absence of Hospital-Acquired Illness or Injury  Outcome: Progressing     Problem: Delirium  Goal: Optimal Coping  Outcome: Progressing   Goal Outcome Evaluation:  Patient alert oriented x 4, up in chair for meals, wound vac patient, Pedal pulses intact, Dopler left foot, cooperative with therapy, received PRN Dilaudid PO which was effective.

## 2025-04-09 NOTE — PROGRESS NOTES
"[facility] ACUTE INPATIENT PAIN SERVICE    [facility], [facility], [facility], [facility], [place]   PAIN follow up         ASSESSMENT/ PLAN:  Acute left leg pain pain secondary to fasciotomy and VAC, in the setting of advanced age.   Will need to create a PO medication plan for dressing changes.  Multimodal Medication Therapy:    Adjuvants:  tylenol & robaxin (could increase to 750mg), continue diclofenac to the finger  Opioids: Hydromorphone/Dilaudid2- 4mg  IV dilaudid PRN   Non-medication interventions- PT  Constipation Prophylaxis-  increase senna to 2 tabs BID, discontinue milk of mag   Follow up /Discharge Recommendations - We recommend prescribing the following at the time of discharge:  Likely 30 tabs of PO dilaudid and 20 tabs of robaxin       Subjective:    Met with Lobo at the bedside. Reports he had a bad night. Was unable to \"straighten\" the left leg. Cramping and throbbing. Dressing change caused severe pain yesterday as well. Pain is currently 3-4/10. No BM yet today.     Suggest adding atarax to pre-dressing change regimen. Pt is reluctant to make changes. Also discussed possibility of adding a nerve membrane stabilizing agent. No decision made today.     Discussed with RN, charge RN, and attending MD- unable to doppler PD or PT pulses. Obtained new doppler and able to find pulses.     HPI:  Dudley Kiran is a 69 year old male who was admitted on 3/27/2025.  I was asked to see the patient for left leg pain. Admitted for leg pain. Imaging indicated left leg occlusion.  History of CAD, HTN, pre-DM.    shows no results.          -[place]  pulled from system on 4/2/2025. No results found for the patient and with no opioid/controlled medications per [employer].   Discharge Recommendations - We recommend prescribing the following at the time of discharge: TBD.      History   Drug Use Unknown         Tobacco Use      Smoking status: Never      Smokeless tobacco: Never        Objective:  Vital signs in " "last 24 hours:  B/P: 109/61, T: 97.6, P: 69, R: 18   Blood pressure 97/64, pulse 70, temperature 97.8  F (36.6  C), temperature source Oral, resp. rate 18, height 1.702 m (5' 7\"), weight 99 kg (218 lb 4.1 oz), SpO2 95%.        Review of Systems:   As per subjective, all others negative.    Physical Exam    General: in no apparent distress and non-toxic    HEENT: Head normocephalic atraumatic, oral mucosa moist. Sclerae anicteric  CV: Regular rhythm, normal rate, no murmurs  Resp: No wheezes, no rales or rhonchi, no focal consolidations  GI: active   Skin: No rashes or lesions  Extremities: LLE edema and VAC in place, unable to locate dorsalis pedis or PT pulse           Imaging:  Personally Reviewed.  CT, ultrasound with past occlusion     Lab Results:  Personally Reviewed.   WBC and Crt      Please see A&P for additional details of medical decision making.  MANAGEMENT DISCUSSED with the following over the past 24 hours: patient    NOTE(S)/MEDICAL RECORDS REVIEWED over the past 24 hours: medicine & vascular   Tests personally interpreted in the past 24 hours:  - ULTRASOUND showing occlusion   Tests REVIEWED in the past 24 hours:  - WBC  SUPPLEMENTAL HISTORY, in addition to the patient's history, over the past 24 hours obtained from:   - Pt  Medical complexity over the past 24 hours:  -------------------------- HIGH RISK FOR MORBIDITY -------------------------------------------------------------  - Parenteral (IV) CONTROLLED SUBSTANCES ordered           Viktoriya YBARRA, CNS, CNP  Acute Care Pain Management  Team  Hours of pain coverage Mon-Fri 8-1600  After hours contact the primary team  [facility] Kerri (DEDRICK, JOANIEs, SD, RH)   Page via [employer] web console -Click for [employer]            "

## 2025-04-09 NOTE — PLAN OF CARE
Problem: Pain Acute  Goal: Optimal Pain Control and Function  Outcome: Progressing     Problem: Wound  Goal: Optimal Functional Ability  Outcome: Progressing  Intervention: Optimize Functional Ability  Recent Flowsheet Documentation  Taken 4/8/2025 2100 by Elizabeth Foley, RN  Assistive Device Utilized: walker  Activity Management: activity adjusted per tolerance  Activity Assistance Provided: assistance, 1 person  Goal: Absence of Infection Signs and Symptoms  Outcome: Progressing  Goal: Skin Health and Integrity  Intervention: Optimize Skin Protection  Recent Flowsheet Documentation  Taken 4/8/2025 2100 by Elizabeth Foley, RN  Activity Management: activity adjusted per tolerance  Head of Bed (HOB) Positioning: HOB at 30-45 degrees     Goal Outcome Evaluation:         Minimal pain on left leg. Movement and swelling much improved. Wound vac still in place. Awaiting ARU or TCU at discharge.

## 2025-04-09 NOTE — PROGRESS NOTES
Care Management Follow Up    Length of Stay (days): 13    Expected Discharge Date: 04/10/2025     Concerns to be Addressed: Care progression - discharge planning     Patient plan of care discussed at interdisciplinary rounds: Yes    Anticipated Discharge Disposition: PT/OT rec Acute Rehab Center     Anticipated Discharge Services: Acute Rehab  Anticipated Discharge DME: NA    Patient/family educated on Medicare website which has current facility and service quality ratings: NA  Education Provided on the Discharge Plan: Yes per team  Patient/Family in Agreement with the Plan: yes    Referrals Placed by CM/SW: Homecare, Post Acute Facilities  Private pay costs discussed: Not applicable    Discussed  Partnership in Safe Discharge Planning  document with patient/family: No     Handoff Completed: No, handoff not indicated or clinically appropriate    Additional Information:  Per provider, Dr. West, patient is not ready. Still need pain medication prior to wound vac dressing changes.  Will plan to work towards discharge for Friday    Message sent to ARC    Next Steps: RNCM to follow for medical progression, recommendations, and final discharge plan.     Camilla Malik RN     [facility] [employer] Auth request submitted [id number] Request ID    [id number] rec'd a message from SHADI Bundy following patient today as well. Patient declined both therapies yesterday due to wound dressing/vac changes. Could someone talk to him about the importance of participation if ARC is the plan? He has a commercial plan that will require prior auth, and we typically need to send 2 days worth of therapy notes to show that the pt is compliant and participating. That would be today and tomorrow, with us sending auth after therapies tomorrow.     Message sent to update Dr. West    1534 rec'd response from [facility], inpatient rehab facility services was not approve. Patient needs to meet the criteria or have provider do a peer to peer review.    Message sent to  update Dr. West

## 2025-04-09 NOTE — PROGRESS NOTES
[facility] Progress Note - Hospitalist Service    Date of Admission:  3/27/2025    Assessment & Plan   69 male with history of CAD, hypertension is admitted for acute left lower extremity pain and found to have acute limb ischemia, underwent 4 compartment fasciotomy urgently on admission.  Monitored in the ICU and downgraded clinically 3/28.     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 Continue care per WOC.  -Postoperative cares per vascular surgery  -Hypercoagulable panel negative; heme/onc signed off 3/29/2025  -PTOT, will likely need TCU vs ARU  -Main barrier to discharge is tolerance of wound vac changes and wound cares, will need to be able to manage wound care w/o parenteral opioids prior to TCU/ARU, next due for change 4/12  - Repeat LFTs and CK normal following resumption of statin 4/8 however pain-patient had increasing myalgic pain around this time so we will discontinue and restart when further along     Constipation  Now resolved, baseline BM q3-4 days  -Senakot BID and Miralax BID  -Suppository PRN     Suspected inflammatory arthropathy ? Gout vs pseudogout   Painful erythema and swelling at the level of third right proximal interphalangeal joint  CRP came back markedly elevated at 198.9 - concerning for inflammatory arthropathy ? Gout vs pseudogout.   Hand x-ray showed DJD.  -Resolved with prednisone 10 mg daily x 5 days     Chronic heart failure with  "mildly reduced ejection fraction  Echo this admission with EF 45 to 50%  -Not clinically hypervolemic, monitor for edema     CAD  Remote CABG, no anginal chest pain, monitor clinically  -Continues on aspirin  -Not routinely on statin, started 4/6 after CK resolved     Essential hypertension  -Continue lisinopril at 2.5 mg daily (home dose 5 mg) with hold parameters, metoprolol succinate 25 mg daily          Diet: Snacks/Supplements Adult: Ensure Enlive; With Meals  Snacks/Supplements Adult: Magic Cup; With Meals  Regular Diet Adult    DVT Prophylaxis: DOAC  Capps Catheter: Not present  Lines: None     Cardiac Monitoring: None  Code Status: Full Code      Clinically Significant Risk Factors               # Hypoalbuminemia: Lowest albumin = 3 g/dL at 3/28/2025  4:13 AM, will monitor as appropriate     # Hypertension: Noted on problem list            # Obesity: Estimated body mass index is 34.18 kg/m  as calculated from the following:    Height as of this encounter: 1.702 m (5' 7\").    Weight as of this encounter: 99 kg (218 lb 4.1 oz).       # History of CABG: noted on surgical history       Social Drivers of Health    Physical Activity: Insufficiently Active (3/17/2025)    Exercise Vital Sign     Days of Exercise per Week: 1 day     Minutes of Exercise per Session: 10 min   Social Connections: Unknown (3/17/2025)    Social Connection and Isolation Panel [NHANES]     Frequency of Social Gatherings with Friends and Family: Once a week          Disposition Plan     Medically Ready for Discharge: Anticipated in 2-4 Days             Tomas West MD  Hospitalist Service  [facility]  Securely message with [employer] (more info)  Text page via [employer] Paging/Directory   ______________________________________________________________________    Interval History   No new complaints, ongoing pain with wound VAC changes.    Physical Exam   Vital Signs: Temp: 97.8  F (36.6  C) Temp src: Oral BP: 97/64 Pulse: " 70   Resp: 18 SpO2: 95 % O2 Device: None (Room air)    Weight: 218 lbs 4.09 oz    Alert, no distress, breathing unlabored, left leg with 1+ edema, benign wound VAC    Medical Decision Making       43 MINUTES SPENT BY ME on the date of service doing chart review, history, exam, documentation & further activities per the note.  NOTE(S)/MEDICAL RECORDS REVIEWED over the past 24 hours: Vitals, labs, new imaging, documentation and medication administrations       Data     I have personally reviewed the following data over the past 24 hrs:    10.7  \   11.2 (L)   / 575 (H)     135 102 17.4 /  124 (H)   4.4 24 0.91 \     ALT: 49 AST: 26 AP: 98 TBILI: 0.4   ALB: 3.5 TOT PROTEIN: 7.2 LIPASE: N/A       Imaging results reviewed over the past 24 hrs:   No results found for this or any previous visit (from the past 24 hours).

## 2025-04-10 ENCOUNTER — APPOINTMENT (OUTPATIENT)
Dept: PHYSICAL THERAPY | Facility: HOSPITAL | Age: 70
End: 2025-04-10
Attending: INTERNAL MEDICINE
Payer: COMMERCIAL

## 2025-04-10 VITALS
OXYGEN SATURATION: 95 % | TEMPERATURE: 98.2 F | BODY MASS INDEX: 31.28 KG/M2 | HEIGHT: 67 IN | SYSTOLIC BLOOD PRESSURE: 89 MMHG | RESPIRATION RATE: 20 BRPM | DIASTOLIC BLOOD PRESSURE: 54 MMHG | HEART RATE: 64 BPM | WEIGHT: 199.3 LBS

## 2025-04-10 PROCEDURE — 250N000013 HC RX MED GY IP 250 OP 250 PS 637: Performed by: STUDENT IN AN ORGANIZED HEALTH CARE EDUCATION/TRAINING PROGRAM

## 2025-04-10 PROCEDURE — 250N000013 HC RX MED GY IP 250 OP 250 PS 637: Performed by: NURSE PRACTITIONER

## 2025-04-10 PROCEDURE — 250N000013 HC RX MED GY IP 250 OP 250 PS 637: Performed by: INTERNAL MEDICINE

## 2025-04-10 PROCEDURE — 97110 THERAPEUTIC EXERCISES: CPT | Mod: GP

## 2025-04-10 PROCEDURE — 250N000013 HC RX MED GY IP 250 OP 250 PS 637: Performed by: PAIN MEDICINE

## 2025-04-10 PROCEDURE — 99232 SBSQ HOSP IP/OBS MODERATE 35: CPT | Performed by: PAIN MEDICINE

## 2025-04-10 PROCEDURE — 120N000001 HC R&B MED SURG/OB

## 2025-04-10 PROCEDURE — 250N000013 HC RX MED GY IP 250 OP 250 PS 637: Performed by: HOSPITALIST

## 2025-04-10 PROCEDURE — 99232 SBSQ HOSP IP/OBS MODERATE 35: CPT | Performed by: HOSPITALIST

## 2025-04-10 PROCEDURE — 97116 GAIT TRAINING THERAPY: CPT | Mod: GP

## 2025-04-10 PROCEDURE — 97530 THERAPEUTIC ACTIVITIES: CPT | Mod: GP

## 2025-04-10 RX ORDER — METHOCARBAMOL 750 MG/1
750 TABLET, FILM COATED ORAL ONCE
Status: DISCONTINUED | OUTPATIENT
Start: 2025-04-11 | End: 2025-04-11

## 2025-04-10 RX ORDER — HYDROMORPHONE HYDROCHLORIDE 4 MG/1
4 TABLET ORAL ONCE
Status: DISCONTINUED | OUTPATIENT
Start: 2025-04-11 | End: 2025-04-11

## 2025-04-10 RX ORDER — HYDROMORPHONE HCL IN WATER/PF 6 MG/30 ML
0.2 PATIENT CONTROLLED ANALGESIA SYRINGE INTRAVENOUS 2 TIMES DAILY PRN
Status: DISCONTINUED | OUTPATIENT
Start: 2025-04-10 | End: 2025-04-14 | Stop reason: ALTCHOICE

## 2025-04-10 RX ORDER — HYDROXYZINE HYDROCHLORIDE 25 MG/1
25 TABLET, FILM COATED ORAL ONCE
Status: DISCONTINUED | OUTPATIENT
Start: 2025-04-11 | End: 2025-04-11

## 2025-04-10 RX ADMIN — ACETAMINOPHEN 975 MG: 325 TABLET, FILM COATED ORAL at 00:45

## 2025-04-10 RX ADMIN — ACETAMINOPHEN 975 MG: 325 TABLET, FILM COATED ORAL at 13:22

## 2025-04-10 RX ADMIN — LISINOPRIL 2.5 MG: 2.5 TABLET ORAL at 09:13

## 2025-04-10 RX ADMIN — ACETAMINOPHEN 975 MG: 325 TABLET, FILM COATED ORAL at 17:42

## 2025-04-10 RX ADMIN — ASPIRIN 81 MG: 81 TABLET, COATED ORAL at 09:13

## 2025-04-10 RX ADMIN — DICLOFENAC SODIUM 2 G: 10 GEL TOPICAL at 09:15

## 2025-04-10 RX ADMIN — Medication 500 MG: at 09:12

## 2025-04-10 RX ADMIN — POLYETHYLENE GLYCOL 3350 17 G: 17 POWDER, FOR SOLUTION ORAL at 09:13

## 2025-04-10 RX ADMIN — METHOCARBAMOL 500 MG: 500 TABLET ORAL at 21:56

## 2025-04-10 RX ADMIN — HYDROMORPHONE HYDROCHLORIDE 4 MG: 2 TABLET ORAL at 10:26

## 2025-04-10 RX ADMIN — METHOCARBAMOL 500 MG: 500 TABLET ORAL at 17:43

## 2025-04-10 RX ADMIN — METHOCARBAMOL 500 MG: 500 TABLET ORAL at 09:12

## 2025-04-10 RX ADMIN — RIVAROXABAN 20 MG: 10 TABLET, FILM COATED ORAL at 17:43

## 2025-04-10 RX ADMIN — SENNOSIDES AND DOCUSATE SODIUM 2 TABLET: 50; 8.6 TABLET ORAL at 09:13

## 2025-04-10 RX ADMIN — DICLOFENAC SODIUM 2 G: 10 GEL TOPICAL at 22:06

## 2025-04-10 RX ADMIN — Medication 1 TABLET: at 09:12

## 2025-04-10 RX ADMIN — METHOCARBAMOL 500 MG: 500 TABLET ORAL at 13:22

## 2025-04-10 RX ADMIN — ZINC SULFATE 220 MG (50 MG) CAPSULE 220 MG: CAPSULE at 09:13

## 2025-04-10 RX ADMIN — ACETAMINOPHEN 975 MG: 325 TABLET, FILM COATED ORAL at 09:11

## 2025-04-10 RX ADMIN — METOPROLOL SUCCINATE 25 MG: 25 TABLET, EXTENDED RELEASE ORAL at 09:15

## 2025-04-10 RX ADMIN — Medication 60 ML: at 09:16

## 2025-04-10 RX ADMIN — PANTOPRAZOLE SODIUM 40 MG: 40 TABLET, DELAYED RELEASE ORAL at 09:13

## 2025-04-10 ASSESSMENT — ACTIVITIES OF DAILY LIVING (ADL)
ADLS_ACUITY_SCORE: 43
ADLS_ACUITY_SCORE: 42
ADLS_ACUITY_SCORE: 42
ADLS_ACUITY_SCORE: 43
ADLS_ACUITY_SCORE: 42
ADLS_ACUITY_SCORE: 42
ADLS_ACUITY_SCORE: 43
ADLS_ACUITY_SCORE: 42
ADLS_ACUITY_SCORE: 43
ADLS_ACUITY_SCORE: 42
ADLS_ACUITY_SCORE: 43
ADLS_ACUITY_SCORE: 42
ADLS_ACUITY_SCORE: 43
ADLS_ACUITY_SCORE: 42
ADLS_ACUITY_SCORE: 43
ADLS_ACUITY_SCORE: 42
ADLS_ACUITY_SCORE: 43
ADLS_ACUITY_SCORE: 42
ADLS_ACUITY_SCORE: 42

## 2025-04-10 NOTE — PROGRESS NOTES
Care Management Follow Up    Length of Stay (days): 14    Expected Discharge Date: 04/11/2025     Concerns to be Addressed: Care progression - discharge planning     Patient plan of care discussed at interdisciplinary rounds: Yes    Anticipated Discharge Disposition: PT/OT rec Acute Rehab Center     Anticipated Discharge Services: Acute Rehab  Anticipated Discharge DME: NA    Patient/family educated on Medicare website which has current facility and service quality ratings: NA  Education Provided on the Discharge Plan: Yes per team  Patient/Family in Agreement with the Plan: yes    Referrals Placed by CM/SW: Homecare, Post Acute Facilities  Private pay costs discussed: Not applicable    Discussed  Partnership in Safe Discharge Planning  document with patient/family: No     Handoff Completed: No, handoff not indicated or clinically appropriate    Additional Information:  Rec'd a voicemail from Sandra with [facility] [place], request for rehab was reviewed by their medical director, but recommended for denial. Will need a return call [phone].    0753 rec'd a call from [facility], will need a peer to peer review or more clinical for the request on patient for Inpatient Rehab Facility Services.  BCBS/BCBS MEDICARE ADVANTAGE ([id number]) and/or Case number [id number]     Message sent to Dr. West    Next Steps: RNCM to follow for medical progression, recommendations, and final discharge plan.     Camilla Malik, RN     1042 rec'd a call from [facility] peer to peer review was completed for the Acute Rehab, but was denied. [facility] can offer skilled nursing facility (TCU), but will need to submit new auth and will need to know accepting facility by end of day tomorrow. Once TCU is secure to call [phone] and update on the facility.    Met with patient at bedside to discuss the above.    Writer provided a list of local skilled nursing facilities - [facility] (which includes the medicare.gov website) for  patient and family to review.

## 2025-04-10 NOTE — PROGRESS NOTES
"SPIRITUAL HEALTH SERVICES NOTE  [facility]; P4    Reason for Visit: LOS    SPIRITUAL ASSESSMENT  Slightly anxious about wound care tomorrow, but confident overall  Reports good support around him    Patient/Family Understanding of Illness and Goals of Care - Met with Lobo due to LOS. He shares that he has had both good and bad news today. He notes that again today he did not need pain killers after his PT session, which is encouraging. On the other hand, he notes that he was told he needs to start considering TCUs and other care options after discharge. He says \"I can't even think about that until after tomorrow.\" When asked what tomorrow holds, he states that he is having a wound care change tomorrow. He says \"Those are both painful and stressful. I don't want to make any decisions until after that.\" Lobo shares that his sister is coming to the hospital tonight and that he may discuss post-discharge options then.     Distress and Loss - Lobo says that his recovery from this surgery has been more difficult and painful than the recovery from his bypass 15 years ago. He is disappointed that some of his travel plans for this summer will need to be canceled, but is hopeful that he can reschedule them in the future.     Strengths, Coping, and Resources - Lobo identifies his sister as a source of support. He notes that he can stay with her after discharge and that she has only one single stair in her home. Lobo enjoys traveling and watching horse racing. He has several racetracks around the country that he hopes to visit.     Meaning, Beliefs, and Spirituality - Lobo is Baptism. He is not connected to a Catholic, but notes that he has lots of people praying for him. He shares that he a spiritual experience going into surgery - experiencing a calm that assured him he would be okay. This has been an encouragement to him as he recovers. He identifies prayer as a helpful practice. Prayer shared.     Plan of Care: No " further plans for follow-up at this time, but will remain available for further support as patient/family needs/desires.    Marta Gonzalez M.Div.  [facility]  Office: [phone] (for non-urgent requests)  Please Vocera or page through [facility] for time-sensitive requests

## 2025-04-10 NOTE — PLAN OF CARE
Problem: Adult Inpatient Plan of Care  Goal: Plan of Care Review  Outcome: Progressing  Flowsheets (Taken 4/10/2025 1838)  Plan of Care Reviewed With: patient  Overall Patient Progress: improving  Goal: Patient-Specific Goal (Individualized)  Outcome: Progressing  Goal: Absence of Hospital-Acquired Illness or Injury  Outcome: Progressing  Intervention: Identify and Manage Fall Risk  Recent Flowsheet Documentation  Taken 4/10/2025 1557 by Adegun, Oluwadamilola, RN  Safety Promotion/Fall Prevention:   activity supervised   room near nurse's station  Intervention: Prevent Skin Injury  Recent Flowsheet Documentation  Taken 4/10/2025 1557 by Adegun, Oluwadamilola, RN  Body Position: position changed independently  Goal: Optimal Comfort and Wellbeing  Outcome: Progressing  Intervention: Monitor Pain and Promote Comfort  Recent Flowsheet Documentation  Taken 4/10/2025 1557 by Adegun, Oluwadamilola, RN  Pain Management Interventions: pain management plan reviewed with patient/caregiver  Goal: Readiness for Transition of Care  Outcome: Progressing     Problem: Delirium  Goal: Optimal Coping  Outcome: Progressing  Goal: Improved Behavioral Control  Outcome: Progressing  Intervention: Minimize Safety Risk  Recent Flowsheet Documentation  Taken 4/10/2025 1557 by Adegun, Oluwadamilola, RN  Enhanced Safety Measures: pain management  Goal: Improved Attention and Thought Clarity  Outcome: Progressing  Goal: Improved Sleep  Outcome: Progressing     Problem: Risk for Delirium  Goal: Optimal Coping  Outcome: Progressing  Goal: Improved Behavioral Control  Outcome: Progressing  Intervention: Minimize Safety Risk  Recent Flowsheet Documentation  Taken 4/10/2025 1557 by Adegun, Oluwadamilola, RN  Enhanced Safety Measures: pain management  Goal: Improved Attention and Thought Clarity  Outcome: Progressing  Goal: Improved Sleep  Outcome: Progressing     Problem: Skin Injury Risk Increased  Goal: Skin Health and Integrity  Outcome:  Progressing  Intervention: Plan: Nurse Driven Intervention: Moisture Management  Recent Flowsheet Documentation  Taken 4/10/2025 1557 by Adegun, Oluwadamilola, RN  Moisture Interventions: Encourage regular toileting  Intervention: Plan: Nurse Driven Intervention: Friction and Shear  Recent Flowsheet Documentation  Taken 4/10/2025 1557 by Adegun, Oluwadamilola, RN  Friction/Shear Interventions: HOB 30 degrees or less  Intervention: Optimize Skin Protection  Recent Flowsheet Documentation  Taken 4/10/2025 1557 by Adegun, Oluwadamilola, RN  Activity Management: activity adjusted per tolerance  Head of Bed (HOB) Positioning: HOB at 30-45 degrees     Problem: Comorbidity Management  Goal: Blood Pressure in Desired Range  Outcome: Progressing     Problem: VTE (Venous Thromboembolism)  Goal: Tissue Perfusion  Outcome: Progressing  Goal: Right Ventricular Function  Outcome: Progressing     Problem: Fall Injury Risk  Goal: Absence of Fall and Fall-Related Injury  Outcome: Progressing  Intervention: Promote Injury-Free Environment  Recent Flowsheet Documentation  Taken 4/10/2025 1557 by Adegun, Oluwadamilola, RN  Safety Promotion/Fall Prevention:   activity supervised   room near nurse's station     Problem: Pain Acute  Goal: Optimal Pain Control and Function  Outcome: Progressing  Intervention: Develop Pain Management Plan  Recent Flowsheet Documentation  Taken 4/10/2025 1557 by Adegun, Oluwadamilola, RN  Pain Management Interventions: pain management plan reviewed with patient/caregiver     Problem: Surgery Nonspecified  Goal: Absence of Bleeding  Outcome: Progressing  Goal: Blood Glucose Level Within Targeted Range  Outcome: Progressing  Goal: Absence of Infection Signs and Symptoms  Outcome: Progressing     Problem: Constipation  Goal: Effective Bowel Elimination  Outcome: Progressing     Problem: Wound  Goal: Optimal Coping  Outcome: Progressing  Goal: Optimal Functional Ability  Outcome: Progressing  Intervention:  Optimize Functional Ability  Recent Flowsheet Documentation  Taken 4/10/2025 1557 by Adegun, Oluwadamilola, RN  Assistive Device Utilized: walker  Activity Management: activity adjusted per tolerance  Activity Assistance Provided: assistance, 1 person  Goal: Absence of Infection Signs and Symptoms  Outcome: Progressing  Goal: Improved Oral Intake  Outcome: Progressing  Goal: Optimal Pain Control and Function  Outcome: Progressing  Intervention: Prevent or Manage Pain  Recent Flowsheet Documentation  Taken 4/10/2025 1557 by Adegun, Oluwadamilola, RN  Pain Management Interventions: pain management plan reviewed with patient/caregiver  Goal: Skin Health and Integrity  Outcome: Progressing  Intervention: Optimize Skin Protection  Recent Flowsheet Documentation  Taken 4/10/2025 1557 by Adegun, Oluwadamilola, RN  Activity Management: activity adjusted per tolerance  Head of Bed (HOB) Positioning: HOB at 30-45 degrees  Goal: Optimal Wound Healing  Outcome: Progressing   Goal Outcome Evaluation: Pt is alert and oriented, able to make needs known. VSS, pain minimal, managed with scheduled meds. Wound vac in place to lateral and medial incision sites. Daily weight obtained with standing scale. Pt ambulates A1.       Plan of Care Reviewed With: patient    Overall Patient Progress: improvingOverall Patient Progress: improving

## 2025-04-10 NOTE — PLAN OF CARE
Problem: Adult Inpatient Plan of Care  Goal: Plan of Care Review  Outcome: Progressing  Flowsheets (Taken 4/9/2025 2237)  Plan of Care Reviewed With: patient  Overall Patient Progress: improving  Goal: Patient-Specific Goal (Individualized)  Outcome: Progressing  Goal: Absence of Hospital-Acquired Illness or Injury  Outcome: Progressing  Intervention: Identify and Manage Fall Risk  Recent Flowsheet Documentation  Taken 4/9/2025 1642 by Adegun, Oluwadamilola, RN  Safety Promotion/Fall Prevention:   activity supervised   room near nurse's station  Intervention: Prevent Skin Injury  Recent Flowsheet Documentation  Taken 4/9/2025 1853 by Adegun, Oluwadamilola, RN  Body Position: heels elevated  Taken 4/9/2025 1642 by Adegun, Oluwadamilola, RN  Body Position: position changed independently  Goal: Optimal Comfort and Wellbeing  Outcome: Progressing  Goal: Readiness for Transition of Care  Outcome: Progressing     Problem: Delirium  Goal: Optimal Coping  Outcome: Progressing  Goal: Improved Behavioral Control  Outcome: Progressing  Intervention: Minimize Safety Risk  Recent Flowsheet Documentation  Taken 4/9/2025 1642 by Adegun, Oluwadamilola, RN  Enhanced Safety Measures: pain management  Goal: Improved Attention and Thought Clarity  Outcome: Progressing  Goal: Improved Sleep  Outcome: Progressing     Problem: Risk for Delirium  Goal: Optimal Coping  Outcome: Progressing  Goal: Improved Behavioral Control  Outcome: Progressing  Intervention: Minimize Safety Risk  Recent Flowsheet Documentation  Taken 4/9/2025 1642 by Adegun, Oluwadamilola, RN  Enhanced Safety Measures: pain management  Goal: Improved Attention and Thought Clarity  Outcome: Progressing  Goal: Improved Sleep  Outcome: Progressing     Problem: Skin Injury Risk Increased  Goal: Skin Health and Integrity  Outcome: Progressing  Intervention: Optimize Skin Protection  Recent Flowsheet Documentation  Taken 4/9/2025 1642 by Adegun, Oluwadamilola, RN  Activity  Management: activity adjusted per tolerance  Head of Bed (HOB) Positioning: HOB at 30-45 degrees     Problem: Comorbidity Management  Goal: Blood Pressure in Desired Range  Outcome: Progressing     Problem: VTE (Venous Thromboembolism)  Goal: Tissue Perfusion  Outcome: Progressing  Goal: Right Ventricular Function  Outcome: Progressing     Problem: Fall Injury Risk  Goal: Absence of Fall and Fall-Related Injury  Outcome: Progressing  Intervention: Promote Injury-Free Environment  Recent Flowsheet Documentation  Taken 4/9/2025 1642 by Adegun, Oluwadamilola, RN  Safety Promotion/Fall Prevention:   activity supervised   room near nurse's station     Problem: Pain Acute  Goal: Optimal Pain Control and Function  Outcome: Progressing     Problem: Surgery Nonspecified  Goal: Absence of Bleeding  Outcome: Progressing  Goal: Blood Glucose Level Within Targeted Range  Outcome: Progressing  Goal: Absence of Infection Signs and Symptoms  Outcome: Progressing     Problem: Constipation  Goal: Effective Bowel Elimination  Outcome: Progressing     Problem: Wound  Goal: Optimal Coping  Outcome: Progressing  Goal: Optimal Functional Ability  Outcome: Progressing  Intervention: Optimize Functional Ability  Recent Flowsheet Documentation  Taken 4/9/2025 1642 by Adegun, Oluwadamilola, RN  Assistive Device Utilized: walker  Activity Management: activity adjusted per tolerance  Activity Assistance Provided: assistance, 1 person  Goal: Absence of Infection Signs and Symptoms  Outcome: Progressing  Goal: Improved Oral Intake  Outcome: Progressing  Goal: Optimal Pain Control and Function  Outcome: Progressing  Goal: Skin Health and Integrity  Outcome: Progressing  Intervention: Optimize Skin Protection  Recent Flowsheet Documentation  Taken 4/9/2025 1642 by Adegun, Oluwadamilola, RN  Activity Management: activity adjusted per tolerance  Head of Bed (HOB) Positioning: HOB at 30-45 degrees  Goal: Optimal Wound Healing  Outcome: Progressing    Goal Outcome Evaluation: Pt alert and oriented, able to make needs known. VSS, pain minimal this shift, managed with scheduled meds. Wound vac in place and patent at medial and lateral incisions.       Plan of Care Reviewed With: patient    Overall Patient Progress: improvingOverall Patient Progress: improving

## 2025-04-10 NOTE — PROGRESS NOTES
"[facility] ACUTE INPATIENT PAIN SERVICE    [facility], [facility], [facility], [facility], [place]   PAIN follow up         ASSESSMENT/ PLAN:  Acute left leg pain pain secondary to fasciotomy and VAC, in the setting of advanced age.   PO medications for dressing changes - vistaril, robaxin, and dilaudid.   Multimodal Medication Therapy:    Adjuvants:  tylenol & robaxin (could increase to 750mg), continue diclofenac to the finger  Opioids: Hydromorphone/Dilaudid2- 4mg  IV dilaudid PRN - minimize   Non-medication interventions- PT  Constipation Prophylaxis-  increase senna to 2 tabs BID, discontinue milk of mag   Follow up /Discharge Recommendations - We recommend prescribing the following at the time of discharge:  Likely 30 tabs of PO dilaudid and 20 tabs of robaxin       Subjective:    Lobo reports he had a good day yesterday. Reviewed pain plan for dressing change tomorrow: robaxin, dilaudid and vistaril.     HPI:  Dudley Kiran is a 69 year old male who was admitted on 3/27/2025.  I was asked to see the patient for left leg pain. Admitted for leg pain. Imaging indicated left leg occlusion.  History of CAD, HTN, pre-DM.    shows no results.        -MN  pulled from system on 4/2/2025. No results found for the patient and with no opioid/controlled medications per [employer].   Discharge Recommendations - We recommend prescribing the following at the time of discharge: TBD.      History   Drug Use Unknown         Tobacco Use      Smoking status: Never      Smokeless tobacco: Never        Objective:  Vital signs in last 24 hours:  B/P: 109/61, T: 97.6, P: 69, R: 18   Blood pressure 112/64, pulse 68, temperature 97.9  F (36.6  C), temperature source Oral, resp. rate 18, height 1.702 m (5' 7\"), weight 99 kg (218 lb 4.1 oz), SpO2 95%.        Review of Systems:   As per subjective, all others negative.    Physical Exam    General: in no apparent distress and non-toxic    HEENT: Head normocephalic atraumatic, " oral mucosa moist. Sclerae anicteric  CV: Regular rhythm, normal rate, no murmurs  Resp: No wheezes, no rales or rhonchi, no focal consolidations  GI: active   Skin: No rashes or lesions  Extremities: LLE edema and VAC in place, unable to locate dorsalis pedis or PT pulse           Imaging:  Personally Reviewed.  CT, ultrasound with past occlusion     Lab Results:  Personally Reviewed.   WBC and Crt      Please see A&P for additional details of medical decision making.  MANAGEMENT DISCUSSED with the following over the past 24 hours: patient    NOTE(S)/MEDICAL RECORDS REVIEWED over the past 24 hours: medicine & vascular   Tests personally interpreted in the past 24 hours:  - ULTRASOUND showing occlusion   Tests REVIEWED in the past 24 hours:  - WBC  SUPPLEMENTAL HISTORY, in addition to the patient's history, over the past 24 hours obtained from:   - Pt  Medical complexity over the past 24 hours:  -------------------------- HIGH RISK FOR MORBIDITY -------------------------------------------------------------  - Parenteral (IV) CONTROLLED SUBSTANCES ordered           Viktoriya YBARRA CNS, CNP  Acute Care Pain Management  Team  Hours of pain coverage Mon-Fri 8-1600  After hours contact the primary team  [facility] (DEDRICK, Priti, SD, RH)   Page via [employer] web console -Click for [employer]

## 2025-04-10 NOTE — PROGRESS NOTES
[facility] Progress Note - Hospitalist Service    Date of Admission:  3/27/2025    Assessment & Plan   69 male with history of CAD, hypertension is admitted for acute left lower extremity pain and found to have acute limb ischemia, underwent 4 compartment fasciotomy urgently on admission.  Monitored in the ICU and downgraded clinically 3/28.     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 Continue care per WOC.  -Postoperative cares per vascular surgery  -Hypercoagulable panel negative; heme/onc signed off 3/29/2025  -PTOT, will likely need TCU vs ARU  -Main barrier to discharge is tolerance of wound vac changes and wound cares, will need to be able to manage wound care w/o parenteral opioids prior to TCU/ARU, next due for change 4/12  - Repeat LFTs and CK normal following resumption of statin 4/8 however patient had increasing myalgic pain around this time so we will discontinue and restart when further recovered     Constipation  Now resolved, baseline BM q3-4 days  -Senakot BID and Miralax BID  -Suppository PRN     Suspected inflammatory arthropathy ? Gout vs pseudogout   Painful erythema and swelling at the level of third right proximal interphalangeal joint  CRP came back markedly elevated at 198.9 - concerning for inflammatory arthropathy ? Gout vs pseudogout.   Hand x-ray showed DJD.  -Resolved with prednisone 10 mg daily x 5 days     Chronic heart failure  "with mildly reduced ejection fraction  Echo this admission with EF 45 to 50%  -Not clinically hypervolemic, monitor for edema     CAD  Remote CABG, no anginal chest pain, monitor clinically  -Continues on aspirin  -Not routinely on statin, started 4/6 after CK resolved     Essential hypertension  -Continue lisinopril at 2.5 mg daily (home dose 5 mg) with hold parameters, metoprolol succinate 25 mg daily          Diet: Snacks/Supplements Adult: Magic Cup; With Meals  Regular Diet Adult  Snacks/Supplements Adult: Ensure Enlive; Between Meals    DVT Prophylaxis: DOAC  Capps Catheter: Not present  Lines: None     Cardiac Monitoring: None  Code Status: Full Code      Clinically Significant Risk Factors               # Hypoalbuminemia: Lowest albumin = 3 g/dL at 3/28/2025  4:13 AM, will monitor as appropriate     # Hypertension: Noted on problem list            # Obesity: Estimated body mass index is 34.18 kg/m  as calculated from the following:    Height as of this encounter: 1.702 m (5' 7\").    Weight as of this encounter: 99 kg (218 lb 4.1 oz).       # History of CABG: noted on surgical history       Social Drivers of Health    Physical Activity: Insufficiently Active (3/17/2025)    Exercise Vital Sign     Days of Exercise per Week: 1 day     Minutes of Exercise per Session: 10 min   Social Connections: Unknown (3/17/2025)    Social Connection and Isolation Panel [NHANES]     Frequency of Social Gatherings with Friends and Family: Once a week          Disposition Plan     Medically Ready for Discharge: Anticipated in 2-4 Days             Tomas West MD  Hospitalist Service  [facility]  Securely message with [email or web address] (more info)  Text page via [email or web address] Paging/Directory   ______________________________________________________________________    Interval History   Feeling better, less episodic severe pain.  Walking more effectively.    Physical Exam   Vital Signs: Temp: 97.9  F (36.6  C) Temp src: " Oral BP: 117/74 Pulse: 73   Resp: 18 SpO2: 95 % O2 Device: None (Room air)    Weight: 218 lbs 4.09 oz    Alert, no distress, heart and lungs normal, left lower extremity with benign wound VAC and 1+ edema    Medical Decision Making       47 MINUTES SPENT BY ME on the date of service doing chart review, history, exam, documentation & further activities per the note.  NOTE(S)/MEDICAL RECORDS REVIEWED over the past 24 hours: Vitals, labs, new imaging, documentation and medication administrations       Data         Imaging results reviewed over the past 24 hrs:   No results found for this or any previous visit (from the past 24 hours).

## 2025-04-10 NOTE — PROGRESS NOTES
CLINICAL NUTRITION SERVICES - BRIEF NOTE     INFORMATION OBTAINED  Assessed patient in room.  Pt reports some improvement in appetite and increasing oral intakes. Pt reports po increased with diet liberalized 4/8. Pt denies any nausea vomiting or abd pain. Pt taking some nutrition supplements, x4 unopened ensure and x2 unopened expedite bottles in room. Pt taking 100% of magic cups. Pt agreeable to continue nutrition supplements.     CURRENT NUTRITION ORDERS  Diet: Regular  Snacks/Supplements: Ensure Enlive BID, Magic Cup BID, and Expedite bottle      CURRENT INTAKE/TOLERANCE  4/7: 100% dinner   4/8: 100% magic cup BID  4/9: 100% lunch   Pt taking at minimum x1 ensure enlive and x2 magic cup   Pt taking expedite bottle this AM, not taking 4/8-9   Pt consuming 8055-9282 kcal and 48-73 g protein, meets up to 82% kcal needs and <70% estimated protein needs      NEW FINDINGS  GI symptoms: BM: x1 this AM per pt report  Skin/wounds: LE fasciotomy - improving per WOC 4/8  Incision/surgical sites   1+/2+ edema R-leg, LLE  Nutrition-relevant medications: Scheduled dulcolax, dilaudid, atarax, zestril, robaxin, MVI/M, protonix, miralax, xarelto, senna, Vit C, expedite, Zinc sulfate  Weight: No new wt since 4/1- has daily weight orders    MALNUTRITION  Malnutrition Diagnosis: Patient does not meet two of the established criteria necessary for diagnosing malnutrition but is at risk for malnutrition     INTERVENTIONS  Medical food supplement therapy- Continue Nutrition Supplements     MONITORING/EVALUATION  Progress toward goals will be monitored and evaluated per policy.

## 2025-04-10 NOTE — PLAN OF CARE
Problem: Pain Acute  Goal: Optimal Pain Control and Function  Outcome: Progressing  Intervention: Develop Pain Management Plan  Recent Flowsheet Documentation  Taken 4/10/2025 1120 by Marta Ramirez, RN  Pain Management Interventions: medication (see MAR)  Taken 4/10/2025 1026 by Marta Ramirez, RN  Pain Management Interventions: medication (see MAR)   Goal Outcome Evaluation:       Patient continues to report pain left lower extremity. Pain managed with oral scheduled and PRN oral medications. Patient medicated prior to therapy with good pain control during therapy. Wound vac intact to left lower extremity.

## 2025-04-11 PROCEDURE — 250N000013 HC RX MED GY IP 250 OP 250 PS 637: Performed by: INTERNAL MEDICINE

## 2025-04-11 PROCEDURE — 99232 SBSQ HOSP IP/OBS MODERATE 35: CPT | Performed by: PAIN MEDICINE

## 2025-04-11 PROCEDURE — 250N000013 HC RX MED GY IP 250 OP 250 PS 637: Performed by: STUDENT IN AN ORGANIZED HEALTH CARE EDUCATION/TRAINING PROGRAM

## 2025-04-11 PROCEDURE — 97606 NEG PRS WND THER DME>50 SQCM: CPT

## 2025-04-11 PROCEDURE — 120N000001 HC R&B MED SURG/OB

## 2025-04-11 PROCEDURE — 99232 SBSQ HOSP IP/OBS MODERATE 35: CPT | Performed by: STUDENT IN AN ORGANIZED HEALTH CARE EDUCATION/TRAINING PROGRAM

## 2025-04-11 PROCEDURE — 258N000003 HC RX IP 258 OP 636: Performed by: STUDENT IN AN ORGANIZED HEALTH CARE EDUCATION/TRAINING PROGRAM

## 2025-04-11 PROCEDURE — 250N000013 HC RX MED GY IP 250 OP 250 PS 637: Performed by: PAIN MEDICINE

## 2025-04-11 PROCEDURE — 250N000013 HC RX MED GY IP 250 OP 250 PS 637: Performed by: NURSE PRACTITIONER

## 2025-04-11 RX ORDER — LISINOPRIL 2.5 MG/1
2.5 TABLET ORAL DAILY
Status: DISCONTINUED | OUTPATIENT
Start: 2025-04-12 | End: 2025-04-15 | Stop reason: HOSPADM

## 2025-04-11 RX ORDER — HYDROXYZINE HYDROCHLORIDE 25 MG/1
25 TABLET, FILM COATED ORAL
Status: DISCONTINUED | OUTPATIENT
Start: 2025-04-11 | End: 2025-04-15 | Stop reason: HOSPADM

## 2025-04-11 RX ORDER — HYDROMORPHONE HYDROCHLORIDE 4 MG/1
4 TABLET ORAL
Status: COMPLETED | OUTPATIENT
Start: 2025-04-11 | End: 2025-04-12

## 2025-04-11 RX ORDER — METHOCARBAMOL 750 MG/1
750 TABLET, FILM COATED ORAL
Status: COMPLETED | OUTPATIENT
Start: 2025-04-11 | End: 2025-04-11

## 2025-04-11 RX ORDER — BISACODYL 10 MG
10 SUPPOSITORY, RECTAL RECTAL DAILY PRN
Status: DISCONTINUED | OUTPATIENT
Start: 2025-04-11 | End: 2025-04-15 | Stop reason: HOSPADM

## 2025-04-11 RX ADMIN — HYDROMORPHONE HYDROCHLORIDE 4 MG: 2 TABLET ORAL at 10:05

## 2025-04-11 RX ADMIN — ACETAMINOPHEN 975 MG: 325 TABLET, FILM COATED ORAL at 23:12

## 2025-04-11 RX ADMIN — ACETAMINOPHEN 975 MG: 325 TABLET, FILM COATED ORAL at 17:59

## 2025-04-11 RX ADMIN — Medication 60 ML: at 09:45

## 2025-04-11 RX ADMIN — METHOCARBAMOL 750 MG: 750 TABLET ORAL at 10:05

## 2025-04-11 RX ADMIN — ACETAMINOPHEN 975 MG: 325 TABLET, FILM COATED ORAL at 12:15

## 2025-04-11 RX ADMIN — Medication 1 TABLET: at 09:45

## 2025-04-11 RX ADMIN — ACETAMINOPHEN 975 MG: 325 TABLET, FILM COATED ORAL at 06:41

## 2025-04-11 RX ADMIN — DICLOFENAC SODIUM 2 G: 10 GEL TOPICAL at 21:03

## 2025-04-11 RX ADMIN — HYDROMORPHONE HYDROCHLORIDE 4 MG: 2 TABLET ORAL at 04:11

## 2025-04-11 RX ADMIN — ASPIRIN 81 MG: 81 TABLET, COATED ORAL at 09:45

## 2025-04-11 RX ADMIN — PANTOPRAZOLE SODIUM 40 MG: 40 TABLET, DELAYED RELEASE ORAL at 06:42

## 2025-04-11 RX ADMIN — RIVAROXABAN 20 MG: 10 TABLET, FILM COATED ORAL at 17:59

## 2025-04-11 RX ADMIN — HYDROMORPHONE HYDROCHLORIDE 2 MG: 2 TABLET ORAL at 23:12

## 2025-04-11 RX ADMIN — HYDROXYZINE HYDROCHLORIDE 25 MG: 25 TABLET, FILM COATED ORAL at 10:05

## 2025-04-11 RX ADMIN — SODIUM CHLORIDE 1000 ML: 0.9 INJECTION, SOLUTION INTRAVENOUS at 08:06

## 2025-04-11 RX ADMIN — ZINC SULFATE 220 MG (50 MG) CAPSULE 220 MG: CAPSULE at 09:45

## 2025-04-11 RX ADMIN — HYDROMORPHONE HYDROCHLORIDE 2 MG: 2 TABLET ORAL at 18:05

## 2025-04-11 RX ADMIN — HYDROMORPHONE HYDROCHLORIDE 4 MG: 2 TABLET ORAL at 13:09

## 2025-04-11 RX ADMIN — Medication 500 MG: at 09:45

## 2025-04-11 ASSESSMENT — ACTIVITIES OF DAILY LIVING (ADL)
ADLS_ACUITY_SCORE: 43
ADLS_ACUITY_SCORE: 42
ADLS_ACUITY_SCORE: 43
ADLS_ACUITY_SCORE: 42
ADLS_ACUITY_SCORE: 43
ADLS_ACUITY_SCORE: 43
ADLS_ACUITY_SCORE: 42
ADLS_ACUITY_SCORE: 43
ADLS_ACUITY_SCORE: 42
ADLS_ACUITY_SCORE: 43
ADLS_ACUITY_SCORE: 43
ADLS_ACUITY_SCORE: 42
ADLS_ACUITY_SCORE: 43
ADLS_ACUITY_SCORE: 42
ADLS_ACUITY_SCORE: 42

## 2025-04-11 NOTE — PROGRESS NOTES
Care Management Follow Up    Length of Stay (days): 15    Expected Discharge Date: 04/14/2025     Concerns to be Addressed: Care progression - discharge planning     Patient plan of care discussed at interdisciplinary rounds: Yes    Anticipated Discharge Disposition: PT/OT rec Acute Rehab Center     Anticipated Discharge Services: Acute Rehab  Anticipated Discharge DME: NA    Patient/family educated on Medicare website which has current facility and service quality ratings: NA  Education Provided on the Discharge Plan: Yes per team  Patient/Family in Agreement with the Plan: yes    Referrals Placed by CM/SW: Homecare, Post Acute Facilities  Private pay costs discussed: Not applicable    Discussed  Partnership in Safe Discharge Planning  document with patient/family: No     Handoff Completed: No, handoff not indicated or clinically appropriate    Additional Information:  Met with patient at bedside to get TCU choices.   Patient gave a list of TCU to send referrals    Next Steps: RNCM to follow for medical progression, recommendations, and final discharge plan.     Camilla Malik, RN     [address]38 [address] called from [facility] and can accept patient.   Met with patient to update and he accepted.    1315 called [phone]  [place] and spoke with [place]. He stated he will update a new case for TCU and RNCM will receive notice once approved. Does not need clinical. Will use from [facility] auth.   TCU case #[id number]     [id number]     Per provider, Dr. Mcfarland, patient may be ready tomorrow, but want to make sure he has good pain control. Maybe 1-2 days.

## 2025-04-11 NOTE — PROGRESS NOTES
Care Manager assisting, completed PAS for planned admission to [facility] on April 12.  [id number]    MARIE Glez

## 2025-04-11 NOTE — PROGRESS NOTES
[facility]  WO Nurse Inpatient Assessment     Consulted for: Left leg fasciotomy    Summary: Changed VAC today with oral pain meds only, patient tolerated well.  Y-connect and medial trac pad tube had loose connection thatwas fixed, but ordered replacement supplies in case nursing needs them prior to next change.  Nurse updated.    WO nurse follow-up plan: Tuesday/Friday    Patient History (according to provider note(s):      Per Op note:  Date of Service: 3/27/2025      Preoperative diagnosis     Compartment syndrome left leg  Thromboembolism to left lower extremity  CAD s/p CABG in 2010  HTN  HLD     Postoperative diagnosis     same     Surgeon: Wanda Coelho DO RPVI           Procedures:  Fasciotomy left lower extremity      Assessment:      Areas visualized during today's visit:  left leg    Negative pressure wound therapy applied to: Left leg   Last photo: 3/28         Lateral 3/28      Medial 3/28                                         medial 4/1                                                lateral 4/1       4/4 Lateral      Medial      4/8 Lateral                                                    Medial       4/11 Lateral      Medial    Wound due to: Surgical Wound   Wound history/plan of care:    Surgical date: 3/27   Service following: Vascular  Date Negative Pressure Wound Therapy initiated: 3/28   Interventions in place: elevation  Is patient s nutritional status compromised? no   If yes, what interventions are in place? N/A  Reason for initiating vac therapy? Need for accelerated granulation tissue  Which?of?the?following?co-morbidities?apply? Obesity  If diabetic is patient on a diabetic management program? N/A   Is osteomyelitis present in wound? no   If yes what treatments are in place? N/A    Wound base: lateral 10% tendon, otherwise granulating     Palpation of the wound bed: normal       Drainage: moderate      Volume in cannister: 300     Last cannister change  "date: 4/11     Description of drainage: serosanguinous      Measurements (length x width x depth, in cm)   Lateral 24 x 5.5 x up to 0.4cm  Medial 23 x 5 x 1cm along posterior     Tunneling N/A      Undermining N/A   Periwound skin: Intact      Color: normal and consistent with surrounding tissue and purple       Temperature: normal    Odor: none   Pain: mild, tender   Pain intervention prior to dressing change: patient tolerated well, oral med per orders, and slow and gentle cares   Treatment goal: Increase granulation  STATUS: improving   Supplies ordered: gathered    Number of foam pieces removed from a wound (excluding foam for bridge) :  2 GranuFoam Black and 2 Oil emulsion dressing   Verified this matched the number of foam pieces applied last dressing change: N/A   Number of foam pieces packed into wound (excluding foam for bridge) :  2 GranuFoam Black and 2 Oil emulsion dressing     Treatment Plan:     Negative pressure wound therapy plan:  Wound location: LLE fasciotomy sites   Change Days: Tues/ Fri by WOC RN    Supplies (including all accessories) used: large Black foam , Adaptic/Curity oil emulsion contact layer , and extra trac pad and Y-connector  Cleanse with Vashe prior to replacing NPWT  Suction setting: -125   Methods used: Window paned all periwound skin with vac drape prior to applying sponge    Staff RN to assess integrity of dressing and ensure suction is set at appropriate level every shift.   Date canister. Chart canister output every shift. Change cannister weekly and PRN if full/occluded     Remove foam dressing and replace with BID normal saline moist gauze dressing if:   -a dressing failure which cannot be repaired within 2 hours   -patient is discharging to home without a home pump   -patient is discharging to a facility outside the local area   -if a dressing is a \"Silver Foam\", remove before Radiation Therapy or MRI     The hospital VAC pump is not to be discharged with the patient. " Please disconnect the patient from the machine prior to discharge.  If a home VAC pump has been delivered, connect the home cannister to dressing tubing and the cannister to the home pump, turn on home pump  If the patient is transferring to a nearby facility with a VAC, the tubing can be disconnected, clamp tubing and cover the end with a glove, then can be reconnected if within 2 hours  If transfer will be longer than 2 hours, dressing must be removed and placed with a wet to moist gauze dressing for transfer        Orders: Reviewed    RECOMMEND PRIMARY TEAM ORDER: None, at this time  Education provided: plan of care  Discussed plan of care with: Patient and Nurse  Notify [facility] if wound(s) deteriorate.  Nursing to notify the Provider(s) and re-consult the [facility] Nurse if new skin concern.    DATA:     Current support surface: Standard  Low air loss (BOB pump, Isolibrium, Pulsate)  Containment of urine/stool: Continent of bladder and Continent of bowel  BMI: Body mass index is 31.21 kg/m .   Active diet order: Orders Placed This Encounter      Regular Diet Adult     Output: I/O last 3 completed shifts:  In: 540 [P.O.:540]  Out: 720 [Urine:720]     Labs:   Recent Labs   Lab 04/09/25  0507   ALBUMIN 3.5   HGB 11.2*   WBC 10.7     Pressure injury risk assessment:   Sensory Perception: 3-->slightly limited  Moisture: 3-->occasionally moist  Activity: 3-->walks occasionally  Mobility: 3-->slightly limited  Nutrition: 3-->adequate  Friction and Shear: 2-->potential problem  Gary Score: 17    KYLIE TesfayeN, RN, PHN, HNB-BC, CWOCN  Pager no longer is use, please contact through Monitor My Meds group: [facility] [employer]

## 2025-04-11 NOTE — PROGRESS NOTES
[facility]    Medicine Progress Note - Hospitalist Service    Date of Admission:  3/27/2025    Assessment & Plan      69 male with history of CAD, hypertension is admitted for acute left lower extremity pain and found to have acute limb ischemia, underwent 4 compartment fasciotomy urgently on admission.  Monitored in the ICU and downgraded clinically 3/28.     Rhabdomyolysis - resolved  LLE Acute compartment syndrome s/p fasciotomy  Acute limb ischemia w/ PAD  - Runoff CTA 3/26 showing abrupt cut off to left PT artery, mild infrarenal aneurysmal dilation  - Echo 3/27 showing EF 45 to 50%, no septal defects  - ABIs 3/27 normal on the right, moderate on the left  - s/p LLE fasciotomy on 3/27. Wound vac placed 3/28.  - Hypercoagulable panel negative; heme/onc signed off 3/29/2025  Plan  -Continue rivaraxoban 20mg daily (started 3/29/2025)  -Continue aspirin - home medication  -Myalgias when statin restarted - will discontinue.  -Wound vac placed 3/28/2025. Continue care per WOC.  -PTOT recommending Acute Rehab - Insurance Denied - planning for TCU placement  -Needs to be off IV pain medications for TCU placement 4/12.   - Pain management team following.  - Scheduled Tylenol 975mg TID  - Methocarbamol - holding scheduled, prn 750mg PO  - Opioids: Hydromorphone/Dilaudid2- 4mg and IV dilaudid PRN - minimize      Suspected inflammatory arthropathy - Gout vs pseudogout   Painful erythema and swelling at the level of third right proximal interphalangeal joint  CRP came back markedly elevated at 198.9 - concerning for inflammatory arthropathy ? Gout vs pseudogout.   Hand x-ray showed DJD.  -Resolved with prednisone 10 mg daily x 5 days     Chronic heart failure with mildly reduced ejection fraction  - Home HF medications - lisinopril, metoprolol  - Echo this admission with EF 45 to 50%  Plan  - Continue home metoprolol  - Decrease home lisinopril to 2.5mg daily on discharge  -Not clinically  "hypervolemic, monitor for edema     CAD  Remote CABG, no anginal chest pain, monitor clinically  -Continues on aspirin  -Not routinely on statin - see above     Essential hypertension  -Continue lisinopril at 2.5 mg daily (home dose 5 mg) with hold parameters, metoprolol succinate 25 mg daily          Diet: Snacks/Supplements Adult: Magic Cup; With Meals  Regular Diet Adult  Snacks/Supplements Adult: Ensure Enlive; Between Meals    DVT Prophylaxis: DOAC and Pneumatic Compression Devices  Capps Catheter: Not present  Lines: None     Cardiac Monitoring: None  Code Status: Full Code      Clinically Significant Risk Factors               # Hypoalbuminemia: Lowest albumin = 3 g/dL at 3/28/2025  4:13 AM, will monitor as appropriate     # Hypertension: Noted on problem list            # Obesity: Estimated body mass index is 31.21 kg/m  as calculated from the following:    Height as of this encounter: 1.702 m (5' 7\").    Weight as of this encounter: 90.4 kg (199 lb 4.8 oz).       # History of CABG: noted on surgical history       Social Drivers of Health    Physical Activity: Insufficiently Active (3/17/2025)    Exercise Vital Sign     Days of Exercise per Week: 1 day     Minutes of Exercise per Session: 10 min   Social Connections: Unknown (3/17/2025)    Social Connection and Isolation Panel [NHANES]     Frequency of Social Gatherings with Friends and Family: Once a week          Disposition Plan     Medically Ready for Discharge: Anticipated Tomorrow             Ja Mcfarland MD  Hospitalist Service  [facility]  Securely message with [employer] (more info)  Text page via [email or web address] Paging/Directory   ______________________________________________________________________    Interval History   - Patient having pain with wound vac dressing changes    Physical Exam   Vital Signs: Temp: 97.7  F (36.5  C) Temp src: Oral BP: 119/69 Pulse: 65   Resp: 16 SpO2: 95 % O2 Device: None (Room air)    Weight: 199 lbs " 4.8 oz    General: Alert and Oriented x3. Answers questions appropriately. Follows commands.  Eyes:EOMI. PERRL. Normal Conjunctivae.  HENT: Normocephalic. Atraumatic.  Resp: Breath sounds equal throughout. No crackles. No wheezing.  Cardio: Regular rate and rhythm. No murmurs. No friction rub.  Abd: Soft. Non-distended. Non-tender. Bowel sounds normal. No rebound tenderness.  MSK: Wound vac in place LLE  Neuro: CN 2-12 grossly intact. Strength 5/5 in all 4 extremities.  Skin:No rashes. No jaundice.       Medical Decision Making       45 MINUTES SPENT BY ME on the date of service doing chart review, history, exam, documentation & further activities per the note.  MANAGEMENT DISCUSSED with the following over the past 24 hours: RN, BENIGNO   Tests ORDERED & REVIEWED in the past 24 hours:  - BMP  - CBC  - Magnesium  - Phosphorus  Medical complexity over the past 24 hours:  - Prescription DRUG MANAGEMENT performed      Data         Imaging results reviewed over the past 24 hrs:   No results found for this or any previous visit (from the past 24 hours).

## 2025-04-11 NOTE — PLAN OF CARE
Problem: Pain Acute  Goal: Optimal Pain Control and Function  Intervention: Develop Pain Management Plan  Recent Flowsheet Documentation  Taken 4/11/2025 1005 by Marta Ramirez RN  Pain Management Interventions: medication (see MAR)   Goal Outcome Evaluation:       Pain was managed with oral pain medications this shift. Patient had wound vac changed, was pre-medicated prior to change with oral medications and tolerated well.

## 2025-04-11 NOTE — PROGRESS NOTES
"[facility] ACUTE INPATIENT PAIN SERVICE    [facility], [facility], [facility], [facility], [place]   PAIN follow up         ASSESSMENT/ PLAN:  Acute left leg pain pain secondary to fasciotomy and VAC, in the setting of advanced age.   PO medications for dressing changes - vistaril, robaxin, and dilaudid.  HOLDING due to hypotension.   Multimodal Medication Therapy:    Adjuvants:  tylenol & robaxin (could increase to 750mg), continue diclofenac to the finger  Opioids: Hydromorphone/Dilaudid2- 4mg  IV dilaudid PRN - minimize   Non-medication interventions- PT  Constipation Prophylaxis-  HOLDing due to diarrhea yesterday   Follow up /Discharge Recommendations - We recommend prescribing the following at the time of discharge:  Likely 30 tabs of PO dilaudid and 20 tabs of robaxin     For weekly dressing changes suggest the following prescriptions 30 minutes prior to dressing change: 25 mg hydroxyzine, 750 mg Robaxin, and 4 mg Dilaudid tablet.      Subjective:    Pain controlled. But hypotensive - receiving a fluid bolus this am. 2 BMS yesterday. Discussed with RN and patient.     HPI:  Dudley Kiran is a 69 year old male who was admitted on 3/27/2025.  I was asked to see the patient for left leg pain. Admitted for leg pain. Imaging indicated left leg occlusion.  History of CAD, HTN, pre-DM.    shows no results.        -MN  pulled from system on 4/2/2025. No results found for the patient and with no opioid/controlled medications per Sure[employer].   Discharge Recommendations - We recommend prescribing the following at the time of discharge: TBD.      History   Drug Use Unknown         Tobacco Use      Smoking status: Never      Smokeless tobacco: Never        Objective:  Vital signs in last 24 hours:  B/P: 109/61, T: 97.6, P: 69, R: 18   Blood pressure 92/58, pulse 65, temperature 97.7  F (36.5  C), temperature source Oral, resp. rate 16, height 1.702 m (5' 7\"), weight 90.4 kg (199 lb 4.8 oz), SpO2 " 95%.        Review of Systems:   As per subjective, all others negative.    Physical Exam    General: in no apparent distress and non-toxic    HEENT: Head normocephalic atraumatic, oral mucosa moist. Sclerae anicteric  GI: 2 BMs   Skin: VAC in place   Extremities: LLE edema and VAC in place           Imaging:  Personally Reviewed.  CT, ultrasound with past occlusion     Lab Results:  Personally Reviewed.   WBC and Crt      Please see A&P for additional details of medical decision making.  MANAGEMENT DISCUSSED with the following over the past 24 hours: patient    NOTE(S)/MEDICAL RECORDS REVIEWED over the past 24 hours: medicine & vascular   Tests personally interpreted in the past 24 hours:  - ULTRASOUND showing occlusion   Tests REVIEWED in the past 24 hours:  - WBC  SUPPLEMENTAL HISTORY, in addition to the patient's history, over the past 24 hours obtained from:   - Pt  Medical complexity over the past 24 hours:  -------------------------- HIGH RISK FOR MORBIDITY -------------------------------------------------------------  - Parenteral (IV) CONTROLLED SUBSTANCES ordered           Viktoriya YBARRA, CNS, CNP  Acute Care Pain Management  Team  Hours of pain coverage Mon-Fri 8-1600  After hours contact the primary team  [facility] (DEDIRCK, Priti, SD, RH)   Page via Vocera text web console -Click for [email or web address]

## 2025-04-11 NOTE — PLAN OF CARE
Problem: Adult Inpatient Plan of Care  Goal: Plan of Care Review  Description: The Plan of Care Review/Shift note should be completed every shift.  The Outcome Evaluation is a brief statement about your assessment that the patient is improving, declining, or no change.  This information will be displayed automatically on your shiftnote.  Outcome: Progressing     Problem: Comorbidity Management  Goal: Blood Pressure in Desired Range  Outcome: Progressing     Problem: Constipation  Goal: Effective Bowel Elimination  Outcome: Progressing     Problem: Wound  Goal: Optimal Coping  Outcome: Progressing   Goal Outcome Evaluation:             Pt alert and oriented x4, VSS, room air, PRN dilaudid given x1, scheduled tylenol given, wound vac in place and patent slept between cares

## 2025-04-12 ENCOUNTER — APPOINTMENT (OUTPATIENT)
Dept: PHYSICAL THERAPY | Facility: HOSPITAL | Age: 70
End: 2025-04-12
Attending: INTERNAL MEDICINE
Payer: COMMERCIAL

## 2025-04-12 ENCOUNTER — APPOINTMENT (OUTPATIENT)
Dept: RADIOLOGY | Facility: HOSPITAL | Age: 70
End: 2025-04-12
Attending: STUDENT IN AN ORGANIZED HEALTH CARE EDUCATION/TRAINING PROGRAM
Payer: COMMERCIAL

## 2025-04-12 LAB
ALBUMIN SERPL BCG-MCNC: 3.4 G/DL (ref 3.5–5.2)
ALP SERPL-CCNC: 96 U/L (ref 40–150)
ALT SERPL W P-5'-P-CCNC: 34 U/L (ref 0–70)
ANION GAP SERPL CALCULATED.3IONS-SCNC: 8 MMOL/L (ref 7–15)
AST SERPL W P-5'-P-CCNC: 25 U/L (ref 0–45)
BILIRUB SERPL-MCNC: 0.3 MG/DL
BUN SERPL-MCNC: 15.7 MG/DL (ref 8–23)
CALCIUM SERPL-MCNC: 8.6 MG/DL (ref 8.8–10.4)
CHLORIDE SERPL-SCNC: 107 MMOL/L (ref 98–107)
CREAT SERPL-MCNC: 0.96 MG/DL (ref 0.67–1.17)
EGFRCR SERPLBLD CKD-EPI 2021: 86 ML/MIN/1.73M2
ERYTHROCYTE [DISTWIDTH] IN BLOOD BY AUTOMATED COUNT: 13.7 % (ref 10–15)
GLUCOSE SERPL-MCNC: 105 MG/DL (ref 70–99)
HCO3 SERPL-SCNC: 27 MMOL/L (ref 22–29)
HCT VFR BLD AUTO: 32.9 % (ref 40–53)
HGB BLD-MCNC: 11.1 G/DL (ref 13.3–17.7)
MAGNESIUM SERPL-MCNC: 2.2 MG/DL (ref 1.7–2.3)
MCH RBC QN AUTO: 29.5 PG (ref 26.5–33)
MCHC RBC AUTO-ENTMCNC: 33.7 G/DL (ref 31.5–36.5)
MCV RBC AUTO: 88 FL (ref 78–100)
PHOSPHATE SERPL-MCNC: 2.9 MG/DL (ref 2.5–4.5)
PLATELET # BLD AUTO: 564 10E3/UL (ref 150–450)
POTASSIUM SERPL-SCNC: 4.3 MMOL/L (ref 3.4–5.3)
PROT SERPL-MCNC: 7 G/DL (ref 6.4–8.3)
RBC # BLD AUTO: 3.76 10E6/UL (ref 4.4–5.9)
SODIUM SERPL-SCNC: 142 MMOL/L (ref 135–145)
URATE SERPL-MCNC: 6.3 MG/DL (ref 3.4–7)
WBC # BLD AUTO: 9.9 10E3/UL (ref 4–11)

## 2025-04-12 PROCEDURE — 250N000013 HC RX MED GY IP 250 OP 250 PS 637: Performed by: PAIN MEDICINE

## 2025-04-12 PROCEDURE — 250N000013 HC RX MED GY IP 250 OP 250 PS 637: Performed by: STUDENT IN AN ORGANIZED HEALTH CARE EDUCATION/TRAINING PROGRAM

## 2025-04-12 PROCEDURE — 250N000013 HC RX MED GY IP 250 OP 250 PS 637: Performed by: NURSE PRACTITIONER

## 2025-04-12 PROCEDURE — 83735 ASSAY OF MAGNESIUM: CPT | Performed by: STUDENT IN AN ORGANIZED HEALTH CARE EDUCATION/TRAINING PROGRAM

## 2025-04-12 PROCEDURE — 250N000013 HC RX MED GY IP 250 OP 250 PS 637: Performed by: INTERNAL MEDICINE

## 2025-04-12 PROCEDURE — 73620 X-RAY EXAM OF FOOT: CPT | Mod: LT,52

## 2025-04-12 PROCEDURE — 84155 ASSAY OF PROTEIN SERUM: CPT | Performed by: STUDENT IN AN ORGANIZED HEALTH CARE EDUCATION/TRAINING PROGRAM

## 2025-04-12 PROCEDURE — 82947 ASSAY GLUCOSE BLOOD QUANT: CPT | Performed by: STUDENT IN AN ORGANIZED HEALTH CARE EDUCATION/TRAINING PROGRAM

## 2025-04-12 PROCEDURE — 120N000001 HC R&B MED SURG/OB

## 2025-04-12 PROCEDURE — 97116 GAIT TRAINING THERAPY: CPT | Mod: GP | Performed by: PHYSICAL THERAPIST

## 2025-04-12 PROCEDURE — 36415 COLL VENOUS BLD VENIPUNCTURE: CPT | Performed by: STUDENT IN AN ORGANIZED HEALTH CARE EDUCATION/TRAINING PROGRAM

## 2025-04-12 PROCEDURE — 85027 COMPLETE CBC AUTOMATED: CPT | Performed by: STUDENT IN AN ORGANIZED HEALTH CARE EDUCATION/TRAINING PROGRAM

## 2025-04-12 PROCEDURE — 84550 ASSAY OF BLOOD/URIC ACID: CPT | Performed by: STUDENT IN AN ORGANIZED HEALTH CARE EDUCATION/TRAINING PROGRAM

## 2025-04-12 PROCEDURE — 97110 THERAPEUTIC EXERCISES: CPT | Mod: GP | Performed by: PHYSICAL THERAPIST

## 2025-04-12 PROCEDURE — 84100 ASSAY OF PHOSPHORUS: CPT | Performed by: STUDENT IN AN ORGANIZED HEALTH CARE EDUCATION/TRAINING PROGRAM

## 2025-04-12 PROCEDURE — 99232 SBSQ HOSP IP/OBS MODERATE 35: CPT | Performed by: STUDENT IN AN ORGANIZED HEALTH CARE EDUCATION/TRAINING PROGRAM

## 2025-04-12 RX ADMIN — Medication 60 ML: at 09:48

## 2025-04-12 RX ADMIN — METOPROLOL SUCCINATE 25 MG: 25 TABLET, EXTENDED RELEASE ORAL at 09:48

## 2025-04-12 RX ADMIN — PANTOPRAZOLE SODIUM 40 MG: 40 TABLET, DELAYED RELEASE ORAL at 06:31

## 2025-04-12 RX ADMIN — LISINOPRIL 2.5 MG: 2.5 TABLET ORAL at 09:16

## 2025-04-12 RX ADMIN — ACETAMINOPHEN 975 MG: 325 TABLET, FILM COATED ORAL at 13:28

## 2025-04-12 RX ADMIN — ZINC SULFATE 220 MG (50 MG) CAPSULE 220 MG: CAPSULE at 09:09

## 2025-04-12 RX ADMIN — HYDROMORPHONE HYDROCHLORIDE 2 MG: 2 TABLET ORAL at 10:56

## 2025-04-12 RX ADMIN — Medication 500 MG: at 09:09

## 2025-04-12 RX ADMIN — DICLOFENAC SODIUM 2 G: 10 GEL TOPICAL at 09:10

## 2025-04-12 RX ADMIN — HYDROMORPHONE HYDROCHLORIDE 2 MG: 2 TABLET ORAL at 06:31

## 2025-04-12 RX ADMIN — RIVAROXABAN 20 MG: 10 TABLET, FILM COATED ORAL at 17:20

## 2025-04-12 RX ADMIN — Medication 1 TABLET: at 09:09

## 2025-04-12 RX ADMIN — ACETAMINOPHEN 975 MG: 325 TABLET, FILM COATED ORAL at 06:31

## 2025-04-12 RX ADMIN — HYDROMORPHONE HYDROCHLORIDE 4 MG: 4 TABLET ORAL at 17:49

## 2025-04-12 RX ADMIN — ASPIRIN 81 MG: 81 TABLET, COATED ORAL at 09:09

## 2025-04-12 ASSESSMENT — ACTIVITIES OF DAILY LIVING (ADL)
ADLS_ACUITY_SCORE: 40
ADLS_ACUITY_SCORE: 42
ADLS_ACUITY_SCORE: 40
ADLS_ACUITY_SCORE: 42
ADLS_ACUITY_SCORE: 40
ADLS_ACUITY_SCORE: 42
ADLS_ACUITY_SCORE: 40
ADLS_ACUITY_SCORE: 42
ADLS_ACUITY_SCORE: 40
ADLS_ACUITY_SCORE: 42
ADLS_ACUITY_SCORE: 40
ADLS_ACUITY_SCORE: 42
ADLS_ACUITY_SCORE: 40

## 2025-04-12 NOTE — PLAN OF CARE
Problem: Pain Acute  Goal: Optimal Pain Control and Function  Outcome: Progressing  Intervention: Develop Pain Management Plan  Recent Flowsheet Documentation  Taken 4/12/2025 1056 by Marta Ramirez RN  Pain Management Interventions: medication (see MAR)   Goal Outcome Evaluation:       Pain managed with oral medication. Patient rates pain at a 2/10 after medication. Wound vac intact and functioning, canister changed this shift. Patient up with assist of one and walker.

## 2025-04-12 NOTE — PROGRESS NOTES
[facility]    Medicine Progress Note - Hospitalist Service    Date of Admission:  3/27/2025    Assessment & Plan   69 male with history of CAD, hypertension is admitted for acute left lower extremity pain and found to have acute limb ischemia, underwent 4 compartment fasciotomy urgently on admission.  Monitored in the ICU and downgraded clinically 3/28.     #Rhabdomyolysis - resolved  #LLE Acute compartment syndrome s/p fasciotomy  #Acute limb ischemia w/ PAD  - Runoff CTA 3/26 showing abrupt cut off to left PT artery, mild infrarenal aneurysmal dilation  - Echo 3/27 showing EF 45 to 50%, no septal defects  - ABIs 3/27 normal on the right, moderate on the left  - s/p LLE fasciotomy on 3/27. Wound vac placed 3/28.  - Hypercoagulable panel negative; heme/onc signed off 3/29/2025  Plan  -Continue rivaraxoban 20mg daily (started 3/29/2025)  -Continue aspirin - home medication  -Myalgias when statin restarted - will discontinue.  -Wound vac placed 3/28/2025. Continue care per WOC.  -PTOT recommending Acute Rehab - Insurance Denied - planning for TCU placement  -Needs to be off IV pain medications for TCU placement.   - Pain management team following.  - Scheduled Tylenol 975mg TID  - Methocarbamol - holding scheduled, prn 750mg PO  - Opioids: Hydromorphone/Dilaudid 2- 4mg PO  - IV dilaudid PRN - minimize      #Suspected inflammatory arthropathy - Gout vs pseudogout   - Erythema and swelling intermittently at multiple different toes.  - CRP came back markedly elevated at 198.9.  - Hand x-ray showed DJD on 3/31  - Resolved with prednisone 10 mg daily 3/31-4/4  - Left foot XR 4/12 showing Moderate first MTP joint degenerative changes without obvious gout or pseudogout.  - Uric acid upper limit of normal at 6.3 on 4/12.  Plan  - Continue to monitor     #Chronic heart failure with mildly reduced ejection fraction  - Home HF medications - lisinopril, metoprolol  - Echo this admission with EF 45 to  "50%  Plan  - Continue home metoprolol  - Decrease home lisinopril to 2.5mg daily on discharge  -Not clinically hypervolemic, monitor for edema     #CAD  -Remote CABG, no anginal chest pain, monitor clinically  -Continues on aspirin  -Not routinely on statin - see above     Essential hypertension  -Continue lisinopril at 2.5 mg daily (home dose 5 mg) with hold parameters, metoprolol succinate 25 mg daily          Diet: Snacks/Supplements Adult: Magic Cup; With Meals  Regular Diet Adult  Snacks/Supplements Adult: Ensure Enlive; Between Meals    DVT Prophylaxis: DOAC and Pneumatic Compression Devices  Capps Catheter: Not present  Lines: None     Cardiac Monitoring: None  Code Status: Full Code      Clinically Significant Risk Factors           # Hypocalcemia: Lowest Ca = 8.6 mg/dL in last 2 days, will monitor and replace as appropriate     # Hypoalbuminemia: Lowest albumin = 3 g/dL at 3/28/2025  4:13 AM, will monitor as appropriate     # Hypertension: Noted on problem list            # Obesity: Estimated body mass index is 31.21 kg/m  as calculated from the following:    Height as of this encounter: 1.702 m (5' 7\").    Weight as of this encounter: 90.4 kg (199 lb 4.8 oz).       # History of CABG: noted on surgical history       Social Drivers of Health    Physical Activity: Insufficiently Active (3/17/2025)    Exercise Vital Sign     Days of Exercise per Week: 1 day     Minutes of Exercise per Session: 10 min   Social Connections: Unknown (3/17/2025)    Social Connection and Isolation Panel [NHANES]     Frequency of Social Gatherings with Friends and Family: Once a week          Disposition Plan     Medically Ready for Discharge: Anticipated in 2-4 Days             Ja Mcfarland MD  Hospitalist Service  [facility]  Securely message with [employer] (more info)  Text page via [employer] Paging/Directory   ______________________________________________________________________    Interval History   - " Significant pain with wound vac dressing change yesterday but was able to tolerate without IV pain medications    Physical Exam   Vital Signs: Temp: 98.1  F (36.7  C) Temp src: Oral BP: 106/58 Pulse: 79   Resp: 18 SpO2: 96 % O2 Device: None (Room air)    Weight: 199 lbs 4.8 oz    General: Alert and Oriented x3. Answers questions appropriately. Follows commands.  Eyes:EOMI. PERRL. Normal Conjunctivae.  HENT: Normocephalic. Atraumatic.  Resp: Breath sounds equal throughout. No crackles. No wheezing.  Cardio: Regular rate and rhythm. No murmurs. No friction rub.  Abd: Soft. Non-distended. Non-tender. Bowel sounds normal. No rebound tenderness.  MSK: Wound vac in place LLE  Neuro: CN 2-12 grossly intact. Strength 5/5 in all 4 extremities.  Skin:No rashes. No jaundice.     Medical Decision Making       45 MINUTES SPENT BY ME on the date of service doing chart review, history, exam, documentation & further activities per the note.  MANAGEMENT DISCUSSED with the following over the past 24 hours: RN   Tests ORDERED & REVIEWED in the past 24 hours:  - BMP  - CBC  - Magnesium  - Phosphorus  Medical complexity over the past 24 hours:  - Prescription DRUG MANAGEMENT performed      Data     I have personally reviewed the following data over the past 24 hrs:    9.9  \   11.1 (L)   / 564 (H)     142 107 15.7 /  105 (H)   4.3 27 0.96 \     ALT: 34 AST: 25 AP: 96 TBILI: 0.3   ALB: 3.4 (L) TOT PROTEIN: 7.0 LIPASE: N/A       Imaging results reviewed over the past 24 hrs:   Recent Results (from the past 24 hours)   XR Foot Left 1 View    Narrative    EXAM: XR FOOT LEFT 1 VIEW  LOCATION: [facility]  DATE: 4/12/2025    INDICATION: 1st toe pain  COMPARISON: None.      Impression    IMPRESSION: No acute fracture or malalignment. Moderate first MTP joint degenerative changes. Otherwise mild degenerative changes throughout the foot.

## 2025-04-12 NOTE — PLAN OF CARE
The patient reported pain 5/10 oral 2mg Dilaudid given, reduced pain to 3/10. Pleasant and cooperative with cares. Wound vac to ordered suction, dressing is clean, dry, and intact. Weak pulses noted bilaterally, warm skin, cap refill less than 3 sec. Right knuckle is less painfull, movement has improved, not restricted. Valerian cream applied.     Goal Outcome Evaluation:           Problem: Adult Inpatient Plan of Care  Goal: Optimal Comfort and Wellbeing  Outcome: Progressing  Intervention: Monitor Pain and Promote Comfort  Recent Flowsheet Documentation  Taken 4/11/2025 2312 by Chayo Moran, RN  Pain Management Interventions:   medication (see MAR)   emotional support   repositioned  Taken 4/11/2025 1839 by Chayo Moran, RN  Pain Management Interventions:   medication (see MAR)   pillow support provided   repositioned   emotional support

## 2025-04-12 NOTE — PLAN OF CARE
Problem: Adult Inpatient Plan of Care  Goal: Plan of Care Review  Description: The Plan of Care Review/Shift note should be completed every shift.  The Outcome Evaluation is a brief statement about your assessment that the patient is improving, declining, or no change.  This information will be displayed automatically on your shiftnote.  Outcome: Progressing     Problem: Pain Acute  Goal: Optimal Pain Control and Function  Outcome: Progressing   Goal Outcome Evaluation:    Patient is alert and oriented x4. Vitally stable on RA. Pt report 4-5/10 pain, schedule tylenol and PRN PO dilaudid given. Left foot dressing CDI, wound vac in place. Voiding using bedside urinal. Able to make needs known. Call light within reach.

## 2025-04-13 ENCOUNTER — APPOINTMENT (OUTPATIENT)
Dept: PHYSICAL THERAPY | Facility: HOSPITAL | Age: 70
End: 2025-04-13
Attending: INTERNAL MEDICINE
Payer: COMMERCIAL

## 2025-04-13 PROCEDURE — 250N000013 HC RX MED GY IP 250 OP 250 PS 637: Performed by: INTERNAL MEDICINE

## 2025-04-13 PROCEDURE — 250N000013 HC RX MED GY IP 250 OP 250 PS 637: Performed by: STUDENT IN AN ORGANIZED HEALTH CARE EDUCATION/TRAINING PROGRAM

## 2025-04-13 PROCEDURE — 99232 SBSQ HOSP IP/OBS MODERATE 35: CPT | Performed by: STUDENT IN AN ORGANIZED HEALTH CARE EDUCATION/TRAINING PROGRAM

## 2025-04-13 PROCEDURE — 250N000011 HC RX IP 250 OP 636: Mod: JZ | Performed by: PAIN MEDICINE

## 2025-04-13 PROCEDURE — 97116 GAIT TRAINING THERAPY: CPT | Mod: GP | Performed by: PHYSICAL THERAPIST

## 2025-04-13 PROCEDURE — 97110 THERAPEUTIC EXERCISES: CPT | Mod: GP | Performed by: PHYSICAL THERAPIST

## 2025-04-13 PROCEDURE — 250N000013 HC RX MED GY IP 250 OP 250 PS 637: Performed by: NURSE PRACTITIONER

## 2025-04-13 PROCEDURE — 120N000001 HC R&B MED SURG/OB

## 2025-04-13 RX ORDER — METHOCARBAMOL 750 MG/1
750 TABLET, FILM COATED ORAL 4 TIMES DAILY PRN
Status: DISCONTINUED | OUTPATIENT
Start: 2025-04-13 | End: 2025-04-14

## 2025-04-13 RX ORDER — POLYETHYLENE GLYCOL 3350 17 G/17G
17 POWDER, FOR SOLUTION ORAL 2 TIMES DAILY PRN
Status: DISCONTINUED | OUTPATIENT
Start: 2025-04-13 | End: 2025-04-15 | Stop reason: HOSPADM

## 2025-04-13 RX ORDER — AMOXICILLIN 250 MG
2 CAPSULE ORAL 2 TIMES DAILY PRN
Status: DISCONTINUED | OUTPATIENT
Start: 2025-04-13 | End: 2025-04-15 | Stop reason: HOSPADM

## 2025-04-13 RX ADMIN — HYDROMORPHONE HYDROCHLORIDE 0.2 MG: 0.2 INJECTION, SOLUTION INTRAMUSCULAR; INTRAVENOUS; SUBCUTANEOUS at 04:46

## 2025-04-13 RX ADMIN — ACETAMINOPHEN 975 MG: 325 TABLET, FILM COATED ORAL at 00:02

## 2025-04-13 RX ADMIN — Medication 60 ML: at 09:34

## 2025-04-13 RX ADMIN — Medication 1 TABLET: at 09:33

## 2025-04-13 RX ADMIN — HYDROMORPHONE HYDROCHLORIDE 4 MG: 2 TABLET ORAL at 10:45

## 2025-04-13 RX ADMIN — LISINOPRIL 2.5 MG: 2.5 TABLET ORAL at 09:33

## 2025-04-13 RX ADMIN — METOPROLOL SUCCINATE 25 MG: 25 TABLET, EXTENDED RELEASE ORAL at 09:33

## 2025-04-13 RX ADMIN — RIVAROXABAN 20 MG: 10 TABLET, FILM COATED ORAL at 18:00

## 2025-04-13 RX ADMIN — ASPIRIN 81 MG: 81 TABLET, COATED ORAL at 09:33

## 2025-04-13 RX ADMIN — HYDROMORPHONE HYDROCHLORIDE 4 MG: 2 TABLET ORAL at 22:17

## 2025-04-13 RX ADMIN — ACETAMINOPHEN 975 MG: 325 TABLET, FILM COATED ORAL at 18:01

## 2025-04-13 RX ADMIN — DICLOFENAC SODIUM 2 G: 10 GEL TOPICAL at 09:33

## 2025-04-13 RX ADMIN — Medication 500 MG: at 09:33

## 2025-04-13 RX ADMIN — PANTOPRAZOLE SODIUM 40 MG: 40 TABLET, DELAYED RELEASE ORAL at 06:39

## 2025-04-13 RX ADMIN — ACETAMINOPHEN 975 MG: 325 TABLET, FILM COATED ORAL at 06:38

## 2025-04-13 ASSESSMENT — ACTIVITIES OF DAILY LIVING (ADL)
ADLS_ACUITY_SCORE: 40

## 2025-04-13 NOTE — CONSULTS
Care Management Follow Up    Length of Stay (days): 17    Expected Discharge Date: 04/14/2025    Anticipated Discharge Plan:  Acute Rehab or TCU    Transportation: TBD    PT Recommendations: Transitional Care Facility  OT Recommendations:  Acute Rehab Center-Motivated patient will benefit from intensive, interdisciplinary therapy.  Anticipate will be able to tolerate 3 hours of therapy per day     Barriers to Discharge: medical stability, placement, diagnostic workup, wound vac     Prior Living Situation: mobile home with  (2 cousins)    Discussed  Partnership in Safe Discharge Planning  document with patient/family: No     Handoff Completed: No, handoff not indicated or clinically appropriate    Patient/Spokesperson Updated: No    Additional Information:    Chart review, discussed with MD.  Per MD, have been working on pain management.  Plan is to complete dressing change tomorrow, 04/14.  If he tolerates this, could discharge by 04/15.      Next Steps:     CM continues to follow for discharge plans and needed support.  Confirm continued bed availability at [facility].    [address]

## 2025-04-13 NOTE — PLAN OF CARE
Problem: Adult Inpatient Plan of Care  Goal: Optimal Comfort and Wellbeing  Intervention: Monitor Pain and Promote Comfort  Recent Flowsheet Documentation  Taken 4/13/2025 1045 by Marta Ramirez RN  Pain Management Interventions: medication (see MAR)   Goal Outcome Evaluation:       Wound vac is intact and functioning on left lower extremity. Patient medicated with oral medication prior to physical therapy and was effective in pain management.

## 2025-04-13 NOTE — PROGRESS NOTES
[facility]    Medicine Progress Note - Hospitalist Service    Date of Admission:  3/27/2025    Assessment & Plan   69 male with history of CAD, hypertension is admitted for acute left lower extremity pain and found to have acute limb ischemia, underwent 4 compartment fasciotomy urgently on admission.  Monitored in the ICU and downgraded clinically 3/28.     #Rhabdomyolysis - resolved  #LLE Acute compartment syndrome s/p fasciotomy  #Acute limb ischemia w/ PAD  - Runoff CTA 3/26 showing abrupt cut off to left PT artery, mild infrarenal aneurysmal dilation  - Echo 3/27 showing EF 45 to 50%, no septal defects  - ABIs 3/27 normal on the right, moderate on the left  - s/p LLE fasciotomy on 3/27. Wound vac placed 3/28.  - Hypercoagulable panel negative; heme/onc signed off 3/29/2025  Plan  -Continue rivaraxoban 20mg daily (started 3/29/2025)  -Continue aspirin - home medication  -Myalgias when statin restarted - will discontinue.  -Wound vac placed 3/28/2025. Continue care per WOC.  -PTOT recommending Acute Rehab - Insurance Denied - planning for TCU placement  -Needs to be off IV pain medications for TCU placement.   - Pain management team following.  - Scheduled Tylenol 975mg TID  - Methocarbamol - holding scheduled, prn 750mg PO  - Opioids: Hydromorphone/Dilaudid 2- 4mg PO  - IV dilaudid PRN - minimize      #Suspected inflammatory arthropathy - Gout vs pseudogout   - Erythema and swelling intermittently at multiple different toes.  - CRP came back markedly elevated at 198.9.  - Hand x-ray showed DJD on 3/31  - Resolved with prednisone 10 mg daily 3/31-4/4  - Left foot XR 4/12 showing Moderate first MTP joint degenerative changes without obvious gout or pseudogout.  - Uric acid upper limit of normal at 6.3 on 4/12.  Plan  - Continue to monitor     #Chronic heart failure with mildly reduced ejection fraction  - Home HF medications - lisinopril, metoprolol  - Echo this admission with EF 45 to  "50%  Plan  - Continue home metoprolol  - Decrease home lisinopril to 2.5mg daily on discharge  -Not clinically hypervolemic, monitor for edema     #CAD  -Remote CABG, no anginal chest pain, monitor clinically  -Continues on aspirin  -Not routinely on statin - see above     Essential hypertension  -Continue lisinopril at 2.5 mg daily (home dose 5 mg) with hold parameters, metoprolol succinate 25 mg daily          Diet: Snacks/Supplements Adult: Magic Cup; With Meals  Regular Diet Adult  Snacks/Supplements Adult: Ensure Enlive; Between Meals    DVT Prophylaxis: DOAC and Pneumatic Compression Devices  Capps Catheter: Not present  Lines: None     Cardiac Monitoring: None  Code Status: Full Code      Clinically Significant Risk Factors           # Hypocalcemia: Lowest Ca = 8.6 mg/dL in last 2 days, will monitor and replace as appropriate     # Hypoalbuminemia: Lowest albumin = 3 g/dL at 3/28/2025  4:13 AM, will monitor as appropriate     # Hypertension: Noted on problem list            # Obesity: Estimated body mass index is 31.84 kg/m  as calculated from the following:    Height as of this encounter: 1.702 m (5' 7\").    Weight as of this encounter: 92.2 kg (203 lb 4.8 oz).       # History of CABG: noted on surgical history       Social Drivers of Health    Physical Activity: Insufficiently Active (3/17/2025)    Exercise Vital Sign     Days of Exercise per Week: 1 day     Minutes of Exercise per Session: 10 min   Social Connections: Unknown (3/17/2025)    Social Connection and Isolation Panel [NHANES]     Frequency of Social Gatherings with Friends and Family: Once a week          Disposition Plan     Medically Ready for Discharge: Anticipated Tomorrow             Ja Mcfarland MD  Hospitalist Service  [facility]  Securely message with Carmen (more info)  Text page via [employer] Paging/Directory   ______________________________________________________________________    Interval History   - " Patient with continued pain - needed IV pain medication for wound VAC malfunction needing dressing change.    Physical Exam   Vital Signs: Temp: 97.9  F (36.6  C) Temp src: Oral BP: 92/56 Pulse: 74   Resp: 16 SpO2: 95 % O2 Device: None (Room air)    Weight: 203 lbs 4.8 oz    General: Alert and Oriented x3. Answers questions appropriately. Follows commands.  Eyes:EOMI. PERRL. Normal Conjunctivae.  HENT: Normocephalic. Atraumatic.  Resp: Breath sounds equal throughout. No crackles. No wheezing.  Cardio: Regular rate and rhythm. No murmurs. No friction rub.  Abd: Soft. Non-distended. Non-tender. Bowel sounds normal. No rebound tenderness.  MSK: Wound vac in place LLE  Neuro: CN 2-12 grossly intact. Strength 5/5 in all 4 extremities.  Skin:No rashes. No jaundice.        Medical Decision Making       45 MINUTES SPENT BY ME on the date of service doing chart review, history, exam, documentation & further activities per the note.  MANAGEMENT DISCUSSED with the following over the past 24 hours: RN, CM   Tests ORDERED & REVIEWED in the past 24 hours:      Medical complexity over the past 24 hours:  - Prescription DRUG MANAGEMENT performed      Data         Imaging results reviewed over the past 24 hrs:   No results found for this or any previous visit (from the past 24 hours).

## 2025-04-13 NOTE — PLAN OF CARE
The patient's vac indicated blockage, the hub and tubing was replaced. The nurse tested each site separately to ensure proper suction at 125 mmHg. Wound vac is currently running as ordered. Dressing is clean, dry, and intact. He was given 4mg of Dilaudid before the dressing change. Pain rated 3/10 after dressing change.     Goal Outcome Evaluation:    Problem: Pain Acute  Goal: Optimal Pain Control and Function  Outcome: Progressing  Intervention: Develop Pain Management Plan  Recent Flowsheet Documentation  Taken 4/12/2025 1749 by Chayo Moran RN  Pain Management Interventions: medication (see MAR)  Taken 4/12/2025 1721 by Chayo Moran RN  Pain Management Interventions: declines  Intervention: Prevent or Manage Pain  Recent Flowsheet Documentation  Taken 4/12/2025 1723 by Chayo Moran RN  Medication Review/Management: medications reviewed     Problem: Wound  Goal: Optimal Pain Control and Function  Outcome: Progressing  Intervention: Prevent or Manage Pain  Recent Flowsheet Documentation  Taken 4/12/2025 1749 by Chayo Moran RN  Pain Management Interventions: medication (see MAR)  Taken 4/12/2025 1721 by Chayo Moran RN  Pain Management Interventions: declines

## 2025-04-13 NOTE — PLAN OF CARE
"  Problem: Adult Inpatient Plan of Care  Goal: Plan of Care Review  Description: The Plan of Care Review/Shift note should be completed every shift.  The Outcome Evaluation is a brief statement about your assessment that the patient is improving, declining, or no change.  This information will be displayed automatically on your shiftnote.  Outcome: Progressing     Problem: Pain Acute  Goal: Optimal Pain Control and Function  Outcome: Progressing  Intervention: Develop Pain Management Plan  Recent Flowsheet Documentation  Taken 4/13/2025 0002 by Shantell Cortez RN  Pain Management Interventions: medication (see MAR)  Intervention: Prevent or Manage Pain  Recent Flowsheet Documentation  Taken 4/13/2025 0002 by Shantell Cortez RN  Medication Review/Management: medications reviewed   Goal Outcome Evaluation:    Patient's wound vac with occasional \"blockage detected\" alarm. Primary RN troubleshoot wound vac, however, the blockage detected alarm continued to go off. Charge RN removed old track pad, created a new insertion site and applied a new track pad. Wound vac working effectively on continuous at 125 mmHg. One time dose of PRN IV dilaudid given for dressing change. Able to make needs known. Call light within reach.     "

## 2025-04-14 ENCOUNTER — PATIENT OUTREACH (OUTPATIENT)
Dept: CARE COORDINATION | Facility: CLINIC | Age: 70
End: 2025-04-14
Payer: COMMERCIAL

## 2025-04-14 ENCOUNTER — APPOINTMENT (OUTPATIENT)
Dept: PHYSICAL THERAPY | Facility: HOSPITAL | Age: 70
End: 2025-04-14
Attending: INTERNAL MEDICINE
Payer: COMMERCIAL

## 2025-04-14 ENCOUNTER — APPOINTMENT (OUTPATIENT)
Dept: OCCUPATIONAL THERAPY | Facility: HOSPITAL | Age: 70
End: 2025-04-14
Attending: INTERNAL MEDICINE
Payer: COMMERCIAL

## 2025-04-14 PROBLEM — I10 ESSENTIAL HYPERTENSION, BENIGN: Status: ACTIVE | Noted: 2019-12-18

## 2025-04-14 PROBLEM — I99.8 ACUTE LOWER LIMB ISCHEMIA: Status: ACTIVE | Noted: 2025-04-14

## 2025-04-14 LAB
ANION GAP SERPL CALCULATED.3IONS-SCNC: 7 MMOL/L (ref 7–15)
BUN SERPL-MCNC: 19.8 MG/DL (ref 8–23)
CALCIUM SERPL-MCNC: 8.7 MG/DL (ref 8.8–10.4)
CHLORIDE SERPL-SCNC: 104 MMOL/L (ref 98–107)
CREAT SERPL-MCNC: 1 MG/DL (ref 0.67–1.17)
EGFRCR SERPLBLD CKD-EPI 2021: 81 ML/MIN/1.73M2
ERYTHROCYTE [DISTWIDTH] IN BLOOD BY AUTOMATED COUNT: 14 % (ref 10–15)
GLUCOSE SERPL-MCNC: 119 MG/DL (ref 70–99)
HCO3 SERPL-SCNC: 29 MMOL/L (ref 22–29)
HCT VFR BLD AUTO: 32.9 % (ref 40–53)
HGB BLD-MCNC: 10.7 G/DL (ref 13.3–17.7)
MAGNESIUM SERPL-MCNC: 2.3 MG/DL (ref 1.7–2.3)
MCH RBC QN AUTO: 28.5 PG (ref 26.5–33)
MCHC RBC AUTO-ENTMCNC: 32.5 G/DL (ref 31.5–36.5)
MCV RBC AUTO: 88 FL (ref 78–100)
PHOSPHATE SERPL-MCNC: 3.1 MG/DL (ref 2.5–4.5)
PLATELET # BLD AUTO: 525 10E3/UL (ref 150–450)
POTASSIUM SERPL-SCNC: 4 MMOL/L (ref 3.4–5.3)
RBC # BLD AUTO: 3.75 10E6/UL (ref 4.4–5.9)
SODIUM SERPL-SCNC: 140 MMOL/L (ref 135–145)
WBC # BLD AUTO: 10.9 10E3/UL (ref 4–11)

## 2025-04-14 PROCEDURE — 84100 ASSAY OF PHOSPHORUS: CPT | Performed by: STUDENT IN AN ORGANIZED HEALTH CARE EDUCATION/TRAINING PROGRAM

## 2025-04-14 PROCEDURE — 83735 ASSAY OF MAGNESIUM: CPT | Performed by: STUDENT IN AN ORGANIZED HEALTH CARE EDUCATION/TRAINING PROGRAM

## 2025-04-14 PROCEDURE — 99232 SBSQ HOSP IP/OBS MODERATE 35: CPT | Performed by: STUDENT IN AN ORGANIZED HEALTH CARE EDUCATION/TRAINING PROGRAM

## 2025-04-14 PROCEDURE — 250N000013 HC RX MED GY IP 250 OP 250 PS 637: Performed by: INTERNAL MEDICINE

## 2025-04-14 PROCEDURE — 97535 SELF CARE MNGMENT TRAINING: CPT | Mod: GO

## 2025-04-14 PROCEDURE — 97606 NEG PRS WND THER DME>50 SQCM: CPT

## 2025-04-14 PROCEDURE — 250N000013 HC RX MED GY IP 250 OP 250 PS 637: Performed by: STUDENT IN AN ORGANIZED HEALTH CARE EDUCATION/TRAINING PROGRAM

## 2025-04-14 PROCEDURE — G0463 HOSPITAL OUTPT CLINIC VISIT: HCPCS | Mod: 25

## 2025-04-14 PROCEDURE — 80048 BASIC METABOLIC PNL TOTAL CA: CPT | Performed by: STUDENT IN AN ORGANIZED HEALTH CARE EDUCATION/TRAINING PROGRAM

## 2025-04-14 PROCEDURE — 97110 THERAPEUTIC EXERCISES: CPT | Mod: GP

## 2025-04-14 PROCEDURE — 120N000001 HC R&B MED SURG/OB

## 2025-04-14 PROCEDURE — 85041 AUTOMATED RBC COUNT: CPT | Performed by: STUDENT IN AN ORGANIZED HEALTH CARE EDUCATION/TRAINING PROGRAM

## 2025-04-14 PROCEDURE — 36415 COLL VENOUS BLD VENIPUNCTURE: CPT | Performed by: STUDENT IN AN ORGANIZED HEALTH CARE EDUCATION/TRAINING PROGRAM

## 2025-04-14 PROCEDURE — 85018 HEMOGLOBIN: CPT | Performed by: STUDENT IN AN ORGANIZED HEALTH CARE EDUCATION/TRAINING PROGRAM

## 2025-04-14 PROCEDURE — 250N000013 HC RX MED GY IP 250 OP 250 PS 637: Performed by: NURSE PRACTITIONER

## 2025-04-14 PROCEDURE — 97116 GAIT TRAINING THERAPY: CPT | Mod: GP

## 2025-04-14 RX ORDER — METHOCARBAMOL 500 MG/1
250 TABLET ORAL 3 TIMES DAILY PRN
Status: DISCONTINUED | OUTPATIENT
Start: 2025-04-14 | End: 2025-04-15 | Stop reason: HOSPADM

## 2025-04-14 RX ORDER — HYDROMORPHONE HYDROCHLORIDE 2 MG/1
2 TABLET ORAL EVERY 4 HOURS PRN
Status: DISCONTINUED | OUTPATIENT
Start: 2025-04-14 | End: 2025-04-15

## 2025-04-14 RX ORDER — HYDROMORPHONE HYDROCHLORIDE 4 MG/1
4 TABLET ORAL
Status: DISCONTINUED | OUTPATIENT
Start: 2025-04-14 | End: 2025-04-15

## 2025-04-14 RX ADMIN — Medication 1 TABLET: at 09:20

## 2025-04-14 RX ADMIN — HYDROMORPHONE HYDROCHLORIDE 4 MG: 4 TABLET ORAL at 12:09

## 2025-04-14 RX ADMIN — ACETAMINOPHEN 975 MG: 325 TABLET, FILM COATED ORAL at 13:44

## 2025-04-14 RX ADMIN — ACETAMINOPHEN 975 MG: 325 TABLET, FILM COATED ORAL at 06:19

## 2025-04-14 RX ADMIN — Medication 500 MG: at 09:18

## 2025-04-14 RX ADMIN — PANTOPRAZOLE SODIUM 40 MG: 40 TABLET, DELAYED RELEASE ORAL at 06:19

## 2025-04-14 RX ADMIN — ACETAMINOPHEN 975 MG: 325 TABLET, FILM COATED ORAL at 00:44

## 2025-04-14 RX ADMIN — Medication 60 ML: at 09:19

## 2025-04-14 RX ADMIN — ACETAMINOPHEN 975 MG: 325 TABLET, FILM COATED ORAL at 17:46

## 2025-04-14 RX ADMIN — ASPIRIN 81 MG: 81 TABLET, COATED ORAL at 09:18

## 2025-04-14 RX ADMIN — DICLOFENAC SODIUM 2 G: 10 GEL TOPICAL at 21:27

## 2025-04-14 RX ADMIN — RIVAROXABAN 20 MG: 10 TABLET, FILM COATED ORAL at 17:47

## 2025-04-14 ASSESSMENT — ACTIVITIES OF DAILY LIVING (ADL)
ADLS_ACUITY_SCORE: 40
ADLS_ACUITY_SCORE: 42
ADLS_ACUITY_SCORE: 40
DEPENDENT_IADLS:: INDEPENDENT
ADLS_ACUITY_SCORE: 40
ADLS_ACUITY_SCORE: 42
ADLS_ACUITY_SCORE: 40

## 2025-04-14 NOTE — PROGRESS NOTES
[facility] Nurse Inpatient Assessment     Consulted for: Left leg fasciotomy    Summary: 4/14 Vac had to be taken off overnight due to an issue.  Replaced vac today and patient tolerated extremely well with just PO pain meds and distraction.    4/11 Changed VAC today with oral pain meds only, patient tolerated well.  Y-connect and medial trac pad tube had loose connection thatwas fixed, but ordered replacement supplies in case nursing needs them prior to next change.  Nurse updated.    [facility] nurse follow-up plan: twice weekly    Patient History (according to provider note(s):      Per Op note:  Date of Service: 3/27/2025      Preoperative diagnosis     Compartment syndrome left leg  Thromboembolism to left lower extremity  CAD s/p CABG in 2010  HTN  HLD     Postoperative diagnosis     same     Surgeon: aWnda Coelho DO RPVI           Procedures:  Fasciotomy left lower extremity      Assessment:      Areas visualized during today's visit:  left leg    Negative pressure wound therapy applied to: Left leg   Last photo: 3/28         Lateral 3/28      Medial 3/28                                         medial 4/1                                                lateral 4/1       4/4 Lateral      Medial      4/8 Lateral                                                    Medial       4/11 Lateral      Medial        4/14 lateral                                                  medial  Wound due to: Surgical Wound   Wound history/plan of care:    Surgical date: 3/27   Service following: Vascular  Date Negative Pressure Wound Therapy initiated: 3/28   Interventions in place: elevation  Is patient s nutritional status compromised? no   If yes, what interventions are in place? N/A  Reason for initiating vac therapy? Need for accelerated granulation tissue  Which?of?the?following?co-morbidities?apply? Obesity  If diabetic is patient on a diabetic management program? N/A   Is osteomyelitis present in  wound? no   If yes what treatments are in place? N/A    Wound base: lateral 10% tendon, otherwise granulating     Palpation of the wound bed: normal       Drainage: small      Volume in cannister: 100     Last cannister change date: 4/14     Description of drainage: bloody      Measurements (length x width x depth, in cm)   Lateral 24 x 4.5 x up to 0.4cm  Medial 23 x 4.5 x 1 cm along posterior     Tunneling N/A      Undermining N/A   Periwound skin: Intact      Color: normal and consistent with surrounding tissue and purple       Temperature: normal    Odor: none   Pain: mild, tender   Pain intervention prior to dressing change: patient tolerated well, oral med per orders, and slow and gentle cares   Treatment goal: Increase granulation  STATUS: improving   Supplies ordered: gathered    Number of foam pieces removed from a wound (excluding foam for bridge) :  gauze    Verified this matched the number of foam pieces applied last dressing change: N/A   Number of foam pieces packed into wound (excluding foam for bridge) :  4 GranuFoam Black and 2 Oil emulsion dressing  2 black foam per side    Treatment Plan:     Negative pressure wound therapy plan:  Wound location: LLE fasciotomy sites   Change Days:  M/Th  by WOC RN    Supplies (including all accessories) used: large Black foam , Adaptic/Curity oil emulsion contact layer , and extra trac pad and Y-connector  Cleanse with Vashe prior to replacing NPWT  Suction setting: -125   Methods used: Window paned all periwound skin with vac drape prior to applying sponge    Staff RN to assess integrity of dressing and ensure suction is set at appropriate level every shift.   Date canister. Chart canister output every shift. Change cannister weekly and PRN if full/occluded     Remove foam dressing and replace with BID normal saline moist gauze dressing if:   -a dressing failure which cannot be repaired within 2 hours   -patient is discharging to home without a home pump  "  -patient is discharging to a facility outside the local area   -if a dressing is a \"Silver Foam\", remove before Radiation Therapy or MRI     The hospital VAC pump is not to be discharged with the patient. Please disconnect the patient from the machine prior to discharge.  If a home VAC pump has been delivered, connect the home cannister to dressing tubing and the cannister to the home pump, turn on home pump  If the patient is transferring to a nearby facility with a VAC, the tubing can be disconnected, clamp tubing and cover the end with a glove, then can be reconnected if within 2 hours  If transfer will be longer than 2 hours, dressing must be removed and placed with a wet to moist gauze dressing for transfer        Orders: Reviewed and Updated    RECOMMEND PRIMARY TEAM ORDER: None, at this time  Education provided: plan of care  Discussed plan of care with: Patient and Nurse  Notify [facility] if wound(s) deteriorate.  Nursing to notify the Provider(s) and re-consult the [facility] Nurse if new skin concern.    DATA:     Current support surface: Standard  Low air loss (BOB pump, Isolibrium, Pulsate)  Containment of urine/stool: Continent of bladder and Continent of bowel  BMI: Body mass index is 31.84 kg/m .   Active diet order: Orders Placed This Encounter      Regular Diet Adult     Output: I/O last 3 completed shifts:  In: 360 [P.O.:360]  Out: 650 [Urine:650]     Labs:   Recent Labs   Lab 04/14/25  0548 04/12/25  0603   ALBUMIN  --  3.4*   HGB 10.7* 11.1*   WBC 10.9 9.9     Pressure injury risk assessment:   Sensory Perception: 3-->slightly limited  Moisture: 3-->occasionally moist  Activity: 3-->walks occasionally  Mobility: 3-->slightly limited  Nutrition: 3-->adequate  Friction and Shear: 2-->potential problem  Gary Score: 17    KYLIE SilvermanN RN CWOCN  Pager no longer in use, please contact through [employer] group: [facility]          "

## 2025-04-14 NOTE — PROGRESS NOTES
RNCM checked in with pt to confirm transport at discharge. Pt would like his son Lobo to transport him. RNCM left msg for Lobo to provide update and check his availability for tomorrow afternoon.     1354 - Rcv'd call back from son Lobo. Lobo confirmed he would be able to transport pt to TCU tomorrow. Son would prefer between [phone] but says he can be flexible if needed.     Continue to keep son updated as needed.

## 2025-04-14 NOTE — PLAN OF CARE
Problem: Pain Acute  Goal: Optimal Pain Control and Function  Outcome: Progressing  Intervention: Develop Pain Management Plan  Recent Flowsheet Documentation  Taken 4/14/2025 1344 by Marta Ramirez RN  Pain Management Interventions: medication (see MAR)   Goal Outcome Evaluation:      Patient reports pain in left lower extremity to continue having minimal pain. Patient used oral hydromorphone prior to wound vac change and tolerated treatment well.

## 2025-04-14 NOTE — PROGRESS NOTES
CLINICAL NUTRITION SERVICES - REASSESSMENT NOTE     RECOMMENDATIONS FOR MDs/PROVIDERS TO ORDER:  None    Registered Dietitian Interventions:  -Encouraged pt to request and continue taking supplements at TCU to help meet increased needs   -Discontinue expedite d/t pt dislike  -Continue ensure bid and magic cup bid to help meet needs    Future/Additional Recommendations:  -Adjust supplements pending intake, weight, tolerance, acceptance, wound healing, labs       INFORMATION OBTAINED  Assessed patient in room. Pt reports he has been working hard to get in 2 meal/day which is greater than his baseline of 1 meal/day at home and he has been taking most of the ensure and the magic cups. He does not like or take the expedite recently.       CURRENT NUTRITION ORDERS  Diet: Orders Placed This Encounter      Regular Diet Adult    Snacks/Supplements: ensure enlive bid, alpesh cup bid, expedite daily= 1380 kcal, 68 g protein    CURRENT INTAKE/TOLERANCE  Pt only ordering 0-1 meal/day from the kitchen and getting food from home. Pt reports eating a total of 2 meal/day and taking most of supplements except expedite    Eating %at meals per flowsheets  Supplement intake not documented    No longer taking expedite  Ensure enlive bid and magic cup bid = 1280 kcal, 58 g protein, meeting 65% of estimated kcal and 50% of estimated protein needs    Difficult to determine adequacy of intake d/t eating 1-2 meal/day from home. With good intake of supplements suspect meeting at least 75% of estimated needs       NEW FINDINGS  GI symptoms:   Last BM 4/13  Skin/wounds: LE fasciotomy - improving per WOC 4/11  Nutrition-relevant labs: Reviewed- no concerns  Nutrition-relevant medications:   mvi with minerals, protonix,  vit C 500 mg  Zinc sulfate course completed. Bowel meds dc'd  Weight:   1-2+ BLE edema. Wt fluid was fluid up from baseline and came back down. Wt 4/10 =5.2% weight loss x 6 weeks  Date/Time Weight Weight Method   04/12/25  1749 92.2 kg (203 lb 4.8 oz) Standing scale   04/10/25 1748 90.4 kg (199 lb 4.8 oz) Standing scale   04/01/25 0626 99 kg (218 lb 4.1 oz) Bed scale   03/31/25 0121 99.5 kg (219 lb 5.7 oz) Bed scale   03/30/25 0609 99.3 kg (218 lb 14.7 oz) Bed scale   03/29/25 0422 103 kg (227 lb 1.2 oz) Bed scale   03/28/25 0600 101.6 kg (223 lb 14.4 oz) Bed scale     03/27/25 95.3 kg (210 lb)   03/19/25 95.7 kg (211 lb)- clinic   09/13/24 95.7 kg (211 lb)   03/13/24 95.3 kg (210 lb)     Dosing Weight: 95.3 kg, based on  usual BW     ASSESSED NUTRITION NEEDS  Estimated Energy Needs: 2000 -2100+ kcals/day ([place] Jeor)x 1.2+  Justification: Increased needs  Estimated Protein Needs: 110 -125 grams protein/day (1.2 - 1.5 grams of pro/kg)  Justification: Wound healing  Estimated Fluid Needs: 2000 -2100 mL/day (1 mL/kcal)  Justification: Maintenance, CHF    MALNUTRITION  % Intake: Decreased intake does not meet criteria  % Weight Loss: Weight loss does not meet criteria  Subcutaneous Fat Loss: None observed  Muscle Loss: None observed  Fluid Accumulation/Edema: Mild, 1+  Malnutrition Diagnosis: Patient does not meet two of the established criteria necessary for diagnosing malnutrition but is at risk for malnutrition  Malnutrition Present on Admission: Yes    EVALUATION OF THE PROGRESS TOWARD GOALS     NUTRITION DIAGNOSIS  Food and nutrition related knowledge deficit r/t CHF needs evidenced by pt desire for higher Na items- resolved-no longer on diet restriction and pt has low intake at baseline    Increased nutrient needs related to wound healing as evidenced by wound vac post fasciotomy- ongoing    GOALS  -Patient to consume % of nutritionally adequate meal trays TID, or the equivalent with supplements/snacks.- difficult to determine, suspect met  -Wound healing- progressing     MONITORING/EVALUATION  Progress toward goals will be monitored and evaluated per policy.

## 2025-04-14 NOTE — PROGRESS NOTES
[facility] ACUTE INPATIENT PAIN SERVICE    [facility], [facility], [facility], [facility], [place]   PAIN Progress Note        ASSESSMENT/ PLAN:  Acute left leg pain pain secondary to fasciotomy and VAC, in the setting of advanced age.   PO medications for dressing changes - vistaril, robaxin, and dilaudid.  HOLDING due to hypotension.     In the last 24 hours, patient has utilized 2 dose of Dilaudid 4 mg oral tablets.    Multimodal Medication Therapy:    Adjuvants:  tylenol & robaxin (could increase to 750mg), continue diclofenac to the finger  Opioids: Hydromorphone/Dilaudid2- 4mg  IV dilaudid PRN - minimize   Non-medication interventions- PT  Constipation Prophylaxis-  HOLDing due to diarrhea yesterday   Follow up /Discharge Recommendations - We recommend prescribing the following at the time of discharge:  Likely 30 tabs of PO dilaudid and 20 tabs of robaxin      For weekly dressing changes suggest the following prescriptions 30 minutes prior to dressing change: 25 mg hydroxyzine, 750 mg Robaxin, and 4 mg Dilaudid tablet.      Subjective:   Telemedicine visit this morning, patient rating LE pain at 2/10, feels improved. We discussed pain control, he is trying not to use IV Dilaudid, will discontinue today, and add note to oral Dilaudid for 2 mg q 4 h prn pain, but 4 mg prior  PT and dressing changes.        Discussed plan with Dr. Mcfarland     HPI:  Dudley Kiran is a 69 year old male who was admitted on 3/27/2025.  Acute Pain Management Team was asked to see the patient for left leg pain. Admitted for leg pain. Imaging indicated left leg occlusion.  History of CAD, HTN, pre-DM.    shows no results.        -MN  pulled from system on 4/2/2025. No results found for the patient and with no opioid/controlled medications per [employer].   Discharge Recommendations - We recommend prescribing the following at the time of discharge: EKTA Velázquez  [facility]  Acute Care Pain Management   Team  Hours of pain coverage Mon-Fri 8-1600  After hours contact the primary team  [facility] (DEDRICK, Priti, SD, RH)   Page via [employer]

## 2025-04-14 NOTE — PLAN OF CARE
At 22:30 the Wound vac alarmed low suction and blockage. Unable to fix issue. The patient was given 4mg Dilaudid for anticipatory pain. Lavender essential oils were given and calming music played during the wound vac dressing removal. The foam and drape were removed, leaving the contact layer. Wet to dry dressing applied. The patient tolerated the dressing change rating pain 3/10. The incoming nurse and charge nurse were updated to notify [facility] nurse for reapplication tomorrow.     Goal Outcome Evaluation:    Problem: Pain Acute  Goal: Optimal Pain Control and Function  Outcome: Progressing  Intervention: Develop Pain Management Plan  Recent Flowsheet Documentation  Taken 4/13/2025 2217 by Chayo Moran RN  Pain Management Interventions: medication (see MAR)  Taken 4/13/2025 1801 by Chayo Moran RN  Pain Management Interventions: medication (see MAR)  Intervention: Prevent or Manage Pain  Recent Flowsheet Documentation  Taken 4/13/2025 1730 by Chayo Moran RN  Medication Review/Management: medications reviewed     Problem: Surgery Nonspecified  Goal: Absence of Bleeding  Outcome: Progressing  Goal: Absence of Infection Signs and Symptoms  Outcome: Progressing     Problem: Wound  Goal: Optimal Wound Healing  Outcome: Progressing

## 2025-04-14 NOTE — PLAN OF CARE
"  Problem: Adult Inpatient Plan of Care  Goal: Plan of Care Review  Description: The Plan of Care Review/Shift note should be completed every shift.  The Outcome Evaluation is a brief statement about your assessment that the patient is improving, declining, or no change.  This information will be displayed automatically on your shiftnote.  Outcome: Progressing  Goal: Patient-Specific Goal (Individualized)  Description: You can add care plan individualizations to a care plan. Examples of Individualization might be:  \"Parent requests to be called daily at 9am for status\", \"I have a hard time hearing out of my right ear\", or \"Do not touch me to wake me up as it startlesme\".  Outcome: Progressing  Goal: Absence of Hospital-Acquired Illness or Injury  Outcome: Progressing  Intervention: Identify and Manage Fall Risk  Recent Flowsheet Documentation  Taken 4/14/2025 0100 by Ny Gant RN  Safety Promotion/Fall Prevention:   safety round/check completed   activity supervised  Intervention: Prevent Skin Injury  Recent Flowsheet Documentation  Taken 4/14/2025 0100 by Ny Gant RN  Body Position: position changed independently  Intervention: Prevent and Manage VTE (Venous Thromboembolism) Risk  Recent Flowsheet Documentation  Taken 4/14/2025 0100 by Ny Gant RN  VTE Prevention/Management:   SCDs off (sequential compression devices)   patient refused intervention  Intervention: Prevent Infection  Recent Flowsheet Documentation  Taken 4/14/2025 0100 by Ny Gant RN  Infection Prevention: hand hygiene promoted  Goal: Optimal Comfort and Wellbeing  Outcome: Progressing  Intervention: Monitor Pain and Promote Comfort  Recent Flowsheet Documentation  Taken 4/14/2025 0044 by Ny Gant RN  Pain Management Interventions: medication (see MAR)  Goal: Readiness for Transition of Care  Outcome: Progressing     Problem: Pain Acute  Goal: Optimal Pain Control and Function  Outcome: " Progressing  Intervention: Develop Pain Management Plan  Recent Flowsheet Documentation  Taken 4/14/2025 0044 by Ny Gant RN  Pain Management Interventions: medication (see MAR)  Intervention: Prevent or Manage Pain  Recent Flowsheet Documentation  Taken 4/14/2025 0100 by Ny Gant RN  Medication Review/Management: medications reviewed     Problem: Wound  Goal: Optimal Coping  Outcome: Progressing  Goal: Optimal Functional Ability  Outcome: Progressing  Intervention: Optimize Functional Ability  Recent Flowsheet Documentation  Taken 4/14/2025 0100 by Ny Gant RN  Activity Management: activity adjusted per tolerance  Activity Assistance Provided: assistance, 1 person  Taken 4/14/2025 0044 by Ny Gant RN  Assistive Device Utilized: walker  Activity Management: activity adjusted per tolerance  Activity Assistance Provided: assistance, 1 person  Goal: Absence of Infection Signs and Symptoms  Outcome: Progressing  Goal: Improved Oral Intake  Outcome: Progressing  Goal: Optimal Pain Control and Function  Outcome: Progressing  Intervention: Prevent or Manage Pain  Recent Flowsheet Documentation  Taken 4/14/2025 0044 by Ny Gant RN  Pain Management Interventions: medication (see MAR)  Goal: Skin Health and Integrity  Outcome: Progressing  Intervention: Optimize Skin Protection  Recent Flowsheet Documentation  Taken 4/14/2025 0100 by Ny Gant RN  Activity Management: activity adjusted per tolerance  Taken 4/14/2025 0044 by Ny Gant RN  Activity Management: activity adjusted per tolerance  Goal: Optimal Wound Healing  Outcome: Progressing   Goal Outcome Evaluation:      Pt A/O x 4. Left leg drsg c/d/I. Denies numbness and tingling. Tylenol for 3/10 left leg pain.

## 2025-04-14 NOTE — PROGRESS NOTES
[facility]    Medicine Progress Note - Hospitalist Service    Date of Admission:  3/27/2025    Assessment & Plan   69 male with history of CAD, hypertension is admitted for acute left lower extremity pain and found to have acute limb ischemia, underwent 4 compartment fasciotomy urgently on admission.  Monitored in the ICU and downgraded clinically 3/28.     #Rhabdomyolysis - resolved  #LLE Acute compartment syndrome s/p fasciotomy  #Acute limb ischemia w/ PAD  - Runoff CTA 3/26 showing abrupt cut off to left PT artery, mild infrarenal aneurysmal dilation  - Echo 3/27 showing EF 45 to 50%, no septal defects  - ABIs 3/27 normal on the right, moderate on the left  - s/p LLE fasciotomy on 3/27. Wound vac placed 3/28.  - Hypercoagulable panel negative; heme/onc signed off 3/29/2025  Plan  -Continue rivaraxoban 20mg daily (started 3/29/2025)  -Continue aspirin - home medication  -Myalgias when statin restarted - will discontinue.  -Wound vac placed 3/28/2025. Continue care per WOC.  -PTOT recommending Acute Rehab - Insurance Denied - planning for TCU placement  -Needs to be off IV pain medications for TCU placement.   - Pain management team following.  - Scheduled Tylenol 975mg TID  - Methocarbamol - holding scheduled, prn 750mg PO  - Opioids: Hydromorphone/Dilaudid 2- 4mg PO  - IV dilaudid PRN - minimize      #Suspected inflammatory arthropathy - Gout vs pseudogout   - Erythema and swelling intermittently at multiple different toes.  - CRP came back markedly elevated at 198.9.  - Hand x-ray showed DJD on 3/31  - Resolved with prednisone 10 mg daily 3/31-4/4  - Left foot XR 4/12 showing Moderate first MTP joint degenerative changes without obvious gout or pseudogout.  - Uric acid upper limit of normal at 6.3 on 4/12.  Plan  - Continue to monitor     #Chronic heart failure with mildly reduced ejection fraction  - Home HF medications - lisinopril, metoprolol  - Echo this admission with EF 45 to  "50%  Plan  - Continue home metoprolol  - Decrease home lisinopril to 2.5mg daily on discharge  -Not clinically hypervolemic, monitor for edema     #CAD  -Remote CABG, no anginal chest pain, monitor clinically  -Continues on aspirin  -Not routinely on statin - see above     Essential hypertension  -Continue lisinopril at 2.5 mg daily (home dose 5 mg) with hold parameters, metoprolol succinate 25 mg daily          Diet: Snacks/Supplements Adult: Magic Cup; With Meals  Regular Diet Adult  Snacks/Supplements Adult: Ensure Enlive; Between Meals    DVT Prophylaxis: Pneumatic Compression Devices  Capps Catheter: Not present  Lines: None     Cardiac Monitoring: None  Code Status: Full Code      Clinically Significant Risk Factors               # Hypoalbuminemia: Lowest albumin = 3 g/dL at 3/28/2025  4:13 AM, will monitor as appropriate     # Hypertension: Noted on problem list            # Obesity: Estimated body mass index is 31.84 kg/m  as calculated from the following:    Height as of this encounter: 1.702 m (5' 7\").    Weight as of this encounter: 92.2 kg (203 lb 4.8 oz).       # History of CABG: noted on surgical history       Social Drivers of Health    Physical Activity: Insufficiently Active (3/17/2025)    Exercise Vital Sign     Days of Exercise per Week: 1 day     Minutes of Exercise per Session: 10 min   Social Connections: Unknown (3/17/2025)    Social Connection and Isolation Panel [NHANES]     Frequency of Social Gatherings with Friends and Family: Once a week          Disposition Plan     Medically Ready for Discharge: Anticipated in 2-4 Days             Ja Mcfarland MD  Hospitalist Service  [facility]  Securely message with [employer] (more info)  Text page via [employer] Paging/Directory   ______________________________________________________________________    Interval History   - Needs Wound VAC changed today  - Continues to have LLE pain - has not needed IV pain meds " overnight.    Physical Exam   Vital Signs: Temp: 97.8  F (36.6  C) Temp src: Oral BP: 99/58 Pulse: 57   Resp: 18 SpO2: 95 % O2 Device: None (Room air)    Weight: 203 lbs 4.8 oz    General: Alert and Oriented x3. Answers questions appropriately. Follows commands.  Eyes:EOMI. PERRL. Normal Conjunctivae.  HENT: Normocephalic. Atraumatic.  Resp: Breath sounds equal throughout. No crackles. No wheezing.  Cardio: Regular rate and rhythm. No murmurs. No friction rub.  Abd: Soft. Non-distended. Non-tender. Bowel sounds normal. No rebound tenderness.  MSK: Wound vac in place LLE  Neuro: CN 2-12 grossly intact. Strength 5/5 in all 4 extremities.  Skin:No rashes. No jaundice.     Medical Decision Making       45 MINUTES SPENT BY ME on the date of service doing chart review, history, exam, documentation & further activities per the note.  MANAGEMENT DISCUSSED with the following over the past 24 hours: RN, CM, Pain MGMT team   Tests ORDERED & REVIEWED in the past 24 hours:  - BMP  - CBC  - Magnesium  - Phosphorus  Medical complexity over the past 24 hours:  - Prescription DRUG MANAGEMENT performed      Data     I have personally reviewed the following data over the past 24 hrs:    10.9  \   10.7 (L)   / 525 (H)     140 104 19.8 /  119 (H)   4.0 29 1.00 \       Imaging results reviewed over the past 24 hrs:   No results found for this or any previous visit (from the past 24 hours).

## 2025-04-14 NOTE — PROGRESS NOTES
Confirmed with Kourtney at [facility] that they can accept patient tomorrow.     Sam barragan approved [id number] Auth#  4/11-4/17

## 2025-04-14 NOTE — PROGRESS NOTES
Clinical Product Navigator RN reviewed chart; patient on payer product coverage.  Review results:   CPN Initial Information Gathering  Referral Source: Health Plan      Patient identified by Health Plan as a candidate for Primary Care Coordination due to current admission. Per chart review, patient is currently admitted as of 3/27/25 to [facility] for Left leg pain and open wound. RN Clinical Product Navigator will monitor through discharge to ensure a TCM/PRimary Care Coordination program is opened post discharge for patient outreach and CC assessment for ongoing support.     Maria Alejandra Hoang RN   Clinical Product Navigator   [email or web address]   Office: [phone]

## 2025-04-15 ENCOUNTER — APPOINTMENT (OUTPATIENT)
Dept: PHYSICAL THERAPY | Facility: HOSPITAL | Age: 70
End: 2025-04-15
Attending: INTERNAL MEDICINE
Payer: COMMERCIAL

## 2025-04-15 ENCOUNTER — APPOINTMENT (OUTPATIENT)
Dept: OCCUPATIONAL THERAPY | Facility: HOSPITAL | Age: 70
End: 2025-04-15
Attending: INTERNAL MEDICINE
Payer: COMMERCIAL

## 2025-04-15 ENCOUNTER — DOCUMENTATION ONLY (OUTPATIENT)
Dept: GERIATRICS | Facility: CLINIC | Age: 70
End: 2025-04-15
Payer: COMMERCIAL

## 2025-04-15 VITALS
SYSTOLIC BLOOD PRESSURE: 105 MMHG | OXYGEN SATURATION: 93 % | TEMPERATURE: 98.4 F | RESPIRATION RATE: 19 BRPM | HEIGHT: 67 IN | DIASTOLIC BLOOD PRESSURE: 55 MMHG | BODY MASS INDEX: 31.91 KG/M2 | WEIGHT: 203.3 LBS | HEART RATE: 74 BPM

## 2025-04-15 PROCEDURE — 99239 HOSP IP/OBS DSCHRG MGMT >30: CPT | Performed by: STUDENT IN AN ORGANIZED HEALTH CARE EDUCATION/TRAINING PROGRAM

## 2025-04-15 PROCEDURE — 97110 THERAPEUTIC EXERCISES: CPT | Mod: GP

## 2025-04-15 PROCEDURE — 99232 SBSQ HOSP IP/OBS MODERATE 35: CPT | Performed by: NURSE PRACTITIONER

## 2025-04-15 PROCEDURE — 250N000013 HC RX MED GY IP 250 OP 250 PS 637: Performed by: INTERNAL MEDICINE

## 2025-04-15 PROCEDURE — 97535 SELF CARE MNGMENT TRAINING: CPT | Mod: GO

## 2025-04-15 PROCEDURE — 97110 THERAPEUTIC EXERCISES: CPT | Mod: GO

## 2025-04-15 PROCEDURE — 250N000013 HC RX MED GY IP 250 OP 250 PS 637: Performed by: NURSE PRACTITIONER

## 2025-04-15 PROCEDURE — 250N000013 HC RX MED GY IP 250 OP 250 PS 637: Performed by: STUDENT IN AN ORGANIZED HEALTH CARE EDUCATION/TRAINING PROGRAM

## 2025-04-15 PROCEDURE — 97116 GAIT TRAINING THERAPY: CPT | Mod: GP

## 2025-04-15 RX ORDER — HYDROMORPHONE HYDROCHLORIDE 4 MG/1
4 TABLET ORAL EVERY 4 HOURS PRN
Status: DISCONTINUED | OUTPATIENT
Start: 2025-04-15 | End: 2025-04-15 | Stop reason: HOSPADM

## 2025-04-15 RX ORDER — BISACODYL 10 MG
10 SUPPOSITORY, RECTAL RECTAL DAILY PRN
DISCHARGE
Start: 2025-04-15

## 2025-04-15 RX ORDER — HYDROMORPHONE HYDROCHLORIDE 2 MG/1
2 TABLET ORAL EVERY 4 HOURS PRN
Status: DISCONTINUED | OUTPATIENT
Start: 2025-04-15 | End: 2025-04-15 | Stop reason: HOSPADM

## 2025-04-15 RX ORDER — MULTIPLE VITAMINS W/ MINERALS TAB 9MG-400MCG
1 TAB ORAL DAILY
DISCHARGE
Start: 2025-04-16

## 2025-04-15 RX ORDER — HYDROXYZINE HYDROCHLORIDE 25 MG/1
25 TABLET, FILM COATED ORAL
DISCHARGE
Start: 2025-04-15

## 2025-04-15 RX ORDER — ASCORBIC ACID 500 MG
500 TABLET ORAL DAILY
DISCHARGE
Start: 2025-04-16

## 2025-04-15 RX ORDER — METHOCARBAMOL 500 MG/1
500 TABLET, FILM COATED ORAL 3 TIMES DAILY PRN
Qty: 20 TABLET | Refills: 0 | Status: SHIPPED | OUTPATIENT
Start: 2025-04-15 | End: 2025-04-16

## 2025-04-15 RX ORDER — AMOXICILLIN 250 MG
2 CAPSULE ORAL 2 TIMES DAILY PRN
DISCHARGE
Start: 2025-04-15

## 2025-04-15 RX ORDER — PROCHLORPERAZINE MALEATE 5 MG/1
5 TABLET ORAL EVERY 6 HOURS PRN
DISCHARGE
Start: 2025-04-15

## 2025-04-15 RX ORDER — ACETAMINOPHEN 325 MG/1
975 TABLET ORAL EVERY 6 HOURS
DISCHARGE
Start: 2025-04-15 | End: 2025-05-15

## 2025-04-15 RX ORDER — HYDROMORPHONE HYDROCHLORIDE 4 MG/1
4 TABLET ORAL EVERY 4 HOURS PRN
Qty: 36 TABLET | Refills: 0 | Status: SHIPPED | OUTPATIENT
Start: 2025-04-15

## 2025-04-15 RX ORDER — PANTOPRAZOLE SODIUM 40 MG/1
40 TABLET, DELAYED RELEASE ORAL
DISCHARGE
Start: 2025-04-16

## 2025-04-15 RX ADMIN — DICLOFENAC SODIUM 2 G: 10 GEL TOPICAL at 09:17

## 2025-04-15 RX ADMIN — ASPIRIN 81 MG: 81 TABLET, COATED ORAL at 09:16

## 2025-04-15 RX ADMIN — ACETAMINOPHEN 975 MG: 325 TABLET, FILM COATED ORAL at 06:45

## 2025-04-15 RX ADMIN — Medication 1 TABLET: at 09:16

## 2025-04-15 RX ADMIN — Medication 500 MG: at 09:16

## 2025-04-15 RX ADMIN — PANTOPRAZOLE SODIUM 40 MG: 40 TABLET, DELAYED RELEASE ORAL at 06:46

## 2025-04-15 ASSESSMENT — ACTIVITIES OF DAILY LIVING (ADL)
ADLS_ACUITY_SCORE: 42

## 2025-04-15 NOTE — PROGRESS NOTES
Care Management Discharge Note    Discharge Date: 04/15/2025       Discharge Disposition: Transitional Care    Discharge Services: Home Care    Discharge DME: Wound Vac    Discharge Transportation: family or friend will provide    Private pay costs discussed: Not applicable    Does the patient's insurance plan have a 3 day qualifying hospital stay waiver?  Yes     Which insurance plan 3 day waiver is available? Alternative insurance waiver    Will the waiver be used for post-acute placement? No    PAS Confirmation Code: [id number]  Patient/family educated on Medicare website which has current facility and service quality ratings: yes    Education Provided on the Discharge Plan: Yes  Persons Notified of Discharge Plans: patient   Patient/Family in Agreement with the Plan: yes    Handoff Referral Completed: No, handoff not indicated or clinically appropriate    Additional Information:  See below     Ashley Hunt RN        Confirmed with Kourtney at [facility] they can accept today as planned. They can get wound vac there within 2 hours of getting the orders.     11:40 AM  Met with patient. He is agreeable to plan. He confirmed his son plans to transport today between 2-3pm

## 2025-04-15 NOTE — PROGRESS NOTES
Physical Therapy Discharge Summary    Reason for therapy discharge:    Discharged to TCU.    Progress towards therapy goal(s). See goals on Care Plan in [facility] electronic health record for goal details.  Goals partially met.  Barriers to achieving goals:   discharge from facility.    Therapy recommendation(s):    Further recommended therapy is related to documented deficits, and is necessary to maximize functional independence in order for patient to return to prior level of function.      Jihan Guadarrama, MARTAT 4/15/2025

## 2025-04-15 NOTE — PROGRESS NOTES
"Pt alert and oriented x4, denies pain, wound vac in place and patent. Slept between cares.     Vital signs:  Temp: 98.2  F (36.8  C) Temp src: Oral BP: 104/61 Pulse: 75   Resp: 19 SpO2: 94 % O2 Device: None (Room air) Oxygen Delivery: 1 LPM Height: 170.2 cm (5' 7\") Weight: 92.2 kg (203 lb 4.8 oz)  Estimated body mass index is 31.84 kg/m  as calculated from the following:    Height as of this encounter: 1.702 m (5' 7\").    Weight as of this encounter: 92.2 kg (203 lb 4.8 oz).        "

## 2025-04-15 NOTE — PROGRESS NOTES
[facility] GERIATRIC SERVICES    Code Status:  FULL CODE   Visit Type:   Chief Complaint   Patient presents with    TCU Admission     [facility] 3/27/2025 - 4/15/2025     Facility:  [facility] ([facility]) [[id number]]         HPI: Dudley Kiran is a 69 year old male who I am seeing today for admit to the TCU. Past medical history includes CAD, hypertension, PAD, CHF, HTN and AAA. Pt recently hospitalized with acute left lower extremity pain. CTA of A/P and LE showed Abrupt cut off of the left posterior tibial artery at the level of the mid tibia concerning for arterial thromboembolism. Pt found to have compartmental syndrome and underwent 4 compartment fasciotomy on 3/27 with Vascular surgery. Wound vac was placed by wound care team on 3/28. Post op complications included pain management. Pt had myalgia with statins. Statin discontinued. Suspected inflammatory arthropathy - Gout vs pseudogout.  Erythema and swelling intermittently at multiple different toes. CRP was elevated at 198.9. Uric acid 6.3. Hand xray 3/31 with DJD. Left foot XR 4/12 showing Moderate first MTP joint degenerative changes without obvious gout or pseudogout. Pt treated with steroids.     Transitional Care Course: Today pt sitting up in bedside chair. S/p fasciotomy. He continues in wound VAC therapy. He continues with pain. He reports it is improving. Most of the pain with walking. We discussed today coordinating his pain med prior to therapy. He continues on tylenol, Dilaudid and tizanidine. 1+ edema noted. He denies any SOB or CP. He reports he is eating good and having regular bowel movements. Bp occasionally elevated. No swelling of joints noted.       Assessment/Plan:      Rhabdomyolysis now resolved  LLE Acute compartment syndrome s/p fasciotomy  Acute limb ischemia w/ PAD  -Runoff CTA 3/26 showing abrupt cut off to left PT artery, mild infrarenal aneurysmal dilation  -Echo 3/27 showing EF 45 to 50%, no septal defects  -ABIs 3/27  normal on the right, moderate on the left  - S/p LLE fasciotomy on 3/27. Wound vac placed 3/28.  -Hypercoagulable panel negative; heme/onc signed off 3/29/2025  -WBAT  -rivaraxoban 20mg daily (started 3/29/2025)  -aspirin 81 mg every day.  -Continue Wound VAC therapy changing 2/weekly.   -OK for PT, OT to eval and treat.   -Tylenol 1000 mg every 6 hours  -Methocarbamol 500 mg as needed every 6 hours for pain  -Dilaudid 4 mg Q 4 hours prn. Give dose ~ 8:30 am prior to 9 am therapy. Pain assessments Q 4 hours.   -Continue to elevate.   -Follow up CBC.      Chronic heart failure with mildly reduced ejection fraction  - Echo this admission with EF 45 to 50%  -metoprolol 12.5 mg Q HS.   -lisinopril to 2.5mg daily   -every day weights.   -Follow up BMP.      CAD  -Remote CABG  -aspirin 81 mg daily.   -No SOB or CP.   -Not routinely on statin- unable to tolerate statin in hospital.      AAA  -3.1 x 3.1`  - Follow up with Vascular Surgery out pt.      Active Ambulatory Problems     Diagnosis Date Noted    Coronary arteriosclerosis due to lipid rich plaque 12/28/2012    History of prediabetes 02/01/2019    Elevated cholesterol 02/15/2019    Essential hypertension, benign 12/18/2019    Primary osteoarthritis of both hips 12/18/2019    Class 1 obesity due to excess calories with serious comorbidity and body mass index (BMI) of 32.0 to 32.9 in adult 08/10/2021    OA (osteoarthritis) 10/27/2021    S/P CABG (coronary artery bypass graft) 10/04/2023    Acute lower limb ischemia 04/14/2025     Resolved Ambulatory Problems     Diagnosis Date Noted    Osteoarthrosis, unspecified whether generalized or localized, pelvic region and thigh 12/28/2012    Health Care Home 12/28/2012    Elevated blood pressure reading without diagnosis of hypertension 02/01/2019    Squamous blepharitis of right upper eyelid 02/01/2019    Periorbital dermatitis 02/01/2019    Overweight (BMI 25.0-29.9) 12/18/2019     Past Medical History:   Diagnosis Date     Arthritis     Coronary artery disease     ED (erectile dysfunction)     HTN (hypertension)     Hypercholesteremia     Obesity     Prediabetes      Allergies   Allergen Reactions    No Known Allergies      Potentially cats       All Meds and Allergies reviewed in the record at the facility and is the most up-to-date.    Post Discharge Medication Reconciliation Status: discharge medications reconciled, continue medications without change  Current Outpatient Medications   Medication Sig Dispense Refill    acetaminophen (TYLENOL) 325 MG tablet Take 3 tablets (975 mg) by mouth every 6 hours.      albuterol (PROAIR HFA/PROVENTIL HFA/VENTOLIN HFA) 108 (90 Base) MCG/ACT inhaler Inhale 2 puffs into the lungs every 6 hours as needed for shortness of breath, wheezing or cough 18 g 4    aspirin 81 MG EC tablet Take 81 mg by mouth daily.      bisacodyl (DULCOLAX) 10 MG suppository Place 1 suppository (10 mg) rectally daily as needed for constipation.      diclofenac (VOLTAREN) 1 % topical gel Apply 2 g topically 3 times daily.      fluticasone-salmeterol (AIRDUO RESPICLICK) 232-14 MCG/ACT inhaler Inhale 1 puff into the lungs 2 times daily 1 each 11    HYDROmorphone (DILAUDID) 4 MG tablet Take 1 tablet (4 mg) by mouth every 4 hours as needed for severe pain. 36 tablet 0    hydrOXYzine HCl (ATARAX) 25 MG tablet Take 1 tablet (25 mg) by mouth every 48 hours as needed for anxiety (adjuvant to pain).      ipratropium (ATROVENT) 0.06 % nasal spray Spray 2 sprays into both nostrils 2 times daily 15 mL 4    lisinopril (ZESTRIL) 5 MG tablet Take 1 tablet (5 mg) by mouth daily. 90 tablet 3    metoprolol succinate ER (TOPROL XL) 25 MG 24 hr tablet TAKE 0.5 TABLETS(12.5 MG) BY MOUTH DAILY 45 tablet 3    multivitamin w/minerals (THERA-VIT-M) tablet Take 1 tablet by mouth daily.      pantoprazole (PROTONIX) 40 MG EC tablet Take 1 tablet (40 mg) by mouth every morning (before breakfast).      prochlorperazine (COMPAZINE) 5 MG tablet Take 1  "tablet (5 mg) by mouth every 6 hours as needed for nausea or vomiting.      rivaroxaban ANTICOAGULANT (XARELTO) 20 MG TABS tablet Take 1 tablet (20 mg) by mouth daily (with dinner).      senna-docusate (SENOKOT-S/PERICOLACE) 8.6-50 MG tablet Take 2 tablets by mouth 2 times daily as needed for constipation.      tadalafil (CIALIS) 10 MG tablet Take 1 tablet (10 mg) by mouth daily as needed (erectile difficulties). 90 tablet 2    tiZANidine (ZANAFLEX) 2 MG capsule Take 2 mg by mouth 2 times daily as needed.      vitamin C (ASCORBIC ACID) 500 MG tablet Take 1 tablet (500 mg) by mouth daily.      wound support modular (EXPEDITE) LIQD bottle Take 60 mLs by mouth daily.       No current facility-administered medications for this visit.       REVIEW OF SYSTEMS:   10 point review of systems reviewed and pertinent positives in the HPI.     PHYSICAL EXAMINATION:  Physical Exam     Vital signs: BP (!) 140/85   Pulse 97   Temp 97.5  F (36.4  C)   Resp 20   Ht 1.702 m (5' 7\")   Wt 90.4 kg (199 lb 3.2 oz)   SpO2 96%   BMI 31.20 kg/m    General: Awake, Alert, oriented x3, sitting up in bedside chair, appropriately, follows simple commands, conversant  HEENT:Pink conjunctiva, anicteric sclerae, moist oral mucosa  NECK: Supple  CVS:  S1  S2, without murmur or gallop.   LUNG: Clear to auscultation, No wheezes, rales or rhonci.  BACK: No kyphosis of the thoracic spine  ABDOMEN: Soft, nontender to palpation, with positive bowel sounds  EXTREMITIES: Moves both upper and lower extremities with some limitation to LLE, 1+ LLE pedal edema  SKIN: Wound VAC to LLE. Beefy red tissue noted beneath.   NEUROLOGIC: Intact, pulses palpable but less on LLE.   PSYCHIATRIC: Cognition intact      Labs:  All labs reviewed in the nursing home record and [facility]   @  Lab Results   Component Value Date    WBC 10.9 04/14/2025     Lab Results   Component Value Date    RBC 3.75 04/14/2025     Lab Results   Component Value Date    HGB 10.7 04/14/2025 "     Lab Results   Component Value Date    HCT 32.9 04/14/2025     Lab Results   Component Value Date    MCV 88 04/14/2025     Lab Results   Component Value Date    MCH 28.5 04/14/2025     Lab Results   Component Value Date    MCHC 32.5 04/14/2025     Lab Results   Component Value Date    RDW 14.0 04/14/2025     Lab Results   Component Value Date     04/14/2025        @Last Comprehensive Metabolic Panel:  Sodium   Date Value Ref Range Status   04/14/2025 140 135 - 145 mmol/L Final   02/15/2019 145.0 (H) 133.0 - 144.0 mmol/L Final     Potassium   Date Value Ref Range Status   04/14/2025 4.0 3.4 - 5.3 mmol/L Final   02/11/2022 4.6 3.5 - 5.0 mmol/L Final   02/15/2019 4.2 3.4 - 5.3 mmol/L Final     Chloride   Date Value Ref Range Status   04/14/2025 104 98 - 107 mmol/L Final   02/11/2022 105 98 - 107 mmol/L Final   02/15/2019 105.0 94.0 - 109.0 mmol/L Final     Carbon Dioxide   Date Value Ref Range Status   02/15/2019 29.0 20.0 - 32.0 mmol/L Final     Carbon Dioxide (CO2)   Date Value Ref Range Status   04/14/2025 29 22 - 29 mmol/L Final   02/11/2022 27 22 - 31 mmol/L Final     Anion Gap   Date Value Ref Range Status   04/14/2025 7 7 - 15 mmol/L Final   02/11/2022 9 5 - 18 mmol/L Final     Glucose   Date Value Ref Range Status   04/14/2025 119 (H) 70 - 99 mg/dL Final   02/11/2022 99 70 - 125 mg/dL Final   02/15/2019 100.0 60.0 - 109.0 mg/dL Final     GLUCOSE BY METER POCT   Date Value Ref Range Status   04/05/2025 106 (H) 70 - 99 mg/dL Final     Urea Nitrogen   Date Value Ref Range Status   04/14/2025 19.8 8.0 - 23.0 mg/dL Final   02/11/2022 9 8 - 22 mg/dL Final   02/15/2019 11.0 7.0 - 30.0 mg/dL Final     Creatinine   Date Value Ref Range Status   04/14/2025 1.00 0.67 - 1.17 mg/dL Final   02/15/2019 1.1 0.8 - 1.5 mg/dL Final     GFR Estimate   Date Value Ref Range Status   04/14/2025 81 >60 mL/min/1.73m2 Final     Comment:     eGFR calculated using 2021 CKD-EPI equation.   12/18/2019 >60 >60 mL/min/1.73m2 Final    07/30/2014 >60 >60 mL/min/1.73m2 Final     Calcium   Date Value Ref Range Status   04/14/2025 8.7 (L) 8.8 - 10.4 mg/dL Final   02/15/2019 9.3 8.5 - 10.4 mg/dL Final     > 45 minutes spent preparing for this visit which included reviewing hospital records, labs, imaging, meds, consults as well as face to face time with pt and collaborating with nursing.     This note has been dictated using voice recognition software. Any grammatical or context distortions are unintentional and inherent to the software    Electronically signed by: Raquel Dunlap, CNP

## 2025-04-15 NOTE — PLAN OF CARE
Problem: Adult Inpatient Plan of Care  Goal: Absence of Hospital-Acquired Illness or Injury  Intervention: Prevent Infection  Recent Flowsheet Documentation  Taken 4/14/2025 1800 by Elizabeth Ledesma RN  Infection Prevention: hand hygiene promoted  Goal: Optimal Comfort and Wellbeing  Intervention: Provide Person-Centered Care  Recent Flowsheet Documentation  Taken 4/14/2025 1800 by Elizabeth Ledesma RN  Trust Relationship/Rapport: care explained     Problem: Delirium  Goal: Optimal Coping  Intervention: Optimize Psychosocial Adjustment to Delirium  Recent Flowsheet Documentation  Taken 4/14/2025 1800 by Elizabeth Ledesma RN  Supportive Measures: active listening utilized  Goal: Improved Behavioral Control  Intervention: Prevent and Manage Agitation  Recent Flowsheet Documentation  Taken 4/14/2025 1800 by Elizabeth Ledesma RN  Environment Familiarity/Consistency: daily routine followed  Intervention: Minimize Safety Risk  Recent Flowsheet Documentation  Taken 4/14/2025 1800 by Elizabeth Ledesma RN  Trust Relationship/Rapport: care explained  Goal: Improved Attention and Thought Clarity  Intervention: Maximize Cognitive Function  Recent Flowsheet Documentation  Taken 4/14/2025 1800 by Elizabeth Ledesma RN  Sensory Stimulation Regulation: care clustered  Reorientation Measures: clock in view     Problem: Risk for Delirium  Goal: Optimal Coping  Intervention: Optimize Psychosocial Adjustment to Delirium  Recent Flowsheet Documentation  Taken 4/14/2025 1800 by Elizabeth Ledesma RN  Supportive Measures: active listening utilized  Goal: Improved Behavioral Control  Intervention: Prevent and Manage Agitation  Recent Flowsheet Documentation  Taken 4/14/2025 1800 by Elizabeth Ledesma RN  Environment Familiarity/Consistency: daily routine followed  Intervention: Minimize Safety Risk  Recent Flowsheet Documentation  Taken 4/14/2025 1800 by Elizabeth Ledesma RN  Trust Relationship/Rapport: care explained  Goal: Improved Attention and  Thought Clarity  Intervention: Maximize Cognitive Function  Recent Flowsheet Documentation  Taken 4/14/2025 1800 by Elizabeth Ledesma, RN  Sensory Stimulation Regulation: care clustered  Reorientation Measures: clock in view   Goal Outcome Evaluation:       Pt up to the chair for 2-3hrs, minimal pain to incisional site manage with schedule Tylenol. Wound vac in place with output, Pt voiding and eating well, pending discharge tomorrow.

## 2025-04-15 NOTE — PROGRESS NOTES
[facility] ACUTE PAIN SERVICE    [facility], [facility], [facility], [facility], [place]   PAIN Progress Note    Assessment/Plan:  Dudley Kiran is a 69 year old male who was admitted on 3/27/2025.  Pain team was asked to see the patient for Acute Pain Management Team was asked to see the patient for left leg pain. Admitted for leg pain. Imaging indicated left leg occlusion.  History of CAD, HTN, pre-DM.    shows no results.     Plans for discharge. Pain team will sign off.     PLAN:   1) Pain is consistent with acute left leg pain pain secondary to fasciotomy and VAC   Multimodal Medication Therapy  Topical: Voltaren gel 3 times daily  NSAID'S: Estimated Creatinine Clearance: 75.4 mL/min (based on SCr of 1 mg/dL).  On Xarelto  Steroids: None  Muscle Relaxants: Robaxin on hold  Adjuvants: Acetaminophen  Antidepressants/anxiolytics: Hydroxyzine 25 mg every 48 hours prior to dressing change  Opioids: Dilaudid 2-4 mg every 4 hours as needed  IV Pain medication: None  Non-medication interventions:  Ice, Rest, PT, OT, Distraction (TV, Music, Reading), and wound care  Constipation Prophylaxis: Scheduled and prn: As needed suppository, as needed Colace, as needed MiraLAX, as needed senna docusate    Discharge Recommendations - We recommend prescribing the following at the time of discharge: Likely dilaudid.     Subjective:  Describes pain as 2-3/10 and aching in the LLE. The patient denies nausea, vomiting, constipation, diarrhea, chest pain, shortness of breath, dizziness, fever, and chills.     Principal Problem:  <principal problem not specified>     Patient Active Problem List   Diagnosis    Coronary arteriosclerosis due to lipid rich plaque    History of prediabetes    Elevated cholesterol    Essential hypertension, benign    Primary osteoarthritis of both hips    Class 1 obesity due to excess calories with serious comorbidity and body mass index (BMI) of 32.0 to 32.9 in adult    OA (osteoarthritis)    S/P  "CABG (coronary artery bypass graft)    Acute lower limb ischemia        Objective:    History   Drug Use Unknown          Tobacco Use      Smoking status: Never      Smokeless tobacco: Never      Vital signs in last 24 hours:  /55 (BP Location: Left arm)   Pulse 74   Temp 98.4  F (36.9  C) (Oral)   Resp 19   Ht 1.702 m (5' 7\")   Wt 92.2 kg (203 lb 4.8 oz)   SpO2 93%   BMI 31.84 kg/m      Weight:     Vitals:    03/30/25 0609 03/31/25 0121 04/01/25 0626 04/10/25 1748   Weight: 99.3 kg (218 lb 14.7 oz) 99.5 kg (219 lb 5.7 oz) 99 kg (218 lb 4.1 oz) 90.4 kg (199 lb 4.8 oz)    04/12/25 1749   Weight: 92.2 kg (203 lb 4.8 oz)      Weight change:   Body mass index is 31.84 kg/m .    Intake/Output last 3 shifts:  I/O last 3 completed shifts:  In: 480 [P.O.:480]  Out: 595 [Urine:495; Drains:100]  Intake/Output this shift:  I/O this shift:  In: -   Out: 325 [Urine:275; Drains:50]    Review of Systems:   As per subjective, all others negative.    Physical Exam:  General Appearance:  Alert, cooperative, no distress, appears stated age, resting in bed    Head:  Normocephalic, without obvious abnormality, atraumatic   Eyes:  PERRL, conjunctiva/corneas clear, EOM's intact   Nose: Nares normal, septum midline   Throat: Lips, mucosa, and tongue normal; teeth and gums normal   Neck: Supple, symmetrical, trachea midline   Back:   Symmetric, no curvature, ROM normal   Lungs:   Clear to auscultation bilaterally, respirations unlabored, room air    Chest Wall:  No tenderness or deformity   Heart:  Regular rate and rhythm, S1, S2 normal    Abdomen:   Soft, non-tender   Extremities: Wound vac to LLE wounds    Skin: Skin warm, dry    Neurologic: Alert and oriented X 3, Moves all 4 extremities   Psych: Affect is appropriate      Imaging: Reviewed I have personally reviewed pertinent notes, labs, tests, and radiologic imaging in patient's chart.  Labs: Reviewed I have personally reviewed pertinent notes, labs, tests, and radiologic " imaging in patient's chart.  Notes: Reviewed I have personally reviewed pertinent notes, labs, tests, and radiologic imaging in patient's chart.    Total time spent 35 minutes with greater than 50% in consultation, education and coordination of care.   Also discussed with RN.   Treatment plan includes: multimodal pain approach, Hospital Medicine Service for medical management, WOC, PT, OT.   Patient educated regarding: multimodal pain approach and medications as listed above.   Elements of Medical Decision Making as described above. Acute or chronic illness or injury or surgery. High risk therapy including opioids, high risk drug therapy including oral and/or parenteral controlled substances.    Patient is understanding of the plan. All questions and concerns addressed to patient's satisfaction.     Alena YBARRA FNP-C  Acute Care Inpatient Pain Management Program  [facility] ([facility])  Hours of coverage Monday-Friday [phone]. After hours please contact Primary team.   Page via Epic chat or [employer]

## 2025-04-15 NOTE — PLAN OF CARE
Occupational Therapy Discharge Summary    Reason for therapy discharge:    Discharged to transitional care facility.    Progress towards therapy goal(s). See goals on Care Plan in [facility] electronic health record for goal details.  Goals not met.  Barriers to achieving goals:   discharge from facility.    Therapy recommendation(s):    Continued therapy is recommended.  Rationale/Recommendations:  decreased ADLs.    Goal Outcome Evaluation:         Mary Reyes, OTR/MT  4/15/2025

## 2025-04-15 NOTE — PLAN OF CARE
Goal Outcome Evaluation:      Plan of Care Reviewed With: patient    Overall Patient Progress: improvingOverall Patient Progress: improving    Problem: Adult Inpatient Plan of Care  Goal: Optimal Comfort and Wellbeing  Intervention: Monitor Pain and Promote Comfort  Recent Flowsheet Documentation  Taken 4/15/2025 1058 by Sanjuana Salamanca RN  Pain Management Interventions:   declines   distraction  Taken 4/15/2025 0909 by Sanjuana Salamanca RN  Pain Management Interventions:   distraction   declines   Reports the pain comes and goes, when he is in bed, the pain is fine 2/10, the pain does come more when the leg is down, but once elevated, it gets better. Is tolerating oral prn and scheduled meds.     Problem: Skin Injury Risk Increased  Goal: Skin Health and Integrity  Intervention: Optimize Skin Protection  Recent Flowsheet Documentation  Taken 4/15/2025 1058 by Sanjuana Salamanca RN  Activity Management: activity adjusted per tolerance  Taken 4/15/2025 0910 by Sanjuana Salamanca RN  Activity Management:   activity adjusted per tolerance   up ad roseline   up in chair  Taken 4/15/2025 0909 by Sanjuana Salamanca RN  Activity Management:   activity adjusted per tolerance   up ad roseline   up in chair   Wound vac in place inside and outside of left calf. Minimal drainage into canister. Will continue to monitor.    Problem: Wound  Goal: Optimal Functional Ability  Outcome: Met  Intervention: Optimize Functional Ability  Recent Flowsheet Documentation  Taken 4/15/2025 1058 by Sanjuana Salamanca RN  Activity Management: activity adjusted per tolerance  Taken 4/15/2025 0910 by Sanjuana Salamanca RN  Assistive Device Utilized: walker  Activity Management:   activity adjusted per tolerance   up ad roseline   up in chair  Activity Assistance Provided: assistance, stand-by  Taken 4/15/2025 0909 by Sanjuana Salamanca RN  Activity Management:   activity adjusted per tolerance   up ad roseline   up in chair  Activity  Assistance Provided: assistance, 1 person   Is stand by assist of 1 using a walker to the recliner, leg elevated while sitting.

## 2025-04-15 NOTE — PROVIDER NOTIFICATION
Text to Dr. Mcfarland, BP is 105/55, hold parameters on metoprolol, will hold dose this morning. Need clarification for lisinopril. Await new orders.

## 2025-04-15 NOTE — DISCHARGE SUMMARY
"[facility]  Hospitalist Discharge Summary      Date of Admission:  3/27/2025  Date of Discharge:  4/15/2025  Discharging Provider: Ja Mcfarland MD  Discharge Service: Hospitalist Service    Discharge Diagnoses   Acute Limb Ischemia of LLE 2/2 Compartment Syndrome s/p Fasciotomy  Arterial Thromboembolism  Rhabdomyolysis  Peripheral Artery Disease      Clinically Significant Risk Factors     # Obesity: Estimated body mass index is 31.84 kg/m  as calculated from the following:    Height as of this encounter: 1.702 m (5' 7\").    Weight as of this encounter: 92.2 kg (203 lb 4.8 oz).       Follow-ups Needed After Discharge   Follow-up Appointments       Follow Up and recommended labs and tests      You need to follow-up in vascular surgery clinic for wound check in 1 week.  I placed referral on discharge.  You should also follow up with primary care provider in 1-2 weeks for post-hospitalization visit.                Discharge Disposition   Discharged to short-term care facility  Condition at discharge: Good    Hospital Course   69 male with history of CAD, hypertension who was admitted on after initially presenting to [facility] ED on 3/26 for management of  acute left lower extremity pain.    In the ED, patient had stable vital signs. Labs notable for WBC 14.4, CK elevated to 13,000, lactate 1.5. Patient noted to have significant pain in the ED. CTA of A/P and LE showed Abrupt cut off of the left posterior tibial artery at the level of the mid tibia concerning for arterial thromboembolism. Plan made to admit at [facility] for vascular surgery evaluation.    Vascular surgery consulted on admission and patient underwent 4 compartment fasciotomy later in the day on 3/27. Wound vac was placed by wound care team on 3/28. Hypercoagulable panel negative; heme/onc signed off 3/29. Patient slowly recovered with PT/OT but continued to have significant pain requiring IV pain medications. PT/OT initially " recommending Acute Rehab, however insurance denied claim x2. Plan made for TCU and patient was stable for discharge on 4/15. Prolonged hospital course due to significant LLE pain. Patient needs to follow up with Vascular surgery in 3-5 days for wound check and further management. Discharged to TCU with pain medications including Tylenol, Dilaudid PO, Methocarbamol and adjust medications for constipation, nausea, etc. Also started on Xarelto during admission and continued on discharge. Should also take aspirin 81mg daily. Wound vac in place on discharge - TCU to manage.     #Rhabdomyolysis - resolved  #LLE Acute compartment syndrome s/p fasciotomy  #Acute limb ischemia w/ PAD  - Runoff CTA 3/26 showing abrupt cut off to left PT artery, mild infrarenal aneurysmal dilation  - Echo 3/27 showing EF 45 to 50%, no septal defects  - ABIs 3/27 normal on the right, moderate on the left  - s/p LLE fasciotomy on 3/27. Wound vac placed 3/28.  - Hypercoagulable panel negative; heme/onc signed off 3/29/2025  Plan  -Continue rivaraxoban 20mg daily (started 3/29/2025)  -Continue aspirin - home medication  -Myalgias when statin restarted - will discontinue.  -Wound vac placed 3/28/2025. Continue care per WOC.  -PTOT recommending Acute Rehab - Insurance Denied - planning for TCU placement  you will need to take the following medications after discharge  - Aspirin 81 mg once daily  - Rivaroxaban 20 mg once daily  - Tylenol 1000 mg every 6 hours as needed for pain  - Methocarbamol 500 mg as needed every 6 hours for pain  - I also gave him prescription of Dilaudid 4 mg to use as needed especially with dressing changes as well as hydroxyzine to use with dressing changes  - I also gave prescriptions for senna for constipation, Compazine for nausea, Protonix for any esophageal reflux     #Suspected inflammatory arthropathy - Gout vs pseudogout - Resolved prior to discharge.   - Erythema and swelling intermittently at multiple different  toes.  - CRP came back markedly elevated at 198.9.  - Hand x-ray showed DJD on 3/31  - Resolved with prednisone 10 mg daily 3/31-4/4  - Left foot XR 4/12 showing Moderate first MTP joint degenerative changes without obvious gout or pseudogout.  - Uric acid upper limit of normal at 6.3 on 4/12.  Plan  - Continue to monitor     #Chronic heart failure with mildly reduced ejection fraction  - Home HF medications - lisinopril, metoprolol  - Echo this admission with EF 45 to 50%  Plan  - Continue home metoprolol  - Decrease home lisinopril to 2.5mg daily on discharge  -Not clinically hypervolemic, monitor for edema     #CAD  -Remote CABG, no anginal chest pain, monitor clinically  -Continues on aspirin  -Not routinely on statin - see above     Essential hypertension  -Continue lisinopril at 2.5 mg daily (home dose 5 mg) with hold parameters, metoprolol succinate 25 mg daily    #AAA  - Follow up with Vascular Surgery    Consultations This Hospital Stay   VASCULAR SURGERY IP CONSULT  PHARMACY IP CONSULT  PHARMACY IP CONSULT  UROLOGY IP CONSULT  OCCUPATIONAL THERAPY ADULT IP CONSULT  PHYSICAL THERAPY ADULT IP CONSULT  CARE MANAGEMENT / SOCIAL WORK IP CONSULT  WOUND OSTOMY CONTINENCE NURSE  IP CONSULT  PHARMACY LIAISON FOR MEDICATION COVERAGE CONSULT  HEMATOLOGY & ONCOLOGY IP CONSULT  NUTRITION SERVICES ADULT IP CONSULT  PHYSICAL THERAPY ADULT IP CONSULT  SOCIAL WORK IP CONSULT  PAIN MANAGEMENT ADULT IP CONSULT  CARE MANAGEMENT / SOCIAL WORK IP CONSULT  PHYSICAL THERAPY ADULT IP CONSULT  OCCUPATIONAL THERAPY ADULT IP CONSULT    Code Status   Full Code    Time Spent on this Encounter   IJa MD, personally saw the patient today and spent greater than 30 minutes discharging this patient.       Ja Mcfarland MD  [address] [address]  Phone: [phone]  Fax:  [phone]  ______________________________________________________________________    Physical Exam   Vital Signs: Temp: 98.4  F (36.9  C) Temp src: Oral BP: 105/55 Pulse: 74   Resp: 19 SpO2: 93 % O2 Device: None (Room air)    Weight: 203 lbs 4.8 oz    General: Alert and Oriented x3. Answers questions appropriately. Follows commands.  Eyes:EOMI. PERRL. Normal Conjunctivae.  HENT: Normocephalic. Atraumatic.  Resp: Breath sounds equal throughout. No crackles. No wheezing.  Cardio: Regular rate and rhythm. No murmurs. No friction rub.  Abd: Soft. Non-distended. Non-tender. Bowel sounds normal. No rebound tenderness.  MSK: Wound vac in place LLE  Neuro: CN 2-12 grossly intact. Strength 5/5 in all 4 extremities.  Skin:No rashes. No jaundice.        Primary Care Physician   Tima Daivs    Discharge Orders      Vascular Surgery Referral      General info for SNF    Length of Stay Estimate: Short Term Care: Estimated # of Days <30  Condition at Discharge: Improving  Level of care:skilled   Rehabilitation Potential: Excellent  Admission H&P remains valid and up-to-date: Yes  Recent Chemotherapy: N/A  Use Nursing Home Standing Orders: Yes     Follow Up and recommended labs and tests    You need to follow-up in vascular surgery clinic for wound check in 1 week.  I placed referral on discharge.  You should also follow up with primary care provider in 1-2 weeks for post-hospitalization visit.     Tubes and Drains    Negative Pressure Wound Therapy Leg   Anterior;Left;Lower;Lateral;Medial;Mid;Inner;Outer 17 days     Reason for your hospital stay    You were hospitalized for acute lower limb ischemia otherwise noticed blockage of blood flow to your left leg.  The vascular surgery team corrected the blockage.     Wound care    Negative pressure wound therapy plan:  Wound location: LLE fasciotomy sites   Change Days: Tu/Fri by WOC RN    Supplies (including all accessories) used: large Black foam , Adaptic/Curity oil emulsion  "contact layer , and extra trac pad and Y-connector  Cleanse with Vashe prior to replacing NPWT  Suction setting: -125   Methods used: Window paned all periwound skin with vac drape prior to applying sponge  Staff RN to assess integrity of dressing and ensure suction is set at appropriate level every shift.   Date canister. Chart canister output every shift. Change cannister weekly and PRN if full/occluded   Remove foam dressing and replace with BID normal saline moist gauze dressing if:   -a dressing failure which cannot be repaired within 2 hours   -patient is discharging to home without a home pump   -patient is discharging to a facility outside the local area   -if a dressing is a \"Silver Foam\", remove before Radiation Therapy or MRI     Activity - Up ad roseline     Full Code     Physical Therapy Adult Consult    Evaluate and treat as clinically indicated.  Reason:  Lower extremity surgery.     Occupational Therapy Adult Consult    Evaluate and treat as clinically indicated.  Reason:  Lower extremity surgery.     Diet    Follow this diet upon discharge: Current Diet:Orders Placed This Encounter      Snacks/Supplements Adult: Magic Cup; With Meals      Snacks/Supplements Adult: Ensure Enlive; Between Meals      Regular Diet Adult       Significant Results and Procedures   Results for orders placed or performed during the hospital encounter of 03/27/25   CT Chest Pulmonary Embolism w Contrast    Narrative    EXAM: CT CHEST PULMONARY EMBOLISM W CONTRAST  LOCATION: [facility]  DATE: 3/28/2025    INDICATION: embolic workup  COMPARISON: CT angiogram of the abdomen and pelvis 3/26/2025  TECHNIQUE: CT chest pulmonary angiogram during arterial phase injection of IV contrast. Multiplanar reformats and MIP reconstructions were performed. Dose reduction techniques were used.   CONTRAST: IsoVue 370 90mL    FINDINGS:  ANGIOGRAM CHEST: Pulmonary arteries are normal caliber and negative for pulmonary emboli. " Thoracic aorta is negative for dissection.    LUNGS AND PLEURA: Lungs are incompletely expanded. Foci of subsegmental atelectasis are present in both lower lobes along the posterior costal pleura adjacent to minimal pleural fluid crowding of the bronchovascular structures around the shelby and limited   atelectasis in the upper lobes along the upper major fissures. A few, sparse groundglass opacities are present in the upper lobes consistent with nonspecific inflammation or edema. Subtle bronchial wall thickening of airways around the shelby and in the   lower lobes. No endoluminal airway debris or bronchiectasis.    MEDIASTINUM: Cardiac chambers are normal in size. No pericardial effusion. Previous median sternotomy and coronary revascularization. No lymphadenopathy. Esophagus is decompressed.    CORONARY ARTERY CALCIFICATION: Previous intervention (stents or CABG).    UPPER ABDOMEN: Vicarious excretion of contrast in the gallbladder. Few nonobstructing calculi are present in the upper poles of both kidneys.    MUSCULOSKELETAL: Disc space narrowing and degenerative osteophytes of the thoracic spine from T3 through the thoracolumbar junction. No compression deformities. Solid osseous union of the median sternotomy.      Impression    IMPRESSION:    1.  No acute pulmonary embolism.  2.  Shallow lung inflation with atelectasis in the bases and around the shelby. Minimal upper lobe opacities consistent with minimal nonspecific alveolar inflammation/edema.  3.  Bilateral nonobstructive nephrolithiasis.   XR Hand Right 2 Views    Narrative    EXAM: XR HAND RIGHT 2 VIEWS  LOCATION: [facility]  DATE: 3/31/2025    INDICATION: Right proximal interphalangeal joint swelling and tenderness following recent trauma  COMPARISON: None.      Impression    IMPRESSION: Degenerative change at the DRUJ. Degenerative change at multiple IP joints of the right hand. No evidence for acute fracture or dislocation.   XR  Foot Left 1 View    Narrative    EXAM: XR FOOT LEFT 1 VIEW  LOCATION: [facility]  DATE: 2025    INDICATION: 1st toe pain  COMPARISON: None.      Impression    IMPRESSION: No acute fracture or malalignment. Moderate first MTP joint degenerative changes. Otherwise mild degenerative changes throughout the foot.   Echocardiogram Complete     Value    LVEF  45-50%    Narrative    [id number]  [id number]  [id number]  [id number]^[email or web address]^[email or web address] [address]     Name: JASON SUE  MRN: [id number]  : 1955  Study Date: 2025 09:44 AM  Age: 69 yrs  Gender: Male  Patient [facility]: [facility]  Reason For Study: Hypertension (HTN)  Ordering Physician: LEXI CASSIDY  Referring Physician: LISSETT MARTIN  Performed By: ALEJANDRINA     BSA: 2.1 m2  Height: 67 in  Weight: 210 lb  HR: 68  BP: 40/81 mmHg  ______________________________________________________________________________  Procedure  Echocardiogram with two-dimensional, color and spectral Doppler. Definity (NDC  #[id number]) given intravenously.  ______________________________________________________________________________  Interpretation Summary     The left ventricle is normal in size.  The visual ejection fraction is 45-50%.  Grade II or moderate diastolic dysfunction.  There is mild global hypokinesia of the left ventricle.  Normal right ventricle size and systolic function.  The left atrium is mildly dilated.  No hemodynamically ignificant valvular abnormalities on 2D or color Doppler  images.     No previous study for comparison.     ______________________________________________________________________________  I      WMSI = 2.00     % Normal = 0     X - Cannot   0 -                      (2) - Mildly 2 -          Segments  Size  Interpret    Hyperkinetic 1 - Normal  Hypokinetic  Hypokinetic  1-2     small                                                     7 -          3-5       moderate  3 - Akinetic 4 -          5 -         6 - Akinetic Dyskinetic   6-14    large               Dyskinetic   Aneurysmal  w/scar       w/scar       15-16   diffuse     Left Ventricle  The left ventricle is normal in size. There is borderline concentric left  ventricular hypertrophy. Grade II or moderate diastolic dysfunction. The  visual ejection fraction is 45-50%. There is mild global hypokinesia of the  left ventricle.     Right Ventricle  Normal right ventricle size and systolic function.     Atria  The left atrium is mildly dilated. Right atrial size is normal. There is no  atrial shunt seen.     Mitral Valve  Mitral valve leaflets appear normal. There is no evidence of mitral stenosis  or clinically significant mitral regurgitation.     Tricuspid Valve  Tricuspid valve leaflets appear normal. There is no evidence of tricuspid  stenosis or clinically significant tricuspid regurgitation.     Aortic Valve  Aortic valve leaflets appear normal. There is no evidence of aortic stenosis  or clinically significant aortic regurgitation.     Pulmonic Valve  The pulmonic valve is not well seen, but is grossly normal.     Vessels  The aorta root is normal. IVC diameter and respiratory changes fall into an  intermediate range suggesting an RA pressure of 8 mmHg.     Pericardium  There is no pericardial effusion.     ______________________________________________________________________________  MMode/2D Measurements & Calculations  IVSd: 0.92 cm  LVIDd: 4.8 cm  LVIDs: 3.6 cm  LVPWd: 1.3 cm  FS: 26.3 %  LV mass(C)d: 193.8 grams  LV mass(C)dI: 93.9 grams/m2  Ao root diam: 3.6 cm  LA dimension: 4.1 cm  asc Aorta Diam: 3.7 cm  LA/Ao: 1.1  LVOT diam: 2.3 cm  LVOT area: 4.2 cm2     Ao root diam index Ht(cm/m): 2.1  Ao root diam index BSA (cm/m2): 1.8  Asc Ao diam index BSA (cm/m2): 1.8  Asc Ao diam index Ht(cm/m): 2.2  EF Biplane: 42.7 %  LA Volume (BP): 68.2 ml  LA Volume Index (BP): 33.1 ml/m2     LA Volume Indexed  (AL/bp): 35.7 ml/m2  RV Base: 3.9 cm  RWT: 0.52  TAPSE: 1.6 cm     Time Measurements  MM HR: 68.0 BPM     Doppler Measurements & Calculations  MV E max anderson: 85.7 cm/sec  MV A max anderson: 69.8 cm/sec  MV E/A: 1.2  MV max PG: 3.7 mmHg  MV mean P.6 mmHg  MV V2 VTI: 23.4 cm  MVA(VTI): 3.5 cm2  MV dec slope: 338.0 cm/sec2  MV dec time: 0.25 sec     Ao V2 max: 122.6 cm/sec  Ao max P.0 mmHg  Ao V2 mean: 79.4 cm/sec  Ao mean PG: 3.0 mmHg  Ao V2 VTI: 25.7 cm  OZZIE(I,D): 3.2 cm2  OZZIE(V,D): 3.4 cm2  LV V1 max PG: 3.9 mmHg  LV V1 max: 99.0 cm/sec  LV V1 VTI: 19.5 cm  SV(LVOT): 81.2 ml  SI(LVOT): 39.3 ml/m2  PA V2 max: 73.1 cm/sec  PA max P.3 mmHg  PA acc time: 0.13 sec  PI end-d anderson: 94.1 cm/sec  AV Anderson Ratio (DI): 0.81  OZZIE Index (cm2/m2): 1.5  E/E': 14.3  E/E' av.4  Lateral E/e': 10.5  Medial E/e': 14.3  Peak E' Anderson: 6.0 cm/sec  RV S Anderson: 9.8 cm/sec     ______________________________________________________________________________  Report approved by: Maryellen Murray MD on 2025 01:05 PM             Discharge Medications   Discharge Medication List as of 4/15/2025 12:13 PM        START taking these medications    Details   bisacodyl (DULCOLAX) 10 MG suppository Place 1 suppository (10 mg) rectally daily as needed for constipation., Transitional      diclofenac (VOLTAREN) 1 % topical gel Apply 2 g topically 3 times daily., Transitional      HYDROmorphone (DILAUDID) 4 MG tablet Take 1 tablet (4 mg) by mouth every 4 hours as needed for severe pain., Disp-36 tablet, R-0, Local Print      hydrOXYzine HCl (ATARAX) 25 MG tablet Take 1 tablet (25 mg) by mouth every 48 hours as needed for anxiety (adjuvant to pain)., Transitional      methocarbamol (ROBAXIN) 500 MG tablet Take 1 tablet (500 mg) by mouth 3 times daily as needed for muscle spasms., Disp-20 tablet, R-0, Local Print      multivitamin w/minerals (THERA-VIT-M) tablet Take 1 tablet by mouth daily., Transitional      pantoprazole (PROTONIX) 40 MG EC tablet Take 1  tablet (40 mg) by mouth every morning (before breakfast)., Transitional      prochlorperazine (COMPAZINE) 5 MG tablet Take 1 tablet (5 mg) by mouth every 6 hours as needed for nausea or vomiting., Transitional      rivaroxaban ANTICOAGULANT (XARELTO) 20 MG TABS tablet Take 1 tablet (20 mg) by mouth daily (with dinner)., Transitional      senna-docusate (SENOKOT-S/PERICOLACE) 8.6-50 MG tablet Take 2 tablets by mouth 2 times daily as needed for constipation., Transitional      vitamin C (ASCORBIC ACID) 500 MG tablet Take 1 tablet (500 mg) by mouth daily., Transitional      wound support modular (EXPEDITE) LIQD bottle Take 60 mLs by mouth daily., Transitional           CONTINUE these medications which have CHANGED    Details   acetaminophen (TYLENOL) 325 MG tablet Take 3 tablets (975 mg) by mouth every 6 hours., Transitional           CONTINUE these medications which have NOT CHANGED    Details   albuterol (PROAIR HFA/PROVENTIL HFA/VENTOLIN HFA) 108 (90 Base) MCG/ACT inhaler Inhale 2 puffs into the lungs every 6 hours as needed for shortness of breath, wheezing or cough, Disp-18 g, R-4, E-PrescribePharmacy may dispense brand covered by insurance (Proair, or proventil or ventolin or generic albuterol inhaler)      aspirin 81 MG EC tablet Take 81 mg by mouth daily., Historical      fluticasone-salmeterol (AIRDUO RESPICLICK) 232-14 MCG/ACT inhaler Inhale 1 puff into the lungs 2 times daily, Disp-1 each, R-11, E-Prescribe      ipratropium (ATROVENT) 0.06 % nasal spray Spray 2 sprays into both nostrils 2 times daily, Disp-15 mL, R-4, E-Prescribe      lisinopril (ZESTRIL) 5 MG tablet Take 1 tablet (5 mg) by mouth daily., Disp-90 tablet, R-3, E-Prescribe      metoprolol succinate ER (TOPROL XL) 25 MG 24 hr tablet TAKE 0.5 TABLETS(12.5 MG) BY MOUTH DAILY, Disp-45 tablet, R-3, E-Prescribe      tadalafil (CIALIS) 10 MG tablet Take 1 tablet (10 mg) by mouth daily as needed (erectile difficulties)., Disp-90 tablet, R-2,  E-PrescribeDo not fill until patient requests           Allergies   Allergies   Allergen Reactions    No Known Allergies      Potentially cats

## 2025-04-16 ENCOUNTER — PATIENT OUTREACH (OUTPATIENT)
Dept: FAMILY MEDICINE | Facility: CLINIC | Age: 70
End: 2025-04-16

## 2025-04-16 ENCOUNTER — LAB REQUISITION (OUTPATIENT)
Dept: LAB | Facility: CLINIC | Age: 70
End: 2025-04-16
Payer: COMMERCIAL

## 2025-04-16 ENCOUNTER — PATIENT OUTREACH (OUTPATIENT)
Dept: CARE COORDINATION | Facility: CLINIC | Age: 70
End: 2025-04-16

## 2025-04-16 ENCOUNTER — TRANSITIONAL CARE UNIT VISIT (OUTPATIENT)
Dept: GERIATRICS | Facility: CLINIC | Age: 70
End: 2025-04-16
Payer: COMMERCIAL

## 2025-04-16 VITALS
RESPIRATION RATE: 20 BRPM | WEIGHT: 199.2 LBS | HEART RATE: 97 BPM | HEIGHT: 67 IN | SYSTOLIC BLOOD PRESSURE: 140 MMHG | BODY MASS INDEX: 31.27 KG/M2 | DIASTOLIC BLOOD PRESSURE: 85 MMHG | TEMPERATURE: 97.5 F | OXYGEN SATURATION: 96 %

## 2025-04-16 DIAGNOSIS — I10 ESSENTIAL HYPERTENSION, BENIGN: ICD-10-CM

## 2025-04-16 DIAGNOSIS — I25.83 CORONARY ARTERIOSCLEROSIS DUE TO LIPID RICH PLAQUE: ICD-10-CM

## 2025-04-16 DIAGNOSIS — I71.40 ABDOMINAL AORTIC ANEURYSM (AAA) WITHOUT RUPTURE, UNSPECIFIED PART: ICD-10-CM

## 2025-04-16 DIAGNOSIS — T79.A0XA COMPARTMENT SYNDROME, UNSPECIFIED, INITIAL ENCOUNTER: ICD-10-CM

## 2025-04-16 DIAGNOSIS — I73.9 PVD (PERIPHERAL VASCULAR DISEASE): ICD-10-CM

## 2025-04-16 DIAGNOSIS — Z98.890 H/O FASCIOTOMY: ICD-10-CM

## 2025-04-16 DIAGNOSIS — I50.32 CHRONIC DIASTOLIC CONGESTIVE HEART FAILURE (H): ICD-10-CM

## 2025-04-16 DIAGNOSIS — I99.8 ACUTE LOWER LIMB ISCHEMIA: ICD-10-CM

## 2025-04-16 DIAGNOSIS — I73.9 PAD (PERIPHERAL ARTERY DISEASE): Primary | ICD-10-CM

## 2025-04-16 DIAGNOSIS — M79.A22 NONTRAUMATIC COMPARTMENT SYNDROME OF LEFT LOWER EXTREMITY: ICD-10-CM

## 2025-04-16 PROCEDURE — 99310 SBSQ NF CARE HIGH MDM 45: CPT | Performed by: NURSE PRACTITIONER

## 2025-04-16 RX ORDER — TIZANIDINE HYDROCHLORIDE 2 MG/1
2 CAPSULE, GELATIN COATED ORAL 2 TIMES DAILY PRN
COMMUNITY

## 2025-04-16 ASSESSMENT — ACTIVITIES OF DAILY LIVING (ADL): DEPENDENT_IADLS:: INDEPENDENT

## 2025-04-16 NOTE — TELEPHONE ENCOUNTER
Transitions of Care Outreach  Chief Complaint   Patient presents with    Hospital F/U       Most Recent Admission Date: 3/27/2025   Most Recent Admission Diagnosis: Left leg pain - M79.605     Most Recent Discharge Date: 4/15/2025   Most Recent Discharge Diagnosis: Open wound - T14.8XXA  Acute lower limb ischemia - I99.8  Constipation, unspecified constipation type - K59.00  Nausea and vomiting, unspecified vomiting type - R11.2  S/P CABG (coronary artery bypass graft) - Z95.1  Class 1 obesity due to excess calories with serious comorbidity and body mass index (BMI) of 32.0 to 32.9 in adult - E66.811, E66.09, Z68.32  Primary osteoarthritis of both hips - M16.0  Essential hypertension, benign - I10  Elevated cholesterol - E78.00  History of prediabetes - Z87.898  Coronary arteriosclerosis due to lipid rich plaque - I25.83     Transitions of Care Assessment    Discharge Assessment  How are you doing now that you are home?: feeling better  How are your symptoms? (Red Flag symptoms escalate to triage hotline per guidelines): Improved  Do you know how to contact your clinic care team if you have future questions or changes to your health status? : Yes  Does the patient have their discharge instructions? : Yes  Does the patient have questions regarding their discharge instructions? : No  Were you started on any new medications or were there changes to any of your previous medications? : Yes  Does the patient have all of their medications?: Yes  Do you have questions regarding any of your medications? : No  Do you have all of your needed medical supplies or equipment (DME)?  (i.e. oxygen tank, CPAP, cane, etc.): Yes    Follow up Plan     Discharge Follow-Up  Discharge follow up appointment scheduled in alignment with recommended follow up timeframe or Transitions of Risk Category? (Low = within 30 days; Moderate= within 14 days; High= within 7 days): No  Patient's follow up appointment not scheduled: Patient declined  scheduling support. Education on the importance of transitions of care follow up. Provided scheduling phone number.    Future Appointments   Date Time Provider Department Center   4/17/2025 11:30 AM Wanda Coelho DO [facility]   9/19/2025  8:00 AM Tima Davis MD [facility]       Outpatient Plan as outlined on AVS reviewed with patient.    For any urgent concerns, please contact our 24 hour nurse triage line: [phone] ([phone])       Dillan Chang RN

## 2025-04-16 NOTE — PROGRESS NOTES
Clinical Product Navigator RN reviewed chart; patient on payer product coverage.  Review results:   CPN Initial Information Gathering  Referral Source: Health Plan    Patient identified by Health Plan as a potential candidate for Primary Care Coordination due to recent admission. Per chart review, patient was admitted to [facility] 3/27 - 4/15/25 for left leg pain and open wound. Patient discharged to [facility], with wound vac in place. RN Clinical Product Navigator will follow patient through TCU discharge and then open a Primary Care Coordination program for outreach.      Clinic Care Coordination Contact  Care Coordination Transition Communication      Assessment: Patient has transitioned to TCU/ARU for short term rehabilitation:    Facility Name: [facility]  Transition Communication:  Notified facility of Primary Care- Care Coordination support via Epic fax.    Plan: Care Coordinator will await notification from facility staff informing of patient's discharge plans/needs. Care Coordinator will review chart and outreach to facility staff every 4 weeks and as needed.       Maria Alejandra Hoang RN   Clinical Product Navigator   [email or web address]   Office: [phone]

## 2025-04-16 NOTE — LETTER
[facility]   To:             Please give to facility    From:   Maria Alejandra Hoang RN  Care Coordinator   [facility]   P: [phone] [email or web address]   Patient Name:  Dudley Kiran YOB: 1955   Admit date: 4/15/25      *Information Needed:  Please contact me when the patient will discharge (or if they will move to long term care)- include the discharge date, disposition, and main diagnosis   If the patient is discharged with home care services, please provide the name of the agency    Also- Please inform me if a care conference is being held.   Phone, Fax or Email with information                   Thank you

## 2025-04-16 NOTE — LETTER
4/16/2025      Dudley Kiran  [address] [address]         [facility]    Code Status:  FULL CODE   Visit Type:   Chief Complaint   Patient presents with     TCU Admission     [facility] 3/27/2025 - 4/15/2025     Facility:  [facility] ([facility]) [[id number]]         HPI: Dudley Kiran is a 69 year old male who I am seeing today for admit to the TCU. Past medical history includes CAD, hypertension, PAD, CHF, HTN and AAA. Pt recently hospitalized with acute left lower extremity pain. CTA of A/P and LE showed Abrupt cut off of the left posterior tibial artery at the level of the mid tibia concerning for arterial thromboembolism. Pt found to have compartmental syndrome and underwent 4 compartment fasciotomy on 3/27 with Vascular surgery. Wound vac was placed by wound care team on 3/28. Post op complications included pain management. Pt had myalgia with statins. Statin discontinued. Suspected inflammatory arthropathy - Gout vs pseudogout.  Erythema and swelling intermittently at multiple different toes. CRP was elevated at 198.9. Uric acid 6.3. Hand xray 3/31 with DJD. Left foot XR 4/12 showing Moderate first MTP joint degenerative changes without obvious gout or pseudogout. Pt treated with steroids.     Transitional Care Course: Today pt sitting up in bedside chair. S/p fasciotomy. He continues in wound VAC therapy. He continues with pain. He reports it is improving. Most of the pain with walking. We discussed today coordinating his pain med prior to therapy. He continues on tylenol, Dilaudid and tizanidine. 1+ edema noted. He denies any SOB or CP. He reports he is eating good and having regular bowel movements. Bp occasionally elevated. No swelling of joints noted.       Assessment/Plan:      Rhabdomyolysis now resolved  LLE Acute compartment syndrome s/p fasciotomy  Acute limb ischemia w/ PAD  -Runoff CTA 3/26 showing abrupt cut off to left PT artery, mild  infrarenal aneurysmal dilation  -Echo 3/27 showing EF 45 to 50%, no septal defects  -ABIs 3/27 normal on the right, moderate on the left  - S/p LLE fasciotomy on 3/27. Wound vac placed 3/28.  -Hypercoagulable panel negative; heme/onc signed off 3/29/2025  -WBAT  -rivaraxoban 20mg daily (started 3/29/2025)  -aspirin 81 mg every day.  -Continue Wound VAC therapy changing 2/weekly.   -OK for PT, OT to eval and treat.   -Tylenol 1000 mg every 6 hours  -Methocarbamol 500 mg as needed every 6 hours for pain  -Dilaudid 4 mg Q 4 hours prn. Give dose ~ 8:30 am prior to 9 am therapy. Pain assessments Q 4 hours.   -Continue to elevate.   -Follow up CBC.      Chronic heart failure with mildly reduced ejection fraction  - Echo this admission with EF 45 to 50%  -metoprolol 12.5 mg Q HS.   -lisinopril to 2.5mg daily   -every day weights.   -Follow up BMP.      CAD  -Remote CABG  -aspirin 81 mg daily.   -No SOB or CP.   -Not routinely on statin- unable to tolerate statin in hospital.      AAA  -3.1 x 3.1`  - Follow up with Vascular Surgery out pt.      Active Ambulatory Problems     Diagnosis Date Noted     Coronary arteriosclerosis due to lipid rich plaque 12/28/2012     History of prediabetes 02/01/2019     Elevated cholesterol 02/15/2019     Essential hypertension, benign 12/18/2019     Primary osteoarthritis of both hips 12/18/2019     Class 1 obesity due to excess calories with serious comorbidity and body mass index (BMI) of 32.0 to 32.9 in adult 08/10/2021     OA (osteoarthritis) 10/27/2021     S/P CABG (coronary artery bypass graft) 10/04/2023     Acute lower limb ischemia 04/14/2025     Resolved Ambulatory Problems     Diagnosis Date Noted     Osteoarthrosis, unspecified whether generalized or localized, pelvic region and thigh 12/28/2012     Health Care Home 12/28/2012     Elevated blood pressure reading without diagnosis of hypertension 02/01/2019     Squamous blepharitis of right upper eyelid 02/01/2019     Periorbital  dermatitis 02/01/2019     Overweight (BMI 25.0-29.9) 12/18/2019     Past Medical History:   Diagnosis Date     Arthritis      Coronary artery disease      ED (erectile dysfunction)      HTN (hypertension)      Hypercholesteremia      Obesity      Prediabetes      Allergies   Allergen Reactions     No Known Allergies      Potentially cats       All Meds and Allergies reviewed in the record at the facility and is the most up-to-date.    Post Discharge Medication Reconciliation Status: discharge medications reconciled, continue medications without change  Current Outpatient Medications   Medication Sig Dispense Refill     acetaminophen (TYLENOL) 325 MG tablet Take 3 tablets (975 mg) by mouth every 6 hours.       albuterol (PROAIR HFA/PROVENTIL HFA/VENTOLIN HFA) 108 (90 Base) MCG/ACT inhaler Inhale 2 puffs into the lungs every 6 hours as needed for shortness of breath, wheezing or cough 18 g 4     aspirin 81 MG EC tablet Take 81 mg by mouth daily.       bisacodyl (DULCOLAX) 10 MG suppository Place 1 suppository (10 mg) rectally daily as needed for constipation.       diclofenac (VOLTAREN) 1 % topical gel Apply 2 g topically 3 times daily.       fluticasone-salmeterol (AIRDUO RESPICLICK) 232-14 MCG/ACT inhaler Inhale 1 puff into the lungs 2 times daily 1 each 11     HYDROmorphone (DILAUDID) 4 MG tablet Take 1 tablet (4 mg) by mouth every 4 hours as needed for severe pain. 36 tablet 0     hydrOXYzine HCl (ATARAX) 25 MG tablet Take 1 tablet (25 mg) by mouth every 48 hours as needed for anxiety (adjuvant to pain).       ipratropium (ATROVENT) 0.06 % nasal spray Spray 2 sprays into both nostrils 2 times daily 15 mL 4     lisinopril (ZESTRIL) 5 MG tablet Take 1 tablet (5 mg) by mouth daily. 90 tablet 3     metoprolol succinate ER (TOPROL XL) 25 MG 24 hr tablet TAKE 0.5 TABLETS(12.5 MG) BY MOUTH DAILY 45 tablet 3     multivitamin w/minerals (THERA-VIT-M) tablet Take 1 tablet by mouth daily.       pantoprazole (PROTONIX) 40 MG  "EC tablet Take 1 tablet (40 mg) by mouth every morning (before breakfast).       prochlorperazine (COMPAZINE) 5 MG tablet Take 1 tablet (5 mg) by mouth every 6 hours as needed for nausea or vomiting.       rivaroxaban ANTICOAGULANT (XARELTO) 20 MG TABS tablet Take 1 tablet (20 mg) by mouth daily (with dinner).       senna-docusate (SENOKOT-S/PERICOLACE) 8.6-50 MG tablet Take 2 tablets by mouth 2 times daily as needed for constipation.       tadalafil (CIALIS) 10 MG tablet Take 1 tablet (10 mg) by mouth daily as needed (erectile difficulties). 90 tablet 2     tiZANidine (ZANAFLEX) 2 MG capsule Take 2 mg by mouth 2 times daily as needed.       vitamin C (ASCORBIC ACID) 500 MG tablet Take 1 tablet (500 mg) by mouth daily.       wound support modular (EXPEDITE) LIQD bottle Take 60 mLs by mouth daily.       No current facility-administered medications for this visit.       REVIEW OF SYSTEMS:   10 point review of systems reviewed and pertinent positives in the HPI.     PHYSICAL EXAMINATION:  Physical Exam     Vital signs: BP (!) 140/85   Pulse 97   Temp 97.5  F (36.4  C)   Resp 20   Ht 1.702 m (5' 7\")   Wt 90.4 kg (199 lb 3.2 oz)   SpO2 96%   BMI 31.20 kg/m    General: Awake, Alert, oriented x3, sitting up in bedside chair, appropriately, follows simple commands, conversant  HEENT:Pink conjunctiva, anicteric sclerae, moist oral mucosa  NECK: Supple  CVS:  S1  S2, without murmur or gallop.   LUNG: Clear to auscultation, No wheezes, rales or rhonci.  BACK: No kyphosis of the thoracic spine  ABDOMEN: Soft, nontender to palpation, with positive bowel sounds  EXTREMITIES: Moves both upper and lower extremities with some limitation to LLE, 1+ LLE pedal edema  SKIN: Wound VAC to LLE. Beefy red tissue noted beneath.   NEUROLOGIC: Intact, pulses palpable but less on LLE.   PSYCHIATRIC: Cognition intact      Labs:  All labs reviewed in the nursing home record and [facility]   @  Lab Results   Component Value Date    WBC 10.9 " 04/14/2025     Lab Results   Component Value Date    RBC 3.75 04/14/2025     Lab Results   Component Value Date    HGB 10.7 04/14/2025     Lab Results   Component Value Date    HCT 32.9 04/14/2025     Lab Results   Component Value Date    MCV 88 04/14/2025     Lab Results   Component Value Date    MCH 28.5 04/14/2025     Lab Results   Component Value Date    MCHC 32.5 04/14/2025     Lab Results   Component Value Date    RDW 14.0 04/14/2025     Lab Results   Component Value Date     04/14/2025        @Last Comprehensive Metabolic Panel:  Sodium   Date Value Ref Range Status   04/14/2025 140 135 - 145 mmol/L Final   02/15/2019 145.0 (H) 133.0 - 144.0 mmol/L Final     Potassium   Date Value Ref Range Status   04/14/2025 4.0 3.4 - 5.3 mmol/L Final   02/11/2022 4.6 3.5 - 5.0 mmol/L Final   02/15/2019 4.2 3.4 - 5.3 mmol/L Final     Chloride   Date Value Ref Range Status   04/14/2025 104 98 - 107 mmol/L Final   02/11/2022 105 98 - 107 mmol/L Final   02/15/2019 105.0 94.0 - 109.0 mmol/L Final     Carbon Dioxide   Date Value Ref Range Status   02/15/2019 29.0 20.0 - 32.0 mmol/L Final     Carbon Dioxide (CO2)   Date Value Ref Range Status   04/14/2025 29 22 - 29 mmol/L Final   02/11/2022 27 22 - 31 mmol/L Final     Anion Gap   Date Value Ref Range Status   04/14/2025 7 7 - 15 mmol/L Final   02/11/2022 9 5 - 18 mmol/L Final     Glucose   Date Value Ref Range Status   04/14/2025 119 (H) 70 - 99 mg/dL Final   02/11/2022 99 70 - 125 mg/dL Final   02/15/2019 100.0 60.0 - 109.0 mg/dL Final     GLUCOSE BY METER POCT   Date Value Ref Range Status   04/05/2025 106 (H) 70 - 99 mg/dL Final     Urea Nitrogen   Date Value Ref Range Status   04/14/2025 19.8 8.0 - 23.0 mg/dL Final   02/11/2022 9 8 - 22 mg/dL Final   02/15/2019 11.0 7.0 - 30.0 mg/dL Final     Creatinine   Date Value Ref Range Status   04/14/2025 1.00 0.67 - 1.17 mg/dL Final   02/15/2019 1.1 0.8 - 1.5 mg/dL Final     GFR Estimate   Date Value Ref Range Status    04/14/2025 81 >60 mL/min/1.73m2 Final     Comment:     eGFR calculated using 2021 CKD-EPI equation.   12/18/2019 >60 >60 mL/min/1.73m2 Final   07/30/2014 >60 >60 mL/min/1.73m2 Final     Calcium   Date Value Ref Range Status   04/14/2025 8.7 (L) 8.8 - 10.4 mg/dL Final   02/15/2019 9.3 8.5 - 10.4 mg/dL Final     > 45 minutes spent preparing for this visit which included reviewing hospital records, labs, imaging, meds, consults as well as face to face time with pt and collaborating with nursing.     This note has been dictated using voice recognition software. Any grammatical or context distortions are unintentional and inherent to the software    Electronically signed by: Raquel Dunlap CNP       Sincerely,        Raquel Dunlap NP    Electronically signed

## 2025-04-17 LAB
ANION GAP SERPL CALCULATED.3IONS-SCNC: 9 MMOL/L (ref 7–15)
BUN SERPL-MCNC: 17.2 MG/DL (ref 8–23)
CALCIUM SERPL-MCNC: 9 MG/DL (ref 8.8–10.4)
CHLORIDE SERPL-SCNC: 105 MMOL/L (ref 98–107)
CREAT SERPL-MCNC: 1.02 MG/DL (ref 0.67–1.17)
EGFRCR SERPLBLD CKD-EPI 2021: 80 ML/MIN/1.73M2
ERYTHROCYTE [DISTWIDTH] IN BLOOD BY AUTOMATED COUNT: 14.1 % (ref 10–15)
GLUCOSE SERPL-MCNC: 103 MG/DL (ref 70–99)
HCO3 SERPL-SCNC: 26 MMOL/L (ref 22–29)
HCT VFR BLD AUTO: 35.3 % (ref 40–53)
HGB BLD-MCNC: 11.1 G/DL (ref 13.3–17.7)
MCH RBC QN AUTO: 29.1 PG (ref 26.5–33)
MCHC RBC AUTO-ENTMCNC: 31.4 G/DL (ref 31.5–36.5)
MCV RBC AUTO: 93 FL (ref 78–100)
PLATELET # BLD AUTO: 461 10E3/UL (ref 150–450)
POTASSIUM SERPL-SCNC: 4.6 MMOL/L (ref 3.4–5.3)
RBC # BLD AUTO: 3.81 10E6/UL (ref 4.4–5.9)
SODIUM SERPL-SCNC: 140 MMOL/L (ref 135–145)
WBC # BLD AUTO: 9.9 10E3/UL (ref 4–11)

## 2025-04-17 PROCEDURE — P9604 ONE-WAY ALLOW PRORATED TRIP: HCPCS | Mod: ORL | Performed by: FAMILY MEDICINE

## 2025-04-17 PROCEDURE — 85027 COMPLETE CBC AUTOMATED: CPT | Mod: ORL | Performed by: FAMILY MEDICINE

## 2025-04-17 PROCEDURE — 80048 BASIC METABOLIC PNL TOTAL CA: CPT | Mod: ORL | Performed by: FAMILY MEDICINE

## 2025-04-17 PROCEDURE — 36415 COLL VENOUS BLD VENIPUNCTURE: CPT | Mod: ORL | Performed by: FAMILY MEDICINE

## 2025-04-18 ENCOUNTER — LAB REQUISITION (OUTPATIENT)
Dept: LAB | Facility: CLINIC | Age: 70
End: 2025-04-18
Payer: COMMERCIAL

## 2025-04-18 DIAGNOSIS — D64.9 ANEMIA, UNSPECIFIED: ICD-10-CM

## 2025-04-22 ENCOUNTER — TRANSITIONAL CARE UNIT VISIT (OUTPATIENT)
Dept: GERIATRICS | Facility: CLINIC | Age: 70
End: 2025-04-22
Payer: COMMERCIAL

## 2025-04-22 ENCOUNTER — OFFICE VISIT (OUTPATIENT)
Dept: VASCULAR SURGERY | Facility: CLINIC | Age: 70
End: 2025-04-22
Attending: STUDENT IN AN ORGANIZED HEALTH CARE EDUCATION/TRAINING PROGRAM
Payer: COMMERCIAL

## 2025-04-22 VITALS
DIASTOLIC BLOOD PRESSURE: 72 MMHG | RESPIRATION RATE: 16 BRPM | HEART RATE: 85 BPM | TEMPERATURE: 98.7 F | SYSTOLIC BLOOD PRESSURE: 114 MMHG

## 2025-04-22 VITALS
HEART RATE: 91 BPM | HEIGHT: 67 IN | SYSTOLIC BLOOD PRESSURE: 101 MMHG | WEIGHT: 201.2 LBS | RESPIRATION RATE: 20 BRPM | DIASTOLIC BLOOD PRESSURE: 39 MMHG | BODY MASS INDEX: 31.58 KG/M2 | OXYGEN SATURATION: 96 % | TEMPERATURE: 97.1 F

## 2025-04-22 DIAGNOSIS — I10 ESSENTIAL HYPERTENSION, BENIGN: ICD-10-CM

## 2025-04-22 DIAGNOSIS — T14.8XXA OPEN WOUND: ICD-10-CM

## 2025-04-22 DIAGNOSIS — I99.8 ACUTE LOWER LIMB ISCHEMIA: ICD-10-CM

## 2025-04-22 DIAGNOSIS — I25.83 CORONARY ARTERIOSCLEROSIS DUE TO LIPID RICH PLAQUE: ICD-10-CM

## 2025-04-22 DIAGNOSIS — I50.32 CHRONIC DIASTOLIC CONGESTIVE HEART FAILURE (H): ICD-10-CM

## 2025-04-22 DIAGNOSIS — I71.40 ABDOMINAL AORTIC ANEURYSM (AAA) WITHOUT RUPTURE, UNSPECIFIED PART: ICD-10-CM

## 2025-04-22 DIAGNOSIS — I73.9 PAD (PERIPHERAL ARTERY DISEASE): Primary | ICD-10-CM

## 2025-04-22 DIAGNOSIS — I73.9 PVD (PERIPHERAL VASCULAR DISEASE): ICD-10-CM

## 2025-04-22 DIAGNOSIS — Z98.890 H/O FASCIOTOMY: ICD-10-CM

## 2025-04-22 DIAGNOSIS — M79.A22 NONTRAUMATIC COMPARTMENT SYNDROME OF LEFT LOWER EXTREMITY: ICD-10-CM

## 2025-04-22 LAB
ERYTHROCYTE [DISTWIDTH] IN BLOOD BY AUTOMATED COUNT: 14.3 % (ref 10–15)
HCT VFR BLD AUTO: 33.5 % (ref 40–53)
HGB BLD-MCNC: 10.6 G/DL (ref 13.3–17.7)
MCH RBC QN AUTO: 28.3 PG (ref 26.5–33)
MCHC RBC AUTO-ENTMCNC: 31.6 G/DL (ref 31.5–36.5)
MCV RBC AUTO: 89 FL (ref 78–100)
PLATELET # BLD AUTO: 352 10E3/UL (ref 150–450)
RBC # BLD AUTO: 3.75 10E6/UL (ref 4.4–5.9)
WBC # BLD AUTO: 7.9 10E3/UL (ref 4–11)

## 2025-04-22 PROCEDURE — G0463 HOSPITAL OUTPT CLINIC VISIT: HCPCS | Performed by: PHYSICIAN ASSISTANT

## 2025-04-22 PROCEDURE — P9603 ONE-WAY ALLOW PRORATED MILES: HCPCS | Mod: ORL | Performed by: NURSE PRACTITIONER

## 2025-04-22 PROCEDURE — 85027 COMPLETE CBC AUTOMATED: CPT | Mod: ORL | Performed by: NURSE PRACTITIONER

## 2025-04-22 PROCEDURE — 36415 COLL VENOUS BLD VENIPUNCTURE: CPT | Mod: ORL | Performed by: NURSE PRACTITIONER

## 2025-04-22 ASSESSMENT — PAIN SCALES - GENERAL: PAINLEVEL_OUTOF10: MODERATE PAIN (4)

## 2025-04-22 NOTE — PROGRESS NOTES
[facility]    Code Status:  FULL CODE   Visit Type:   Chief Complaint   Patient presents with    TCU Follow Up     Facility:  [facility] ([facility]) [[id number]]         HPI: Dudley Kiran is a 69 year old male who I am seeing today for follow up on the TCU. Past medical history includes CAD, hypertension, PAD, CHF, HTN and AAA. Pt recently hospitalized with acute left lower extremity pain. CTA of A/P and LE showed Abrupt cut off of the left posterior tibial artery at the level of the mid tibia concerning for arterial thromboembolism. Pt found to have compartmental syndrome and underwent 4 compartment fasciotomy on 3/27 with Vascular surgery. Wound vac was placed by wound care team on 3/28. Post op complications included pain management. Pt had myalgia with statins. Statin discontinued. Suspected inflammatory arthropathy - Gout vs pseudogout.  Erythema and swelling intermittently at multiple different toes. CRP was elevated at 198.9. Uric acid 6.3. Hand xray 3/31 with DJD. Left foot XR 4/12 showing Moderate first MTP joint degenerative changes without obvious gout or pseudogout. Pt treated with steroids.     Transitional Care Course: Today pt sitting up in bedside chair. He was out to see surgeon today. Pt S/p fasciotomy. He continues in wound VAC therapy. He returned out of the VAC with wet/dry dressing. 2 incisions with beefy red granulation. No redness or warmth. Moderate serosanguinous drainage. Edema improved. Pain improving. He continues on tylenol and Dilaudid. No SOB or CP. BP improved.     Assessment/Plan:      Rhabdomyolysis now resolved  LLE Acute compartment syndrome s/p fasciotomy  Acute limb ischemia w/ PAD  -Runoff CTA 3/26 showing abrupt cut off to left PT artery, mild infrarenal aneurysmal dilation  -Echo 3/27 showing EF 45 to 50%, no septal defects  -ABIs 3/27 normal on the right, moderate on the left  - S/p LLE fasciotomy on 3/27. Wound vac placed 3/28.  -Hypercoagulable  panel negative; heme/onc signed off 3/29/2025  -WBAT  -rivaraxoban 20mg daily (started 3/29/2025)  -aspirin 81 mg every day.  -Continue Wound VAC therapy changing 2/weekly.   -Continues in therapy.   -Tylenol 1000 mg every 6 hours  -Methocarbamol 500 mg as needed every 6 hours for pain  -Dilaudid 4 mg Q 4 hours prn. Give dose ~ 8:30 am prior to 9 am therapy. Pain assessments Q 4 hours.   -Continue to elevate.   -Follow up CBC with hgb 10.6. Monitor.   -Follow up with surgeon today. No new orders. Next appt on 5/12.      Chronic heart failure with mildly reduced ejection fraction  - Echo this admission with EF 45 to 50%  -metoprolol 12.5 mg Q HS.   -lisinopril to 2.5mg daily   -every day weights.   -Follow up BMP WNL.      CAD  -Remote CABG  -aspirin 81 mg daily.   -No SOB or CP.   -Not routinely on statin- unable to tolerate statin in hospital.      AAA  -3.1 x 3.1`  - Follow up with Vascular Surgery out pt.      Active Ambulatory Problems     Diagnosis Date Noted    Coronary arteriosclerosis due to lipid rich plaque 12/28/2012    History of prediabetes 02/01/2019    Elevated cholesterol 02/15/2019    Essential hypertension, benign 12/18/2019    Primary osteoarthritis of both hips 12/18/2019    Class 1 obesity due to excess calories with serious comorbidity and body mass index (BMI) of 32.0 to 32.9 in adult 08/10/2021    OA (osteoarthritis) 10/27/2021    S/P CABG (coronary artery bypass graft) 10/04/2023    Acute lower limb ischemia 04/14/2025     Resolved Ambulatory Problems     Diagnosis Date Noted    Osteoarthrosis, unspecified whether generalized or localized, pelvic region and thigh 12/28/2012    Health Care Home 12/28/2012    Elevated blood pressure reading without diagnosis of hypertension 02/01/2019    Squamous blepharitis of right upper eyelid 02/01/2019    Periorbital dermatitis 02/01/2019    Overweight (BMI 25.0-29.9) 12/18/2019     Past Medical History:   Diagnosis Date    Arthritis     Coronary artery  disease     ED (erectile dysfunction)     HTN (hypertension)     Hypercholesteremia     Obesity     Prediabetes      Allergies   Allergen Reactions    No Known Allergies      Potentially cats       All Meds and Allergies reviewed in the record at the facility and is the most up-to-date.    Current Outpatient Medications   Medication Sig Dispense Refill    acetaminophen (TYLENOL) 325 MG tablet Take 3 tablets (975 mg) by mouth every 6 hours.      albuterol (PROAIR HFA/PROVENTIL HFA/VENTOLIN HFA) 108 (90 Base) MCG/ACT inhaler Inhale 2 puffs into the lungs every 6 hours as needed for shortness of breath, wheezing or cough 18 g 4    aspirin 81 MG EC tablet Take 81 mg by mouth daily.      bisacodyl (DULCOLAX) 10 MG suppository Place 1 suppository (10 mg) rectally daily as needed for constipation.      diclofenac (VOLTAREN) 1 % topical gel Apply 2 g topically 3 times daily.      fluticasone-salmeterol (AIRDUO RESPICLICK) 232-14 MCG/ACT inhaler Inhale 1 puff into the lungs 2 times daily 1 each 11    HYDROmorphone (DILAUDID) 4 MG tablet Take 1 tablet (4 mg) by mouth every 4 hours as needed for severe pain. 36 tablet 0    hydrOXYzine HCl (ATARAX) 25 MG tablet Take 1 tablet (25 mg) by mouth every 48 hours as needed for anxiety (adjuvant to pain).      ipratropium (ATROVENT) 0.06 % nasal spray Spray 2 sprays into both nostrils 2 times daily 15 mL 4    lisinopril (ZESTRIL) 5 MG tablet Take 1 tablet (5 mg) by mouth daily. 90 tablet 3    metoprolol succinate ER (TOPROL XL) 25 MG 24 hr tablet TAKE 0.5 TABLETS(12.5 MG) BY MOUTH DAILY 45 tablet 3    multivitamin w/minerals (THERA-VIT-M) tablet Take 1 tablet by mouth daily.      pantoprazole (PROTONIX) 40 MG EC tablet Take 1 tablet (40 mg) by mouth every morning (before breakfast).      prochlorperazine (COMPAZINE) 5 MG tablet Take 1 tablet (5 mg) by mouth every 6 hours as needed for nausea or vomiting.      rivaroxaban ANTICOAGULANT (XARELTO) 20 MG TABS tablet Take 1 tablet (20 mg)  "by mouth daily (with dinner).      senna-docusate (SENOKOT-S/PERICOLACE) 8.6-50 MG tablet Take 2 tablets by mouth 2 times daily as needed for constipation.      tadalafil (CIALIS) 10 MG tablet Take 1 tablet (10 mg) by mouth daily as needed (erectile difficulties). 90 tablet 2    tiZANidine (ZANAFLEX) 2 MG capsule Take 2 mg by mouth 2 times daily as needed.      vitamin C (ASCORBIC ACID) 500 MG tablet Take 1 tablet (500 mg) by mouth daily.      wound support modular (EXPEDITE) LIQD bottle Take 60 mLs by mouth daily.       No current facility-administered medications for this visit.       REVIEW OF SYSTEMS:   10 point review of systems reviewed and pertinent positives in the HPI.     PHYSICAL EXAMINATION:  Physical Exam     Vital signs: BP (!) 101/39   Pulse 91   Temp 97.1  F (36.2  C)   Resp 20   Ht 1.702 m (5' 7\")   Wt 91.3 kg (201 lb 3.2 oz)   SpO2 96%   BMI 31.51 kg/m    General: Awake, Alert, oriented x3, sitting up in bedside chair, appropriately, follows simple commands, conversant  HEENT:Pink conjunctiva, anicteric sclerae, moist oral mucosa  NECK: Supple  CVS:  S1  S2, without murmur or gallop.   LUNG: Clear to auscultation, No wheezes, rales or rhonci.  BACK: No kyphosis of the thoracic spine  ABDOMEN: Soft, nontender to palpation, with positive bowel sounds  EXTREMITIES: Moves both upper and lower extremities with some limitation to LLE, 1+ LLE pedal edema  SKIN: 2 incisional wounds to LLE with beefy red granulation. Moderate serosanguinous drainage. No redness or warmth.   NEUROLOGIC: Intact, pulses palpable but less on LLE.   PSYCHIATRIC: Cognition intact      Labs:  All labs reviewed in the nursing home record and [facility]   @  Lab Results   Component Value Date    WBC 10.9 04/14/2025     Lab Results   Component Value Date    RBC 3.75 04/14/2025     Lab Results   Component Value Date    HGB 10.7 04/14/2025     Lab Results   Component Value Date    HCT 32.9 04/14/2025     Lab Results   Component Value " Date    MCV 88 04/14/2025     Lab Results   Component Value Date    MCH 28.5 04/14/2025     Lab Results   Component Value Date    MCHC 32.5 04/14/2025     Lab Results   Component Value Date    RDW 14.0 04/14/2025     Lab Results   Component Value Date     04/14/2025        @Last Comprehensive Metabolic Panel:  Sodium   Date Value Ref Range Status   04/17/2025 140 135 - 145 mmol/L Final   02/15/2019 145.0 (H) 133.0 - 144.0 mmol/L Final     Potassium   Date Value Ref Range Status   04/17/2025 4.6 3.4 - 5.3 mmol/L Final   02/11/2022 4.6 3.5 - 5.0 mmol/L Final   02/15/2019 4.2 3.4 - 5.3 mmol/L Final     Chloride   Date Value Ref Range Status   04/17/2025 105 98 - 107 mmol/L Final   02/11/2022 105 98 - 107 mmol/L Final   02/15/2019 105.0 94.0 - 109.0 mmol/L Final     Carbon Dioxide   Date Value Ref Range Status   02/15/2019 29.0 20.0 - 32.0 mmol/L Final     Carbon Dioxide (CO2)   Date Value Ref Range Status   04/17/2025 26 22 - 29 mmol/L Final   02/11/2022 27 22 - 31 mmol/L Final     Anion Gap   Date Value Ref Range Status   04/17/2025 9 7 - 15 mmol/L Final   02/11/2022 9 5 - 18 mmol/L Final     Glucose   Date Value Ref Range Status   04/17/2025 103 (H) 70 - 99 mg/dL Final   02/11/2022 99 70 - 125 mg/dL Final   02/15/2019 100.0 60.0 - 109.0 mg/dL Final     GLUCOSE BY METER POCT   Date Value Ref Range Status   04/05/2025 106 (H) 70 - 99 mg/dL Final     Urea Nitrogen   Date Value Ref Range Status   04/17/2025 17.2 8.0 - 23.0 mg/dL Final   02/11/2022 9 8 - 22 mg/dL Final   02/15/2019 11.0 7.0 - 30.0 mg/dL Final     Creatinine   Date Value Ref Range Status   04/17/2025 1.02 0.67 - 1.17 mg/dL Final   02/15/2019 1.1 0.8 - 1.5 mg/dL Final     GFR Estimate   Date Value Ref Range Status   04/17/2025 80 >60 mL/min/1.73m2 Final   12/18/2019 >60 >60 mL/min/1.73m2 Final   07/30/2014 >60 >60 mL/min/1.73m2 Final     Calcium   Date Value Ref Range Status   04/17/2025 9.0 8.8 - 10.4 mg/dL Final   02/15/2019 9.3 8.5 - 10.4 mg/dL  Final       This note has been dictated using voice recognition software. Any grammatical or context distortions are unintentional and inherent to the software    Electronically signed by: Raquel Dunlap CNP

## 2025-04-22 NOTE — PATIENT INSTRUCTIONS
Fredy Buckner,    Thank you for entrusting your care with us today. After your visit today with HIMANSHU Hernández this is the plan that was discussed at your appointment.    Continue with current wound care orders.    Will need home care set up for wound vac dressing changes if discharged from TCU.    Follow up in 2-3 weeks with Dr. Coelho.    I am including additional information on these things and our contact information if you have any questions or concerns.   Please do not hesitate to reach out to us if you felt we did not answer your questions or you are unsure of the treatment plan after your visit today. Our number is [phone].Thank you for trusting us with your care.         Again thank you for your time.

## 2025-04-22 NOTE — LETTER
4/22/2025      Dudley Kiran  [address] [address]         [facility]    Code Status:  FULL CODE   Visit Type:   Chief Complaint   Patient presents with     TCU Follow Up     Facility:  [facility] ([facility]) [[id number]]         HPI: Dudley Kiran is a 69 year old male who I am seeing today for follow up on the TCU. Past medical history includes CAD, hypertension, PAD, CHF, HTN and AAA. Pt recently hospitalized with acute left lower extremity pain. CTA of A/P and LE showed Abrupt cut off of the left posterior tibial artery at the level of the mid tibia concerning for arterial thromboembolism. Pt found to have compartmental syndrome and underwent 4 compartment fasciotomy on 3/27 with Vascular surgery. Wound vac was placed by wound care team on 3/28. Post op complications included pain management. Pt had myalgia with statins. Statin discontinued. Suspected inflammatory arthropathy - Gout vs pseudogout.  Erythema and swelling intermittently at multiple different toes. CRP was elevated at 198.9. Uric acid 6.3. Hand xray 3/31 with DJD. Left foot XR 4/12 showing Moderate first MTP joint degenerative changes without obvious gout or pseudogout. Pt treated with steroids.     Transitional Care Course: Today pt sitting up in bedside chair. He was out to see surgeon today. Pt S/p fasciotomy. He continues in wound VAC therapy. He returned out of the VAC with wet/dry dressing. 2 incisions with beefy red granulation. No redness or warmth. Moderate serosanguinous drainage. Edema improved. Pain improving. He continues on tylenol and Dilaudid. No SOB or CP. BP improved.     Assessment/Plan:      Rhabdomyolysis now resolved  LLE Acute compartment syndrome s/p fasciotomy  Acute limb ischemia w/ PAD  -Runoff CTA 3/26 showing abrupt cut off to left PT artery, mild infrarenal aneurysmal dilation  -Echo 3/27 showing EF 45 to 50%, no septal defects  -ABIs 3/27 normal on the right,  moderate on the left  - S/p LLE fasciotomy on 3/27. Wound vac placed 3/28.  -Hypercoagulable panel negative; heme/onc signed off 3/29/2025  -WBAT  -rivaraxoban 20mg daily (started 3/29/2025)  -aspirin 81 mg every day.  -Continue Wound VAC therapy changing 2/weekly.   -Continues in therapy.   -Tylenol 1000 mg every 6 hours  -Methocarbamol 500 mg as needed every 6 hours for pain  -Dilaudid 4 mg Q 4 hours prn. Give dose ~ 8:30 am prior to 9 am therapy. Pain assessments Q 4 hours.   -Continue to elevate.   -Follow up CBC with hgb 10.6. Monitor.   -Follow up with surgeon today. No new orders. Next appt on 5/12.      Chronic heart failure with mildly reduced ejection fraction  - Echo this admission with EF 45 to 50%  -metoprolol 12.5 mg Q HS.   -lisinopril to 2.5mg daily   -every day weights.   -Follow up BMP WNL.      CAD  -Remote CABG  -aspirin 81 mg daily.   -No SOB or CP.   -Not routinely on statin- unable to tolerate statin in hospital.      AAA  -3.1 x 3.1`  - Follow up with Vascular Surgery out pt.      Active Ambulatory Problems     Diagnosis Date Noted     Coronary arteriosclerosis due to lipid rich plaque 12/28/2012     History of prediabetes 02/01/2019     Elevated cholesterol 02/15/2019     Essential hypertension, benign 12/18/2019     Primary osteoarthritis of both hips 12/18/2019     Class 1 obesity due to excess calories with serious comorbidity and body mass index (BMI) of 32.0 to 32.9 in adult 08/10/2021     OA (osteoarthritis) 10/27/2021     S/P CABG (coronary artery bypass graft) 10/04/2023     Acute lower limb ischemia 04/14/2025     Resolved Ambulatory Problems     Diagnosis Date Noted     Osteoarthrosis, unspecified whether generalized or localized, pelvic region and thigh 12/28/2012     Health Care Home 12/28/2012     Elevated blood pressure reading without diagnosis of hypertension 02/01/2019     Squamous blepharitis of right upper eyelid 02/01/2019     Periorbital dermatitis 02/01/2019      Overweight (BMI 25.0-29.9) 12/18/2019     Past Medical History:   Diagnosis Date     Arthritis      Coronary artery disease      ED (erectile dysfunction)      HTN (hypertension)      Hypercholesteremia      Obesity      Prediabetes      Allergies   Allergen Reactions     No Known Allergies      Potentially cats       All Meds and Allergies reviewed in the record at the facility and is the most up-to-date.    Current Outpatient Medications   Medication Sig Dispense Refill     acetaminophen (TYLENOL) 325 MG tablet Take 3 tablets (975 mg) by mouth every 6 hours.       albuterol (PROAIR HFA/PROVENTIL HFA/VENTOLIN HFA) 108 (90 Base) MCG/ACT inhaler Inhale 2 puffs into the lungs every 6 hours as needed for shortness of breath, wheezing or cough 18 g 4     aspirin 81 MG EC tablet Take 81 mg by mouth daily.       bisacodyl (DULCOLAX) 10 MG suppository Place 1 suppository (10 mg) rectally daily as needed for constipation.       diclofenac (VOLTAREN) 1 % topical gel Apply 2 g topically 3 times daily.       fluticasone-salmeterol (AIRDUO RESPICLICK) 232-14 MCG/ACT inhaler Inhale 1 puff into the lungs 2 times daily 1 each 11     HYDROmorphone (DILAUDID) 4 MG tablet Take 1 tablet (4 mg) by mouth every 4 hours as needed for severe pain. 36 tablet 0     hydrOXYzine HCl (ATARAX) 25 MG tablet Take 1 tablet (25 mg) by mouth every 48 hours as needed for anxiety (adjuvant to pain).       ipratropium (ATROVENT) 0.06 % nasal spray Spray 2 sprays into both nostrils 2 times daily 15 mL 4     lisinopril (ZESTRIL) 5 MG tablet Take 1 tablet (5 mg) by mouth daily. 90 tablet 3     metoprolol succinate ER (TOPROL XL) 25 MG 24 hr tablet TAKE 0.5 TABLETS(12.5 MG) BY MOUTH DAILY 45 tablet 3     multivitamin w/minerals (THERA-VIT-M) tablet Take 1 tablet by mouth daily.       pantoprazole (PROTONIX) 40 MG EC tablet Take 1 tablet (40 mg) by mouth every morning (before breakfast).       prochlorperazine (COMPAZINE) 5 MG tablet Take 1 tablet (5 mg) by  "mouth every 6 hours as needed for nausea or vomiting.       rivaroxaban ANTICOAGULANT (XARELTO) 20 MG TABS tablet Take 1 tablet (20 mg) by mouth daily (with dinner).       senna-docusate (SENOKOT-S/PERICOLACE) 8.6-50 MG tablet Take 2 tablets by mouth 2 times daily as needed for constipation.       tadalafil (CIALIS) 10 MG tablet Take 1 tablet (10 mg) by mouth daily as needed (erectile difficulties). 90 tablet 2     tiZANidine (ZANAFLEX) 2 MG capsule Take 2 mg by mouth 2 times daily as needed.       vitamin C (ASCORBIC ACID) 500 MG tablet Take 1 tablet (500 mg) by mouth daily.       wound support modular (EXPEDITE) LIQD bottle Take 60 mLs by mouth daily.       No current facility-administered medications for this visit.       REVIEW OF SYSTEMS:   10 point review of systems reviewed and pertinent positives in the HPI.     PHYSICAL EXAMINATION:  Physical Exam     Vital signs: BP (!) 101/39   Pulse 91   Temp 97.1  F (36.2  C)   Resp 20   Ht 1.702 m (5' 7\")   Wt 91.3 kg (201 lb 3.2 oz)   SpO2 96%   BMI 31.51 kg/m    General: Awake, Alert, oriented x3, sitting up in bedside chair, appropriately, follows simple commands, conversant  HEENT:Pink conjunctiva, anicteric sclerae, moist oral mucosa  NECK: Supple  CVS:  S1  S2, without murmur or gallop.   LUNG: Clear to auscultation, No wheezes, rales or rhonci.  BACK: No kyphosis of the thoracic spine  ABDOMEN: Soft, nontender to palpation, with positive bowel sounds  EXTREMITIES: Moves both upper and lower extremities with some limitation to LLE, 1+ LLE pedal edema  SKIN: 2 incisional wounds to LLE with beefy red granulation. Moderate serosanguinous drainage. No redness or warmth.   NEUROLOGIC: Intact, pulses palpable but less on LLE.   PSYCHIATRIC: Cognition intact      Labs:  All labs reviewed in the nursing home record and Epic   @  Lab Results   Component Value Date    WBC 10.9 04/14/2025     Lab Results   Component Value Date    RBC 3.75 04/14/2025     Lab Results "   Component Value Date    HGB 10.7 04/14/2025     Lab Results   Component Value Date    HCT 32.9 04/14/2025     Lab Results   Component Value Date    MCV 88 04/14/2025     Lab Results   Component Value Date    MCH 28.5 04/14/2025     Lab Results   Component Value Date    MCHC 32.5 04/14/2025     Lab Results   Component Value Date    RDW 14.0 04/14/2025     Lab Results   Component Value Date     04/14/2025        @Last Comprehensive Metabolic Panel:  Sodium   Date Value Ref Range Status   04/17/2025 140 135 - 145 mmol/L Final   02/15/2019 145.0 (H) 133.0 - 144.0 mmol/L Final     Potassium   Date Value Ref Range Status   04/17/2025 4.6 3.4 - 5.3 mmol/L Final   02/11/2022 4.6 3.5 - 5.0 mmol/L Final   02/15/2019 4.2 3.4 - 5.3 mmol/L Final     Chloride   Date Value Ref Range Status   04/17/2025 105 98 - 107 mmol/L Final   02/11/2022 105 98 - 107 mmol/L Final   02/15/2019 105.0 94.0 - 109.0 mmol/L Final     Carbon Dioxide   Date Value Ref Range Status   02/15/2019 29.0 20.0 - 32.0 mmol/L Final     Carbon Dioxide (CO2)   Date Value Ref Range Status   04/17/2025 26 22 - 29 mmol/L Final   02/11/2022 27 22 - 31 mmol/L Final     Anion Gap   Date Value Ref Range Status   04/17/2025 9 7 - 15 mmol/L Final   02/11/2022 9 5 - 18 mmol/L Final     Glucose   Date Value Ref Range Status   04/17/2025 103 (H) 70 - 99 mg/dL Final   02/11/2022 99 70 - 125 mg/dL Final   02/15/2019 100.0 60.0 - 109.0 mg/dL Final     GLUCOSE BY METER POCT   Date Value Ref Range Status   04/05/2025 106 (H) 70 - 99 mg/dL Final     Urea Nitrogen   Date Value Ref Range Status   04/17/2025 17.2 8.0 - 23.0 mg/dL Final   02/11/2022 9 8 - 22 mg/dL Final   02/15/2019 11.0 7.0 - 30.0 mg/dL Final     Creatinine   Date Value Ref Range Status   04/17/2025 1.02 0.67 - 1.17 mg/dL Final   02/15/2019 1.1 0.8 - 1.5 mg/dL Final     GFR Estimate   Date Value Ref Range Status   04/17/2025 80 >60 mL/min/1.73m2 Final   12/18/2019 >60 >60 mL/min/1.73m2 Final   07/30/2014 >60  >60 mL/min/1.73m2 Final     Calcium   Date Value Ref Range Status   04/17/2025 9.0 8.8 - 10.4 mg/dL Final   02/15/2019 9.3 8.5 - 10.4 mg/dL Final       This note has been dictated using voice recognition software. Any grammatical or context distortions are unintentional and inherent to the software    Electronically signed by: Raquel Dunlap CNP       Sincerely,        Raquel Dunlap NP    Electronically signed

## 2025-04-22 NOTE — PROGRESS NOTES
VASCULAR SURGERY PROGRESS NOTE    LOCATION:  [facility]     Dudley Kiran  Medical Record #: [id number]  YOB: 1955  Age: 69 year old     Date of Service: 4/22/2025    PRIMARY CARE PROVIDER: Tima Davis    Reason for visit: Wound check     IMPRESSION: 69-year-old male presenting for wound check of left lower extremity fasciotomy sites. Wounds appear healthy and without signs of infection today. Patient continues with wound VAC therapy at this time and is not interested in skin grafting to his fasciotomy sites.     RECOMMENDATION/RISKS: Continue best medical management with Xarelto, aspirin, and statin. Continue wound VAC therapy to the left lower extremity changing 2 times per week.  Activity as tolerated, no weightbearing restrictions to the left lower extremity at this time.  Follow-up in 2-3 weeks for repeat wound check.    HPI:  Dudley Kiran is a 69 year old male with past medical history significant for hypertension, hyperlipidemia, coronary artery disease who underwent recent left lower extremity fasciotomy due to compartment syndrome following an acute ischemic event.  He presents today for a wound check and is accompanied by his son.  Patient notes he has been making slow progress at the TCU where he has been residing since his hospital discharge.  He has been working with physical therapy regularly and was able to do a few steps yesterday.  He is still not able to fully bear weight and walk for any meaningful distance.  Mr. Kiran also continues to utilize pain medication 3 times a day for adequate pain control.  Staff at his facility assist with his twice weekly VAC changes.  He denies any fevers, chills, or sweats.  He is compliant with his medications.  Patient notes he may be leaving the TCU soon due to insurance coverage but does not quite feel ready to return home.  We discussed that he will most likely be set up with home care services when he is deemed  safe for discharge.  No other concerns.    REVIEW OF SYSTEMS:    A 12 point ROS was reviewed and is negative except for what is listed above in HPI.    PHH:    Past Medical History:   Diagnosis Date    Arthritis     Coronary artery disease     ED (erectile dysfunction)     HTN (hypertension)     Hypercholesteremia     Obesity     Prediabetes     Primary osteoarthritis of both hips       Past Surgical History:   Procedure Laterality Date    ARTHROPLASTY, HIP, TOTAL, DIRECT ANTERIOR APPROACH, USING HANA TABLE Left 10/27/2021    Procedure: LEFT TOTAL HIP ARTHROPLASTY DIRECT ANTERIOR APPROACH;  Surgeon: Bon Little MD;  Location: [facility] OR    BYPASS GRAFT ARTERY CORONARY  2010    FASCIOTOMY LOWER EXTREMITY Left 3/27/2025    Procedure: FASCIOTOMY, LEFT LOWER EXTREMITY;  Surgeon: Wanda Coelho DO;  Location: [facility] OR     ALLERGIES:  No known allergies    MEDS:    Current Outpatient Medications:     acetaminophen (TYLENOL) 325 MG tablet, Take 3 tablets (975 mg) by mouth every 6 hours., Disp: , Rfl:     albuterol (PROAIR HFA/PROVENTIL HFA/VENTOLIN HFA) 108 (90 Base) MCG/ACT inhaler, Inhale 2 puffs into the lungs every 6 hours as needed for shortness of breath, wheezing or cough, Disp: 18 g, Rfl: 4    aspirin 81 MG EC tablet, Take 81 mg by mouth daily., Disp: , Rfl:     bisacodyl (DULCOLAX) 10 MG suppository, Place 1 suppository (10 mg) rectally daily as needed for constipation., Disp: , Rfl:     diclofenac (VOLTAREN) 1 % topical gel, Apply 2 g topically 3 times daily., Disp: , Rfl:     fluticasone-salmeterol (AIRDUO RESPICLICK) 232-14 MCG/ACT inhaler, Inhale 1 puff into the lungs 2 times daily, Disp: 1 each, Rfl: 11    HYDROmorphone (DILAUDID) 4 MG tablet, Take 1 tablet (4 mg) by mouth every 4 hours as needed for severe pain., Disp: 36 tablet, Rfl: 0    hydrOXYzine HCl (ATARAX) 25 MG tablet, Take 1 tablet (25 mg) by mouth every 48 hours as needed for anxiety (adjuvant to pain)., Disp: ,  Rfl:     ipratropium (ATROVENT) 0.06 % nasal spray, Spray 2 sprays into both nostrils 2 times daily, Disp: 15 mL, Rfl: 4    lisinopril (ZESTRIL) 5 MG tablet, Take 1 tablet (5 mg) by mouth daily., Disp: 90 tablet, Rfl: 3    metoprolol succinate ER (TOPROL XL) 25 MG 24 hr tablet, TAKE 0.5 TABLETS(12.5 MG) BY MOUTH DAILY, Disp: 45 tablet, Rfl: 3    multivitamin w/minerals (THERA-VIT-M) tablet, Take 1 tablet by mouth daily., Disp: , Rfl:     pantoprazole (PROTONIX) 40 MG EC tablet, Take 1 tablet (40 mg) by mouth every morning (before breakfast)., Disp: , Rfl:     prochlorperazine (COMPAZINE) 5 MG tablet, Take 1 tablet (5 mg) by mouth every 6 hours as needed for nausea or vomiting., Disp: , Rfl:     rivaroxaban ANTICOAGULANT (XARELTO) 20 MG TABS tablet, Take 1 tablet (20 mg) by mouth daily (with dinner)., Disp: , Rfl:     senna-docusate (SENOKOT-S/PERICOLACE) 8.6-50 MG tablet, Take 2 tablets by mouth 2 times daily as needed for constipation., Disp: , Rfl:     tadalafil (CIALIS) 10 MG tablet, Take 1 tablet (10 mg) by mouth daily as needed (erectile difficulties)., Disp: 90 tablet, Rfl: 2    tiZANidine (ZANAFLEX) 2 MG capsule, Take 2 mg by mouth 2 times daily as needed., Disp: , Rfl:     vitamin C (ASCORBIC ACID) 500 MG tablet, Take 1 tablet (500 mg) by mouth daily., Disp: , Rfl:     wound support modular (EXPEDITE) LIQD bottle, Take 60 mLs by mouth daily., Disp: , Rfl:     SOCIAL HABITS:    History   Smoking Status    Never   Smokeless Tobacco    Never     Social History    Substance and Sexual Activity      Alcohol use: No        Alcohol/week: 0.0 standard drinks of alcohol      History   Drug Use Unknown     FAMILY HISTORY:    Family History   Problem Relation Age of Onset    Diabetes No family hx of     Diabetes No family hx of     Breast Cancer No family hx of     Colon Cancer No family hx of     Prostate Cancer No family hx of     Hypertension No family hx of      PE:  There were no vitals taken for this visit.  Wt  Readings from Last 1 Encounters:   04/16/25 90.4 kg (199 lb 3.2 oz)     There is no height or weight on file to calculate BMI.    EXAM:  GENERAL: well-developed 69 year old male who appears his stated age  CARDIAC: normal   CHEST/LUNG: normal respiratory effort   MUSCULOSKELETAL: grossly normal and both lower extremities are intact, no lower extremity edema  NEUROLOGIC: focally intact, alert and oriented x 3  PSYCH: appropriate affect  VASCULAR: fasciotomy sites with healthy, granulation tissue at wound base, kelechi wound skin intact without erythema or maceration, images uploaded to chart     DIAGNOSTIC STUDIES:     Images:  Pertinent imaging reviewed.    LABS:      Sodium   Date Value Ref Range Status   04/17/2025 140 135 - 145 mmol/L Final   04/14/2025 140 135 - 145 mmol/L Final   04/12/2025 142 135 - 145 mmol/L Final   02/15/2019 145.0 (H) 133.0 - 144.0 mmol/L Final   02/01/2019 143.0 133.0 - 144.0 mmol/L Final   05/06/2016 139.0 133.0 - 144.0 mmol/L Final     Urea Nitrogen   Date Value Ref Range Status   04/17/2025 17.2 8.0 - 23.0 mg/dL Final   04/14/2025 19.8 8.0 - 23.0 mg/dL Final   04/12/2025 15.7 8.0 - 23.0 mg/dL Final   02/11/2022 9 8 - 22 mg/dL Final   10/14/2021 11 8 - 22 mg/dL Final   12/18/2019 14 8 - 22 mg/dL Final   02/15/2019 11.0 7.0 - 30.0 mg/dL Final   02/01/2019 14.0 7.0 - 30.0 mg/dL Final   05/06/2016 13.0 7.0 - 30.0 mg/dL Final     Hemoglobin   Date Value Ref Range Status   04/22/2025 10.6 (L) 13.3 - 17.7 g/dL Final   04/17/2025 11.1 (L) 13.3 - 17.7 g/dL Final   04/14/2025 10.7 (L) 13.3 - 17.7 g/dL Final     Platelet Count   Date Value Ref Range Status   04/22/2025 352 150 - 450 10e3/uL Final   04/17/2025 461 (H) 150 - 450 10e3/uL Final   04/14/2025 525 (H) 150 - 450 10e3/uL Final     INR   Date Value Ref Range Status   03/27/2025 1.16 (H) 0.85 - 1.15 Final   03/27/2025 1.17 (H) 0.85 - 1.15 Final     30 minutes spent on the day of encounter doing chart review, history and exam, documentation,  and further activities as noted.     HENNA HealyC  VASCULAR SURGERY

## 2025-04-22 NOTE — PROGRESS NOTES
[facility]        Patient is here for a post-op to discuss fasciotomy LLE on 3/27 with Dr. Coelho. At [facility].    Pt is currently taking Aspirin and Xarelto.    /72   Pulse 85   Temp 98.7  F (37.1  C)   Resp 16     The provider has been notified that the patient has no concerns.     Questions patient would like addressed today are: N/A.    Refills are needed: No    Has homecare services and agency name:  No

## 2025-04-24 ENCOUNTER — TRANSITIONAL CARE UNIT VISIT (OUTPATIENT)
Dept: GERIATRICS | Facility: CLINIC | Age: 70
End: 2025-04-24
Payer: COMMERCIAL

## 2025-04-24 VITALS
HEIGHT: 67 IN | TEMPERATURE: 97.8 F | SYSTOLIC BLOOD PRESSURE: 107 MMHG | DIASTOLIC BLOOD PRESSURE: 70 MMHG | BODY MASS INDEX: 31.58 KG/M2 | OXYGEN SATURATION: 97 % | RESPIRATION RATE: 18 BRPM | HEART RATE: 84 BPM | WEIGHT: 201.2 LBS

## 2025-04-24 DIAGNOSIS — I25.83 CORONARY ARTERIOSCLEROSIS DUE TO LIPID RICH PLAQUE: ICD-10-CM

## 2025-04-24 DIAGNOSIS — I73.9 PAD (PERIPHERAL ARTERY DISEASE): Primary | ICD-10-CM

## 2025-04-24 DIAGNOSIS — I50.32 CHRONIC DIASTOLIC CONGESTIVE HEART FAILURE (H): ICD-10-CM

## 2025-04-24 DIAGNOSIS — I10 ESSENTIAL HYPERTENSION, BENIGN: ICD-10-CM

## 2025-04-24 DIAGNOSIS — I71.40 ABDOMINAL AORTIC ANEURYSM (AAA) WITHOUT RUPTURE, UNSPECIFIED PART: ICD-10-CM

## 2025-04-24 DIAGNOSIS — I73.9 PVD (PERIPHERAL VASCULAR DISEASE): ICD-10-CM

## 2025-04-24 DIAGNOSIS — M79.A22 NONTRAUMATIC COMPARTMENT SYNDROME OF LEFT LOWER EXTREMITY: ICD-10-CM

## 2025-04-24 DIAGNOSIS — I99.8 ACUTE LOWER LIMB ISCHEMIA: ICD-10-CM

## 2025-04-24 DIAGNOSIS — Z98.890 H/O FASCIOTOMY: ICD-10-CM

## 2025-04-24 NOTE — LETTER
4/24/2025      Dudley Kiran  [address] [address]        [facility]    Code Status:  FULL CODE   Visit Type:   Chief Complaint   Patient presents with     TCU Follow Up     Facility:  [facility] ([facility]) [[id number]]         HPI: Dudley Kiran is a 69 year old male who I am seeing today for follow up on the TCU. Past medical history includes CAD, hypertension, PAD, CHF, HTN and AAA. Pt recently hospitalized with acute left lower extremity pain. CTA of A/P and LE showed Abrupt cut off of the left posterior tibial artery at the level of the mid tibia concerning for arterial thromboembolism. Pt found to have compartmental syndrome and underwent 4 compartment fasciotomy on 3/27 with Vascular surgery. Wound vac was placed by wound care team on 3/28. Post op complications included pain management. Pt had myalgia with statins. Statin discontinued. Suspected inflammatory arthropathy - Gout vs pseudogout.  Erythema and swelling intermittently at multiple different toes. CRP was elevated at 198.9. Uric acid 6.3. Hand xray 3/31 with DJD. Left foot XR 4/12 showing Moderate first MTP joint degenerative changes without obvious gout or pseudogout. Pt treated with steroids.     Transitional Care Course: Today pt sitting up in bedside chair.  Patient is status post fasciotomy secondary to compartment syndrome.  He continues have wound VAC to left lower extremity.  He reported some increased pain with therapy this morning.  He continues on Dilaudid, Tylenol and methocarbamol.  Swelling greatly improved.  Bruising resolving.  He is eating well and having regular bowel movements.  He denies any shortness of breath or chest pain.  Blood pressure satisfactory.    Assessment/Plan:      Rhabdomyolysis now resolved  LLE Acute compartment syndrome s/p fasciotomy  Acute limb ischemia w/ PAD  -Runoff CTA 3/26 showing abrupt cut off to left PT artery, mild infrarenal aneurysmal  dilation  -Echo 3/27 showing EF 45 to 50%, no septal defects  -ABIs 3/27 normal on the right, moderate on the left  - S/p LLE fasciotomy on 3/27. Wound vac placed 3/28.  -Hypercoagulable panel negative; heme/onc signed off 3/29/2025  -WBAT  -rivaraxoban 20mg daily (started 3/29/2025)  -aspirin 81 mg every day.  -Continue Wound VAC therapy changing 2/weekly.   -Continues in therapy.   -Tylenol 1000 mg every 6 hours  -Methocarbamol 500 mg as needed every 6 hours for pain  -Dilaudid 4 mg Q 4 hours prn. Give dose ~ 8:30 am prior to 9 am therapy. Pain assessments Q 4 hours.   -Continue to elevate.   -Follow up CBC with hgb 10.6. Monitor.   -Follow up with vascular on 5/12.     Chronic heart failure with mildly reduced ejection fraction  - Echo this admission with EF 45 to 50%  -metoprolol 12.5 mg Q HS.   -lisinopril to 2.5mg daily   -every day weights.   -Follow up BMP WNL.      CAD  -Remote CABG  -aspirin 81 mg daily.   -No SOB or CP.   -Not routinely on statin- unable to tolerate statin in hospital.      AAA  -3.1 x 3.1`  - Follow up with Vascular Surgery out pt.      Active Ambulatory Problems     Diagnosis Date Noted     Coronary arteriosclerosis due to lipid rich plaque 12/28/2012     History of prediabetes 02/01/2019     Elevated cholesterol 02/15/2019     Essential hypertension, benign 12/18/2019     Primary osteoarthritis of both hips 12/18/2019     Class 1 obesity due to excess calories with serious comorbidity and body mass index (BMI) of 32.0 to 32.9 in adult 08/10/2021     OA (osteoarthritis) 10/27/2021     S/P CABG (coronary artery bypass graft) 10/04/2023     Acute lower limb ischemia 04/14/2025     Resolved Ambulatory Problems     Diagnosis Date Noted     Osteoarthrosis, unspecified whether generalized or localized, pelvic region and thigh 12/28/2012     Health Care Home 12/28/2012     Elevated blood pressure reading without diagnosis of hypertension 02/01/2019     Squamous blepharitis of right upper  eyelid 02/01/2019     Periorbital dermatitis 02/01/2019     Overweight (BMI 25.0-29.9) 12/18/2019     Past Medical History:   Diagnosis Date     Arthritis      Coronary artery disease      ED (erectile dysfunction)      HTN (hypertension)      Hypercholesteremia      Obesity      Prediabetes      Allergies   Allergen Reactions     No Known Allergies      Potentially cats       All Meds and Allergies reviewed in the record at the facility and is the most up-to-date.    Current Outpatient Medications   Medication Sig Dispense Refill     acetaminophen (TYLENOL) 325 MG tablet Take 3 tablets (975 mg) by mouth every 6 hours.       albuterol (PROAIR HFA/PROVENTIL HFA/VENTOLIN HFA) 108 (90 Base) MCG/ACT inhaler Inhale 2 puffs into the lungs every 6 hours as needed for shortness of breath, wheezing or cough 18 g 4     aspirin 81 MG EC tablet Take 81 mg by mouth daily.       bisacodyl (DULCOLAX) 10 MG suppository Place 1 suppository (10 mg) rectally daily as needed for constipation.       diclofenac (VOLTAREN) 1 % topical gel Apply 2 g topically 3 times daily.       fluticasone-salmeterol (AIRDUO RESPICLICK) 232-14 MCG/ACT inhaler Inhale 1 puff into the lungs 2 times daily 1 each 11     HYDROmorphone (DILAUDID) 4 MG tablet Take 1 tablet (4 mg) by mouth every 4 hours as needed for severe pain. 36 tablet 0     hydrOXYzine HCl (ATARAX) 25 MG tablet Take 1 tablet (25 mg) by mouth every 48 hours as needed for anxiety (adjuvant to pain).       ipratropium (ATROVENT) 0.06 % nasal spray Spray 2 sprays into both nostrils 2 times daily 15 mL 4     lisinopril (ZESTRIL) 5 MG tablet Take 1 tablet (5 mg) by mouth daily. 90 tablet 3     metoprolol succinate ER (TOPROL XL) 25 MG 24 hr tablet TAKE 0.5 TABLETS(12.5 MG) BY MOUTH DAILY 45 tablet 3     multivitamin w/minerals (THERA-VIT-M) tablet Take 1 tablet by mouth daily.       pantoprazole (PROTONIX) 40 MG EC tablet Take 1 tablet (40 mg) by mouth every morning (before breakfast).        "prochlorperazine (COMPAZINE) 5 MG tablet Take 1 tablet (5 mg) by mouth every 6 hours as needed for nausea or vomiting.       rivaroxaban ANTICOAGULANT (XARELTO) 20 MG TABS tablet Take 1 tablet (20 mg) by mouth daily (with dinner).       senna-docusate (SENOKOT-S/PERICOLACE) 8.6-50 MG tablet Take 2 tablets by mouth 2 times daily as needed for constipation.       tadalafil (CIALIS) 10 MG tablet Take 1 tablet (10 mg) by mouth daily as needed (erectile difficulties). 90 tablet 2     tiZANidine (ZANAFLEX) 2 MG capsule Take 2 mg by mouth 2 times daily as needed.       vitamin C (ASCORBIC ACID) 500 MG tablet Take 1 tablet (500 mg) by mouth daily.       wound support modular (EXPEDITE) LIQD bottle Take 60 mLs by mouth daily.       No current facility-administered medications for this visit.       REVIEW OF SYSTEMS:   10 point review of systems reviewed and pertinent positives in the HPI.     PHYSICAL EXAMINATION:  Physical Exam     Vital signs: /70   Pulse 84   Temp 97.8  F (36.6  C)   Resp 18   Ht 1.702 m (5' 7\")   Wt 91.3 kg (201 lb 3.2 oz)   SpO2 97%   BMI 31.51 kg/m    General: Awake, Alert, oriented x3, sitting up in bedside chair, appropriately, follows simple commands, conversant  HEENT:Pink conjunctiva, anicteric sclerae, moist oral mucosa  NECK: Supple  CVS:  S1  S2, without murmur or gallop.   LUNG: Clear to auscultation, No wheezes, rales or rhonci.  BACK: No kyphosis of the thoracic spine  ABDOMEN: Soft, nontender to palpation, with positive bowel sounds  EXTREMITIES: Moves both upper and lower extremities with some limitation to LLE, trace LLE pedal edema  SKIN: Wound VAC dry and intact to left lower extremity.  No redness or warmth.  NEUROLOGIC: Intact, pulses palpable but less on LLE.   PSYCHIATRIC: Cognition intact      Labs:  All labs reviewed in the nursing home record and [facility]   @  Lab Results   Component Value Date    WBC 10.9 04/14/2025     Lab Results   Component Value Date    RBC 3.75 " 04/14/2025     Lab Results   Component Value Date    HGB 10.7 04/14/2025     Lab Results   Component Value Date    HCT 32.9 04/14/2025     Lab Results   Component Value Date    MCV 88 04/14/2025     Lab Results   Component Value Date    MCH 28.5 04/14/2025     Lab Results   Component Value Date    MCHC 32.5 04/14/2025     Lab Results   Component Value Date    RDW 14.0 04/14/2025     Lab Results   Component Value Date     04/14/2025        @Last Comprehensive Metabolic Panel:  Sodium   Date Value Ref Range Status   04/17/2025 140 135 - 145 mmol/L Final   02/15/2019 145.0 (H) 133.0 - 144.0 mmol/L Final     Potassium   Date Value Ref Range Status   04/17/2025 4.6 3.4 - 5.3 mmol/L Final   02/11/2022 4.6 3.5 - 5.0 mmol/L Final   02/15/2019 4.2 3.4 - 5.3 mmol/L Final     Chloride   Date Value Ref Range Status   04/17/2025 105 98 - 107 mmol/L Final   02/11/2022 105 98 - 107 mmol/L Final   02/15/2019 105.0 94.0 - 109.0 mmol/L Final     Carbon Dioxide   Date Value Ref Range Status   02/15/2019 29.0 20.0 - 32.0 mmol/L Final     Carbon Dioxide (CO2)   Date Value Ref Range Status   04/17/2025 26 22 - 29 mmol/L Final   02/11/2022 27 22 - 31 mmol/L Final     Anion Gap   Date Value Ref Range Status   04/17/2025 9 7 - 15 mmol/L Final   02/11/2022 9 5 - 18 mmol/L Final     Glucose   Date Value Ref Range Status   04/17/2025 103 (H) 70 - 99 mg/dL Final   02/11/2022 99 70 - 125 mg/dL Final   02/15/2019 100.0 60.0 - 109.0 mg/dL Final     GLUCOSE BY METER POCT   Date Value Ref Range Status   04/05/2025 106 (H) 70 - 99 mg/dL Final     Urea Nitrogen   Date Value Ref Range Status   04/17/2025 17.2 8.0 - 23.0 mg/dL Final   02/11/2022 9 8 - 22 mg/dL Final   02/15/2019 11.0 7.0 - 30.0 mg/dL Final     Creatinine   Date Value Ref Range Status   04/17/2025 1.02 0.67 - 1.17 mg/dL Final   02/15/2019 1.1 0.8 - 1.5 mg/dL Final     GFR Estimate   Date Value Ref Range Status   04/17/2025 80 >60 mL/min/1.73m2 Final   12/18/2019 >60 >60  mL/min/1.73m2 Final   07/30/2014 >60 >60 mL/min/1.73m2 Final     Calcium   Date Value Ref Range Status   04/17/2025 9.0 8.8 - 10.4 mg/dL Final   02/15/2019 9.3 8.5 - 10.4 mg/dL Final       This note has been dictated using voice recognition software. Any grammatical or context distortions are unintentional and inherent to the software    Electronically signed by: Raquel Dunlap CNP       Sincerely,        Raquel Dunlap NP    Electronically signed

## 2025-04-25 NOTE — PROGRESS NOTES
[facility]    Code Status:  FULL CODE   Visit Type:   Chief Complaint   Patient presents with    TCU Follow Up     Facility:  [facility] ([facility]) [[id number]]         HPI: Dudley Kiran is a 69 year old male who I am seeing today for follow up on the TCU. Past medical history includes CAD, hypertension, PAD, CHF, HTN and AAA. Pt recently hospitalized with acute left lower extremity pain. CTA of A/P and LE showed Abrupt cut off of the left posterior tibial artery at the level of the mid tibia concerning for arterial thromboembolism. Pt found to have compartmental syndrome and underwent 4 compartment fasciotomy on 3/27 with Vascular surgery. Wound vac was placed by wound care team on 3/28. Post op complications included pain management. Pt had myalgia with statins. Statin discontinued. Suspected inflammatory arthropathy - Gout vs pseudogout.  Erythema and swelling intermittently at multiple different toes. CRP was elevated at 198.9. Uric acid 6.3. Hand xray 3/31 with DJD. Left foot XR 4/12 showing Moderate first MTP joint degenerative changes without obvious gout or pseudogout. Pt treated with steroids.     Transitional Care Course: Today pt sitting up in bedside chair.  Patient is status post fasciotomy secondary to compartment syndrome.  He continues have wound VAC to left lower extremity.  He reported some increased pain with therapy this morning.  He continues on Dilaudid, Tylenol and methocarbamol.  Swelling greatly improved.  Bruising resolving.  He is eating well and having regular bowel movements.  He denies any shortness of breath or chest pain.  Blood pressure satisfactory.    Assessment/Plan:      Rhabdomyolysis now resolved  LLE Acute compartment syndrome s/p fasciotomy  Acute limb ischemia w/ PAD  -Runoff CTA 3/26 showing abrupt cut off to left PT artery, mild infrarenal aneurysmal dilation  -Echo 3/27 showing EF 45 to 50%, no septal defects  -ABIs 3/27 normal on the right,  moderate on the left  - S/p LLE fasciotomy on 3/27. Wound vac placed 3/28.  -Hypercoagulable panel negative; heme/onc signed off 3/29/2025  -WBAT  -rivaraxoban 20mg daily (started 3/29/2025)  -aspirin 81 mg every day.  -Continue Wound VAC therapy changing 2/weekly.   -Continues in therapy.   -Tylenol 1000 mg every 6 hours  -Methocarbamol 500 mg as needed every 6 hours for pain  -Dilaudid 4 mg Q 4 hours prn. Give dose ~ 8:30 am prior to 9 am therapy. Pain assessments Q 4 hours.   -Continue to elevate.   -Follow up CBC with hgb 10.6. Monitor.   -Follow up with vascular on 5/12.     Chronic heart failure with mildly reduced ejection fraction  - Echo this admission with EF 45 to 50%  -metoprolol 12.5 mg Q HS.   -lisinopril to 2.5mg daily   -every day weights.   -Follow up BMP WNL.      CAD  -Remote CABG  -aspirin 81 mg daily.   -No SOB or CP.   -Not routinely on statin- unable to tolerate statin in hospital.      AAA  -3.1 x 3.1`  - Follow up with Vascular Surgery out pt.      Active Ambulatory Problems     Diagnosis Date Noted    Coronary arteriosclerosis due to lipid rich plaque 12/28/2012    History of prediabetes 02/01/2019    Elevated cholesterol 02/15/2019    Essential hypertension, benign 12/18/2019    Primary osteoarthritis of both hips 12/18/2019    Class 1 obesity due to excess calories with serious comorbidity and body mass index (BMI) of 32.0 to 32.9 in adult 08/10/2021    OA (osteoarthritis) 10/27/2021    S/P CABG (coronary artery bypass graft) 10/04/2023    Acute lower limb ischemia 04/14/2025     Resolved Ambulatory Problems     Diagnosis Date Noted    Osteoarthrosis, unspecified whether generalized or localized, pelvic region and thigh 12/28/2012    Health Care Home 12/28/2012    Elevated blood pressure reading without diagnosis of hypertension 02/01/2019    Squamous blepharitis of right upper eyelid 02/01/2019    Periorbital dermatitis 02/01/2019    Overweight (BMI 25.0-29.9) 12/18/2019     Past Medical  History:   Diagnosis Date    Arthritis     Coronary artery disease     ED (erectile dysfunction)     HTN (hypertension)     Hypercholesteremia     Obesity     Prediabetes      Allergies   Allergen Reactions    No Known Allergies      Potentially cats       All Meds and Allergies reviewed in the record at the facility and is the most up-to-date.    Current Outpatient Medications   Medication Sig Dispense Refill    acetaminophen (TYLENOL) 325 MG tablet Take 3 tablets (975 mg) by mouth every 6 hours.      albuterol (PROAIR HFA/PROVENTIL HFA/VENTOLIN HFA) 108 (90 Base) MCG/ACT inhaler Inhale 2 puffs into the lungs every 6 hours as needed for shortness of breath, wheezing or cough 18 g 4    aspirin 81 MG EC tablet Take 81 mg by mouth daily.      bisacodyl (DULCOLAX) 10 MG suppository Place 1 suppository (10 mg) rectally daily as needed for constipation.      diclofenac (VOLTAREN) 1 % topical gel Apply 2 g topically 3 times daily.      fluticasone-salmeterol (AIRDUO RESPICLICK) 232-14 MCG/ACT inhaler Inhale 1 puff into the lungs 2 times daily 1 each 11    HYDROmorphone (DILAUDID) 4 MG tablet Take 1 tablet (4 mg) by mouth every 4 hours as needed for severe pain. 36 tablet 0    hydrOXYzine HCl (ATARAX) 25 MG tablet Take 1 tablet (25 mg) by mouth every 48 hours as needed for anxiety (adjuvant to pain).      ipratropium (ATROVENT) 0.06 % nasal spray Spray 2 sprays into both nostrils 2 times daily 15 mL 4    lisinopril (ZESTRIL) 5 MG tablet Take 1 tablet (5 mg) by mouth daily. 90 tablet 3    metoprolol succinate ER (TOPROL XL) 25 MG 24 hr tablet TAKE 0.5 TABLETS(12.5 MG) BY MOUTH DAILY 45 tablet 3    multivitamin w/minerals (THERA-VIT-M) tablet Take 1 tablet by mouth daily.      pantoprazole (PROTONIX) 40 MG EC tablet Take 1 tablet (40 mg) by mouth every morning (before breakfast).      prochlorperazine (COMPAZINE) 5 MG tablet Take 1 tablet (5 mg) by mouth every 6 hours as needed for nausea or vomiting.      rivaroxaban  "ANTICOAGULANT (XARELTO) 20 MG TABS tablet Take 1 tablet (20 mg) by mouth daily (with dinner).      senna-docusate (SENOKOT-S/PERICOLACE) 8.6-50 MG tablet Take 2 tablets by mouth 2 times daily as needed for constipation.      tadalafil (CIALIS) 10 MG tablet Take 1 tablet (10 mg) by mouth daily as needed (erectile difficulties). 90 tablet 2    tiZANidine (ZANAFLEX) 2 MG capsule Take 2 mg by mouth 2 times daily as needed.      vitamin C (ASCORBIC ACID) 500 MG tablet Take 1 tablet (500 mg) by mouth daily.      wound support modular (EXPEDITE) LIQD bottle Take 60 mLs by mouth daily.       No current facility-administered medications for this visit.       REVIEW OF SYSTEMS:   10 point review of systems reviewed and pertinent positives in the HPI.     PHYSICAL EXAMINATION:  Physical Exam     Vital signs: /70   Pulse 84   Temp 97.8  F (36.6  C)   Resp 18   Ht 1.702 m (5' 7\")   Wt 91.3 kg (201 lb 3.2 oz)   SpO2 97%   BMI 31.51 kg/m    General: Awake, Alert, oriented x3, sitting up in bedside chair, appropriately, follows simple commands, conversant  HEENT:Pink conjunctiva, anicteric sclerae, moist oral mucosa  NECK: Supple  CVS:  S1  S2, without murmur or gallop.   LUNG: Clear to auscultation, No wheezes, rales or rhonci.  BACK: No kyphosis of the thoracic spine  ABDOMEN: Soft, nontender to palpation, with positive bowel sounds  EXTREMITIES: Moves both upper and lower extremities with some limitation to LLE, trace LLE pedal edema  SKIN: Wound VAC dry and intact to left lower extremity.  No redness or warmth.  NEUROLOGIC: Intact, pulses palpable but less on LLE.   PSYCHIATRIC: Cognition intact      Labs:  All labs reviewed in the nursing home record and Epic   @  Lab Results   Component Value Date    WBC 10.9 04/14/2025     Lab Results   Component Value Date    RBC 3.75 04/14/2025     Lab Results   Component Value Date    HGB 10.7 04/14/2025     Lab Results   Component Value Date    HCT 32.9 04/14/2025     Lab " Results   Component Value Date    MCV 88 04/14/2025     Lab Results   Component Value Date    MCH 28.5 04/14/2025     Lab Results   Component Value Date    MCHC 32.5 04/14/2025     Lab Results   Component Value Date    RDW 14.0 04/14/2025     Lab Results   Component Value Date     04/14/2025        @Last Comprehensive Metabolic Panel:  Sodium   Date Value Ref Range Status   04/17/2025 140 135 - 145 mmol/L Final   02/15/2019 145.0 (H) 133.0 - 144.0 mmol/L Final     Potassium   Date Value Ref Range Status   04/17/2025 4.6 3.4 - 5.3 mmol/L Final   02/11/2022 4.6 3.5 - 5.0 mmol/L Final   02/15/2019 4.2 3.4 - 5.3 mmol/L Final     Chloride   Date Value Ref Range Status   04/17/2025 105 98 - 107 mmol/L Final   02/11/2022 105 98 - 107 mmol/L Final   02/15/2019 105.0 94.0 - 109.0 mmol/L Final     Carbon Dioxide   Date Value Ref Range Status   02/15/2019 29.0 20.0 - 32.0 mmol/L Final     Carbon Dioxide (CO2)   Date Value Ref Range Status   04/17/2025 26 22 - 29 mmol/L Final   02/11/2022 27 22 - 31 mmol/L Final     Anion Gap   Date Value Ref Range Status   04/17/2025 9 7 - 15 mmol/L Final   02/11/2022 9 5 - 18 mmol/L Final     Glucose   Date Value Ref Range Status   04/17/2025 103 (H) 70 - 99 mg/dL Final   02/11/2022 99 70 - 125 mg/dL Final   02/15/2019 100.0 60.0 - 109.0 mg/dL Final     GLUCOSE BY METER POCT   Date Value Ref Range Status   04/05/2025 106 (H) 70 - 99 mg/dL Final     Urea Nitrogen   Date Value Ref Range Status   04/17/2025 17.2 8.0 - 23.0 mg/dL Final   02/11/2022 9 8 - 22 mg/dL Final   02/15/2019 11.0 7.0 - 30.0 mg/dL Final     Creatinine   Date Value Ref Range Status   04/17/2025 1.02 0.67 - 1.17 mg/dL Final   02/15/2019 1.1 0.8 - 1.5 mg/dL Final     GFR Estimate   Date Value Ref Range Status   04/17/2025 80 >60 mL/min/1.73m2 Final   12/18/2019 >60 >60 mL/min/1.73m2 Final   07/30/2014 >60 >60 mL/min/1.73m2 Final     Calcium   Date Value Ref Range Status   04/17/2025 9.0 8.8 - 10.4 mg/dL Final    02/15/2019 9.3 8.5 - 10.4 mg/dL Final       This note has been dictated using voice recognition software. Any grammatical or context distortions are unintentional and inherent to the software    Electronically signed by: Raquel Dunlap CNP

## 2025-04-26 DIAGNOSIS — I99.8 ACUTE LOWER LIMB ISCHEMIA: ICD-10-CM

## 2025-04-26 RX ORDER — HYDROMORPHONE HYDROCHLORIDE 4 MG/1
4 TABLET ORAL EVERY 4 HOURS PRN
Qty: 15 TABLET | Refills: 0 | Status: SHIPPED | OUTPATIENT
Start: 2025-04-26 | End: 2025-04-28

## 2025-04-28 DIAGNOSIS — I99.8 ACUTE LOWER LIMB ISCHEMIA: ICD-10-CM

## 2025-04-28 RX ORDER — HYDROMORPHONE HYDROCHLORIDE 4 MG/1
4 TABLET ORAL EVERY 4 HOURS PRN
Qty: 30 TABLET | Refills: 0 | Status: SHIPPED | OUTPATIENT
Start: 2025-04-28

## 2025-04-29 ENCOUNTER — TRANSITIONAL CARE UNIT VISIT (OUTPATIENT)
Dept: GERIATRICS | Facility: CLINIC | Age: 70
End: 2025-04-29
Payer: COMMERCIAL

## 2025-04-29 VITALS
RESPIRATION RATE: 20 BRPM | OXYGEN SATURATION: 96 % | BODY MASS INDEX: 31.74 KG/M2 | HEIGHT: 67 IN | HEART RATE: 89 BPM | DIASTOLIC BLOOD PRESSURE: 69 MMHG | WEIGHT: 202.2 LBS | SYSTOLIC BLOOD PRESSURE: 106 MMHG | TEMPERATURE: 97.6 F

## 2025-04-29 DIAGNOSIS — I50.32 CHRONIC DIASTOLIC CONGESTIVE HEART FAILURE (H): ICD-10-CM

## 2025-04-29 DIAGNOSIS — I73.9 PVD (PERIPHERAL VASCULAR DISEASE): ICD-10-CM

## 2025-04-29 DIAGNOSIS — I99.8 ACUTE LOWER LIMB ISCHEMIA: Primary | ICD-10-CM

## 2025-04-29 DIAGNOSIS — Z98.890 H/O FASCIOTOMY: ICD-10-CM

## 2025-04-29 DIAGNOSIS — I25.83 CORONARY ARTERIOSCLEROSIS DUE TO LIPID RICH PLAQUE: ICD-10-CM

## 2025-04-29 DIAGNOSIS — I71.40 ABDOMINAL AORTIC ANEURYSM (AAA) WITHOUT RUPTURE, UNSPECIFIED PART: ICD-10-CM

## 2025-04-29 DIAGNOSIS — I73.9 PAD (PERIPHERAL ARTERY DISEASE): ICD-10-CM

## 2025-04-29 DIAGNOSIS — M79.A22 NONTRAUMATIC COMPARTMENT SYNDROME OF LEFT LOWER EXTREMITY: ICD-10-CM

## 2025-04-29 DIAGNOSIS — I10 ESSENTIAL HYPERTENSION, BENIGN: ICD-10-CM

## 2025-04-29 PROCEDURE — 99309 SBSQ NF CARE MODERATE MDM 30: CPT | Performed by: NURSE PRACTITIONER

## 2025-04-29 NOTE — PROGRESS NOTES
[facility] GERIATRIC SERVICES    Code Status:  FULL CODE   Visit Type:   Chief Complaint   Patient presents with    TCU Follow Up     Facility:  [facility] ([facility]) [[id number]]         HPI: Dudley Kiran is a 69 year old male who I am seeing today for follow up on the TCU. Past medical history includes CAD, hypertension, PAD, CHF, HTN and AAA. Pt recently hospitalized with acute left lower extremity pain. CTA of A/P and LE showed Abrupt cut off of the left posterior tibial artery at the level of the mid tibia concerning for arterial thromboembolism. Pt found to have compartmental syndrome and underwent 4 compartment fasciotomy on 3/27 with Vascular surgery. Wound vac was placed by wound care team on 3/28. Post op complications included pain management. Pt had myalgia with statins. Statin discontinued. Suspected inflammatory arthropathy - Gout vs pseudogout.  Erythema and swelling intermittently at multiple different toes. CRP was elevated at 198.9. Uric acid 6.3. Hand xray 3/31 with DJD. Left foot XR 4/12 showing Moderate first MTP joint degenerative changes without obvious gout or pseudogout. Pt treated with steroids.     Transitional Care Course: Today pt lying in bed. Patient is status post fasciotomy secondary to compartment syndrome. Wounds to LLE with beefy red granulation tissue. Area appears to be getting smaller in depth. He reports pain is improving. He was able to bear some weight today in therapy. He continues on prn Dilaudid, Tylenol and methocarbamol for pain.  Swelling greatly improved.  Bruising resolving.  No shortness of breath or chest pain.  Blood pressure satisfactory.    Assessment/Plan:      Rhabdomyolysis now resolved  LLE Acute compartment syndrome s/p fasciotomy  Acute limb ischemia w/ PAD  -Runoff CTA 3/26 showing abrupt cut off to left PT artery, mild infrarenal aneurysmal dilation  -Echo 3/27 showing EF 45 to 50%, no septal defects  -ABIs 3/27 normal on the right, moderate on the  left  - S/p LLE fasciotomy on 3/27. Wound vac placed 3/28.  -Hypercoagulable panel negative; heme/onc signed off 3/29/2025  -WBAT  -rivaraxoban 20mg daily (started 3/29/2025)  -aspirin 81 mg every day.  -Continue Wound VAC therapy changing 2/weekly.   -Continues in therapy.   -Tylenol 1000 mg every 6 hours  -Methocarbamol 500 mg as needed every 6 hours for pain  -Dilaudid 4 mg Q 4 hours prn. Give dose ~ 8:30 am prior to 9 am therapy. Pain assessments Q 4 hours.   -Continue to elevate.   -Follow up CBC with hgb 10.6. Monitor.   -Follow up with vascular on 5/12.     Chronic heart failure with mildly reduced ejection fraction  - Echo this admission with EF 45 to 50%  -metoprolol 12.5 mg Q HS.   -lisinopril to 2.5mg daily   -every day weights.   -Follow up BMP WNL.      CAD  -Remote CABG  -aspirin 81 mg daily.   -No SOB or CP.   -Not routinely on statin- unable to tolerate statin in hospital.      AAA  -3.1 x 3.1`  - Follow up with Vascular Surgery out pt.      Active Ambulatory Problems     Diagnosis Date Noted    Coronary arteriosclerosis due to lipid rich plaque 12/28/2012    History of prediabetes 02/01/2019    Elevated cholesterol 02/15/2019    Essential hypertension, benign 12/18/2019    Primary osteoarthritis of both hips 12/18/2019    Class 1 obesity due to excess calories with serious comorbidity and body mass index (BMI) of 32.0 to 32.9 in adult 08/10/2021    OA (osteoarthritis) 10/27/2021    S/P CABG (coronary artery bypass graft) 10/04/2023    Acute lower limb ischemia 04/14/2025     Resolved Ambulatory Problems     Diagnosis Date Noted    Osteoarthrosis, unspecified whether generalized or localized, pelvic region and thigh 12/28/2012    Health Care Home 12/28/2012    Elevated blood pressure reading without diagnosis of hypertension 02/01/2019    Squamous blepharitis of right upper eyelid 02/01/2019    Periorbital dermatitis 02/01/2019    Overweight (BMI 25.0-29.9) 12/18/2019     Past Medical History:    Diagnosis Date    Arthritis     Coronary artery disease     ED (erectile dysfunction)     HTN (hypertension)     Hypercholesteremia     Obesity     Prediabetes      Allergies   Allergen Reactions    No Known Allergies      Potentially cats       All Meds and Allergies reviewed in the record at the facility and is the most up-to-date.    Current Outpatient Medications   Medication Sig Dispense Refill    acetaminophen (TYLENOL) 325 MG tablet Take 3 tablets (975 mg) by mouth every 6 hours.      albuterol (PROAIR HFA/PROVENTIL HFA/VENTOLIN HFA) 108 (90 Base) MCG/ACT inhaler Inhale 2 puffs into the lungs every 6 hours as needed for shortness of breath, wheezing or cough 18 g 4    aspirin 81 MG EC tablet Take 81 mg by mouth daily.      bisacodyl (DULCOLAX) 10 MG suppository Place 1 suppository (10 mg) rectally daily as needed for constipation.      diclofenac (VOLTAREN) 1 % topical gel Apply 2 g topically 3 times daily.      fluticasone-salmeterol (AIRDUO RESPICLICK) 232-14 MCG/ACT inhaler Inhale 1 puff into the lungs 2 times daily 1 each 11    HYDROmorphone (DILAUDID) 4 MG tablet Take 1 tablet (4 mg) by mouth every 4 hours as needed for severe pain. 30 tablet 0    hydrOXYzine HCl (ATARAX) 25 MG tablet Take 1 tablet (25 mg) by mouth every 48 hours as needed for anxiety (adjuvant to pain).      ipratropium (ATROVENT) 0.06 % nasal spray Spray 2 sprays into both nostrils 2 times daily 15 mL 4    lisinopril (ZESTRIL) 5 MG tablet Take 1 tablet (5 mg) by mouth daily. 90 tablet 3    metoprolol succinate ER (TOPROL XL) 25 MG 24 hr tablet TAKE 0.5 TABLETS(12.5 MG) BY MOUTH DAILY 45 tablet 3    multivitamin w/minerals (THERA-VIT-M) tablet Take 1 tablet by mouth daily.      pantoprazole (PROTONIX) 40 MG EC tablet Take 1 tablet (40 mg) by mouth every morning (before breakfast).      prochlorperazine (COMPAZINE) 5 MG tablet Take 1 tablet (5 mg) by mouth every 6 hours as needed for nausea or vomiting.      rivaroxaban ANTICOAGULANT  "(XARELTO) 20 MG TABS tablet Take 1 tablet (20 mg) by mouth daily (with dinner).      senna-docusate (SENOKOT-S/PERICOLACE) 8.6-50 MG tablet Take 2 tablets by mouth 2 times daily as needed for constipation.      tadalafil (CIALIS) 10 MG tablet Take 1 tablet (10 mg) by mouth daily as needed (erectile difficulties). 90 tablet 2    tiZANidine (ZANAFLEX) 2 MG capsule Take 2 mg by mouth 2 times daily as needed.      vitamin C (ASCORBIC ACID) 500 MG tablet Take 1 tablet (500 mg) by mouth daily.      wound support modular (EXPEDITE) LIQD bottle Take 60 mLs by mouth daily.       No current facility-administered medications for this visit.       REVIEW OF SYSTEMS:   10 point review of systems reviewed and pertinent positives in the HPI.     PHYSICAL EXAMINATION:  Physical Exam     Vital signs: /69   Pulse 89   Temp 97.6  F (36.4  C)   Resp 20   Ht 1.702 m (5' 7\")   Wt 91.7 kg (202 lb 3.2 oz)   SpO2 96%   BMI 31.67 kg/m    General: Awake, Alert, oriented x3, sitting up in bedside chair, appropriately, follows simple commands, conversant  HEENT:Pink conjunctiva, anicteric sclerae, moist oral mucosa  NECK: Supple  CVS:  S1  S2, without murmur or gallop.   LUNG: Clear to auscultation, No wheezes, rales or rhonci.  BACK: No kyphosis of the thoracic spine  ABDOMEN: Soft, nontender to palpation, with positive bowel sounds  EXTREMITIES: Moves both upper and lower extremities with some limitation to LLE, trace LLE pedal edema  SKIN: LLE wounds with beefy red granulation. Moderate serosanguinous drainage. No redness or warmth.   NEUROLOGIC: Intact, pulses palpable  PSYCHIATRIC: Cognition intact      Labs:  All labs reviewed in the nursing home record and Epic   @  Lab Results   Component Value Date    WBC 10.9 04/14/2025     Lab Results   Component Value Date    RBC 3.75 04/14/2025     Lab Results   Component Value Date    HGB 10.7 04/14/2025     Lab Results   Component Value Date    HCT 32.9 04/14/2025     Lab Results "   Component Value Date    MCV 88 04/14/2025     Lab Results   Component Value Date    MCH 28.5 04/14/2025     Lab Results   Component Value Date    MCHC 32.5 04/14/2025     Lab Results   Component Value Date    RDW 14.0 04/14/2025     Lab Results   Component Value Date     04/14/2025        @Last Comprehensive Metabolic Panel:  Sodium   Date Value Ref Range Status   04/17/2025 140 135 - 145 mmol/L Final   02/15/2019 145.0 (H) 133.0 - 144.0 mmol/L Final     Potassium   Date Value Ref Range Status   04/17/2025 4.6 3.4 - 5.3 mmol/L Final   02/11/2022 4.6 3.5 - 5.0 mmol/L Final   02/15/2019 4.2 3.4 - 5.3 mmol/L Final     Chloride   Date Value Ref Range Status   04/17/2025 105 98 - 107 mmol/L Final   02/11/2022 105 98 - 107 mmol/L Final   02/15/2019 105.0 94.0 - 109.0 mmol/L Final     Carbon Dioxide   Date Value Ref Range Status   02/15/2019 29.0 20.0 - 32.0 mmol/L Final     Carbon Dioxide (CO2)   Date Value Ref Range Status   04/17/2025 26 22 - 29 mmol/L Final   02/11/2022 27 22 - 31 mmol/L Final     Anion Gap   Date Value Ref Range Status   04/17/2025 9 7 - 15 mmol/L Final   02/11/2022 9 5 - 18 mmol/L Final     Glucose   Date Value Ref Range Status   04/17/2025 103 (H) 70 - 99 mg/dL Final   02/11/2022 99 70 - 125 mg/dL Final   02/15/2019 100.0 60.0 - 109.0 mg/dL Final     GLUCOSE BY METER POCT   Date Value Ref Range Status   04/05/2025 106 (H) 70 - 99 mg/dL Final     Urea Nitrogen   Date Value Ref Range Status   04/17/2025 17.2 8.0 - 23.0 mg/dL Final   02/11/2022 9 8 - 22 mg/dL Final   02/15/2019 11.0 7.0 - 30.0 mg/dL Final     Creatinine   Date Value Ref Range Status   04/17/2025 1.02 0.67 - 1.17 mg/dL Final   02/15/2019 1.1 0.8 - 1.5 mg/dL Final     GFR Estimate   Date Value Ref Range Status   04/17/2025 80 >60 mL/min/1.73m2 Final   12/18/2019 >60 >60 mL/min/1.73m2 Final   07/30/2014 >60 >60 mL/min/1.73m2 Final     Calcium   Date Value Ref Range Status   04/17/2025 9.0 8.8 - 10.4 mg/dL Final   02/15/2019 9.3  8.5 - 10.4 mg/dL Final     Pt will need a standard wheelchair upon discharge due to recent hospitalization with compartmental syndrome to LLE . Pt underwent 4 compartment fasciotomy on 3/27 with Vascular surgery. He continues with large wound to LLE with Wound VAC placement.   Seat width 18in  Seat depth 18 in  Seat cushion  Elevating foot rest (left)     Pt has mobility limitations impairing their ability to participate in mobility related activities of daily living (MRADLS's) such toileting, feeding, dressing, grooming and bathing in customary locations in the home without wheelchair.     Mobility limitations cannot be sufficiently and safely resolved by use of an appropriately fitted cane or walker.     Patient/cargiver is able to safely use a manual wheelchair.     Pt's functional mobility deficit can be sufficiently resolved by the use of a manual wheel chair.     The beneficiary's home provides adequate access between rooms, maneuvering space, and surfaces for use of the manual wheelchair that is provided.     Use of a manual wheelchair will significantly improve the beneficiary's ability to participate in MRADLs and the beneficiary will use it on a regular basis in the home.     The beneficiary has not expressed an unwillingness to use the manual wheelchair that is provided in the home.     This note has been dictated using voice recognition software. Any grammatical or context distortions are unintentional and inherent to the software    Electronically signed by: Raquel Dunlap CNP

## 2025-04-29 NOTE — LETTER
4/29/2025      Dudley Kiran  [address] [address]        [facility]    Code Status:  FULL CODE   Visit Type:   Chief Complaint   Patient presents with     TCU Follow Up     Facility:  [facility] ([facility]) [[id number]]         HPI: Dudley Kiran is a 69 year old male who I am seeing today for follow up on the TCU. Past medical history includes CAD, hypertension, PAD, CHF, HTN and AAA. Pt recently hospitalized with acute left lower extremity pain. CTA of A/P and LE showed Abrupt cut off of the left posterior tibial artery at the level of the mid tibia concerning for arterial thromboembolism. Pt found to have compartmental syndrome and underwent 4 compartment fasciotomy on 3/27 with Vascular surgery. Wound vac was placed by wound care team on 3/28. Post op complications included pain management. Pt had myalgia with statins. Statin discontinued. Suspected inflammatory arthropathy - Gout vs pseudogout.  Erythema and swelling intermittently at multiple different toes. CRP was elevated at 198.9. Uric acid 6.3. Hand xray 3/31 with DJD. Left foot XR 4/12 showing Moderate first MTP joint degenerative changes without obvious gout or pseudogout. Pt treated with steroids.     Transitional Care Course: Today pt lying in bed. Patient is status post fasciotomy secondary to compartment syndrome. Wounds to LLE with beefy red granulation tissue. Area appears to be getting smaller in depth. He reports pain is improving. He was able to bear some weight today in therapy. He continues on prn Dilaudid, Tylenol and methocarbamol for pain.  Swelling greatly improved.  Bruising resolving.  No shortness of breath or chest pain.  Blood pressure satisfactory.    Assessment/Plan:      Rhabdomyolysis now resolved  LLE Acute compartment syndrome s/p fasciotomy  Acute limb ischemia w/ PAD  -Runoff CTA 3/26 showing abrupt cut off to left PT artery, mild infrarenal aneurysmal  dilation  -Echo 3/27 showing EF 45 to 50%, no septal defects  -ABIs 3/27 normal on the right, moderate on the left  - S/p LLE fasciotomy on 3/27. Wound vac placed 3/28.  -Hypercoagulable panel negative; heme/onc signed off 3/29/2025  -WBAT  -rivaraxoban 20mg daily (started 3/29/2025)  -aspirin 81 mg every day.  -Continue Wound VAC therapy changing 2/weekly.   -Continues in therapy.   -Tylenol 1000 mg every 6 hours  -Methocarbamol 500 mg as needed every 6 hours for pain  -Dilaudid 4 mg Q 4 hours prn. Give dose ~ 8:30 am prior to 9 am therapy. Pain assessments Q 4 hours.   -Continue to elevate.   -Follow up CBC with hgb 10.6. Monitor.   -Follow up with vascular on 5/12.     Chronic heart failure with mildly reduced ejection fraction  - Echo this admission with EF 45 to 50%  -metoprolol 12.5 mg Q HS.   -lisinopril to 2.5mg daily   -every day weights.   -Follow up BMP WNL.      CAD  -Remote CABG  -aspirin 81 mg daily.   -No SOB or CP.   -Not routinely on statin- unable to tolerate statin in hospital.      AAA  -3.1 x 3.1`  - Follow up with Vascular Surgery out pt.      Active Ambulatory Problems     Diagnosis Date Noted     Coronary arteriosclerosis due to lipid rich plaque 12/28/2012     History of prediabetes 02/01/2019     Elevated cholesterol 02/15/2019     Essential hypertension, benign 12/18/2019     Primary osteoarthritis of both hips 12/18/2019     Class 1 obesity due to excess calories with serious comorbidity and body mass index (BMI) of 32.0 to 32.9 in adult 08/10/2021     OA (osteoarthritis) 10/27/2021     S/P CABG (coronary artery bypass graft) 10/04/2023     Acute lower limb ischemia 04/14/2025     Resolved Ambulatory Problems     Diagnosis Date Noted     Osteoarthrosis, unspecified whether generalized or localized, pelvic region and thigh 12/28/2012     Health Care Home 12/28/2012     Elevated blood pressure reading without diagnosis of hypertension 02/01/2019     Squamous blepharitis of right upper  eyelid 02/01/2019     Periorbital dermatitis 02/01/2019     Overweight (BMI 25.0-29.9) 12/18/2019     Past Medical History:   Diagnosis Date     Arthritis      Coronary artery disease      ED (erectile dysfunction)      HTN (hypertension)      Hypercholesteremia      Obesity      Prediabetes      Allergies   Allergen Reactions     No Known Allergies      Potentially cats       All Meds and Allergies reviewed in the record at the facility and is the most up-to-date.    Current Outpatient Medications   Medication Sig Dispense Refill     acetaminophen (TYLENOL) 325 MG tablet Take 3 tablets (975 mg) by mouth every 6 hours.       albuterol (PROAIR HFA/PROVENTIL HFA/VENTOLIN HFA) 108 (90 Base) MCG/ACT inhaler Inhale 2 puffs into the lungs every 6 hours as needed for shortness of breath, wheezing or cough 18 g 4     aspirin 81 MG EC tablet Take 81 mg by mouth daily.       bisacodyl (DULCOLAX) 10 MG suppository Place 1 suppository (10 mg) rectally daily as needed for constipation.       diclofenac (VOLTAREN) 1 % topical gel Apply 2 g topically 3 times daily.       fluticasone-salmeterol (AIRDUO RESPICLICK) 232-14 MCG/ACT inhaler Inhale 1 puff into the lungs 2 times daily 1 each 11     HYDROmorphone (DILAUDID) 4 MG tablet Take 1 tablet (4 mg) by mouth every 4 hours as needed for severe pain. 30 tablet 0     hydrOXYzine HCl (ATARAX) 25 MG tablet Take 1 tablet (25 mg) by mouth every 48 hours as needed for anxiety (adjuvant to pain).       ipratropium (ATROVENT) 0.06 % nasal spray Spray 2 sprays into both nostrils 2 times daily 15 mL 4     lisinopril (ZESTRIL) 5 MG tablet Take 1 tablet (5 mg) by mouth daily. 90 tablet 3     metoprolol succinate ER (TOPROL XL) 25 MG 24 hr tablet TAKE 0.5 TABLETS(12.5 MG) BY MOUTH DAILY 45 tablet 3     multivitamin w/minerals (THERA-VIT-M) tablet Take 1 tablet by mouth daily.       pantoprazole (PROTONIX) 40 MG EC tablet Take 1 tablet (40 mg) by mouth every morning (before breakfast).        "prochlorperazine (COMPAZINE) 5 MG tablet Take 1 tablet (5 mg) by mouth every 6 hours as needed for nausea or vomiting.       rivaroxaban ANTICOAGULANT (XARELTO) 20 MG TABS tablet Take 1 tablet (20 mg) by mouth daily (with dinner).       senna-docusate (SENOKOT-S/PERICOLACE) 8.6-50 MG tablet Take 2 tablets by mouth 2 times daily as needed for constipation.       tadalafil (CIALIS) 10 MG tablet Take 1 tablet (10 mg) by mouth daily as needed (erectile difficulties). 90 tablet 2     tiZANidine (ZANAFLEX) 2 MG capsule Take 2 mg by mouth 2 times daily as needed.       vitamin C (ASCORBIC ACID) 500 MG tablet Take 1 tablet (500 mg) by mouth daily.       wound support modular (EXPEDITE) LIQD bottle Take 60 mLs by mouth daily.       No current facility-administered medications for this visit.       REVIEW OF SYSTEMS:   10 point review of systems reviewed and pertinent positives in the HPI.     PHYSICAL EXAMINATION:  Physical Exam     Vital signs: /69   Pulse 89   Temp 97.6  F (36.4  C)   Resp 20   Ht 1.702 m (5' 7\")   Wt 91.7 kg (202 lb 3.2 oz)   SpO2 96%   BMI 31.67 kg/m    General: Awake, Alert, oriented x3, sitting up in bedside chair, appropriately, follows simple commands, conversant  HEENT:Pink conjunctiva, anicteric sclerae, moist oral mucosa  NECK: Supple  CVS:  S1  S2, without murmur or gallop.   LUNG: Clear to auscultation, No wheezes, rales or rhonci.  BACK: No kyphosis of the thoracic spine  ABDOMEN: Soft, nontender to palpation, with positive bowel sounds  EXTREMITIES: Moves both upper and lower extremities with some limitation to LLE, trace LLE pedal edema  SKIN: LLE wounds with beefy red granulation. Moderate serosanguinous drainage. No redness or warmth.   NEUROLOGIC: Intact, pulses palpable  PSYCHIATRIC: Cognition intact      Labs:  All labs reviewed in the nursing home record and Epic   @  Lab Results   Component Value Date    WBC 10.9 04/14/2025     Lab Results   Component Value Date    RBC " 3.75 04/14/2025     Lab Results   Component Value Date    HGB 10.7 04/14/2025     Lab Results   Component Value Date    HCT 32.9 04/14/2025     Lab Results   Component Value Date    MCV 88 04/14/2025     Lab Results   Component Value Date    MCH 28.5 04/14/2025     Lab Results   Component Value Date    MCHC 32.5 04/14/2025     Lab Results   Component Value Date    RDW 14.0 04/14/2025     Lab Results   Component Value Date     04/14/2025        @Last Comprehensive Metabolic Panel:  Sodium   Date Value Ref Range Status   04/17/2025 140 135 - 145 mmol/L Final   02/15/2019 145.0 (H) 133.0 - 144.0 mmol/L Final     Potassium   Date Value Ref Range Status   04/17/2025 4.6 3.4 - 5.3 mmol/L Final   02/11/2022 4.6 3.5 - 5.0 mmol/L Final   02/15/2019 4.2 3.4 - 5.3 mmol/L Final     Chloride   Date Value Ref Range Status   04/17/2025 105 98 - 107 mmol/L Final   02/11/2022 105 98 - 107 mmol/L Final   02/15/2019 105.0 94.0 - 109.0 mmol/L Final     Carbon Dioxide   Date Value Ref Range Status   02/15/2019 29.0 20.0 - 32.0 mmol/L Final     Carbon Dioxide (CO2)   Date Value Ref Range Status   04/17/2025 26 22 - 29 mmol/L Final   02/11/2022 27 22 - 31 mmol/L Final     Anion Gap   Date Value Ref Range Status   04/17/2025 9 7 - 15 mmol/L Final   02/11/2022 9 5 - 18 mmol/L Final     Glucose   Date Value Ref Range Status   04/17/2025 103 (H) 70 - 99 mg/dL Final   02/11/2022 99 70 - 125 mg/dL Final   02/15/2019 100.0 60.0 - 109.0 mg/dL Final     GLUCOSE BY METER POCT   Date Value Ref Range Status   04/05/2025 106 (H) 70 - 99 mg/dL Final     Urea Nitrogen   Date Value Ref Range Status   04/17/2025 17.2 8.0 - 23.0 mg/dL Final   02/11/2022 9 8 - 22 mg/dL Final   02/15/2019 11.0 7.0 - 30.0 mg/dL Final     Creatinine   Date Value Ref Range Status   04/17/2025 1.02 0.67 - 1.17 mg/dL Final   02/15/2019 1.1 0.8 - 1.5 mg/dL Final     GFR Estimate   Date Value Ref Range Status   04/17/2025 80 >60 mL/min/1.73m2 Final   12/18/2019 >60 >60  mL/min/1.73m2 Final   07/30/2014 >60 >60 mL/min/1.73m2 Final     Calcium   Date Value Ref Range Status   04/17/2025 9.0 8.8 - 10.4 mg/dL Final   02/15/2019 9.3 8.5 - 10.4 mg/dL Final       This note has been dictated using voice recognition software. Any grammatical or context distortions are unintentional and inherent to the software    Electronically signed by: Raquel Dunlap CNP       Sincerely,        Raquel Dunlap NP    Electronically signed

## 2025-05-05 ENCOUNTER — DISCHARGE SUMMARY NURSING HOME (OUTPATIENT)
Dept: GERIATRICS | Facility: CLINIC | Age: 70
End: 2025-05-05
Payer: COMMERCIAL

## 2025-05-05 ENCOUNTER — PATIENT OUTREACH (OUTPATIENT)
Dept: CARE COORDINATION | Facility: CLINIC | Age: 70
End: 2025-05-05

## 2025-05-05 VITALS
HEIGHT: 67 IN | WEIGHT: 202.4 LBS | DIASTOLIC BLOOD PRESSURE: 84 MMHG | SYSTOLIC BLOOD PRESSURE: 133 MMHG | RESPIRATION RATE: 20 BRPM | BODY MASS INDEX: 31.77 KG/M2 | HEART RATE: 76 BPM | TEMPERATURE: 97.6 F | OXYGEN SATURATION: 97 %

## 2025-05-05 DIAGNOSIS — I99.8 ACUTE LOWER LIMB ISCHEMIA: ICD-10-CM

## 2025-05-05 DIAGNOSIS — Z98.890 H/O FASCIOTOMY: ICD-10-CM

## 2025-05-05 DIAGNOSIS — I50.32 CHRONIC DIASTOLIC CONGESTIVE HEART FAILURE (H): ICD-10-CM

## 2025-05-05 DIAGNOSIS — I73.9 PVD (PERIPHERAL VASCULAR DISEASE): ICD-10-CM

## 2025-05-05 DIAGNOSIS — I25.83 CORONARY ARTERIOSCLEROSIS DUE TO LIPID RICH PLAQUE: ICD-10-CM

## 2025-05-05 DIAGNOSIS — I71.40 ABDOMINAL AORTIC ANEURYSM (AAA) WITHOUT RUPTURE, UNSPECIFIED PART: ICD-10-CM

## 2025-05-05 DIAGNOSIS — I10 ESSENTIAL HYPERTENSION, BENIGN: ICD-10-CM

## 2025-05-05 DIAGNOSIS — I73.9 PAD (PERIPHERAL ARTERY DISEASE): ICD-10-CM

## 2025-05-05 DIAGNOSIS — I99.8 ACUTE LOWER LIMB ISCHEMIA: Primary | ICD-10-CM

## 2025-05-05 DIAGNOSIS — M79.A22 NONTRAUMATIC COMPARTMENT SYNDROME OF LEFT LOWER EXTREMITY: ICD-10-CM

## 2025-05-05 PROCEDURE — 99316 NF DSCHRG MGMT 30 MIN+: CPT | Performed by: NURSE PRACTITIONER

## 2025-05-05 RX ORDER — HYDROMORPHONE HYDROCHLORIDE 4 MG/1
4 TABLET ORAL EVERY 4 HOURS PRN
Qty: 17 TABLET | Refills: 0 | Status: SHIPPED | OUTPATIENT
Start: 2025-05-05

## 2025-05-05 ASSESSMENT — ACTIVITIES OF DAILY LIVING (ADL): DEPENDENT_IADLS:: INDEPENDENT

## 2025-05-05 NOTE — PROGRESS NOTES
[facility] GERIATRIC SERVICES    Code Status:  FULL CODE   Visit Type:   Chief Complaint   Patient presents with    Discharge Summary Nursing Home     Facility:  [facility] ([facility]) [[id number]]         HPI: Dudley Kiran is a 69 year old male who I am seeing today for discharge from the TCU. Past medical history includes CAD, hypertension, PAD, CHF, HTN and AAA. Pt recently hospitalized with acute left lower extremity pain. CTA of A/P and LE showed Abrupt cut off of the left posterior tibial artery at the level of the mid tibia concerning for arterial thromboembolism. Pt found to have compartmental syndrome and underwent 4 compartment fasciotomy on 3/27 with Vascular surgery. Wound vac was placed by wound care team on 3/28. Post op complications included pain management. Pt had myalgia with statins. Statin discontinued. Suspected inflammatory arthropathy - Gout vs pseudogout.  Erythema and swelling intermittently at multiple different toes. CRP was elevated at 198.9. Uric acid 6.3. Hand xray 3/31 with DJD. Left foot XR 4/12 showing Moderate first MTP joint degenerative changes without obvious gout or pseudogout. Pt treated with steroids.     Transitional Care Course: Today pt sitting up in wheelchair. Recent compartmental syndrome s/p fasciotomy. He continues with 2 wounds to his LLE. He continues in wound VAC therapy with dressing changes 2/weekly.  Pain controlled with Dilaudid, Tylenol for pain.  Swelling greatly improved. Pt denies any shortness of breath or chest pain.  Blood pressure satisfactory. Continues on chronic AC with rivaraxoban.     Assessment/Plan:      Rhabdomyolysis now resolved  LLE Acute compartment syndrome s/p fasciotomy  Acute limb ischemia w/ PAD  -Runoff CTA 3/26 showing abrupt cut off to left PT artery, mild infrarenal aneurysmal dilation  -Echo 3/27 showing EF 45 to 50%, no septal defects  -ABIs 3/27 normal on the right, moderate on the left  - S/p LLE fasciotomy on 3/27. Wound  vac placed 3/28.  -Hypercoagulable panel negative; heme/onc signed off 3/29/2025  -WBAT  -rivaraxoban 20mg daily (started 3/29/2025)  -aspirin 81 mg every day.  -Continue Wound VAC therapy changing 2/weekly.   -Continues in therapy.   -Tylenol 1000 mg every 6 hours  -Dilaudid 4 mg Q 4 hours prn. Give dose ~ 8:30 am prior to 9 am therapy. Pain assessments Q 4 hours.   -Continue to elevate.   -Follow up CBC with hgb 10.6. Monitor.   -Follow up with vascular on 5/12.     Chronic heart failure with mildly reduced ejection fraction  - Echo this admission with EF 45 to 50%  -metoprolol 12.5 mg Q HS.   -lisinopril to 2.5mg daily   -every day weights.   -Follow up BMP WNL.      CAD  -Remote CABG  -aspirin 81 mg daily.   -No SOB or CP.   -Not routinely on statin- unable to tolerate statin in hospital.      AAA  -3.1 x 3.1`  - Follow up with Vascular Surgery out pt.     -ok to discharge with current meds and treatments.   -Home PT, OT, HHA and RN for medication management/wound care.  -Follow up with PCP in 1-2 weeks.      Active Ambulatory Problems     Diagnosis Date Noted    Coronary arteriosclerosis due to lipid rich plaque 12/28/2012    History of prediabetes 02/01/2019    Elevated cholesterol 02/15/2019    Essential hypertension, benign 12/18/2019    Primary osteoarthritis of both hips 12/18/2019    Class 1 obesity due to excess calories with serious comorbidity and body mass index (BMI) of 32.0 to 32.9 in adult 08/10/2021    OA (osteoarthritis) 10/27/2021    S/P CABG (coronary artery bypass graft) 10/04/2023    Acute lower limb ischemia 04/14/2025     Resolved Ambulatory Problems     Diagnosis Date Noted    Osteoarthrosis, unspecified whether generalized or localized, pelvic region and thigh 12/28/2012    Health Care Home 12/28/2012    Elevated blood pressure reading without diagnosis of hypertension 02/01/2019    Squamous blepharitis of right upper eyelid 02/01/2019    Periorbital dermatitis 02/01/2019    Overweight  (BMI 25.0-29.9) 12/18/2019     Past Medical History:   Diagnosis Date    Arthritis     Coronary artery disease     ED (erectile dysfunction)     HTN (hypertension)     Hypercholesteremia     Obesity     Prediabetes      Allergies   Allergen Reactions    No Known Allergies      Potentially cats       All Meds and Allergies reviewed in the record at the facility and is the most up-to-date.    Current Outpatient Medications   Medication Sig Dispense Refill    acetaminophen (TYLENOL) 325 MG tablet Take 3 tablets (975 mg) by mouth every 6 hours.      albuterol (PROAIR HFA/PROVENTIL HFA/VENTOLIN HFA) 108 (90 Base) MCG/ACT inhaler Inhale 2 puffs into the lungs every 6 hours as needed for shortness of breath, wheezing or cough 18 g 4    aspirin 81 MG EC tablet Take 81 mg by mouth daily.      bisacodyl (DULCOLAX) 10 MG suppository Place 1 suppository (10 mg) rectally daily as needed for constipation.      fluticasone-salmeterol (AIRDUO RESPICLICK) 232-14 MCG/ACT inhaler Inhale 1 puff into the lungs 2 times daily 1 each 11    HYDROmorphone (DILAUDID) 4 MG tablet Take 1 tablet (4 mg) by mouth every 4 hours as needed for severe pain. 17 tablet 0    ipratropium (ATROVENT) 0.06 % nasal spray Spray 2 sprays into both nostrils 2 times daily 15 mL 4    lisinopril (ZESTRIL) 5 MG tablet Take 1 tablet (5 mg) by mouth daily. 90 tablet 3    metoprolol succinate ER (TOPROL XL) 25 MG 24 hr tablet TAKE 0.5 TABLETS(12.5 MG) BY MOUTH DAILY 45 tablet 3    multivitamin w/minerals (THERA-VIT-M) tablet Take 1 tablet by mouth daily.      pantoprazole (PROTONIX) 40 MG EC tablet Take 1 tablet (40 mg) by mouth every morning (before breakfast).      prochlorperazine (COMPAZINE) 5 MG tablet Take 1 tablet (5 mg) by mouth every 6 hours as needed for nausea or vomiting.      rivaroxaban ANTICOAGULANT (XARELTO) 20 MG TABS tablet Take 1 tablet (20 mg) by mouth daily (with dinner).      senna-docusate (SENOKOT-S/PERICOLACE) 8.6-50 MG tablet Take 2 tablets by  "mouth 2 times daily as needed for constipation.      tadalafil (CIALIS) 10 MG tablet Take 1 tablet (10 mg) by mouth daily as needed (erectile difficulties). 90 tablet 2    vitamin C (ASCORBIC ACID) 500 MG tablet Take 1 tablet (500 mg) by mouth daily.      wound support modular (EXPEDITE) LIQD bottle Take 60 mLs by mouth daily.       No current facility-administered medications for this visit.       REVIEW OF SYSTEMS:   10 point review of systems reviewed and pertinent positives in the HPI.     PHYSICAL EXAMINATION:  Physical Exam     Vital signs: /84   Pulse 76   Temp 97.6  F (36.4  C)   Resp 20   Ht 1.702 m (5' 7\")   Wt 91.8 kg (202 lb 6.4 oz)   SpO2 97%   BMI 31.70 kg/m    General: Awake, Alert, oriented x3, sitting up in wheelchair, appropriately, follows simple commands, conversant  HEENT:Pink conjunctiva, anicteric sclerae, moist oral mucosa  NECK: Supple  CVS:  S1  S2, without murmur or gallop.   LUNG: Clear to auscultation, No wheezes, rales or rhonci.  BACK: No kyphosis of the thoracic spine  ABDOMEN: Soft, nontender to palpation, with positive bowel sounds  EXTREMITIES: Moves both upper and lower extremities with some limitation to LLE, trace LLE pedal edema  SKIN: LLE wounds with wound VAC in place.   NEUROLOGIC: Intact, pulses palpable  PSYCHIATRIC: Cognition intact      Labs:  All labs reviewed in the nursing home record and Epic   @  Lab Results   Component Value Date    WBC 10.9 04/14/2025     Lab Results   Component Value Date    RBC 3.75 04/14/2025     Lab Results   Component Value Date    HGB 10.7 04/14/2025     Lab Results   Component Value Date    HCT 32.9 04/14/2025     Lab Results   Component Value Date    MCV 88 04/14/2025     Lab Results   Component Value Date    MCH 28.5 04/14/2025     Lab Results   Component Value Date    MCHC 32.5 04/14/2025     Lab Results   Component Value Date    RDW 14.0 04/14/2025     Lab Results   Component Value Date     04/14/2025        @Last " Comprehensive Metabolic Panel:  Sodium   Date Value Ref Range Status   04/17/2025 140 135 - 145 mmol/L Final   02/15/2019 145.0 (H) 133.0 - 144.0 mmol/L Final     Potassium   Date Value Ref Range Status   04/17/2025 4.6 3.4 - 5.3 mmol/L Final   02/11/2022 4.6 3.5 - 5.0 mmol/L Final   02/15/2019 4.2 3.4 - 5.3 mmol/L Final     Chloride   Date Value Ref Range Status   04/17/2025 105 98 - 107 mmol/L Final   02/11/2022 105 98 - 107 mmol/L Final   02/15/2019 105.0 94.0 - 109.0 mmol/L Final     Carbon Dioxide   Date Value Ref Range Status   02/15/2019 29.0 20.0 - 32.0 mmol/L Final     Carbon Dioxide (CO2)   Date Value Ref Range Status   04/17/2025 26 22 - 29 mmol/L Final   02/11/2022 27 22 - 31 mmol/L Final     Anion Gap   Date Value Ref Range Status   04/17/2025 9 7 - 15 mmol/L Final   02/11/2022 9 5 - 18 mmol/L Final     Glucose   Date Value Ref Range Status   04/17/2025 103 (H) 70 - 99 mg/dL Final   02/11/2022 99 70 - 125 mg/dL Final   02/15/2019 100.0 60.0 - 109.0 mg/dL Final     GLUCOSE BY METER POCT   Date Value Ref Range Status   04/05/2025 106 (H) 70 - 99 mg/dL Final     Urea Nitrogen   Date Value Ref Range Status   04/17/2025 17.2 8.0 - 23.0 mg/dL Final   02/11/2022 9 8 - 22 mg/dL Final   02/15/2019 11.0 7.0 - 30.0 mg/dL Final     Creatinine   Date Value Ref Range Status   04/17/2025 1.02 0.67 - 1.17 mg/dL Final   02/15/2019 1.1 0.8 - 1.5 mg/dL Final     GFR Estimate   Date Value Ref Range Status   04/17/2025 80 >60 mL/min/1.73m2 Final   12/18/2019 >60 >60 mL/min/1.73m2 Final   07/30/2014 >60 >60 mL/min/1.73m2 Final     Calcium   Date Value Ref Range Status   04/17/2025 9.0 8.8 - 10.4 mg/dL Final   02/15/2019 9.3 8.5 - 10.4 mg/dL Final     DISCHARGE PLAN/FACE TO FACE:  I certify that this patient is under my care and that I, or a nurse practitioner or physician's assistant working with me, had a face-to-face encounter that meets the physician face-to-face encounter requirements with this patient.       I certify  that, based on my findings, the following services are medically necessary home health services.    My clinical findings support the need for the above skilled services.    This patient is homebound because: recent hospitalization with compartmental syndrome s/p fasciotomy with wound VAC placement.     The patient is, or has been, under my care and I have initiated the establishment of the plan of care. This patient will be followed by a physician who will periodically review the plan of care.    > 35 minutes spent for this visit which included reviewing discharge medications, home care services and follow ups as well as collaborating with nursing and .       Electronically signed by: Raquel Dunlap CNP

## 2025-05-05 NOTE — LETTER
5/5/2025      Dudley Kiran  [address] [address]        [facility] GERIATRIC [facility]    Code Status:  FULL CODE   Visit Type:   Chief Complaint   Patient presents with     Discharge Summary Nursing Home     Facility:  [facility] ([facility]) [[id number]]         HPI: Dudley Kiran is a 69 year old male who I am seeing today for discharge from the TCU. Past medical history includes CAD, hypertension, PAD, CHF, HTN and AAA. Pt recently hospitalized with acute left lower extremity pain. CTA of A/P and LE showed Abrupt cut off of the left posterior tibial artery at the level of the mid tibia concerning for arterial thromboembolism. Pt found to have compartmental syndrome and underwent 4 compartment fasciotomy on 3/27 with Vascular surgery. Wound vac was placed by wound care team on 3/28. Post op complications included pain management. Pt had myalgia with statins. Statin discontinued. Suspected inflammatory arthropathy - Gout vs pseudogout.  Erythema and swelling intermittently at multiple different toes. CRP was elevated at 198.9. Uric acid 6.3. Hand xray 3/31 with DJD. Left foot XR 4/12 showing Moderate first MTP joint degenerative changes without obvious gout or pseudogout. Pt treated with steroids.     Transitional Care Course: Today pt sitting up in wheelchair. Recent compartmental syndrome s/p fasciotomy. He continues with 2 wounds to his LLE. He continues in wound VAC therapy with dressing changes 2/weekly.  Pain controlled with Dilaudid, Tylenol for pain.  Swelling greatly improved. Pt denies any shortness of breath or chest pain.  Blood pressure satisfactory. Continues on chronic AC with rivaraxoban.     Assessment/Plan:      Rhabdomyolysis now resolved  LLE Acute compartment syndrome s/p fasciotomy  Acute limb ischemia w/ PAD  -Runoff CTA 3/26 showing abrupt cut off to left PT artery, mild infrarenal aneurysmal dilation  -Echo 3/27 showing EF 45 to 50%, no septal  defects  -ABIs 3/27 normal on the right, moderate on the left  - S/p LLE fasciotomy on 3/27. Wound vac placed 3/28.  -Hypercoagulable panel negative; heme/onc signed off 3/29/2025  -WBAT  -rivaraxoban 20mg daily (started 3/29/2025)  -aspirin 81 mg every day.  -Continue Wound VAC therapy changing 2/weekly.   -Continues in therapy.   -Tylenol 1000 mg every 6 hours  -Dilaudid 4 mg Q 4 hours prn. Give dose ~ 8:30 am prior to 9 am therapy. Pain assessments Q 4 hours.   -Continue to elevate.   -Follow up CBC with hgb 10.6. Monitor.   -Follow up with vascular on 5/12.     Chronic heart failure with mildly reduced ejection fraction  - Echo this admission with EF 45 to 50%  -metoprolol 12.5 mg Q HS.   -lisinopril to 2.5mg daily   -every day weights.   -Follow up BMP WNL.      CAD  -Remote CABG  -aspirin 81 mg daily.   -No SOB or CP.   -Not routinely on statin- unable to tolerate statin in hospital.      AAA  -3.1 x 3.1`  - Follow up with Vascular Surgery out pt.     -ok to discharge with current meds and treatments.   -Home PT, OT, HHA and RN for medication management/wound care.  -Follow up with PCP in 1-2 weeks.      Active Ambulatory Problems     Diagnosis Date Noted     Coronary arteriosclerosis due to lipid rich plaque 12/28/2012     History of prediabetes 02/01/2019     Elevated cholesterol 02/15/2019     Essential hypertension, benign 12/18/2019     Primary osteoarthritis of both hips 12/18/2019     Class 1 obesity due to excess calories with serious comorbidity and body mass index (BMI) of 32.0 to 32.9 in adult 08/10/2021     OA (osteoarthritis) 10/27/2021     S/P CABG (coronary artery bypass graft) 10/04/2023     Acute lower limb ischemia 04/14/2025     Resolved Ambulatory Problems     Diagnosis Date Noted     Osteoarthrosis, unspecified whether generalized or localized, pelvic region and thigh 12/28/2012     Health Care Home 12/28/2012     Elevated blood pressure reading without diagnosis of hypertension 02/01/2019      Squamous blepharitis of right upper eyelid 02/01/2019     Periorbital dermatitis 02/01/2019     Overweight (BMI 25.0-29.9) 12/18/2019     Past Medical History:   Diagnosis Date     Arthritis      Coronary artery disease      ED (erectile dysfunction)      HTN (hypertension)      Hypercholesteremia      Obesity      Prediabetes      Allergies   Allergen Reactions     No Known Allergies      Potentially cats       All Meds and Allergies reviewed in the record at the facility and is the most up-to-date.    Current Outpatient Medications   Medication Sig Dispense Refill     acetaminophen (TYLENOL) 325 MG tablet Take 3 tablets (975 mg) by mouth every 6 hours.       albuterol (PROAIR HFA/PROVENTIL HFA/VENTOLIN HFA) 108 (90 Base) MCG/ACT inhaler Inhale 2 puffs into the lungs every 6 hours as needed for shortness of breath, wheezing or cough 18 g 4     aspirin 81 MG EC tablet Take 81 mg by mouth daily.       bisacodyl (DULCOLAX) 10 MG suppository Place 1 suppository (10 mg) rectally daily as needed for constipation.       fluticasone-salmeterol (AIRDUO RESPICLICK) 232-14 MCG/ACT inhaler Inhale 1 puff into the lungs 2 times daily 1 each 11     HYDROmorphone (DILAUDID) 4 MG tablet Take 1 tablet (4 mg) by mouth every 4 hours as needed for severe pain. 17 tablet 0     ipratropium (ATROVENT) 0.06 % nasal spray Spray 2 sprays into both nostrils 2 times daily 15 mL 4     lisinopril (ZESTRIL) 5 MG tablet Take 1 tablet (5 mg) by mouth daily. 90 tablet 3     metoprolol succinate ER (TOPROL XL) 25 MG 24 hr tablet TAKE 0.5 TABLETS(12.5 MG) BY MOUTH DAILY 45 tablet 3     multivitamin w/minerals (THERA-VIT-M) tablet Take 1 tablet by mouth daily.       pantoprazole (PROTONIX) 40 MG EC tablet Take 1 tablet (40 mg) by mouth every morning (before breakfast).       prochlorperazine (COMPAZINE) 5 MG tablet Take 1 tablet (5 mg) by mouth every 6 hours as needed for nausea or vomiting.       rivaroxaban ANTICOAGULANT (XARELTO) 20 MG TABS  "tablet Take 1 tablet (20 mg) by mouth daily (with dinner).       senna-docusate (SENOKOT-S/PERICOLACE) 8.6-50 MG tablet Take 2 tablets by mouth 2 times daily as needed for constipation.       tadalafil (CIALIS) 10 MG tablet Take 1 tablet (10 mg) by mouth daily as needed (erectile difficulties). 90 tablet 2     vitamin C (ASCORBIC ACID) 500 MG tablet Take 1 tablet (500 mg) by mouth daily.       wound support modular (EXPEDITE) LIQD bottle Take 60 mLs by mouth daily.       No current facility-administered medications for this visit.       REVIEW OF SYSTEMS:   10 point review of systems reviewed and pertinent positives in the HPI.     PHYSICAL EXAMINATION:  Physical Exam     Vital signs: /84   Pulse 76   Temp 97.6  F (36.4  C)   Resp 20   Ht 1.702 m (5' 7\")   Wt 91.8 kg (202 lb 6.4 oz)   SpO2 97%   BMI 31.70 kg/m    General: Awake, Alert, oriented x3, sitting up in wheelchair, appropriately, follows simple commands, conversant  HEENT:Pink conjunctiva, anicteric sclerae, moist oral mucosa  NECK: Supple  CVS:  S1  S2, without murmur or gallop.   LUNG: Clear to auscultation, No wheezes, rales or rhonci.  BACK: No kyphosis of the thoracic spine  ABDOMEN: Soft, nontender to palpation, with positive bowel sounds  EXTREMITIES: Moves both upper and lower extremities with some limitation to LLE, trace LLE pedal edema  SKIN: LLE wounds with wound VAC in place.   NEUROLOGIC: Intact, pulses palpable  PSYCHIATRIC: Cognition intact      Labs:  All labs reviewed in the nursing home record and Epic   @  Lab Results   Component Value Date    WBC 10.9 04/14/2025     Lab Results   Component Value Date    RBC 3.75 04/14/2025     Lab Results   Component Value Date    HGB 10.7 04/14/2025     Lab Results   Component Value Date    HCT 32.9 04/14/2025     Lab Results   Component Value Date    MCV 88 04/14/2025     Lab Results   Component Value Date    MCH 28.5 04/14/2025     Lab Results   Component Value Date    MCHC 32.5 " 04/14/2025     Lab Results   Component Value Date    RDW 14.0 04/14/2025     Lab Results   Component Value Date     04/14/2025        @Last Comprehensive Metabolic Panel:  Sodium   Date Value Ref Range Status   04/17/2025 140 135 - 145 mmol/L Final   02/15/2019 145.0 (H) 133.0 - 144.0 mmol/L Final     Potassium   Date Value Ref Range Status   04/17/2025 4.6 3.4 - 5.3 mmol/L Final   02/11/2022 4.6 3.5 - 5.0 mmol/L Final   02/15/2019 4.2 3.4 - 5.3 mmol/L Final     Chloride   Date Value Ref Range Status   04/17/2025 105 98 - 107 mmol/L Final   02/11/2022 105 98 - 107 mmol/L Final   02/15/2019 105.0 94.0 - 109.0 mmol/L Final     Carbon Dioxide   Date Value Ref Range Status   02/15/2019 29.0 20.0 - 32.0 mmol/L Final     Carbon Dioxide (CO2)   Date Value Ref Range Status   04/17/2025 26 22 - 29 mmol/L Final   02/11/2022 27 22 - 31 mmol/L Final     Anion Gap   Date Value Ref Range Status   04/17/2025 9 7 - 15 mmol/L Final   02/11/2022 9 5 - 18 mmol/L Final     Glucose   Date Value Ref Range Status   04/17/2025 103 (H) 70 - 99 mg/dL Final   02/11/2022 99 70 - 125 mg/dL Final   02/15/2019 100.0 60.0 - 109.0 mg/dL Final     GLUCOSE BY METER POCT   Date Value Ref Range Status   04/05/2025 106 (H) 70 - 99 mg/dL Final     Urea Nitrogen   Date Value Ref Range Status   04/17/2025 17.2 8.0 - 23.0 mg/dL Final   02/11/2022 9 8 - 22 mg/dL Final   02/15/2019 11.0 7.0 - 30.0 mg/dL Final     Creatinine   Date Value Ref Range Status   04/17/2025 1.02 0.67 - 1.17 mg/dL Final   02/15/2019 1.1 0.8 - 1.5 mg/dL Final     GFR Estimate   Date Value Ref Range Status   04/17/2025 80 >60 mL/min/1.73m2 Final   12/18/2019 >60 >60 mL/min/1.73m2 Final   07/30/2014 >60 >60 mL/min/1.73m2 Final     Calcium   Date Value Ref Range Status   04/17/2025 9.0 8.8 - 10.4 mg/dL Final   02/15/2019 9.3 8.5 - 10.4 mg/dL Final     DISCHARGE PLAN/FACE TO FACE:  I certify that this patient is under my care and that I, or a nurse practitioner or physician's  assistant working with me, had a face-to-face encounter that meets the physician face-to-face encounter requirements with this patient.       I certify that, based on my findings, the following services are medically necessary home health services.    My clinical findings support the need for the above skilled services.    This patient is homebound because: recent hospitalization with compartmental syndrome s/p fasciotomy with wound VAC placement.     The patient is, or has been, under my care and I have initiated the establishment of the plan of care. This patient will be followed by a physician who will periodically review the plan of care.    > 35 minutes spent for this visit which included reviewing discharge medications, home care services and follow ups as well as collaborating with nursing and .       Electronically signed by: Raquel Dunlap CNP       Sincerely,        Raquel Dunlap NP    Electronically signed

## 2025-05-05 NOTE — PROGRESS NOTES
RN Clinical Product Navigator is following  patient through TCU discharge and then open a Primary Care Coordination program for outreach.      RN Clinical Product Navigator called [phone], CPN confirmed with TCU  that patient remains admitted to TCU. RN Clinical Product Susie LVM for [facility] requesting an update either via phone or email when they have an anticipated discharge date for patient.     RN Clinical Product Susie will chart review in 1-2 weeks.     Maria Alejandra Hoang RN   Clinical Product Navigator   [email or web address]   Office: [phone]

## 2025-05-06 ENCOUNTER — MEDICAL CORRESPONDENCE (OUTPATIENT)
Dept: HEALTH INFORMATION MANAGEMENT | Facility: CLINIC | Age: 70
End: 2025-05-06
Payer: COMMERCIAL

## 2025-05-06 ENCOUNTER — TELEPHONE (OUTPATIENT)
Dept: VASCULAR SURGERY | Facility: CLINIC | Age: 70
End: 2025-05-06
Payer: COMMERCIAL

## 2025-05-06 NOTE — TELEPHONE ENCOUNTER
Gave verbal wound vac orders to Riri at [facility]. Also faxed the 4/15 orders to [phone]

## 2025-05-07 DIAGNOSIS — Z09 HOSPITAL DISCHARGE FOLLOW-UP: ICD-10-CM

## 2025-05-08 ENCOUNTER — TELEPHONE (OUTPATIENT)
Dept: FAMILY MEDICINE | Facility: CLINIC | Age: 70
End: 2025-05-08
Payer: COMMERCIAL

## 2025-05-08 NOTE — TELEPHONE ENCOUNTER
MTM referral from: Transitions of Care (recent hospital discharge, TCU discharge, or ED visit)    MTM referral outreach attempt #1 on May 8, 2025 at 12:20 PM      Outcome: Patient is not interested at this time because no questions    Use bcbs part d map  for the carrier/Plan on the flowsheet          Felisha SHEIKH    [phone]

## 2025-05-08 NOTE — TELEPHONE ENCOUNTER
Home Care is calling regarding an established patient with [facility].  Requesting orders from: Tima Davis RN APPROVED: RN able to provide verbal orders.  Home Care will send orders for signature.  RN will close encounter.  Is this a request for a temporary pause in the home care episode?  No    Orders Requested    Physical Therapy  Request for initial certification (first set of orders)   Frequency: 1x/week for 2 weeks, 2x/week for 3 weeks   RN gave verbal order: Yes        Contacts       Contact Date/Time Type Contact Phone/Fax    05/08/2025 04:15 PM CDT Phone (Incoming) [facility] - [facility] (Home Care) [phone]     Secure Line          Stephanie Montesinos RN

## 2025-05-09 ENCOUNTER — TELEPHONE (OUTPATIENT)
Dept: FAMILY MEDICINE | Facility: CLINIC | Age: 70
End: 2025-05-09
Payer: COMMERCIAL

## 2025-05-09 NOTE — TELEPHONE ENCOUNTER
Home care calling to update that patient declined home health aid services for shower assistance.   Reports patient is staying at his sister's home right now and it is a much safer environment for patient to complete shower independently.     Will continue skilled nursing.     Just wanted to provide update, does not need a return call.     Yarely Garcia RN  [facility]

## 2025-05-12 ENCOUNTER — TELEPHONE (OUTPATIENT)
Dept: FAMILY MEDICINE | Facility: CLINIC | Age: 70
End: 2025-05-12
Payer: COMMERCIAL

## 2025-05-12 ENCOUNTER — OFFICE VISIT (OUTPATIENT)
Dept: VASCULAR SURGERY | Facility: CLINIC | Age: 70
End: 2025-05-12
Attending: STUDENT IN AN ORGANIZED HEALTH CARE EDUCATION/TRAINING PROGRAM
Payer: COMMERCIAL

## 2025-05-12 VITALS
RESPIRATION RATE: 20 BRPM | SYSTOLIC BLOOD PRESSURE: 131 MMHG | DIASTOLIC BLOOD PRESSURE: 78 MMHG | TEMPERATURE: 97.8 F | OXYGEN SATURATION: 96 % | HEART RATE: 102 BPM

## 2025-05-12 DIAGNOSIS — T14.8XXA OPEN WOUND: Primary | ICD-10-CM

## 2025-05-12 RX ORDER — HYDROMORPHONE HYDROCHLORIDE 2 MG/1
4 TABLET ORAL EVERY 6 HOURS PRN
Qty: 4 TABLET | Refills: 0 | Status: SHIPPED | OUTPATIENT
Start: 2025-05-12 | End: 2025-05-15

## 2025-05-12 ASSESSMENT — PAIN SCALES - GENERAL: PAINLEVEL_OUTOF10: MILD PAIN (2)

## 2025-05-12 NOTE — PROGRESS NOTES
Vascular Clinic Rooming Questions      Patient is here for post-op for 3/27 FASCIOTOMY, LEFT LOWER EXTREMITY .      /78   Pulse 102   Temp 97.8  F (36.6  C)   Resp 20   SpO2 96%     The provider has been notified that the patient has concerns of high anxiety and claustrophobia do the pain meds cause this?  .     Questions patient would like addressed today are: N/A.    Refills are needed: No    Has homecare services and agency name:  Yes: [facility]

## 2025-05-12 NOTE — TELEPHONE ENCOUNTER
Forms/Letter Request    Type of form/letter: Home Health Certification      Do we have the form/letter: Yes: Placed in Dr. Davis in box    Who is the form from? Home care/ [facility]     Where did/will the form come from? form was faxed in    When is form/letter needed by: when done    How would you like the form/letter returned: Fax : [phone]      Order details/form description: 2 pages    Order number (if applicable): [id number]

## 2025-05-12 NOTE — PROGRESS NOTES
VASCULAR SURGERY OUTPATIENT CONSULT OR VISIT    VASCULAR SURGEON: Dr. Wanda Coelho, DO    LOCATION:  [facility]    Dudley Kiran  Medical Record #: [id number]   YOB: 1955   Age:  69 year old     Date of Service: May 12, 2025     PRIMARY CARE PROVIDER: Tima Davis       Reason for visit:  LLE fasciotomy wound check    IMPRESSION:    69M who underwent four-compartment fasciotomies for compartment syndrome likely 2/2 thromboembolism on 3/27/2025. He has been healing well with wound VACs over the medial and lateral incisions and is now at a point where his wounds are well granulated and at the level of the skin. I recommended consultation of plastic surgery for skin grafting at this time, however the patient would not like to have any additional surgeries or time in the hospital. He would thus prefer healing by secondary intention. For this, I would recommend stopping the VAC dressings and covering the wounds with xeroform, ABDs, and kerlix while it continues to heal. Patient is also suffering from numerous panic attacks likely related to this major change in his health, thus would likely benefit from Psychiatry consultation to discuss medical management of anxiety and panic attacks.    RECOMMENDATION:    - Discontinue VACs for now  - Continue homecare for 3x/week dressing changes with skilled RN with xeroform, gauze, ABD, and ACE wraps (other days to be replaced by sister)  - Psychiatry consult placed, pain center referral placed  - Short-term refill of dilaudid sent  - Continue on aspirin/Xarelto  - Ultrasound in 3 years to assess aortic aneurysm  - Will see back in wound clinic in 4 weeks to reassess healing    HPI:  uDdley Kiran  is a 69 year old  male who was seen today in follow-up for his fasciotomy wounds. Overall, patient is doing quite well since discharge. He was recently discharge from TCU and is now staying with his sister. He has been quite anxious  about his condition and is having panic attacks every other week. He is particularly anxious in the 1-2 days before VAC changes due to concerns about pain, even though he has not had much pain over the last 5-6 VAC changes. He is also losing weight due to not eating. He states that he does not have much of an appetite, but is forcing himself to eat as much as possible. Continues to take aspirin and Xarelto as prescribed.    PHH:    Past Medical History:   Diagnosis Date    Arthritis     Coronary artery disease     ED (erectile dysfunction)     HTN (hypertension)     Hypercholesteremia     Obesity     Prediabetes     Primary osteoarthritis of both hips          Past Surgical History:   Procedure Laterality Date    ARTHROPLASTY, HIP, TOTAL, DIRECT ANTERIOR APPROACH, USING HANA TABLE Left 10/27/2021    Procedure: LEFT TOTAL HIP ARTHROPLASTY DIRECT ANTERIOR APPROACH;  Surgeon: Bon Little MD;  Location: [facility] OR    BYPASS GRAFT ARTERY CORONARY  2010    FASCIOTOMY LOWER EXTREMITY Left 3/27/2025    Procedure: FASCIOTOMY, LEFT LOWER EXTREMITY;  Surgeon: Wadna Coelho DO;  [place] [facility] [place]        ALLERGIES:     Allergies   Allergen Reactions    No Known Allergies      Potentially cats        MEDS:    Current Outpatient Medications   Medication Sig Dispense Refill    acetaminophen (TYLENOL) 325 MG tablet Take 3 tablets (975 mg) by mouth every 6 hours.      albuterol (PROAIR HFA/PROVENTIL HFA/VENTOLIN HFA) 108 (90 Base) MCG/ACT inhaler Inhale 2 puffs into the lungs every 6 hours as needed for shortness of breath, wheezing or cough 18 g 4    aspirin 81 MG EC tablet Take 81 mg by mouth daily.      bisacodyl (DULCOLAX) 10 MG suppository Place 1 suppository (10 mg) rectally daily as needed for constipation.      fluticasone-salmeterol (AIRDUO RESPICLICK) 232-14 MCG/ACT inhaler Inhale 1 puff into the lungs 2 times daily 1 each 11    HYDROmorphone (DILAUDID) 4 MG tablet Take 1 tablet (4 mg) by  mouth every 4 hours as needed for severe pain. 17 tablet 0    ipratropium (ATROVENT) 0.06 % nasal spray Spray 2 sprays into both nostrils 2 times daily 15 mL 4    lisinopril (ZESTRIL) 5 MG tablet Take 1 tablet (5 mg) by mouth daily. 90 tablet 3    metoprolol succinate ER (TOPROL XL) 25 MG 24 hr tablet TAKE 0.5 TABLETS(12.5 MG) BY MOUTH DAILY 45 tablet 3    multivitamin w/minerals (THERA-VIT-M) tablet Take 1 tablet by mouth daily.      pantoprazole (PROTONIX) 40 MG EC tablet Take 1 tablet (40 mg) by mouth every morning (before breakfast).      prochlorperazine (COMPAZINE) 5 MG tablet Take 1 tablet (5 mg) by mouth every 6 hours as needed for nausea or vomiting.      rivaroxaban ANTICOAGULANT (XARELTO) 20 MG TABS tablet Take 1 tablet (20 mg) by mouth daily (with dinner).      senna-docusate (SENOKOT-S/PERICOLACE) 8.6-50 MG tablet Take 2 tablets by mouth 2 times daily as needed for constipation.      tadalafil (CIALIS) 10 MG tablet Take 1 tablet (10 mg) by mouth daily as needed (erectile difficulties). 90 tablet 2    vitamin C (ASCORBIC ACID) 500 MG tablet Take 1 tablet (500 mg) by mouth daily.      wound support modular (EXPEDITE) LIQD bottle Take 60 mLs by mouth daily.       No current facility-administered medications for this visit.        SOCIAL HABITS:    Tobacco Use      Smoking status: Never      Smokeless tobacco: Never       Alcohol Use: Not on file       History   Drug Use Unknown        REVIEW OF SYSTEMS:    A 12 point ROS was reviewed and except for what is listed in the HPI above, all others are negative    PE:  /78   Pulse 102   Temp 97.8  F (36.6  C)   Resp 20   SpO2 96%    0 lbs 0 oz   There is no height or weight on file to calculate BMI.     EXAM:  GENERAL: This is a well-developed.69 year old male who appears his  stated age  EYES: Grossly normal.  MOUTH: Buccal mucosa normal   CARDIAC:  RR by pedal signal  CHEST/LUNG:  Breathing unlabored on RA  GASTROINTESINAL (ABDOMEN):Soft, nondistended    MUSCULOSKELETAL: Grossly normal and both lower extremities are intact.  HEME/LYMPH: No lymphedema  NEUROLOGIC: Focally intact, Alert and oriented x 3.   PSYCH: appropriate affect  SKIN: Wounds with good bed of granulation tissue throughout. Several areas of hypergranulation tissue  Pulse Exam:   Multiphasic L AT and PT signals      DIAGNOSTIC STUDIES:     Images:  No results found.       LABS:      Sodium   Date Value Ref Range Status   04/17/2025 140 135 - 145 mmol/L Final   04/14/2025 140 135 - 145 mmol/L Final   04/12/2025 142 135 - 145 mmol/L Final   02/15/2019 145.0 (H) 133.0 - 144.0 mmol/L Final   02/01/2019 143.0 133.0 - 144.0 mmol/L Final   05/06/2016 139.0 133.0 - 144.0 mmol/L Final     Urea Nitrogen   Date Value Ref Range Status   04/17/2025 17.2 8.0 - 23.0 mg/dL Final   04/14/2025 19.8 8.0 - 23.0 mg/dL Final   04/12/2025 15.7 8.0 - 23.0 mg/dL Final   02/11/2022 9 8 - 22 mg/dL Final   10/14/2021 11 8 - 22 mg/dL Final   12/18/2019 14 8 - 22 mg/dL Final   02/15/2019 11.0 7.0 - 30.0 mg/dL Final   02/01/2019 14.0 7.0 - 30.0 mg/dL Final   05/06/2016 13.0 7.0 - 30.0 mg/dL Final     Hemoglobin   Date Value Ref Range Status   04/22/2025 10.6 (L) 13.3 - 17.7 g/dL Final   04/17/2025 11.1 (L) 13.3 - 17.7 g/dL Final   04/14/2025 10.7 (L) 13.3 - 17.7 g/dL Final     Platelet Count   Date Value Ref Range Status   04/22/2025 352 150 - 450 10e3/uL Final   04/17/2025 461 (H) 150 - 450 10e3/uL Final   04/14/2025 525 (H) 150 - 450 10e3/uL Final     INR   Date Value Ref Range Status   03/27/2025 1.16 (H) 0.85 - 1.15 Final   03/27/2025 1.17 (H) 0.85 - 1.15 Final         30 minutes spent on the day of encounter doing chart review, history and exam, documentation, and further activities as noted.     Stan San MD  [facility]

## 2025-05-12 NOTE — PATIENT INSTRUCTIONS
This is the plan that was discussed at your appointment.    Referral to mental health  Referral to Pain Clinic  Prescription for dilaudid was sent to your pharmacy    Wound Care Instructions    daily cleanse the wound with Normal Saline or Wound Cleanser    Pat dry    Apply Lotion to the intact skin surrounding the wound(s); some good lotions to use include: Remedy skin repair cream, Cetaphil, or AtracTain    Apply  Xeroform To/onto of the wound(s)    Cover with ABD    Secure the dressing with roll gauze    For compression apply: Short stretch    It is not ok to get your wound wet    Call the clinic for any questions regarding your wound or cares and if you experience signs or symptoms of infection including: fevers, chills, increased redness, increased drainage, foul odor, increased pain at [phone].           I am including additional information on these things and our contact information if you have any questions or concerns.   Please do not hesitate to reach out to us if you felt we did not answer your questions or you are unsure of the treatment plan after your visit today. Our number is [phone].  Thank you for trusting us with your care.         Again thank you for your time.

## 2025-05-12 NOTE — TELEPHONE ENCOUNTER
Forms/Letter Request    Type of form/letter: Home Health Certification      Do we have the form/letter: Yes: placed in Dr. Davis in box    Who is the form from? Home care/ [facility]    Where did/will the form come from? form was faxed in    When is form/letter needed by: when done    How would you like the form/letter returned: Fax : [phone]    Order details/form description: 11 pages    Order number (if applicable): [id number] ,[id number] ,[id number]

## 2025-05-13 ENCOUNTER — PATIENT OUTREACH (OUTPATIENT)
Dept: CARE COORDINATION | Facility: CLINIC | Age: 70
End: 2025-05-13
Payer: COMMERCIAL

## 2025-05-13 ASSESSMENT — ACTIVITIES OF DAILY LIVING (ADL): DEPENDENT_IADLS:: INDEPENDENT

## 2025-05-13 NOTE — PROGRESS NOTES
Clinic Care Coordination Contact  Clinical Product Navigator RN reviewed chart; patient on payer product coverage.  Review results:   CPN Initial Information Gathering  Referral Source: Health Plan     Patient identified by Health Plan as a potential candidate for Primary Care Coordination due to recent admission and TCU stay. Per chart review, patient was admitted to [facility] 3/27 - 4/15/25 for left leg pain and open wound. [facility].     RN Clinical Product Navigator called and spoke with Lobo. Since TCU discharge patient has been staying with his sister.     Patient completed Vascular Surgery follow up yesterday 5/12/25. During yesterday's office visit patient reported feeling quite anxious about his condition and is having panic attacks every other week. He is particularly anxious in the 1-2 days before VAC changes due to concerns about pain, even though he has not had much pain over the last 5-6 VAC changes. Patient also reported having a poor appetite since TCU discharge. Patient was able to have wound vac removed yesterday. Vascular Surgeon placed referrals for mental health and pain management consult.     Today patient states that he is feeling much improved since having wound vac removed. Lobo states that he went home yesterday and reported a large appetite. Patient also notes a much improved mental health state. Reviewed resources available, patient declines additional resources at this time.     Lobo reports that home care has initiated last week, and since wound vac is now removed, patient will be having a home care RN out 3x a week for dressing changes. Home care RN will be out to patient's home again today.     Over all Lobo states that he is stiff and a bit sore in the mornings and when bearing weight on leg, other than that no concerns or complaints. Lobo is scheduled to follow up in Wound Clinic in 4 weeks to  reassess healing. Lobo reports that he feels that he is finally in a good spot with this health and healing. Lobo is scheduled to follow up with PCP office on 5/15/25. Patient denies any on going Primary Care Coordination needs at this time.     Maria Alejandra Hoang RN   Clinical Product Navigator   [email or web address]   Office: [phone]

## 2025-05-14 ENCOUNTER — TELEPHONE (OUTPATIENT)
Dept: FAMILY MEDICINE | Facility: CLINIC | Age: 70
End: 2025-05-14
Payer: COMMERCIAL

## 2025-05-14 NOTE — TELEPHONE ENCOUNTER
Forms/Letter Request    Type of form/letter: Home Health Certification      Do we have the form/letter: Yes: placed in Dr. Davis in box    Who is the form from? Home care    Where did/will the form come from? form was faxed in    When is form/letter needed by: when done    How would you like the form/letter returned: Fax : [phone]      Order details/form description: 3 pages    Order number (if applicable): [id number]

## 2025-05-15 ENCOUNTER — RESULTS FOLLOW-UP (OUTPATIENT)
Dept: FAMILY MEDICINE | Facility: CLINIC | Age: 70
End: 2025-05-15

## 2025-05-15 ENCOUNTER — OFFICE VISIT (OUTPATIENT)
Dept: FAMILY MEDICINE | Facility: CLINIC | Age: 70
End: 2025-05-15
Payer: COMMERCIAL

## 2025-05-15 ENCOUNTER — PATIENT OUTREACH (OUTPATIENT)
Dept: CARE COORDINATION | Facility: CLINIC | Age: 70
End: 2025-05-15

## 2025-05-15 VITALS
WEIGHT: 197.3 LBS | SYSTOLIC BLOOD PRESSURE: 114 MMHG | TEMPERATURE: 98.8 F | RESPIRATION RATE: 16 BRPM | BODY MASS INDEX: 30.97 KG/M2 | OXYGEN SATURATION: 94 % | HEIGHT: 67 IN | DIASTOLIC BLOOD PRESSURE: 74 MMHG | HEART RATE: 76 BPM

## 2025-05-15 DIAGNOSIS — I25.83 CORONARY ARTERIOSCLEROSIS DUE TO LIPID RICH PLAQUE: ICD-10-CM

## 2025-05-15 DIAGNOSIS — I10 ESSENTIAL HYPERTENSION, BENIGN: ICD-10-CM

## 2025-05-15 DIAGNOSIS — F41.9 ANXIETY: ICD-10-CM

## 2025-05-15 DIAGNOSIS — Z79.899 MEDICATION MANAGEMENT: ICD-10-CM

## 2025-05-15 DIAGNOSIS — I50.9 CHRONIC HEART FAILURE, UNSPECIFIED HEART FAILURE TYPE (H): ICD-10-CM

## 2025-05-15 DIAGNOSIS — T14.8XXA OPEN WOUND: Primary | ICD-10-CM

## 2025-05-15 DIAGNOSIS — M06.4 INFLAMMATORY POLYARTHROPATHY (H): ICD-10-CM

## 2025-05-15 DIAGNOSIS — I71.40 ABDOMINAL AORTIC ANEURYSM (AAA) WITHOUT RUPTURE, UNSPECIFIED PART: ICD-10-CM

## 2025-05-15 DIAGNOSIS — M79.A22 NONTRAUMATIC COMPARTMENT SYNDROME OF LEFT LOWER EXTREMITY: ICD-10-CM

## 2025-05-15 LAB
ERYTHROCYTE [DISTWIDTH] IN BLOOD BY AUTOMATED COUNT: 13.7 % (ref 10–15)
HCT VFR BLD AUTO: 33 % (ref 40–53)
HGB BLD-MCNC: 10.8 G/DL (ref 13.3–17.7)
MCH RBC QN AUTO: 28.4 PG (ref 26.5–33)
MCHC RBC AUTO-ENTMCNC: 32.7 G/DL (ref 31.5–36.5)
MCV RBC AUTO: 87 FL (ref 78–100)
PLATELET # BLD AUTO: 491 10E3/UL (ref 150–450)
RBC # BLD AUTO: 3.8 10E6/UL (ref 4.4–5.9)
WBC # BLD AUTO: 10.6 10E3/UL (ref 4–11)

## 2025-05-15 RX ORDER — HYDROXYZINE HYDROCHLORIDE 25 MG/1
25 TABLET, FILM COATED ORAL EVERY 6 HOURS PRN
Qty: 90 TABLET | Refills: 0 | Status: SHIPPED | OUTPATIENT
Start: 2025-05-15

## 2025-05-15 RX ORDER — HYDROMORPHONE HYDROCHLORIDE 2 MG/1
4 TABLET ORAL EVERY 6 HOURS PRN
Qty: 4 TABLET | Refills: 0 | Status: SHIPPED | OUTPATIENT
Start: 2025-05-15

## 2025-05-15 ASSESSMENT — PAIN SCALES - GENERAL: PAINLEVEL_OUTOF10: NO PAIN (0)

## 2025-05-15 NOTE — PATIENT INSTRUCTIONS
I have sent in a prescription for hydroxyzine 25 mg tablets.  You can take between 1 and 2 of these as needed for anxiety/panic symptoms.  If it is making you drowsy then start with half a tablet.    You may consider starting a Claritin or Zyrtec once daily for cat allergies.  This can also make you drowsy so would recommend taking it at night.    Please start taking extra strength Tylenol 3 times daily on a schedule to stay on top of your pain.  Use only half tablet of the Dilaudid sparingly as you continue to wean off of this.    Please follow-up with vascular as discussed and schedule follow-up with your primary care provider in approximately 4 to 6 weeks.  Let us know if you have any new symptoms or worsening symptoms in the meantime.

## 2025-05-15 NOTE — PROGRESS NOTES
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  I reviewed summary from recent hospitalization on 4/15/2025 and from transitional care unit on 5/5/2025.  The patient's wound continues to improve.  He had wound VAC removed few days ago and continues with dressing changes by home health care nurse 3 times a week.  No sign of new infection or other concern at this time.  Continues to have pain which is managed with Dilaudid and Tylenol.  I will provide small refill of hydromorphone today.  We discussed this medications risks and that he should no longer need this beyond today's refill.  I did advise him to start taking prescription strength Tylenol on a schedule to stay ahead of the pain as needed basis.  He will continue to follow closely with vascular specialist in upcoming weeks and is advised to reach out to them with any ongoing or worsening of pain.  I advised him to schedule a follow-up with his primary care provider in 4 to 6 weeks to ensure overall improvement.  - CBC with platelets  - HYDROmorphone (DILAUDID) 2 MG tablet  Dispense: 4 tablet; Refill: 0  - CBC with platelets    Anxiety  Patient has had a few episodes of panic attacks.  He did have a referral to psychiatry placed by vascular the other day.  I will prescribe him hydroxyzine to use as needed for these episodes.  He is advised to follow-up with us in upcoming weeks to ensure improvement in his anxiety level.  -Hydroxyzine 25 mg    Inflammatory arthropathy  Patient had elevated CRP during hospitalization.  Symptoms have improved since finishing prednisone.  Will continue to closely monitor.    Abdominal aortic aneurysm (AAA) without rupture, unspecified part  The patient continues to follow with vascular for management of this.    Essential hypertension, benign  Blood pressure remains adequately controlled on lisinopril 2.5 mg daily and metoprolol  succinate 25 mg daily.    Coronary arteriosclerosis due to lipid rich plaque  He remains on baby aspirin and blood thinner.  His statin was discontinued due to rhabdomyolysis.      Chronic heart failure, unspecified heart failure type (H)  Reviewed most recent send echocardiogram with an EF of 45 to 50%.  Patient is euvolemic and asymptomatic without evidence of edema.  He continues beta-blocker.         MED REC REQUIRED  Post Medication Reconciliation Status:       Follow-up       Jason Diamond is a 69 year old, presenting for the following health issues:  Wound Check (Rehab follow up, left leg wound)      5/15/2025    12:45 PM   Additional Questions   Roomed by Janette     Wound Check    History of Present Illness       Reason for visit:  Operation follow up   He is taking medications regularly.        The patient presents today to follow-up from recent TCU stay.  Medical history significant for CAD, hypertension, PAD, CHF, AAA.  He was hospitalized with lower extremity abrupt cut off of left posterior tibial artery concerning for arterial thromboembolism.  He was found to have compartmental syndrome and underwent 4-compartment fasciotomy on 3/27 with vascular surgery.  He had a wound VAC placed at that time.  His recovery was complicated by pain.  His statin was discontinued after having myalgias from this.  He was noted to have elevated CRP at 198.9.  As well as a mildly elevated uric acid level.  His rhabdomyolysis had resolved.  He continues wound VAC therapy with changes twice weekly.  He remains on rivaroxaban 20 mg daily, baby aspirin.  He has been seen by vascular specialist earlier this week for follow-up they discussed seeing a plastic surgeon for skin grafting but the patient declines this.  He was advised to stop wound VAC dressings and instead cover with Xeroform ABDs and Curlex to allow wounds to heal.  He continues to home care 3 days a week for dressing changes as well as physical and  "Occupational Therapy.  He continues lisinopril and metoprolol for chronic heart failure.  Weight has been stable.  No symptoms of fluid overload.  He continues to have some pain and has been using his Dilaudid sparingly.  He is requesting a short refill of this.  He is taking the Tylenol as needed currently.    Been dealing with some anxiety and intermittent panic attacks.  He has had a few episodes where he feels almost claustrophobic like he needs to get out of the room and outside.  Typically doing this and deep breathing helps relieve symptoms.  These episodes happen mainly in the evening and at nighttime.  He is wondering if the medication could be contributing to these panic attacks and decided to stop his blood thinner past 2 days.  He has not changed any other medication.  He did have a referral to psychiatry placed by the vascular specialist.     Review of Systems - pertinent positives noted in HPI, otherwise ROS is negative.        Objective    /74 (BP Location: Left arm, Patient Position: Sitting, Cuff Size: Adult Regular)   Pulse 76   Temp 98.8  F (37.1  C) (Temporal)   Resp 16   Ht 1.702 m (5' 7.01\")   Wt 89.5 kg (197 lb 4.8 oz)   SpO2 94%   BMI 30.89 kg/m    Body mass index is 30.89 kg/m .  Physical Exam   GENERAL: alert and no distress  RESP: lungs clear to auscultation - no rales, rhonchi or wheezes  CV: regular rate and rhythm, normal S1 S2, no S3 or S4, no murmur, click or rub, no peripheral edema  MS: no gross musculoskeletal defects noted, no edema  SKIN: Patient's leg wounds are covered with wrapping as they were just changed earlier today.  No sign of surrounding erythema, edema, warmth or tenderness.  No suspicious lesions or rashes  NEURO: Normal strength and tone, mentation intact and speech normal  PSYCH: mentation appears normal, affect normal/bright            Signed Electronically by: AMADA Walker CNP    "

## 2025-05-20 ENCOUNTER — MEDICAL CORRESPONDENCE (OUTPATIENT)
Dept: HEALTH INFORMATION MANAGEMENT | Facility: CLINIC | Age: 70
End: 2025-05-20

## 2025-05-20 ENCOUNTER — OFFICE VISIT (OUTPATIENT)
Dept: VASCULAR SURGERY | Facility: CLINIC | Age: 70
End: 2025-05-20
Attending: NURSE PRACTITIONER
Payer: COMMERCIAL

## 2025-05-20 VITALS — DIASTOLIC BLOOD PRESSURE: 82 MMHG | HEART RATE: 83 BPM | OXYGEN SATURATION: 96 % | SYSTOLIC BLOOD PRESSURE: 126 MMHG

## 2025-05-20 DIAGNOSIS — E66.811 CLASS 1 OBESITY DUE TO EXCESS CALORIES WITH SERIOUS COMORBIDITY AND BODY MASS INDEX (BMI) OF 32.0 TO 32.9 IN ADULT: ICD-10-CM

## 2025-05-20 DIAGNOSIS — S81.002A OPEN WOUND OF KNEE, LEG, AND ANKLE, LEFT, INITIAL ENCOUNTER: ICD-10-CM

## 2025-05-20 DIAGNOSIS — Z98.890 HISTORY OF FASCIOTOMY: Primary | ICD-10-CM

## 2025-05-20 DIAGNOSIS — S81.802A OPEN WOUND OF KNEE, LEG, AND ANKLE, LEFT, INITIAL ENCOUNTER: ICD-10-CM

## 2025-05-20 DIAGNOSIS — E66.09 CLASS 1 OBESITY DUE TO EXCESS CALORIES WITH SERIOUS COMORBIDITY AND BODY MASS INDEX (BMI) OF 32.0 TO 32.9 IN ADULT: ICD-10-CM

## 2025-05-20 DIAGNOSIS — S91.002A OPEN WOUND OF KNEE, LEG, AND ANKLE, LEFT, INITIAL ENCOUNTER: ICD-10-CM

## 2025-05-20 DIAGNOSIS — Z95.1 S/P CABG (CORONARY ARTERY BYPASS GRAFT): ICD-10-CM

## 2025-05-20 PROCEDURE — 3074F SYST BP LT 130 MM HG: CPT | Performed by: NURSE PRACTITIONER

## 2025-05-20 PROCEDURE — 11045 DBRDMT SUBQ TISS EACH ADDL: CPT | Performed by: NURSE PRACTITIONER

## 2025-05-20 PROCEDURE — 11042 DBRDMT SUBQ TIS 1ST 20SQCM/<: CPT | Performed by: NURSE PRACTITIONER

## 2025-05-20 PROCEDURE — 3079F DIAST BP 80-89 MM HG: CPT | Performed by: NURSE PRACTITIONER

## 2025-05-20 PROCEDURE — 1125F AMNT PAIN NOTED PAIN PRSNT: CPT | Performed by: NURSE PRACTITIONER

## 2025-05-20 ASSESSMENT — PAIN SCALES - GENERAL: PAINLEVEL_OUTOF10: MODERATE PAIN (4)

## 2025-05-20 NOTE — PROGRESS NOTES
Follow up Vascular Visit       Date of Service:05/20/25      Chief Complaint: LLE fasciotomy wound check      Pt returns to [facility] with regards to their E fasciotomy wound check. This is a 69 year old who underwent four-compartment fasciotomies for compartment syndrome likely 2/2 thromboembolism on 3/27/2025.  Pertinent history of Hypertension and CAD, Arthritis, prediabetes, and CABG who presents to this ED for evaluation of leg pain. They arrive today with cousin; he stayed in the lobby. They are currently using xeroform; ABD; rolled; gauze to the wounds. This is being done by sister daily also has home care. They are using tubular compression and short stretch  for compression. They are feeling well today. Denies fevers, chills. No shortness of breath.     Allergies:   Allergies   Allergen Reactions    No Known Allergies      Potentially cats       Medications:   Current Outpatient Medications:     acetaminophen (TYLENOL) 325 MG tablet, Take 3 tablets (975 mg) by mouth every 6 hours., Disp: , Rfl:     albuterol (PROAIR HFA/PROVENTIL HFA/VENTOLIN HFA) 108 (90 Base) MCG/ACT inhaler, Inhale 2 puffs into the lungs every 6 hours as needed for shortness of breath, wheezing or cough, Disp: 18 g, Rfl: 4    aspirin 81 MG EC tablet, Take 81 mg by mouth daily., Disp: , Rfl:     fluticasone-salmeterol (AIRDUO RESPICLICK) 232-14 MCG/ACT inhaler, Inhale 1 puff into the lungs 2 times daily, Disp: 1 each, Rfl: 11    HYDROmorphone (DILAUDID) 2 MG tablet, Take 2 tablets (4 mg) by mouth every 6 hours as needed for pain., Disp: 4 tablet, Rfl: 0    hydrOXYzine HCl (ATARAX) 25 MG tablet, Take 1 tablet (25 mg) by mouth every 6 hours as needed for anxiety., Disp: 90 tablet, Rfl: 0    ipratropium (ATROVENT) 0.06 % nasal spray, Spray 2 sprays into both nostrils 2 times daily, Disp: 15 mL, Rfl: 4    lisinopril (ZESTRIL) 5 MG tablet, Take 1 tablet (5 mg) by mouth daily., Disp: 90 tablet, Rfl: 3     metoprolol succinate ER (TOPROL XL) 25 MG 24 hr tablet, TAKE 0.5 TABLETS(12.5 MG) BY MOUTH DAILY, Disp: 45 tablet, Rfl: 3    multivitamin w/minerals (THERA-VIT-M) tablet, Take 1 tablet by mouth daily., Disp: , Rfl:     pantoprazole (PROTONIX) 40 MG EC tablet, Take 1 tablet (40 mg) by mouth every morning (before breakfast)., Disp: , Rfl:     prochlorperazine (COMPAZINE) 5 MG tablet, Take 1 tablet (5 mg) by mouth every 6 hours as needed for nausea or vomiting., Disp: , Rfl:     rivaroxaban ANTICOAGULANT (XARELTO) 20 MG TABS tablet, Take 1 tablet (20 mg) by mouth daily (with dinner)., Disp: , Rfl:     tadalafil (CIALIS) 10 MG tablet, Take 1 tablet (10 mg) by mouth daily as needed (erectile difficulties)., Disp: 90 tablet, Rfl: 2    vitamin C (ASCORBIC ACID) 500 MG tablet, Take 1 tablet (500 mg) by mouth daily., Disp: , Rfl:     wound support modular (EXPEDITE) LIQD bottle, Take 60 mLs by mouth daily., Disp: , Rfl:     History:   Past Medical History:   Diagnosis Date    Acute lower limb ischemia 04/14/2025    Arthritis     Class 1 obesity due to excess calories with serious comorbidity and body mass index (BMI) of 32.0 to 32.9 in adult 08/10/2021    Coronary arteriosclerosis due to lipid rich plaque 12/28/2012    Problem list name updated by automated process. Provider to review      Coronary artery disease     ED (erectile dysfunction)     Elevated cholesterol 02/15/2019    Essential hypertension, benign 12/18/2019    History of prediabetes 02/01/2019    HTN (hypertension)     Hypercholesteremia     OA (osteoarthritis) 10/27/2021    Obesity     Prediabetes     Primary osteoarthritis of both hips     S/P CABG (coronary artery bypass graft) 10/04/2023       Physical Exam:    /82 (BP Location: Left arm)   Pulse 83   SpO2 96%     General:  Patient presents to clinic in no apparent distress.  Head: normocephalic atraumatic  Psychiatric:  Alert and oriented x3.   Respiratory: unlabored breathing; no  "cough  Integumentary:  Skin is uniformly warm, dry and pink.    Ulcer #1 Location: left lateral calf  Size: 23.5L x 3W x 0.2depth.  no sinus tract present, Wound base: slough  no undermining present. Ulcer is full thickness. There is moderate drainage. Periwound: no denudement, erythema, induration, maceration or warmth.      Ulcer #2 Location: left medial calf  Size: 23.5L x 2W x 0.4depth.  no sinus tract present, Wound base: slough  no undermining present. Ulcer is full thickness. There is moderate drainage. Periwound: no denudement, erythema, induration, maceration or warmth.      Negative Pressure Wound Therapy Leg Anterior;Left;Lower;Lateral;Medial;Mid;Inner;Outer (Active)   Number of days: 53       VASC Wound Left lower leg (lateral) (Active)   Pre Size Length 23.5 05/20/25 0832   Pre Size Width 3 05/20/25 0832   Pre Size Depth 0.2 05/20/25 0832   Pre Total Sq cm 70.5 05/20/25 0832   Number of days: 28       VASC Wound Left lower leg (medial) (Active)   Pre Size Length 23.5 05/20/25 0832   Pre Size Width 2 05/20/25 0832   Pre Size Depth 0.4 05/20/25 0832   Pre Total Sq cm 46.4 05/20/25 0832   Number of days: 28       Incision/Surgical Site 03/27/25 Left;Medial Tibial (Active)   Number of days: 54       Incision/Surgical Site 03/27/25 Left;Lateral Tibial (Active)   Number of days: 54            Circumferential volume measures:          5/20/2025     8:00 AM   Circumferential Measures   Left - just above MTP 23   Left Ankle 22.5   Left Widest Calf 36       Labs:    I personally reviewed the following lab results today and those on care everywhere    No results found for: \"CRP\"   Sed Rate   Date Value Ref Range Status   03/08/2019 12 0 - 15 mm/hr Final      Last Renal Panel:  Sodium   Date Value Ref Range Status   04/17/2025 140 135 - 145 mmol/L Final   02/15/2019 145.0 (H) 133.0 - 144.0 mmol/L Final     Potassium   Date Value Ref Range Status   04/17/2025 4.6 3.4 - 5.3 mmol/L Final   02/11/2022 4.6 3.5 - 5.0 " "mmol/L Final   02/15/2019 4.2 3.4 - 5.3 mmol/L Final     Chloride   Date Value Ref Range Status   04/17/2025 105 98 - 107 mmol/L Final   02/11/2022 105 98 - 107 mmol/L Final   02/15/2019 105.0 94.0 - 109.0 mmol/L Final     Carbon Dioxide   Date Value Ref Range Status   02/15/2019 29.0 20.0 - 32.0 mmol/L Final     Carbon Dioxide (CO2)   Date Value Ref Range Status   04/17/2025 26 22 - 29 mmol/L Final   02/11/2022 27 22 - 31 mmol/L Final     Anion Gap   Date Value Ref Range Status   04/17/2025 9 7 - 15 mmol/L Final   02/11/2022 9 5 - 18 mmol/L Final     Glucose   Date Value Ref Range Status   04/17/2025 103 (H) 70 - 99 mg/dL Final   02/11/2022 99 70 - 125 mg/dL Final   02/15/2019 100.0 60.0 - 109.0 mg/dL Final     GLUCOSE BY METER POCT   Date Value Ref Range Status   04/05/2025 106 (H) 70 - 99 mg/dL Final     Urea Nitrogen   Date Value Ref Range Status   04/17/2025 17.2 8.0 - 23.0 mg/dL Final   02/11/2022 9 8 - 22 mg/dL Final   02/15/2019 11.0 7.0 - 30.0 mg/dL Final     Creatinine   Date Value Ref Range Status   04/17/2025 1.02 0.67 - 1.17 mg/dL Final   02/15/2019 1.1 0.8 - 1.5 mg/dL Final     GFR Estimate   Date Value Ref Range Status   04/17/2025 80 >60 mL/min/1.73m2 Final   12/18/2019 >60 >60 mL/min/1.73m2 Final   07/30/2014 >60 >60 mL/min/1.73m2 Final     Calcium   Date Value Ref Range Status   04/17/2025 9.0 8.8 - 10.4 mg/dL Final   02/15/2019 9.3 8.5 - 10.4 mg/dL Final     Phosphorus   Date Value Ref Range Status   04/14/2025 3.1 2.5 - 4.5 mg/dL Final     Albumin   Date Value Ref Range Status   04/12/2025 3.4 (L) 3.5 - 5.2 g/dL Final   07/11/2011 4.0 3.5 - 5.0 g/dl Final      Lab Results   Component Value Date    WBC 10.6 05/15/2025     Lab Results   Component Value Date    RBC 3.80 05/15/2025     Lab Results   Component Value Date    HGB 10.8 05/15/2025     Lab Results   Component Value Date    HCT 33.0 05/15/2025     No components found for: \"MCT\"  Lab Results   Component Value Date    MCV 87 05/15/2025 " "    Lab Results   Component Value Date    MCH 28.4 05/15/2025     Lab Results   Component Value Date    MCHC 32.7 05/15/2025     Lab Results   Component Value Date    RDW 13.7 05/15/2025     Lab Results   Component Value Date     05/15/2025      Lab Results   Component Value Date    A1C 5.9 03/19/2025    A1C 5.9 09/13/2024    A1C 5.9 03/13/2024    A1C 5.8 09/08/2023    A1C 5.8 03/08/2023    A1C 5.7 02/01/2019    A1C 5.9 05/06/2016      No results found for: \"TSH\"   No results found for: \"VITDT\"                Impression:  Encounter Diagnoses   Name Primary?    History of fasciotomy Yes    Open wound of knee, leg, and ankle, left, initial encounter     Class 1 obesity due to excess calories with serious comorbidity and body mass index (BMI) of 32.0 to 32.9 in adult     S/P CABG (coronary artery bypass graft)                              Are any of these ulcers new today: No; Location: na    Assessment/Plan:          1. Debridement: Nursing staff removed the old dressing and cleanse the wound(s) with specified solution. After discussion of risk factors and verbal consent was obtained 2% Lidocaine HCL jelly was applied, under clean conditions, the LLE ulceration(s) were debrided using currette. Devitalized and nonviable tissue, along with any fibrin and slough, was removed to improve granulation tissue formation, stimulate wound healing, decrease overall bacteria load, disrupt biofilm formation and decrease edge senescence.  Total excisional debridement was 116.9 sq cm from the epidermis/dermis area and into the subcutaneous tissue with a depth of 0.2-0.4 cm.   Ulcers were improved afterwards and .  Measures were unchanged after debridement.       2.  Ulcer treatment: ulcer treatment will include irrigation and dressings to promote autolytic debridement which will include:will stop the xeroform to reduce the frequency of dressing changes; will instead go to silvercel; ABD; rolled gauze and change twice " per week by home care. If for some reason the patient is not able to get their dressing(s) changed as outlined above (due to illness, lack of supplies, lack of help) please do the following: remove old, soiled dressings; wash the ulcers with saline; pat dry; apply ABD pad or other absorbant pad and secure with rolled gauze; avoid tape directly on your skin; patient instructed to call the clinic as soon as possible to let us know what the current issues are in receiving ulcer care. Stable            3. Edema: will continue tubular compression and short stretch; encouraged elevation. The compression wraps were applied today in clinic.     Patient presents with moderate, bilateral  lower extremity secondary lymphedema.      Patient requires a standard-fit knee high compression stocking and/or velcro wraps    Secondary Lymphedema &Edema: continue elevation and velcro wraps; needs to wear these consistently; replace every 6 months. The compression wraps were applied today in clinic. Patient presents with severe, bilateral secondary lymphedema. Patient requires daytime and nighttime compression garments; I have prescribed velcro wraps to accommodate and deliver gradient compression throughout the affected extremities. Quantity of 3 is needed for each leg for proper wash and wear.         If a 2 layer or 4 layer compression wrap is being used; these are safe to have on for ONLY 7 days. If for some reason the patient is not able to get the wrap(s) changed (due to illness; lack of supplies, lack of help, lack of transportation) please do the following: unwrap the old 2 or 4 layer compression wrap; avoid using scissors as you could cut your skin and cause ulcers; use tubular compression when available. Call to reschedule your home care or clinic visit appointment as soon as possible.  Stable            4. Nutrition: focus on protein; albumin was slightly low           5. Offloading: position off the wounds          6. Wound  Etiology: surgical      Patient will follow up with me in 3 weeks for reevaluation. They were instructed to call the clinic sooner with any signs or symptoms of infection or any further questions/concerns. Answered all questions.          Jihan Mckay DNP, RN, CNP, CWOCN, CFCN, CLT  [facility]   [phone]        This note was electronically signed by Jihan Mckay NP

## 2025-05-20 NOTE — PATIENT INSTRUCTIONS
"Wound care supplies were not ordered or needed today.        Wound Care Instructions    2 TIMES PER WEEK home care and as needed, Cleanse your left leg wound(s) with Vashe or dilute hibiclens solution (30cc in 500cc NS).  NOTE: if the old dressing is sticking please soak off with copious saline solution; if this is a continued problem add adaptic first    Pat Dry with non-sterile gauze    Apply Lotion to the intact skin surrounding your wound and other dry skin locations. Some good lotions include: Remedy Skin Repair Cream, Sarna, Vanicream or Cetaphil    Primary Dressing: Apply silvercel into/onto the wounds    Secondary dressing: Cover with ABD    Secure with non-sterile roll gauze (4\" x 75\" roll) and tape (1\" roll tape) as needed; avoid adhesive directly on the skin    Compression: tubular compression and short stretch    Elevation    Avoid pressure over the wound areas; use pillows for positioning and extra padding    It is not ok to get your wound wet in the bath or shower      If for some reason you are not able to get your dressing(s) changed as outlined above (due to illness, lack of supplies, lack of help) please do the following: remove old, soiled dressings; wash the wounds with saline; pat dry; apply ABD pad or other absorbant pad and secure with rolled gauze; avoid tape directly on your skin; Call the clinic as soon as possible to let us know what the current issues are in receiving wound care [phone].      SEEK MEDICAL CARE IF:  You have an increase in swelling, pain, or redness around the wound.  You have an increase in the amount of pus coming from the wound.  There is a bad smell coming from the wound.  The wound appears to be worsening/enlarging  You have a fever greater than 101.5 F      It is ok to continue current wound care treatment/products for the next 2-3 days until new wound care supplies are ordered and arrive. If longer than this please contact our office at [phone].    If you " have a 2 layer or 4 layer compression wrap on these are safe to have on for ONLY 7 days. If for some reason you are not able to get the wrap(s) changed (due to illness; lack of supplies, lack of help, lack of transportation) please do the following: unwrap the old 2 or 4 layer compression wrap; avoid using scissors as you could cut your skin and cause wounds; use tubular compression when available. Call to reschedule your home care or clinic visit appointment as soon as possible.    Please NOTE: if you are 15 minutes late to your arrival time, you will have to be rescheduled. Please call our clinic as soon as possible if you know you will not be able to get to your appointment at [phone]. If you fail to show up to 3 scheduled clinic appointments you will be dismissed from our clinic.              We want to hear from you!  In the next few weeks, you should receive a call or email to complete a survey about your visit at [facility]. Please help us improve your appointment experience by letting us know how we did today. We strive to make your experience good and value any ways in which we could do better.      We value your input and suggestions.    Thank you for choosing the [facility]!           It is recommended that you do not get your ulcer wet when showering.  Listed below are several ways of keeping it dry when you shower.     1. Wrap it with Press and Seal plastic wrap.  It can be found in the stores where the plastic wraps or tin foil is kept.               2.  Some people take a bath and hang their leg/foot out of the tub.                        3  Put your leg in a plastic bag and tape it on.           4. You can purchase a shower cover for casts at some pharmacies and through the Internet.            5. Take a Bed Bath or wash up at the sink     High Protein Foods      Chicken  -Chicken breast, 3.5oz.-30 grams protein  -Chicken thigh-10 grams(average  size)  -Drumstick-11 grams  -Wing- 6 grams  -Chicken meat, cooked, 4 oz.    Beef  -Hamburger sparkle, 4 oz-28 grams protein  -Steak, 6 oz-42 grams  -Most cuts of beef- 7 grams of protein per ounce    Fish  -Most fish fillets or steaks are about 22 grams of protein for 3 1/2 oz(100 grams) of cooked fish, or 6 grams per ounce  -Tuna, 6 oz can-40 grams of protein    Pork  -Pork chop, average-22 grams protein  -Pork loin or tenderloin, 4 oz.-29 grams  -Ham, 3oz serving- 19 grams  -Ground pork 3oz cooked-22 grams  -Hawkins, 1 slice-3 grams  -[place]-style hawkins(black hawkins), slice-5-6 grams    Eggs and Dairy  -Egg, large-7 grams  -Milk, 1 cup-8 grams  -Cottage cheese, 1/2 cup-15 grams  -Greek yogurt, 1 cup-usually 8-12 grams, check label  -Soft cheeses (Mozzarella, Brie, Camembert)- 6 grams  -Medium cheeses(cheddar, swiss)- 7 or 8 grams per oz  -Hard cheeses(parmesan)- 10 grams per oz    Beans  -Tofu, 1/2 cup 20 grams  -Tofu, 1 oz., 2.3 grams  -Soy milk, 1 cup-6-10 grams  -Most beans(black, alejandra, lentils, etc.) about 7-10 grams protein per half cup of cooked beans  -soy beans, 1/2 cup cooked-14 grams  -Split peas, 1/2 cup cooked- 8 grams    Nuts and Seeds  -Peanut butter, 2 Tablespoons- 8 grams protein  -Almonds, 1/4 cup- 8 grams  -Peanuts, 1/4 cup-9 grams  -Cashews, 1/4 cup- 5 grams  -Pecans, 1/4 cup- 2.5 grams  -Sunflower seeds, 1/4 cup- 6 grams  -Pumpkin seeds, 1/4 cup-8 grams  -Flax seeds- 1/4 cup- 8 grams    Protein Supplements  -Core Power Protein Shakes 26g  -Premier Protein Shakes 30g  -Ensure  -Boost  -Glucerna, if diabetic  When you have an open ulcer, your bodies protein needs are much higher, so it is recommended eat good sources of protein

## 2025-05-23 ENCOUNTER — MEDICAL CORRESPONDENCE (OUTPATIENT)
Dept: HEALTH INFORMATION MANAGEMENT | Facility: CLINIC | Age: 70
End: 2025-05-23
Payer: COMMERCIAL

## 2025-05-25 ENCOUNTER — APPOINTMENT (OUTPATIENT)
Dept: CT IMAGING | Facility: CLINIC | Age: 70
End: 2025-05-25
Attending: EMERGENCY MEDICINE
Payer: COMMERCIAL

## 2025-05-25 ENCOUNTER — HOSPITAL ENCOUNTER (INPATIENT)
Facility: CLINIC | Age: 70
LOS: 3 days | Discharge: HOME | End: 2025-05-28
Attending: EMERGENCY MEDICINE
Payer: COMMERCIAL

## 2025-05-25 DIAGNOSIS — Z71.89 OTHER SPECIFIED COUNSELING: Chronic | ICD-10-CM

## 2025-05-25 DIAGNOSIS — T14.8XXA OPEN WOUND: ICD-10-CM

## 2025-05-25 DIAGNOSIS — R11.2 NAUSEA VOMITING AND DIARRHEA: ICD-10-CM

## 2025-05-25 DIAGNOSIS — T14.8XXA WOUND INFECTION: ICD-10-CM

## 2025-05-25 DIAGNOSIS — L08.9 WOUND INFECTION: ICD-10-CM

## 2025-05-25 DIAGNOSIS — R19.7 NAUSEA VOMITING AND DIARRHEA: ICD-10-CM

## 2025-05-25 DIAGNOSIS — R65.10 SIRS (SYSTEMIC INFLAMMATORY RESPONSE SYNDROME) (H): ICD-10-CM

## 2025-05-25 LAB
ABO + RH BLD: NORMAL
ALBUMIN SERPL BCG-MCNC: 3.9 G/DL (ref 3.5–5.2)
ALP SERPL-CCNC: 86 U/L (ref 40–150)
ALT SERPL W P-5'-P-CCNC: 33 U/L (ref 0–70)
ANION GAP SERPL CALCULATED.3IONS-SCNC: 13 MMOL/L (ref 7–15)
APTT PPP: 44 SECONDS (ref 22–38)
AST SERPL W P-5'-P-CCNC: 31 U/L (ref 0–45)
BASE EXCESS BLDV CALC-SCNC: 3.2 MMOL/L (ref -3–3)
BASOPHILS # BLD AUTO: 0 10E3/UL (ref 0–0.2)
BASOPHILS NFR BLD AUTO: 0 %
BILIRUB SERPL-MCNC: 0.8 MG/DL
BLD GP AB SCN SERPL QL: NEGATIVE
BUN SERPL-MCNC: 12.5 MG/DL (ref 8–23)
CALCIUM SERPL-MCNC: 9.5 MG/DL (ref 8.8–10.4)
CHLORIDE SERPL-SCNC: 100 MMOL/L (ref 98–107)
CREAT SERPL-MCNC: 0.91 MG/DL (ref 0.67–1.17)
CRP SERPL HS-MCNC: 234 MG/L
EGFRCR SERPLBLD CKD-EPI 2021: >90 ML/MIN/1.73M2
EOSINOPHIL # BLD AUTO: 0 10E3/UL (ref 0–0.7)
EOSINOPHIL NFR BLD AUTO: 0 %
ERYTHROCYTE [DISTWIDTH] IN BLOOD BY AUTOMATED COUNT: 13.9 % (ref 10–15)
FLUAV RNA SPEC QL NAA+PROBE: NEGATIVE
FLUBV RNA RESP QL NAA+PROBE: NEGATIVE
GLUCOSE SERPL-MCNC: 114 MG/DL (ref 70–99)
HCO3 BLDV-SCNC: 28 MMOL/L (ref 21–28)
HCO3 SERPL-SCNC: 23 MMOL/L (ref 22–29)
HCT VFR BLD AUTO: 35.5 % (ref 40–53)
HGB BLD-MCNC: 11.4 G/DL (ref 13.3–17.7)
IMM GRANULOCYTES # BLD: 0 10E3/UL
IMM GRANULOCYTES NFR BLD: 0 %
INR PPP: 1.63 (ref 0.85–1.15)
LACTATE SERPL-SCNC: 1.2 MMOL/L (ref 0.7–2)
LIPASE SERPL-CCNC: 31 U/L (ref 13–60)
LYMPHOCYTES # BLD AUTO: 1.4 10E3/UL (ref 0.8–5.3)
LYMPHOCYTES NFR BLD AUTO: 13 %
MCH RBC QN AUTO: 27.9 PG (ref 26.5–33)
MCHC RBC AUTO-ENTMCNC: 32.1 G/DL (ref 31.5–36.5)
MCV RBC AUTO: 87 FL (ref 78–100)
MONOCYTES # BLD AUTO: 1.1 10E3/UL (ref 0–1.3)
MONOCYTES NFR BLD AUTO: 11 %
NEUTROPHILS # BLD AUTO: 7.6 10E3/UL (ref 1.6–8.3)
NEUTROPHILS NFR BLD AUTO: 75 %
NRBC # BLD AUTO: 0 10E3/UL
NRBC BLD AUTO-RTO: 0 /100
O2/TOTAL GAS SETTING VFR VENT: 21 %
OXYHGB MFR BLDV: 22 % (ref 70–75)
PCO2 BLDV: 42 MM HG (ref 40–50)
PH BLDV: 7.43 [PH] (ref 7.32–7.43)
PLATELET # BLD AUTO: 313 10E3/UL (ref 150–450)
PO2 BLDV: 18 MM HG (ref 25–47)
POTASSIUM SERPL-SCNC: 3.3 MMOL/L (ref 3.4–5.3)
PROT SERPL-MCNC: 7.8 G/DL (ref 6.4–8.3)
PROTHROMBIN TIME: 19.4 SECONDS (ref 11.8–14.8)
RBC # BLD AUTO: 4.08 10E6/UL (ref 4.4–5.9)
RSV RNA SPEC NAA+PROBE: NEGATIVE
SAO2 % BLDV: 22.6 % (ref 70–75)
SARS-COV-2 RNA RESP QL NAA+PROBE: NEGATIVE
SODIUM SERPL-SCNC: 136 MMOL/L (ref 135–145)
SPECIMEN EXP DATE BLD: NORMAL
WBC # BLD AUTO: 10.2 10E3/UL (ref 4–11)

## 2025-05-25 PROCEDURE — 99222 1ST HOSP IP/OBS MODERATE 55: CPT | Performed by: INTERNAL MEDICINE

## 2025-05-25 PROCEDURE — 250N000011 HC RX IP 250 OP 636: Performed by: INTERNAL MEDICINE

## 2025-05-25 PROCEDURE — 82310 ASSAY OF CALCIUM: CPT | Performed by: EMERGENCY MEDICINE

## 2025-05-25 PROCEDURE — 87077 CULTURE AEROBIC IDENTIFY: CPT | Performed by: EMERGENCY MEDICINE

## 2025-05-25 PROCEDURE — 85041 AUTOMATED RBC COUNT: CPT | Performed by: EMERGENCY MEDICINE

## 2025-05-25 PROCEDURE — 258N000003 HC RX IP 258 OP 636: Performed by: EMERGENCY MEDICINE

## 2025-05-25 PROCEDURE — 87637 SARSCOV2&INF A&B&RSV AMP PRB: CPT | Performed by: EMERGENCY MEDICINE

## 2025-05-25 PROCEDURE — 85730 THROMBOPLASTIN TIME PARTIAL: CPT | Performed by: EMERGENCY MEDICINE

## 2025-05-25 PROCEDURE — 75635 CT ANGIO ABDOMINAL ARTERIES: CPT

## 2025-05-25 PROCEDURE — G0545 PR INHRENT VISIT TO INPT/OBS W CNFRM/SUSPCT INFCT DIS BY INFCT DIS SPCIALST: HCPCS | Performed by: INTERNAL MEDICINE

## 2025-05-25 PROCEDURE — 250N000013 HC RX MED GY IP 250 OP 250 PS 637: Performed by: INTERNAL MEDICINE

## 2025-05-25 PROCEDURE — 250N000011 HC RX IP 250 OP 636: Performed by: EMERGENCY MEDICINE

## 2025-05-25 PROCEDURE — 99221 1ST HOSP IP/OBS SF/LOW 40: CPT | Mod: GC | Performed by: SURGERY

## 2025-05-25 PROCEDURE — 120N000001 HC R&B MED SURG/OB

## 2025-05-25 PROCEDURE — 83690 ASSAY OF LIPASE: CPT | Performed by: EMERGENCY MEDICINE

## 2025-05-25 PROCEDURE — 87040 BLOOD CULTURE FOR BACTERIA: CPT | Performed by: EMERGENCY MEDICINE

## 2025-05-25 PROCEDURE — 85610 PROTHROMBIN TIME: CPT | Performed by: EMERGENCY MEDICINE

## 2025-05-25 PROCEDURE — 36415 COLL VENOUS BLD VENIPUNCTURE: CPT | Performed by: EMERGENCY MEDICINE

## 2025-05-25 PROCEDURE — 99285 EMERGENCY DEPT VISIT HI MDM: CPT | Mod: 25

## 2025-05-25 PROCEDURE — 86141 C-REACTIVE PROTEIN HS: CPT | Performed by: STUDENT IN AN ORGANIZED HEALTH CARE EDUCATION/TRAINING PROGRAM

## 2025-05-25 PROCEDURE — 86900 BLOOD TYPING SEROLOGIC ABO: CPT | Performed by: EMERGENCY MEDICINE

## 2025-05-25 PROCEDURE — 83605 ASSAY OF LACTIC ACID: CPT | Performed by: EMERGENCY MEDICINE

## 2025-05-25 PROCEDURE — 82805 BLOOD GASES W/O2 SATURATION: CPT | Performed by: EMERGENCY MEDICINE

## 2025-05-25 PROCEDURE — 258N000003 HC RX IP 258 OP 636: Performed by: INTERNAL MEDICINE

## 2025-05-25 RX ORDER — PIPERACILLIN SODIUM, TAZOBACTAM SODIUM 4; .5 G/20ML; G/20ML
4.5 INJECTION, POWDER, LYOPHILIZED, FOR SOLUTION INTRAVENOUS ONCE
Status: COMPLETED | OUTPATIENT
Start: 2025-05-25 | End: 2025-05-25

## 2025-05-25 RX ORDER — HYDRALAZINE HYDROCHLORIDE 20 MG/ML
10 INJECTION INTRAMUSCULAR; INTRAVENOUS EVERY 4 HOURS PRN
Status: DISCONTINUED | OUTPATIENT
Start: 2025-05-25 | End: 2025-05-28 | Stop reason: HOSPADM

## 2025-05-25 RX ORDER — HYDRALAZINE HYDROCHLORIDE 10 MG/1
10 TABLET, FILM COATED ORAL EVERY 4 HOURS PRN
Status: DISCONTINUED | OUTPATIENT
Start: 2025-05-25 | End: 2025-05-28 | Stop reason: HOSPADM

## 2025-05-25 RX ORDER — AMOXICILLIN 250 MG
2 CAPSULE ORAL 2 TIMES DAILY PRN
Status: DISCONTINUED | OUTPATIENT
Start: 2025-05-25 | End: 2025-05-28 | Stop reason: HOSPADM

## 2025-05-25 RX ORDER — NALOXONE HYDROCHLORIDE 0.4 MG/ML
0.2 INJECTION, SOLUTION INTRAMUSCULAR; INTRAVENOUS; SUBCUTANEOUS
Status: DISCONTINUED | OUTPATIENT
Start: 2025-05-25 | End: 2025-05-28 | Stop reason: HOSPADM

## 2025-05-25 RX ORDER — HYDROMORPHONE HYDROCHLORIDE 1 MG/ML
0.3 INJECTION, SOLUTION INTRAMUSCULAR; INTRAVENOUS; SUBCUTANEOUS
Refills: 0 | Status: DISCONTINUED | OUTPATIENT
Start: 2025-05-25 | End: 2025-05-26

## 2025-05-25 RX ORDER — LISINOPRIL 5 MG/1
5 TABLET ORAL DAILY
Status: DISCONTINUED | OUTPATIENT
Start: 2025-05-25 | End: 2025-05-28 | Stop reason: HOSPADM

## 2025-05-25 RX ORDER — ACETAMINOPHEN 325 MG/1
325-650 TABLET ORAL EVERY 6 HOURS PRN
COMMUNITY

## 2025-05-25 RX ORDER — ACETAMINOPHEN 650 MG/1
650 SUPPOSITORY RECTAL EVERY 4 HOURS PRN
Status: DISCONTINUED | OUTPATIENT
Start: 2025-05-25 | End: 2025-05-28 | Stop reason: HOSPADM

## 2025-05-25 RX ORDER — ALBUTEROL SULFATE 90 UG/1
2 INHALANT RESPIRATORY (INHALATION) EVERY 6 HOURS PRN
Status: DISCONTINUED | OUTPATIENT
Start: 2025-05-25 | End: 2025-05-28 | Stop reason: HOSPADM

## 2025-05-25 RX ORDER — HYDROMORPHONE HYDROCHLORIDE 4 MG/1
4 TABLET ORAL EVERY 6 HOURS PRN
Status: DISCONTINUED | OUTPATIENT
Start: 2025-05-25 | End: 2025-05-28 | Stop reason: HOSPADM

## 2025-05-25 RX ORDER — HYDROMORPHONE HYDROCHLORIDE 1 MG/ML
0.3 INJECTION, SOLUTION INTRAMUSCULAR; INTRAVENOUS; SUBCUTANEOUS
Status: DISCONTINUED | OUTPATIENT
Start: 2025-05-25 | End: 2025-05-26

## 2025-05-25 RX ORDER — MULTIPLE VITAMINS W/ MINERALS TAB 9MG-400MCG
1 TAB ORAL DAILY
Status: DISCONTINUED | OUTPATIENT
Start: 2025-05-25 | End: 2025-05-28 | Stop reason: HOSPADM

## 2025-05-25 RX ORDER — AMOXICILLIN 250 MG
1 CAPSULE ORAL 2 TIMES DAILY PRN
Status: DISCONTINUED | OUTPATIENT
Start: 2025-05-25 | End: 2025-05-28 | Stop reason: HOSPADM

## 2025-05-25 RX ORDER — NALOXONE HYDROCHLORIDE 0.4 MG/ML
0.4 INJECTION, SOLUTION INTRAMUSCULAR; INTRAVENOUS; SUBCUTANEOUS
Status: DISCONTINUED | OUTPATIENT
Start: 2025-05-25 | End: 2025-05-28 | Stop reason: HOSPADM

## 2025-05-25 RX ORDER — ACETAMINOPHEN 325 MG/1
325-650 TABLET ORAL EVERY 6 HOURS PRN
Status: DISCONTINUED | OUTPATIENT
Start: 2025-05-25 | End: 2025-05-28 | Stop reason: HOSPADM

## 2025-05-25 RX ORDER — HYDROXYZINE HYDROCHLORIDE 25 MG/1
25 TABLET, FILM COATED ORAL EVERY 6 HOURS PRN
Status: DISCONTINUED | OUTPATIENT
Start: 2025-05-25 | End: 2025-05-28 | Stop reason: HOSPADM

## 2025-05-25 RX ORDER — LIDOCAINE 40 MG/G
CREAM TOPICAL
Status: DISCONTINUED | OUTPATIENT
Start: 2025-05-25 | End: 2025-05-28 | Stop reason: HOSPADM

## 2025-05-25 RX ORDER — PROCHLORPERAZINE MALEATE 5 MG/1
5 TABLET ORAL EVERY 6 HOURS PRN
Status: DISCONTINUED | OUTPATIENT
Start: 2025-05-25 | End: 2025-05-28 | Stop reason: HOSPADM

## 2025-05-25 RX ORDER — ONDANSETRON 2 MG/ML
4 INJECTION INTRAMUSCULAR; INTRAVENOUS EVERY 6 HOURS PRN
Status: DISCONTINUED | OUTPATIENT
Start: 2025-05-25 | End: 2025-05-28 | Stop reason: HOSPADM

## 2025-05-25 RX ORDER — ACETAMINOPHEN 325 MG/1
650 TABLET ORAL EVERY 4 HOURS PRN
Status: DISCONTINUED | OUTPATIENT
Start: 2025-05-25 | End: 2025-05-28 | Stop reason: HOSPADM

## 2025-05-25 RX ORDER — ASPIRIN 81 MG/1
81 TABLET ORAL DAILY
Status: DISCONTINUED | OUTPATIENT
Start: 2025-05-25 | End: 2025-05-28 | Stop reason: HOSPADM

## 2025-05-25 RX ORDER — POTASSIUM CHLORIDE 7.45 MG/ML
10 INJECTION INTRAVENOUS ONCE
Status: COMPLETED | OUTPATIENT
Start: 2025-05-25 | End: 2025-05-25

## 2025-05-25 RX ORDER — IOPAMIDOL 755 MG/ML
120 INJECTION, SOLUTION INTRAVASCULAR ONCE
Status: COMPLETED | OUTPATIENT
Start: 2025-05-25 | End: 2025-05-25

## 2025-05-25 RX ORDER — CALCIUM CARBONATE 500 MG/1
1000 TABLET, CHEWABLE ORAL 4 TIMES DAILY PRN
Status: DISCONTINUED | OUTPATIENT
Start: 2025-05-25 | End: 2025-05-28 | Stop reason: HOSPADM

## 2025-05-25 RX ORDER — PIPERACILLIN SODIUM, TAZOBACTAM SODIUM 3; .375 G/15ML; G/15ML
3.38 INJECTION, POWDER, LYOPHILIZED, FOR SOLUTION INTRAVENOUS EVERY 6 HOURS
Status: DISCONTINUED | OUTPATIENT
Start: 2025-05-25 | End: 2025-05-27

## 2025-05-25 RX ORDER — ONDANSETRON 4 MG/1
4 TABLET, ORALLY DISINTEGRATING ORAL EVERY 6 HOURS PRN
Status: DISCONTINUED | OUTPATIENT
Start: 2025-05-25 | End: 2025-05-28 | Stop reason: HOSPADM

## 2025-05-25 RX ADMIN — VANCOMYCIN HYDROCHLORIDE 2000 MG: 5 INJECTION, POWDER, LYOPHILIZED, FOR SOLUTION INTRAVENOUS at 14:10

## 2025-05-25 RX ADMIN — SODIUM CHLORIDE 1000 ML: 0.9 INJECTION, SOLUTION INTRAVENOUS at 13:54

## 2025-05-25 RX ADMIN — LISINOPRIL 5 MG: 5 TABLET ORAL at 15:19

## 2025-05-25 RX ADMIN — ASPIRIN 81 MG: 81 TABLET, COATED ORAL at 15:19

## 2025-05-25 RX ADMIN — PIPERACILLIN AND TAZOBACTAM 4.5 G: 4; .5 INJECTION, POWDER, FOR SOLUTION INTRAVENOUS at 13:06

## 2025-05-25 RX ADMIN — HYDROMORPHONE HYDROCHLORIDE 4 MG: 4 TABLET ORAL at 17:29

## 2025-05-25 RX ADMIN — ACETAMINOPHEN 650 MG: 325 TABLET ORAL at 16:47

## 2025-05-25 RX ADMIN — Medication 1 TABLET: at 15:19

## 2025-05-25 RX ADMIN — RIVAROXABAN 20 MG: 10 TABLET, FILM COATED ORAL at 16:48

## 2025-05-25 RX ADMIN — PIPERACILLIN AND TAZOBACTAM 3.38 G: 3; .375 INJECTION, POWDER, FOR SOLUTION INTRAVENOUS at 19:39

## 2025-05-25 RX ADMIN — POTASSIUM CHLORIDE 10 MEQ: 7.46 INJECTION, SOLUTION INTRAVENOUS at 13:17

## 2025-05-25 RX ADMIN — IOPAMIDOL 120 ML: 755 INJECTION, SOLUTION INTRAVENOUS at 13:35

## 2025-05-25 ASSESSMENT — ACTIVITIES OF DAILY LIVING (ADL)
ADLS_ACUITY_SCORE: 58
ADLS_ACUITY_SCORE: 60
ADLS_ACUITY_SCORE: 58
ADLS_ACUITY_SCORE: 60
ADLS_ACUITY_SCORE: 58
ADLS_ACUITY_SCORE: 60

## 2025-05-25 ASSESSMENT — COLUMBIA-SUICIDE SEVERITY RATING SCALE - C-SSRS
6. HAVE YOU EVER DONE ANYTHING, STARTED TO DO ANYTHING, OR PREPARED TO DO ANYTHING TO END YOUR LIFE?: NO
1. IN THE PAST MONTH, HAVE YOU WISHED YOU WERE DEAD OR WISHED YOU COULD GO TO SLEEP AND NOT WAKE UP?: NO
2. HAVE YOU ACTUALLY HAD ANY THOUGHTS OF KILLING YOURSELF IN THE PAST MONTH?: NO

## 2025-05-25 NOTE — PROGRESS NOTES
Patient is A&Ox4, Pleasant. Ax1 wgb, pivot. L and R PIV saline locked in between IV abx. Wounds and blood cultures pending. Temp elevated to 100.9 today, MD notified and gave PRN tylenol, see MAR. Voiding without difficulty utilizing hand urinal. Anticipated discharge pending cultures. Regular diet.

## 2025-05-25 NOTE — PHARMACY-ADMISSION MEDICATION HISTORY
Pharmacist Admission Medication History    Admission medication history is complete. The information provided in this note is only as accurate as the sources available at the time of the update.    Information Source(s): Patient, Family member, Clinic records, and CareEverywhere/SureScripts via in-person    Pertinent Information: Patient and spouse were able to confirm current medications and last known administration times.     Changes made to PTA medication list:  Added: APAP  Deleted: Airoduo Inhaler + Atrovent nasal spray   Changed: None    Allergies reviewed with patient and updates made in EHR: yes    Medication History Completed By: Leon Armas [facility] 5/25/2025 12:34 PM    PTA Med List   Medication Sig Last Dose/Taking    acetaminophen (TYLENOL) 325 MG tablet Take 325-650 mg by mouth every 6 hours as needed for mild pain. Taking As Needed    albuterol (PROAIR HFA/PROVENTIL HFA/VENTOLIN HFA) 108 (90 Base) MCG/ACT inhaler Inhale 2 puffs into the lungs every 6 hours as needed for shortness of breath, wheezing or cough Taking As Needed    aspirin 81 MG EC tablet Take 81 mg by mouth daily. 5/24/2025 Morning    HYDROmorphone (DILAUDID) 2 MG tablet Take 2 tablets (4 mg) by mouth every 6 hours as needed for pain. Taking As Needed    hydrOXYzine HCl (ATARAX) 25 MG tablet Take 1 tablet (25 mg) by mouth every 6 hours as needed for anxiety. Taking As Needed    lisinopril (ZESTRIL) 5 MG tablet Take 1 tablet (5 mg) by mouth daily. 5/23/2025    metoprolol succinate ER (TOPROL XL) 25 MG 24 hr tablet TAKE 0.5 TABLETS(12.5 MG) BY MOUTH DAILY 5/23/2025    multivitamin w/minerals (THERA-VIT-M) tablet Take 1 tablet by mouth daily. Past Week    ondansetron (ZOFRAN ODT) 4 MG ODT tab Take 1 tablet (4 mg) by mouth every 8 hours as needed for nausea. Taking As Needed    prochlorperazine (COMPAZINE) 5 MG tablet Take 1 tablet (5 mg) by mouth every 6 hours as needed for nausea or vomiting. Taking As Needed    rivaroxaban ANTICOAGULANT  (XARELTO) 20 MG TABS tablet Take 1 tablet (20 mg) by mouth daily (with dinner). 5/24/2025 Evening    tadalafil (CIALIS) 10 MG tablet Take 1 tablet (10 mg) by mouth daily as needed (erectile difficulties). Taking As Needed    vitamin C (ASCORBIC ACID) 500 MG tablet Take 1 tablet (500 mg) by mouth daily. Past Week

## 2025-05-25 NOTE — ED PROVIDER NOTES
Emergency Department Encounter     Evaluation Date & Time:   2025 11:12 AM    CHIEF COMPLAINT:  Fever and Infection      Triage Note:Patient presents to ED with redness to L calf, patient had a fasciotomy about 8 weeks ago, r/t blood clot in L calf, last wound care was on Tuesday and has had fevers and decreased intake since Wednesday, also reports dark urine.  Hali Sosa RN.......2025 10:58 AM     Triage Assessment (Adult)       Row Name 25 1056          Triage Assessment    Airway WDL WDL        Respiratory WDL    Respiratory WDL WDL        Skin Circulation/Temperature WDL    Skin Circulation/Temperature WDL WDL        Cardiac WDL    Cardiac WDL WDL        Peripheral/Neurovascular WDL    Peripheral Neurovascular WDL X;neurovascular assessment lower        Cognitive/Neuro/Behavioral WDL    Cognitive/Neuro/Behavioral WDL WDL        LLE Neurovascular Assessment    Sensation LLE tenderness present        RLE Neurovascular Assessment    Sensation RLE no tenderness                         FINAL IMPRESSION:    ICD-10-CM    1. Wound infection  T14.8XXA     L08.9       2. Nausea vomiting and diarrhea  R11.2     R19.7     resolved      3. SIRS (systemic inflammatory response syndrome) (H)  R65.10           Impression and Plan     ED COURSE & MEDICAL DECISION MAKIN:26 AM I met with the patient, obtained history, performed an initial exam, and discussed options and plan for diagnostics and treatment here in the ED.  11:55 AM I spoke with Dr. Tao, Vascular surgery.  12:37 PM I discussed the case with hospitalist, Dr Pichardo, who accepts the patient for admission    ED Course as of 25 1238   Sun May 25, 2025   1204 Patient comes in with nonspecific fever a few days ago and upset stomach.  1 diarrhea but no persistent abdominal pain so although C. difficile is within the differential I think very unlikely the cause of her symptoms today.  His abdomen now is soft and nonacute and he no longer  is vomiting so again I think GI source although possible is less likely.  Still will need to do a CT scan of his abdomen given that he is tachycardic.  This could be in part due to dehydration.  However, I did review his pictures from his last vascular surgery clinic visit on 20 May and the patchy redness is definitely new so obviously is concerning for cellulitis leading to at least SIRS with a tachycardia if not sepsis.  Sepsis protocol ordered but again the patient's blood pressure is good so no full fluid bolus is necessary at this time we will start with 1 L.   1230 I spoke with our vascular surgery on-call fellow.  He does see the pictures and agree it appears acutely infected.  He would like CT a follow-through of the leg since I am looking at his abdomen.  These orders were placed.  For now, though, his foot is warm on that left leg so perfusion does seem to be adequate.  He is comfortable with the plan to admit here to [facility] and he will plan to come see him once admitted to the hospital.  Otherwise CAT scan of the abdomen to rule out associated finding there causing nausea such as diverticulitis.   1237 Spoke with Dr. Rosales with the hospitalist service he is comfortable with plan to admit and will follow-up on the results of the CAT scan.       At the conclusion of the encounter I discussed the results of all the tests and the disposition. The questions were answered. The patient or family acknowledged understanding and was agreeable with the care plan.          0 minutes of critical care time        MEDICATIONS GIVEN IN THE EMERGENCY DEPARTMENT:  Medications   sodium chloride (PF) 0.9% PF flush 3 mL (has no administration in time range)   sodium chloride (PF) 0.9% PF flush 3 mL (has no administration in time range)   sodium chloride 0.9% BOLUS 1,000 mL (has no administration in time range)   piperacillin-tazobactam (ZOSYN) 4.5 g vial to attach to  mL bag (has no administration in time  range)   vancomycin (VANCOCIN) 1,750 mg in 0.9% NaCl 517.5 mL intermittent infusion (has no administration in time range)   potassium chloride 10 mEq in 100 mL sterile water infusion (has no administration in time range)       NEW PRESCRIPTIONS STARTED AT TODAY'S ED VISIT:  New Prescriptions    No medications on file       HPI     The history is provided by the patient and a friend. No  was used.        Dudley Kiran is a 69 year old male with a pertinent history of coronary arteriosclerosis, prediabetes, hypertension, s/p CABG, and s/p fasciotomy lower extremity, who presents to this ED via walk-in for evaluation of fever and possible wound infection.     Patient reports he had a fasciotomy on his left leg done 8.5 weeks ago. E had the procedure done [facility]. Patient states last Tuesday (5/20/25) he was seen by Vascular and his wound was doing good. Patient states his bandaging his changed on Tuesday and Friday. He has a nurse that does this at his house. Patient notes that 5/19 to 5/22, patient has been extremely nauseous with no appetite. Patient states that has gotten better and he is able to keep some stuff down. Patient states 3 days ago, he would get fevers at night of 101.8 degrees. He states he would get the chills with the fever. Patient also notes a darker colored urine for the past 3 days. Patient has been taking Tylenol for the pain.     Patient denies any other complaints at this time.     Per chart review, patient was seen on 5/20/25 at [facility] for evaluation of LLE fasciotomy wound check. Patient underwent four-compartment fasciotomies for compartment syndrome likely 2/2 thromboembolism on 3/27/2025. Patient is feeling well today. Patient will follow up with Vascular in 3 weeks for reevaluation.    REVIEW OF SYSTEMS:  Review of Systems  remainder of systems are all otherwise negative.        Medical History     Past Medical History:    Diagnosis Date    Acute lower limb ischemia 04/14/2025    Arthritis     Class 1 obesity due to excess calories with serious comorbidity and body mass index (BMI) of 32.0 to 32.9 in adult 08/10/2021    Coronary arteriosclerosis due to lipid rich plaque 12/28/2012    Coronary artery disease     ED (erectile dysfunction)     Elevated cholesterol 02/15/2019    Essential hypertension, benign 12/18/2019    History of prediabetes 02/01/2019    HTN (hypertension)     Hypercholesteremia     OA (osteoarthritis) 10/27/2021    Obesity     Prediabetes     Primary osteoarthritis of both hips     S/P CABG (coronary artery bypass graft) 10/04/2023       Past Surgical History:   Procedure Laterality Date    ARTHROPLASTY, HIP, TOTAL, DIRECT ANTERIOR APPROACH, USING HANA TABLE Left 10/27/2021    Procedure: LEFT TOTAL HIP ARTHROPLASTY DIRECT ANTERIOR APPROACH;  Surgeon: Bon Little MD;  Location: [facility] Main OR    BYPASS GRAFT ARTERY CORONARY  2010    FASCIOTOMY LOWER EXTREMITY Left 3/27/2025    Procedure: FASCIOTOMY, LEFT LOWER EXTREMITY;  Surgeon: Wanda Coelho DO;  Location: [facility] Main OR       Family History   Problem Relation Age of Onset    Diabetes No family hx of     Diabetes No family hx of     Breast Cancer No family hx of     Colon Cancer No family hx of     Prostate Cancer No family hx of     Hypertension No family hx of        Social History     Tobacco Use    Smoking status: Never    Smokeless tobacco: Never   Vaping Use    Vaping status: Never Used   Substance Use Topics    Alcohol use: No     Alcohol/week: 0.0 standard drinks of alcohol    Drug use: Never       acetaminophen (TYLENOL) 325 MG tablet  albuterol (PROAIR HFA/PROVENTIL HFA/VENTOLIN HFA) 108 (90 Base) MCG/ACT inhaler  aspirin 81 MG EC tablet  HYDROmorphone (DILAUDID) 2 MG tablet  hydrOXYzine HCl (ATARAX) 25 MG tablet  lisinopril (ZESTRIL) 5 MG tablet  metoprolol succinate ER (TOPROL XL) 25 MG 24 hr tablet  multivitamin w/minerals  "(THERA-VIT-M) tablet  ondansetron (ZOFRAN ODT) 4 MG ODT tab  prochlorperazine (COMPAZINE) 5 MG tablet  rivaroxaban ANTICOAGULANT (XARELTO) 20 MG TABS tablet  tadalafil (CIALIS) 10 MG tablet  vitamin C (ASCORBIC ACID) 500 MG tablet  wound support modular (EXPEDITE) LIQD bottle        Physical Exam     First Vitals:  Patient Vitals for the past 24 hrs:   BP Temp Temp src Pulse Resp SpO2 Height Weight   05/25/25 1116 (!) 145/66 -- -- 111 -- 94 % -- --   05/25/25 1054 133/76 99.2  F (37.3  C) Oral 113 16 98 % 1.702 m (5' 7\") 87.5 kg (193 lb)       PHYSICAL EXAM:   Constitutional:   Patient is laying in bed. Easily conversative.   HENT:  Normocephalic, posterior pharynx wnl,   Eyes:  PERRL, EOMI, Conjunctiva normal, No discharge, no scleral icterus.  Respiratory:  Breathing easily, clear  Cardiovascular:  slightly tachycardic, regular rhythm. nl s1s2 0 murmurs, rubs, or gallops.  Peripheral pulses dp, pt, and radial are wnl.  No peripheral edema   GI:  Bowel sounds normal, Soft, No tenderness, No flank tenderness, nondistended.  :No CVA tenderness.   Musculoskeletal:  Moves all extremities.  No erythematous or swollen major joints, Patchy erythema throughout the anterior part of the left leg. 2 large vertical wounds on either side of the calf. Appears healthy tissue at base, skin edge purpuric, No foul smell or drainage.  Integument:  Normal color  Neurologic:  Alert & oriented x 3, Normal motor function, Normal sensory function, No focal deficits noted. Normal speech.  Psychiatric:  Affect normal, Judgment normal, Mood normal.     Results     LAB AND RADIOLOGY:  All pertinent labs reviewed and interpreted  Results for orders placed or performed during the hospital encounter of 05/25/25   Lipase     Status: Normal   Result Value Ref Range    Lipase 31 13 - 60 U/L   Comprehensive metabolic panel     Status: Abnormal   Result Value Ref Range    Sodium 136 135 - 145 mmol/L    Potassium 3.3 (L) 3.4 - 5.3 mmol/L    Carbon " Dioxide (CO2) 23 22 - 29 mmol/L    Anion Gap 13 7 - 15 mmol/L    Urea Nitrogen 12.5 8.0 - 23.0 mg/dL    Creatinine 0.91 0.67 - 1.17 mg/dL    GFR Estimate >90 >60 mL/min/1.73m2    Calcium 9.5 8.8 - 10.4 mg/dL    Chloride 100 98 - 107 mmol/L    Glucose 114 (H) 70 - 99 mg/dL    Alkaline Phosphatase 86 40 - 150 U/L    AST 31 0 - 45 U/L    ALT 33 0 - 70 U/L    Protein Total 7.8 6.4 - 8.3 g/dL    Albumin 3.9 3.5 - 5.2 g/dL    Bilirubin Total 0.8 <=1.2 mg/dL   Lactic Acid Whole Blood with 1X Repeat in 2 HR when >2     Status: Normal   Result Value Ref Range    Lactic Acid, Initial 1.2 0.7 - 2.0 mmol/L   Blood gas venous     Status: Abnormal   Result Value Ref Range    pH Venous 7.43 7.32 - 7.43    pCO2 Venous 42 40 - 50 mm Hg    pO2 Venous 18 (L) 25 - 47 mm Hg    Bicarbonate Venous 28 21 - 28 mmol/L    Base Excess/Deficit Venous 3.2 (H) -3.0 - 3.0 mmol/L    FIO2 21     Oxyhemoglobin Venous 22 (L) 70 - 75 %    O2 Sat, Venous 22.6 (L) 70.0 - 75.0 %    Narrative    In healthy individuals, oxyhemoglobin (O2Hb) and oxygen saturation (SO2) are approximately equal. In the presence of dyshemoglobins, oxyhemoglobin can be considerably lower than oxygen saturation.   INR     Status: Abnormal   Result Value Ref Range    INR 1.63 (H) 0.85 - 1.15    PT 19.4 (H) 11.8 - 14.8 Seconds   Partial thromboplastin time     Status: Abnormal   Result Value Ref Range    aPTT 44 (H) 22 - 38 Seconds   CBC with platelets and differential     Status: Abnormal   Result Value Ref Range    WBC Count 10.2 4.0 - 11.0 10e3/uL    RBC Count 4.08 (L) 4.40 - 5.90 10e6/uL    Hemoglobin 11.4 (L) 13.3 - 17.7 g/dL    Hematocrit 35.5 (L) 40.0 - 53.0 %    MCV 87 78 - 100 fL    MCH 27.9 26.5 - 33.0 pg    MCHC 32.1 31.5 - 36.5 g/dL    RDW 13.9 10.0 - 15.0 %    Platelet Count 313 150 - 450 10e3/uL    % Neutrophils 75 %    % Lymphocytes 13 %    % Monocytes 11 %    % Eosinophils 0 %    % Basophils 0 %    % Immature Granulocytes 0 %    NRBCs per 100 WBC 0 <1 /100     Absolute Neutrophils 7.6 1.6 - 8.3 10e3/uL    Absolute Lymphocytes 1.4 0.8 - 5.3 10e3/uL    Absolute Monocytes 1.1 0.0 - 1.3 10e3/uL    Absolute Eosinophils 0.0 0.0 - 0.7 10e3/uL    Absolute Basophils 0.0 0.0 - 0.2 10e3/uL    Absolute Immature Granulocytes 0.0 <=0.4 10e3/uL    Absolute NRBCs 0.0 10e3/uL   Adult Type and Screen     Status: None   Result Value Ref Range    ABO/RH(D) O POS     Antibody Screen Negative Negative    SPECIMEN EXPIRATION DATE [id number]    CBC with platelets differential     Status: Abnormal    Narrative    The following orders were created for panel order CBC with platelets differential.  Procedure                               Abnormality         Status                     ---------                               -----------         ------                     CBC with platelets and ...[[id number]]  Abnormal            Final result                 Please view results for these tests on the individual orders.   ABO/Rh type and screen     Status: None    Narrative    The following orders were created for panel order ABO/Rh type and screen.  Procedure                               Abnormality         Status                     ---------                               -----------         ------                     Adult Type and Screen[[id number]]                           Final result                 Please view results for these tests on the individual orders.         ECG:    Performed at: 12:38 PM     Impression: nsr rate of 97 on monitor    I have independently reviewed and interpreted the EKS(s) documented above    PROCEDURES:  Procedures:      [facility] Documentation     Medical Decision Making    Admit.    MIPS (CTPE, Dental pain, Capps, Sinusitis, Asthma/COPD, Head Trauma): Not Applicable    SEPSIS: The patient has signs of sepsis   Sepsis ED evaluation   The patient has signs of sepsis as evidenced by:  1. Presence of 2 SIRS criteria, suspected infection, AND  2. Organ  dysfunction: tachycardia with source of infection.  No signs of organ dysfunction acutely and map greater than 65 so no fluid boluses indicated    Sepsis Care Initiation: Starting at sirs diagnosis without elevated lactic acidosis, until specified. Prior to this documentation, sepsis, severe sepsis, or septic shock was NOT thought to be a significant cause of illness. This order represents the first time infection was seriously considered to be affecting the patient.    Lactic Acid Results:  Recent Labs   Lab Test 05/25/25  1157 03/27/25  0624 03/27/25  0037   LACT 1.2 1.2 1.5       3 Hour Bundle 6 Hour Bundle (Reassessment)   Blood Cultures before IV Antibiotics: Yes  Antibiotics given: see below  Prehospital fluid volume (mL):                     Total fluids given (ED +Pre-hospital):  The patient responded to a lesser volume of IV fluids. The initial volume ordered was 1000 mL.    Repeat Lactic Acid Level: Ordered by reflex for 2 hours after initial lactic acid collection.  Vasopressors: MAP>65 after initial IVF bolus, will continue to monitor fluid status and vital signs.  Repeat perfusion exam: I attest to having performed a repeat sepsis exam and assessment of perfusion at 12:40 PM .   BMI Readings from Last 1 Encounters:   05/25/25 30.23 kg/m        Anti-infectives (From admission through now)      Start     Dose/Rate Route Frequency Ordered Stop    05/25/25 1230  piperacillin-tazobactam (ZOSYN) 4.5 g vial to attach to  mL bag         4.5 g  over 30 Minutes Intravenous ONCE 05/25/25 1229                      CMS Diagnoses: IV Antibiotics given and/or elevated Lactate of 1.2 and no sepsis note found - Delete this reminder and enter the sepsis note or '.edcms' before signing chart.>>>None       I, Mitchell Barroso, am serving as a scribe to document services personally performed by Dr. Nena York, based on my observations and the provider's statements to me. I, Dr. Nena York, attest that  Mitchell Barroso is acting in a scribe capacity, has observed my performance of the services and has documented them in accordance with my direction.      Nena York MD  Emergency Medicine  [facility] EMERGENCY ROOM         Nena York MD  05/25/25 [id number]

## 2025-05-25 NOTE — PHARMACY-VANCOMYCIN DOSING SERVICE
Pharmacy Vancomycin Initial Note  Date of Service May 25, 2025  Patient's  1955  69 year old, male    Indication: Skin and Soft Tissue Infection    Current estimated CrCl = Estimated Creatinine Clearance: 80.9 mL/min (based on SCr of 0.91 mg/dL).    Creatinine for last 3 days  2025: 11:57 AM Creatinine 0.91 mg/dL    Recent Vancomycin Level(s) for last 3 days  No results found for requested labs within last 3 days.      Vancomycin IV Administrations (past 72 hours)        No vancomycin orders with administrations in past 72 hours.                    Nephrotoxins and other renal medications (From now, onward)      Start     Dose/Rate Route Frequency Ordered Stop    25 1930  piperacillin-tazobactam (ZOSYN) 3.375 g vial to attach to  mL bag         3.375 g  over 30 Minutes Intravenous EVERY 6 HOURS 25 1311      25 1330  lisinopril (ZESTRIL) tablet 5 mg        Note to Pharmacy: PTA Sig:Take 1 tablet (5 mg) by mouth daily.      5 mg Oral DAILY 25 1311      25 1330  vancomycin (VANCOCIN) 2,000 mg in 0.9% NaCl 520 mL intermittent infusion         2,000 mg  over 2 Hours Intravenous ONCE 25 1329      25 1230  piperacillin-tazobactam (ZOSYN) 4.5 g vial to attach to  mL bag         4.5 g  over 30 Minutes Intravenous ONCE 25 1229              Contrast Orders - past 72 hours (72h ago, onward)      Start     Dose/Rate Route Frequency Stop    25 1330  iopamidol (ISOVUE-370) solution 120 mL         120 mL Intravenous ONCE              [employer] Prediction of Planned Initial Vancomycin Regimen  Loading dose: 2000 mg at 13:28 2025.  Regimen: 2000 mg IV every 24 hours.  Start time: 13:28 on 2025  Exposure target: AUC24 (range) 400-600 mg/L.hr   AUC24,ss: 528 mg/L.hr  Probability of AUC24 > 400: 79 %  Ctrough,ss: 14.1 mg/L  Probability of Ctrough,ss > 20: 24 %  Probability of nephrotoxicity (Lodise NORMA ): 9 %    Plan:  Start vancomycin    mg IV q24h starting 5/26/25 at 1330   Vancomycin monitoring method: AUC  Vancomycin therapeutic monitoring goal: 400-600 mg*h/L  Pharmacy will check vancomycin levels as appropriate in 1-3 Days.    Serum creatinine levels will be ordered daily for the first week of therapy and at least twice weekly for subsequent weeks.      Leon Armas, RPH

## 2025-05-25 NOTE — H&P
[facility]    History and Physical - Hospitalist Service       Date of Admission:  5/25/2025  69-year-old man remote coronary artery bypass grafting 2010,  history of acute left lower extremity ischemia from thromboembolic events with subsequent fasciotomy chronic wound treated with antibiotic and wound VAC was seen in follow-up at vascular surgery several days ago for some erythema of the area no pustular drainage no fever chills  He is referred to the ER for further evaluation and treatment.  The changes in the appearance of the leg started to occur approximately 4 days ago        Assessment & Plan      Dudley Kiran is a 69 year old male admitted on 5/25/2025. He had acute left lower extremity limb ischemia from thromboembolic event end of March 2025 transferred to [facility] where he was treated with anticoagulants and had a emergent fasciotomy.  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 see above  4 history of coronary artery bypass grafting 2010    Plan: Will start him on empiric antibiotics Zosyn and vancomycin for now review with vascular and also ask ID to consult         Diet: Combination Diet Regular Diet Adult    DVT Prophylaxis: DOAC  Capps Catheter: Not present  Lines: None     Cardiac Monitoring: None  Code Status: Full Code      Clinically Significant Risk Factors Present on Admission        # Hypokalemia: Lowest K  "= 3.3 mmol/L in last 2 days, will replace as needed         # Drug Induced Coagulation Defect: home medication list includes an anticoagulant medication  # Drug Induced Platelet Defect: home medication list includes an antiplatelet medication   # Hypertension: Noted on problem list           # Obesity: Estimated body mass index is 30.23 kg/m  as calculated from the following:    Height as of this encounter: 1.702 m (5' 7\").    Weight as of this encounter: 87.5 kg (193 lb).        # History of CABG: noted on surgical history       Disposition Plan     Medically Ready for Discharge: Anticipated in 2-4 Days           Cuate Pichardo MD  Hospitalist Service  [facility]  Securely message with [employer] (more info)  Text page via [employer] Paging/Directory     ______________________________________________________________________    Chief Complaint   Wound cellulitis left lower leg        History is obtained from the patient    History of Present Illness   Dudley Kiran is a 69 year old male admitted on 5/25/2025. He had acute left lower extremity limb ischemia from thromboembolic event end of March 2025 transferred to [facility] where he was treated with anticoagulants and had a emergent fasciotomy.  The wound was slow to heal he eventually made his way to nursing home with wound VAC in place  Remote history of coronary artery disease and bypass grafting 2010 that has been stable  He was seen by vascular surgery few days ago wound was started to get worse and now continues to get worse he is being admitted for evaluation and treatment    Social History     Socioeconomic History    Marital status:      Spouse name: Not on file    Number of children: Not on file    Years of education: Not on file    Highest education level: Not on file   Occupational History    Not on file   Tobacco Use    Smoking status: Never    Smokeless tobacco: Never   Vaping Use    Vaping status: Never Used   Substance " and Sexual Activity    Alcohol use: No     Alcohol/week: 0.0 standard drinks of alcohol    Drug use: Never    Sexual activity: Not on file   Other Topics Concern    Not on file   Social History Narrative    Single lives with 2 cousins 3 grown children from his marriage they are in their 30s; significant friend to accompany to the emergency room at [facility] (May 2025 at [facility])    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     Social Drivers of Health     Financial Resource Strain: Low Risk  (3/28/2025)    Financial Resource Strain     Within the past 12 months, have you or your family members you live with been unable to get utilities (heat, electricity) when it was really needed?: No   Food Insecurity: Low Risk  (3/28/2025)    Food Insecurity     Within the past 12 months, did you worry that your food would run out before you got money to buy more?: No     Within the past 12 months, did the food you bought just not last and you didn t have money to get more?: No   Transportation Needs: Low Risk  (3/28/2025)    Transportation Needs     Within the past 12 months, has lack of transportation kept you from medical appointments, getting your medicines, non-medical meetings or appointments, work, or from getting things that you need?: No   Physical Activity: Insufficiently Active (3/17/2025)    Exercise Vital Sign     Days of Exercise per Week: 1 day     Minutes of Exercise per Session: 10 min   Stress: No Stress Concern Present (3/17/2025)    [employer] [place] [employer] - Occupational Stress Questionnaire     Feeling of Stress : Not at all   Social Connections: Unknown (3/17/2025)    Social Connection and Isolation Panel [NHANES]     Frequency of Communication with Friends and Family: Not on file     Frequency of Social Gatherings with Friends and Family: Once a week     Attends Oriental orthodox Services: Not on file     Active Member of Clubs or Organizations: Not on file     Attends Club or  Organization Meetings: Not on file     Marital Status: Not on file   Interpersonal Safety: Low Risk  (3/27/2025)    Interpersonal Safety     Do you feel physically and emotionally safe where you currently live?: Yes     Within the past 12 months, have you been hit, slapped, kicked or otherwise physically hurt by someone?: No     Within the past 12 months, have you been humiliated or emotionally abused in other ways by your partner or ex-partner?: No   Housing Stability: Low Risk  (3/28/2025)    Housing Stability     Do you have housing? : Yes     Are you worried about losing your housing?: No          Past Medical History    Past Medical History:   Diagnosis Date    Acute lower limb ischemia 04/14/2025    Arthritis     Class 1 obesity due to excess calories with serious comorbidity and body mass index (BMI) of 32.0 to 32.9 in adult 08/10/2021    Coronary arteriosclerosis due to lipid rich plaque 12/28/2012    Problem list name updated by automated process. Provider to review      Coronary artery disease     ED (erectile dysfunction)     Elevated cholesterol 02/15/2019    Essential hypertension, benign 12/18/2019    History of prediabetes 02/01/2019    HTN (hypertension)     Hypercholesteremia     OA (osteoarthritis) 10/27/2021    Obesity     Prediabetes     Primary osteoarthritis of both hips     Status post coronary artery bypass graft 2010       Past Surgical History   Past Surgical History:   Procedure Laterality Date    ARTHROPLASTY, HIP, TOTAL, DIRECT ANTERIOR APPROACH, USING HANA TABLE Left 10/27/2021    Procedure: LEFT TOTAL HIP ARTHROPLASTY DIRECT ANTERIOR APPROACH;  Surgeon: Bon Little MD;  Location: [facility] OR    BYPASS GRAFT ARTERY CORONARY  2010    FASCIOTOMY LOWER EXTREMITY Left 3/27/2025    Procedure: FASCIOTOMY, LEFT LOWER EXTREMITY;  Surgeon: Wanda Coelho DO;  Location: [facility] OR       Prior to Admission Medications   Prior to Admission Medications   Prescriptions  Last Dose Informant Patient Reported? Taking?   HYDROmorphone (DILAUDID) 2 MG tablet   No Yes   Sig: Take 2 tablets (4 mg) by mouth every 6 hours as needed for pain.   acetaminophen (TYLENOL) 325 MG tablet   Yes Yes   Sig: Take 325-650 mg by mouth every 6 hours as needed for mild pain.   albuterol (PROAIR HFA/PROVENTIL HFA/VENTOLIN HFA) 108 (90 Base) MCG/ACT inhaler   No Yes   Sig: Inhale 2 puffs into the lungs every 6 hours as needed for shortness of breath, wheezing or cough   aspirin 81 MG EC tablet 5/24/2025 Morning  Yes Yes   Sig: Take 81 mg by mouth daily.   hydrOXYzine HCl (ATARAX) 25 MG tablet   No Yes   Sig: Take 1 tablet (25 mg) by mouth every 6 hours as needed for anxiety.   lisinopril (ZESTRIL) 5 MG tablet 5/23/2025  No Yes   Sig: Take 1 tablet (5 mg) by mouth daily.   metoprolol succinate ER (TOPROL XL) 25 MG 24 hr tablet 5/23/2025  No Yes   Sig: TAKE 0.5 TABLETS(12.5 MG) BY MOUTH DAILY   multivitamin w/minerals (THERA-VIT-M) tablet Past Week  No Yes   Sig: Take 1 tablet by mouth daily.   ondansetron (ZOFRAN ODT) 4 MG ODT tab   No Yes   Sig: Take 1 tablet (4 mg) by mouth every 8 hours as needed for nausea.   prochlorperazine (COMPAZINE) 5 MG tablet   No Yes   Sig: Take 1 tablet (5 mg) by mouth every 6 hours as needed for nausea or vomiting.   rivaroxaban ANTICOAGULANT (XARELTO) 20 MG TABS tablet 5/24/2025 Evening  No Yes   Sig: Take 1 tablet (20 mg) by mouth daily (with dinner).   tadalafil (CIALIS) 10 MG tablet   No Yes   Sig: Take 1 tablet (10 mg) by mouth daily as needed (erectile difficulties).   vitamin C (ASCORBIC ACID) 500 MG tablet Past Week  No Yes   Sig: Take 1 tablet (500 mg) by mouth daily.   wound support modular (EXPEDITE) LIQD bottle   No No   Sig: Take 60 mLs by mouth daily.      Facility-Administered Medications: None      ------------------------------------------------------------------------    Physical Exam   Vital Signs: Temp: 99.2  F (37.3  C) Temp src: Oral BP: (!) 145/66 Pulse:  111   Resp: 16 SpO2: 94 % O2 Device: None (Room air)    Weight: 193 lbs 0 oz    He is seen in emergency room #10 he is very comfortable very pleasant seen with a woman friend of his looks healthier than one might expect lungs are clear heart regular rhythm abdomen benign left lower extremity shows evidence of the linear wound from the fasciotomy there is no pustular drainage there is no dehiscence there is significant surrounding erythema suggestive of cellulitis and/or inflammation    Medical Decision Making       55 MINUTES SPENT BY ME on the date of service doing chart review, history, exam, documentation & further activities per the note.          Data     I have personally reviewed the following data over the past 24 hrs:    10.2  \   11.4 (L)   / 313     136 100 12.5 /  114 (H)   3.3 (L) 23 0.91 \     ALT: 33 AST: 31 AP: 86 TBILI: 0.8   ALB: 3.9 TOT PROTEIN: 7.8 LIPASE: 31     Procal: N/A CRP: N/A Lactic Acid: 1.2       INR:  1.63 (H) PTT:  44 (H)   D-dimer:  N/A Fibrinogen:  N/A       Imaging results reviewed over the past 24 hrs:   No results found for this or any previous visit (from the past 24 hours).

## 2025-05-25 NOTE — CONSULTS
"[facility]  General ID Service Consult      Patient: Dudley Kiran  YOB: 1955, MRN: [id number]  Date of Admission:  5/25/2025  Date of Consult: 05/25/2025  Consult Requested by: Cuate Pichardo MD  Admission Diagnosis: Wound infection [T14.8XXA, L08.9]  SIRS (systemic inflammatory response syndrome) (H) [R65.10]  Nausea vomiting and diarrhea [R11.2, R19.7]  Consult Question:     ID Assessment & Plan   Hx of Compartment syndrome left leg  Thromboembolism to left lower extremity  Had Fasciotomy left lower extremity  on 3/27    Now in with fevers x3-5 days with cellulitis LLE    PLAN  Follow on wound cx  GPC on stain  BC pending  Vanco zosyn for now    Marina Johnson MD  [facility]  ______________________________________________________________________        History of Present Illness   Per dr Pichardo   69 year old male admitted on 5/25/2025. He had acute left lower extremity limb ischemia from thromboembolic event end of March 2025 transferred to [facility] where he was treated with anticoagulants and had a emergent fasciotomy.  The wound was slow to heal he eventually made his way to nursing home with wound VAC in place  Remote history of coronary artery disease and bypass grafting 2010 that has been stable  He was seen by vascular surgery few days ago wound was started to get worse and now continues to get worse he is being admitted for evaluation and treatment\"    Visiting nurse concerned about leg appearance  Nausea but o vomiting  No diarrhea (one day only with loose stools)  No cough coryza or SOB  No abd pains  No rashes  No travel  Ha a cat no scratches etc    Review of Systems   The 10 point Review of Systems is negative other than noted in the HPI or here.     Past Medical History    Past Medical History:   Diagnosis Date    Acute lower limb ischemia 04/14/2025    Arthritis     Class 1 obesity due to excess calories with serious " comorbidity and body mass index (BMI) of 32.0 to 32.9 in adult 08/10/2021    Coronary arteriosclerosis due to lipid rich plaque 12/28/2012    Problem list name updated by automated process. Provider to review      Coronary artery disease     ED (erectile dysfunction)     Elevated cholesterol 02/15/2019    Essential hypertension, benign 12/18/2019    History of prediabetes 02/01/2019    HTN (hypertension)     Hypercholesteremia     OA (osteoarthritis) 10/27/2021    Obesity     Prediabetes     Primary osteoarthritis of both hips     Status post coronary artery bypass graft 2010       Past Surgical History   Past Surgical History:   Procedure Laterality Date    ARTHROPLASTY, HIP, TOTAL, DIRECT ANTERIOR APPROACH, USING HANA TABLE Left 10/27/2021    Procedure: LEFT TOTAL HIP ARTHROPLASTY DIRECT ANTERIOR APPROACH;  Surgeon: Bon Little MD;  Location: [facility] OR    BYPASS GRAFT ARTERY CORONARY  2010    FASCIOTOMY LOWER EXTREMITY Left 3/27/2025    Procedure: FASCIOTOMY, LEFT LOWER EXTREMITY;  Surgeon: Wanda Coelho DO;  Location: [facility] OR       Social History   Social History     Tobacco Use    Smoking status: Never    Smokeless tobacco: Never   Vaping Use    Vaping status: Never Used   Substance Use Topics    Alcohol use: No     Alcohol/week: 0.0 standard drinks of alcohol    Drug use: Never       Family History         Medications   I have reviewed this patient's current medications    Allergies   Allergies   Allergen Reactions    Codeine Nausea    No Known Allergies      Potentially cats       Physical Exam   Vital Signs: Temp: 100.9  F (38.3  C) Temp src: Oral BP: 129/74 Pulse: 98   Resp: 16 SpO2: 96 % O2 Device: None (Room air)    Weight: 193 lbs 0 oz    Gen. appearance nontoxic  Eyes no conjunctivitis or icterus  Neck no stiffness or neck vein distention    Lungs clear no wheeze  Abdomen soft not tender  Extremities just dressed  per ED No erythematous or swollen major joints, Patchy  erythema throughout the anterior part of the left leg. 2 large vertical wounds on either side of the calf. Appears healthy tissue at base, skin edge purpuric, No foul smell or drainage.   Skin  no rash or emboli  Neurologic alert oriented no focal deficits        Data   Inflammatory Markers   Recent Labs   Lab Test 04/01/25  0454 03/31/25  0520 03/08/19  1623   SED  --   --  12   CRPI 190.60* 198.90*  --         Hematology Studies   Recent Labs   Lab Test 05/25/25  1157 05/15/25  1335 04/22/25  0500 04/17/25  0542 04/14/25  0548 04/12/25  0603 04/09/25  0507 04/06/25  0507 03/27/25  0624 03/27/25  0257 03/27/25  0013 03/26/25  2038   WBC 10.2 10.6 7.9 9.9 10.9 9.9 10.7 15.4*   < > 13.1*   < > 14.4*   ANEU 7.6  --   --   --   --   --  5.7 10.1*  --  7.0  --  9.2*   AEOS 0.0  --   --   --   --   --  1.2* 1.2*  --  1.0*  --  0.8*   HGB 11.4* 10.8* 10.6* 11.1* 10.7* 11.1* 11.2* 11.4*   < > 12.8*   < > 15.9   MCV 87 87 89 93 88 88 87 87   < > 85   < > 84    491* 352 461* 525* 564* 575* 564*   < > 233   < > 292    < > = values in this interval not displayed.       Metabolic Studies   Recent Labs   Lab Test 05/25/25  1157 04/17/25  0542 04/14/25  0548 04/12/25  0603 04/09/25  0507    140 140 142 135   POTASSIUM 3.3* 4.6 4.0 4.3 4.4   CHLORIDE 100 105 104 107 102   CO2 23 26 29 27 24   BUN 12.5 17.2 19.8 15.7 17.4   CR 0.91 1.02 1.00 0.96 0.91   GFRESTIMATED >90 80 81 86 >90       Hepatic Studies    Recent Labs   Lab Test 05/25/25  1157 04/12/25  0603 04/09/25  0507 04/06/25  0507 03/28/25  0413 03/27/25  0257 03/19/25  0855   BILITOTAL 0.8 0.3 0.4  --  0.4 1.1 0.4   ALKPHOS 86 96 98  --  62 73 92   ALBUMIN 3.9 3.4* 3.5 3.6 3.0* 3.7 4.2   AST 31 25 26  --  160* 255* 31   ALT 33 34 49  --  44 50 27       Most Recent 6 Bacteria Isolates From Any Culture (See EPIC Reports for Culture Details):No lab results found.    Urine Studies    Recent Labs   Lab Test 03/27/25  1634   LEUKEST 25 William/uL*   WBCU 6*  "      Vancomycin Levels  No lab results found.    Invalid input(s): \"VANCO\"    Hepatitis B Testing No lab results found.  Hepatitis C Testing   No results found for: \"HCVAB\", \"HQTG\", \"HCGENO\", \"HCPCR\", \"HQTRNA\", \"HEPRNA\"  HIVTesting No lab results found.    Respiratory Virus Testing    RSV PCR   Date Value Ref Range Status   05/25/2025 Negative Negative Final     COVID-19 Antibody Results, Testing for Immunity           No data to display              COVID-19 PCR Results          5/25/2025    12:21   COVID-19 PCR Results   SARS CoV2 PCR Negative        "

## 2025-05-25 NOTE — CONSULTS
Vascular surgery consultation    This is a 69-year-old male known to our service for left lower extremity compartment syndrome, likely due to thromboembolism with reperfusion injury.  Patient underwent left lower extremity 4 compartment fasciotomy on March 27 with chronic wound VAC therapy till this early month.  The patient was seen by wound clinic and was debrided 5 days ago, and since then, the patient started having fevers, and redness around the left lower extremity.  The patient notes change in dressing at the time, but does not know exactly what it is.    On exam,  NAD  NLB on RA  Heart rate was tachycardic on admission, now improving  Left lower extremity with spotty erythema in left lower extremity without tenderness, there is warmth  Right PT palp, left DP palp, right lower extremity strength 5 out of 5, left lower extremity strength 5 out of 5 on plantarflexion, 3 out of 5 in dorsiflexion, sensation baseline to light touch bilaterally  Left lower extremity fasciotomy wounds are well-healing except few areas of dry scabs which were removed at bedside, no areas of necrosis    Images reviewed    Three-vessel runoff to the foot on the right lower extremity, slower than left  Two-vessel runoff to the foot on the left lower extremity, there is no drainable fluid collection, I think this is faster in the left side due to chronic inflammation and vasodilation    Impression: Cellulitis of the left lower extremity versus allergic reaction    CRP is pending at this time.  Normal leukocytosis without significant left shift.  Normal lactic acid.  I do not think there is any urgent/emergent indication for debridement at this time.  Will continue to monitor progression of his wound with IV antibiotics.  Continue to follow.    Irais Tao MD  Vascular Surgery Fellow    Vascular surgery attending staff note: Case discussed with our senior fellow, Dr. Tao.  Suspect cellulitis in the left lower extremity.  Do not feel  the need for urgent surgical debridement.  Will continue to follow.    ANN PANDEY M.D.

## 2025-05-25 NOTE — ED TRIAGE NOTES
Patient presents to ED with redness to L calf, patient had a fasciotomy about 8 weeks ago, r/t blood clot in L calf, last wound care was on Tuesday and has had fevers and decreased intake since Wednesday, also reports dark urine.  Hali Sosa RN.......5/25/2025 10:58 AM     Triage Assessment (Adult)       Row Name 05/25/25 1056          Triage Assessment    Airway WDL WDL        Respiratory WDL    Respiratory WDL WDL        Skin Circulation/Temperature WDL    Skin Circulation/Temperature WDL WDL        Cardiac WDL    Cardiac WDL WDL        Peripheral/Neurovascular WDL    Peripheral Neurovascular WDL X;neurovascular assessment lower        Cognitive/Neuro/Behavioral WDL    Cognitive/Neuro/Behavioral WDL WDL        LLE Neurovascular Assessment    Sensation LLE tenderness present        RLE Neurovascular Assessment    Sensation RLE no tenderness

## 2025-05-25 NOTE — PHARMACY-VANCOMYCIN DOSING SERVICE
Pharmacy Vancomycin Initial Note  Date of Service May 25, 2025  Patient's  1955  69 year old, male    Indication: Sepsis and Skin and Soft Tissue Infection    Current estimated CrCl = Estimated Creatinine Clearance: 80.9 mL/min (based on SCr of 0.91 mg/dL).    Creatinine for last 3 days  2025: 11:57 AM Creatinine 0.91 mg/dL    Recent Vancomycin Level(s) for last 3 days  No results found for requested labs within last 3 days.      Vancomycin IV Administrations (past 72 hours)        No vancomycin orders with administrations in past 72 hours.                    Nephrotoxins and other renal medications (From now, onward)      Start     Dose/Rate Route Frequency Ordered Stop    25 1300  vancomycin (VANCOCIN) 1,750 mg in 0.9% NaCl 517.5 mL intermittent infusion         1,750 mg  over 2 Hours Intravenous ONCE 25 1231      25 1230  piperacillin-tazobactam (ZOSYN) 4.5 g vial to attach to  mL bag         4.5 g  over 30 Minutes Intravenous ONCE 25 1229              Contrast Orders - past 72 hours (72h ago, onward)      None             Plan:  Start vancomycin  2000 mg IV once   If continuing inpatient, please reorder pharmacy to dose vancomycin consult     Leon Armas, [facility]

## 2025-05-26 ENCOUNTER — ENROLLMENT (OUTPATIENT)
Dept: HOME HEALTH SERVICES | Facility: HOME HEALTH | Age: 70
End: 2025-05-26
Payer: COMMERCIAL

## 2025-05-26 LAB
ANION GAP SERPL CALCULATED.3IONS-SCNC: 10 MMOL/L (ref 7–15)
BUN SERPL-MCNC: 9.3 MG/DL (ref 8–23)
CALCIUM SERPL-MCNC: 8.3 MG/DL (ref 8.8–10.4)
CHLORIDE SERPL-SCNC: 104 MMOL/L (ref 98–107)
CREAT SERPL-MCNC: 0.94 MG/DL (ref 0.67–1.17)
EGFRCR SERPLBLD CKD-EPI 2021: 88 ML/MIN/1.73M2
ERYTHROCYTE [DISTWIDTH] IN BLOOD BY AUTOMATED COUNT: 13.9 % (ref 10–15)
GLUCOSE SERPL-MCNC: 119 MG/DL (ref 70–99)
HCO3 SERPL-SCNC: 23 MMOL/L (ref 22–29)
HCT VFR BLD AUTO: 28.1 % (ref 40–53)
HGB BLD-MCNC: 9.4 G/DL (ref 13.3–17.7)
MAGNESIUM SERPL-MCNC: 2.1 MG/DL (ref 1.7–2.3)
MCH RBC QN AUTO: 28.5 PG (ref 26.5–33)
MCHC RBC AUTO-ENTMCNC: 33.5 G/DL (ref 31.5–36.5)
MCV RBC AUTO: 85 FL (ref 78–100)
PLATELET # BLD AUTO: 261 10E3/UL (ref 150–450)
POTASSIUM SERPL-SCNC: 3.3 MMOL/L (ref 3.4–5.3)
POTASSIUM SERPL-SCNC: 3.5 MMOL/L (ref 3.4–5.3)
RBC # BLD AUTO: 3.3 10E6/UL (ref 4.4–5.9)
SODIUM SERPL-SCNC: 137 MMOL/L (ref 135–145)
WBC # BLD AUTO: 9.1 10E3/UL (ref 4–11)

## 2025-05-26 PROCEDURE — 250N000011 HC RX IP 250 OP 636: Performed by: INTERNAL MEDICINE

## 2025-05-26 PROCEDURE — 99232 SBSQ HOSP IP/OBS MODERATE 35: CPT | Performed by: INTERNAL MEDICINE

## 2025-05-26 PROCEDURE — 82374 ASSAY BLOOD CARBON DIOXIDE: CPT | Performed by: INTERNAL MEDICINE

## 2025-05-26 PROCEDURE — 83735 ASSAY OF MAGNESIUM: CPT | Performed by: INTERNAL MEDICINE

## 2025-05-26 PROCEDURE — 36415 COLL VENOUS BLD VENIPUNCTURE: CPT | Performed by: INTERNAL MEDICINE

## 2025-05-26 PROCEDURE — 84132 ASSAY OF SERUM POTASSIUM: CPT | Performed by: INTERNAL MEDICINE

## 2025-05-26 PROCEDURE — 250N000013 HC RX MED GY IP 250 OP 250 PS 637: Performed by: INTERNAL MEDICINE

## 2025-05-26 PROCEDURE — 120N000001 HC R&B MED SURG/OB

## 2025-05-26 PROCEDURE — 258N000003 HC RX IP 258 OP 636: Performed by: INTERNAL MEDICINE

## 2025-05-26 PROCEDURE — 85018 HEMOGLOBIN: CPT | Performed by: INTERNAL MEDICINE

## 2025-05-26 RX ORDER — POTASSIUM CHLORIDE 1500 MG/1
40 TABLET, EXTENDED RELEASE ORAL ONCE
Status: COMPLETED | OUTPATIENT
Start: 2025-05-26 | End: 2025-05-26

## 2025-05-26 RX ORDER — HYDROMORPHONE HYDROCHLORIDE 1 MG/ML
0.5 INJECTION, SOLUTION INTRAMUSCULAR; INTRAVENOUS; SUBCUTANEOUS
Status: DISCONTINUED | OUTPATIENT
Start: 2025-05-26 | End: 2025-05-28 | Stop reason: HOSPADM

## 2025-05-26 RX ADMIN — RIVAROXABAN 20 MG: 10 TABLET, FILM COATED ORAL at 17:15

## 2025-05-26 RX ADMIN — PIPERACILLIN AND TAZOBACTAM 3.38 G: 3; .375 INJECTION, POWDER, FOR SOLUTION INTRAVENOUS at 14:43

## 2025-05-26 RX ADMIN — Medication 1 TABLET: at 09:21

## 2025-05-26 RX ADMIN — ASPIRIN 81 MG: 81 TABLET, COATED ORAL at 09:21

## 2025-05-26 RX ADMIN — PIPERACILLIN AND TAZOBACTAM 3.38 G: 3; .375 INJECTION, POWDER, FOR SOLUTION INTRAVENOUS at 19:32

## 2025-05-26 RX ADMIN — ACETAMINOPHEN 650 MG: 325 TABLET ORAL at 11:13

## 2025-05-26 RX ADMIN — VANCOMYCIN HYDROCHLORIDE 2000 MG: 5 INJECTION, POWDER, LYOPHILIZED, FOR SOLUTION INTRAVENOUS at 13:52

## 2025-05-26 RX ADMIN — ACETAMINOPHEN 650 MG: 325 TABLET ORAL at 23:52

## 2025-05-26 RX ADMIN — PIPERACILLIN AND TAZOBACTAM 3.38 G: 3; .375 INJECTION, POWDER, FOR SOLUTION INTRAVENOUS at 01:24

## 2025-05-26 RX ADMIN — POTASSIUM CHLORIDE 40 MEQ: 1500 TABLET, EXTENDED RELEASE ORAL at 11:13

## 2025-05-26 RX ADMIN — LISINOPRIL 5 MG: 5 TABLET ORAL at 09:21

## 2025-05-26 RX ADMIN — PIPERACILLIN AND TAZOBACTAM 3.38 G: 3; .375 INJECTION, POWDER, FOR SOLUTION INTRAVENOUS at 09:25

## 2025-05-26 RX ADMIN — METOPROLOL SUCCINATE ER TABLETS 12.5 MG: 25 TABLET, FILM COATED, EXTENDED RELEASE ORAL at 09:21

## 2025-05-26 ASSESSMENT — ACTIVITIES OF DAILY LIVING (ADL)
ADLS_ACUITY_SCORE: 36
ADLS_ACUITY_SCORE: 59
ADLS_ACUITY_SCORE: 36
ADLS_ACUITY_SCORE: 59
ADLS_ACUITY_SCORE: 59
ADLS_ACUITY_SCORE: 36
ADLS_ACUITY_SCORE: 59
ADLS_ACUITY_SCORE: 36
ADLS_ACUITY_SCORE: 59
ADLS_ACUITY_SCORE: 36

## 2025-05-26 NOTE — PLAN OF CARE
Problem: Adult Inpatient Plan of Care  Goal: Optimal Comfort and Wellbeing  5/26/2025 1630 by Leslie Romero, RN  Outcome: Progressing  5/26/2025 1131 by Leslie Romero, RN  Outcome: Progressing  Intervention: Monitor Pain and Promote Comfort  Recent Flowsheet Documentation  Taken 5/26/2025 0800 by Leslie Romero, RN  Pain Management Interventions: declines   Goal Outcome Evaluation:       Pt A&O x4, VSS. Afebrile. Denies pain. Voiding, urine tea/desmond color. Pt on Potassium protocol. L and R PIV saline locked in between IV abx. New drsg applied to LLE Drsg, CDI. CMS intact.

## 2025-05-26 NOTE — PLAN OF CARE
Problem: Infection  Goal: Absence of Infection Signs and Symptoms  Outcome: Progressing     AO x4.  VSS on RA. BC pending. Dark tea urine color. Denied pain. Makes needs known. Call light within reach.

## 2025-05-26 NOTE — PLAN OF CARE
Pt is A&Ox4, VSS on RA, temp has normalized. SBA when ambulating. Voiding adequately. Dressing to LLE is CDI. CMS intact. Pt is reporting mild pain during shift.     Care hours [id number]      Problem: Adult Inpatient Plan of Care  Goal: Absence of Hospital-Acquired Illness or Injury  Intervention: Prevent Infection  Recent Flowsheet Documentation  Taken 5/25/2025 1940 by Kenya Akins, RN  Infection Prevention: rest/sleep promoted     Problem: Adult Inpatient Plan of Care  Goal: Optimal Comfort and Wellbeing  Outcome: Progressing  Intervention: Monitor Pain and Promote Comfort  Recent Flowsheet Documentation  Taken 5/25/2025 2158 by Kenya Akins, RN  Pain Management Interventions: declines  Taken 5/25/2025 1941 by Kenya Akins, RN  Pain Management Interventions: declines

## 2025-05-26 NOTE — PROGRESS NOTES
"[facility] Progress Note    SUBJECTIVE:  Less pain able to sleep.  Leg mostly bandaged up see vascular note, things likely improved from admit though.        REVIEW OF SYSTEMS:  Negative unless as listed above.  Social history, Family history, Medications: reviewed.    OBJECTIVE:  /60   Pulse 86   Temp 98.9  F (37.2  C) (Oral)   Resp 16   Ht 1.702 m (5' 7\")   Wt 87.5 kg (193 lb)   SpO2 93%   BMI 30.23 kg/m                PHYSICAL EXAM:  Alert, awake  Vitals tabulated above, reviewed  Neck supple without lymphadenopathy  Sclera normal color, not injected  CARDIOVASCULAR regular rate and rhythm, no murmur  Lungs CLEAR TO AUSCULTATION   Abdomen soft, NT/ND, absent HEPATOSPLENOMEGALY  Skin normal  Joints normal  Neurologic exam non focal          Antibiotics:    Pertinent labs:    Recent Labs   Lab Test 05/26/25  0629 05/25/25  1157 05/15/25  1335   WBC 9.1 10.2 10.6   HGB 9.4* 11.4* 10.8*   HCT 28.1* 35.5* 33.0*   MCV 85 87 87    313 491*       Lab Results   Component Value Date    RBC 3.30 05/26/2025     Lab Results   Component Value Date    HGB 9.4 05/26/2025     Lab Results   Component Value Date    HCT 28.1 05/26/2025     No components found for: \"MCT\"  Lab Results   Component Value Date    MCV 85 05/26/2025     Lab Results   Component Value Date    MCH 28.5 05/26/2025     Lab Results   Component Value Date    MCHC 33.5 05/26/2025     Lab Results   Component Value Date    RDW 13.9 05/26/2025     Lab Results   Component Value Date     05/26/2025       Last Comprehensive Metabolic Panel:  Sodium   Date Value Ref Range Status   05/26/2025 137 135 - 145 mmol/L Final   02/15/2019 145.0 (H) 133.0 - 144.0 mmol/L Final     Potassium   Date Value Ref Range Status   05/26/2025 3.3 (L) 3.4 - 5.3 mmol/L Final   02/11/2022 4.6 3.5 - 5.0 mmol/L Final   02/15/2019 4.2 3.4 - 5.3 mmol/L Final     Chloride   Date Value Ref Range Status   05/26/2025 104 98 - 107 mmol/L Final   02/11/2022 " "105 98 - 107 mmol/L Final   02/15/2019 105.0 94.0 - 109.0 mmol/L Final     Carbon Dioxide   Date Value Ref Range Status   02/15/2019 29.0 20.0 - 32.0 mmol/L Final     Carbon Dioxide (CO2)   Date Value Ref Range Status   05/26/2025 23 22 - 29 mmol/L Final   02/11/2022 27 22 - 31 mmol/L Final     Anion Gap   Date Value Ref Range Status   05/26/2025 10 7 - 15 mmol/L Final   02/11/2022 9 5 - 18 mmol/L Final     Glucose   Date Value Ref Range Status   05/26/2025 119 (H) 70 - 99 mg/dL Final   02/11/2022 99 70 - 125 mg/dL Final   02/15/2019 100.0 60.0 - 109.0 mg/dL Final     GLUCOSE BY METER POCT   Date Value Ref Range Status   04/05/2025 106 (H) 70 - 99 mg/dL Final     Urea Nitrogen   Date Value Ref Range Status   05/26/2025 9.3 8.0 - 23.0 mg/dL Final   02/11/2022 9 8 - 22 mg/dL Final   02/15/2019 11.0 7.0 - 30.0 mg/dL Final     Creatinine   Date Value Ref Range Status   05/26/2025 0.94 0.67 - 1.17 mg/dL Final   02/15/2019 1.1 0.8 - 1.5 mg/dL Final     GFR Estimate   Date Value Ref Range Status   05/26/2025 88 >60 mL/min/1.73m2 Final     Comment:     eGFR calculated using 2021 CKD-EPI equation.   12/18/2019 >60 >60 mL/min/1.73m2 Final   07/30/2014 >60 >60 mL/min/1.73m2 Final     Calcium   Date Value Ref Range Status   05/26/2025 8.3 (L) 8.8 - 10.4 mg/dL Final   02/15/2019 9.3 8.5 - 10.4 mg/dL Final       Liver Function Studies -   Recent Labs   Lab Test 05/25/25  1157   PROTTOTAL 7.8   ALBUMIN 3.9   BILITOTAL 0.8   ALKPHOS 86   AST 31   ALT 33       Sed Rate   Date Value Ref Range Status   03/08/2019 12 0 - 15 mm/hr Final       No results found for: \"CRP\"            MICROBIOLOGY DATA:      Test Result Released: No    Specimen Information: Leg, Below Knee, Left; Wound   0 Result Notes  Gram Stain Result  Abnormal   2+ Gram positive cocci   No white blood cells seen           Resulting Agency: [facility]                  ASSESSMENT:  Cellulitis vs contact dermatitis based on picture  BC neg    RECOMMENDATION:  On maximal " coverage, will determine agent, route duration likely tomorrow for discharge.   ALEKSANDRA Lancaster MD  Office [phone] option 2 to desk staff

## 2025-05-26 NOTE — PROGRESS NOTES
"[facility]    Medicine Progress Note - Hospitalist Service    Date of Admission:  5/25/2025    Assessment & Plan   69 year old male admitted on 5/25/2025. He had acute left lower extremity limb ischemia from thromboembolic event end of March 2025 transferred to [facility] where he was treated with anticoagulants and had a emergent fasciotomy.  The wound was slow to heal he eventually made his way to nursing home with wound VAC in place  Remote history of coronary artery disease and bypass grafting 2010 that has been stable  He was seen by vascular surgery few days ago wound was started to get worse and now continues to get worse he is being admitted for evaluation and treatment     1/.  Lower extremity wound growing multiple bacteria including Staphylococcus, Streptococcus   As well as diphtheroids.  Followed by infectious disease continue with current antibiotics may need to narrow down antimicrobial  recommendations  2/.  Pain control seems to be adequate.  3/.  Hypokalemia replace as per protocol  /.  Peripheral vascular disease on DOAC to continue.       Diet: Combination Diet Regular Diet Adult    DVT Prophylaxis: DOAC  Capps Catheter: Not present  Lines: None     Cardiac Monitoring: None  Code Status: Full Code      Clinically Significant Risk Factors Present on Admission        # Hypokalemia: Lowest K = 3.3 mmol/L in last 2 days, will replace as needed    # Hypocalcemia: Lowest Ca = 8.3 mg/dL in last 2 days, will monitor and replace as appropriate      # Drug Induced Coagulation Defect: home medication list includes an anticoagulant medication  # Drug Induced Platelet Defect: home medication list includes an antiplatelet medication   # Hypertension: Noted on problem list      # Anemia: based on hgb <11       # Obesity: Estimated body mass index is 30.23 kg/m  as calculated from the following:    Height as of this encounter: 1.702 m (5' 7\").    Weight as of this encounter: 87.5 kg " (193 lb).        # History of CABG: noted on surgical history       Social Drivers of Health    Physical Activity: Insufficiently Active (3/17/2025)    Exercise Vital Sign     Days of Exercise per Week: 1 day     Minutes of Exercise per Session: 10 min   Social Connections: Unknown (3/17/2025)    Social Connection and Isolation Panel [NHANES]     Frequency of Social Gatherings with Friends and Family: Once a week          Disposition Plan     Medically Ready for Discharge: Anticipated in 2-4 Days             Caty Rosado MD  Hospitalist Service  [facility]  Securely message with [email or web address] (more info)  Text page via [employer] Paging/Directory   ______________________________________________________________________    Interval History   No new or acute issues overnight.  He tells me he actually feels much better since he came into the hospital    Physical Exam   Vital Signs: Temp: 98.9  F (37.2  C) Temp src: Oral BP: 103/58 Pulse: 85   Resp: 16 SpO2: 93 % O2 Device: None (Room air)    Weight: 193 lbs 0 oz    General Appearance: Alert awake oriented x 3 no acute distress  Respiratory: Clear to auscultation  Cardiovascular: S1-S2  GI: Soft nontender bowel sounds are present  Skin: Left lower extremity in dressing, the nurse is adjusting the dressing hence I did not remove it  Other: No neurological deficits    Medical Decision Making       40 MINUTES SPENT BY ME on the date of service doing chart review, history, exam, documentation & further activities per the note.      Data     Current Facility-Administered Medications   Medication Dose Route Frequency Provider Last Rate Last Admin    aspirin EC tablet 81 mg  81 mg Oral Daily Cuate Pichardo MD   81 mg at 05/25/25 1519    lisinopril (ZESTRIL) tablet 5 mg  5 mg Oral Daily Cuate Pichardo MD   5 mg at 05/25/25 1519    metoprolol succinate ER (TOPROL-XL) 24 hr half-tab 12.5 mg  12.5 mg Oral Daily Cuate Pichardo MD        multivitamin w/minerals  (THERA-VIT-M) tablet 1 tablet  1 tablet Oral Daily Cuate Pichardo MD   1 tablet at 05/25/25 1519    piperacillin-tazobactam (ZOSYN) 3.375 g vial to attach to  mL bag  3.375 g Intravenous Q6H Cuate Pichardo MD   3.375 g at 05/26/25 0124    rivaroxaban ANTICOAGULANT (XARELTO) tablet 20 mg  20 mg Oral Daily with supper Cuate Pichardo MD   20 mg at 05/25/25 1648    sodium chloride (PF) 0.9% PF flush 3 mL  3 mL Intracatheter Q8H BloniNena leger MD   3 mL at 05/26/25 0125    sodium chloride (PF) 0.9% PF flush 3 mL  3 mL Intracatheter Q8H CATIA Cuate Pichardo MD   3 mL at 05/26/25 0124       ------------------------- PAST 24 HR DATA REVIEWED -----------------------------------------------    I have personally reviewed the following data over the past 24 hrs:    9.1  \   9.4 (L)   / 261     137 104 9.3 /  119 (H)   3.3 (L) 23 0.94 \     ALT: 33 AST: 31 AP: 86 TBILI: 0.8   ALB: 3.9 TOT PROTEIN: 7.8 LIPASE: 31     Procal: N/A CRP: N/A Lactic Acid: 1.2       INR:  1.63 (H) PTT:  44 (H)   D-dimer:  N/A Fibrinogen:  N/A       Imaging results reviewed over the past 24 hrs:   Recent Results (from the past 24 hours)   CTA Chest Abdomen Pelvis Runoff w Contrast    Narrative    EXAM: CTA CHEST/ABDOMEN/PELVIS RUNOFF WITH CONTRAST  LOCATION: [facility]  DATE: 05/25/2025    INDICATION: Nausea. History of significant atherosclerosis with arterial thromboembolism to leg.  COMPARISON: 03/26/2025.  TECHNIQUE: Helical acquisition through the chest, abdomen, pelvis, and bilateral lower extremities was performed during the arterial phase of contrast enhancement. Second phase arterial imaging was performed through the bilateral lower extremities. 2D   and 3D reconstructions performed by the CT technologist. Dose reduction techniques were used.  CONTRAST: 90 mL Isovue 370.    FINDINGS:   AORTA: No intramural hematoma. Moderate atherosclerotic vascular calcifications. No aortic dissection. 2.5 cm  aneurysmal dilation of the infrarenal abdominal aorta with atherosclerotic plaque and 2.3 cm aneurysmal dilation of the right common iliac   artery. These findings are unchanged. Patent celiac, superior mesenteric, and renal arteries. Inferior mesenteric artery not identified. Patent common, internal, and external iliac arteries.    RIGHT LEG: Patent right common femoral, superficial femoral, and deep femoral artery with a patent popliteal artery with three-vessel runoff. On delayed images, there is attenuated enhancement of the distal anterior and peroneal arteries with possible   occlusion of both at the level of the foot. Consider correlation with ultrasound. Nonspecific asymmetric venous return of the right leg compared to the left.    LEFT LEG: Patent left common femoral, superficial femoral, deep femoral, and popliteal arteries with three-vessel runoff and arterial patency through the level of the leg.    NONVASCULAR FINDINGS:  MEDIASTINUM: Status post median sternotomy. CABG. Normal size heart. No pericardial effusion. No lymphadenopathy.    LUNGS/PLEURA: Normal    ABDOMEN: Punctate nonobstructing bilateral renal stones. Punctate dependent stone seen in the left bladder, not previously seen, query recent stone passage. Fatty liver. Normal gallbladder, pancreas, spleen, and adrenals. No lymphadenopathy.    PELVIS: Normal appendix. Normal bowel. No free air or fluid. No lymphadenopathy. Normal prostate and seminal vesicles.    MUSCULOSKELETAL: Status post left total hip arthroplasty. No acute osseous abnormality.      Impression    IMPRESSION:  1.  No pulmonary embolus, or aortic dissection.  2.  Attenuated enhancement of the distal right anterior tibial and peroneal arteries, consider correlation with ultrasound to ensure patency, particularly of vasculature of the right foot. Consider correlation with ultrasound to evaluate for patency.  3.  Stable aneurysmal dilation of the right infrarenal abdominal aorta  and common iliac artery with atherosclerotic vascular disease and coronary artery disease.  4.  2 mm left dependent bladder stone with bilateral nonobstructing renal stones, correlate for recent stone passage.  5.  Fatty liver.

## 2025-05-26 NOTE — CONSULTS
Referral pending (for benefit check IF IV antibiotics are needed at discharge)  [facility]    [address] [facility] [id number]

## 2025-05-26 NOTE — PROGRESS NOTES
Vascular progress note    Patient feeling better. Subjective fever gone. No significant pain in the leg.    NAD  RRR  CMS intact in BLE  LLE with less erythema, no tenderness    Impression: cellulitis of LLE    - continue abx per ID  - daily dressing change with adaptic/xeroform, Webril, and acewrap  - neurovacular checks per unit routine  - continue carisa Tao MD  Vascular Surgery Fellow

## 2025-05-27 ENCOUNTER — TELEPHONE (OUTPATIENT)
Dept: FAMILY MEDICINE | Facility: CLINIC | Age: 70
End: 2025-05-27

## 2025-05-27 LAB
BACTERIA WND CULT: ABNORMAL
GRAM STAIN RESULT: ABNORMAL
GRAM STAIN RESULT: ABNORMAL
HOLD SPECIMEN: NORMAL
POTASSIUM SERPL-SCNC: 3.7 MMOL/L (ref 3.4–5.3)

## 2025-05-27 PROCEDURE — 250N000011 HC RX IP 250 OP 636: Performed by: INTERNAL MEDICINE

## 2025-05-27 PROCEDURE — 120N000001 HC R&B MED SURG/OB

## 2025-05-27 PROCEDURE — 84132 ASSAY OF SERUM POTASSIUM: CPT | Performed by: INTERNAL MEDICINE

## 2025-05-27 PROCEDURE — 258N000003 HC RX IP 258 OP 636: Performed by: INTERNAL MEDICINE

## 2025-05-27 PROCEDURE — 36415 COLL VENOUS BLD VENIPUNCTURE: CPT | Performed by: INTERNAL MEDICINE

## 2025-05-27 PROCEDURE — G0463 HOSPITAL OUTPT CLINIC VISIT: HCPCS

## 2025-05-27 PROCEDURE — 99232 SBSQ HOSP IP/OBS MODERATE 35: CPT | Performed by: INTERNAL MEDICINE

## 2025-05-27 PROCEDURE — 250N000013 HC RX MED GY IP 250 OP 250 PS 637: Performed by: INTERNAL MEDICINE

## 2025-05-27 PROCEDURE — G0545 PR INHRENT VISIT TO INPT/OBS W CNFRM/SUSPCT INFCT DIS BY INFCT DIS SPCIALST: HCPCS | Performed by: INTERNAL MEDICINE

## 2025-05-27 RX ORDER — CEPHALEXIN 500 MG/1
500 CAPSULE ORAL 4 TIMES DAILY
Status: DISCONTINUED | OUTPATIENT
Start: 2025-05-28 | End: 2025-05-28 | Stop reason: HOSPADM

## 2025-05-27 RX ADMIN — VANCOMYCIN HYDROCHLORIDE 2000 MG: 5 INJECTION, POWDER, LYOPHILIZED, FOR SOLUTION INTRAVENOUS at 12:54

## 2025-05-27 RX ADMIN — RIVAROXABAN 20 MG: 10 TABLET, FILM COATED ORAL at 16:29

## 2025-05-27 RX ADMIN — METOPROLOL SUCCINATE ER TABLETS 12.5 MG: 25 TABLET, FILM COATED, EXTENDED RELEASE ORAL at 08:51

## 2025-05-27 RX ADMIN — PIPERACILLIN AND TAZOBACTAM 3.38 G: 3; .375 INJECTION, POWDER, FOR SOLUTION INTRAVENOUS at 08:52

## 2025-05-27 RX ADMIN — ASPIRIN 81 MG: 81 TABLET, COATED ORAL at 08:51

## 2025-05-27 RX ADMIN — LISINOPRIL 5 MG: 5 TABLET ORAL at 08:52

## 2025-05-27 RX ADMIN — Medication 1 TABLET: at 08:51

## 2025-05-27 RX ADMIN — PIPERACILLIN AND TAZOBACTAM 3.38 G: 3; .375 INJECTION, POWDER, FOR SOLUTION INTRAVENOUS at 02:53

## 2025-05-27 ASSESSMENT — ACTIVITIES OF DAILY LIVING (ADL)
DEPENDENT_IADLS:: INDEPENDENT
ADLS_ACUITY_SCORE: 37

## 2025-05-27 NOTE — TELEPHONE ENCOUNTER
Forms/Letter Request    Type of form/letter: Home Health Certification      Do we have the form/letter: Yes: Dr Davis's inbox    Who is the form from? Home care, [facility] home health    Where did/will the form come from? form was faxed in    When is form/letter needed by:     How would you like the form/letter returned: Fax : [phone]      Order Number [id number]

## 2025-05-27 NOTE — PROGRESS NOTES
Ongoing SW/CM Assessment/Plan of Care Note     See SW/CM flowsheets for goals and other objective data.    Patient/Family discharge goal (s):  Goal #1: Adjustment/coping issues addressed  Goal #2: Communication facilitated  Goal #3: Psychosocial needs assessed    PT Recommendation:  Recommendation for Discharge Support: PT WI: 24 Hour assist       OT Recommendation:  Recommendation for Discharge Support: OT WI: Intermittent assist daily       SLP Recommendation:  Recommendation for Discharge Support: SLP WI: 24 Hour assist    Disposition:  Planned Discharge Destination: Assisted living    Progress note:   SW participated in OFT's with Primary RN, Yvonne Perry regarding pt's medical status. Pt from [facility] ([phone]). Pt not stable for DC. SW to continue to follow and provide support.     Mariam Arana MSW, APSW                "[facility]    Medicine Progress Note - Hospitalist Service    Date of Admission:  5/25/2025    Assessment & Plan   69 year old male admitted on 5/25/2025. He had acute left lower extremity limb ischemia from thromboembolic event end of March 2025 transferred to [facility] where he was treated with anticoagulants and had a emergent fasciotomy.  The wound was slow to heal he eventually made his way to nursing home with wound VAC in place  Remote history of coronary artery disease and bypass grafting 2010 that has been stable  He was seen by vascular surgery few days ago wound was started to get worse and now continues to get worse he is being admitted for evaluation and treatment      5/27 :     No new issues noted today  Likely discharge in am once final culture results and antibiotic plan established  ID following      A/p :       1/.  Lower extremity wound growing multiple bacteria including Staphylococcus, Streptococcus   As well as diphtheroids.  Followed by infectious disease continue with current antibiotics may need to narrow down antimicrobial  recommendations  2/.  Pain control seems to be adequate.  3/.  Hypokalemia replace as per protocol  /.  Peripheral vascular disease on DOAC to continue.       Diet: Combination Diet Regular Diet Adult  Snacks/Supplements Adult: Ensure Enlive; With Meals    DVT Prophylaxis: DOAC  Capps Catheter: Not present  Lines: None     Cardiac Monitoring: None  Code Status: Full Code      Clinically Significant Risk Factors        # Hypokalemia: Lowest K = 3.3 mmol/L in last 2 days, will replace as needed           # Coagulation Defect: INR = 1.63 (Ref range: 0.85 - 1.15) and/or PTT = 44 Seconds (Ref range: 22 - 38 Seconds), will monitor for bleeding    # Hypertension: Noted on problem list            # Obesity: Estimated body mass index is 30.23 kg/m  as calculated from the following:    Height as of this encounter: 1.702 m (5' 7\").    Weight as of this " encounter: 87.5 kg (193 lb)., PRESENT ON ADMISSION  # Severe Malnutrition: based on nutrition assessment and treatment provided per dietitian's recommendations., PRESENT ON ADMISSION   # Financial/Environmental Concerns: none   # History of CABG: noted on surgical history       Social Drivers of Health    Physical Activity: Insufficiently Active (3/17/2025)    Exercise Vital Sign     Days of Exercise per Week: 1 day     Minutes of Exercise per Session: 10 min   Social Connections: Unknown (3/17/2025)    Social Connection and Isolation Panel [NHANES]     Frequency of Social Gatherings with Friends and Family: Once a week          Disposition Plan     Medically Ready for Discharge: Anticipated in 2-4 Days             Carlos Diego MD  Hospitalist Service  [facility]  Securely message with [employer] (more info)  Text page via [employer] Paging/Directory   ______________________________________________________________________        Physical Exam   Vital Signs: Temp: 97.6  F (36.4  C) Temp src: Oral BP: 116/66 Pulse: 80   Resp: 18 SpO2: 97 % O2 Device: None (Room air)    Weight: 193 lbs 0 oz    General Appearance: Alert awake oriented x 3 no acute distress  Respiratory: Clear to auscultation  Cardiovascular: S1-S2  GI: Soft nontender bowel sounds are present  Skin: Left lower extremity in dressing, the nurse is adjusting the dressing hence I did not remove it  Other: No neurological deficits    Medical Decision Making       40 MINUTES SPENT BY ME on the date of service doing chart review, history, exam, documentation & further activities per the note.      Data     Current Facility-Administered Medications   Medication Dose Route Frequency Provider Last Rate Last Admin    aspirin EC tablet 81 mg  81 mg Oral Daily Cuate Pichardo MD   81 mg at 05/27/25 0851    [START ON 5/28/2025] cephALEXin (KEFLEX) capsule 500 mg  500 mg Oral 4x Daily Prem Christian MD        lisinopril (ZESTRIL) tablet 5 mg  5  mg Oral Daily Cuate Pichardo MD   5 mg at 05/27/25 0852    metoprolol succinate ER (TOPROL-XL) 24 hr half-tab 12.5 mg  12.5 mg Oral Daily Cuate Pichardo MD   12.5 mg at 05/27/25 0851    multivitamin w/minerals (THERA-VIT-M) tablet 1 tablet  1 tablet Oral Daily Cuate Pichardo MD   1 tablet at 05/27/25 0851    rivaroxaban ANTICOAGULANT (XARELTO) tablet 20 mg  20 mg Oral Daily with supper Cuate Pichardo MD   20 mg at 05/26/25 1715    sodium chloride (PF) 0.9% PF flush 3 mL  3 mL Intracatheter Q8H Nena York MD   3 mL at 05/27/25 1253    sodium chloride (PF) 0.9% PF flush 3 mL  3 mL Intracatheter Q8H CATIA Cuate Pichardo MD   3 mL at 05/27/25 1301       ------------------------- PAST 24 HR DATA REVIEWED -----------------------------------------------    I have personally reviewed the following data over the past 24 hrs:    N/A  \   N/A   / N/A     N/A N/A N/A /  N/A   3.7 N/A N/A \       Imaging results reviewed over the past 24 hrs:   No results found for this or any previous visit (from the past 24 hours).

## 2025-05-27 NOTE — CONSULTS
[facility] Nurse Inpatient Assessment     Consulted for: LLE wound hx of fasciotomy/wound vac    Summary: Healing fasciotomy wounds    [facility] nurse follow-up plan: weekly    Patient History (according to provider note(s):      69 year old male admitted on 5/25/2025. He had acute left lower extremity limb ischemia from thromboembolic event end of March 2025 transferred to [facility] where he was treated with anticoagulants and had a emergent fasciotomy.  The wound was slow to heal he eventually made his way to nursing home with wound VAC in place  Remote history of coronary artery disease and bypass grafting 2010 that has been stable  He was seen by vascular surgery few days ago wound was started to get worse and now continues to get worse he is being admitted for evaluation and treatment    Assessment:      Wound location: LLE      5/27 lateral                                                    medial    Last photo: 5/27  Wound due to: Surgical Wound  Wound history/plan of care: patient with surgical wounds from 3/27  Wound base: 100 % Granulation tissue     Palpation of the wound bed: normal      Drainage: small     Description of drainage: bloody     Measurements (length x width x depth, in cm): Lateral 16  x 3.2  x  0.2 cm ; medial 15 x 1.6 x 0.2cm     Tunneling: N/A     Undermining: N/A  Periwound skin: Intact      Color: normal and consistent with surrounding tissue      Temperature: normal   Odor: none  Pain: mild, tender  Pain interventions prior to dressing change: slow and gentle cares   Treatment goal: Drainage control, Protection, and Promote epidermal migration  STATUS: initial assessment  Supplies ordered: gathered    Biofilm layer removed from each wound.      Treatment Plan:     LLE  Soak wounds with vashe moistened gauze for 5 minutes, wipe biofilm off surface or wounds, pat dry  Cut strips of hydrofera ready to cover wounds (one sheet of 4x5 per dressing change)  Secure  with 2 kerlix rolls  Apply tubi  (ask OT/PT if out)  Change twice weekly    Orders: Written    RECOMMEND PRIMARY TEAM ORDER: None, at this time  Education provided: plan of care  Discussed plan of care with: Patient and Nurse  Notify [facility] if wound(s) deteriorate.  Nursing to notify the Provider(s) and re-consult the [facility] Nurse if new skin concern.    DATA:     Current support surface: Standard  Standard gel mattress (Isoflex)  Containment of urine/stool: Continent of bladder and Continent of bowel  BMI: Body mass index is 30.23 kg/m .   Active diet order: Orders Placed This Encounter      Combination Diet Regular Diet Adult     Output: I/O last 3 completed shifts:  In: 500 [P.O.:400; IV Piggyback:100]  Out: 700 [Urine:700]     Labs:   Recent Labs   Lab 05/26/25  0629 05/25/25  1157   ALBUMIN  --  3.9   HGB 9.4* 11.4*   INR  --  1.63*   WBC 9.1 10.2     Pressure injury risk assessment:   Sensory Perception: 4-->no impairment  Moisture: 4-->rarely moist  Activity: 3-->walks occasionally  Mobility: 3-->slightly limited  Nutrition: 3-->adequate  Friction and Shear: 3-->no apparent problem  Gary Score: 20    ELADIO Ramos RN CWOCN  Pager no longer in use, please contact through [employer]: [facility] [employer]

## 2025-05-27 NOTE — PLAN OF CARE
Pt is A&Ox4, VSS on RA. SBA when ambulating. Voiding adequately. Dressing to LLE is CDI. CMS intact. Pt is reporting mild pain during shift.     Care hours 5527-5011      Problem: Adult Inpatient Plan of Care  Goal: Absence of Hospital-Acquired Illness or Injury  Intervention: Prevent Infection  Recent Flowsheet Documentation  Taken 5/26/2025 2330 by Kenya Akins, RN  Infection Prevention: single patient room provided  Taken 5/26/2025 1930 by Kenya Akins, RN  Infection Prevention: single patient room provided     Problem: Adult Inpatient Plan of Care  Goal: Optimal Comfort and Wellbeing  Outcome: Progressing  Intervention: Monitor Pain and Promote Comfort  Recent Flowsheet Documentation  Taken 5/26/2025 2352 by Kenya Akins, RN  Pain Management Interventions: medication (see MAR)  Taken 5/26/2025 1935 by Kenya Akins, RN  Pain Management Interventions: declines     Problem: Delirium  Goal: Improved Sleep  Outcome: Progressing

## 2025-05-27 NOTE — PLAN OF CARE
Problem: Adult Inpatient Plan of Care  Goal: Absence of Hospital-Acquired Illness or Injury  Intervention: Prevent Infection  Recent Flowsheet Documentation  Taken 5/27/2025 0826 by Kane Valle, RN  Infection Prevention: single patient room provided     Problem: Adult Inpatient Plan of Care  Goal: Optimal Comfort and Wellbeing  Intervention: Provide Person-Centered Care  Recent Flowsheet Documentation  Taken 5/27/2025 0826 by Kane Valle, RN  Trust Relationship/Rapport: care explained     Goal Outcome Evaluation:  Pt is AOX4, able to make needs known. Wound care nurse saw patient 5/27 moved wound dressing change to every Tuesday and Friday. Pt is continent of B & B, uses toilet in bathroom. Pt is SBAX1, with a walker, walks well. Pt is on RA, no c/o CP, SOB, N/V. Pt is on regular diet, calls appropriately. Call light within reach. Abx therapy is running.     Care hours: [id number]

## 2025-05-27 NOTE — PROGRESS NOTES
"[facility] Progress Note    ASSESSMENT:  Cellulitis vs contact dermatitis based on picture, fever fits with it having been cellulitis. Cultures with Group C/G strep and staph aureus. Improved.   BC neg    RECOMMENDATION:  Vancomycin today  If MSSA, can plan cephalexin 500mg PO QID for 10 days starting 5/28.    If MRSA, plan above cephalexin (for strep) PLUS doxycycline (provided active against staph aureus) 100mg PO BID 10 days.   Expect discharge 5/28 -- I will be here in afternoon, but he does not need to wait for my visit, can go with above plan.    Prem Christian MD  [facility]  Contact via [employer] or [employer] paging  ______________________________________________________________________     SUBJECTIVE:  doing well. Minimal pain. Leg weak with walking but has not been active past several days. Tolerating antibiotics.        REVIEW OF SYSTEMS:  Negative unless as listed above.  Social history, Family history, Medications: reviewed.    OBJECTIVE:  /66 (BP Location: Left arm)   Pulse 80   Temp 97.6  F (36.4  C) (Oral)   Resp 18   Ht 1.702 m (5' 7\")   Wt 87.5 kg (193 lb)   SpO2 97%   BMI 30.23 kg/m                PHYSICAL EXAM:  Alert, awake  Sclera normal color, not injected  CARDIOVASCULAR regular   Lungs normal respiratory effort   Abdomen deferred  Skin L leg wrapped, not viewed today.   Neurologic exam non focal  Prior photo:          Antibiotics:    Pertinent labs:    Recent Labs   Lab Test 05/26/25  0629 05/25/25  1157 05/15/25  1335   WBC 9.1 10.2 10.6   HGB 9.4* 11.4* 10.8*   HCT 28.1* 35.5* 33.0*   MCV 85 87 87    313 491*       Lab Results   Component Value Date    RBC 3.30 05/26/2025     Lab Results   Component Value Date    HGB 9.4 05/26/2025     Lab Results   Component Value Date    HCT 28.1 05/26/2025     No components found for: \"MCT\"  Lab Results   Component Value Date    MCV 85 05/26/2025     Lab Results   Component Value Date    MCH " 28.5 05/26/2025     Lab Results   Component Value Date    MCHC 33.5 05/26/2025     Lab Results   Component Value Date    RDW 13.9 05/26/2025     Lab Results   Component Value Date     05/26/2025       Last Comprehensive Metabolic Panel:  Sodium   Date Value Ref Range Status   05/26/2025 137 135 - 145 mmol/L Final   02/15/2019 145.0 (H) 133.0 - 144.0 mmol/L Final     Potassium   Date Value Ref Range Status   05/27/2025 3.7 3.4 - 5.3 mmol/L Final   02/11/2022 4.6 3.5 - 5.0 mmol/L Final   02/15/2019 4.2 3.4 - 5.3 mmol/L Final     Chloride   Date Value Ref Range Status   05/26/2025 104 98 - 107 mmol/L Final   02/11/2022 105 98 - 107 mmol/L Final   02/15/2019 105.0 94.0 - 109.0 mmol/L Final     Carbon Dioxide   Date Value Ref Range Status   02/15/2019 29.0 20.0 - 32.0 mmol/L Final     Carbon Dioxide (CO2)   Date Value Ref Range Status   05/26/2025 23 22 - 29 mmol/L Final   02/11/2022 27 22 - 31 mmol/L Final     Anion Gap   Date Value Ref Range Status   05/26/2025 10 7 - 15 mmol/L Final   02/11/2022 9 5 - 18 mmol/L Final     Glucose   Date Value Ref Range Status   05/26/2025 119 (H) 70 - 99 mg/dL Final   02/11/2022 99 70 - 125 mg/dL Final   02/15/2019 100.0 60.0 - 109.0 mg/dL Final     GLUCOSE BY METER POCT   Date Value Ref Range Status   04/05/2025 106 (H) 70 - 99 mg/dL Final     Urea Nitrogen   Date Value Ref Range Status   05/26/2025 9.3 8.0 - 23.0 mg/dL Final   02/11/2022 9 8 - 22 mg/dL Final   02/15/2019 11.0 7.0 - 30.0 mg/dL Final     Creatinine   Date Value Ref Range Status   05/26/2025 0.94 0.67 - 1.17 mg/dL Final   02/15/2019 1.1 0.8 - 1.5 mg/dL Final     GFR Estimate   Date Value Ref Range Status   05/26/2025 88 >60 mL/min/1.73m2 Final     Comment:     eGFR calculated using 2021 CKD-EPI equation.   12/18/2019 >60 >60 mL/min/1.73m2 Final   07/30/2014 >60 >60 mL/min/1.73m2 Final     Calcium   Date Value Ref Range Status   05/26/2025 8.3 (L) 8.8 - 10.4 mg/dL Final   02/15/2019 9.3 8.5 - 10.4 mg/dL Final  "      Liver Function Studies -   Recent Labs   Lab Test 05/25/25  1157   PROTTOTAL 7.8   ALBUMIN 3.9   BILITOTAL 0.8   ALKPHOS 86   AST 31   ALT 33       Sed Rate   Date Value Ref Range Status   03/08/2019 12 0 - 15 mm/hr Final       No results found for: \"CRP\"            MICROBIOLOGY DATA:      Test Result Released: No    Specimen Information: Leg, Below Knee, Left; Wound   0 Result Notes  Gram Stain Result  Abnormal   2+ Gram positive cocci   No white blood cells seen           Resulting Agency: [facility]              "

## 2025-05-27 NOTE — CONSULTS
Care Management Initial Consult      General Information  Assessment completed with: Patient,    Type of CM/SW Visit: Initial Assessment  Primary Care Provider verified and updated as needed: Yes   Readmission within the last 30 days: no previous admission in last 30 days   Reason for Consult: discharge planning, health care directive, transportation  Advance Care Planning: Advance Care Planning Reviewed: no concerns identified        Communication Assessment  Patient's communication style: spoken language (English or Bilingual)    Hearing Difficulty or Deaf: no   Wear Glasses or Blind: yes    Cognitive  Cognitive/Neuro/Behavioral: WDL                      Living Environment:   People in home: other relative(s) (Two cousins)     Current living Arrangements: mobile home      Able to return to prior arrangements: yes     Family/Social Support:  Care provided by: self, other (see comments) (Cousins assist as needed)  Provides care for: no one  Marital Status:   Support system: Other (specify), Sibling(s) (Cousins)          Description of Support System: Supportive, Involved (As needed/able)    Support Assessment: Adequate family and caregiver support, Adequate social supports (When at baseline)    Current Resources:   Patient receiving home care services: Yes  Skilled Home Care Services: Physical Therapy (Not fully determined - awaiting response from vendor. Confirmation of services request sent today 5/27 at ~0930 hrs)  Community Resources: None, Home Care  Equipment currently used at home: cane, straight  Supplies currently used at home: None    Employment/Financial:  Employment Status: retired     Financial Concerns: none   Referral to Financial Worker: No     Does the patient's insurance plan have a 3 day qualifying hospital stay waiver?  Yes     Which insurance plan 3 day waiver is available? Alternative insurance waiver    Will the waiver be used for post-acute placement? Undetermined at this  time    Lifestyle & Psychosocial Needs:  Social Drivers of Health     Food Insecurity: Low Risk  (5/26/2025)    Food Insecurity     Within the past 12 months, did you worry that your food would run out before you got money to buy more?: No     Within the past 12 months, did the food you bought just not last and you didn t have money to get more?: No   Depression: Not at risk (3/19/2025)    PHQ-2     PHQ-2 Score: 0   Housing Stability: Low Risk  (5/26/2025)    Housing Stability     Do you have housing? : Yes     Are you worried about losing your housing?: No   Tobacco Use: Low Risk  (5/20/2025)    Patient History     Smoking Tobacco Use: Never     Smokeless Tobacco Use: Never     Passive Exposure: Not on file   Financial Resource Strain: Low Risk  (5/26/2025)    Financial Resource Strain     Within the past 12 months, have you or your family members you live with been unable to get utilities (heat, electricity) when it was really needed?: No   Alcohol Use: Not on file   Transportation Needs: Low Risk  (5/26/2025)    Transportation Needs     Within the past 12 months, has lack of transportation kept you from medical appointments, getting your medicines, non-medical meetings or appointments, work, or from getting things that you need?: No   Physical Activity: Insufficiently Active (3/17/2025)    Exercise Vital Sign     Days of Exercise per Week: 1 day     Minutes of Exercise per Session: 10 min   Interpersonal Safety: Low Risk  (5/26/2025)    Interpersonal Safety     Do you feel physically and emotionally safe where you currently live?: Yes     Within the past 12 months, have you been hit, slapped, kicked or otherwise physically hurt by someone?: No     Within the past 12 months, have you been humiliated or emotionally abused in other ways by your partner or ex-partner?: No   Stress: No Stress Concern Present (3/17/2025)    [place] [employer] - Occupational Stress Questionnaire     Feeling of  Stress : Not at all   Social Connections: Unknown (3/17/2025)    Social Connection and Isolation Panel [NHANES]     Frequency of Communication with Friends and Family: Not on file     Frequency of Social Gatherings with Friends and Family: Once a week     Attends Orthodox Services: Not on file     Active Member of Clubs or Organizations: Not on file     Attends Club or Organization Meetings: Not on file     Marital Status: Not on file   Health Literacy: Not on file     Functional Status:  Prior to admission patient needed assistance:   Dependent ADLs:: Ambulation-walker, Ambulation-cane (When at baseline)  Dependent IADLs:: Independent (When at baseline, with intermittent support from family within the home)  Assessment of Functional Status: Not at  functional baseline    Mental Health Status:  Mental Health Status: No Current Concerns       Chemical Dependency Status:  Chemical Dependency Status: No Current Concerns           Values/Beliefs:  Spiritual, Cultural Beliefs, Orthodox Practices, Values that affect care: no             Discussed  Partnership in Safe Discharge Planning  document with patient/family: Yes    Additional Information:  Writer met with pt to introduce Care Management(CM), obtain an initial assessment, and offer discharge support. Pt resides in a mobile home with two cousins who assist with I/ADL support as needed. Pt is currently open to home care services through [facility] - confirmation of services request faxed 5/27. Cane/walker use as needed. Pt is hopeful for a return to home at time of discharge.    Due to the nature of pt's admission, home IV Abx services may be applicable at time of discharge. Pt/family was provided with the Medicare Compare list for SNF, Home Care, and Home Infusion.  Discussed associated Medicare star ratings to assist with choice for referrals/discharge planning Yes. Education was given to pt/family that star ratings are updated/maintained by Medicare and can be  "reviewed by visiting www.medicare.gov Yes - List declined and writer instructed to use their discretion - initial benefits check fax placed.    5/26 per ALEKSANDRA Ness-  \"ASSESSMENT:  Cellulitis vs contact dermatitis based on picture  BC neg     RECOMMENDATION:  On maximal coverage, will determine agent, route duration likely tomorrow for discharge.   ALEKSANDRA Lancaster MD  Office [phone] option 2 to desk staff\"    5/26 per NILESH Joy-  \"Assessment & Plan  69 year old male admitted on 5/25/2025. He had acute left lower extremity limb ischemia from thromboembolic event end of March 2025 transferred to [facility] where he was treated with anticoagulants and had a emergent fasciotomy.  The wound was slow to heal he eventually made his way to nursing home with wound VAC in place  Remote history of coronary artery disease and bypass grafting 2010 that has been stable  He was seen by vascular surgery few days ago wound was started to get worse and now continues to get worse he is being admitted for evaluation and treatment     1/.  Lower extremity wound growing multiple bacteria including Staphylococcus, Streptococcus   As well as diphtheroids.  Followed by infectious disease continue with current antibiotics may need to narrow down antimicrobial  recommendations  2/.  Pain control seems to be adequate.  3/.  Hypokalemia replace as per protocol  /.  Peripheral vascular disease on DOAC to continue.\"    Next Steps:   PT & OT Consults requested per MD Caba Note 5/27. CM to follow-up on IV Abx insurance coverage, therapy consults, and ID plan. Family to transport if pt is able to transfer.    Jorge Luis Bae RN      "

## 2025-05-27 NOTE — PROGRESS NOTES
"CLINICAL NUTRITION SERVICES - ASSESSMENT NOTE    RECOMMENDATIONS FOR MDs/PROVIDERS TO ORDER:  none    Registered Dietitian Interventions:  Ensure Enlive daily with ice    Future/Additional Recommendations:  Will monitor progress towards goals     REASON FOR ASSESSMENT  At nutrition risk    Pt admitted with lower extremity wound and cellulitis with recent wound vac from fasciotomy in March 2025    INFORMATION OBTAINED  Assessed patient in room.    NUTRITION HISTORY  Pt states he was recently on Cole and expedite and did not like. He would prefer not to have them. He states he went 3 days with no intake due to his infection. He feels he is eating better now. He is open to some Ensure Enlive.       CURRENT NUTRITION ORDERS  Diet: Regular      CURRENT INTAKE/TOLERANCE  Pt ordered 1 meal on 5/26 worth 408kcal and 9g protein     LABS  Nutrition-relevant labs: Reviewed    MEDICATIONS  Nutrition-relevant medications: MVI, zosyn    ANTHROPOMETRICS  Height: 170.2 cm (5' 7\")  Admission Weight: 87.5 kg (193 lb) (05/25/25 1054)   Most Recent Weight: 87.5 kg (193 lb) (05/25/25 1054)  IBW: 67 kg  BMI: Body mass index is 30.23 kg/m .   Weight History:   Wt Readings from Last 15 Encounters:   05/25/25 87.5 kg (193 lb)   05/15/25 89.5 kg (197 lb 4.8 oz)   05/05/25 91.8 kg (202 lb 6.4 oz)   04/29/25 91.7 kg (202 lb 3.2 oz)   04/24/25 91.3 kg (201 lb 3.2 oz)   04/22/25 91.3 kg (201 lb 3.2 oz)   04/16/25 90.4 kg (199 lb 3.2 oz)   04/12/25 92.2 kg (203 lb 4.8 oz)   03/27/25 95.3 kg (210 lb)   03/19/25 95.7 kg (211 lb)   09/13/24 95.7 kg (211 lb)   03/13/24 95.3 kg (210 lb)   10/04/23 95.2 kg (209 lb 14.4 oz)   09/08/23 94.9 kg (209 lb 4.8 oz)   06/05/23 95.3 kg (210 lb)     Pt down 17# (8%) in 2 months     Dosing Weight: 67 kg, based on, ideal body wt    ASSESSED NUTRITION NEEDS  Estimated Energy Needs: 1068-8070 kcals/day (22-25kcal/kg)  Justification: Obese  Estimated Protein Needs:  grams protein/day (1.2 - 1.5 grams of " pro/kg)  Justification: Wound healing  Estimated Fluid Needs: 2000 mL/day   Justification: Maintenance    SYSTEM AND PHYSICAL FINDINGS    Edema: +1 left foot and leg  GI symptoms: Reviewed  Skin/wounds: left foot incision. Per vascular surgery: Left lower extremity fasciotomy wounds are well-healing except few areas of dry scabs which were removed at bedside, no areas of necrosis     MALNUTRITION  % Intake: </= 50% for >/= 5 days (severe)  % Weight Loss: > 5% in 1 month (severe)   Subcutaneous Fat Loss: None observed  Muscle Loss: None observed  Fluid Accumulation/Edema: Mild, 1+  Malnutrition Diagnosis: Severe malnutrition in the context of acute illness or injury  Malnutrition Present on Admission: Yes    NUTRITION DIAGNOSIS  Malnutrition (undernutrition) related to reduced po secondary to infection as evidenced by wt loss, reduced po, fluid retention    INTERVENTIONS  Pt refused expedite/Cole  Begin Ensure Enlive in vanilla    GOALS  Total avg nutritional intake to meet a minimum of 1474 kcal/kg and 80 g PRO/kg daily (per dosing wt 67 kg).     MONITORING/EVALUATION  Progress toward goals will be monitored and evaluated per policy.

## 2025-05-27 NOTE — DISCHARGE INSTRUCTIONS
WOC DISCHARGE INSTRUCTIONS:  LLE  Soak wounds with vashe moistened gauze for 5 minutes, wipe biofilm off surface or wounds, pat dry  Cut strips of hydrofera ready to cover wounds (one sheet of 4x5 per dressing change)  Secure with 2 kerlix rolls  Apply tubi  (ask OT/PT if out)  Change twice weekly

## 2025-05-28 VITALS
DIASTOLIC BLOOD PRESSURE: 71 MMHG | SYSTOLIC BLOOD PRESSURE: 127 MMHG | RESPIRATION RATE: 18 BRPM | OXYGEN SATURATION: 95 % | BODY MASS INDEX: 30.29 KG/M2 | HEART RATE: 74 BPM | WEIGHT: 193 LBS | TEMPERATURE: 98.4 F | HEIGHT: 67 IN

## 2025-05-28 LAB
GLUCOSE BLDC GLUCOMTR-MCNC: 105 MG/DL (ref 70–99)
HOLD SPECIMEN: NORMAL
HOLD SPECIMEN: NORMAL
POTASSIUM SERPL-SCNC: 3.6 MMOL/L (ref 3.4–5.3)

## 2025-05-28 PROCEDURE — 250N000013 HC RX MED GY IP 250 OP 250 PS 637: Performed by: INTERNAL MEDICINE

## 2025-05-28 PROCEDURE — 99239 HOSP IP/OBS DSCHRG MGMT >30: CPT | Performed by: INTERNAL MEDICINE

## 2025-05-28 PROCEDURE — 99207 PR NO CHARGE LOS: CPT | Performed by: INTERNAL MEDICINE

## 2025-05-28 PROCEDURE — 84132 ASSAY OF SERUM POTASSIUM: CPT | Performed by: INTERNAL MEDICINE

## 2025-05-28 PROCEDURE — 36415 COLL VENOUS BLD VENIPUNCTURE: CPT | Performed by: INTERNAL MEDICINE

## 2025-05-28 RX ORDER — HYDROMORPHONE HYDROCHLORIDE 2 MG/1
4 TABLET ORAL EVERY 6 HOURS PRN
Qty: 10 TABLET | Refills: 0 | Status: SHIPPED | OUTPATIENT
Start: 2025-05-28

## 2025-05-28 RX ORDER — DOXYCYCLINE 100 MG/1
100 CAPSULE ORAL 2 TIMES DAILY
Qty: 19 CAPSULE | Refills: 0 | Status: SHIPPED | OUTPATIENT
Start: 2025-05-28 | End: 2025-06-07

## 2025-05-28 RX ORDER — CEPHALEXIN 500 MG/1
500 CAPSULE ORAL 4 TIMES DAILY
Qty: 38 CAPSULE | Refills: 0 | Status: SHIPPED | OUTPATIENT
Start: 2025-05-29 | End: 2025-06-08

## 2025-05-28 RX ORDER — DOXYCYCLINE 100 MG/1
100 CAPSULE ORAL 2 TIMES DAILY
Status: DISCONTINUED | OUTPATIENT
Start: 2025-05-28 | End: 2025-05-28 | Stop reason: HOSPADM

## 2025-05-28 RX ADMIN — ACETAMINOPHEN 650 MG: 325 TABLET ORAL at 09:11

## 2025-05-28 RX ADMIN — DOXYCYCLINE HYCLATE 100 MG: 100 CAPSULE ORAL at 12:55

## 2025-05-28 RX ADMIN — Medication 1 TABLET: at 09:10

## 2025-05-28 RX ADMIN — CEPHALEXIN 500 MG: 500 CAPSULE ORAL at 12:55

## 2025-05-28 RX ADMIN — LISINOPRIL 5 MG: 5 TABLET ORAL at 09:10

## 2025-05-28 RX ADMIN — CEPHALEXIN 500 MG: 500 CAPSULE ORAL at 09:10

## 2025-05-28 RX ADMIN — METOPROLOL SUCCINATE ER TABLETS 12.5 MG: 25 TABLET, FILM COATED, EXTENDED RELEASE ORAL at 09:11

## 2025-05-28 ASSESSMENT — ACTIVITIES OF DAILY LIVING (ADL)
ADLS_ACUITY_SCORE: 36
ADLS_ACUITY_SCORE: 37
ADLS_ACUITY_SCORE: 37
ADLS_ACUITY_SCORE: 36
ADLS_ACUITY_SCORE: 37
ADLS_ACUITY_SCORE: 36
ADLS_ACUITY_SCORE: 37
ADLS_ACUITY_SCORE: 39
ADLS_ACUITY_SCORE: 39
ADLS_ACUITY_SCORE: 36
ADLS_ACUITY_SCORE: 36
ADLS_ACUITY_SCORE: 37
ADLS_ACUITY_SCORE: 36

## 2025-05-28 NOTE — PROGRESS NOTES
Vascular progress note    Patient feeling better. Subjective fever gone. No significant pain in the leg.    NAD  RRR  CMS intact in BLE  LLE with less erythema, no tenderness    Impression: cellulitis of LLE    - continue abx per ID  - daily dressing change with adaptic/xeroform, Webril, and acewrap  - neurovacular checks per unit routine  - continue xarelto  - Patient doing well from our standpoint, okay to discharge from our standpoint with follow-up as planned    Irais Tao MD  Vascular Surgery Fellow

## 2025-05-28 NOTE — PLAN OF CARE
Problem: Adult Inpatient Plan of Care  Goal: Optimal Comfort and Wellbeing  Outcome: Progressing  Intervention: Provide Person-Centered Care  Recent Flowsheet Documentation  Taken 5/28/2025 0900 by Daylin Jarquin RN  Trust Relationship/Rapport:   questions answered   questions encouraged   thoughts/feelings acknowledged   care explained    Problem: Delirium  Goal: Improved Sleep  Outcome: Progressing     Problem: Pain Acute  Goal: Optimal Pain Control and Function  Outcome: Progressing  Intervention: Prevent or Manage Pain  Recent Flowsheet Documentation  Taken 5/28/2025 1130 by Daylin Jarquin RN  Bowel Elimination Promotion:   adequate fluid intake promoted   ambulation promoted  Taken 5/28/2025 0900 by Dayiln Jarquin RN  Medication Review/Management: medications reviewed     Problem: Infection  Goal: Absence of Infection Signs and Symptoms  Outcome: Progressing  Intervention: Prevent or Manage Infection  Recent Flowsheet Documentation  Taken 5/28/2025 0900 by Daylin Jarquin RN  Isolation Precautions: contact precautions maintained     Goal Outcome Evaluation:  Pt is A&O, calls appropriately for needs and is independent with walker for ambulation. Vitals signs are stable, afebrile, oxygen is on room air. Pt complains of pain 3/10, to left calf, PRN tylenol given for this, with relief. Pt is tolerating regular diet plus protein snacks. Pt is voiding spontaneously, last bowel movement 5/28/2028. PO antibiotics given. Alarms in place. Contact precautions in place for MRSA. Wound care completed today per care plan due to saturated dressing.     Protocols:  Potassium, recheck AM     Plan:  Possible discharge home today.    Daylin Jarquin RN  May 28, 2025, 3:30 PM

## 2025-05-28 NOTE — PROGRESS NOTES
ID brief visit    Reviewed results with him. Home today with plan in yesterday's note for cephalexin and doxy.     Prem Christian MD

## 2025-05-28 NOTE — PROVIDER NOTIFICATION
Notification to remote cross cover at 2149: Lab called with new growth of MRSA from wound cultures collected on 5/25    Response at 2150 : no new orders at this time, ID can decide on any antibiotic changes in AM if needed per Reed Woods

## 2025-05-28 NOTE — PLAN OF CARE
Patient ready for discharge. Went over AVS paperwork with patient, questions answered. Discharged home via private vehicle.

## 2025-05-28 NOTE — PLAN OF CARE
Pt is A&Ox4, VSS on RA. SBA with walker and gait belt when ambulating. Voiding adequately. Dressing to LLE is CDI. CMS intact. Pt is reporting mild pain during shift. Contact precautions initiated for new MRSA findings in wound cultures.     Care hours [id number]      Problem: Adult Inpatient Plan of Care  Goal: Absence of Hospital-Acquired Illness or Injury  Intervention: Prevent Infection  Recent Flowsheet Documentation  Taken 5/28/2025 0000 by Kenya Akins, RN  Infection Prevention: single patient room provided  Taken 5/27/2025 1930 by Kenya Akins, RN  Infection Prevention: single patient room provided     Problem: Adult Inpatient Plan of Care  Goal: Optimal Comfort and Wellbeing  Outcome: Progressing  Intervention: Monitor Pain and Promote Comfort  Recent Flowsheet Documentation  Taken 5/27/2025 1942 by Kenya Akins, RN  Pain Management Interventions: declines

## 2025-05-29 ENCOUNTER — TELEPHONE (OUTPATIENT)
Dept: FAMILY MEDICINE | Facility: CLINIC | Age: 70
End: 2025-05-29
Payer: COMMERCIAL

## 2025-05-29 ENCOUNTER — PATIENT OUTREACH (OUTPATIENT)
Dept: CARE COORDINATION | Facility: CLINIC | Age: 70
End: 2025-05-29
Payer: COMMERCIAL

## 2025-05-29 LAB
BACTERIA SPEC CULT: NORMAL
BACTERIA SPEC CULT: NORMAL

## 2025-05-29 NOTE — TELEPHONE ENCOUNTER
Forms/Letter Request    Type of form/letter: Home Health Certification      Do we have the form/letter: Yes: DR Davis's inbox    Who is the form from? Home care, [facility]    Where did/will the form come from? form was faxed in    Order [id number]

## 2025-05-29 NOTE — LETTER
[facility] CARE COORDINATION  [facility]    May 29, 2025    [place]  [address] [address]      Dear Lobo,    I am a clinic care coordinator who works with Tima Davis MD with the [facility]. I wanted to thank you for spending the time to talk with me.  Below is a description of clinic care coordination and how I can further assist you.       The clinic care coordination team is made up of a registered nurse, , financial resource worker and community health worker who understand the health care system. The goal of clinic care coordination is to help you manage your health and improve access to the health care system. Our team works alongside your provider to assist you in determining your health and social needs. We can help you obtain health care and community resources, providing you with necessary information and education. We can work with you through any barriers and develop a care plan that helps coordinate and strengthen the communication between you and your care team.  Our services are voluntary and are offered without charge to you personally.    Please feel free to contact me with any questions or concerns regarding care coordination and what we can offer.      We are focused on providing you with the highest-quality healthcare experience possible.    Sincerely,     Francine Rinaldi,   [facility]  [phone]

## 2025-05-29 NOTE — PROGRESS NOTES
Clinic Care Coordination Contact  Transitions of Care Outreach  Chief Complaint   Patient presents with    Clinic Care Coordination - Initial    Clinic Care Coordination - Post Hospital       Most Recent Admission Date: 5/25/2025   Most Recent Admission Diagnosis: Wound infection - T14.8XXA, L08.9  SIRS (systemic inflammatory response syndrome) (H) - R65.10  Nausea vomiting and diarrhea - R11.2, R19.7     Most Recent Discharge Date: 5/28/2025   Most Recent Discharge Diagnosis: Wound infection - T14.8XXA, L08.9  Nausea vomiting and diarrhea - R11.2, R19.7  SIRS (systemic inflammatory response syndrome) (H) - R65.10  Other specified counseling - Z71.89  Open wound - T14.8XXA     Transitions of Care Assessment    Discharge Assessment  How are you doing now that you are home?: doing well  How are your symptoms? (Red Flag symptoms escalate to triage hotline per guidelines): Improved  Do you know how to contact your clinic care team if you have future questions or changes to your health status? : Yes  Does the patient have their discharge instructions? : Yes  Does the patient have questions regarding their discharge instructions? : No  Were you started on any new medications or were there changes to any of your previous medications? : Yes  Does the patient have all of their medications?: Yes  Do you have questions regarding any of your medications? : No  Do you have all of your needed medical supplies or equipment (DME)?  (i.e. oxygen tank, CPAP, cane, etc.): Yes         Post-op (Clinicians Only)  Did the patient have surgery or a procedure: No  Fever: No  Chills: No  Eating & Drinking: eating and drinking without complaints/concerns  PO Intake: regular diet  Additional Symptoms: decreased appetite  Bowel Function: loose stools  Date of last BM: 05/29/25  Urinary Status: voiding without complaint/concerns    Patient is doing well. Home care resumed.  Is trying to eat protein to help with wound recovery.  Thought he set up  a PCP appt after his appt on May 15th, but nothing is scheduled. He is willing to set up appt if PCP feels it is needed. He does have appt with vascular on [address] 10. Will route to pcp team for review.  No other needs and will call if he needs care coordination.    Follow up Plan     Discharge Follow-Up  Discharge follow up appointment scheduled in alignment with recommended follow up timeframe or Transitions of Risk Category? (Low = within 30 days; Moderate= within 14 days; High= within 7 days): Yes  Discharge Follow Up Appointment Date: 06/10/25  Discharge Follow Up Appointment Scheduled with?: Specialty Care Provider  Patient's follow up appointment not scheduled: Patient declined scheduling support. Education on the importance of transitions of care follow up. Provided scheduling phone number.    Future Appointments   Date Time Provider Department Center   6[facility]2025 10:00 [facility]   9/19/2025  8:00 AM Tima Davis MD [facility]       Outpatient Plan as outlined on AVS reviewed with patient.    For any urgent concerns, please contact our 24 hour nurse triage line: [phone] ([phone])       MARIE Sapp    [facility]/Voicemail    Clinical Data: Care Coordinator Outreach    Outreach Documentation Number of Outreach Attempt   5/29/2025   9:17 AM 1       Left message on patient's voicemail with call back information and requested return call.      Plan: Care Coordinator will try to reach patient again in 1-2 business days.    Francine Rinaldi,   [facility]  [phone]

## 2025-05-30 LAB
BACTERIA SPEC CULT: NO GROWTH
BACTERIA SPEC CULT: NO GROWTH

## 2025-06-02 NOTE — DISCHARGE SUMMARY
"[facility]  Hospitalist Discharge Summary      Date of Admission:  5/25/2025  Date of Discharge:  5/28/2025  4:59 PM  Discharging Provider: Carlos Diego MD  Discharge Service: Hospitalist Service    Discharge Diagnoses       69 year old male admitted on 5/25/2025. He had acute left lower extremity limb ischemia from thromboembolic event end of March 2025 transferred to [facility] where he was treated with anticoagulants and had a emergent fasciotomy.  The wound was slow to heal he eventually made his way to nursing home with wound VAC in place  Remote history of coronary artery disease and bypass grafting 2010 that has been stable  He was seen by vascular surgery few days ago wound was started to get worse and now continues to get worse he is being admitted for evaluation and treatment            5/28 :     Cleared by ID today  Discharge today to home              A/p :         1/.  Lower extremity wound growing multiple bacteria including Staphylococcus, Streptococcus    ID consulted  Cellulitis vs contact dermatitis based on picture, fever fits with it having been cellulitis.  Cultures with Group C/G strep and MRSA, Corynebacterium. Improved.   BC neg  Plan for cephalexin (for strep) PLUS doxycycline (provided active against staph aureus  ) 100 mg bid for 10 days       2/.  Pain control seems to be adequate.    3/.  Hypokalemia replace as per protocol    4/. H/o   left lower extremity compartment syndrome, likely due to thromboembolism with reperfusion injury. Patient underwent left lower extremity 4 compartment fasciotomy on   March 27 , h/o PVD - with chronic wound VAC therapy till this early month.         Clinically Significant Risk Factors     # Obesity: Estimated body mass index is 30.23 kg/m  as calculated from the following:    Height as of this encounter: 1.702 m (5' 7\").    Weight as of this encounter: 87.5 kg (193 lb).  # Severe Malnutrition: based on nutrition assessment and " treatment provided per dietitian's recommendations.      Follow-ups Needed After Discharge   Follow-up Appointments       Hospital Follow-up with Existing Primary Care Provider (PCP)          Schedule Primary Care visit within: 7 Days       Follow Up      Follow up with Vascular surgery        As advised            {Additional follow-up instructions/to-do's for PCP    : none    Unresulted Labs Ordered in the Past 30 Days of this Admission       No orders found from 4/25/2025 to 5/26/2025.        These results will be followed up by pcp    Discharge Disposition   Discharged to home  Condition at discharge: Stable        Consultations This Hospital Stay   PHARMACY TO DOSE VANCO  PHARMACY TO DOSE VANCO  INFECTIOUS DISEASES IP CONSULT  WOUND OSTOMY CONTINENCE NURSE  IP CONSULT  VASCULAR SURGERY IP CONSULT  VASCULAR SURGERY IP CONSULT  CARE MANAGEMENT / SOCIAL WORK IP CONSULT  CARE MANAGEMENT / SOCIAL WORK IP CONSULT    Code Status   Prior    Time Spent on this Encounter   I, Carlos Diego MD, personally saw the patient today and spent greater than 30 minutes discharging this patient.       Carlos Diego MD  [address] [address]  Phone: [phone]  Fax: [phone]  ______________________________________________________________________    Physical Exam   Vital Signs:                    Weight: 193 lbs 0 oz      General Appearance:  Alert awake oriented x 3 no acute distress  Respiratory: Clear to auscultation  Cardiovascular: S1-S2  GI: Soft nontender bowel sounds are present  Skin: Left lower extremity in dressing, the nurse is adjusting the dressing hence I did not remove it  Other:  No neurological deficits         Primary Care Physician   Tima Davis    Discharge Orders      Primary Care - Care Coordination Referral      Reason for your hospital stay    left lower extremity wound     Activity    Your activity upon discharge: activity  as tolerated     Follow Up    Follow up with Vascular surgery        As advised     Diet    Follow this diet upon discharge: Current Diet:Orders Placed This Encounter      Snacks/Supplements Adult: Ensure Enlive; With Meals      Combination Diet Regular Diet Adult     Hospital Follow-up with Existing Primary Care Provider (PCP)            Significant Results and Procedures   Most Recent 3 CBC's:  Recent Labs   Lab Test 05/26/25  0629 05/25/25  1157 05/15/25  1335   WBC 9.1 10.2 10.6   HGB 9.4* 11.4* 10.8*   MCV 85 87 87    313 491*     Most Recent 3 BMP's:  Recent Labs   Lab Test 05/28/25  0816 05/28/25  0806 05/27/25  0836 05/26/25  1452 05/26/25  0629 05/25/25  1157 04/17/25  0542   NA  --   --   --   --  137 136 140   POTASSIUM 3.6  --  3.7 3.5 3.3* 3.3* 4.6   CHLORIDE  --   --   --   --  104 100 105   CO2  --   --   --   --  23 23 26   BUN  --   --   --   --  9.3 12.5 17.2   CR  --   --   --   --  0.94 0.91 1.02   ANIONGAP  --   --   --   --  10 13 9   MELONY  --   --   --   --  8.3* 9.5 9.0   GLC  --  105*  --   --  119* 114* 103*     Most Recent 2 LFT's:  Recent Labs   Lab Test 05/25/25  1157 04/12/25  0603   AST 31 25   ALT 33 34   ALKPHOS 86 96   BILITOTAL 0.8 0.3     Most Recent 3 INR's:  Recent Labs   Lab Test 05/25/25  1157 03/27/25  1302 03/27/25  0624   INR 1.63* 1.16* 1.17*       Discharge Medications   Discharge Medication List as of 5/28/2025  4:06 PM        START taking these medications    Details   cephALEXin (KEFLEX) 500 MG capsule Take 1 capsule (500 mg) by mouth 4 times daily for 38 doses., Disp-38 capsule, R-0, E-Prescribe      doxycycline hyclate (VIBRAMYCIN) 100 MG capsule Take 1 capsule (100 mg) by mouth 2 times daily for 19 doses., Disp-19 capsule, R-0, E-Prescribe           CONTINUE these medications which have CHANGED    Details   HYDROmorphone (DILAUDID) 2 MG tablet Take 2 tablets (4 mg) by mouth every 6 hours as needed for pain., Disp-10 tablet, R-0, Local Print           CONTINUE  these medications which have NOT CHANGED    Details   acetaminophen (TYLENOL) 325 MG tablet Take 325-650 mg by mouth every 6 hours as needed for mild pain., Historical      albuterol (PROAIR HFA/PROVENTIL HFA/VENTOLIN HFA) 108 (90 Base) MCG/ACT inhaler Inhale 2 puffs into the lungs every 6 hours as needed for shortness of breath, wheezing or cough, Disp-18 g, R-4, E-PrescribePharmacy may dispense brand covered by insurance (Proair, or proventil or ventolin or generic albuterol inhaler)      aspirin 81 MG EC tablet Take 81 mg by mouth daily., Historical      hydrOXYzine HCl (ATARAX) 25 MG tablet Take 1 tablet (25 mg) by mouth every 6 hours as needed for anxiety., Disp-90 tablet, R-0, E-Prescribe      lisinopril (ZESTRIL) 5 MG tablet Take 1 tablet (5 mg) by mouth daily., Disp-90 tablet, R-3, E-Prescribe      metoprolol succinate ER (TOPROL XL) 25 MG 24 hr tablet TAKE 0.5 TABLETS(12.5 MG) BY MOUTH DAILY, Disp-45 tablet, R-3, E-Prescribe      multivitamin w/minerals (THERA-VIT-M) tablet Take 1 tablet by mouth daily., Transitional      ondansetron (ZOFRAN ODT) 4 MG ODT tab Take 1 tablet (4 mg) by mouth every 8 hours as needed for nausea., Disp-20 tablet, R-0, E-Prescribe      prochlorperazine (COMPAZINE) 5 MG tablet Take 1 tablet (5 mg) by mouth every 6 hours as needed for nausea or vomiting., Transitional      rivaroxaban ANTICOAGULANT (XARELTO) 20 MG TABS tablet Take 1 tablet (20 mg) by mouth daily (with dinner)., Transitional      tadalafil (CIALIS) 10 MG tablet Take 1 tablet (10 mg) by mouth daily as needed (erectile difficulties)., Disp-90 tablet, R-2, E-PrescribeDo not fill until patient requests      vitamin C (ASCORBIC ACID) 500 MG tablet Take 1 tablet (500 mg) by mouth daily., Transitional      wound support modular (EXPEDITE) LIQD bottle Take 60 mLs by mouth daily., Transitional           Allergies   Allergies   Allergen Reactions    Codeine Nausea    No Known Allergies      Potentially cats

## 2025-06-06 ENCOUNTER — APPOINTMENT (OUTPATIENT)
Dept: ULTRASOUND IMAGING | Facility: CLINIC | Age: 70
End: 2025-06-06
Attending: STUDENT IN AN ORGANIZED HEALTH CARE EDUCATION/TRAINING PROGRAM
Payer: COMMERCIAL

## 2025-06-06 ENCOUNTER — HOSPITAL ENCOUNTER (INPATIENT)
Facility: CLINIC | Age: 70
LOS: 5 days | Discharge: HOME | End: 2025-06-11
Attending: EMERGENCY MEDICINE | Admitting: FAMILY MEDICINE
Payer: COMMERCIAL

## 2025-06-06 ENCOUNTER — APPOINTMENT (OUTPATIENT)
Dept: RADIOLOGY | Facility: CLINIC | Age: 70
End: 2025-06-06
Attending: STUDENT IN AN ORGANIZED HEALTH CARE EDUCATION/TRAINING PROGRAM
Payer: COMMERCIAL

## 2025-06-06 ENCOUNTER — APPOINTMENT (OUTPATIENT)
Dept: MRI IMAGING | Facility: CLINIC | Age: 70
End: 2025-06-06
Attending: STUDENT IN AN ORGANIZED HEALTH CARE EDUCATION/TRAINING PROGRAM
Payer: COMMERCIAL

## 2025-06-06 DIAGNOSIS — T14.8XXA OPEN WOUND: ICD-10-CM

## 2025-06-06 DIAGNOSIS — M79.675 GREAT TOE PAIN, LEFT: ICD-10-CM

## 2025-06-06 DIAGNOSIS — Z71.89 OTHER SPECIFIED COUNSELING: Chronic | ICD-10-CM

## 2025-06-06 DIAGNOSIS — M00.9 SEPTIC ARTHRITIS OF LEFT FOOT, DUE TO UNSPECIFIED ORGANISM (H): Primary | ICD-10-CM

## 2025-06-06 LAB
ALBUMIN SERPL BCG-MCNC: 3.8 G/DL (ref 3.5–5.2)
ALP SERPL-CCNC: 93 U/L (ref 40–150)
ALT SERPL W P-5'-P-CCNC: 26 U/L (ref 0–70)
ANION GAP SERPL CALCULATED.3IONS-SCNC: 14 MMOL/L (ref 7–15)
APTT PPP: 43 SECONDS (ref 22–38)
AST SERPL W P-5'-P-CCNC: 21 U/L (ref 0–45)
BASOPHILS # BLD AUTO: 0 10E3/UL (ref 0–0.2)
BASOPHILS NFR BLD AUTO: 0 %
BILIRUB SERPL-MCNC: 0.9 MG/DL
BUN SERPL-MCNC: 12.8 MG/DL (ref 8–23)
CALCIUM SERPL-MCNC: 9.6 MG/DL (ref 8.8–10.4)
CHLORIDE SERPL-SCNC: 99 MMOL/L (ref 98–107)
CREAT SERPL-MCNC: 0.93 MG/DL (ref 0.67–1.17)
CRP SERPL-MCNC: 204 MG/L
EGFRCR SERPLBLD CKD-EPI 2021: 89 ML/MIN/1.73M2
EOSINOPHIL # BLD AUTO: 0.1 10E3/UL (ref 0–0.7)
EOSINOPHIL NFR BLD AUTO: 1 %
ERYTHROCYTE [DISTWIDTH] IN BLOOD BY AUTOMATED COUNT: 14 % (ref 10–15)
GLUCOSE SERPL-MCNC: 137 MG/DL (ref 70–99)
HCO3 SERPL-SCNC: 21 MMOL/L (ref 22–29)
HCT VFR BLD AUTO: 33.5 % (ref 40–53)
HGB BLD-MCNC: 10.8 G/DL (ref 13.3–17.7)
IMM GRANULOCYTES # BLD: 0.1 10E3/UL
IMM GRANULOCYTES NFR BLD: 1 %
INR PPP: 1.37 (ref 0.85–1.15)
LACTATE SERPL-SCNC: 1.4 MMOL/L (ref 0.7–2)
LYMPHOCYTES # BLD AUTO: 2 10E3/UL (ref 0.8–5.3)
LYMPHOCYTES NFR BLD AUTO: 12 %
MCH RBC QN AUTO: 27.6 PG (ref 26.5–33)
MCHC RBC AUTO-ENTMCNC: 32.2 G/DL (ref 31.5–36.5)
MCV RBC AUTO: 86 FL (ref 78–100)
MONOCYTES # BLD AUTO: 1.5 10E3/UL (ref 0–1.3)
MONOCYTES NFR BLD AUTO: 9 %
NEUTROPHILS # BLD AUTO: 13.1 10E3/UL (ref 1.6–8.3)
NEUTROPHILS NFR BLD AUTO: 78 %
NRBC # BLD AUTO: 0 10E3/UL
NRBC BLD AUTO-RTO: 0 /100
PLATELET # BLD AUTO: 523 10E3/UL (ref 150–450)
POTASSIUM SERPL-SCNC: 4.1 MMOL/L (ref 3.4–5.3)
PROCALCITONIN SERPL IA-MCNC: 0.13 NG/ML
PROT SERPL-MCNC: 8.4 G/DL (ref 6.4–8.3)
PROTHROMBIN TIME: 17 SECONDS (ref 11.8–14.8)
RBC # BLD AUTO: 3.91 10E6/UL (ref 4.4–5.9)
SODIUM SERPL-SCNC: 134 MMOL/L (ref 135–145)
WBC # BLD AUTO: 16.8 10E3/UL (ref 4–11)

## 2025-06-06 PROCEDURE — 120N000001 HC R&B MED SURG/OB

## 2025-06-06 PROCEDURE — 84450 TRANSFERASE (AST) (SGOT): CPT | Performed by: STUDENT IN AN ORGANIZED HEALTH CARE EDUCATION/TRAINING PROGRAM

## 2025-06-06 PROCEDURE — 250N000013 HC RX MED GY IP 250 OP 250 PS 637: Performed by: STUDENT IN AN ORGANIZED HEALTH CARE EDUCATION/TRAINING PROGRAM

## 2025-06-06 PROCEDURE — 250N000011 HC RX IP 250 OP 636: Performed by: EMERGENCY MEDICINE

## 2025-06-06 PROCEDURE — 87040 BLOOD CULTURE FOR BACTERIA: CPT | Performed by: STUDENT IN AN ORGANIZED HEALTH CARE EDUCATION/TRAINING PROGRAM

## 2025-06-06 PROCEDURE — A9585 GADOBUTROL INJECTION: HCPCS | Performed by: STUDENT IN AN ORGANIZED HEALTH CARE EDUCATION/TRAINING PROGRAM

## 2025-06-06 PROCEDURE — 258N000003 HC RX IP 258 OP 636: Performed by: EMERGENCY MEDICINE

## 2025-06-06 PROCEDURE — 250N000013 HC RX MED GY IP 250 OP 250 PS 637

## 2025-06-06 PROCEDURE — 36415 COLL VENOUS BLD VENIPUNCTURE: CPT | Performed by: STUDENT IN AN ORGANIZED HEALTH CARE EDUCATION/TRAINING PROGRAM

## 2025-06-06 PROCEDURE — 73720 MRI LWR EXTREMITY W/O&W/DYE: CPT | Mod: LT

## 2025-06-06 PROCEDURE — 250N000011 HC RX IP 250 OP 636: Performed by: STUDENT IN AN ORGANIZED HEALTH CARE EDUCATION/TRAINING PROGRAM

## 2025-06-06 PROCEDURE — 96365 THER/PROPH/DIAG IV INF INIT: CPT

## 2025-06-06 PROCEDURE — 85610 PROTHROMBIN TIME: CPT | Performed by: STUDENT IN AN ORGANIZED HEALTH CARE EDUCATION/TRAINING PROGRAM

## 2025-06-06 PROCEDURE — 83605 ASSAY OF LACTIC ACID: CPT | Performed by: STUDENT IN AN ORGANIZED HEALTH CARE EDUCATION/TRAINING PROGRAM

## 2025-06-06 PROCEDURE — 250N000011 HC RX IP 250 OP 636

## 2025-06-06 PROCEDURE — 84145 PROCALCITONIN (PCT): CPT | Performed by: STUDENT IN AN ORGANIZED HEALTH CARE EDUCATION/TRAINING PROGRAM

## 2025-06-06 PROCEDURE — 86140 C-REACTIVE PROTEIN: CPT | Performed by: STUDENT IN AN ORGANIZED HEALTH CARE EDUCATION/TRAINING PROGRAM

## 2025-06-06 PROCEDURE — 93971 EXTREMITY STUDY: CPT | Mod: LT

## 2025-06-06 PROCEDURE — 73630 X-RAY EXAM OF FOOT: CPT | Mod: LT

## 2025-06-06 PROCEDURE — 99285 EMERGENCY DEPT VISIT HI MDM: CPT | Mod: 25

## 2025-06-06 PROCEDURE — 85025 COMPLETE CBC W/AUTO DIFF WBC: CPT | Performed by: STUDENT IN AN ORGANIZED HEALTH CARE EDUCATION/TRAINING PROGRAM

## 2025-06-06 PROCEDURE — 258N000003 HC RX IP 258 OP 636

## 2025-06-06 PROCEDURE — 255N000002 HC RX 255 OP 636: Performed by: STUDENT IN AN ORGANIZED HEALTH CARE EDUCATION/TRAINING PROGRAM

## 2025-06-06 PROCEDURE — 85730 THROMBOPLASTIN TIME PARTIAL: CPT | Performed by: STUDENT IN AN ORGANIZED HEALTH CARE EDUCATION/TRAINING PROGRAM

## 2025-06-06 RX ORDER — HYDROMORPHONE HCL IN WATER/PF 6 MG/30 ML
0.2 PATIENT CONTROLLED ANALGESIA SYRINGE INTRAVENOUS
Refills: 0 | Status: DISCONTINUED | OUTPATIENT
Start: 2025-06-06 | End: 2025-06-09 | Stop reason: ALTCHOICE

## 2025-06-06 RX ORDER — ONDANSETRON 2 MG/ML
4 INJECTION INTRAMUSCULAR; INTRAVENOUS EVERY 6 HOURS PRN
Status: DISCONTINUED | OUTPATIENT
Start: 2025-06-06 | End: 2025-06-11 | Stop reason: HOSPADM

## 2025-06-06 RX ORDER — ASPIRIN 81 MG/1
81 TABLET ORAL DAILY
Status: DISCONTINUED | OUTPATIENT
Start: 2025-06-07 | End: 2025-06-11 | Stop reason: HOSPADM

## 2025-06-06 RX ORDER — HEPARIN SODIUM 5000 [USP'U]/.5ML
5000 INJECTION, SOLUTION INTRAVENOUS; SUBCUTANEOUS EVERY 8 HOURS
Status: DISCONTINUED | OUTPATIENT
Start: 2025-06-06 | End: 2025-06-06

## 2025-06-06 RX ORDER — LISINOPRIL 5 MG/1
5 TABLET ORAL DAILY
Status: DISCONTINUED | OUTPATIENT
Start: 2025-06-07 | End: 2025-06-11 | Stop reason: HOSPADM

## 2025-06-06 RX ORDER — DIPHENHYDRAMINE HYDROCHLORIDE 50 MG/ML
25 INJECTION, SOLUTION INTRAMUSCULAR; INTRAVENOUS EVERY 6 HOURS PRN
Status: DISCONTINUED | OUTPATIENT
Start: 2025-06-06 | End: 2025-06-11 | Stop reason: HOSPADM

## 2025-06-06 RX ORDER — PIPERACILLIN SODIUM, TAZOBACTAM SODIUM 3; .375 G/15ML; G/15ML
3.38 INJECTION, POWDER, LYOPHILIZED, FOR SOLUTION INTRAVENOUS ONCE
Status: COMPLETED | OUTPATIENT
Start: 2025-06-06 | End: 2025-06-06

## 2025-06-06 RX ORDER — PROCHLORPERAZINE MALEATE 5 MG/1
5 TABLET ORAL EVERY 6 HOURS PRN
Status: DISCONTINUED | OUTPATIENT
Start: 2025-06-06 | End: 2025-06-11 | Stop reason: HOSPADM

## 2025-06-06 RX ORDER — SODIUM CHLORIDE 9 MG/ML
INJECTION, SOLUTION INTRAVENOUS CONTINUOUS
Status: DISCONTINUED | OUTPATIENT
Start: 2025-06-06 | End: 2025-06-07

## 2025-06-06 RX ORDER — ONDANSETRON 4 MG/1
4 TABLET, ORALLY DISINTEGRATING ORAL EVERY 6 HOURS PRN
Status: DISCONTINUED | OUTPATIENT
Start: 2025-06-06 | End: 2025-06-11 | Stop reason: HOSPADM

## 2025-06-06 RX ORDER — IPRATROPIUM BROMIDE 42 UG/1
2 SPRAY, METERED NASAL 2 TIMES DAILY PRN
Status: DISCONTINUED | OUTPATIENT
Start: 2025-06-06 | End: 2025-06-11 | Stop reason: HOSPADM

## 2025-06-06 RX ORDER — ACETAMINOPHEN 325 MG/1
325-650 TABLET ORAL EVERY 6 HOURS PRN
Status: DISCONTINUED | OUTPATIENT
Start: 2025-06-06 | End: 2025-06-06

## 2025-06-06 RX ORDER — DIPHENHYDRAMINE HYDROCHLORIDE 50 MG/ML
25 INJECTION, SOLUTION INTRAMUSCULAR; INTRAVENOUS ONCE
Status: DISCONTINUED | OUTPATIENT
Start: 2025-06-06 | End: 2025-06-06

## 2025-06-06 RX ORDER — CALCIUM CARBONATE 500 MG/1
1000 TABLET, CHEWABLE ORAL 4 TIMES DAILY PRN
Status: DISCONTINUED | OUTPATIENT
Start: 2025-06-06 | End: 2025-06-11 | Stop reason: HOSPADM

## 2025-06-06 RX ORDER — PIPERACILLIN SODIUM, TAZOBACTAM SODIUM 3; .375 G/15ML; G/15ML
3.38 INJECTION, POWDER, LYOPHILIZED, FOR SOLUTION INTRAVENOUS EVERY 6 HOURS
Status: DISCONTINUED | OUTPATIENT
Start: 2025-06-06 | End: 2025-06-10

## 2025-06-06 RX ORDER — DIPHENHYDRAMINE HCL 25 MG
25 CAPSULE ORAL ONCE
Status: DISCONTINUED | OUTPATIENT
Start: 2025-06-06 | End: 2025-06-06

## 2025-06-06 RX ORDER — DIPHENHYDRAMINE HCL 25 MG
25 CAPSULE ORAL EVERY 6 HOURS PRN
Status: DISCONTINUED | OUTPATIENT
Start: 2025-06-06 | End: 2025-06-11 | Stop reason: HOSPADM

## 2025-06-06 RX ORDER — GADOBUTROL 604.72 MG/ML
0.1 INJECTION INTRAVENOUS ONCE
Status: COMPLETED | OUTPATIENT
Start: 2025-06-06 | End: 2025-06-06

## 2025-06-06 RX ORDER — HYDROMORPHONE HYDROCHLORIDE 2 MG/1
2 TABLET ORAL ONCE
Refills: 0 | Status: COMPLETED | OUTPATIENT
Start: 2025-06-06 | End: 2025-06-06

## 2025-06-06 RX ORDER — ACETAMINOPHEN 650 MG/1
650 SUPPOSITORY RECTAL EVERY 4 HOURS PRN
Status: DISCONTINUED | OUTPATIENT
Start: 2025-06-06 | End: 2025-06-11 | Stop reason: HOSPADM

## 2025-06-06 RX ORDER — AMOXICILLIN 250 MG
1 CAPSULE ORAL 2 TIMES DAILY PRN
Status: DISCONTINUED | OUTPATIENT
Start: 2025-06-06 | End: 2025-06-11 | Stop reason: HOSPADM

## 2025-06-06 RX ORDER — HEPARIN SODIUM 5000 [USP'U]/.5ML
5000 INJECTION, SOLUTION INTRAVENOUS; SUBCUTANEOUS ONCE
Status: COMPLETED | OUTPATIENT
Start: 2025-06-06 | End: 2025-06-06

## 2025-06-06 RX ORDER — AMOXICILLIN 250 MG
2 CAPSULE ORAL 2 TIMES DAILY PRN
Status: DISCONTINUED | OUTPATIENT
Start: 2025-06-06 | End: 2025-06-11 | Stop reason: HOSPADM

## 2025-06-06 RX ORDER — ACETAMINOPHEN 325 MG/1
650 TABLET ORAL EVERY 4 HOURS PRN
Status: DISCONTINUED | OUTPATIENT
Start: 2025-06-06 | End: 2025-06-11 | Stop reason: HOSPADM

## 2025-06-06 RX ORDER — LIDOCAINE 40 MG/G
CREAM TOPICAL
Status: DISCONTINUED | OUTPATIENT
Start: 2025-06-06 | End: 2025-06-11 | Stop reason: HOSPADM

## 2025-06-06 RX ORDER — OXYCODONE HYDROCHLORIDE 5 MG/1
5 TABLET ORAL EVERY 4 HOURS PRN
Refills: 0 | Status: DISCONTINUED | OUTPATIENT
Start: 2025-06-06 | End: 2025-06-09

## 2025-06-06 RX ORDER — IPRATROPIUM BROMIDE 42 UG/1
2 SPRAY, METERED NASAL 2 TIMES DAILY PRN
COMMUNITY

## 2025-06-06 RX ORDER — VANCOMYCIN HYDROCHLORIDE 1 G/200ML
1000 INJECTION, SOLUTION INTRAVENOUS EVERY 12 HOURS
Status: DISCONTINUED | OUTPATIENT
Start: 2025-06-07 | End: 2025-06-11 | Stop reason: HOSPADM

## 2025-06-06 RX ORDER — HYDROMORPHONE HCL IN WATER/PF 6 MG/30 ML
0.4 PATIENT CONTROLLED ANALGESIA SYRINGE INTRAVENOUS
Refills: 0 | Status: DISCONTINUED | OUTPATIENT
Start: 2025-06-06 | End: 2025-06-09 | Stop reason: ALTCHOICE

## 2025-06-06 RX ADMIN — PIPERACILLIN AND TAZOBACTAM 3.38 G: 3; .375 INJECTION, POWDER, FOR SOLUTION INTRAVENOUS at 16:35

## 2025-06-06 RX ADMIN — PIPERACILLIN AND TAZOBACTAM 3.38 G: 3; .375 INJECTION, POWDER, FOR SOLUTION INTRAVENOUS at 22:55

## 2025-06-06 RX ADMIN — ACETAMINOPHEN 650 MG: 325 TABLET ORAL at 18:46

## 2025-06-06 RX ADMIN — VANCOMYCIN HYDROCHLORIDE 1750 MG: 5 INJECTION, POWDER, LYOPHILIZED, FOR SOLUTION INTRAVENOUS at 17:20

## 2025-06-06 RX ADMIN — SODIUM CHLORIDE: 0.9 INJECTION, SOLUTION INTRAVENOUS at 20:54

## 2025-06-06 RX ADMIN — HYDROMORPHONE HYDROCHLORIDE 2 MG: 2 TABLET ORAL at 17:17

## 2025-06-06 RX ADMIN — HEPARIN SODIUM 5000 UNITS: 5000 INJECTION, SOLUTION INTRAVENOUS; SUBCUTANEOUS at 20:52

## 2025-06-06 RX ADMIN — GADOBUTROL 8.75 ML: 604.72 INJECTION INTRAVENOUS at 19:46

## 2025-06-06 RX ADMIN — DIPHENHYDRAMINE HYDROCHLORIDE 25 MG: 50 INJECTION INTRAMUSCULAR; INTRAVENOUS at 18:48

## 2025-06-06 ASSESSMENT — ACTIVITIES OF DAILY LIVING (ADL)
ADLS_ACUITY_SCORE: 59
ADLS_ACUITY_SCORE: 62
ADLS_ACUITY_SCORE: 62
ADLS_ACUITY_SCORE: 59

## 2025-06-06 NOTE — MEDICATION SCRIBE - ADMISSION MEDICATION HISTORY
Medication Scribe Admission Medication History    Admission medication history is complete. The information provided in this note is only as accurate as the sources available at the time of the update.    Information Source(s): Patient, Clinic records, and CareEverywhere/SureScripts via in-person    Pertinent Information: Patient has been on cephalexin and doxycycline since 5/28/2025. Last doses were this morning. Patient reported having about one day left of each. Cephalexin is QID (one dose taken today) and doxycycline is BID (one dose taken today).    Currently on aspirin 81 mg and rivaroxaban 20 mg as well.     Last dose of hydromorphone PTA was about 1030.     Changes made to PTA medication list:  Added:   Ipratropium 0.06% nasal spray  Deleted:   Albuterol HFA - no longer taking  Hydroxyzine 25 mg - no longer taking  Multivitamin - no longer taking  Vitamin C - no longer taking  Expedite - no longer taking  Changed: None    Allergies reviewed with patient and updates made in EHR: yes    Medication History Completed By: Popeye Pickering 6/6/2025 4:14 PM    PTA Med List   Medication Sig Note Last Dose/Taking    acetaminophen (TYLENOL) 325 MG tablet Take 325-650 mg by mouth every 6 hours as needed for mild pain.  Past Week    aspirin 81 MG EC tablet Take 81 mg by mouth daily.  6/6/2025 Morning    cephALEXin (KEFLEX) 500 MG capsule Take 1 capsule (500 mg) by mouth 4 times daily for 38 doses.  6/6/2025 at  7:45 AM    doxycycline hyclate (VIBRAMYCIN) 100 MG capsule Take 1 capsule (100 mg) by mouth 2 times daily for 19 doses.  6/6/2025 at  9:00 AM    HYDROmorphone (DILAUDID) 2 MG tablet Take 2 tablets (4 mg) by mouth every 6 hours as needed for pain.  6/6/2025 at 10:30 AM    ipratropium (ATROVENT) 0.06 % nasal spray Spray 2 sprays into both nostrils 2 times daily as needed for rhinitis.  Past Week    lisinopril (ZESTRIL) 5 MG tablet Take 1 tablet (5 mg) by mouth daily.  6/6/2025 Morning    metoprolol succinate ER  (TOPROL XL) 25 MG 24 hr tablet TAKE 0.5 TABLETS(12.5 MG) BY MOUTH DAILY  6/6/2025 Morning    ondansetron (ZOFRAN ODT) 4 MG ODT tab Take 1 tablet (4 mg) by mouth every 8 hours as needed for nausea. 6/6/2025: Has not needed Unknown    prochlorperazine (COMPAZINE) 5 MG tablet Take 1 tablet (5 mg) by mouth every 6 hours as needed for nausea or vomiting. 6/6/2025: Has not needed Unknown    rivaroxaban ANTICOAGULANT (XARELTO) 20 MG TABS tablet Take 1 tablet (20 mg) by mouth daily (with dinner).  6/5/2025 at  5:00 PM    tadalafil (CIALIS) 10 MG tablet Take 1 tablet (10 mg) by mouth daily as needed (erectile difficulties).  Unknown

## 2025-06-06 NOTE — PHARMACY-VANCOMYCIN DOSING SERVICE
Pharmacy Vancomycin Initial Note  Date of Service 2025  Patient's  1955  69 year old, male    Indication: Skin and Soft Tissue Infection    Current estimated CrCl = Estimated Creatinine Clearance: 79.2 mL/min (based on SCr of 0.93 mg/dL).    Creatinine for last 3 days  2025:  1:28 PM Creatinine 0.93 mg/dL    Recent Vancomycin Level(s) for last 3 days  No results found for requested labs within last 3 days.      Vancomycin IV Administrations (past 72 hours)        No vancomycin orders with administrations in past 72 hours.                    Nephrotoxins and other renal medications (From now, onward)      Start     Dose/Rate Route Frequency Ordered Stop    25 1630  piperacillin-tazobactam (ZOSYN) 3.375 g vial to attach to  mL bag         3.375 g  over 30 Minutes Intravenous ONCE 25 1533      25 1630  vancomycin (VANCOCIN) 1,750 mg in 0.9% NaCl 500 mL intermittent infusion         1,750 mg  over 2 Hours Intravenous ONCE 25 1617              Contrast Orders - past 72 hours (72h ago, onward)      None                  Plan:  Start vancomycin  1750 mg IV once  Vancomycin monitoring method: AUC  Vancomycin therapeutic monitoring goal: 400-600 mg*h/L  Please re-consult pharmacy if intent is to continue while inpatient    Alejandra Durham RPH

## 2025-06-06 NOTE — ED PROVIDER NOTES
Emergency Department Encounter   NAME: Dudley Kiran ; AGE: 69 year old male ; YOB: 1955 ; MRN: [id number] ; PCP: Tima Davis   ED PROVIDER: Leslie Mckeon PA-C    Evaluation Date & Time:   No admission date for patient encounter.    CHIEF COMPLAINT:  Post-op Problem        Impression and Plan   FINAL IMPRESSION:    ICD-10-CM    1. 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 Patient had acute left lower extremity limb ischemia from thromboembolic event end of March 2025.  He was treated with anticoagulation and had an emergent fasciotomy.  The wound was very slow to heal, patient was discharged to nursing home with wound VAC in place. Patient was admitted here at [facility] 5/25 to 5/28 for left leg wound infection. While admitted infectious disease was consulted and patient was discharged hoe with Keflex and doxycycline.  Endorses doing well at home until yesterday when he started to notice increased pain, swelling, and bruising of the left big toe. He endorses taking antibiotics as prescribed and states he has a day or so left of the antibiotics.  Denies any fevers, chills, nausea, or vomiting.  No fluid leakage from the left lower leg.    Vitals reviewed, he is initially tachycardic in triage with pulse of 111.  Afebrile. On exam he is resting comfortably in a wheelchair, no acute distress. Non toxic appearing. Differential diagnosis/emergent conditions considered and evaluated for includes but not limited to contusion, arthritis, hematoma, abscess, septic joint, cellulitis, limb ischemia, DVT. On exam the left MTP joint is edematous and ecchymotic. It is quite tender to palpation.  There is no erythema or warmth.  No visible  "abscess or area of fluctuance.  He is able to wiggle the toes small amount as well as perform active plantar flexion and dorsiflexion.  DP and PT pulses are dopplerable bilaterally.    CBC shows leukocytosis of 16.8.  This is quite elevated, especially when compared to previous. Pt was also tachycardic upon arrival.  His lactic acid is within normal range. Procalcitonin normal.  CRP is elevated at 204.  XR of the left foot finds degenerative changes of the first MTP joint, fracture.  Ultrasound of the left lower extremity is negative for DVT, superficial thrombophlebitis,    I consulted Dr. Izquierdo with podiatry.  He recommended MRI of the left foot to evaluate for septic joint.  Ultimately given patient's new leukocytosis, tachycardia, and elevated CRP he stated he would be hesitant to discharge patient home at this time even if MRI is negative and recommended admission for IV vancomycin and Zosyn and continued management. Patient states he is \"tired of this place\" and \"does not want to stay more than one night\" but is agreeable to admission. I spoke with hospitalist team and patient was accepted for admission.      Medical Decision Making  I reviewed the EMR: Inpatient Record: Hospital admission 5/25/2025  Admit.    MIPS (CTPE, Dental pain, Capps, Sinusitis, Asthma/COPD, Head Trauma): Not Applicable    SEPSIS: None      I considered escalation of care and admitting the patient but ultimately did not given reassuring exam and workup.      ED COURSE:  12:30 AM I met and introduced myself to the patient. I gathered initial history and performed my physical exam. We discussed plan for initial workup.   12:52 PM I have staffed the patient with Dr. Kuhn ED MD, who has evaluated the patient and agrees with all aspects of today's care.   1521 I spoke with Dr. Izquierdo Podiatry  4:50 PM I spoke with Dr. Whyte and pt was accepted for admission.       At the conclusion of the encounter I discussed the results of all the " "tests and the disposition. The questions were answered. The patient or family acknowledged understanding and was agreeable with the care plan.          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  Patient was admitted here at [facility] 5/25 to 5/28 for wound infection.  Patient had acute left lower extremity limb ischemia from thromboembolic event end of March 2025.  He was treated with anticoagulation and had an emergent fasciotomy.  The wound was very slow to heal, patient was discharged to nursing home with wound VAC in place.  While recently patient infectious disease was consulted.  Patient was discharged with Keflex and doxycycline.  Endorses taking antibiotics as prescribed and states he has a day or so left of antibiotic.        Physical Exam     First Vitals:  Patient Vitals for the past 24 hrs:   BP Temp Temp src Pulse Resp SpO2 Height Weight   06/06/25 1438 131/69 -- -- 101 -- 96 % -- --   06/06/25 1216 131/77 99.3  F (37.4  C) Oral 111 18 94 % 1.702 m (5' 7\") 87.5 kg (193 lb)         PHYSICAL EXAM    General Appearance:  Alert, cooperative, no distress, appears stated age  Respiratory: No distress.   Cardiovascular: Regular rate   Musculoskeletal: Moving all extremities. No gross deformities.  The left first MTP joint is edematous with overlying " purple ecchymosis.  No erythema or warmth.  DP and PT pulses dopplerable bilaterally.  He is able to wiggle the toes of the left foot and perform active dorsi flexion and plantarflexion.  Integument: Warm, dry, no rashes or lesions  Neurologic: Alert and orientated x3. No focal deficits.  Psych: Normal mood and affect                Results     LAB:  All pertinent labs reviewed and interpreted  Labs Ordered and Resulted from Time of ED Arrival to Time of ED Departure   COMPREHENSIVE METABOLIC PANEL - Abnormal       Result Value    Sodium 134 (*)     Potassium 4.1      Carbon Dioxide (CO2) 21 (*)     Anion Gap 14      Urea Nitrogen 12.8      Creatinine 0.93      GFR Estimate 89      Calcium 9.6      Chloride 99      Glucose 137 (*)     Alkaline Phosphatase 93      AST 21      ALT 26      Protein Total 8.4 (*)     Albumin 3.8      Bilirubin Total 0.9     CRP INFLAMMATION - Abnormal    CRP Inflammation 204.00 (*)    INR - Abnormal    INR 1.37 (*)     PT 17.0 (*)    PARTIAL THROMBOPLASTIN TIME - Abnormal    aPTT 43 (*)    CBC WITH PLATELETS AND DIFFERENTIAL - Abnormal    WBC Count 16.8 (*)     RBC Count 3.91 (*)     Hemoglobin 10.8 (*)     Hematocrit 33.5 (*)     MCV 86      MCH 27.6      MCHC 32.2      RDW 14.0      Platelet Count 523 (*)     % Neutrophils 78      % Lymphocytes 12      % Monocytes 9      % Eosinophils 1      % Basophils 0      % Immature Granulocytes 1      NRBCs per 100 WBC 0      Absolute Neutrophils 13.1 (*)     Absolute Lymphocytes 2.0      Absolute Monocytes 1.5 (*)     Absolute Eosinophils 0.1      Absolute Basophils 0.0      Absolute Immature Granulocytes 0.1      Absolute NRBCs 0.0     LACTIC ACID WHOLE BLOOD WITH 1X REPEAT IN 2 HR WHEN >2 - Normal    Lactic Acid, Initial 1.4     PROCALCITONIN - Normal    Procalcitonin 0.13     BLOOD CULTURE   BLOOD CULTURE       RADIOLOGY:  US Lower Extremity Venous Duplex Left   Final Result   IMPRESSION:   1.  No deep venous thrombosis in the left lower  extremity.      Foot XR, G/E 3 views, left   Final Result   IMPRESSION: Degenerative change first MTP joint. Degenerative change at the IP joint of the great toe. No evidence for acute fracture. Plantar and Achilles calcaneal spurring.      MR Foot Left w/o & w Contrast    (Results Pending)         ECG:  N/A        PROCEDURES:  N/A            Leslie Mckeon PA-C   Emergency Medicine   [facility] EMERGENCY ROOM       Leslie Mckeon PA-C  06/06/25 1564

## 2025-06-06 NOTE — ED PROVIDER NOTES
"Emergency Department Staff Physician Note     I had a face to face encounter with this patient seen by the Advanced Practice Provider (ELENA).  I have seen, examined, and discussed the patient with the ELENA and agree with their assessment and plan of management.    Relevant HPI:     Dudley Kiran is a 69 year old male who presents to the Emergency Department for evaluation of a post-operative problem.    Patient was admitted to [facility] from 5/25 to 5/28 for wound infection.  Had acute left lower extremity limb ischemia from thromboembolic event end of March 2025.  He was treated with anticoagulation and had an emergent fasciotomy.  The wound was very slow to heal. Discharged to nursing home with wound vac in place. ID consulted, patient was discharged with Keflex and doxycycline for 10 days. Now reports worsening left foot pain and bruising.    I, Kenya Del Cid, am serving as a scribe to document services personally performed by Henry Kuhn D.O., based on my observations and the provider's statements to me.   I, Henry Kuhn D.O., attest that Kenya Del Cid was acting in a scribe capacity, has observed my performance of the services and has documented them in accordance with my direction.    ED Course:  12:54 PM I received the patient report from the ELENA, Leslie Mckeon PA-C. I agree with their assessment and plan of management, and I will see the patient. I then went to meet with the patient to introduce myself, gather additional history, perform my initial exam, and discuss the plan.     Brief Physical Exam:  VITAL SIGNS: /77   Pulse 111   Temp 99.3  F (37.4  C) (Oral)   Resp 18   Ht 1.702 m (5' 7\")   Wt 87.5 kg (193 lb)   SpO2 94%   BMI 30.23 kg/m      General Appearance: Well-appearing, well-nourished, no acute distress ***  Head:  Normocephalic, without obvious abnormality, atraumatic  Eyes:  PERRL, conjunctiva/corneas clear, EOM's intact,  ENT:  Lips, mucosa, and tongue normal, membranes are " moist without pallor  Neck:  Normal ROM, symmetrical, trachea midline    Chest:  No tenderness or deformity, no crepitus  Cardio:  Regular rate and rhythm, no murmur, rub or gallop, 2+ pulses symmetric in all extremities  Pulm:  Clear to auscultation bilaterally, respirations unlabored,   Back:  ROM normal, no CVA tenderness, no spinal tenderness, no paraspinal tenderness  Abdomen:  Soft, non-tender, no rebound or guarding.  Musculoskeletal: Full ROM, no edema, no cyanosis, good ROM of major joints  Integument:  Warm, Dry, No erythema, No rash.    Neurologic:  Alert & oriented.  No focal deficits appreciated.  Ambulatory.  Psychiatric:  Affect normal, Judgment normal, Mood normal.        LABS  No results found for this or any previous visit (from the past 24 hours).      RADIOLOGY  No orders to display        I have independently interpreted the above image, ***. See radiology report for detail.    EKG:    Rate: ***  Rhythm: Normal Sinus Rhythm  Axis: Normal  Interval: Normal  Conduction: Normal  QRS: Normal  ST: Normal  T-wave: Normal  QT: Not prolonged  Comparison EKG: ***  Impression:  No acute ischemic change ***  I have independently reviewed and interpreted today's EKG, pending Cardiologist read       Impression / ED Plan:  I had a face to face encounter with this patient seen by the Advanced Practice Provider (ELENA). I personally made/approved the management plan and take responsibility for the patient management. I personally saw patient and performed a substantive portion of the visit including all aspects of the medical decision making.     Dudley Kiran is a 69 year old male presents to the ED for evaluation of ***.     No diagnosis found.    Henry Kuhn D.O.  Emergency Medicine  [facility] EMERGENCY ROOM  [address] [id number] [phone]  Dept: [phone]

## 2025-06-06 NOTE — ED TRIAGE NOTES
Pt presents to the ED with worsening pain and bruising to left foot. Pt recently had a blood clot that led to him needing fasciotomy and then developed an infection. Pt's home health nurse recommended further evaluation      Triage Assessment (Adult)       Row Name 06/06/25 1213          Triage Assessment    Airway WDL WDL        Respiratory WDL    Respiratory WDL WDL        Skin Circulation/Temperature WDL    Skin Circulation/Temperature WDL X  bruising to left toe        Cardiac WDL    Cardiac WDL WDL        Peripheral/Neurovascular WDL    Peripheral Neurovascular WDL WDL        Cognitive/Neuro/Behavioral WDL    Cognitive/Neuro/Behavioral WDL WDL

## 2025-06-06 NOTE — H&P
[facility]    History and Physical - Hospitalist Service       Date of Admission:  6/6/2025    Assessment & Plan      Dudley Kiran is a 69 year old male admitted on 6/6/2025. He has a history of CAD s/p CABG, VTE on Xarelto, HTN, and recent thromboembolic event leading to acute limb ischemia and emergent fasciotomies, and recent admission for infection of fasciotomies (5/25/25-5/28/25). He was discharged from recent hospitalization with doxycycline and Keflex which he has been taking as prescribed with one day of treatment remaining. He presented to the ED with acute swelling and skin changes of left MTP joint and is admitted for possible septic joint.    Left MTP joint tenderness, swelling, ecchymosis  Leukocytosis  Tachycardia  Sepsis   Tachycardic but afebrile. WBC 16.8 . Recent admission for infection of fasciotomy sites (5/25/25-5/28/25), had been discharged on doxycycline and Keflex which he reports taking as prescribed. ER provider discussed case for podiatrist who recommended MR left foot and admission for IV antibiotics due to concern for septic arthritis.  - Podiatry consulted, appreciate recs  - continue IV Vancomycin and Zosyn  - MR left foot ordered and pending  - Pain control: PRN tylenol, IV dilaudid   - NPO at midnight for podiatry evaluation   - EKG for preoperative optimization     R arm rash  New right forearm macular rash.  No signs of anaphylaxis.  Unclear if this is due to vancomycin administration as patient received it at his prior hospitalizations and tolerated well. Possibly some sort of mild reaction rather than vanc allergy. Will closely monitor for now.   -Benadryl as needed    Chronic medical conditions:     HTN: hold lisinopril given normotension and possible surgery     CAD, hx of bypass: hold ASA; continue metoprolol     Rhinitis: PTA atrovent           Diet: Regular Diet Adult  NPO for Medical/Clinical Reasons Except for: No Exceptions  DVT  Prophylaxis: subcutaneous heparin ONCE tonight; day team to reassess   Capps Catheter: Not present  Fluids: 125 ml's NS   Lines: None     Cardiac Monitoring: None  Code Status: Full Code      Disposition Plan      Expected Discharge Date: 06/07/2025                    Richard Mojica MD  Hospitalist Service  [facility]  Securely message with [facility] (more info)  Text page via [email or web address] Paging/Directory   ______________________________________________________________________    Chief Complaint     History is obtained from the patient    History of Present Illness   Dudley Kiran is a 69 year old male who was recently hospitalized for a wound infection. Had a recent thromboembolism which led to acute limb ischemia and emergent fasciotomies. He was just admitted again on 5/25/25 due to infection of the fasciotomy sites. He was followed by ID and discharged on 5/28/25 with doxycycline and Keflex; he reports taking these antibiotics as prescribed with only one day of the course remaining. Today, he presents with 3 days of left MTP joint which is swollen and purple in color.    On presentation he was initially tachycardic but afebrile. XR of the toe was negative. No area of fluctuance or visual sore on the foot. Has big incisions on the bilateral calves which do not appear infected. WBC 16.8 which is elevated compared to baseline, concerning in the setting of tachycardia and recent infection.  which is elevated from previous though has been persistently elevated. US of the BLE completed, no clot. Ran case by podiatry, Dr. Izquierdo, who advised admission with initiation of Vancomycin and Zosyn. Concern is for septic joint. MRI of the foot is ordered, not yet completed. Even if MRI is negative,  recommended admission regardless based on presentation and labs.      Past Medical History    Past Medical History:   Diagnosis Date    Acute lower limb ischemia 04/14/2025    Arthritis     Class  1 obesity due to excess calories with serious comorbidity and body mass index (BMI) of 32.0 to 32.9 in adult 08/10/2021    Coronary arteriosclerosis due to lipid rich plaque 12/28/2012    Problem list name updated by automated process. Provider to review      Coronary artery disease     ED (erectile dysfunction)     Elevated cholesterol 02/15/2019    Essential hypertension, benign 12/18/2019    History of prediabetes 02/01/2019    HTN (hypertension)     Hypercholesteremia     OA (osteoarthritis) 10/27/2021    Obesity     Prediabetes     Primary osteoarthritis of both hips     S/P CABG (coronary artery bypass graft) 10/04/2023    Status post coronary artery bypass graft 2010       Past Surgical History   Past Surgical History:   Procedure Laterality Date    ARTHROPLASTY, HIP, TOTAL, DIRECT ANTERIOR APPROACH, USING HANA TABLE Left 10/27/2021    Procedure: LEFT TOTAL HIP ARTHROPLASTY DIRECT ANTERIOR APPROACH;  Surgeon: Bon Little MD;  Location: [facility] OR    BYPASS GRAFT ARTERY CORONARY  2010    FASCIOTOMY LOWER EXTREMITY Left 3/27/2025    Procedure: FASCIOTOMY, LEFT LOWER EXTREMITY;  Surgeon: Wanda Coelho DO;  Location: [facility] OR       Prior to Admission Medications   Prior to Admission Medications   Prescriptions Last Dose Informant Patient Reported? Taking?   HYDROmorphone (DILAUDID) 2 MG tablet 6/6/2025 at 10:30 AM  No Yes   Sig: Take 2 tablets (4 mg) by mouth every 6 hours as needed for pain.   acetaminophen (TYLENOL) 325 MG tablet Past Week  Yes Yes   Sig: Take 325-650 mg by mouth every 6 hours as needed for mild pain.   aspirin 81 MG EC tablet 6/6/2025 Morning  Yes Yes   Sig: Take 81 mg by mouth daily.   cephALEXin (KEFLEX) 500 MG capsule 6/6/2025 at  7:45 AM  No Yes   Sig: Take 1 capsule (500 mg) by mouth 4 times daily for 38 doses.   doxycycline hyclate (VIBRAMYCIN) 100 MG capsule 6/6/2025 at  9:00 AM  No Yes   Sig: Take 1 capsule (100 mg) by mouth 2 times daily for 19 doses.    ipratropium (ATROVENT) 0.06 % nasal spray Past Week  Yes Yes   Sig: Spray 2 sprays into both nostrils 2 times daily as needed for rhinitis.   lisinopril (ZESTRIL) 5 MG tablet 6/6/2025 Morning  No Yes   Sig: Take 1 tablet (5 mg) by mouth daily.   metoprolol succinate ER (TOPROL XL) 25 MG 24 hr tablet 6/6/2025 Morning  No Yes   Sig: TAKE 0.5 TABLETS(12.5 MG) BY MOUTH DAILY   ondansetron (ZOFRAN ODT) 4 MG ODT tab Unknown  No Yes   Sig: Take 1 tablet (4 mg) by mouth every 8 hours as needed for nausea.   prochlorperazine (COMPAZINE) 5 MG tablet Unknown  No Yes   Sig: Take 1 tablet (5 mg) by mouth every 6 hours as needed for nausea or vomiting.   rivaroxaban ANTICOAGULANT (XARELTO) 20 MG TABS tablet 6/5/2025 at  5:00 PM  No Yes   Sig: Take 1 tablet (20 mg) by mouth daily (with dinner).   tadalafil (CIALIS) 10 MG tablet Unknown  No Yes   Sig: Take 1 tablet (10 mg) by mouth daily as needed (erectile difficulties).      Facility-Administered Medications: None         Physical Exam   Vital Signs: Temp: 99.3  F (37.4  C) Temp src: Oral BP: 111/68 Pulse: 106   Resp: 18 SpO2: 93 % O2 Device: None (Room air)    Weight: 193 lbs 0 oz    Constitutional: awake, alert, cooperative, no apparent distress, and appears stated age  Hematologic / Lymphatic: no cervical lymphadenopathy  Respiratory: No increased work of breathing, good air exchange, clear to auscultation bilaterally, no crackles or wheezing  Cardiovascular: Normal apical impulse, regular rate and rhythm, normal S1 and S2, no S3 or S4, and no murmur noted  GI: normal   Skin: raised macular R erythematous forearm rash    Musculoskeletal: L leg with wrapped fasciotomies; no drainage; L foot: ecchymosis and pain at L MTP joint. Distal pulses intact.      Neurologic: Awake, alert, oriented to name, place and time.  Sensory is intact.         Data     I have personally reviewed the following data over the past 24 hrs:    16.8 (H)  \   10.8 (L)   / 523 (H)     134 (L) 99 12.8 /   137 (H)   4.1 21 (L) 0.93 \     ALT: 26 AST: 21 AP: 93 TBILI: 0.9   ALB: 3.8 TOT PROTEIN: 8.4 (H) LIPASE: N/A     Procal: 0.13 CRP: 204.00 (H) Lactic Acid: 1.4       INR:  1.37 (H) PTT:  43 (H)   D-dimer:  N/A Fibrinogen:  N/A       Imaging results reviewed over the past 24 hrs:   Recent Results (from the past 24 hours)   Foot XR, G/E 3 views, left    Narrative    EXAM: XR FOOT LEFT G/E 3 VIEWS  LOCATION: [facility]  DATE: 6/6/2025    INDICATION: left 1st MCP swelling and ecchymosis  COMPARISON: None.      Impression    IMPRESSION: Degenerative change first MTP joint. Degenerative change at the IP joint of the great toe. No evidence for acute fracture. Plantar and Achilles calcaneal spurring.   US Lower Extremity Venous Duplex Left    Narrative    EXAM: US LOWER EXTREMITY VENOUS DUPLEX LEFT  LOCATION: [facility]  DATE: 6/6/2025    INDICATION: Hx DVT causing limb ischemia and requiring fasciotomy. left great toe swelling end ecchymosis started yesterday, evaluate for blood clot vs.hematoma  COMPARISON: None.  TECHNIQUE: Venous Duplex ultrasound of the left lower extremity with and without compression, augmentation and duplex. Color flow and spectral Doppler with waveform analysis performed.    FINDINGS: Exam includes the common femoral, femoral, popliteal, and contralateral common femoral veins as well as segmentally visualized deep calf veins and greater saphenous vein.     LEFT: No deep vein thrombosis. No superficial thrombophlebitis. No popliteal cyst. No acute calf veins difficult to evaluate due to wound.      Impression    IMPRESSION:  1.  No deep venous thrombosis in the left lower extremity.

## 2025-06-06 NOTE — ED NOTES
RN notes hives no longer present. No new or worsening sx at this time. However, slight fever noted. Pt heading to MRI shortly.

## 2025-06-06 NOTE — ED NOTES
RN went to pause vancomycin prior to MRI and noticed presence of hives on right forearm locally proximal to IV. MD at bedside. Vancomycin paused and orders to follow. Denies any itchiness, SOB, or oral swelling.

## 2025-06-07 LAB
ANION GAP SERPL CALCULATED.3IONS-SCNC: 10 MMOL/L (ref 7–15)
BUN SERPL-MCNC: 12.5 MG/DL (ref 8–23)
CALCIUM SERPL-MCNC: 8.8 MG/DL (ref 8.8–10.4)
CHLORIDE SERPL-SCNC: 102 MMOL/L (ref 98–107)
CREAT SERPL-MCNC: 1.06 MG/DL (ref 0.67–1.17)
EGFRCR SERPLBLD CKD-EPI 2021: 76 ML/MIN/1.73M2
ERYTHROCYTE [DISTWIDTH] IN BLOOD BY AUTOMATED COUNT: 14 % (ref 10–15)
GLUCOSE SERPL-MCNC: 145 MG/DL (ref 70–99)
HCO3 SERPL-SCNC: 23 MMOL/L (ref 22–29)
HCT VFR BLD AUTO: 31.3 % (ref 40–53)
HGB BLD-MCNC: 10.2 G/DL (ref 13.3–17.7)
MCH RBC QN AUTO: 27.8 PG (ref 26.5–33)
MCHC RBC AUTO-ENTMCNC: 32.6 G/DL (ref 31.5–36.5)
MCV RBC AUTO: 85 FL (ref 78–100)
PLATELET # BLD AUTO: 423 10E3/UL (ref 150–450)
POTASSIUM SERPL-SCNC: 4 MMOL/L (ref 3.4–5.3)
RBC # BLD AUTO: 3.67 10E6/UL (ref 4.4–5.9)
SODIUM SERPL-SCNC: 135 MMOL/L (ref 135–145)
URATE SERPL-MCNC: 4.1 MG/DL (ref 3.4–7)
WBC # BLD AUTO: 12.7 10E3/UL (ref 4–11)

## 2025-06-07 PROCEDURE — 99222 1ST HOSP IP/OBS MODERATE 55: CPT | Performed by: PODIATRIST

## 2025-06-07 PROCEDURE — 258N000003 HC RX IP 258 OP 636

## 2025-06-07 PROCEDURE — 250N000011 HC RX IP 250 OP 636

## 2025-06-07 PROCEDURE — 84550 ASSAY OF BLOOD/URIC ACID: CPT

## 2025-06-07 PROCEDURE — 99223 1ST HOSP IP/OBS HIGH 75: CPT | Mod: GC

## 2025-06-07 PROCEDURE — 250N000013 HC RX MED GY IP 250 OP 250 PS 637

## 2025-06-07 PROCEDURE — 250N000011 HC RX IP 250 OP 636: Performed by: FAMILY MEDICINE

## 2025-06-07 PROCEDURE — 85041 AUTOMATED RBC COUNT: CPT

## 2025-06-07 PROCEDURE — 82435 ASSAY OF BLOOD CHLORIDE: CPT

## 2025-06-07 PROCEDURE — 99207 PR NO BILLABLE SERVICE THIS VISIT: CPT | Performed by: PODIATRIST

## 2025-06-07 PROCEDURE — 36415 COLL VENOUS BLD VENIPUNCTURE: CPT

## 2025-06-07 PROCEDURE — 99222 1ST HOSP IP/OBS MODERATE 55: CPT | Performed by: STUDENT IN AN ORGANIZED HEALTH CARE EDUCATION/TRAINING PROGRAM

## 2025-06-07 PROCEDURE — 120N000001 HC R&B MED SURG/OB

## 2025-06-07 RX ORDER — NALOXONE HYDROCHLORIDE 0.4 MG/ML
0.4 INJECTION, SOLUTION INTRAMUSCULAR; INTRAVENOUS; SUBCUTANEOUS
Status: DISCONTINUED | OUTPATIENT
Start: 2025-06-07 | End: 2025-06-11 | Stop reason: HOSPADM

## 2025-06-07 RX ORDER — NALOXONE HYDROCHLORIDE 0.4 MG/ML
0.2 INJECTION, SOLUTION INTRAMUSCULAR; INTRAVENOUS; SUBCUTANEOUS
Status: DISCONTINUED | OUTPATIENT
Start: 2025-06-07 | End: 2025-06-11 | Stop reason: HOSPADM

## 2025-06-07 RX ORDER — HEPARIN SODIUM 5000 [USP'U]/.5ML
5000 INJECTION, SOLUTION INTRAVENOUS; SUBCUTANEOUS EVERY 8 HOURS
Status: DISCONTINUED | OUTPATIENT
Start: 2025-06-07 | End: 2025-06-11 | Stop reason: HOSPADM

## 2025-06-07 RX ADMIN — PIPERACILLIN AND TAZOBACTAM 3.38 G: 3; .375 INJECTION, POWDER, FOR SOLUTION INTRAVENOUS at 04:27

## 2025-06-07 RX ADMIN — PIPERACILLIN AND TAZOBACTAM 3.38 G: 3; .375 INJECTION, POWDER, FOR SOLUTION INTRAVENOUS at 16:18

## 2025-06-07 RX ADMIN — VANCOMYCIN HYDROCHLORIDE 1000 MG: 1 INJECTION, SOLUTION INTRAVENOUS at 06:09

## 2025-06-07 RX ADMIN — SODIUM CHLORIDE: 0.9 INJECTION, SOLUTION INTRAVENOUS at 08:32

## 2025-06-07 RX ADMIN — METOPROLOL SUCCINATE ER TABLETS 12.5 MG: 25 TABLET, FILM COATED, EXTENDED RELEASE ORAL at 08:20

## 2025-06-07 RX ADMIN — HEPARIN SODIUM 5000 UNITS: 5000 INJECTION, SOLUTION INTRAVENOUS; SUBCUTANEOUS at 16:18

## 2025-06-07 RX ADMIN — VANCOMYCIN HYDROCHLORIDE 1000 MG: 1 INJECTION, SOLUTION INTRAVENOUS at 18:29

## 2025-06-07 RX ADMIN — PIPERACILLIN AND TAZOBACTAM 3.38 G: 3; .375 INJECTION, POWDER, FOR SOLUTION INTRAVENOUS at 22:46

## 2025-06-07 RX ADMIN — PIPERACILLIN AND TAZOBACTAM 3.38 G: 3; .375 INJECTION, POWDER, FOR SOLUTION INTRAVENOUS at 10:39

## 2025-06-07 ASSESSMENT — ACTIVITIES OF DAILY LIVING (ADL)
ADLS_ACUITY_SCORE: 38
ADLS_ACUITY_SCORE: 39
ADLS_ACUITY_SCORE: 38
ADLS_ACUITY_SCORE: 39
ADLS_ACUITY_SCORE: 38
ADLS_ACUITY_SCORE: 39
ADLS_ACUITY_SCORE: 38
ADLS_ACUITY_SCORE: 39
ADLS_ACUITY_SCORE: 38
DEPENDENT_IADLS:: INDEPENDENT
ADLS_ACUITY_SCORE: 39
ADLS_ACUITY_SCORE: 39
ADLS_ACUITY_SCORE: 38
ADLS_ACUITY_SCORE: 39
ADLS_ACUITY_SCORE: 39
ADLS_ACUITY_SCORE: 38
ADLS_ACUITY_SCORE: 38

## 2025-06-07 NOTE — PLAN OF CARE
Goal Outcome Evaluation:  A/O. VSS on RA. Pt states pain is minimal; no meds given overnight. Utilizing urinal at bedside; walker for ambulation. Call light within reach. Able to make needs known. Calls appropriately.    Problem: Adult Inpatient Plan of Care  Goal: Absence of Hospital-Acquired Illness or Injury  Intervention: Prevent Infection  Recent Flowsheet Documentation  Taken 6/7/2025 0100 by Camilla Feldman, RN  Infection Prevention:   cohorting utilized   hand hygiene promoted   personal protective equipment utilized   rest/sleep promoted   single patient room provided     Problem: Adult Inpatient Plan of Care  Goal: Optimal Comfort and Wellbeing  Outcome: Progressing  Intervention: Provide Person-Centered Care  Recent Flowsheet Documentation  Taken 6/6/2025 2100 by Camilla Feldman RN  Trust Relationship/Rapport:   care explained   choices provided   empathic listening provided   questions answered   questions encouraged   thoughts/feelings acknowledged     Problem: Pain Acute  Goal: Optimal Pain Control and Function  Outcome: Progressing  Intervention: Prevent or Manage Pain  Recent Flowsheet Documentation  Taken 6/7/2025 0100 by Camilla Feldman, RN  Medication Review/Management: medications reviewed

## 2025-06-07 NOTE — PLAN OF CARE
Pt still receiving IV antibiotics. Per pt and ED pics pt's left toe seems to be improving less edema and more color coming back. Pulses intact. SBA. Blood cultures pending.  Alissa Banks RN on 6/7/2025 at 1:46 PM   Problem: Infection  Goal: Absence of Infection Signs and Symptoms  Outcome: Progressing     Problem: Pain Acute  Goal: Optimal Pain Control and Function  Outcome: Progressing   Goal Outcome Evaluation:

## 2025-06-07 NOTE — PROGRESS NOTES
[facility]    Progress Note - Hospitalist Service       Date of Admission:  6/6/2025    Assessment & Plan   Dudley Kiran is a 69 year old male admitted on 6/6/2025. He has a history of CAD s/p CABG, VTE on Xarelto, HTN and recent thromboembolism resulting in acute limb ischemia and fasciotomies and is admitted for MTP joint pain and inflammation concerning for septic arthritis.     L MTP Septic arthritis  L MTP acute osteoarthritis  Sepsis 2/2 to above  History L fasciotomies c/b cellulitis  Presented with increasing pain and decreased ROM of the L 1st MTP following PT. Home WO nurse suggested further evaluation and Lobo was meeting sepsis criteria in the ED with tachycardia and leukocytosis, elevated CRP to 204. WBC has been down trending thus far. MRI of the L foot showed 1st MTP arthropathy with effusion, enhancing synovitis, and soft tissue edema consistent with septic arthritis. These findings were considered potentially consistent with gout, however uric acid WNL. Also some concern for osteomyelitis of the same 1st MTP on imaging. He was started on IV antibiotics and ID was consulted given his recent hospitalizations and infection and patient's desire to avoid another procedure if at all possible.     - Podiatry consulted, appreciate recommendations   - Continue IV Vancomycin and Zosyn  - If no improvement or worsening consider IR joint aspiration for cultures  - Infectious Disease consult, appreciate recommendations  - Pain Control:   - PRN Tylenol 650 mg q4h PRN for mild pain  - PRN oxycodone 2.5-5 mg q4h PRN for moderate to severe pain  - PRN IV dilaudid 0.2-0.4 mg q2h PRN for severe breakthrough pain  - AM CBC  - PT/OT/SW  - Cardiac Diet  - Discontinue IV fluids  - Heparin subcutaneous for anticoagulation given potential for procedure in next 24 h     R arm rash, improving  Forearm rash near IV while administering vancomycin on 6/6/25, not indicative of allergy but likely  "mild infusion reaction that resolved with benadryl.     - Continue to monitor with ongoing IV antibiotic infusions  - Benadryl PRN    Chronic Conditions  HTN: hold lisinopril  CAD s/p CABG: hold ASA, continue metoprolol  Rhinitis: PTA Atrovent           Diet: Low Saturated Fat Na <2400 mg    DVT Prophylaxis: Heparin SQ  Capps Catheter: Not present  Fluids: PO   Lines: None     Cardiac Monitoring: None  Code Status: Full Code      Clinically Significant Risk Factors Present on Admission         # Hyponatremia: Lowest Na = 134 mmol/L in last 2 days, will monitor as appropriate          # Drug Induced Coagulation Defect: home medication list includes an anticoagulant medication  # Drug Induced Platelet Defect: home medication list includes an antiplatelet medication   # Hypertension: Noted on problem list      # Anemia: based on hgb <11       # Obesity: Estimated body mass index is 30.23 kg/m  as calculated from the following:    Height as of this encounter: 1.702 m (5' 7\").    Weight as of this encounter: 87.5 kg (193 lb).         # Financial/Environmental Concerns:     # History of CABG: noted on surgical history       Social Drivers of Health   Physical Activity: Insufficiently Active (3/17/2025)    Exercise Vital Sign     Days of Exercise per Week: 1 day     Minutes of Exercise per Session: 10 min   Social Connections: Unknown (3/17/2025)    Social Connection and Isolation Panel [NHANES]     Frequency of Social Gatherings with Friends and Family: Once a week         Disposition Plan     Medically Ready for Discharge: Anticipated in 2-4 Days       The patient's care was discussed with the Attending Physician, Dr. Ramos Sharma.    Tosha Jacinto MD  Hospitalist Service  [facility]  Securely message with [employer] (more info)  Text page via [employer] Paging/Directory   ______________________________________________________________________    Interval History   NAEO, pain improving compared to " yesterday and has slightly more ROM of the L 1st toe. Is feeling down because of this most recent setback in what has been a long few months, but hopeful that this most recent infection could be managed with antibiotics.     Physical Exam   Vital Signs: Temp: 99  F (37.2  C) Temp src: Oral BP: 118/68 Pulse: 102   Resp: 18 SpO2: 93 % O2 Device: None (Room air)    Weight: 193 lbs 0 oz    Physical Exam  Constitutional:       General: He is not in acute distress.  Cardiovascular:      Rate and Rhythm: Normal rate and regular rhythm.      Pulses: Normal pulses.      Heart sounds: Normal heart sounds.   Pulmonary:      Effort: Pulmonary effort is normal.      Breath sounds: Normal breath sounds.   Abdominal:      General: Abdomen is flat.      Palpations: Abdomen is soft.   Musculoskeletal:      Right foot: Normal range of motion. No tenderness. Normal pulse.      Left foot: Decreased range of motion. Swelling and tenderness present. No crepitus. Normal pulse.   Skin:     General: Skin is warm and dry.      Capillary Refill: Capillary refill takes less than 2 seconds.      Findings: Bruising and wound present.      Comments: L 1st MTP bruising, LLE fasciotomy site dressing C/D/I   Neurological:      Mental Status: He is alert.       Data     I have personally reviewed the following data over the past 24 hrs:    12.7 (H)  \   10.2 (L)   / 423     135 102 12.5 /  145 (H)   4.0 23 1.06 \     ALT: 26 AST: 21 AP: 93 TBILI: 0.9   ALB: 3.8 TOT PROTEIN: 8.4 (H) LIPASE: N/A     Procal: 0.13 CRP: 204.00 (H) Lactic Acid: 1.4       INR:  1.37 (H) PTT:  43 (H)   D-dimer:  N/A Fibrinogen:  N/A       Imaging results reviewed over the past 24 hrs:   Recent Results (from the past 24 hours)   Foot XR, G/E 3 views, left    Narrative    EXAM: XR FOOT LEFT G/E 3 VIEWS  LOCATION: [facility]  DATE: 6/6/2025    INDICATION: left 1st MCP swelling and ecchymosis  COMPARISON: None.      Impression    IMPRESSION:  Degenerative change first MTP joint. Degenerative change at the IP joint of the great toe. No evidence for acute fracture. Plantar and Achilles calcaneal spurring.   US Lower Extremity Venous Duplex Left    Narrative    EXAM: US LOWER EXTREMITY VENOUS DUPLEX LEFT  LOCATION: [facility]  DATE: 6/6/2025    INDICATION: Hx DVT causing limb ischemia and requiring fasciotomy. left great toe swelling end ecchymosis started yesterday, evaluate for blood clot vs.hematoma  COMPARISON: None.  TECHNIQUE: Venous Duplex ultrasound of the left lower extremity with and without compression, augmentation and duplex. Color flow and spectral Doppler with waveform analysis performed.    FINDINGS: Exam includes the common femoral, femoral, popliteal, and contralateral common femoral veins as well as segmentally visualized deep calf veins and greater saphenous vein.     LEFT: No deep vein thrombosis. No superficial thrombophlebitis. No popliteal cyst. No acute calf veins difficult to evaluate due to wound.      Impression    IMPRESSION:  1.  No deep venous thrombosis in the left lower extremity.   MR Foot Left w/o & w Contrast    Narrative    EXAM: MR FOOT LEFT W/O and W CONTRAST  LOCATION: [facility]  DATE: 6/6/2025    INDICATION: Sepsis, first MTP joint pain, swelling, and ecchymosis. Septic arthritis concern.  COMPARISON: Radiographs, same date.  TECHNIQUE: Routine. Additional postgadolinium T1 sequences were obtained.  IV CONTRAST: 8.75mL Gadavist    FINDINGS:     Acute monoarthritis at the first MTP joint, including joint effusion, enhancing synovitis, or significant periarticular soft tissue edema. There is also abnormal bone marrow signal in the base of the proximal phalanx and the head of the first metatarsal.   Joint alignment is normal. No definite fractures identified. Severe degenerative arthritis at the first MTP joint, also in the IP joint of the first toe.    No acute  arthropathy in the other joints in the midfoot or forefoot. No joint effusions. Normal joint alignment.    Normal bones and bone marrow elsewhere.    Flexor and extensor tendons are intact without tearing or significant tendinopathy. No tenosynovitis in the flexor or extensor hallucis tendon sheaths.    Moderate soft tissue edema and enhancement throughout the base of the first toe. No devitalized/nonenhancing phlegmon or abscess. No sinus tract.    No discrete full-thickness ulcer identified in the first toe, clinical correlation recommended.    Mild soft tissue edema and enhancement within the muscles around the first MTP joint and metatarsal, likely reactive inflammation to the first MTP joint inflammation. No intramuscular fluid collection or myonecrosis. No significant muscle atrophy.    The Lisfranc joint and the plantar fascial are intact.      Impression    IMPRESSION:  1.  Acute arthropathy at the first MTP joint, including joint effusion, enhancing synovitis, and significant periarticular soft tissue edema and inflammation. Findings could be consistent with septic arthritis in the appropriate clinical context.    2.  Is noted that the imaging findings are nonspecific, and other causes of Monro arthropathy could have a similar appearance. This is not classic appearance for gout, but crystal deposition arthropathy would not be entirely excluded. Similarly,   traumatic injury to the MTP joint could appear similar.    3.  Bone marrow signal abnormality in the base of the proximal phalanx and the head of the metatarsal. Given the concern for septic arthritis, these are concerning for areas of osteomyelitis. However, the appearance is not highly specific and trauma   might appear similar.    4.  Tendons and ligaments are intact. No evidence of structural derangement. No tenosynovitis.    5.  Soft tissue edema and enhancement in the great toe is nonspecific but could represent cellulitis in the appropriate  clinical context. No devitalized phlegmon or abscess.

## 2025-06-07 NOTE — PROVIDER NOTIFICATION
1811: RN paged Dr. German after receiving a phone call from CT technician - need clarity regarding urgency of exam and if Dr. German connected with the radiology MD.    1813:  RN spoke with Dr. German on the phone.  Plan for RN to connect with radiology again and help connect Dr. German to radiology MD.      1825:  RN got phone number to place page with [facility] for Dr. German - page placed.

## 2025-06-07 NOTE — CONSULTS
Consultation - INFECTIOUS DISEASE CONSULTATION  Dudley Kiran,  1955, MRN [id number]      Great toe pain, left [M79.675]    PCP: Tima Davis, [phone]   Code status:  Full Code               Chief Complaint: Proximal left first MTP septic arthritis.     Assessment:  Dudley Kiran is a 69 year old old male with   History of thromboembolic event leading to acute ischemic limb  History of compartment syndrome of the left leg.  Status post fasciotomy.  Recent diagnosis of cellulitis associated with wound infection.  Cultures with MRSA and Streptococcus dysgalactiae, and Corynebacterium striatum.  Was treated with IV antibiotic and discharged on oral Keflex plus oral doxycycline for 10 days on 2025  Left first MTP inflammatory arthropathy.  Does not look typical for septic arthritis.  Has a history of pain in both first MTPs in the past although last episode was about 5 years ago.  This is worrisome for gout although infection cannot be totally excluded.      Recommendations:   Continue on current antibiotic.  Aspirate left first MTP joint for cell count, Gram stain with culture, and crystal analysis.  If has septic arthritis, will need I&D.  If crystal is positive, no surgery will be indicated.  Continue wound care on the lateral aspect of the left leg.    Discussed with the patient, nursing staff.    Thank you for letting us be part of the patient care team. We will follow.     Maria Elena German MD,MD  [facility].   [facility]  Office Telephone [phone].  Fax [phone]  [facility] paging    HPI:    Dudley Kiran is a 69 year old old male. History is provided by patient, chart review.  ID is asked to see this patient for possible left first MTP septic arthritis.  Patient has a history of compartment syndrome following fasciotomy for escaping left lower extremity.  This was recently complicated with cellulitis requiring  hospitalization.  He was treated with IV antibiotic and discharged on p.o. doxycycline and p.o. Keflex based on cultures as detailed above.  He is now admitted with left first toe pain and swelling.  Reports having had similar episode of pain at the same location a few times in the past and on the right side although the last episode was over 5 years ago.  Uric acid is noted to be normal.        ===========================================    Medical History  Past Medical History:   Diagnosis Date    Acute lower limb ischemia 04/14/2025    Arthritis     Class 1 obesity due to excess calories with serious comorbidity and body mass index (BMI) of 32.0 to 32.9 in adult 08/10/2021    Coronary arteriosclerosis due to lipid rich plaque 12/28/2012    Problem list name updated by automated process. Provider to review      Coronary artery disease     ED (erectile dysfunction)     Elevated cholesterol 02/15/2019    Essential hypertension, benign 12/18/2019    History of prediabetes 02/01/2019    HTN (hypertension)     Hypercholesteremia     OA (osteoarthritis) 10/27/2021    Obesity     Prediabetes     Primary osteoarthritis of both hips     S/P CABG (coronary artery bypass graft) 10/04/2023    Status post coronary artery bypass graft 2010        Surgical History  Past Surgical History:   Procedure Laterality Date    ARTHROPLASTY, HIP, TOTAL, DIRECT ANTERIOR APPROACH, USING HANA TABLE Left 10/27/2021    Procedure: LEFT TOTAL HIP ARTHROPLASTY DIRECT ANTERIOR APPROACH;  Surgeon: Bon Little MD;  Location: [facility] OR    BYPASS GRAFT ARTERY CORONARY  2010    FASCIOTOMY LOWER EXTREMITY Left 3/27/2025    Procedure: FASCIOTOMY, LEFT LOWER EXTREMITY;  Surgeon: Wanda Coelho DO;  Location: [facility] OR            Social History  Reviewed, and he  reports that he has never smoked. He has never used smokeless tobacco. He reports that he does not drink alcohol and does not use drugs.        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       Allergies   Allergen Reactions    Codeine Nausea    No Known Allergies      Potentially cats        Medications Prior to Admission   Medication Sig Dispense Refill Last Dose/Taking    acetaminophen (TYLENOL) 325 MG tablet Take 325-650 mg by mouth every 6 hours as needed for mild pain.   Past Week    aspirin 81 MG EC tablet Take 81 mg by mouth daily.   6/6/2025 Morning    cephALEXin (KEFLEX) 500 MG capsule Take 1 capsule (500 mg) by mouth 4 times daily for 38 doses. 38 capsule 0 6/6/2025 at  7:45 AM    doxycycline hyclate (VIBRAMYCIN) 100 MG capsule Take 1 capsule (100 mg) by mouth 2 times daily for 19 doses. 19 capsule 0 6/6/2025 at  9:00 AM    HYDROmorphone (DILAUDID) 2 MG tablet Take 2 tablets (4 mg) by mouth every 6 hours as needed for pain. 10 tablet 0 6/6/2025 at 10:30 AM    ipratropium (ATROVENT) 0.06 % nasal spray Spray 2 sprays into both nostrils 2 times daily as needed for rhinitis.   Past Week    lisinopril (ZESTRIL) 5 MG tablet Take 1 tablet (5 mg) by mouth daily. 90 tablet 3 6/6/2025 Morning    metoprolol succinate ER (TOPROL XL) 25 MG 24 hr tablet TAKE 0.5 TABLETS(12.5 MG) BY MOUTH DAILY 45 tablet 3 6/6/2025 Morning    ondansetron (ZOFRAN ODT) 4 MG ODT tab Take 1 tablet (4 mg) by mouth every 8 hours as needed for nausea. 20 tablet 0 Unknown    prochlorperazine (COMPAZINE) 5 MG tablet  Take 1 tablet (5 mg) by mouth every 6 hours as needed for nausea or vomiting.   Unknown    rivaroxaban ANTICOAGULANT (XARELTO) 20 MG TABS tablet Take 1 tablet (20 mg) by mouth daily (with dinner).   6/5/2025 at  5:00 PM    tadalafil (CIALIS) 10 MG tablet Take 1 tablet (10 mg) by mouth daily as needed (erectile difficulties). 90 tablet 2 Unknown        Review of Systems:  Negative except for findings in the HPI Physical Exam:  Temp:  [98.5  F (36.9  C)-100.7  F (38.2  C)] 99  F (37.2  C)  Pulse:  [] 102  Resp:  [16-18] 18  BP: (111-130)/(67-78) 118/68  SpO2:  [93 %-97 %] 93 %    Gen: Pleasant in no acute distress.  HEENT: NCAT. EOMI. PERRL.  Neck: No bruit, JVD or thyromegaly.  Lungs: Clear to ascultation bilat with no crackles or wheezes.  Card: RRR. NSR. No RMG. Peripheral pulses present and symmetric. No edema.  Abd: Soft NT ND. No mass. Normal bowel sounds.  Skin: No rash.  Extr: Left first MTP swelling and tenderness but not overtly red.See pictures below.   Neuro: Alert and oriented to place time and person.   Media Information    Document Information    Other: Photograph   Left toe   06/06/2025 6:08 PM   Attached To:   Hospital Encounter on 6/6/25   Source Information    Richard Mojica MD  [facility]       Media Information      Media Information         ============================    Pertinent Labs  personally reviewed.   Recent Labs   Lab 06/07/25  0613 06/06/25  1328   WBC 12.7* 16.8*   HGB 10.2* 10.8*   HCT 31.3* 33.5*    523*       Recent Labs   Lab 06/07/25  0613 06/06/25  1328    134*   CO2 23 21*   BUN 12.5 12.8   ALBUMIN  --  3.8   ALKPHOS  --  93   ALT  --  26   AST  --  21       MICROBIOLOGY DATA:  Personally reviewed   Contains abnormal data Wound Aerobic Bacterial Culture Routine With Gram Stain  Order: [id number]   Collected 5/25/2025 12:30 PM       Status: Final result    Test Result Released: Yes (not seen)    Specimen Information: Leg, Below Knee, Left; Wound   0  Result Notes  Culture 4+ Streptococcus dysgalactiae (Group C/G Streptococcus) Abnormal    This organism is susceptible to ampicillin, penicillin, vancomycin and the cephalosporins. If treatment is required and your patient is allergic to penicillin, contact the microbiology lab within 5 days to request susceptibility testing.   4+ Corynebacterium striatum Abnormal    Susceptibilities not routinely done, refer to antibiogram to view typical susceptibility profiles   3+ Staphylococcus aureus MRSA Abnormal             Gram Stain Result  Abnormal   2+ Gram positive cocci   No white blood cells seen           Resulting Agency: [facility]     Susceptibility     Staphylococcus aureus MRSA     JEANNE     Ciprofloxacin <=0.5 ug/mL Susceptible *     Clindamycin 0.25 ug/mL Susceptible     Daptomycin 0.25 ug/mL Susceptible     Doxycycline <=0.5 ug/mL Susceptible     Erythromycin <=0.25 ug/mL Susceptible     Gentamicin <=0.5 ug/mL Susceptible     Inducible macrolide resistance test Negative ug/mL Negative *     Levofloxacin 0.25 ug/mL Susceptible *     Linezolid 2 ug/mL Susceptible     Nitrofurantoin <=16 ug/mL Susceptible *     Oxacillin >=4 ug/mL Resistant 1     Rifampin <=0.5 ug/mL Susceptible *     Tetracycline <=1 ug/mL Susceptible     Tigecycline <=0.12 ug/mL Susceptible *     Trimethoprim/Sulfamethoxazole <=0.5/9.5 u... Susceptible     Vancomycin <=0.5 ug/mL Susceptible              * Suppressed Antibiotic   1 Oxacillin susceptible isolates are susceptible to cephalosporins (example: cefazolin and cephalexin) and beta lactam combination agents. Oxacillin resistant isolates are resistant to these agents.       Susceptibility Comments    Staphylococcus aureus MRSA   MRSA requires contact precautions.        Specimen Collected: 05/25/25 12:30 PM Last Resulted: 05/27/25  9:42 PM       Pertinent Radiology  personally reviewed.

## 2025-06-07 NOTE — CONSULTS
Consultation - Foot and Ankle/Podiatry  Dudley Kiran,  1955, MRN [id number]    Admitting Dx: Great toe pain, left [M79.675]    PCP: Tima Davis, [phone]   Code status:  Full Code       Extended Emergency Contact Information  Primary Emergency Contact: Dudley Kiran PEDRO  Mobile Phone: [phone]  Relation: Son  Secondary Emergency Contact: Shawnee Morrison  Mobile Phone: [phone]  Relation: Sister       ASSESSMENT   Septic arthritis first MPJ left foot  Acute osteomyelitis first MPJ left foot  Active Problems:    Great toe pain, left       PLAN   - I discussed with the patient that on exam today he does have pain to palpation along the first MPJ on the left foot.  No open lesions or obvious erythema although there is localized edema to the first MPJ which may be indicative of septic arthritis.  WBC 12.7, trending down.  Afebrile.    MRI left foot:1.  Acute arthropathy at the first MTP joint, including joint effusion, enhancing synovitis, and significant periarticular soft tissue edema and inflammation. Findings could be consistent with septic arthritis in the appropriate clinical context.     2.  Is noted that the imaging findings are nonspecific, and other causes of Monro arthropathy could have a similar appearance. This is not classic appearance for gout, but crystal deposition arthropathy would not be entirely excluded. Similarly,   traumatic injury to the MTP joint could appear similar.     3.  Bone marrow signal abnormality in the base of the proximal phalanx and the head of the metatarsal. Given the concern for septic arthritis, these are concerning for areas of osteomyelitis. However, the appearance is not highly specific and trauma   might appear similar.     4.  Tendons and ligaments are intact. No evidence of structural derangement. No tenosynovitis.     5.  Soft tissue edema and enhancement in the great toe is nonspecific but could represent cellulitis in the appropriate  clinical context. No devitalized phlegmon or abscess.    - I reviewed the above MRI report with the patient which indicates nonspecific edema within the first MPJ which is concerning for septic arthritis and also for possible acute osteomyelitis of the proximal phalanx of the hallux and first metatarsal head.    -I discussed treatment options with the patient today including surgical I&D with arthrotomy of the first MPJ versus needle aspiration with IR to investigate for bacterial infection within the first MPJ versus continued antibiotics.  After discussion patient like to proceed with nonsurgical options at this time and I would like to continue to monitor with antibiotics over the next 24 hours.  We reviewed that if symptoms do not greatly improve we will likely proceed with needle aspiration.  All questions invited and answered.    -Medical management per hospitalist.  Patient okay to eat at this time.  Will obtain ID consult.  Continue Zosyn/Vanco pending ID input.  I will plan to follow-up with the patient tomorrow.    Thank you for the consultation. I will follow.     Mau Izquierdo DPM  [facility] Podiatry/Foot & Ankle Surgery  On-Call: [phone]  ______________________________________________________________________        Reason For Consult: Septic Arthritis 1st MPJ left foot  Acute Osteomyelitis 1st MPJ left foot     HPI    I have been requested by the hospitalist service to evaluate Dudley Kiran who is a 69 year old year old male for the above. The patient was admitted to [facility] for a left foot infection.  Patient reports that he recently noticed increasing pain and swelling in the first MPJ on the left foot.  Denies known injury.  Patient reports that after receiving antibiotics overnight he has noticed decreased pain and improved symptoms.  Patient recently underwent emergent fasciotomy of the left lower extremity.  He has been seen and evaluated in the recent past by  vascular surgery.  Past medical history significant for coronary artery disease.  MRI of the left foot has been completed during current hospitalization.  Patient seen today with medicine service.         Medical History  Past Medical History:   Diagnosis Date    Acute lower limb ischemia 04/14/2025    Arthritis     Class 1 obesity due to excess calories with serious comorbidity and body mass index (BMI) of 32.0 to 32.9 in adult 08/10/2021    Coronary arteriosclerosis due to lipid rich plaque 12/28/2012    Problem list name updated by automated process. Provider to review      Coronary artery disease     ED (erectile dysfunction)     Elevated cholesterol 02/15/2019    Essential hypertension, benign 12/18/2019    History of prediabetes 02/01/2019    HTN (hypertension)     Hypercholesteremia     OA (osteoarthritis) 10/27/2021    Obesity     Prediabetes     Primary osteoarthritis of both hips     S/P CABG (coronary artery bypass graft) 10/04/2023    Status post coronary artery bypass graft 2010     Surgical History  He  has a past surgical history that includes Bypass graft artery coronary (2010); Arthroplasty, Hip, Total, Direct Anterior Approach, Using [place] Table (Left, 10/27/2021); and Fasciotomy lower extremity (Left, 3/27/2025).   Social History  Reviewed, and he  reports that he has never smoked. He has never used smokeless tobacco. He reports that he does not drink alcohol and does not use drugs.   Allergies  Allergies   Allergen Reactions    Codeine Nausea    No Known Allergies      Potentially cats    Family History  family history is not on file.  family history not pertinent to presenting problem.    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       Prior to Admission Medications   Medications Prior to Admission   Medication Sig Dispense Refill Last Dose/Taking    acetaminophen (TYLENOL) 325 MG tablet Take 325-650 mg by mouth every 6 hours as needed for mild pain.   Past Week    aspirin 81 MG EC tablet Take 81 mg by mouth daily.   6/6/2025 Morning    cephALEXin (KEFLEX) 500 MG capsule Take 1 capsule (500 mg) by mouth 4 times daily for 38 doses. 38 capsule 0 6/6/2025 at  7:45 AM    doxycycline hyclate (VIBRAMYCIN) 100 MG capsule Take 1 capsule (100 mg) by mouth 2 times daily for 19 doses. 19 capsule 0 6/6/2025 at  9:00 AM    HYDROmorphone (DILAUDID) 2 MG tablet Take 2 tablets (4 mg) by mouth every 6 hours as needed for pain. 10 tablet 0 6/6/2025 at 10:30 AM    ipratropium (ATROVENT) 0.06 % nasal spray Spray 2 sprays into both nostrils 2 times daily as needed for rhinitis.   Past Week    lisinopril (ZESTRIL) 5 MG tablet Take 1 tablet (5 mg) by mouth daily. 90 tablet 3 6/6/2025 Morning    metoprolol succinate ER (TOPROL XL) 25 MG 24 hr tablet TAKE 0.5 TABLETS(12.5 MG) BY MOUTH DAILY 45 tablet 3 6/6/2025 Morning    ondansetron (ZOFRAN ODT) 4 MG ODT tab Take 1 tablet (4 mg) by mouth every 8 hours as needed for nausea. 20 tablet 0 Unknown    prochlorperazine (COMPAZINE) 5 MG tablet Take 1 tablet (5 mg) by mouth every 6 hours as needed for nausea or vomiting.   Unknown    rivaroxaban ANTICOAGULANT (XARELTO) 20 MG TABS tablet Take 1 tablet (20 mg) by mouth daily (with dinner).   6/5/2025 at  5:00 PM    tadalafil (CIALIS) 10 MG tablet Take 1 tablet (10 mg) by mouth daily as needed (erectile difficulties). 90 tablet 2 Unknown          Imaging: reviewed images          Review of Systems:  All other systems negative in detail except what is noted in HPI.  Physical Exam:  Temp:  [98.5  F (36.9  C)-100.7  F (38.2  C)] 99  F (37.2  C)  Pulse:  [] 102  Resp:  [16-18] 18  BP: (111-131)/(67-78)  "118/68  SpO2:  [93 %-97 %] 93 %    General appearance: Patient is alert and fully cooperative with history & exam.  No sign of distress is noted during the visit.  Psychiatric: Affect is pleasant & appropriate.  Patient appears motivated to improve health.  Respiratory: Breathing is regular & unlabored while sitting.  HEENT: Hearing is intact to spoken word.  Speech is clear.  No gross evidence of visual impairment that would impact ambulation.    Vascular: Dorsalis pedis nonpalpable left foot.  Diffuse edema first MPJ left foot.  Dermatologic: Ecchymosis type discoloration along first MPJ left foot.  No open lesions or significant erythema identified left lower extremity.  Neurologic: All epicritic and proprioceptive sensations are grossly intact left.  Musculoskeletal: Pain to palpation first MPJ left foot.     /68 (BP Location: Left arm, Patient Position: Semi-Mark's, Cuff Size: Adult Regular)   Pulse 102   Temp 99  F (37.2  C) (Oral)   Resp 18   Ht 1.702 m (5' 7\")   Wt 87.5 kg (193 lb)   SpO2 93%   BMI 30.23 kg/m      BMI= Body mass index is 30.23 kg/m .    Pertinent Labs    [unfilled]       Recent Labs   Lab 06/07/25  0613 06/06/25  1328    134*   CO2 23 21*   BUN 12.5 12.8       Lab Results   Component Value Date    ALT 26 06/06/2025    AST 21 06/06/2025    ALKPHOS 93 06/06/2025        No results found for: \"CRP\"   [unfilled]     Pertinent Radiology  Radiology Results: Reviewed  MR Foot Left w/o & w Contrast  Result Date: 6/6/2025  EXAM: MR FOOT LEFT W/O and W CONTRAST LOCATION: [facility] DATE: 6/6/2025 INDICATION: Sepsis, first MTP joint pain, swelling, and ecchymosis. Septic arthritis concern. COMPARISON: Radiographs, same date. TECHNIQUE: Routine. Additional postgadolinium T1 sequences were obtained. IV CONTRAST: 8.75mL Gadavist FINDINGS: Acute monoarthritis at the first MTP joint, including joint effusion, enhancing synovitis, or significant periarticular soft " tissue edema. There is also abnormal bone marrow signal in the base of the proximal phalanx and the head of the first metatarsal.  Joint alignment is normal. No definite fractures identified. Severe degenerative arthritis at the first MTP joint, also in the IP joint of the first toe. No acute arthropathy in the other joints in the midfoot or forefoot. No joint effusions. Normal joint alignment. Normal bones and bone marrow elsewhere. Flexor and extensor tendons are intact without tearing or significant tendinopathy. No tenosynovitis in the flexor or extensor hallucis tendon sheaths. Moderate soft tissue edema and enhancement throughout the base of the first toe. No devitalized/nonenhancing phlegmon or abscess. No sinus tract. No discrete full-thickness ulcer identified in the first toe, clinical correlation recommended. Mild soft tissue edema and enhancement within the muscles around the first MTP joint and metatarsal, likely reactive inflammation to the first MTP joint inflammation. No intramuscular fluid collection or myonecrosis. No significant muscle atrophy. The Lisfranc joint and the plantar fascial are intact.     IMPRESSION: 1.  Acute arthropathy at the first MTP joint, including joint effusion, enhancing synovitis, and significant periarticular soft tissue edema and inflammation. Findings could be consistent with septic arthritis in the appropriate clinical context. 2.  Is noted that the imaging findings are nonspecific, and other causes of Monro arthropathy could have a similar appearance. This is not classic appearance for gout, but crystal deposition arthropathy would not be entirely excluded. Similarly, traumatic injury to the MTP joint could appear similar. 3.  Bone marrow signal abnormality in the base of the proximal phalanx and the head of the metatarsal. Given the concern for septic arthritis, these are concerning for areas of osteomyelitis. However, the appearance is not highly specific and trauma  might appear similar. 4.  Tendons and ligaments are intact. No evidence of structural derangement. No tenosynovitis. 5.  Soft tissue edema and enhancement in the great toe is nonspecific but could represent cellulitis in the appropriate clinical context. No devitalized phlegmon or abscess.    US Lower Extremity Venous Duplex Left  Result Date: 6/6/2025  EXAM: US LOWER EXTREMITY VENOUS DUPLEX LEFT LOCATION: [facility] DATE: 6/6/2025 INDICATION: Hx DVT causing limb ischemia and requiring fasciotomy. left great toe swelling end ecchymosis started yesterday, evaluate for blood clot vs.hematoma COMPARISON: None. TECHNIQUE: Venous Duplex ultrasound of the left lower extremity with and without compression, augmentation and duplex. Color flow and spectral Doppler with waveform analysis performed. FINDINGS: Exam includes the common femoral, femoral, popliteal, and contralateral common femoral veins as well as segmentally visualized deep calf veins and greater saphenous vein. LEFT: No deep vein thrombosis. No superficial thrombophlebitis. No popliteal cyst. No acute calf veins difficult to evaluate due to wound.     IMPRESSION: 1.  No deep venous thrombosis in the left lower extremity.    Foot XR, G/E 3 views, left  Result Date: 6/6/2025  EXAM: XR FOOT LEFT G/E 3 VIEWS LOCATION: [facility] DATE: 6/6/2025 INDICATION: left 1st MCP swelling and ecchymosis COMPARISON: None.     IMPRESSION: Degenerative change first MTP joint. Degenerative change at the IP joint of the great toe. No evidence for acute fracture. Plantar and Achilles calcaneal spurring.    CTA Chest Abdomen Pelvis Runoff w Contrast  Result Date: 5/25/2025  EXAM: CTA CHEST/ABDOMEN/PELVIS RUNOFF WITH CONTRAST LOCATION: [facility] DATE: 05/25/2025 INDICATION: Nausea. History of significant atherosclerosis with arterial thromboembolism to leg. COMPARISON: 03/26/2025. TECHNIQUE: Helical  acquisition through the chest, abdomen, pelvis, and bilateral lower extremities was performed during the arterial phase of contrast enhancement. Second phase arterial imaging was performed through the bilateral lower extremities. 2D and 3D reconstructions performed by the CT technologist. Dose reduction techniques were used. CONTRAST: 90 mL Isovue 370. FINDINGS: AORTA: No intramural hematoma. Moderate atherosclerotic vascular calcifications. No aortic dissection. 2.5 cm aneurysmal dilation of the infrarenal abdominal aorta with atherosclerotic plaque and 2.3 cm aneurysmal dilation of the right common iliac artery. These findings are unchanged. Patent celiac, superior mesenteric, and renal arteries. Inferior mesenteric artery not identified. Patent common, internal, and external iliac arteries. RIGHT LEG: Patent right common femoral, superficial femoral, and deep femoral artery with a patent popliteal artery with three-vessel runoff. On delayed images, there is attenuated enhancement of the distal anterior and peroneal arteries with possible occlusion of both at the level of the foot. Consider correlation with ultrasound. Nonspecific asymmetric venous return of the right leg compared to the left. LEFT LEG: Patent left common femoral, superficial femoral, deep femoral, and popliteal arteries with three-vessel runoff and arterial patency through the level of the leg. NONVASCULAR FINDINGS: MEDIASTINUM: Status post median sternotomy. CABG. Normal size heart. No pericardial effusion. No lymphadenopathy. LUNGS/PLEURA: Normal ABDOMEN: Punctate nonobstructing bilateral renal stones. Punctate dependent stone seen in the left bladder, not previously seen, query recent stone passage. Fatty liver. Normal gallbladder, pancreas, spleen, and adrenals. No lymphadenopathy. PELVIS: Normal appendix. Normal bowel. No free air or fluid. No lymphadenopathy. Normal prostate and seminal vesicles. MUSCULOSKELETAL: Status post left total hip  arthroplasty. No acute osseous abnormality.     IMPRESSION: 1.  No pulmonary embolus, or aortic dissection. 2.  Attenuated enhancement of the distal right anterior tibial and peroneal arteries, consider correlation with ultrasound to ensure patency, particularly of vasculature of the right foot. Consider correlation with ultrasound to evaluate for patency. 3.  Stable aneurysmal dilation of the right infrarenal abdominal aorta and common iliac artery with atherosclerotic vascular disease and coronary artery disease. 4.  2 mm left dependent bladder stone with bilateral nonobstructing renal stones, correlate for recent stone passage. 5.  Fatty liver.

## 2025-06-07 NOTE — PLAN OF CARE
Problem: Pain Acute  Goal: Optimal Pain Control and Function  6/7/2025 1116 by Diann Spear, RN  Outcome: Progressing  6/7/2025 1115 by Diann Spear RN  Outcome: Progressing  Intervention: Optimize Psychosocial Wellbeing  Flowsheets (Taken 6/7/2025 1116)  Supportive Measures: active listening utilized  Intervention: Develop Pain Management Plan  Flowsheets  Taken 6/7/2025 1116  Pain Management Interventions: medication offered but refused  Taken 6/7/2025 0818  Pain Management Interventions: medication offered but refused  Intervention: Prevent or Manage Pain  Flowsheets (Taken 6/7/2025 1116)  Sensory Stimulation Regulation: care clustered  Bowel Elimination Promotion:   adequate fluid intake promoted   ambulation promoted     Problem: Infection  Goal: Absence of Infection Signs and Symptoms  Outcome: Progressing  Intervention: Prevent or Manage Infection  Flowsheets  Taken 6/7/2025 1116  Isolation Precautions: contact precautions maintained  Taken 6/7/2025 0820  Isolation Precautions: contact precautions maintained    A&Ox4. SBA with walker. IV ABX. Low fat low sodium diet. Family visiting at bedside.

## 2025-06-07 NOTE — PLAN OF CARE
Day RN ([phone])    Pt A/O x4.  VSS and afebrile.  Pt rates pain as a 2-4 out of 10, declining medication intervention at this time.  CMS intact.  Dressing CDI to LLE.  Up SBA.  Voiding adequately.  Tolerating low fat/sodium diet well.  IV zosyn and vanco.  Plan is TBD - CT joint aspiration asap and continue IV antibiotics.  Will continue to monitor.     Jazmin Camp RN

## 2025-06-07 NOTE — PLAN OF CARE
Goal Outcome Evaluation:      Plan of Care Reviewed With: patient    Overall Patient Progress: no changeOverall Patient Progress: no change    Outcome Evaluation: Pt hopes to return home or can stay with his sister if needed.

## 2025-06-07 NOTE — ED NOTES
"[facility] ED Handoff Report    ED Chief Complaint: great toe pain , left     ED Diagnosis:  (M79.675) Great toe pain, left  Comment:   Plan: MRI antibiotics         PMH:    Past Medical History:   Diagnosis Date    Acute lower limb ischemia 04/14/2025    Arthritis     Class 1 obesity due to excess calories with serious comorbidity and body mass index (BMI) of 32.0 to 32.9 in adult 08/10/2021    Coronary arteriosclerosis due to lipid rich plaque 12/28/2012    Problem list name updated by automated process. Provider to review      Coronary artery disease     ED (erectile dysfunction)     Elevated cholesterol 02/15/2019    Essential hypertension, benign 12/18/2019    History of prediabetes 02/01/2019    HTN (hypertension)     Hypercholesteremia     OA (osteoarthritis) 10/27/2021    Obesity     Prediabetes     Primary osteoarthritis of both hips     S/P CABG (coronary artery bypass graft) 10/04/2023    Status post coronary artery bypass graft 2010        Code Status:  Full Code     Falls Risk: No Band: Not applicable    Current Living Situation/Residence: lives in a house     Elimination Status: Continent: Yes     Activity Level: Independent    Patient's Preferred Language:  English     Needed: No    Vital Signs:  /68   Pulse 106   Temp 100.7  F (38.2  C) (Oral)   Resp 18   Ht 1.702 m (5' 7\")   Wt 87.5 kg (193 lb)   SpO2 93%   BMI 30.23 kg/m       Cardiac Rhythm: n/a    Pain Score: 3/10    Is the Patient Confused:  No    Last Food or Drink: 06/06/25 at prior to arrival     Assessment and Plan of Care:  antibiotics and MRI      Tests Performed: Done: Labs and Imaging    Treatments Provided:  IV antibiotics    Family Dynamics/Concerns: No    Belongings Checklist Done and Signed by Patient: No    Belongings Sent with Patient: clothes, cell phone, slippers    Boarding medications sent with patient: none    Additional Information: Pt had hives at IV sight/ mid forearm roughly 43 minutes after " start of vancomycin. Dr. Gage was at bedside. Wanted to stop Vancomycin and give benadryl and resume afterwards as slower rate and monitor closely. Hives resolved about 20 minutes after pausing Vancomycin.     RN: Timbo Saenz RN     6/6/2025 7:11 PM

## 2025-06-07 NOTE — PHARMACY-VANCOMYCIN DOSING SERVICE
Pharmacy Vancomycin Initial Note  Date of Service 2025  Patient's  1955  69 year old, male    Indication: Sepsis    Current estimated CrCl = Estimated Creatinine Clearance: 79.2 mL/min (based on SCr of 0.93 mg/dL).    Creatinine for last 3 days  2025:  1:28 PM Creatinine 0.93 mg/dL    Recent Vancomycin Level(s) for last 3 days  No results found for requested labs within last 3 days.      Vancomycin IV Administrations (past 72 hours)                     vancomycin (VANCOCIN) 1,750 mg in 0.9% NaCl 500 mL intermittent infusion (mg) 1,750 mg Given 25 1720                    Nephrotoxins and other renal medications (From now, onward)      Start     Dose/Rate Route Frequency Ordered Stop    25 0800  [Held by provider]  lisinopril (ZESTRIL) tablet 5 mg        (On hold since today at 1759 until manually unheld; held by Richard Mojica MDHold Reason: Other)   Note to Pharmacy: PTA Sig:Take 1 tablet (5 mg) by mouth daily.      5 mg Oral DAILY 25 17525 2230  piperacillin-tazobactam (ZOSYN) 3.375 g vial to attach to  mL bag         3.375 g  over 30 Minutes Intravenous EVERY 6 HOURS 25 1858              Contrast Orders - past 72 hours (72h ago, onward)      Start     Dose/Rate Route Frequency Stop    25 1930  gadobutrol (GADAVIST) injection 8.75 mL         0.1 mL/kg × 87.5 kg Intravenous ONCE 25 1946            [employer] Prediction of Planned Initial Vancomycin Regimen  Loading dose: N/A  Regimen: 1000 mg IV every 12 hours.  Start time: 05:20 on 2025  Exposure target: AUC24 (range) 400-600 mg/L.hr   AUC24,ss: 537 mg/L.hr  Probability of AUC24 > 400: 80 %  Ctrough,ss: 17.6 mg/L  Probability of Ctrough,ss > 20: 39 %  Probability of nephrotoxicity (Lodise NORMA ): 13 %          Plan:  Start vancomycin  1000 mg IV q12h.  1750 mg loading dose given in ER.   Vancomycin monitoring method: AUC  Vancomycin therapeutic monitoring goal: 400-600  mg*h/L  Pharmacy will check vancomycin levels as appropriate in 1-3 Days.    Serum creatinine levels will be ordered daily for the first week of therapy and at least twice weekly for subsequent weeks.      Brando Peres RPH

## 2025-06-07 NOTE — CONSULTS
Care Management Initial Consult    General Information  Assessment completed with: Patient,    Type of CM/SW Visit: Initial Assessment    Primary Care Provider verified and updated as needed: Yes   Readmission within the last 30 days: unable to assess      Reason for Consult: discharge planning  Advance Care Planning: Advance Care Planning Reviewed: no concerns identified          Communication Assessment  Patient's communication style: spoken language (English or Bilingual)    Hearing Difficulty or Deaf: no   Wear Glasses or Blind: yes    Cognitive  Cognitive/Neuro/Behavioral: WDL                      Living Environment:   People in home: other relative(s) (Cousins)     Current living Arrangements: mobile home      Able to return to prior arrangements: yes       Family/Social Support:  Care provided by: self  Provides care for: no one  Marital Status:   Support system: Sibling(s), Other (specify) (Cousins)          Description of Support System: Supportive         Current Resources:   Patient receiving home care services: Yes  Skilled Home Care Services: Skilled Nursing     Community Resources: Home Care  Equipment currently used at home: cane, straight, walker, rolling  Supplies currently used at home: Wound Care Supplies    Employment/Financial:  Employment Status: retired        Financial Concerns: none   Referral to Financial Worker: No       Does the patient's insurance plan have a 3 day qualifying hospital stay waiver?  No    Lifestyle & Psychosocial Needs:  Social Drivers of Health     Food Insecurity: Low Risk  (6/6/2025)    Food Insecurity     Within the past 12 months, did you worry that your food would run out before you got money to buy more?: No     Within the past 12 months, did the food you bought just not last and you didn t have money to get more?: No   Depression: Not at risk (3/19/2025)    PHQ-2     PHQ-2 Score: 0   Housing Stability: Low Risk  (6/6/2025)    Housing Stability     Do you  have housing? : Yes     Are you worried about losing your housing?: No   Tobacco Use: Low Risk  (5/20/2025)    Patient History     Smoking Tobacco Use: Never     Smokeless Tobacco Use: Never     Passive Exposure: Not on file   Financial Resource Strain: Low Risk  (6/6/2025)    Financial Resource Strain     Within the past 12 months, have you or your family members you live with been unable to get utilities (heat, electricity) when it was really needed?: No   Alcohol Use: Not on file   Transportation Needs: Low Risk  (6/6/2025)    Transportation Needs     Within the past 12 months, has lack of transportation kept you from medical appointments, getting your medicines, non-medical meetings or appointments, work, or from getting things that you need?: No   Physical Activity: Insufficiently Active (3/17/2025)    Exercise Vital Sign     Days of Exercise per Week: 1 day     Minutes of Exercise per Session: 10 min   Interpersonal Safety: Low Risk  (6/6/2025)    Interpersonal Safety     Do you feel physically and emotionally safe where you currently live?: Yes     Within the past 12 months, have you been hit, slapped, kicked or otherwise physically hurt by someone?: No     Within the past 12 months, have you been humiliated or emotionally abused in other ways by your partner or ex-partner?: No   Stress: No Stress Concern Present (3/17/2025)    [employer] - Occupational Stress Questionnaire     Feeling of Stress : Not at all   Social Connections: Unknown (3/17/2025)    Social Connection and Isolation Panel [NHANES]     Frequency of Communication with Friends and Family: Not on file     Frequency of Social Gatherings with Friends and Family: Once a week     Attends Mormonism Services: Not on file     Active Member of Clubs or Organizations: Not on file     Attends Club or Organization Meetings: Not on file     Marital Status: Not on file   Health Literacy: Not on file       Functional  Status:  Prior to admission patient needed assistance:   Dependent ADLs:: Ambulation-cane  Dependent IADLs:: Independent       Mental Health Status:  Mental Health Status: No Current Concerns       Chemical Dependency Status:  Chemical Dependency Status: No Current Concerns             Values/Beliefs:  Spiritual, Cultural Beliefs, Yarsani Practices, Values that affect care: no               Discussed  Partnership in Safe Discharge Planning  document with patient/family: Yes:     Additional Information:  Dudley Kiran is a(n) 69 year old year old male who presented with Great toe pain, left [M79.675].     CM spoke with Pt at bedside. Introduced self and role. Patient assisted with completing assessment. Patient lives in a(n) mobile home with his Cousins. Patient is independent with IADL/ADLs. Anticipated that patient will discharge to home with family, home with help/services.   Pt if needed, can stay with his sister in [place].  Pt is feeling a bit down over his situation.  SWCM listen to Pt. Pt currently had Home Care RN 2 times a week to assist with wound care.  Pt thinks it is [facility].  SWCM tried to call [facility] and not open on weekends to verify.     Contacts:  Extended Emergency Contact Information  Primary Emergency Contact: Dudley Kiran  Mobile Phone: [phone]  Relation: Son  Secondary Emergency Contact: Shawnee Morrison  Mobile Phone: [phone]  Relation: Sister      Next Steps: CM to verify Home Care with [facility] on Monday. Potential for IV ABX.  ID consulted.     MARIE Ta

## 2025-06-07 NOTE — ED NOTES
Pt back from MRI. VSS. Per provider, ok to restart Vancomycin and Benadryl PRN is ordered. No need to wait for MRI results to come back. Pt going up to 2102 with updates given to receiving RN.

## 2025-06-08 LAB
CREAT SERPL-MCNC: 0.97 MG/DL (ref 0.67–1.17)
EGFRCR SERPLBLD CKD-EPI 2021: 85 ML/MIN/1.73M2
ERYTHROCYTE [DISTWIDTH] IN BLOOD BY AUTOMATED COUNT: 13.7 % (ref 10–15)
HCT VFR BLD AUTO: 30.1 % (ref 40–53)
HGB BLD-MCNC: 9.6 G/DL (ref 13.3–17.7)
HOLD SPECIMEN: NORMAL
MCH RBC QN AUTO: 26.9 PG (ref 26.5–33)
MCHC RBC AUTO-ENTMCNC: 31.9 G/DL (ref 31.5–36.5)
MCV RBC AUTO: 84 FL (ref 78–100)
PLATELET # BLD AUTO: 407 10E3/UL (ref 150–450)
RBC # BLD AUTO: 3.57 10E6/UL (ref 4.4–5.9)
VANCOMYCIN SERPL-MCNC: 20.4 UG/ML (ref ?–25)
WBC # BLD AUTO: 12.6 10E3/UL (ref 4–11)

## 2025-06-08 PROCEDURE — 250N000011 HC RX IP 250 OP 636

## 2025-06-08 PROCEDURE — 99233 SBSQ HOSP IP/OBS HIGH 50: CPT | Mod: GC

## 2025-06-08 PROCEDURE — 36415 COLL VENOUS BLD VENIPUNCTURE: CPT

## 2025-06-08 PROCEDURE — 250N000013 HC RX MED GY IP 250 OP 250 PS 637

## 2025-06-08 PROCEDURE — 82565 ASSAY OF CREATININE: CPT | Performed by: FAMILY MEDICINE

## 2025-06-08 PROCEDURE — 250N000011 HC RX IP 250 OP 636: Performed by: FAMILY MEDICINE

## 2025-06-08 PROCEDURE — 80202 ASSAY OF VANCOMYCIN: CPT | Performed by: FAMILY MEDICINE

## 2025-06-08 PROCEDURE — 99232 SBSQ HOSP IP/OBS MODERATE 35: CPT | Performed by: STUDENT IN AN ORGANIZED HEALTH CARE EDUCATION/TRAINING PROGRAM

## 2025-06-08 PROCEDURE — 85041 AUTOMATED RBC COUNT: CPT

## 2025-06-08 PROCEDURE — 120N000001 HC R&B MED SURG/OB

## 2025-06-08 PROCEDURE — 36415 COLL VENOUS BLD VENIPUNCTURE: CPT | Performed by: FAMILY MEDICINE

## 2025-06-08 PROCEDURE — 99231 SBSQ HOSP IP/OBS SF/LOW 25: CPT | Performed by: PODIATRIST

## 2025-06-08 RX ADMIN — PIPERACILLIN AND TAZOBACTAM 3.38 G: 3; .375 INJECTION, POWDER, FOR SOLUTION INTRAVENOUS at 19:55

## 2025-06-08 RX ADMIN — VANCOMYCIN HYDROCHLORIDE 1000 MG: 1 INJECTION, SOLUTION INTRAVENOUS at 18:04

## 2025-06-08 RX ADMIN — PIPERACILLIN AND TAZOBACTAM 3.38 G: 3; .375 INJECTION, POWDER, FOR SOLUTION INTRAVENOUS at 04:48

## 2025-06-08 RX ADMIN — PIPERACILLIN AND TAZOBACTAM 3.38 G: 3; .375 INJECTION, POWDER, FOR SOLUTION INTRAVENOUS at 11:59

## 2025-06-08 RX ADMIN — METOPROLOL SUCCINATE ER TABLETS 12.5 MG: 25 TABLET, FILM COATED, EXTENDED RELEASE ORAL at 08:14

## 2025-06-08 RX ADMIN — VANCOMYCIN HYDROCHLORIDE 1000 MG: 1 INJECTION, SOLUTION INTRAVENOUS at 06:18

## 2025-06-08 RX ADMIN — HEPARIN SODIUM 5000 UNITS: 5000 INJECTION, SOLUTION INTRAVENOUS; SUBCUTANEOUS at 00:42

## 2025-06-08 RX ADMIN — PIPERACILLIN AND TAZOBACTAM 3.38 G: 3; .375 INJECTION, POWDER, FOR SOLUTION INTRAVENOUS at 17:25

## 2025-06-08 ASSESSMENT — ACTIVITIES OF DAILY LIVING (ADL)
ADLS_ACUITY_SCORE: 38

## 2025-06-08 NOTE — PROGRESS NOTES
"[facility] Inpatient follow up       Patient:  Dudley Kiran  Date of birth 1955, Medical record number [id number]  Date of Visit:  06/08/2025  Attending Physician: Ramos Sharma MD         Assessment and Recommendations:   Assessment:  Dudley Kiran is a 69 year old male with   History of thromboembolic event leading to acute ischemic limb  History of compartment syndrome of the left leg.  Status post fasciotomy.  Recent diagnosis of cellulitis associated with wound infection.  Cultures with MRSA and Streptococcus dysgalactiae, and Corynebacterium striatum.  Was treated with IV antibiotic and discharged on oral Keflex plus oral doxycycline for 10 days on 5/28/2025  Left first MTP inflammatory arthropathy.  Does not look typical for septic arthritis.  Has a history of pain in both first MTPs in the past although last episode was about 5 years ago.  This is worrisome for gout although infection cannot be totally excluded.  Aspiration of the first MTP ordered on 6/7/2025.  I communicated with radiologist on 6/7/2025.  Procedure pending.  Clinically looking better.    Recommendations:  Continue current antibiotics.  Aspiration as ordered.  Labs ordered.  Continue care on the wound on the lateral aspect of the left leg.    Discussed with the patient, family members, nursing staff.    ID will follow.    Maria Elena German MD.  [facility].   [facility]  Office Telephone [phone].  Fax [phone]  [facility] paging            Interval History:     HPI:  The interval history was reviewed.   Doing better today.  Was able to ambulate.  She was also able to go outside in a wheelchair.  Less pain.  Communication with radiology as discussed above.    Pertinent cultures include:  No results found for: \"CULT\"    Recent Inflammatory Biomarkers:   Recent Labs   Lab Test 06/08/25  0643 06/07/25  0613 06/06/25  1328 05/26/25  0629 " 25  1157 05/15/25  1335 25  0958 25  0454   PCAL  --   --  0.13  --   --   --   --  0.17   WBC 12.6* 12.7* 16.8* 9.1 10.2 10.6   < >  --     < > = values in this interval not displayed.            Review of Systems:   CONSTITUTIONAL:    Temp Max: Temp (24hrs), Av.2  F (37.3  C), Min:98.4  F (36.9  C), Max:99.7  F (37.6  C)   .  Negative except for findings in the HPI.           Current Medications (antimicrobials listed in bold):     Current Facility-Administered Medications   Medication Dose Route Frequency Provider Last Rate Last Admin    [Held by provider] aspirin EC tablet 81 mg  81 mg Oral Daily Richard Mojica MD        [Held by provider] heparin ANTICOAGULANT injection 5,000 Units  5,000 Units Subcutaneous Q8H Tosha Jacinto MD   5,000 Units at 25 0042    [Held by provider] lisinopril (ZESTRIL) tablet 5 mg  5 mg Oral Daily Richard Mojica MD        metoprolol succinate ER (TOPROL-XL) 24 hr half-tab 12.5 mg  12.5 mg Oral Daily Tosha Jacinto MD   12.5 mg at 25 0814    piperacillin-tazobactam (ZOSYN) 3.375 g vial to attach to  mL bag  3.375 g Intravenous Q6H Richard Mojica MD 0 mL/hr at 25 2330 3.375 g at 25 1159    [Held by provider] rivaroxaban ANTICOAGULANT (XARELTO) tablet 20 mg  20 mg Oral Daily with supper Richard Mojica MD        sodium chloride (PF) 0.9% PF flush 3 mL  3 mL Intracatheter Q8H [facility] Richard Mojica MD   3 mL at 25 0618    vancomycin (VANCOCIN) 1,000 mg in NaCl 0.9% 200 mL intermittent infusion  1,000 mg Intravenous Q12H Ramos Sharma MD   1,000 mg at 25 0618              Allergies:     Allergies   Allergen Reactions    Codeine Nausea    No Known Allergies      Potentially cats            Physical Exam:   Vitals were reviewed  Patient Vitals for the past 24 hrs:   BP Temp Temp src Pulse Resp SpO2   25 0726 97/57 98.4  F (36.9  C) Oral 91 18 95 %   25 0024 104/63 99.6  F (37.6  C) Oral 95 18 93 %   25 1557 105/62  99.7  F (37.6  C) Oral 103 18 93 %       Physical Examination:  Gen: Pleasant in no acute distress.  HEENT: NCAT. EOMI. PERRL.  Neck: No bruit, JVD or thyromegaly.  Lungs: Clear to ascultation bilat with no crackles or wheezes.  Card: RRR. NSR. No RMG. Peripheral pulses present and symmetric. No edema.  Abd: Soft NT ND. No mass. Normal bowel sounds.  Skin: No rash.  Extr: Left great toe with swelling and mild redness.  Looks more purple than actual red.  Neuro: Alert and oriented to place time and person.          Laboratory Data:   ID Labs:  Microbiology labs:  Reviewed.    No lab results found.  Recent Labs   Lab Test 06/08/25  0643 06/07/25  0613 06/06/25  1328 05/26/25  0629 05/25/25  1157 05/15/25  1335   WBC 12.6* 12.7* 16.8* 9.1 10.2 10.6     Recent Labs   Lab Test 06/08/25  0643 06/07/25  0613 06/06/25  1328 05/26/25  0629   CR 0.97 1.06 0.93 0.94   GFRESTIMATED 85 76 89 88       Hematology Studies  Recent Labs   Lab Test 06/08/25  0643 06/07/25  0613 06/06/25  1328 05/26/25  0629 05/25/25  1157 05/15/25  1335 04/12/25  0603 04/09/25  0507 04/06/25  0507 03/27/25  0624 03/27/25  0257 03/27/25  0013 03/26/25  2038   WBC 12.6* 12.7* 16.8* 9.1 10.2 10.6   < > 10.7 15.4*   < > 13.1*   < > 14.4*   ANEU  --   --  13.1*  --  7.6  --   --  5.7 10.1*  --  7.0  --  9.2*   AEOS  --   --  0.1  --  0.0  --   --  1.2* 1.2*  --  1.0*  --  0.8*   HGB 9.6* 10.2* 10.8* 9.4* 11.4* 10.8*   < > 11.2* 11.4*   < > 12.8*   < > 15.9   HCT 30.1* 31.3* 33.5* 28.1* 35.5* 33.0*   < > 34.0* 35.1*   < > 37.5*   < > 45.0    423 523* 261 313 491*   < > 575* 564*   < > 233   < > 292    < > = values in this interval not displayed.       Metabolic  Recent Labs   Lab Test 06/08/25  0643 06/07/25  0613 06/06/25  1328 05/26/25  0629   NA  --  135 134* 137   BUN  --  12.5 12.8 9.3   CO2  --  23 21* 23   CR 0.97 1.06 0.93 0.94   GFRESTIMATED 85 76 89 88       Hepatic Studies  Recent Labs   Lab Test 06/06/25  1328 05/25/25  1157  04/12/25  0603   BILITOTAL 0.9 0.8 0.3   ALKPHOS 93 86 96   ALBUMIN 3.8 3.9 3.4*   AST 21 31 25   ALT 26 33 34       Immunologlobulins  Recent Labs   Lab Test 05/04/23  1013 03/08/19  1623   IGE 78  78  --    SED  --  12            Imaging Data:   Reviewed

## 2025-06-08 NOTE — PROGRESS NOTES
[facility]    Progress Note - Hospitalist Service       Date of Admission:  6/6/2025    Assessment & Plan   Dudley Kiran is a 69 year old male admitted on 6/6/2025. He has a history of CAD s/p CABG, VTE on Xarelto, HTN and recent thromboembolism resulting in acute limb ischemia and fasciotomies and is admitted for MTP joint pain and inflammation concerning for septic arthritis.     L MTP Septic arthritis  L MTP acute osteoarthritis  Sepsis 2/2 to above  History L fasciotomies c/b cellulitis  Presented with increasing pain and decreased ROM of the L 1st MTP following PT. Home WO nurse suggested further evaluation and Lobo was meeting sepsis criteria in the ED with tachycardia and leukocytosis, elevated CRP to 204. WBC has been down trending thus far. MRI of the L foot showed 1st MTP arthropathy with effusion, enhancing synovitis, and soft tissue edema consistent with septic arthritis. These findings were considered potentially consistent with gout, however uric acid WNL. Also some concern for osteomyelitis of the same 1st MTP on imaging. He was started on IV antibiotics and ID was consulted given his recent hospitalizations and infection and patient's desire to avoid another procedure if at all possible.     - Podiatry consulted, appreciate recommendations   - Continue IV Vancomycin and Zosyn   - IR Joint aspiration recommended by ID  - Follow synovial fluid studies, if infectious will need washout +/- bone biopsy  - Infectious Disease consult, appreciate recommendations   - Recommend IR Joint aspiration and studies, follow results  - Pain Control:   - PRN Tylenol 650 mg q4h PRN for mild pain  - PRN oxycodone 2.5-5 mg q4h PRN for moderate to severe pain  - PRN IV dilaudid 0.2-0.4 mg q2h PRN for severe breakthrough pain  - AM CBC  - PT/OT/SW  - Cardiac Diet  - Discontinue IV fluids  - Heparin subcutaneous for anticoagulation given potential for procedure in next 24 h     R arm rash,  "improving  Forearm rash near IV while administering vancomycin on 6/6/25, not indicative of allergy but likely mild infusion reaction that resolved with benadryl.     - Continue to monitor with ongoing IV antibiotic infusions  - Benadryl PRN    Chronic Conditions  HTN: hold lisinopril  CAD s/p CABG: hold ASA, continue metoprolol  Rhinitis: PTA Atrovent           Diet: Low Saturated Fat Na <2400 mg  Clear Liquid Diet    DVT Prophylaxis: Heparin SQ  Capps Catheter: Not present  Fluids: PO   Lines: None     Cardiac Monitoring: None  Code Status: Full Code      Clinically Significant Risk Factors         # Hyponatremia: Lowest Na = 134 mmol/L in last 2 days, will monitor as appropriate          # Coagulation Defect: INR = 1.37 (Ref range: 0.85 - 1.15) and/or PTT = 43 Seconds (Ref range: 22 - 38 Seconds), will monitor for bleeding    # Hypertension: Noted on problem list            # Obesity: Estimated body mass index is 30.23 kg/m  as calculated from the following:    Height as of this encounter: 1.702 m (5' 7\").    Weight as of this encounter: 87.5 kg (193 lb)., PRESENT ON ADMISSION       # Financial/Environmental Concerns: none   # History of CABG: noted on surgical history       Social Drivers of Health   Physical Activity: Insufficiently Active (3/17/2025)    Exercise Vital Sign     Days of Exercise per Week: 1 day     Minutes of Exercise per Session: 10 min   Social Connections: Unknown (3/17/2025)    Social Connection and Isolation Panel [NHANES]     Frequency of Social Gatherings with Friends and Family: Once a week         Disposition Plan     Medically Ready for Discharge: Anticipated in 2-4 Days       The patient's care was discussed with the Attending Physician, Dr. Ramos Sharma.    Tosha Jacinto MD  Hospitalist Service  [facility]  Securely message with [employer] (more info)  Text page via [employer] Paging/Directory " "  ______________________________________________________________________    Interval History   NAEO, feeling \"apprehensive\" about the need for joint aspiration with IR as he is anticipating the need for washout/bone biopsies to follow. Pain doing okay, has been NWB and has some stiffness and pressure but overall pain well controlled.     Physical Exam   Vital Signs: Temp: 98.4  F (36.9  C) Temp src: Oral BP: 97/57 Pulse: 91   Resp: 18 SpO2: 95 % O2 Device: None (Room air)    Weight: 193 lbs 0 oz    Physical Exam  Constitutional:       General: He is not in acute distress.  Cardiovascular:      Rate and Rhythm: Normal rate and regular rhythm.      Pulses: Normal pulses.      Heart sounds: Normal heart sounds.   Pulmonary:      Effort: Pulmonary effort is normal.      Breath sounds: Normal breath sounds.   Abdominal:      General: Abdomen is flat.      Palpations: Abdomen is soft.   Musculoskeletal:      Right foot: Normal range of motion. No tenderness. Normal pulse.      Left foot: Decreased range of motion. Swelling and tenderness present. No crepitus. Normal pulse.   Skin:     General: Skin is warm and dry.      Capillary Refill: Capillary refill takes less than 2 seconds.      Findings: Bruising and wound present.      Comments: L 1st MTP bruising, LLE fasciotomy site dressing C/D/I   Neurological:      Mental Status: He is alert.       Data     I have personally reviewed the following data over the past 24 hrs:    12.6 (H)  \   9.6 (L)   / 407     N/A N/A N/A /  N/A   N/A N/A 0.97 \       Imaging results reviewed over the past 24 hrs:   No results found for this or any previous visit (from the past 24 hours).    "

## 2025-06-08 NOTE — PROVIDER NOTIFICATION
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  He asked regarding status of CT scan, RN updated him that she had not heard from CT yet today but would reach out to them at his request to try to find out status.  He stated he had been told yesterday that the testing could be done at some point today.    RN called CT tech around 1530.  CT tech stated that they had not heard from the on-call radiologist as would be their normal practice, so was wondering if the provider to provider phone call had been completed.  RN verbalized to him that she had followed through on connecting the providers last evening, not sure what had happened for sure after that time.  RN to connect with provider groups to determine how to proceed.    RN paged resident [facility] provider from between 1555 and 1642.  Explained situation and asked if they would be willing to help coordinate what the next steps should be.  They aided in provider to provider conversation with Dr. German, finding out that he had spoken with an on-call radiologist yesterday who had told him that the procedure could be done today.  RN then called back CT tech, who then reached out to their on-call provider themselves to verify status.  They called back shortly after stating that their on-call provider Dr. Ragsdale would like to speak with Dr. German on the phone to clarify plan.  Provided Dr. Ragsdale's direct cell phone contact which RN passed along through the [facility] provider to get to Dr. German.    They sent along the message at 1617, and stated that Dr. German read it but did not reply as potentially gone for the day.  Plan per [facility] provider for ok to completed the study in the morning unless otherwise heard from by providers.  Thanks

## 2025-06-08 NOTE — PLAN OF CARE
Problem: Adult Inpatient Plan of Care  Goal: Optimal Comfort and Wellbeing  Outcome: Progressing     Problem: Pain Acute  Goal: Optimal Pain Control and Function  Outcome: Progressing     Problem: Infection  Goal: Absence of Infection Signs and Symptoms  Outcome: Progressing  Intervention: Prevent or Manage Infection  Recent Flowsheet Documentation  Taken 6/8/2025 0200 by Alejandra Conner RN  Isolation Precautions: contact precautions maintained   Goal Outcome Evaluation:    Pleasant gentleman, able to make needs known. Kerlix and abds came off of pt's LLE, writer redressed area. IV was causing pain and tenderness, observed it has migrated out of vein, removed that IV and placed a new one without concerns. IV abx infused. Denies pain. VSS.

## 2025-06-08 NOTE — PROGRESS NOTES
FOOT AND ANKLE SURGERY/PODIATRY PROGRESS NOTE        ASSESSMENT:   Septic arthritis first MPJ left foot  Acute osteomyelitis first MPJ left foot      TREATMENT:  - I discussed the patient that I agree with infectious disease recommendation for needle aspiration of the first MPJ to evaluate for septic arthritis versus crystal aspirate.    -We reviewed that if an infectious process is determined to be causative surgical I&D with arthrotomy would be necessary.  We also discussed that MRI indicates possible osteomyelitis of the base of the proximal phalanx of the left hallux and first metatarsal head.  Bone biopsy may also be necessary.    -Medical management per hospitalist.  Antibiotics per ID. Podiatry service will contact the patient with the CT aspirate report when available and we will be guided by the results.     Mau Izquierdo DPM  [facility] Podiatry/Foot & Ankle Surgery      HPI: Dudley DE LA TORRE Magno was seen again today follow-up septic first MPJ left foot with possible underlying acute osteomyelitis of the first MPJ.  Patient was seen yesterday by infectious disease and needle aspiration was recommended to determine if underlying pathology is infectious in nature versus related to gout.  Patient reports decreasing pain overnight and into this morning.    Past Medical History:   Diagnosis Date    Acute lower limb ischemia 04/14/2025    Arthritis     Class 1 obesity due to excess calories with serious comorbidity and body mass index (BMI) of 32.0 to 32.9 in adult 08/10/2021    Coronary arteriosclerosis due to lipid rich plaque 12/28/2012    Problem list name updated by automated process. Provider to review      Coronary artery disease     ED (erectile dysfunction)     Elevated cholesterol 02/15/2019    Essential hypertension, benign 12/18/2019    History of prediabetes 02/01/2019    HTN (hypertension)     Hypercholesteremia     OA (osteoarthritis) 10/27/2021    Obesity     Prediabetes     Primary  osteoarthritis of both hips     S/P CABG (coronary artery bypass graft) 10/04/2023    Status post coronary artery bypass graft 2010       Past Surgical History:   Procedure Laterality Date    ARTHROPLASTY, HIP, TOTAL, DIRECT ANTERIOR APPROACH, USING HANA TABLE Left 10/27/2021    Procedure: LEFT TOTAL HIP ARTHROPLASTY DIRECT ANTERIOR APPROACH;  Surgeon: Bon Little MD;  Location: [facility] OR    BYPASS GRAFT ARTERY CORONARY  2010    FASCIOTOMY LOWER EXTREMITY Left 3/27/2025    Procedure: FASCIOTOMY, LEFT LOWER EXTREMITY;  Surgeon: Wanda Coelho DO;  Location: [facility] OR       Allergies   Allergen Reactions    Codeine Nausea    No Known Allergies      Potentially cats         Current Facility-Administered Medications:     acetaminophen (TYLENOL) tablet 650 mg, 650 mg, Oral, Q4H PRN, 650 mg at 06/06/25 1846 **OR** acetaminophen (TYLENOL) Suppository 650 mg, 650 mg, Rectal, Q4H PRN, Richard Mojica MD    [Held by provider] aspirin EC tablet 81 mg, 81 mg, Oral, Daily, Richard Mojica MD    calcium carbonate (TUMS) chewable tablet 1,000 mg, 1,000 mg, Oral, 4x Daily PRN, Richard Mojica MD    diphenhydrAMINE (BENADRYL) capsule 25 mg, 25 mg, Oral, Q6H PRN **OR** diphenhydrAMINE (BENADRYL) injection 25 mg, 25 mg, Intravenous, Q6H PRN, Richard Mojica MD, 25 mg at 06/06/25 1848    heparin ANTICOAGULANT injection 5,000 Units, 5,000 Units, Subcutaneous, Q8H, Tosha Jacinto MD, 5,000 Units at 06/08/25 0042    HYDROmorphone (DILAUDID) injection 0.2 mg, 0.2 mg, Intravenous, Q2H PRN, Richard Mojica MD    HYDROmorphone (DILAUDID) injection 0.4 mg, 0.4 mg, Intravenous, Q2H PRN, Richard Mojica MD    ipratropium (ATROVENT) 0.06 % spray 2 spray, 2 spray, Both Nostrils, BID PRN, Richard Mojica MD    lidocaine (LMX4) cream, , Topical, Q1H PRN, Richard Mojica MD    lidocaine 1 % 0.1-1 mL, 0.1-1 mL, Other, Q1H PRN, Richard Mojica MD    [Held by provider] lisinopril (ZESTRIL) tablet 5 mg, 5 mg, Oral, Daily, Richard Mojica,  MD    melatonin tablet 1 mg, 1 mg, Oral, At Bedtime PRN, Richard Mojica MD    metoprolol succinate ER (TOPROL-XL) 24 hr half-tab 12.5 mg, 12.5 mg, Oral, Daily, Richard Mojica MD, 12.5 mg at 06/07/25 0820    naloxone (NARCAN) injection 0.2 mg, 0.2 mg, Intravenous, Q2 Min PRN **OR** naloxone (NARCAN) injection 0.4 mg, 0.4 mg, Intravenous, Q2 Min PRN **OR** naloxone (NARCAN) injection 0.2 mg, 0.2 mg, Intramuscular, Q2 Min PRN **OR** naloxone (NARCAN) injection 0.4 mg, 0.4 mg, Intramuscular, Q2 Min PRN, Ramos Sharma MD    ondansetron (ZOFRAN ODT) ODT tab 4 mg, 4 mg, Oral, Q6H PRN **OR** ondansetron (ZOFRAN) injection 4 mg, 4 mg, Intravenous, Q6H PRN, Richard Mojica MD    oxyCODONE (ROXICODONE) tablet 5 mg, 5 mg, Oral, Q4H PRN, Richard Mojica MD    oxyCODONE IR (ROXICODONE) half-tab 2.5 mg, 2.5 mg, Oral, Q4H PRN, Richard Mojica MD    piperacillin-tazobactam (ZOSYN) 3.375 g vial to attach to  mL bag, 3.375 g, Intravenous, Q6H, Richard Mojica MD, Last Rate: 0 mL/hr at 06/06/25 2330, 3.375 g at 06/08/25 0448    prochlorperazine (COMPAZINE) tablet 5 mg, 5 mg, Oral, Q6H PRN, Richard Mojica MD    [Held by provider] rivaroxaban ANTICOAGULANT (XARELTO) tablet 20 mg, 20 mg, Oral, Daily with supper, Richard Mojica MD    senna-docusate (SENOKOT-S/PERICOLACE) 8.6-50 MG per tablet 1 tablet, 1 tablet, Oral, BID PRN **OR** senna-docusate (SENOKOT-S/PERICOLACE) 8.6-50 MG per tablet 2 tablet, 2 tablet, Oral, BID PRN, Richard Mojica MD    sodium chloride (PF) 0.9% PF flush 3 mL, 3 mL, Intracatheter, Q8H CATIA, Richard Mojica MD, 3 mL at 06/08/25 0618    sodium chloride (PF) 0.9% PF flush 3 mL, 3 mL, Intracatheter, q1 min prn, Richard Mojica MD    vancomycin (VANCOCIN) 1,000 mg in NaCl 0.9% 200 mL intermittent infusion, 1,000 mg, Intravenous, Q12H, Ramos Sharma MD, 1,000 mg at 06/08/25 0618    Family History   Problem Relation Age of Onset    Diabetes No family hx of     Diabetes No family hx of     Breast Cancer No family hx of     Colon  Cancer No family hx of     Prostate Cancer No family hx of     Hypertension No family hx of        Social History     Socioeconomic History    Marital status:      Spouse name: Not on file    Number of children: Not on file    Years of education: Not on file    Highest education level: Not on file   Occupational History    Not on file   Tobacco Use    Smoking status: Never    Smokeless tobacco: Never   Vaping Use    Vaping status: Never Used   Substance and Sexual Activity    Alcohol use: No     Alcohol/week: 0.0 standard drinks of alcohol    Drug use: Never    Sexual activity: Not on file   Other Topics Concern    Not on file   Social History Narrative    Single lives with 2 cousins 3 grown children from his marriage they are in their 30s; significant friend to accompany to the emergency room at [facility] (May 2025 at [facility])    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     Social Drivers of Health     Financial Resource Strain: Low Risk  (6/6/2025)    Financial Resource Strain     Within the past 12 months, have you or your family members you live with been unable to get utilities (heat, electricity) when it was really needed?: No   Food Insecurity: Low Risk  (6/6/2025)    Food Insecurity     Within the past 12 months, did you worry that your food would run out before you got money to buy more?: No     Within the past 12 months, did the food you bought just not last and you didn t have money to get more?: No   Transportation Needs: Low Risk  (6/6/2025)    Transportation Needs     Within the past 12 months, has lack of transportation kept you from medical appointments, getting your medicines, non-medical meetings or appointments, work, or from getting things that you need?: No   Physical Activity: Insufficiently Active (3/17/2025)    Exercise Vital Sign     Days of Exercise per Week: 1 day     Minutes of Exercise per Session: 10 min   Stress: No Stress Concern Present (3/17/2025)     "[employer] [place] [employer] - Occupational Stress Questionnaire     Feeling of Stress : Not at all   Social Connections: Unknown (3/17/2025)    Social Connection and Isolation Panel [NHANES]     Frequency of Communication with Friends and Family: Not on file     Frequency of Social Gatherings with Friends and Family: Once a week     Attends Moravian Services: Not on file     Active Member of Clubs or Organizations: Not on file     Attends Club or Organization Meetings: Not on file     Marital Status: Not on file   Interpersonal Safety: Low Risk  (6/6/2025)    Interpersonal Safety     Do you feel physically and emotionally safe where you currently live?: Yes     Within the past 12 months, have you been hit, slapped, kicked or otherwise physically hurt by someone?: No     Within the past 12 months, have you been humiliated or emotionally abused in other ways by your partner or ex-partner?: No   Housing Stability: Low Risk  (6/6/2025)    Housing Stability     Do you have housing? : Yes     Are you worried about losing your housing?: No       10 point Review of Systems is negative except for left foot infection which is noted in HPI.     /63 (BP Location: Left arm)   Pulse 95   Temp 99.6  F (37.6  C) (Oral)   Resp 18   Ht 1.702 m (5' 7\")   Wt 87.5 kg (193 lb)   SpO2 93%   BMI 30.23 kg/m      BMI= Body mass index is 30.23 kg/m .    OBJECTIVE:  General appearance: Patient is alert and fully cooperative with history & exam.  No sign of distress is noted during the visit.    Vascular: Dorsalis pedis nonpalpable left foot.  Diffuse edema first MPJ left foot, decreased since yesterday.  Dermatologic: Ecchymosis type discoloration along first MPJ left foot.  No open lesions or significant erythema identified left lower extremity.  Neurologic: All epicritic and proprioceptive sensations are grossly intact left.  Musculoskeletal: Pain to palpation first MPJ left foot, decreased since " yesterday.      Imaging:     MR Foot Left w/o & w Contrast  Result Date: 6/6/2025  EXAM: MR FOOT LEFT W/O and W CONTRAST LOCATION: [facility] DATE: 6/6/2025 INDICATION: Sepsis, first MTP joint pain, swelling, and ecchymosis. Septic arthritis concern. COMPARISON: Radiographs, same date. TECHNIQUE: Routine. Additional postgadolinium T1 sequences were obtained. IV CONTRAST: 8.75mL Gadavist FINDINGS: Acute monoarthritis at the first MTP joint, including joint effusion, enhancing synovitis, or significant periarticular soft tissue edema. There is also abnormal bone marrow signal in the base of the proximal phalanx and the head of the first metatarsal.  Joint alignment is normal. No definite fractures identified. Severe degenerative arthritis at the first MTP joint, also in the IP joint of the first toe. No acute arthropathy in the other joints in the midfoot or forefoot. No joint effusions. Normal joint alignment. Normal bones and bone marrow elsewhere. Flexor and extensor tendons are intact without tearing or significant tendinopathy. No tenosynovitis in the flexor or extensor hallucis tendon sheaths. Moderate soft tissue edema and enhancement throughout the base of the first toe. No devitalized/nonenhancing phlegmon or abscess. No sinus tract. No discrete full-thickness ulcer identified in the first toe, clinical correlation recommended. Mild soft tissue edema and enhancement within the muscles around the first MTP joint and metatarsal, likely reactive inflammation to the first MTP joint inflammation. No intramuscular fluid collection or myonecrosis. No significant muscle atrophy. The Lisfranc joint and the plantar fascial are intact.     IMPRESSION: 1.  Acute arthropathy at the first MTP joint, including joint effusion, enhancing synovitis, and significant periarticular soft tissue edema and inflammation. Findings could be consistent with septic arthritis in the appropriate clinical context.  2.  Is noted that the imaging findings are nonspecific, and other causes of Monro arthropathy could have a similar appearance. This is not classic appearance for gout, but crystal deposition arthropathy would not be entirely excluded. Similarly, traumatic injury to the MTP joint could appear similar. 3.  Bone marrow signal abnormality in the base of the proximal phalanx and the head of the metatarsal. Given the concern for septic arthritis, these are concerning for areas of osteomyelitis. However, the appearance is not highly specific and trauma might appear similar. 4.  Tendons and ligaments are intact. No evidence of structural derangement. No tenosynovitis. 5.  Soft tissue edema and enhancement in the great toe is nonspecific but could represent cellulitis in the appropriate clinical context. No devitalized phlegmon or abscess.    US Lower Extremity Venous Duplex Left  Result Date: 6/6/2025  EXAM: US LOWER EXTREMITY VENOUS DUPLEX LEFT LOCATION: [facility] DATE: 6/6/2025 INDICATION: Hx DVT causing limb ischemia and requiring fasciotomy. left great toe swelling end ecchymosis started yesterday, evaluate for blood clot vs.hematoma COMPARISON: None. TECHNIQUE: Venous Duplex ultrasound of the left lower extremity with and without compression, augmentation and duplex. Color flow and spectral Doppler with waveform analysis performed. FINDINGS: Exam includes the common femoral, femoral, popliteal, and contralateral common femoral veins as well as segmentally visualized deep calf veins and greater saphenous vein. LEFT: No deep vein thrombosis. No superficial thrombophlebitis. No popliteal cyst. No acute calf veins difficult to evaluate due to wound.     IMPRESSION: 1.  No deep venous thrombosis in the left lower extremity.    Foot XR, G/E 3 views, left  Result Date: 6/6/2025  EXAM: XR FOOT LEFT G/E 3 VIEWS LOCATION: [facility] DATE: 6/6/2025 INDICATION: left 1st MCP  swelling and ecchymosis COMPARISON: None.     IMPRESSION: Degenerative change first MTP joint. Degenerative change at the IP joint of the great toe. No evidence for acute fracture. Plantar and Achilles calcaneal spurring.    CTA Chest Abdomen Pelvis Runoff w Contrast  Result Date: 5/25/2025  EXAM: CTA CHEST/ABDOMEN/PELVIS RUNOFF WITH CONTRAST LOCATION: [facility] DATE: 05/25/2025 INDICATION: Nausea. History of significant atherosclerosis with arterial thromboembolism to leg. COMPARISON: 03/26/2025. TECHNIQUE: Helical acquisition through the chest, abdomen, pelvis, and bilateral lower extremities was performed during the arterial phase of contrast enhancement. Second phase arterial imaging was performed through the bilateral lower extremities. 2D and 3D reconstructions performed by the CT technologist. Dose reduction techniques were used. CONTRAST: 90 mL Isovue 370. FINDINGS: AORTA: No intramural hematoma. Moderate atherosclerotic vascular calcifications. No aortic dissection. 2.5 cm aneurysmal dilation of the infrarenal abdominal aorta with atherosclerotic plaque and 2.3 cm aneurysmal dilation of the right common iliac artery. These findings are unchanged. Patent celiac, superior mesenteric, and renal arteries. Inferior mesenteric artery not identified. Patent common, internal, and external iliac arteries. RIGHT LEG: Patent right common femoral, superficial femoral, and deep femoral artery with a patent popliteal artery with three-vessel runoff. On delayed images, there is attenuated enhancement of the distal anterior and peroneal arteries with possible occlusion of both at the level of the foot. Consider correlation with ultrasound. Nonspecific asymmetric venous return of the right leg compared to the left. LEFT LEG: Patent left common femoral, superficial femoral, deep femoral, and popliteal arteries with three-vessel runoff and arterial patency through the level of the leg. NONVASCULAR  FINDINGS: MEDIASTINUM: Status post median sternotomy. CABG. Normal size heart. No pericardial effusion. No lymphadenopathy. LUNGS/PLEURA: Normal ABDOMEN: Punctate nonobstructing bilateral renal stones. Punctate dependent stone seen in the left bladder, not previously seen, query recent stone passage. Fatty liver. Normal gallbladder, pancreas, spleen, and adrenals. No lymphadenopathy. PELVIS: Normal appendix. Normal bowel. No free air or fluid. No lymphadenopathy. Normal prostate and seminal vesicles. MUSCULOSKELETAL: Status post left total hip arthroplasty. No acute osseous abnormality.     IMPRESSION: 1.  No pulmonary embolus, or aortic dissection. 2.  Attenuated enhancement of the distal right anterior tibial and peroneal arteries, consider correlation with ultrasound to ensure patency, particularly of vasculature of the right foot. Consider correlation with ultrasound to evaluate for patency. 3.  Stable aneurysmal dilation of the right infrarenal abdominal aorta and common iliac artery with atherosclerotic vascular disease and coronary artery disease. 4.  2 mm left dependent bladder stone with bilateral nonobstructing renal stones, correlate for recent stone passage. 5.  Fatty liver.

## 2025-06-08 NOTE — PHARMACY-VANCOMYCIN DOSING SERVICE
Pharmacy Vancomycin Note  Date of Service 2025  Patient's  1955   69 year old, male    Indication: Sepsis  Day of Therapy: 3  Current vancomycin regimen:  1000 mg IV q12h  Current vancomycin monitoring method: AUC  Current vancomycin therapeutic monitoring goal: 400-600 mg*h/L    InsightRX Prediction of Current Vancomycin Regimen  Loading dose: N/A  Regimen: 1000 mg IV every 12 hours.  Start time: 18:18 on 2025  Exposure target: AUC24 (range) 400-600 mg/L.hr   AUC24,ss: 518 mg/L.hr  Probability of AUC24 > 400: 87 %  Ctrough,ss: 17.6 mg/L  Probability of Ctrough,ss > 20: 35 %  Probability of nephrotoxicity (Lodise NORMA ): 13 %      Current estimated CrCl = Estimated Creatinine Clearance: 75.9 mL/min (based on SCr of 0.97 mg/dL).    Creatinine for last 3 days  2025:  1:28 PM Creatinine 0.93 mg/dL  2025:  6:13 AM Creatinine 1.06 mg/dL  2025:  6:43 AM Creatinine 0.97 mg/dL    Recent Vancomycin Levels (past 3 days)  2025:  9:15 AM Vancomycin 20.4 ug/mL    Vancomycin IV Administrations (past 72 hours)                     vancomycin (VANCOCIN) 1,000 mg in NaCl 0.9% 200 mL intermittent infusion (mg) 1,000 mg Given 25 0618     1,000 mg Given 25 1829     1,000 mg Given  0609    vancomycin (VANCOCIN) 1,750 mg in 0.9% NaCl 500 mL intermittent infusion (mg) 1,750 mg Given 25 1720                    Nephrotoxins and other renal medications (From now, onward)      Start     Dose/Rate Route Frequency Ordered Stop    25 0800  [Held by provider]  lisinopril (ZESTRIL) tablet 5 mg        (On hold since 2025 at 1759 until manually unheld; held by Richard Mojica MDHold Reason: Other)   Note to Pharmacy: PTA Sig:Take 1 tablet (5 mg) by mouth daily.      5 mg Oral DAILY 25 1759      25 0600  vancomycin (VANCOCIN) 1,000 mg in NaCl 0.9% 200 mL intermittent infusion         1,000 mg  over 60 Minutes Intravenous EVERY 12 HOURS 25 3714   piperacillin-tazobactam (ZOSYN) 3.375 g vial to attach to  mL bag         3.375 g  over 30 Minutes Intravenous EVERY 6 HOURS 06/06/25 1858                 Contrast Orders - past 72 hours (72h ago, onward)      Start     Dose/Rate Route Frequency Stop    06/06/25 1930  gadobutrol (GADAVIST) injection 8.75 mL         0.1 mL/kg × 87.5 kg Intravenous ONCE 06/06/25 1946            Interpretation of levels and current regimen:  Vancomycin level is reflective of -600    Has serum creatinine changed greater than 50% in last 72 hours: No    Urine output:  unable to determine    Renal Function: Stable    InsightRX Prediction of Planned New Vancomycin Regimen  Loading dose: N/A  Regimen: 1000 mg IV every 12 hours.  Start time: 18:18 on 06/08/2025  Exposure target: AUC24 (range) 400-600 mg/L.hr   AUC24,ss: 518 mg/L.hr  Probability of AUC24 > 400: 87 %  Ctrough,ss: 17.6 mg/L  Probability of Ctrough,ss > 20: 35 %  Probability of nephrotoxicity (Lodise NORMA 2009): 13 %      Plan:  Continue Current Dose  Vancomycin monitoring method: AUC  Vancomycin therapeutic monitoring goal: 400-600 mg*h/L  Pharmacy will check vancomycin levels as appropriate in 1-3 Days.  Serum creatinine levels will be ordered daily for the first week of therapy and at least twice weekly for subsequent weeks.    Ashley Key, RadhaD

## 2025-06-08 NOTE — PLAN OF CARE
Day RN ([phone])    Pt A/O x4.  VSS and afebrile.  Pt rates pain as minimal and has declined medication interveniton.  CMS intact.  Dressing CDI to L leg, toe open to air and bruising/swelling continuing to look improved.  Up independently to stand at EOB, SBA with walker if ambulating farther than that.  Voiding adequately.  Tolerating regular diet well - aware will be clear liquid diet at midnight.  IV zosyn and Vanco.  Plan is tbd, CT guided needle aspiration when able - likely tomorrow see other note; and then allowing that to further guide care.  Will continue to monitor.     Jazmin Camp RN

## 2025-06-09 ENCOUNTER — TELEPHONE (OUTPATIENT)
Dept: FAMILY MEDICINE | Facility: CLINIC | Age: 70
End: 2025-06-09
Payer: COMMERCIAL

## 2025-06-09 ENCOUNTER — APPOINTMENT (OUTPATIENT)
Dept: RADIOLOGY | Facility: CLINIC | Age: 70
End: 2025-06-09
Attending: STUDENT IN AN ORGANIZED HEALTH CARE EDUCATION/TRAINING PROGRAM
Payer: COMMERCIAL

## 2025-06-09 LAB
CREAT SERPL-MCNC: 0.91 MG/DL (ref 0.67–1.17)
CRYSTALS SNV MICRO: NORMAL
EGFRCR SERPLBLD CKD-EPI 2021: >90 ML/MIN/1.73M2
ERYTHROCYTE [DISTWIDTH] IN BLOOD BY AUTOMATED COUNT: 13.8 % (ref 10–15)
HCT VFR BLD AUTO: 29.4 % (ref 40–53)
HGB BLD-MCNC: 9.4 G/DL (ref 13.3–17.7)
MCH RBC QN AUTO: 27 PG (ref 26.5–33)
MCHC RBC AUTO-ENTMCNC: 32 G/DL (ref 31.5–36.5)
MCV RBC AUTO: 85 FL (ref 78–100)
MCV RBC AUTO: 85 FL (ref 78–100)
PLATELET # BLD AUTO: 398 10E3/UL (ref 150–450)
PLATELET # BLD AUTO: 447 10E3/UL (ref 150–450)
RBC # BLD AUTO: 3.48 10E6/UL (ref 4.4–5.9)
WBC # BLD AUTO: 7.5 10E3/UL (ref 4–11)

## 2025-06-09 PROCEDURE — G0463 HOSPITAL OUTPT CLINIC VISIT: HCPCS

## 2025-06-09 PROCEDURE — 85049 AUTOMATED PLATELET COUNT: CPT

## 2025-06-09 PROCEDURE — 89060 EXAM SYNOVIAL FLUID CRYSTALS: CPT | Performed by: STUDENT IN AN ORGANIZED HEALTH CARE EDUCATION/TRAINING PROGRAM

## 2025-06-09 PROCEDURE — 250N000013 HC RX MED GY IP 250 OP 250 PS 637

## 2025-06-09 PROCEDURE — 250N000011 HC RX IP 250 OP 636: Performed by: FAMILY MEDICINE

## 2025-06-09 PROCEDURE — 36415 COLL VENOUS BLD VENIPUNCTURE: CPT

## 2025-06-09 PROCEDURE — 77002 NEEDLE LOCALIZATION BY XRAY: CPT

## 2025-06-09 PROCEDURE — 87070 CULTURE OTHR SPECIMN AEROBIC: CPT | Performed by: STUDENT IN AN ORGANIZED HEALTH CARE EDUCATION/TRAINING PROGRAM

## 2025-06-09 PROCEDURE — 250N000011 HC RX IP 250 OP 636

## 2025-06-09 PROCEDURE — 120N000001 HC R&B MED SURG/OB

## 2025-06-09 PROCEDURE — 89050 BODY FLUID CELL COUNT: CPT | Performed by: STUDENT IN AN ORGANIZED HEALTH CARE EDUCATION/TRAINING PROGRAM

## 2025-06-09 PROCEDURE — 99232 SBSQ HOSP IP/OBS MODERATE 35: CPT | Performed by: STUDENT IN AN ORGANIZED HEALTH CARE EDUCATION/TRAINING PROGRAM

## 2025-06-09 PROCEDURE — 82565 ASSAY OF CREATININE: CPT | Performed by: FAMILY MEDICINE

## 2025-06-09 PROCEDURE — 99232 SBSQ HOSP IP/OBS MODERATE 35: CPT | Mod: GC

## 2025-06-09 RX ORDER — HYDROMORPHONE HYDROCHLORIDE 2 MG/1
2 TABLET ORAL
Refills: 0 | Status: DISCONTINUED | OUTPATIENT
Start: 2025-06-09 | End: 2025-06-11 | Stop reason: HOSPADM

## 2025-06-09 RX ADMIN — VANCOMYCIN HYDROCHLORIDE 1000 MG: 1 INJECTION, SOLUTION INTRAVENOUS at 17:34

## 2025-06-09 RX ADMIN — HYDROMORPHONE HYDROCHLORIDE 0.4 MG: 0.2 INJECTION, SOLUTION INTRAMUSCULAR; INTRAVENOUS; SUBCUTANEOUS at 11:17

## 2025-06-09 RX ADMIN — METOPROLOL SUCCINATE ER TABLETS 12.5 MG: 25 TABLET, FILM COATED, EXTENDED RELEASE ORAL at 09:47

## 2025-06-09 RX ADMIN — HYDROMORPHONE HYDROCHLORIDE 1 MG: 2 TABLET ORAL at 21:34

## 2025-06-09 RX ADMIN — PIPERACILLIN AND TAZOBACTAM 3.38 G: 3; .375 INJECTION, POWDER, FOR SOLUTION INTRAVENOUS at 05:10

## 2025-06-09 RX ADMIN — PIPERACILLIN AND TAZOBACTAM 3.38 G: 3; .375 INJECTION, POWDER, FOR SOLUTION INTRAVENOUS at 20:04

## 2025-06-09 RX ADMIN — PIPERACILLIN AND TAZOBACTAM 3.38 G: 3; .375 INJECTION, POWDER, FOR SOLUTION INTRAVENOUS at 14:49

## 2025-06-09 RX ADMIN — VANCOMYCIN HYDROCHLORIDE 1000 MG: 1 INJECTION, SOLUTION INTRAVENOUS at 05:45

## 2025-06-09 RX ADMIN — ACETAMINOPHEN 650 MG: 325 TABLET ORAL at 00:10

## 2025-06-09 ASSESSMENT — ACTIVITIES OF DAILY LIVING (ADL)
ADLS_ACUITY_SCORE: 39
ADLS_ACUITY_SCORE: 38
ADLS_ACUITY_SCORE: 39
ADLS_ACUITY_SCORE: 38

## 2025-06-09 NOTE — CONSULTS
[facility] Nurse Inpatient Assessment     Consulted for: L lower extremity fasciotomy sites    Summary: Healing fasciotomy wounds    [facility] nurse follow-up plan: weekly    Patient History (according to provider note(s):      Per H+P:  69 year old male admitted on 6/6/2025. He has a history of CAD s/p CABG, VTE on Xarelto, HTN, and recent thromboembolic event leading to acute limb ischemia and emergent fasciotomies, and recent admission for infection of fasciotomies (5/25/25-5/28/25). He was discharged from recent hospitalization with doxycycline and Keflex which he has been taking as prescribed with one day of treatment remaining. He presented to the ED with acute swelling and skin changes of left MTP joint and is admitted for possible septic joint.     Assessment:      Wound location: LLE      6/9 lateral LLE           Medial LLE  Wound due to: Surgical Wound  Wound history/plan of care: patient with surgical wounds from 3/27  Wound base: 100 % Granulation tissue      Palpation of the wound bed: normal      Drainage: small     Description of drainage: bloody     Measurements (length x width x depth, in cm): Lateral 12  x 1.4 cm ; medial 0.8 x 0.6cm     Tunneling: N/A     Undermining: N/A  Periwound skin: Intact and Scar tissue      Color: normal and consistent with surrounding tissue      Temperature: normal   Odor: none  Pain: mild, tender  Pain interventions prior to dressing change: slow and gentle cares   Treatment goal: Drainage control, Protection, and Promote epidermal migration  STATUS: initial assessment this admission  Supplies ordered: gathered    Treatment Plan:     LLE  Soak wounds with vashe moistened gauze for 5 minutes, dry gently  Cut strips of Urgotul AG ready to cover wounds (or equivalent silver contact layer)  Cover with Mepilex (or equivalent silicone bordered foam)  Wrap with kerlix for patient comfort  Change twice weekly    Orders: Written    RECOMMEND PRIMARY  TEAM ORDER: None, at this time  Education provided: plan of care  Discussed plan of care with: Patient and Nurse  Notify [facility] if wound(s) deteriorate.  Nursing to notify the Provider(s) and re-consult the [facility] Nurse if new skin concern.    DATA:     Current support surface: Standard  Standard gel mattress (Isoflex)  Containment of urine/stool: Continent of bladder and Continent of bowel  BMI: Body mass index is 30.23 kg/m .   Active diet order: Orders Placed This Encounter      Regular Diet Adult     Output: I/O last 3 completed shifts:  In: -   Out: 500 [Urine:500]     Labs:   Recent Labs   Lab 06/09/25  0605 06/07/25  0613 06/06/25  1328   ALBUMIN  --   --  3.8   HGB 9.4*   < > 10.8*   INR  --   --  1.37*   WBC 7.5   < > 16.8*    < > = values in this interval not displayed.     Pressure injury risk assessment:   Sensory Perception: 4-->no impairment  Moisture: 4-->rarely moist  Activity: 3-->walks occasionally  Mobility: 3-->slightly limited  Nutrition: 3-->adequate  Friction and Shear: 3-->no apparent problem  Gary Score: 20    KYLIE TesfayeN, RN, PHN, HNB-BC, CWOCN  Pager no longer is use, please contact through [employer] group: [facility] [employer]

## 2025-06-09 NOTE — DISCHARGE INSTRUCTIONS
Home care services have been arrange for you    Home Care Agency:  [facility]    Home Care Phone:  [phone]    Services:  RN/PT/OT/HHA    Instructions: Home care will visit within 48 hrs after discharge.  Provider will call you to schedule visit.      [facility] DISCHARGE INSTRUCTIONS:  LLE  Soak wounds with vashe moistened gauze for 5 minutes, dry gently  Cut strips of Urgotul AG ready to cover wounds (or equivalent silver contact layer)  Cover with Mepilex (or equivalent silicone bordered foam)  Wrap with kerlix for patient comfort  Change twice weekly

## 2025-06-09 NOTE — PROGRESS NOTES
"POST-OP CHECK    Dudley Kiran was seen post-operatively after L 1st MTP joint aspiration. Doing fine overall. Denies pain that is controlled on current pain medication regimen. Denies any shortness of breath, chest pain/discomfort, nausea, vomiting. All questions were answered.     /69 (BP Location: Left arm)   Pulse 82   Temp 97.1  F (36.2  C) (Oral)   Resp 17   Ht 1.702 m (5' 7\")   Wt 87.5 kg (193 lb)   SpO2 94%   BMI 30.23 kg/m    GENERAL: Awake and alert in bed, in good spirits. No acute distress.   HEENT: PERRLA. No scleral icterus or conjunctival injection. Oral cavity moist and pink with no ulcers, exudate, or thrush present. No cervical or supraclavicular lymphadenopathy.  SKIN: Warm and dry. No bruises, rashes, or skin lesions.  LUNGS: Normal work of breathing with no use of accessory muscles. Clear breath sounds in all lung fields bilaterally with no wheezes or crackles appreciated.  CARDIAC: RRR. Normal S1 and S2. No murmurs, clicks, or rubs appreciated. No JVD. Radial and dorsalis pedis pulses intact bilaterally. No peripheral edema.  ABDOMEN: Non-distended. Bowel sounds present in 4 quadrants. Soft and non-tender throughout with no masses or organomegaly.  NEUROLOGIC: Alert and oriented. CN II-XII intact bilaterally. Sensation to light touch involving upper and lower extremities intact bilaterally. 5/5 muscle strength present throughout upper and lower extremities.   EXTREMITIES: No gross deformity or peripheral edema. Appear well-perfused.       Assessment/plan: continue on current plan as detailed in today's formal progress note.    Patient dicussed with attending physician, Dr. Alonzo Jarquin , who agrees with the plan.       Tosha Jacinto MD, PGY-1  [facility] Residency [facility]  06/09/25  "

## 2025-06-09 NOTE — TELEPHONE ENCOUNTER
Forms/Letter Request    Type of form/letter: Home Health Certification      Do we have the form/letter: Yes: placed in Dr. Davis in box    Who is the form from? Home care    Where did/will the form come from? form was faxed in    When is form/letter needed by: when done    How would you like the form/letter returned: Fax : [phone]        Order details/form description: 2 pages    Order number (if applicable): [id number],& [id number]

## 2025-06-09 NOTE — PLAN OF CARE
"  Problem: Adult Inpatient Plan of Care  Goal: Plan of Care Review  Description: The Plan of Care Review/Shift note should be completed every shift.  The Outcome Evaluation is a brief statement about your assessment that the patient is improving, declining, or no change.  This information will be displayed automatically on your shift  note.  Outcome: Progressing  Goal: Patient-Specific Goal (Individualized)  Description: You can add care plan individualizations to a care plan. Examples of Individualization might be:  \"Parent requests to be called daily at 9am for status\", \"I have a hard time hearing out of my right ear\", or \"Do not touch me to wake me up as it startles  me\".  Outcome: Progressing  Goal: Absence of Hospital-Acquired Illness or Injury  Outcome: Progressing  Intervention: Identify and Manage Fall Risk  Recent Flowsheet Documentation  Taken 6/9/2025 0000 by Daria Ho RN  Safety Promotion/Fall Prevention: safety round/check completed  Intervention: Prevent Skin Injury  Recent Flowsheet Documentation  Taken 6/9/2025 0000 by Daria Ho RN  Body Position: position changed independently  Taken 6/8/2025 2353 by Daria Ho RN  Body Position: position changed independently  Intervention: Prevent Infection  Recent Flowsheet Documentation  Taken 6/9/2025 0000 by Daria Ho RN  Infection Prevention: rest/sleep promoted  Goal: Optimal Comfort and Wellbeing  Outcome: Progressing  Intervention: Monitor Pain and Promote Comfort  Recent Flowsheet Documentation  Taken 6/8/2025 2353 by Daria Ho RN  Pain Management Interventions: medication (see MAR)  Intervention: Provide Person-Centered Care  Recent Flowsheet Documentation  Taken 6/9/2025 0000 by Daria Ho RN  Trust Relationship/Rapport:   care explained   emotional support provided   choices provided   empathic listening provided   questions answered   questions encouraged   reassurance provided   thoughts/feelings acknowledged  Goal: Readiness " for Transition of Care  Outcome: Progressing     Problem: Delirium  Goal: Optimal Coping  Outcome: Progressing  Goal: Improved Behavioral Control  Outcome: Progressing  Intervention: Minimize Safety Risk  Recent Flowsheet Documentation  Taken 2025 0000 by Daria Ho RN  Enhanced Safety Measures:   pain management   review medications for side effects with activity  Trust Relationship/Rapport:   care explained   emotional support provided   choices provided   empathic listening provided   questions answered   questions encouraged   reassurance provided   thoughts/feelings acknowledged  Goal: Improved Attention and Thought Clarity  Outcome: Progressing  Goal: Improved Sleep  Outcome: Progressing     Problem: Pain Acute  Goal: Optimal Pain Control and Function  Outcome: Progressing  Intervention: Develop Pain Management Plan  Recent Flowsheet Documentation  Taken 2025 2353 by Daria Ho RN  Pain Management Interventions: medication (see MAR)  Intervention: Prevent or Manage Pain  Recent Flowsheet Documentation  Taken 2025 0000 by Daria Ho RN  Medication Review/Management: medications reviewed     Problem: Infection  Goal: Absence of Infection Signs and Symptoms  Outcome: Progressing  Intervention: Prevent or Manage Infection  Recent Flowsheet Documentation  Taken 2025 0000 by Daria Ho RN  Isolation Precautions: contact precautions maintained       Patient Name: Dudley Kiran  Patient : 1955  69 year old   Diagnosis: Great toe pain (Left)    Medications     Allergies  Codeine and No known allergies    PMH:  Past Medical History:   Diagnosis Date    Acute lower limb ischemia 2025    Arthritis     Class 1 obesity due to excess calories with serious comorbidity and body mass index (BMI) of 32.0 to 32.9 in adult 08/10/2021    Coronary arteriosclerosis due to lipid rich plaque 2012    Problem list name updated by automated process. Provider to review      Coronary  "artery disease     ED (erectile dysfunction)     Elevated cholesterol 02/15/2019    Essential hypertension, benign 12/18/2019    History of prediabetes 02/01/2019    HTN (hypertension)     Hypercholesteremia     OA (osteoarthritis) 10/27/2021    Obesity     Prediabetes     Primary osteoarthritis of both hips     S/P CABG (coronary artery bypass graft) 10/04/2023    Status post coronary artery bypass graft 2010       Past Surgical History:   Procedure Laterality Date    ARTHROPLASTY, HIP, TOTAL, DIRECT ANTERIOR APPROACH, USING HANA TABLE Left 10/27/2021    Procedure: LEFT TOTAL HIP ARTHROPLASTY DIRECT ANTERIOR APPROACH;  Surgeon: Bon Little MD;  Location: [facility] OR    BYPASS GRAFT ARTERY CORONARY  2010    FASCIOTOMY LOWER EXTREMITY Left 3/27/2025    Procedure: FASCIOTOMY, LEFT LOWER EXTREMITY;  Surgeon: Wanda Coelho DO;  Location: [facility] OR       Recent vital signs-  /60 (BP Location: Left arm)   Pulse 93   Temp 98.2  F (36.8  C) (Oral)   Resp 18   Ht 1.702 m (5' 7\")   Wt 87.5 kg (193 lb)   SpO2 97%   BMI 30.23 kg/m      Shift update: Patient rested in room overnight.    Neuro: AAOx 4, afebrile  Pain: c/o headache pain. PRN Tylenol was given for pain management.    Cards: HR 93, .   Respiratory: Lungs sounds clear on room air.   GIGU: Urinating via urinal. LBM 06/08. Diet Clear liquids   Skin: See flowsheet   Lines: Right PIV, infusing vancomycin   Mobility:  SBA w/ walker   Goals: CT biopsy 06/09             Daria Ho RN               "

## 2025-06-09 NOTE — PROGRESS NOTES
"[facility] Inpatient follow up       Patient:  Dudley Kiran  Date of birth 1955, Medical record number [id number]  Date of Visit:  06/09/2025  Attending Physician: Ramos Sharma MD         Assessment and Recommendations:   Assessment:  Dudley Kiran is a 69 year old male with   History of thromboembolic event leading to acute ischemic limb  History of compartment syndrome of the left leg.  Status post fasciotomy.  Recent diagnosis of cellulitis associated with wound infection.  Cultures with MRSA and Streptococcus dysgalactiae, and Corynebacterium striatum.  Was treated with IV antibiotic and discharged on oral Keflex plus oral doxycycline for 10 days on 5/28/2025  Left first MTP inflammatory arthropathy.  Does not look typical for septic arthritis.  Has a history of pain in both first MTPs in the past although last episode was about 5 years ago.  This is worrisome for gout although infection cannot be totally excluded.  Aspiration of the first MTP ordered on 6/7/2025.  I communicated with radiologist on 6/7/2025 and 6/8/2025.  Procedure done on 06/09/25    Recommendations:  Continue current antibiotics.  Follow pending aspiration labs  Continue care on the wound on the lateral aspect of the left leg.    Discussed with the patient, family members, nursing staff.    ID will follow.    Maria Elena German MD.  [facility].   [facility]  Office Telephone [phone].  Fax [phone]  [facility] paging            Interval History:     HPI:  The interval history was reviewed.   Doing better today.  Aspiration done this morning. Feeling better overall.     Pertinent cultures include:  No results found for: \"CULT\"    Recent Inflammatory Biomarkers:   Recent Labs   Lab Test 06/09/25  0605 06/08/25  0643 06/07/25  0613 06/06/25  1328 05/26/25  0629 05/25/25  1157 04/01/25  0958 04/01/25  0454   PCAL  --   --   --  0.13  --   --   -- "  0.17   WBC 7.5 12.6* 12.7* 16.8* 9.1 10.2   < >  --     < > = values in this interval not displayed.            Review of Systems:   CONSTITUTIONAL:    Temp Max: Temp (24hrs), Av.2  F (37.3  C), Min:98.4  F (36.9  C), Max:99.7  F (37.6  C)   .  Negative except for findings in the HPI.           Current Medications (antimicrobials listed in bold):     Current Facility-Administered Medications   Medication Dose Route Frequency Provider Last Rate Last Admin    [Held by provider] aspirin EC tablet 81 mg  81 mg Oral Daily Richard Mojica MD        [Held by provider] heparin ANTICOAGULANT injection 5,000 Units  5,000 Units Subcutaneous Q8H Tosha Jacinto MD   5,000 Units at 25 0042    [Held by provider] lisinopril (ZESTRIL) tablet 5 mg  5 mg Oral Daily Richard Mojica MD        metoprolol succinate ER (TOPROL-XL) 24 hr half-tab 12.5 mg  12.5 mg Oral Daily Tosha Jacinto MD   12.5 mg at 25 0947    piperacillin-tazobactam (ZOSYN) 3.375 g vial to attach to  mL bag  3.375 g Intravenous Q6H Richard Mojica MD 0 mL/hr at 25 2330 3.375 g at 25 0510    [Held by provider] rivaroxaban ANTICOAGULANT (XARELTO) tablet 20 mg  20 mg Oral Daily with supper Richard Mojica MD        sodium chloride (PF) 0.9% PF flush 3 mL  3 mL Intracatheter Q8H [facility] Richard Mojica MD   3 mL at 25 0514    vancomycin (VANCOCIN) 1,000 mg in NaCl 0.9% 200 mL intermittent infusion  1,000 mg Intravenous Q12H Ramos Sharma MD   1,000 mg at 25 0545              Allergies:     Allergies   Allergen Reactions    Codeine Nausea    No Known Allergies      Potentially cats            Physical Exam:   Vitals were reviewed  Patient Vitals for the past 24 hrs:   BP Temp Temp src Pulse Resp SpO2   25 0935 103/69 97.1  F (36.2  C) Oral 82 17 94 %   25 2353 116/60 98.2  F (36.8  C) Oral 93 18 97 %   25 1644 101/64 98.6  F (37  C) Oral 90 18 95 %       Physical Examination:  Gen: Pleasant in no acute  distress.  HEENT: NCAT. EOMI. PERRL.  Neck: No bruit, JVD or thyromegaly.  Lungs: Clear to ascultation bilat with no crackles or wheezes.  Card: RRR. NSR. No RMG. Peripheral pulses present and symmetric. No edema.  Abd: Soft NT ND. No mass. Normal bowel sounds.  Skin: No rash.  Extr: Left great toe with swelling and mild redness.  Looks more purple than actual red.  Neuro: Alert and oriented to place time and person.          Laboratory Data:   ID Labs:  Microbiology labs:  Reviewed.    No lab results found.  Recent Labs   Lab Test 06/09/25  0605 06/08/25  0643 06/07/25  0613 06/06/25  1328 05/26/25  0629 05/25/25  1157   WBC 7.5 12.6* 12.7* 16.8* 9.1 10.2     Recent Labs   Lab Test 06/09/25  0605 06/08/25  0643 06/07/25  0613 06/06/25  1328   CR 0.91 0.97 1.06 0.93   GFRESTIMATED >90 85 76 89       Hematology Studies  Recent Labs   Lab Test 06/09/25  0605 06/09/25  0005 06/08/25  0643 06/07/25  0613 06/06/25  1328 05/26/25  0629 05/25/25  1157 04/12/25  0603 04/09/25  0507 04/06/25  0507 03/27/25  0624 03/27/25  0257 03/27/25  0013 03/26/25  2038   WBC 7.5  --  12.6* 12.7* 16.8* 9.1 10.2   < > 10.7 15.4*   < > 13.1*   < > 14.4*   ANEU  --   --   --   --  13.1*  --  7.6  --  5.7 10.1*  --  7.0  --  9.2*   AEOS  --   --   --   --  0.1  --  0.0  --  1.2* 1.2*  --  1.0*  --  0.8*   HGB 9.4*  --  9.6* 10.2* 10.8* 9.4* 11.4*   < > 11.2* 11.4*   < > 12.8*   < > 15.9   HCT 29.4*  --  30.1* 31.3* 33.5* 28.1* 35.5*   < > 34.0* 35.1*   < > 37.5*   < > 45.0    447 407 423 523* 261 313   < > 575* 564*   < > 233   < > 292    < > = values in this interval not displayed.       Metabolic  Recent Labs   Lab Test 06/09/25  0605 06/08/25  0643 06/07/25  0613 06/06/25  1328 05/26/25  0629   NA  --   --  135 134* 137   BUN  --   --  12.5 12.8 9.3   CO2  --   --  23 21* 23   CR 0.91 0.97 1.06 0.93 0.94   GFRESTIMATED >90 85 76 89 88       Hepatic Studies  Recent Labs   Lab Test 06/06/25  1328 05/25/25  1157 04/12/25  0603    BILITOTAL 0.9 0.8 0.3   ALKPHOS 93 86 96   ALBUMIN 3.8 3.9 3.4*   AST 21 31 25   ALT 26 33 34       Immunologlobulins  Recent Labs   Lab Test 05/04/23  1013 03/08/19  1623   IGE 78  78  --    SED  --  12            Imaging Data:   Reviewed

## 2025-06-09 NOTE — PROVIDER NOTIFICATION
"RN paged provider for pain control alternatives other than IV dilaudid, pt says the 'feeling is fantastic and puts him into a really happy place'. Declines PO oxycodone saying \"we arent friends, it doesnt mix well \"- wants oxy discontinued, states it makes him feel sick.     Pt wouldnt be opposed to PO dilaudid as an alternative. Prefers IV dilaudid. Pt educated on pain control management. Pt rates pain 1/10 at this time.     Attending team adjusted orders and allergies.   "

## 2025-06-09 NOTE — PROGRESS NOTES
Care Management Follow Up    Length of Stay (days): 3    Expected Discharge Date: 06/11/2025     Concerns to be Addressed: discharge planning     Patient plan of care discussed at interdisciplinary rounds: Yes    Anticipated Discharge Disposition: Home, Home Care, Home Infusion         Anticipated Discharge Services: Home Care  Anticipated Discharge DME: None    Patient/family educated on Medicare website which has current facility and service quality ratings: yes  Education Provided on the Discharge Plan: Yes  Patient/Family in Agreement with the Plan: yes    Referrals Placed by CM/SW: Homecare  Private pay costs discussed: Not applicable    Discussed  Partnership in Safe Discharge Planning  document with patient/family: No     Handoff Completed: No, handoff not indicated or clinically appropriate    Additional Information:  10:32 AM  SW connected with [facility] intake in [place].  Pt is currently open to RN/PT/OT/HHA services.        LUPE Rodriguez

## 2025-06-09 NOTE — PROGRESS NOTES
Podiatry / Foot and Ankle Surgery Progress Note    June 9, 2025    WBC 7.5( down from 12.6)  Afebrile   Antibiotics - Zosyn/Vancomycin per Infectious Disease  Joint aspiration with evaluation for culture and crystal pending    Will continue to follow with plan pending results of joint aspiration    Dania Thompson DPM  10:31 AM

## 2025-06-09 NOTE — PROGRESS NOTES
[facility]    Progress Note - Hospitalist Service       Date of Admission:  6/6/2025    Assessment & Plan   Dudley Kiran is a 69 year old male admitted on 6/6/2025. He has a history of CAD s/p CABG, VTE on Xarelto, HTN and recent thromboembolism resulting in acute limb ischemia and fasciotomies and is admitted for MTP joint pain and inflammation concerning for septic arthritis.     L MTP Septic arthritis  L MTP acute osteoarthritis  Sepsis 2/2 to above  History L fasciotomies c/b cellulitis  Presented with increasing pain and decreased ROM of the L 1st MTP following PT. Home WOC nurse suggested further evaluation and Lobo was meeting sepsis criteria in the ED with tachycardia and leukocytosis, elevated CRP to 204. WBC has been down trending thus far. MRI of the L foot showed 1st MTP arthropathy with effusion, enhancing synovitis, and soft tissue edema consistent with septic arthritis. These findings were considered potentially consistent with gout, however uric acid WNL. Also some concern for osteomyelitis of the same 1st MTP on imaging. He was started on IV antibiotics and ID was consulted given his recent hospitalizations and infection and patient's desire to avoid another procedure if at all possible.     - Podiatry consulted, appreciate recommendations   - Continue IV Vancomycin and Zosyn   - IR Joint aspiration recommended by ID, scheduled for today  - Follow synovial fluid studies, if infectious will need washout +/- bone biopsy  - Infectious Disease consult, appreciate recommendations   - Recommend IR Joint aspiration and studies, follow results  - Pain Control:   - PRN Tylenol 650 mg q4h PRN for mild pain  - PRN oxycodone 2.5-5 mg q4h PRN for moderate to severe pain  - PRN IV dilaudid 0.2-0.4 mg q2h PRN for severe breakthrough pain  - AM CBC  - PT/OT/SW  - WOC consult for R LE wounds, appreciate cares  - NPO for procedure today    R arm rash, improving  Forearm rash near IV  "while administering vancomycin on 6/6/25, not indicative of allergy but likely mild infusion reaction that resolved with benadryl.     - Continue to monitor with ongoing IV antibiotic infusions  - Benadryl PRN    Chronic Conditions  HTN: hold lisinopril  CAD s/p CABG: hold ASA, continue metoprolol  Rhinitis: PTA Atrovent           Diet: NPO for Procedure/Surgery per Anesthesia Guidelines Except for: Meds; Clear liquids before procedure/surgery: ADULT (Age GREATER than or Equal to 18 years) - Clear liquids 2 hours before procedure/surgery    DVT Prophylaxis: Heparin subcutaneous - held for procedure  Capps Catheter: Not present  Fluids: NPO for procedure  Lines: None     Cardiac Monitoring: None  Code Status: Full Code      Clinically Significant Risk Factors                     # Hypertension: Noted on problem list            # Obesity: Estimated body mass index is 30.23 kg/m  as calculated from the following:    Height as of this encounter: 1.702 m (5' 7\").    Weight as of this encounter: 87.5 kg (193 lb)., PRESENT ON ADMISSION       # Financial/Environmental Concerns: none   # History of CABG: noted on surgical history       Social Drivers of Health   Physical Activity: Insufficiently Active (3/17/2025)    Exercise Vital Sign     Days of Exercise per Week: 1 day     Minutes of Exercise per Session: 10 min   Social Connections: Unknown (3/17/2025)    Social Connection and Isolation Panel [NHANES]     Frequency of Social Gatherings with Friends and Family: Once a week         Disposition Plan     Medically Ready for Discharge: Anticipated in 2-4 Days       The patient's care was discussed with the Attending Physician, Dr. Alonzo Jarquin.    Tosha Jacinto MD  Hospitalist Service  [facility]  Securely message with [employer] (more info)  Text page via "[employer]" Paging/Directory   ______________________________________________________________________    Interval History   NAEO, still anxious and " apprehensive about joint aspiration and what results may show. He feels his pain is improving and when he got up to go to the bathroom his pain was not nearly as intense as it was on admission. Plan for IR joint aspiration today.    Physical Exam   Vital Signs: Temp: 97.1  F (36.2  C) Temp src: Oral BP: 103/69 Pulse: 82   Resp: 17 SpO2: 94 % O2 Device: None (Room air)    Weight: 193 lbs 0 oz    Physical Exam  Constitutional:       General: He is not in acute distress.  Cardiovascular:      Rate and Rhythm: Normal rate and regular rhythm.      Pulses: Normal pulses.      Heart sounds: Normal heart sounds.   Pulmonary:      Effort: Pulmonary effort is normal.      Breath sounds: Normal breath sounds.   Abdominal:      General: Abdomen is flat.      Palpations: Abdomen is soft.   Musculoskeletal:      Right foot: Normal range of motion. No tenderness. Normal pulse.      Left foot: Decreased range of motion. Swelling present. No crepitus. Normal pulse.      Comments: R 1st MTP non-tender but sensation of pressure present   Skin:     General: Skin is warm and dry.      Capillary Refill: Capillary refill takes less than 2 seconds.      Findings: Bruising and wound present.      Comments: L 1st MTP bruising, LLE fasciotomy site dressing C/D/I   Neurological:      Mental Status: He is alert.       Data     I have personally reviewed the following data over the past 24 hrs:    7.5  \   9.4 (L)   / 398     N/A N/A N/A /  N/A   N/A N/A 0.91 \       Imaging results reviewed over the past 24 hrs:   No results found for this or any previous visit (from the past 24 hours).

## 2025-06-10 ENCOUNTER — TELEPHONE (OUTPATIENT)
Dept: VASCULAR SURGERY | Facility: CLINIC | Age: 70
End: 2025-06-10
Payer: COMMERCIAL

## 2025-06-10 ENCOUNTER — ENROLLMENT (OUTPATIENT)
Dept: HOME HEALTH SERVICES | Facility: HOME HEALTH | Age: 70
End: 2025-06-10
Payer: COMMERCIAL

## 2025-06-10 LAB
CREAT SERPL-MCNC: 0.9 MG/DL (ref 0.67–1.17)
CRP SERPL-MCNC: 98.5 MG/L
EGFRCR SERPLBLD CKD-EPI 2021: >90 ML/MIN/1.73M2
ERYTHROCYTE [DISTWIDTH] IN BLOOD BY AUTOMATED COUNT: 13.8 % (ref 10–15)
HCT VFR BLD AUTO: 29.1 % (ref 40–53)
HGB BLD-MCNC: 9.4 G/DL (ref 13.3–17.7)
MCH RBC QN AUTO: 27.3 PG (ref 26.5–33)
MCHC RBC AUTO-ENTMCNC: 32.3 G/DL (ref 31.5–36.5)
MCV RBC AUTO: 85 FL (ref 78–100)
PLATELET # BLD AUTO: 407 10E3/UL (ref 150–450)
RBC # BLD AUTO: 3.44 10E6/UL (ref 4.4–5.9)
WBC # BLD AUTO: 8.1 10E3/UL (ref 4–11)

## 2025-06-10 PROCEDURE — 36415 COLL VENOUS BLD VENIPUNCTURE: CPT

## 2025-06-10 PROCEDURE — 99232 SBSQ HOSP IP/OBS MODERATE 35: CPT | Performed by: STUDENT IN AN ORGANIZED HEALTH CARE EDUCATION/TRAINING PROGRAM

## 2025-06-10 PROCEDURE — 99231 SBSQ HOSP IP/OBS SF/LOW 25: CPT | Performed by: PODIATRIST

## 2025-06-10 PROCEDURE — 120N000001 HC R&B MED SURG/OB

## 2025-06-10 PROCEDURE — 250N000011 HC RX IP 250 OP 636: Performed by: FAMILY MEDICINE

## 2025-06-10 PROCEDURE — 250N000013 HC RX MED GY IP 250 OP 250 PS 637

## 2025-06-10 PROCEDURE — 82565 ASSAY OF CREATININE: CPT | Performed by: FAMILY MEDICINE

## 2025-06-10 PROCEDURE — 99232 SBSQ HOSP IP/OBS MODERATE 35: CPT | Mod: GC

## 2025-06-10 PROCEDURE — 86140 C-REACTIVE PROTEIN: CPT | Performed by: STUDENT IN AN ORGANIZED HEALTH CARE EDUCATION/TRAINING PROGRAM

## 2025-06-10 PROCEDURE — 85027 COMPLETE CBC AUTOMATED: CPT

## 2025-06-10 PROCEDURE — 250N000011 HC RX IP 250 OP 636

## 2025-06-10 RX ADMIN — VANCOMYCIN HYDROCHLORIDE 1000 MG: 1 INJECTION, SOLUTION INTRAVENOUS at 06:04

## 2025-06-10 RX ADMIN — VANCOMYCIN HYDROCHLORIDE 1000 MG: 1 INJECTION, SOLUTION INTRAVENOUS at 17:42

## 2025-06-10 RX ADMIN — PIPERACILLIN AND TAZOBACTAM 3.38 G: 3; .375 INJECTION, POWDER, FOR SOLUTION INTRAVENOUS at 08:22

## 2025-06-10 RX ADMIN — PIPERACILLIN AND TAZOBACTAM 3.38 G: 3; .375 INJECTION, POWDER, FOR SOLUTION INTRAVENOUS at 03:17

## 2025-06-10 RX ADMIN — METOPROLOL SUCCINATE ER TABLETS 12.5 MG: 25 TABLET, FILM COATED, EXTENDED RELEASE ORAL at 08:21

## 2025-06-10 ASSESSMENT — ACTIVITIES OF DAILY LIVING (ADL)
ADLS_ACUITY_SCORE: 39
ADLS_ACUITY_SCORE: 38
ADLS_ACUITY_SCORE: 39
ADLS_ACUITY_SCORE: 39
ADLS_ACUITY_SCORE: 38
ADLS_ACUITY_SCORE: 39
ADLS_ACUITY_SCORE: 39
ADLS_ACUITY_SCORE: 38
ADLS_ACUITY_SCORE: 39
ADLS_ACUITY_SCORE: 38
ADLS_ACUITY_SCORE: 39

## 2025-06-10 NOTE — PROGRESS NOTES
"[facility] Inpatient follow up       Patient:  Dudley Kiran  Date of birth 1955, Medical record number [id number]  Date of Visit:  06/10/2025  Attending Physician: Ramos Sharma MD         Assessment and Recommendations:   Assessment:  Dudley Kiran is a 69 year old male with   History of thromboembolic event leading to acute ischemic limb  History of compartment syndrome of the left leg.  Status post fasciotomy.  Recent diagnosis of cellulitis associated with wound infection.  Cultures with MRSA and Streptococcus dysgalactiae, and Corynebacterium striatum.  Was treated with IV antibiotic and discharged on oral Keflex plus oral doxycycline for 10 days on 5/28/2025  Left first MTP inflammatory arthropathy.  Does not look typical for septic arthritis.  Has a history of pain in both first MTPs in the past although last episode was about 5 years ago.  This is worrisome for gout although infection cannot be totally excluded.  Aspiration of the first MTP ordered on 6/7/2025.  I communicated with radiologist on 6/7/2025 and 6/8/2025.  Procedure done on 06/09/25.  Cultures in process.  So far no growth.  Crystal analysis ordered but canceled for unclear reason (not enough fluid?)    Recommendations:  Continue IV vancomycin.  Discontinue IV Zosyn.  Check CRP today.  Follow-up pending cultures.    Planning to do IV vancomycin if continues to improve.      Discussed with the patient, and nursing staff.    ID will follow.    Maria Elena German MD.  [facility].   [facility]  Office Telephone [phone].  Fax [phone]  [facility] paging            Interval History:     HPI:  The interval history was reviewed.   Doing better today.  Aspiration done yesterday.  Crystal analysis not sent.  Cultures in process.    Pertinent cultures include:  No results found for: \"CULT\"    Recent Inflammatory Biomarkers:   Recent Labs   Lab Test " 06/10/25  0742 25  0605 25  0643 25  0613 25  1328 25  0629 25  0958 25  0454   PCAL  --   --   --   --  0.13  --   --  0.17   WBC 8.1 7.5 12.6* 12.7* 16.8* 9.1   < >  --     < > = values in this interval not displayed.            Review of Systems:   CONSTITUTIONAL:    Temp Max: Temp (24hrs), Av.2  F (37.3  C), Min:98.4  F (36.9  C), Max:99.7  F (37.6  C)   .  Negative except for findings in the HPI.           Current Medications (antimicrobials listed in bold):     Current Facility-Administered Medications   Medication Dose Route Frequency Provider Last Rate Last Admin    [Held by provider] aspirin EC tablet 81 mg  81 mg Oral Daily Richard Mojica MD        [Held by provider] heparin ANTICOAGULANT injection 5,000 Units  5,000 Units Subcutaneous Q8H Tosha Jacinto MD   5,000 Units at 25 0042    [Held by provider] lisinopril (ZESTRIL) tablet 5 mg  5 mg Oral Daily Richard Mojica MD        metoprolol succinate ER (TOPROL-XL) 24 hr half-tab 12.5 mg  12.5 mg Oral Daily Tosha Jacinto MD   12.5 mg at 06/10/25 0821    piperacillin-tazobactam (ZOSYN) 3.375 g vial to attach to  mL bag  3.375 g Intravenous Q6H Richard Mojica MD 0 mL/hr at 25 2330 3.375 g at 06/10/25 0822    [Held by provider] rivaroxaban ANTICOAGULANT (XARELTO) tablet 20 mg  20 mg Oral Daily with supper Richard Mojica MD        sodium chloride (PF) 0.9% PF flush 3 mL  3 mL Intracatheter Q8H [facility] Richard Mojica MD   3 mL at 25    vancomycin (VANCOCIN) 1,000 mg in NaCl 0.9% 200 mL intermittent infusion  1,000 mg Intravenous Q12H Ramos Sharma MD   1,000 mg at 06/10/25 0604              Allergies:     Allergies   Allergen Reactions    Oxycodone Confusion    Codeine Nausea    No Known Allergies      Potentially cats            Physical Exam:   Vitals were reviewed  Patient Vitals for the past 24 hrs:   BP Temp Temp src Pulse Resp SpO2   06/10/25 0753 105/63 98  F (36.7  C) Oral 76 18 94 %    06/09/25 2308 103/58 99.1  F (37.3  C) Oral 92 20 97 %   06/09/25 1530 129/60 97.3  F (36.3  C) Oral 92 18 98 %       Physical Examination:  Gen: Pleasant in no acute distress.  HEENT: NCAT. EOMI. PERRL.  Neck: No bruit, JVD or thyromegaly.  Lungs: Clear to ascultation bilat with no crackles or wheezes.  Card: RRR. NSR. No RMG. Peripheral pulses present and symmetric. No edema.  Abd: Soft NT ND. No mass. Normal bowel sounds.  Skin: No rash.  Extr: Left great toe with swelling and mild redness.  Looks more purple than actual red.  Neuro: Alert and oriented to place time and person.          Laboratory Data:   ID Labs:  Microbiology labs:  Reviewed.    No lab results found.  Recent Labs   Lab Test 06/10/25  0742 06/09/25  0605 06/08/25  0643 06/07/25  0613 06/06/25  1328 05/26/25  0629   WBC 8.1 7.5 12.6* 12.7* 16.8* 9.1     Recent Labs   Lab Test 06/10/25  0742 06/09/25  0605 06/08/25  0643 06/07/25  0613   CR 0.90 0.91 0.97 1.06   GFRESTIMATED >90 >90 85 76       Hematology Studies  Recent Labs   Lab Test 06/10/25  0742 06/09/25  0605 06/09/25  0005 06/08/25  0643 06/07/25  0613 06/06/25  1328 05/26/25  0629 05/25/25  1157 04/12/25  0603 04/09/25  0507 04/06/25  0507 03/27/25  0624 03/27/25  0257 03/27/25  0013 03/26/25  2038   WBC 8.1 7.5  --  12.6* 12.7* 16.8* 9.1 10.2   < > 10.7 15.4*   < > 13.1*   < > 14.4*   ANEU  --   --   --   --   --  13.1*  --  7.6  --  5.7 10.1*  --  7.0  --  9.2*   AEOS  --   --   --   --   --  0.1  --  0.0  --  1.2* 1.2*  --  1.0*  --  0.8*   HGB 9.4* 9.4*  --  9.6* 10.2* 10.8* 9.4* 11.4*   < > 11.2* 11.4*   < > 12.8*   < > 15.9   HCT 29.1* 29.4*  --  30.1* 31.3* 33.5* 28.1* 35.5*   < > 34.0* 35.1*   < > 37.5*   < > 45.0    398 447 407 423 523* 261 313   < > 575* 564*   < > 233   < > 292    < > = values in this interval not displayed.       Metabolic  Recent Labs   Lab Test 06/10/25  0742 06/09/25  0605 06/08/25  0643 06/07/25  0613 06/06/25  1328 05/26/25  0629   NA  --   --    --  135 134* 137   BUN  --   --   --  12.5 12.8 9.3   CO2  --   --   --  23 21* 23   CR 0.90 0.91 0.97 1.06 0.93 0.94   GFRESTIMATED >90 >90 85 76 89 88       Hepatic Studies  Recent Labs   Lab Test 06/06/25  1328 05/25/25  1157 04/12/25  0603   BILITOTAL 0.9 0.8 0.3   ALKPHOS 93 86 96   ALBUMIN 3.8 3.9 3.4*   AST 21 31 25   ALT 26 33 34       Immunologlobulins  Recent Labs   Lab Test 05/04/23  1013 03/08/19  1623   IGE 78  78  --    SED  --  12            Imaging Data:   Reviewed

## 2025-06-10 NOTE — PLAN OF CARE
Problem: Adult Inpatient Plan of Care  Goal: Plan of Care Review  Description: The Plan of Care Review/Shift note should be completed every shift.  The Outcome Evaluation is a brief statement about your assessment that the patient is improving, declining, or no change.  This information will be displayed automatically on your shift  note.  Outcome: Progressing  Flowsheets (Taken 6/10/2025 1841)  Plan of Care Reviewed With: patient  Goal: Optimal Comfort and Wellbeing  Outcome: Progressing     Problem: Delirium  Goal: Improved Attention and Thought Clarity  Outcome: Progressing     Problem: Pain Acute  Goal: Optimal Pain Control and Function  Outcome: Progressing  Intervention: Prevent or Manage Pain  Recent Flowsheet Documentation  Taken 6/10/2025 1640 by Mahesh Mccracken RN  Medication Review/Management: medications reviewed  Taken 6/10/2025 0828 by Mahesh Mccracken RN  Medication Review/Management: medications reviewed     Problem: Infection  Goal: Absence of Infection Signs and Symptoms  Outcome: Progressing  Intervention: Prevent or Manage Infection  Recent Flowsheet Documentation  Taken 6/10/2025 1640 by Mahesh Mccracken, RN  Isolation Precautions: contact precautions maintained  Taken 6/10/2025 0828 by Mahesh Mccracken RN  Isolation Precautions: contact precautions maintained     Goal Outcome Evaluation:      Plan of Care Reviewed With: patient

## 2025-06-10 NOTE — PROGRESS NOTES
[facility]    Progress Note - Hospitalist Service       Date of Admission:  6/6/2025    Assessment & Plan   Dudley Kiran is a 69 year old male admitted on 6/6/2025. He has a history of CAD s/p CABG, VTE on Xarelto, HTN and recent thromboembolism resulting in acute limb ischemia and fasciotomies and is admitted for MTP joint pain and inflammation concerning for septic arthritis.     L MTP Septic arthritis  L MTP acute osteoarthritis  Sepsis 2/2 to above  History L fasciotomies c/b cellulitis  Presented with increasing pain and decreased ROM of the L 1st MTP following PT. Home WOC nurse suggested further evaluation and Lobo was meeting sepsis criteria in the ED with tachycardia and leukocytosis, elevated CRP to 204, improving to 98.5 on most recent check. WBC has been down trending thus far. MRI of the L foot showed 1st MTP arthropathy with effusion, enhancing synovitis, and soft tissue edema consistent with septic arthritis. These findings were considered potentially consistent with gout, however uric acid WNL. Also some concern for osteomyelitis of the same 1st MTP on imaging. He was started on IV antibiotics and ID was consulted and joint aspiration was completed on 6/9/25.     - Podiatry consulted, signing off today   - ID management of antibiotics   - WBAT   - Follow up with Dr. Izquierdo in 2-3 weeks  - Infectious Disease consult, appreciate recommendations   - S/P IR Joint aspiration 6/9/25, follow studies   - IV Zosyn and Vancomycin  - Pain Control:   - PRN Tylenol 650 mg q4h PRN for mild pain  - PRN oxycodone 2.5-5 mg q4h PRN for moderate to severe pain  - PRN IV dilaudid 0.2-0.4 mg q2h PRN for severe breakthrough pain  - AM CBC  - PT/OT/SW  - WOC consult for R LE wounds, appreciate cares    R arm rash, improving  Forearm rash near IV while administering vancomycin on 6/6/25, not indicative of allergy but likely mild infusion reaction that resolved with benadryl.     - Continue  "to monitor with ongoing IV antibiotic infusions  - Benadryl PRN    Chronic Conditions  HTN: hold lisinopril  CAD s/p CABG: hold ASA, continue metoprolol  Rhinitis: PTA Atrovent           Diet: Regular Diet Adult    DVT Prophylaxis: Heparin subcutaneous - held for procedure  Capps Catheter: Not present  Fluids: NPO for procedure  Lines: None     Cardiac Monitoring: None  Code Status: Full Code      Clinically Significant Risk Factors                     # Hypertension: Noted on problem list            # Obesity: Estimated body mass index is 30.23 kg/m  as calculated from the following:    Height as of this encounter: 1.702 m (5' 7\").    Weight as of this encounter: 87.5 kg (193 lb)., PRESENT ON ADMISSION       # Financial/Environmental Concerns: none   # History of CABG: noted on surgical history       Social Drivers of Health   Physical Activity: Insufficiently Active (3/17/2025)    Exercise Vital Sign     Days of Exercise per Week: 1 day     Minutes of Exercise per Session: 10 min   Social Connections: Unknown (3/17/2025)    Social Connection and Isolation Panel [NHANES]     Frequency of Social Gatherings with Friends and Family: Once a week         Disposition Plan     Medically Ready for Discharge: Anticipated in 2-4 Days       The patient's care was discussed with the Attending Physician, Dr. Alonzo Jarquin.    Tosha Jacinto MD  Hospitalist Service  [facility]  Securely message with Real Time Tomography (more info)  Text page via [employer] Paging/Directory   ______________________________________________________________________    Interval History   NAEO, pain improving and only hurts with dorsiflexion. Has been up to the bathroom and walking with minimal pain. Feeling more hopeful about discharge plans.     Physical Exam   Vital Signs: Temp: 98  F (36.7  C) Temp src: Oral BP: 105/63 Pulse: 76   Resp: 18 SpO2: 94 % O2 Device: None (Room air)    Weight: 193 lbs 0 oz    Physical Exam  Constitutional:      "  General: He is not in acute distress.  Cardiovascular:      Rate and Rhythm: Normal rate and regular rhythm.      Pulses: Normal pulses.      Heart sounds: Normal heart sounds.   Pulmonary:      Effort: Pulmonary effort is normal.      Breath sounds: Normal breath sounds.   Abdominal:      General: Abdomen is flat.      Palpations: Abdomen is soft.   Musculoskeletal:      Right foot: Normal range of motion. No tenderness. Normal pulse.      Left foot: Decreased range of motion. Swelling present. No crepitus. Normal pulse.   Skin:     General: Skin is warm and dry.      Capillary Refill: Capillary refill takes less than 2 seconds.      Findings: Bruising and wound present.      Comments: L 1st MTP bruising, LLE fasciotomy site dressing C/D/I   Neurological:      Mental Status: He is alert.       Data     I have personally reviewed the following data over the past 24 hrs:    8.1  \   9.4 (L)   / 407     N/A N/A N/A /  N/A   N/A N/A 0.90 \     Procal: N/A CRP: 98.50 (H) Lactic Acid: N/A         Imaging results reviewed over the past 24 hrs:   No results found for this or any previous visit (from the past 24 hours).

## 2025-06-10 NOTE — TELEPHONE ENCOUNTER
Currently inpatient expected to discharge 6/11      ----- Message from Dania Thompson sent at 6/10/2025 11:31 AM CDT -----  Please have patient schedule hospital follow up of left foot pain in 2-3 weeks.     Thanks,    Dania

## 2025-06-10 NOTE — PROGRESS NOTES
"Podiatry / Foot and Ankle Surgery Progress Note    Shaneka 10, 2025    Subject: Patient was seen at bedside.  He is resting in no distress.  Relates that his left foot is feeling much better, less pain, redness/swelling.  He is now able to move his toe.  Relates some soreness and bruising from the needle/aspiration yesterday but that seems to be improving as well.    Objective:  Vitals: /63 (BP Location: Left arm)   Pulse 76   Temp 98  F (36.7  C) (Oral)   Resp 18   Ht 1.702 m (5' 7\")   Wt 87.5 kg (193 lb)   SpO2 94%   BMI 30.23 kg/m    BMI= Body mass index is 30.23 kg/m .    General:  Patient is alert and orientated.  NAD.    Left first MPJ with reduced erythema/edema/pain over course of admission    Cultures:      Collected 6/9/2025 12:23 PM       Status: Preliminary result    Test Result Released: No    Specimen Information: Joint, Other; Synovial fluid   0 Result Notes  Culture No growth, less than 1 day            Gram Stain Result No organisms seen   4+ WBC seen   Predominantly PMNs           Resulting Agency: [facility]          Specimen Collected: 06/09/25 12:23 PM Last Resulted: 06/10/25  8:29 AM         Status: Final result    Test Result Released: Yes (not seen)    0 Result Notes         Component  Ref Range & Units (hover) 1 d ago    Crystals Analysis No clinically significant crystals seen.   Resulting Agency [facility]             Specimen Collected: 06/09/25 12:23 PM Last Resulted: 06/09/25 12:41 PM         Assessment: 69 year old male with left first MPJ inflammatory arthropathy    Plan:    Chart reviewed  Discussed findings with the patient  All questions answered   Antibiotics per Infectious Disease - will defer final interpretation of joint aspirate to their team, at this point no organisms seen  Weightbearing as tolerated to the left foot  Assuming no further intervention needed for the foot, patient can follow up with Dr Izquierdo in 2-3 weeks in clinic  Podiatry to sign off  Please contact " if any questions or concerns        Dania Thompson DPM  11:23 AM

## 2025-06-10 NOTE — PLAN OF CARE
Problem: Infection  Goal: Absence of Infection Signs and Symptoms  Outcome: Progressing  Intervention: Prevent or Manage Infection  Recent Flowsheet Documentation  Taken 6/10/2025 0008 by Jones Mendez RN  Isolation Precautions: contact precautions maintained         AO x4. VSS on RA. Denied pain. No major events overnight. Makes needs known. Call light within reach.

## 2025-06-11 ENCOUNTER — HOME INFUSION (OUTPATIENT)
Dept: HOME HEALTH SERVICES | Facility: HOME HEALTH | Age: 70
End: 2025-06-11
Payer: COMMERCIAL

## 2025-06-11 ENCOUNTER — TELEPHONE (OUTPATIENT)
Dept: FAMILY MEDICINE | Facility: CLINIC | Age: 70
End: 2025-06-11

## 2025-06-11 ENCOUNTER — HOME INFUSION BILLING (OUTPATIENT)
Dept: HOME HEALTH SERVICES | Facility: HOME HEALTH | Age: 70
End: 2025-06-11
Payer: COMMERCIAL

## 2025-06-11 VITALS
DIASTOLIC BLOOD PRESSURE: 75 MMHG | RESPIRATION RATE: 17 BRPM | HEART RATE: 86 BPM | OXYGEN SATURATION: 97 % | SYSTOLIC BLOOD PRESSURE: 131 MMHG | WEIGHT: 193 LBS | BODY MASS INDEX: 30.29 KG/M2 | TEMPERATURE: 98.5 F | HEIGHT: 67 IN

## 2025-06-11 DIAGNOSIS — M00.9 SEPTIC ARTHRITIS OF LEFT FOOT, DUE TO UNSPECIFIED ORGANISM (H): Primary | ICD-10-CM

## 2025-06-11 LAB
BACTERIA SPEC CULT: NO GROWTH
BACTERIA SPEC CULT: NO GROWTH
ERYTHROCYTE [DISTWIDTH] IN BLOOD BY AUTOMATED COUNT: 13.9 % (ref 10–15)
HCT VFR BLD AUTO: 29.9 % (ref 40–53)
HGB BLD-MCNC: 9.7 G/DL (ref 13.3–17.7)
HOLD SPECIMEN: NORMAL
MCH RBC QN AUTO: 27.5 PG (ref 26.5–33)
MCHC RBC AUTO-ENTMCNC: 32.4 G/DL (ref 31.5–36.5)
MCV RBC AUTO: 85 FL (ref 78–100)
PLATELET # BLD AUTO: 437 10E3/UL (ref 150–450)
RBC # BLD AUTO: 3.53 10E6/UL (ref 4.4–5.9)
WBC # BLD AUTO: 8.5 10E3/UL (ref 4–11)

## 2025-06-11 PROCEDURE — 250N000011 HC RX IP 250 OP 636: Performed by: FAMILY MEDICINE

## 2025-06-11 PROCEDURE — A4245 ALCOHOL WIPES PER BOX: HCPCS

## 2025-06-11 PROCEDURE — 99232 SBSQ HOSP IP/OBS MODERATE 35: CPT | Performed by: STUDENT IN AN ORGANIZED HEALTH CARE EDUCATION/TRAINING PROGRAM

## 2025-06-11 PROCEDURE — A4221 SUPP NON-INSULIN INF CATH/WK: HCPCS

## 2025-06-11 PROCEDURE — E1399 DURABLE MEDICAL EQUIPMENT MI: HCPCS

## 2025-06-11 PROCEDURE — E0781 EXTERNAL AMBULATORY INFUS PU: HCPCS | Mod: RR

## 2025-06-11 PROCEDURE — A4452 WATERPROOF TAPE: HCPCS

## 2025-06-11 PROCEDURE — A4222 INFUSION SUPPLIES WITH PUMP: HCPCS

## 2025-06-11 PROCEDURE — 85014 HEMATOCRIT: CPT

## 2025-06-11 PROCEDURE — S1015 IV TUBING EXTENSION SET: HCPCS

## 2025-06-11 PROCEDURE — 272N000450 HC KIT 4FR POWER PICC SINGLE LUMEN

## 2025-06-11 PROCEDURE — 36569 INSJ PICC 5 YR+ W/O IMAGING: CPT

## 2025-06-11 PROCEDURE — 36415 COLL VENOUS BLD VENIPUNCTURE: CPT

## 2025-06-11 PROCEDURE — 250N000009 HC RX 250: Mod: JW | Performed by: STUDENT IN AN ORGANIZED HEALTH CARE EDUCATION/TRAINING PROGRAM

## 2025-06-11 PROCEDURE — 250N000013 HC RX MED GY IP 250 OP 250 PS 637

## 2025-06-11 PROCEDURE — A9270 NON-COVERED ITEM OR SERVICE: HCPCS

## 2025-06-11 PROCEDURE — 272N000277 HC DEVICE 4FR SECURACATH

## 2025-06-11 RX ORDER — LIDOCAINE 40 MG/G
CREAM TOPICAL
Status: DISCONTINUED | OUTPATIENT
Start: 2025-06-11 | End: 2025-06-11 | Stop reason: HOSPADM

## 2025-06-11 RX ORDER — VANCOMYCIN HYDROCHLORIDE 1 G/200ML
1000 INJECTION, SOLUTION INTRAVENOUS EVERY 12 HOURS
Status: SHIPPED
Start: 2025-06-11 | End: 2025-06-11

## 2025-06-11 RX ADMIN — VANCOMYCIN HYDROCHLORIDE 1000 MG: 1 INJECTION, SOLUTION INTRAVENOUS at 06:06

## 2025-06-11 RX ADMIN — LIDOCAINE HYDROCHLORIDE 2.5 ML: 10 INJECTION, SOLUTION EPIDURAL; INFILTRATION; INTRACAUDAL; PERINEURAL at 10:45

## 2025-06-11 RX ADMIN — METOPROLOL SUCCINATE ER TABLETS 12.5 MG: 25 TABLET, FILM COATED, EXTENDED RELEASE ORAL at 09:19

## 2025-06-11 ASSESSMENT — ACTIVITIES OF DAILY LIVING (ADL)
ADLS_ACUITY_SCORE: 38

## 2025-06-11 NOTE — PROGRESS NOTES
[facility] ID Inpatient follow up       Patient:  Dudley Kiran  Date of birth 1955, Medical record number [id number]  Date of Visit:  06/11/2025  Attending Physician: Ramos Sharma MD         Assessment and Recommendations:   Assessment:  Dudley Kiran is a 69 year old male with   History of thromboembolic event leading to acute ischemic limb  History of compartment syndrome of the left leg.  Status post fasciotomy.  Recent diagnosis of cellulitis associated with wound infection.  Cultures with MRSA and Streptococcus dysgalactiae, and Corynebacterium striatum.  Was treated with IV antibiotic and discharged on oral Keflex plus oral doxycycline for 10 days on 5/28/2025  Left first MTP inflammatory arthropathy.  Does not look typical for septic arthritis.  Has a history of pain in both first MTPs in the past although last episode was about 5 years ago.  This is worrisome for gout although infection cannot be totally excluded.  Aspiration of the first MTP ordered on 6/7/2025.  I communicated with radiologist on 6/7/2025 and 6/8/2025.  Procedure done on 06/09/25.  Cultures in process.  So far no growth.  Crystal analysis ordered but canceled for unclear reason (not enough fluid?).  Cultures negative so far.  CRP significantly better    Recommendations:  Continue IV vancomycin.  Single-lumen PICC line.  Order placed.  Okay to discharge from the ID standpoint on IV vancomycin for 3 to 4 weeks.  Order placed.  Follow-up pending cultures.    ID clinic follow-up with me in 2 to 3 weeks.  Arranged.    Discussed with the patient, primary team, care management and nursing staff.    ID will sign off.    Maria Elena German MD.  [facility].   [facility] ID Clinic  Office Telephone [phone].  Fax [phone]  [facility] paging            Interval History:     HPI:  The interval history was reviewed.   Continue to improve.  Pain under much better  "control.  Culture reviewed.    Pertinent cultures include:  No results found for: \"CULT\"    Recent Inflammatory Biomarkers:   Recent Labs   Lab Test 25  0623 06/10/25  0742 25  0605 25  0643 25  0613 25  1328 25  0958 25  0454   PCAL  --   --   --   --   --  0.13  --  0.17   WBC 8.5 8.1 7.5 12.6* 12.7* 16.8*   < >  --     < > = values in this interval not displayed.            Review of Systems:   CONSTITUTIONAL:    Temp Max: Temp (24hrs), Av.2  F (37.3  C), Min:98.4  F (36.9  C), Max:99.7  F (37.6  C)   .  Negative except for findings in the HPI.           Current Medications (antimicrobials listed in bold):     Current Facility-Administered Medications   Medication Dose Route Frequency Provider Last Rate Last Admin    [Held by provider] aspirin EC tablet 81 mg  81 mg Oral Daily Richard Mojica MD        [Held by provider] heparin ANTICOAGULANT injection 5,000 Units  5,000 Units Subcutaneous Q8H Tosha Jacinto MD   5,000 Units at 25 0042    [Held by provider] lisinopril (ZESTRIL) tablet 5 mg  5 mg Oral Daily Richard Mojica MD        metoprolol succinate ER (TOPROL-XL) 24 hr half-tab 12.5 mg  12.5 mg Oral Daily Tosha Jacinto MD   12.5 mg at 06/10/25 0821    [Held by provider] rivaroxaban ANTICOAGULANT (XARELTO) tablet 20 mg  20 mg Oral Daily with supper Richard Mojica MD        sodium chloride (PF) 0.9% PF flush 3 mL  3 mL Intracatheter Q8H [facility] Richard Mojica MD   3 mL at 25 0606    vancomycin (VANCOCIN) 1,000 mg in NaCl 0.9% 200 mL intermittent infusion  1,000 mg Intravenous Q12H Ramos Sharma MD   1,000 mg at 25 0606              Allergies:     Allergies   Allergen Reactions    Oxycodone Confusion    Codeine Nausea    No Known Allergies      Potentially cats            Physical Exam:   Vitals were reviewed  Patient Vitals for the past 24 hrs:   BP Temp Temp src Pulse Resp SpO2   25 0742 105/60 98.2  F (36.8  C) Oral 78 16 96 %   06/10/25 2340 " 120/63 98.3  F (36.8  C) Oral 88 18 95 %   06/10/25 1600 113/60 98.5  F (36.9  C) Oral 86 18 96 %       Physical Examination:  Gen: Pleasant in no acute distress.  HEENT: NCAT. EOMI. PERRL.  Neck: No bruit, JVD or thyromegaly.  Lungs: Clear to ascultation bilat with no crackles or wheezes.  Card: RRR. NSR. No RMG. Peripheral pulses present and symmetric. No edema.  Abd: Soft NT ND. No mass. Normal bowel sounds.  Skin: No rash.  Extr: Left great toe with swelling and mild redness.  Looks more purple than actual red.  Neuro: Alert and oriented to place time and person.          Laboratory Data:   ID Labs:  Microbiology labs:  Reviewed.    No lab results found.  Recent Labs   Lab Test 06/11/25  0623 06/10/25  0742 06/09/25  0605 06/08/25  0643 06/07/25  0613 06/06/25  1328   WBC 8.5 8.1 7.5 12.6* 12.7* 16.8*     Recent Labs   Lab Test 06/10/25  0742 06/09/25  0605 06/08/25  0643 06/07/25  0613   CR 0.90 0.91 0.97 1.06   GFRESTIMATED >90 >90 85 76       Hematology Studies  Recent Labs   Lab Test 06/11/25  0623 06/10/25  0742 06/09/25  0605 06/09/25  0005 06/08/25  0643 06/07/25  0613 06/06/25  1328 05/26/25  0629 05/25/25  1157 04/12/25  0603 04/09/25  0507 04/06/25  0507 03/27/25  0624 03/27/25  0257 03/27/25  0013 03/26/25  2038   WBC 8.5 8.1 7.5  --  12.6* 12.7* 16.8*   < > 10.2   < > 10.7 15.4*   < > 13.1*   < > 14.4*   ANEU  --   --   --   --   --   --  13.1*  --  7.6  --  5.7 10.1*  --  7.0  --  9.2*   AEOS  --   --   --   --   --   --  0.1  --  0.0  --  1.2* 1.2*  --  1.0*  --  0.8*   HGB 9.7* 9.4* 9.4*  --  9.6* 10.2* 10.8*   < > 11.4*   < > 11.2* 11.4*   < > 12.8*   < > 15.9   HCT 29.9* 29.1* 29.4*  --  30.1* 31.3* 33.5*   < > 35.5*   < > 34.0* 35.1*   < > 37.5*   < > 45.0    407 398 447 407 423 523*   < > 313   < > 575* 564*   < > 233   < > 292    < > = values in this interval not displayed.       Metabolic  Recent Labs   Lab Test 06/10/25  0742 06/09/25  0605 06/08/25  0643 06/07/25  0613  06/06/25  1328 05/26/25  0629   NA  --   --   --  135 134* 137   BUN  --   --   --  12.5 12.8 9.3   CO2  --   --   --  23 21* 23   CR 0.90 0.91 0.97 1.06 0.93 0.94   GFRESTIMATED >90 >90 85 76 89 88       Hepatic Studies  Recent Labs   Lab Test 06/06/25  1328 05/25/25  1157 04/12/25  0603   BILITOTAL 0.9 0.8 0.3   ALKPHOS 93 86 96   ALBUMIN 3.8 3.9 3.4*   AST 21 31 25   ALT 26 33 34       Immunologlobulins  Recent Labs   Lab Test 05/04/23  1013 03/08/19  1623   IGE 78  78  --    SED  --  12            Imaging Data:   Reviewed

## 2025-06-11 NOTE — LETTER
Home Infusion Plan of Care 25   Effective from: 2025  Effective to: 2026  Last Updated On: 2025    Plan ID: [id number]                Participants as of Finalize on 2025      Name Type Comments Contact Info    Maria Elena German MD Interdisciplinary  [address] [facility] [address]   #[phone]    Arnol Tesfaye [facility] Interdisciplinary  No address on file    Nydia Guzmán RN Home Infusion  No address on file           Patient Demographics       Patient Name  Dudley Kiran Legal Sex  Male   1955 N  [id number] Address  [address]  [facility] [address] Phone  [phone] (Home)  [phone] (Mobile) *Preferred*           Allergies          Severity Reactions    Oxycodone Medium Confusion    Codeine Not Specified Nausea    No Known Allergies Low     Potentially cats           Problem List  Current as of 25 1506      Digestive  Class 1 obesity due to excess calories with serious comorbidity and body mass index (BMI) of 32.0 to 32.9 in adult     Endocrine  Elevated cholesterol  History of prediabetes     Circulatory  Acute lower limb ischemia  Coronary arteriosclerosis due to lipid rich plaque  Essential hypertension, benign    Musculoskeletal and Integumentary  Great toe pain, left     Infectious/Inflammatory  OA (osteoarthritis)  Primary osteoarthritis of both hips              Other  S/P CABG (coronary artery bypass graft)                                Active Services       Anti-infective       Therapy Start: Therapy End: Linked Line: Line Type: Lumens:       2025 PICC 25 Single Lumen Right Basilic Antibiotic(s) PICC 1       Medications     Sodium Chloride, Vancomycin HCl, TUBING, CADD #0499 LOW VOL NO FILTER (VANCOCIN)              Equipment  None            Notes                                 Medications       acetaminophen (TYLENOL) 325 MG tablet    aspirin 81 MG EC tablet    Emergency Supply Kit, Central,     "HYDROmorphone (DILAUDID) 2 MG tablet    ipratropium (ATROVENT) 0.06 % nasal spray    lisinopril (ZESTRIL) 5 MG tablet    metoprolol succinate ER (TOPROL XL) 25 MG 24 hr tablet    ondansetron (ZOFRAN ODT) 4 MG ODT tab    prochlorperazine (COMPAZINE) 5 MG tablet    rivaroxaban ANTICOAGULANT (XARELTO) 20 MG TABS tablet    sodium chloride, PF, 0.9% PF flush    tadalafil (CIALIS) 10 MG tablet    vancomycin (VANCOCIN) 1,000 mg in sodium chloride 0.9 % 100 mL via CADD pump           Hospital Medications       acetaminophen (TYLENOL) Suppository 650 mg    Linked Group 1: Placed in \"Or\" Linked Group    acetaminophen (TYLENOL) tablet 650 mg    Linked Group 1: Placed in \"Or\" Linked Group    aspirin EC tablet 81 mg ([Held by provider] since 6/6/2025  5:59 PM)    calcium carbonate (TUMS) chewable tablet 1,000 mg    diphenhydrAMINE (BENADRYL) capsule 25 mg    Linked Group 2: Placed in \"Or\" Linked Group    diphenhydrAMINE (BENADRYL) injection 25 mg    Linked Group 2: Placed in \"Or\" Linked Group    heparin ANTICOAGULANT injection 5,000 Units ([Held by provider] since 6/8/2025  8:06 AM)    HYDROmorphone (DILAUDID) half-tab 1 mg    HYDROmorphone (DILAUDID) tablet 2 mg    ipratropium (ATROVENT) 0.06 % spray 2 spray    lidocaine (LMX4) cream    lidocaine (LMX4) cream    lidocaine 1 % 0.1-1 mL    lidocaine 1 % 0.1-5 mL    lisinopril (ZESTRIL) tablet 5 mg ([Held by provider] since 6/6/2025  5:59 PM)    melatonin tablet 1 mg    metoprolol succinate ER (TOPROL-XL) 24 hr half-tab 12.5 mg    naloxone (NARCAN) injection 0.2 mg    Linked Group 3: Placed in \"Or\" Linked Group    naloxone (NARCAN) injection 0.2 mg    Linked Group 3: Placed in \"Or\" Linked Group    naloxone (NARCAN) injection 0.4 mg    Linked Group 3: Placed in \"Or\" Linked Group    naloxone (NARCAN) injection 0.4 mg    Linked Group 3: Placed in \"Or\" Linked Group    ondansetron (ZOFRAN ODT) ODT tab 4 mg    Linked Group 4: Placed in \"Or\" Linked Group    ondansetron (ZOFRAN) injection " "4 mg    Linked Group 4: Placed in \"Or\" Linked Group    prochlorperazine (COMPAZINE) tablet 5 mg    rivaroxaban ANTICOAGULANT (XARELTO) tablet 20 mg ([Held by provider] since 6/6/2025  5:59 PM)    senna-docusate (SENOKOT-S/PERICOLACE) 8.6-50 MG per tablet 1 tablet    Linked Group 5: Placed in \"Or\" Linked Group    senna-docusate (SENOKOT-S/PERICOLACE) 8.6-50 MG per tablet 2 tablet    Linked Group 5: Placed in \"Or\" Linked Group    sodium chloride (PF) 0.9% PF flush 10-40 mL    sodium chloride (PF) 0.9% PF flush 3 mL    sodium chloride (PF) 0.9% PF flush 3 mL    vancomycin (VANCOCIN) 1,000 mg in NaCl 0.9% 200 mL intermittent infusion           Template: Bridging - Nursing       Problem: Bridging Nursing Needed       Disciplines: Nursing, Home Infusion, PHARM      Goal: Care Coordination       Disciplines: Nursing, Home Infusion, PHARM      Intervention: CARE COORD: 1-3 Skilled Nurse visits for teaching until patient is independent with therapy               Intervention: Care Coordination: [facility] will bill: Nursing visit on own                                                                      Template: [facility] ANTI-INFECTIVE DISPENSING AND MONITORING - Pharmacy       Problem: Additional Care       Disciplines: Home Infusion      Goal: Drug Goals       Disciplines: Home Infusion      Intervention: Vancomycin AUC goal range is 400-600                       Goal: Flushing orders       Disciplines: Home Infusion      Intervention: Per [facility] policy                               Problem: Lab Plans       Disciplines: Nursing      Goal: Lab details below       Disciplines: Nursing      Intervention: Collect on Mondays: CBC diff/plt, BMP, CRP, Vancomycin trough       Description: Following providers: Dr. German              Intervention: Collect on Thursdays: Creatinine, Vancomycin trough       Description: Following providers: Dr. German                              Problem: Patient Care       Disciplines: Home " Infusion      Goal: Therapy will resolve infection with minimal side effects       Disciplines: Home Infusion              Goal: Patient and/or caregiver is knowledgable of disease process and management of ordered therapy       Disciplines: Home Infusion              Goal: Patient and/or caregiver report changes in medications to [facility] clinicians or prescriber       Disciplines: Home Infusion              Goal: Patient and/or caregiver report new or worsening symptoms and/or medication side effects to [facility] clinicians or prescriber       Disciplines: Home Infusion              Goal: Patient and/or caregiver will demonstrate/verbalize safe storage, maintenance, and disposal of soutions, supplies, and equipment       Disciplines: Home Infusion              Goal: Patient and/or caregiver will adhere to plan of care       Disciplines: Home Infusion              Goal: Patient and/or caregiver, prescriber and [facility] staff collaborate to create the plan of care       Disciplines: Home Infusion              Goal: Patient will adhere to prescribed medication regimen at 90% thoughout length of therapy       Disciplines: Home Infusion                      Problem: Pharmacy Care       Disciplines: Home Infusion      Goal: Pharmacist 24/7 support       Disciplines: Home Infusion              Goal: Pharmacist to evaluate, design, and prepare therapy to ensure compatibility with VAD, infusion pump, supplies, patient ability, and therapy goals       Disciplines: Home Infusion              Goal: Pharmacist to monitor lab results and adjust IV medication doses per [facility] policy       Disciplines: Home Infusion              Goal: Pharmacist to perform medication review for all drug interactions, contraindications, and therapy duplication       Disciplines: Home Infusion              Goal: Pharmacist to perform remote monitoring for response to therapy, side effects, administration related reactions, and VAD events       Disciplines: Home  Infusion              Goal: Pharmacist to provide education for home infusion medication side effects, interactions, adverse reactions, and infusion-related reactions       Disciplines: Home Infusion              Goal: Pharmacist to review, develop, modify the monitoring plan       Disciplines: Home Infusion              Goal: Provide an adequate supply of medication to ensure continued therapy while waiting for lab results or provider follow up       Disciplines: Home Infusion              Goal: Site Care supplies for access device       Disciplines: Home Infusion              Goal: Supplies to administer ordered therapy per [facility] policy       Disciplines: Home Infusion              Goal: Use Alteplase per Alteplase guideline to treat clotted venous catheter       Disciplines: Home Infusion      Intervention: Provider order is required in pregnancy when benefit outweighs risk                                   Current Participants as of 6/12/2025      Name Type Comments Contact Info    Maria Elena German MD Interdisciplinary  [address] [facility] [address]   #[phone]    Signature pending    Arnol Tesfaye YOVANI Interdisciplinary  No address on file        Nydia Guzmán RN Home Infusion  No address on file

## 2025-06-11 NOTE — PROGRESS NOTES
SPIRITUAL HEALTH SERVICES Progress Note     WW 2102     Referral Source/Reason for Visit: [facility] visit to introduce SHS, scope of practice, and assess Lobo  spiritual/emotional wellbeing due to LOS              Summary and Recommendations -   Lobo shared this was his second admission in a couple weeks due to an infection in his leg.  He is discharging today with a plan for treatment  He is well supported by his sister Shawnee and plans on staying with her in the coming days   He was in great spirits laughing and smiling throughout the visit, he requested prayer for his sister and for his healing     PLAN: [facility] available per request     Prem Lopez M.Div., [facility]  Staff    [phone]   Spiritual Health Services is available 24/7 for emergent requests and consults, either by paging the on-call  or by entering an ASAP/STAT consult in [facility], which will also page the on-call .

## 2025-06-11 NOTE — PLAN OF CARE
Physical Therapy: Orders received. Chart reviewed and discussed with care team.? Physical Therapy not indicated due to independence with mobility. Pt reports comfort with walker and stairs. Pt continues to have home PT for continued therapy at home.? Will complete orders.

## 2025-06-11 NOTE — PROGRESS NOTES
Care Management Discharge Note    Discharge Date: 06/11/2025       Discharge Disposition: Home, Home Care, Home Infusion    Discharge Services: Home Care    Discharge DME: None    Discharge Transportation: family or friend will provide    Private pay costs discussed: home IV abx    Does the patient's insurance plan have a 3 day qualifying hospital stay waiver?  Yes     Which insurance plan 3 day waiver is available? Alternative insurance waiver    Will the waiver be used for post-acute placement? No    PAS Confirmation Code:    Patient/family educated on Medicare website which has current facility and service quality ratings: yes    Education Provided on the Discharge Plan: Yes AVS per bedside nurse   Persons Notified of Discharge Plans:  patient, sister Shawnee, [facility], [facility]  Patient/Family in Agreement with the Plan: yes    Handoff Referral Completed: Yes, LEXI PCP: Internal handoff referral completed    Additional Information:    Pt discharging to home with home IV abx, homecare services.    [facility] ([facility]) - accepted this pt for home IV abx.  [facility] Teach Session today 6/11 at 1500pm at [facility] bedside, with pt's sister present.    [facility] - accepted this pt for homecare RN, PT, OT, HHA.  [facility] made aware pt will be receiving home IV abx through [facility].  [facility] will continue to coordinate with HC agency after discharge.    See also CM progress note 6/11 and Infusion Services note 6/11.    Pt can discharge from a CM standpoint once the [facility] teach session is completed without concerns.  Update given to bedside RN.    No further care management intervention anticipated at this time.  Please re-consult if further needs arise.  Care management signing off.      Vinay Urrutia RN

## 2025-06-11 NOTE — PROGRESS NOTES
Home infusion RN just left the floor.  Stated education went well and patient is satisfactory to discharge home.  Thanks

## 2025-06-11 NOTE — PROGRESS NOTES
[facility]    Referral received  from Bella Nowak RN CM, for IV antibiotics.    Benefits verified.   Pt has coverage for IV antibiotics under his [facility] Medicare Advantage plan, however, the drug must be on a CADD pump for coverage. If not on a CADD pump, there is no coverage.    For nursing, patient should have coverage if he is homebound.  If pt is not homebound, [facility] could provides home nurse visits for $90 per visit.    Writer will speak with pt to review benefits, home infusion and to offer choice of agency/home infusion provider.    Thank you for the referral.    Shahana Calhoun RN, BSN  [facility] Liaison  [phone] (Mon through Fri, 8:00 am-5:00 pm)  [phone] (Office)    [facility]  Writer spoke with pt's sister, Shawnee, to review benefits, home infusion and to offer choice of agency/home infusion provider.  Pt would like to go with [facility] for home infusion needs. Pt is already opened to [facility] for home care services (SN, PT, OT).  Liaison will meet with pt and Shawnee this afternoon in pt's hosp room for home infusion teach.  Pt will next dose his Vancomycin at home tonight around 8 pm.  Updated Vinay Urrutia RN CM.

## 2025-06-11 NOTE — PLAN OF CARE
Problem: Adult Inpatient Plan of Care  Goal: Plan of Care Review  Description: The Plan of Care Review/Shift note should be completed every shift.  The Outcome Evaluation is a brief statement about your assessment that the patient is improving, declining, or no change.  This information will be displayed automatically on your shift  note.  Outcome: Progressing     Problem: Pain Acute  Goal: Optimal Pain Control and Function  Outcome: Progressing     Problem: Infection  Goal: Absence of Infection Signs and Symptoms  Outcome: Progressing  Intervention: Prevent or Manage Infection  Recent Flowsheet Documentation  Taken 6/11/2025 0000 by Frances Mendez RN  Isolation Precautions: contact precautions maintained   Goal Outcome Evaluation:  Pt vitals stable overnight. No reports of pain overnight. Possible Picc placement today for home infusions.

## 2025-06-11 NOTE — PROGRESS NOTES
[facility]  Start of Care/Resumption of Care Note    [facility] SOC    DX: Sepsis    Therapy: Anti-Infective    Next Dose Due: 2000 today 6.11.2025    Delivery plan: delivery to sister's home by 1930 6.11.2025    Per [facility] care plan, labs are due on Monday and Thursday     Nursing plan: Agency: [facility]. [facility] to follow for nursing until agency takes over case on 6.13.2025.     Teaching Plan: Liaison Teach Done?: Yes    Other Info: Liaison completed teaching. Krystal is planning on starting services with patient on 6.13.2025    Nydia Guzmán, RN 06/11/25

## 2025-06-11 NOTE — TELEPHONE ENCOUNTER
Forms/Letter Request    Type of form/letter: Home Health Certification      Do we have the form/letter: Yes: Placed in Dr. Davis in box    Who is the form from? Home care    Where did/will the form come from? form was faxed in    When is form/letter needed by: when done    How would you like the form/letter returned: Fax : [phone]      Order details/form description: 2 pages    Order number (if applicable): [id number]

## 2025-06-11 NOTE — PROGRESS NOTES
Care Management Follow Up    Length of Stay (days): 5    Expected Discharge Date: 06/11/2025     Concerns to be Addressed: discharge planning     Patient plan of care discussed at interdisciplinary rounds: Yes    Anticipated Discharge Disposition: Home, Home Care, Home Infusion      Anticipated Discharge Services: Home Care  Anticipated Discharge DME: None    Patient/family educated on Medicare website which has current facility and service quality ratings: yes  Education Provided on the Discharge Plan: Yes  Patient/Family in Agreement with the Plan: yes    Referrals Placed by CM/SW: Homecare  Private pay costs discussed: home infusion    Discussed  Partnership in Safe Discharge Planning  document with patient/family: No     Handoff Completed: No, handoff not indicated or clinically appropriate    Additional Information:    Pt discharging to home, with home IV abx, and homecare services.    [facility] ([facility]) - Per kika Harkins, pt would have coverage for home IV abx if he uses a CADD pump.  Pt has 80/20 coverage, up until he meets his max out of pocket.    CM met with pt at bedside to review discharge home with home IV abx.  Pt understands he/family will be taught how to self-manage the home IV abx; nursing staff will not be administering home IV abx to pt daily.  Pt states he will be discharging to sister Shawnee's home and will stay there for approx. 1 week.  Sister Shawnee confirms she will be pt's support person and would like to be present for the I teach session.    [address] Shawnee[address] [address].    [facility] - Update given to Ashley Harkins to schedule teach session directly with pt/family.    [facility]  - Update given Prachi to Krystal  regarding pt's discharge today.  Discharge orders sent via Epic.  Prachi made aware of pt's discharge to 's home, rather than his own home.    12:58 PM  Per JOSE Harkins session scheduled for today  6/11, at 1500pm, at [facility] bedside.  Pt will dose evening IV vanco dose at home today.  Shahana will review this with pt during I teach session.    Update given to bedside RN.    Next Steps:   I teach session    CM will continue to follow.     Vinay Urrutia RN

## 2025-06-11 NOTE — PROCEDURES
"PICC Line Insertion Procedure Note    Pt. Name:   Dudley Kiran  MRN:          [id number]    Site: [place] [address]  Ordered by: Dr German    Procedure:     Insertion of a  SINGLE Lumen  4 fr  Bard SOLO (valved) Power PICC, Lot number [id number]    Indications: Antibiotic(s)    Contraindications : None    Procedure Details:    Patient identified with 2 identifiers and \"Time Out\" conducted.    Central line insertion bundle followed: Hand hygiene performed prior to procedure, site cleansed with Cholraprep (CHG), hat, mask, sterile gloves, sterile gown worn, patient draped with maximum barrier head to toe drape, sterile field maintained.    The right upper arm BASILIC vein was assessed by ultrasound and found to be anechoic, compressible, patent, and of adequate size.     Lidocaine 1% 2.5 ml administered SQ to the insertion site.     Modified Seldinger Technique (MST) used for insertion, ONE attempt(s) required to access vein. Imaging during access shows the needle within the vein.     PICC was advanced through peel-away sheath.    Catheter threaded without difficulty. The tip of the catheter was positioned in the SVC. Good blood return noted.    Catheter was flushed with 20 cc normal saline.     Catheter secured with SecurAcath, tissue adhesive (on insertion site only), Biopatch (CHG), and Tegaderm dressing applied.    The sharps that are included in the PICC insertion kit were accounted for and disposed of in the sharps container prior to breakdown of the sterile field.    CLABSI prevention brochure left at bedside.    Patient  tolerated procedure well.     Patient's primary RN notified PICC is ready for use.      Findings:    Total catheter length  40 cm, with 2 cm exposed. Mid upper arm circumference is 32 cm.     Tip placement verified in the distal SVC by [employer]:        Comments:      This patient's catheter is secured with SecurAcath instead of a traditional suture or adhesive based securement. This is a " "subcutaneous anchor securement system (aka ARUN or SecurAcath) that holds the catheter just below the skin with nitinol feet and a friction fit  around the catheter.  It can stay with the catheter until the catheter is removed.    Do not open, change or remove the SecurAcath.  SecurAcath may be disinfected during a dressing change, but do not open or remove it.  SecurAcath acts like a hinge - lift, clean 360 degrees around, let dry and apply new dressing.  SecurAcath is compatible with all dressings and antiseptic agents.  Ensure the wings of the catheter and SecurAcath are completely under the boarder of the dressing.  SecurAcath is MRI safe to 3T, see IFU for details.  A Statlock is not necessary when SecurAcath is present.   Patients can go to MRI with SecurAcath device in place.  When the catheter is indicated for removal, enter \"Nursing to Consult for Vascular Access Care\" order in EPIC, watch the removal video on SharePoint, or call for training if you have not removed before. 24 hour SecurAcath Clinical Education and Support  [phone]        Prem Johnson, MSN, RN, VA-BC   Vascular Access - [facility]      "

## 2025-06-11 NOTE — DISCHARGE SUMMARY
"[facility]  Discharge Summary - Medicine & Pediatrics       Date of Admission:  6/6/2025  Date of Discharge:  6/11/2025  Discharging Provider: Prudence Rivera MD  Discharge Service: Hospitalist Service    Discharge Diagnoses   L MTP Septic arthritis  L MTP acute osteoarthritis  Sepsis 2/2 to above  History L fasciotomies c/b cellulitis  R arm rash, resolved     Clinically Significant Risk Factors     # Obesity: Estimated body mass index is 30.23 kg/m  as calculated from the following:    Height as of this encounter: 1.702 m (5' 7\").    Weight as of this encounter: 87.5 kg (193 lb).       Follow-ups Needed After Discharge   Follow-up Appointments       Follow Up      Follow up with Podiatry (Dr. Izquierdo), within 2-3 weeks. for hospital follow- up. No follow up labs or test are needed.        Hospital Follow-up with Existing Primary Care Provider (PCP)          Schedule Primary Care visit within: 7 Days             PCP To-Do:   - Medications: continue IV vancomycin for 28 days per ID, stop PTA doxycycline and Keflex  - Labs/Incidentals: None  - Specialist follow up: Podiatry in 2-3 weeks, ID for IV antibiotic mgmt as needed     Unresulted Labs Ordered in the Past 30 Days of this Admission       Date and Time Order Name Status Description    6/7/2025  5:46 PM Synovial fluid Aerobic Bacterial Culture Routine With Gram Stain Preliminary     6/6/2025  3:33 PM Blood Culture Peripheral blood (BC) Arm, Left Preliminary     6/6/2025  3:33 PM Blood Culture Peripheral blood (BC) Arm, Right Preliminary         These results will be followed up by PCP/Podiatry.     Discharge Disposition   Discharged to home  Condition at discharge: Stable    Hospital Course   Dudley Kiran was admitted on 6/6/2025 for sepsis secondary to L first MTP septic arthritis.  The following problems were addressed during his hospitalization:    L MTP Septic arthritis  L MTP acute osteoarthritis  Sepsis 2/2 to above  History L " fasciotomies c/b cellulitis  L great toe pain and swelling prior to admission. Lobo met sepsis criteria with tachycardia and leukocytosis and signs of inflammation. MRI of L foot showed 1st MTP arthropathy with effusion and synovitis consistent with septic arthritis. Joint aspiration completed with studies negative for crystals but fluid was insufficient for further infectious disease workup. Considered gout, uric acid negative and no crystals. Symptomatic improvement on IV vancomycin and zosyn. Podiatry and ID were involved in care, no need for OR from a podiatry perspective and long term IV antibiotics were ordered on discharge.      R arm rash, resolved   Forearm rash near IV while administering vancomycin on 6/6/25, not indicative of allergy but likely mild infusion reaction that resolved with benadryl. He did not have recurrence of the rash.       Consultations This Hospital Stay   PHARMACY TO DOSE VANCO  PODIATRY IP CONSULT  PHARMACY TO DOSE VANCO  CARE MANAGEMENT / SOCIAL WORK IP CONSULT  INFECTIOUS DISEASES IP CONSULT  INFECTIOUS DISEASES IP CONSULT  WOUND OSTOMY CONTINENCE NURSE  IP CONSULT  PHYSICAL THERAPY ADULT IP CONSULT  OCCUPATIONAL THERAPY ADULT IP CONSULT  VASCULAR ACCESS ADULT IP CONSULT    Code Status   Full Code       The patient was discussed with Dr. Prudence Jacinto MD  Resident Teaching Service  [address] [address]  Phone: [phone]  Fax: [phone]  ______________________________________________________________________    Physical Exam   Vital Signs: Temp: 98.2  F (36.8  C) Temp src: Oral BP: 105/60 Pulse: 78   Resp: 16 SpO2: 96 % O2 Device: None (Room air)    Weight: 193 lbs 0 oz    Physical Exam  Constitutional:       General: He is not in acute distress.  Cardiovascular:      Rate and Rhythm: Normal rate and regular rhythm.      Pulses: Normal pulses.      Heart sounds: Normal heart sounds.  No murmur heard.  Pulmonary:      Effort: Pulmonary effort is normal.      Breath sounds: Normal breath sounds.   Abdominal:      General: Abdomen is flat.      Palpations: Abdomen is soft.      Tenderness: There is no abdominal tenderness.   Musculoskeletal:      Right lower leg: No edema.      Left lower leg: Laceration present. No edema.      Right foot: Normal range of motion. No swelling or tenderness. Normal pulse.      Left foot: Decreased range of motion. Swelling present. No tenderness or bony tenderness. Normal pulse.      Comments: L first MTP bruising and tenderness with full ROM, improved from days previous. L medial and lateral fasciotomies dressed and C/D/I.   Skin:     General: Skin is warm and dry.      Capillary Refill: Capillary refill takes less than 2 seconds.   Neurological:      Mental Status: He is alert and oriented to person, place, and time.         Primary Care Physician   Tima Davis    Discharge Orders      Primary Care - Care Coordination Referral      Home Infusion Referral      Home Care Referral      Reason for your hospital stay    Left big toe pain and concern for septic arthritis     Activity    Your activity upon discharge: activity as tolerated     Follow Up    Follow up with Podiatry (Dr. Izquierdo), within 2-3 weeks. for hospital follow- up. No follow up labs or test are needed.     Resume Home Care Services     Diet    Follow this diet upon discharge: Current Diet:Orders Placed This Encounter      Regular Diet Adult     Hospital Follow-up with Existing Primary Care Provider (PCP)            Significant Results and Procedures   Most Recent 3 CBC's:  Recent Labs   Lab Test 06/11/25  0623 06/10/25  0742 06/09/25  0605   WBC 8.5 8.1 7.5   HGB 9.7* 9.4* 9.4*   MCV 85 85 85    407 398     Most Recent 3 BMP's:  Recent Labs   Lab Test 06/10/25  0742 06/09/25  0605 06/08/25  0643 06/07/25  0613 06/06/25  1328 05/28/25  0816 05/28/25  0806 05/26/25  1452 05/26/25  0629   NA   --   --   --  135 134*  --   --   --  137   POTASSIUM  --   --   --  4.0 4.1 3.6  --    < > 3.3*   CHLORIDE  --   --   --  102 99  --   --   --  104   CO2  --   --   --  23 21*  --   --   --  23   BUN  --   --   --  12.5 12.8  --   --   --  9.3   CR 0.90 0.91 0.97 1.06 0.93  --   --   --  0.94   ANIONGAP  --   --   --  10 14  --   --   --  10   MELONY  --   --   --  8.8 9.6  --   --   --  8.3*   GLC  --   --   --  145* 137*  --  105*  --  119*    < > = values in this interval not displayed.     Most Recent ESR & CRP:  Recent Labs   Lab Test 06/10/25  0742 03/31/25  0520 03/08/19  1623   SED  --   --  12   CRPI 98.50*   < >  --     < > = values in this interval not displayed.   ,   Results for orders placed or performed during the hospital encounter of 06/06/25   US Lower Extremity Venous Duplex Left    Narrative    EXAM: US LOWER EXTREMITY VENOUS DUPLEX LEFT  LOCATION: [facility]  DATE: 6/6/2025    INDICATION: Hx DVT causing limb ischemia and requiring fasciotomy. left great toe swelling end ecchymosis started yesterday, evaluate for blood clot vs.hematoma  COMPARISON: None.  TECHNIQUE: Venous Duplex ultrasound of the left lower extremity with and without compression, augmentation and duplex. Color flow and spectral Doppler with waveform analysis performed.    FINDINGS: Exam includes the common femoral, femoral, popliteal, and contralateral common femoral veins as well as segmentally visualized deep calf veins and greater saphenous vein.     LEFT: No deep vein thrombosis. No superficial thrombophlebitis. No popliteal cyst. No acute calf veins difficult to evaluate due to wound.      Impression    IMPRESSION:  1.  No deep venous thrombosis in the left lower extremity.   Foot XR, G/E 3 views, left    Narrative    EXAM: XR FOOT LEFT G/E 3 VIEWS  LOCATION: [facility]  DATE: 6/6/2025    INDICATION: left 1st MCP swelling and ecchymosis  COMPARISON: None.      Impression     IMPRESSION: Degenerative change first MTP joint. Degenerative change at the IP joint of the great toe. No evidence for acute fracture. Plantar and Achilles calcaneal spurring.   MR Foot Left w/o & w Contrast    Narrative    EXAM: MR FOOT LEFT W/O and W CONTRAST  LOCATION: [facility]  DATE: 6/6/2025    INDICATION: Sepsis, first MTP joint pain, swelling, and ecchymosis. Septic arthritis concern.  COMPARISON: Radiographs, same date.  TECHNIQUE: Routine. Additional postgadolinium T1 sequences were obtained.  IV CONTRAST: 8.75mL Gadavist    FINDINGS:     Acute monoarthritis at the first MTP joint, including joint effusion, enhancing synovitis, or significant periarticular soft tissue edema. There is also abnormal bone marrow signal in the base of the proximal phalanx and the head of the first metatarsal.   Joint alignment is normal. No definite fractures identified. Severe degenerative arthritis at the first MTP joint, also in the IP joint of the first toe.    No acute arthropathy in the other joints in the midfoot or forefoot. No joint effusions. Normal joint alignment.    Normal bones and bone marrow elsewhere.    Flexor and extensor tendons are intact without tearing or significant tendinopathy. No tenosynovitis in the flexor or extensor hallucis tendon sheaths.    Moderate soft tissue edema and enhancement throughout the base of the first toe. No devitalized/nonenhancing phlegmon or abscess. No sinus tract.    No discrete full-thickness ulcer identified in the first toe, clinical correlation recommended.    Mild soft tissue edema and enhancement within the muscles around the first MTP joint and metatarsal, likely reactive inflammation to the first MTP joint inflammation. No intramuscular fluid collection or myonecrosis. No significant muscle atrophy.    The Lisfranc joint and the plantar fascial are intact.      Impression    IMPRESSION:  1.  Acute arthropathy at the first MTP joint,  including joint effusion, enhancing synovitis, and significant periarticular soft tissue edema and inflammation. Findings could be consistent with septic arthritis in the appropriate clinical context.    2.  Is noted that the imaging findings are nonspecific, and other causes of Monro arthropathy could have a similar appearance. This is not classic appearance for gout, but crystal deposition arthropathy would not be entirely excluded. Similarly,   traumatic injury to the MTP joint could appear similar.    3.  Bone marrow signal abnormality in the base of the proximal phalanx and the head of the metatarsal. Given the concern for septic arthritis, these are concerning for areas of osteomyelitis. However, the appearance is not highly specific and trauma   might appear similar.    4.  Tendons and ligaments are intact. No evidence of structural derangement. No tenosynovitis.    5.  Soft tissue edema and enhancement in the great toe is nonspecific but could represent cellulitis in the appropriate clinical context. No devitalized phlegmon or abscess.   XR Joint Aspiration Small Left    Narrative    EXAM: FLUOROSCOPICALLY GUIDED LEFT FIRST MTP JOINT ASPIRATION   LOCATION: [facility]  DATE: 6/9/2025     INDICATION: Left 1st MTP inflammatory arthritis. Concern for gout.    RADIATION DOSE: Total Air Kerma 0.1 mGy    PROCEDURE: Procedure and risks explained and consent received. The skin was prepped and draped in sterile fashion. A small amount of 1% lidocaine was infused into the local soft tissues.     Under direct fluoroscopic guidance, a 22 gauge spinal needle was introduced into the joint space.     COMPLICATIONS: None.    SPECIMEN: 1 mL of blood-tinged fluid was aspirated and sent to lab.      Impression    IMPRESSION:  1.  Successful image-guided left first MTP joint aspiration.    Reference CPT Codes: 10725, 19969        Discharge Medications   Current Discharge Medication List        START  taking these medications    Details   vancomycin (VANCOCIN) 1000 mg/200 mL IVPB Inject 1,000 mg over 60 minutes into the vein every 12 hours for 28 days.    Comments: Weekly CBC/Diff, BMP, CRP, vancomycin trough/AUC.  Associated Diagnoses: Septic arthritis of left foot, due to unspecified organism (H)           CONTINUE these medications which have NOT CHANGED    Details   acetaminophen (TYLENOL) 325 MG tablet Take 325-650 mg by mouth every 6 hours as needed for mild pain.      aspirin 81 MG EC tablet Take 81 mg by mouth daily.      HYDROmorphone (DILAUDID) 2 MG tablet Take 2 tablets (4 mg) by mouth every 6 hours as needed for pain.  Qty: 10 tablet, Refills: 0    Associated Diagnoses: Open wound      ipratropium (ATROVENT) 0.06 % nasal spray Spray 2 sprays into both nostrils 2 times daily as needed for rhinitis.      lisinopril (ZESTRIL) 5 MG tablet Take 1 tablet (5 mg) by mouth daily.  Qty: 90 tablet, Refills: 3    Associated Diagnoses: Essential hypertension, benign      metoprolol succinate ER (TOPROL XL) 25 MG 24 hr tablet TAKE 0.5 TABLETS(12.5 MG) BY MOUTH DAILY  Qty: 45 tablet, Refills: 3    Associated Diagnoses: Essential hypertension, benign      ondansetron (ZOFRAN ODT) 4 MG ODT tab Take 1 tablet (4 mg) by mouth every 8 hours as needed for nausea.  Qty: 20 tablet, Refills: 0    Associated Diagnoses: Nausea      prochlorperazine (COMPAZINE) 5 MG tablet Take 1 tablet (5 mg) by mouth every 6 hours as needed for nausea or vomiting.    Associated Diagnoses: Nausea and vomiting, unspecified vomiting type      rivaroxaban ANTICOAGULANT (XARELTO) 20 MG TABS tablet Take 1 tablet (20 mg) by mouth daily (with dinner).    Associated Diagnoses: Acute lower limb ischemia      tadalafil (CIALIS) 10 MG tablet Take 1 tablet (10 mg) by mouth daily as needed (erectile difficulties).  Qty: 90 tablet, Refills: 2    Comments: Do not fill until patient requests  Associated Diagnoses: Erectile dysfunction, unspecified erectile  dysfunction type           STOP taking these medications       cephALEXin (KEFLEX) 500 MG capsule Comments:   Reason for Stopping:         doxycycline hyclate (VIBRAMYCIN) 100 MG capsule Comments:   Reason for Stopping:             Allergies   Allergies   Allergen Reactions    Oxycodone Confusion    Codeine Nausea    No Known Allergies      Potentially cats

## 2025-06-11 NOTE — PROGRESS NOTES
Occupational therapy orders received. Through chart review and connecting with the care team, it is indicated that pt independent and at baseline with activities of daily living. With defer evaluation and complete order.    Natalia Neal MA, OTR/L

## 2025-06-11 NOTE — PLAN OF CARE
Goal Outcome Evaluation:      Plan of Care Reviewed With: patient    Problem: Adult Inpatient Plan of Care  Goal: Plan of Care Review  Description: The Plan of Care Review/Shift note should be completed every shift.  The Outcome Evaluation is a brief statement about your assessment that the patient is improving, declining, or no change.  This information will be displayed automatically on your shift  note.  Outcome: Progressing  Flowsheets (Taken 6/11/2025 1500)  Outcome Evaluation: Vitally stable, denies s/s of pain. PICC line placed today. Patient set up for home infusion and will discharge once teaching is given by home infusion nurse.  Plan of Care Reviewed With: patient  Goal: Optimal Comfort and Wellbeing  Outcome: Progressing     Problem: Delirium  Goal: Optimal Coping  Outcome: Progressing  Goal: Improved Attention and Thought Clarity  Outcome: Progressing     Problem: Pain Acute  Goal: Optimal Pain Control and Function  Outcome: Progressing  Intervention: Prevent or Manage Pain  Recent Flowsheet Documentation  Taken 6/11/2025 0900 by Mahesh Mccracken RN  Medication Review/Management: medications reviewed     Problem: Infection  Goal: Absence of Infection Signs and Symptoms  Outcome: Progressing  Intervention: Prevent or Manage Infection  Recent Flowsheet Documentation  Taken 6/11/2025 0900 by Mahesh Mccracken, RN  Isolation Precautions: contact precautions maintained     Outcome Evaluation: Vitally stable, denies s/s of pain. PICC line placed today. Patient set up for home infusion and will discharge once teaching is given by home infusion nurse.

## 2025-06-12 ENCOUNTER — RESULTS FOLLOW-UP (OUTPATIENT)
Dept: FAMILY MEDICINE | Facility: CLINIC | Age: 70
End: 2025-06-12

## 2025-06-12 ENCOUNTER — PATIENT OUTREACH (OUTPATIENT)
Dept: CARE COORDINATION | Facility: CLINIC | Age: 70
End: 2025-06-12

## 2025-06-12 ENCOUNTER — LAB REQUISITION (OUTPATIENT)
Dept: LAB | Facility: HOSPITAL | Age: 70
End: 2025-06-12
Payer: COMMERCIAL

## 2025-06-12 DIAGNOSIS — M00.9 PYOGENIC ARTHRITIS, UNSPECIFIED (H): ICD-10-CM

## 2025-06-12 LAB
BACTERIA SNV CULT: NORMAL
CREAT SERPL-MCNC: 0.82 MG/DL (ref 0.67–1.17)
EGFRCR SERPLBLD CKD-EPI 2021: >90 ML/MIN/1.73M2
GRAM STAIN RESULT: NORMAL
GRAM STAIN RESULT: NORMAL
VANCOMYCIN SERPL-MCNC: 9.6 UG/ML (ref ?–25)

## 2025-06-12 PROCEDURE — A4222 INFUSION SUPPLIES WITH PUMP: HCPCS

## 2025-06-12 PROCEDURE — 82565 ASSAY OF CREATININE: CPT | Performed by: STUDENT IN AN ORGANIZED HEALTH CARE EDUCATION/TRAINING PROGRAM

## 2025-06-12 PROCEDURE — 80202 ASSAY OF VANCOMYCIN: CPT | Performed by: STUDENT IN AN ORGANIZED HEALTH CARE EDUCATION/TRAINING PROGRAM

## 2025-06-12 NOTE — PROGRESS NOTES
Clinic Care Coordination Contact  Transitions of Care Outreach  Chief Complaint   Patient presents with    Clinic Care Coordination - Initial    Clinic Care Coordination - Post Hospital       Most Recent Admission Date: 6/6/2025   Most Recent Admission Diagnosis: Great toe pain, left - M79.675     Most Recent Discharge Date: 6/11/2025   Most Recent Discharge Diagnosis: Great toe pain, left - M79.675  Other specified counseling - Z71.89  Open wound - T14.8XXA  Septic arthritis of left foot, due to unspecified organism (H) - M00.9     Transitions of Care Assessment    Discharge Assessment  How are you doing now that you are home?: getting better. infusion home care has started.  How are your symptoms? (Red Flag symptoms escalate to triage hotline per guidelines): Improved  Do you know how to contact your clinic care team if you have future questions or changes to your health status? : Yes  Does the patient have their discharge instructions? : Yes  Does the patient have questions regarding their discharge instructions? : No  Were you started on any new medications or were there changes to any of your previous medications? : Yes  Does the patient have all of their medications?: Yes  Do you have questions regarding any of your medications? : No  Do you have all of your needed medical supplies or equipment (DME)?  (i.e. oxygen tank, CPAP, cane, etc.): Yes         Post-op (Clinicians Only)  Did the patient have surgery or a procedure: No  Fever: No  Chills: No  Eating & Drinking: eating and drinking without complaints/concerns  PO Intake: regular diet  Additional Symptoms: decreased appetite  Bowel Function: normal  Urinary Status: voiding without complaint/concerns    Has lost 20 pounds during the spring due to illness.  Is not at baseline, but knows it will take time.  Thankful for the call. No need for care coordination at this time. Will send letter.  Routing to [place] to assist with setting up PCP appt for post  hospital.      Follow up Plan     Discharge Follow-Up  Discharge follow up appointment scheduled in alignment with recommended follow up timeframe or Transitions of Risk Category? (Low = within 30 days; Moderate= within 14 days; High= within 7 days): No  Patient's follow up appointment not scheduled: Patient accepted scheduling support. Appt scheduled/requested per protocol.    Future Appointments   Date Time Provider Department Center   6/24/2025  9:20 AM Marai Elena German MD [facility]   6/25/2025 11:20 [facility]   9/19/2025  8:00 AM Tima Davis MD [facility]       Outpatient Plan as outlined on AVS reviewed with patient.    For any urgent concerns, please contact our 24 hour nurse triage line: [phone] ([phone])       MARIE Sapp

## 2025-06-12 NOTE — LETTER
[facility] CARE COORDINATION  [facility]    June 12, 2025    Dudley DE LA TORRE Magno  [address] [address]      Dear Lobo,    I am a clinic care coordinator who works with Tima Davis MD with the [facility]. I wanted to thank you for spending the time to talk with me.  Below is a description of clinic care coordination and how I can further assist you.       The clinic care coordination team is made up of a registered nurse, , financial resource worker and community health worker who understand the health care system. The goal of clinic care coordination is to help you manage your health and improve access to the health care system. Our team works alongside your provider to assist you in determining your health and social needs. We can help you obtain health care and community resources, providing you with necessary information and education. We can work with you through any barriers and develop a care plan that helps coordinate and strengthen the communication between you and your care team.  Our services are voluntary and are offered without charge to you personally.    Please feel free to contact me with any questions or concerns regarding care coordination and what we can offer.      We are focused on providing you with the highest-quality healthcare experience possible.    Sincerely,     Francine Rinaldi,   [facility]  [phone]

## 2025-06-12 NOTE — PLAN OF CARE
Day RN ([id number])    Pt discharged home from hospital at around 1645. Reviewed discharge instructions with patient and family member. Questions answered. Patient discharged to home with plan for getting home meds from home infusion, discharge instructions, education from home infusion RN, and belongings.  Pt eager and adequate for discharge.    Pt A/O x4.  VSS and afebrile.  Pt rates pain as minimal, declined medication intervention.  CMS intact - ex L foot blotchyness/swelling/bruising by great toe.  Dressing CDI to LLE.  Up independently.  Voiding adequately.  Tolerating regular diet well.  PICC line in R upper extremity.  Plan is home on discharge once medically stable and IV antibiotic plan established.  Will continue to monitor.     Jazmin Camp RN

## 2025-06-13 ENCOUNTER — TELEPHONE (OUTPATIENT)
Dept: FAMILY MEDICINE | Facility: CLINIC | Age: 70
End: 2025-06-13
Payer: COMMERCIAL

## 2025-06-13 ENCOUNTER — HOME INFUSION BILLING (OUTPATIENT)
Dept: HOME HEALTH SERVICES | Facility: HOME HEALTH | Age: 70
End: 2025-06-13
Payer: COMMERCIAL

## 2025-06-13 ENCOUNTER — HOME INFUSION (OUTPATIENT)
Dept: HOME HEALTH SERVICES | Facility: HOME HEALTH | Age: 70
End: 2025-06-13
Payer: COMMERCIAL

## 2025-06-13 DIAGNOSIS — M00.9: ICD-10-CM

## 2025-06-13 DIAGNOSIS — M00.9 PYOGENIC ARTHRITIS, UPPER ARM (H): Primary | ICD-10-CM

## 2025-06-13 PROCEDURE — A4222 INFUSION SUPPLIES WITH PUMP: HCPCS

## 2025-06-13 NOTE — TELEPHONE ENCOUNTER
Forms/Letter Request    Type of form/letter: Home Health Certification      Do we have the form/letter: Yes: Placed in Dr. Davis in box    Who is the form from? Home care/[facility]    Where did/will the form come from? form was faxed in    When is form/letter needed by: when done    How would you like the form/letter returned: Fax : [phone]      Order details/form description: 10 pages    Order number (if applicable): [id number] & [id number]

## 2025-06-14 LAB
BACTERIA SNV CULT: NO GROWTH
GRAM STAIN RESULT: NORMAL
GRAM STAIN RESULT: NORMAL

## 2025-06-14 PROCEDURE — A4222 INFUSION SUPPLIES WITH PUMP: HCPCS

## 2025-06-15 PROCEDURE — A4222 INFUSION SUPPLIES WITH PUMP: HCPCS

## 2025-06-16 ENCOUNTER — HOME INFUSION (OUTPATIENT)
Dept: HOME HEALTH SERVICES | Facility: HOME HEALTH | Age: 70
End: 2025-06-16
Payer: COMMERCIAL

## 2025-06-16 ENCOUNTER — TELEPHONE (OUTPATIENT)
Dept: INFECTIOUS DISEASES | Facility: CLINIC | Age: 70
End: 2025-06-16
Payer: COMMERCIAL

## 2025-06-16 DIAGNOSIS — L03.90 CELLULITIS: Primary | ICD-10-CM

## 2025-06-16 PROCEDURE — A4222 INFUSION SUPPLIES WITH PUMP: HCPCS

## 2025-06-16 RX ORDER — ALBUTEROL SULFATE 90 UG/1
1-2 INHALANT RESPIRATORY (INHALATION)
Status: CANCELLED
Start: 2025-06-16

## 2025-06-16 RX ORDER — METHYLPREDNISOLONE SODIUM SUCCINATE 40 MG/ML
40 INJECTION INTRAMUSCULAR; INTRAVENOUS
Status: CANCELLED
Start: 2025-06-16

## 2025-06-16 RX ORDER — DIPHENHYDRAMINE HYDROCHLORIDE 50 MG/ML
50 INJECTION, SOLUTION INTRAMUSCULAR; INTRAVENOUS
Status: CANCELLED
Start: 2025-06-16

## 2025-06-16 RX ORDER — ALBUTEROL SULFATE 0.83 MG/ML
2.5 SOLUTION RESPIRATORY (INHALATION)
Status: CANCELLED | OUTPATIENT
Start: 2025-06-16

## 2025-06-16 RX ORDER — EPINEPHRINE 1 MG/ML
0.3 INJECTION, SOLUTION, CONCENTRATE INTRAVENOUS EVERY 5 MIN PRN
Status: CANCELLED | OUTPATIENT
Start: 2025-06-16

## 2025-06-16 RX ORDER — DIPHENHYDRAMINE HYDROCHLORIDE 50 MG/ML
25 INJECTION, SOLUTION INTRAMUSCULAR; INTRAVENOUS
Status: CANCELLED
Start: 2025-06-16

## 2025-06-16 RX ORDER — HEPARIN SODIUM,PORCINE 10 UNIT/ML
5-20 VIAL (ML) INTRAVENOUS DAILY PRN
Status: CANCELLED | OUTPATIENT
Start: 2025-06-16

## 2025-06-16 RX ORDER — HEPARIN SODIUM (PORCINE) LOCK FLUSH IV SOLN 100 UNIT/ML 100 UNIT/ML
5 SOLUTION INTRAVENOUS
Status: CANCELLED | OUTPATIENT
Start: 2025-06-16

## 2025-06-16 NOTE — PROGRESS NOTES
Triage Note/Care Coordination    Identify the person you spoke with and their relationship to the patient: home care nurseVeronika     Reason for the call: Access device problem/concern    CVAD-Related Issues    Type of central venous access device:PICC valved    Describe occlusion (check all that apply): Able to infuse/flush, no blood return    Interventions (check all that apply): Assess infusion system (clamped, kinked, or pinched tubing, needleless connector, Assess when the dressing was last changed (complete occlusion), and Did patient change position, raise arm on accessed side or turn head (complete occlusion), position change lying to sitting.    Assessment: RN unable to obtain blood from PICC line.   RN does not have supplies with her for a Peripheral lab draw.    Outcomes:     What is the plan for ongoing care of this patient: Patient will need to go to Infusion Center for tPA as he does not have coverage for medication in the home.    Was there harm, averted harm, or unexpected death of the patient? No    Does the patient/caregiver verbalize agreement with the plan? Yes      Follow-Up Communication    Update to prescriber:  [employer] message requesting Therapy Plan for tPA    Prescriber contacted: Dr. German  6/16/25  940am    UPDATE @ 357pm:  Patient is set up at [facility] on Wednesday 6/18/25 at 130pm for tPA to PICC line.  Patient and homecare nurse have been updated with this information.

## 2025-06-17 ENCOUNTER — TELEPHONE (OUTPATIENT)
Dept: VASCULAR SURGERY | Facility: CLINIC | Age: 70
End: 2025-06-17
Payer: COMMERCIAL

## 2025-06-17 ENCOUNTER — TELEPHONE (OUTPATIENT)
Dept: FAMILY MEDICINE | Facility: CLINIC | Age: 70
End: 2025-06-17
Payer: COMMERCIAL

## 2025-06-17 ENCOUNTER — HOME INFUSION BILLING (OUTPATIENT)
Dept: HOME HEALTH SERVICES | Facility: HOME HEALTH | Age: 70
End: 2025-06-17
Payer: COMMERCIAL

## 2025-06-17 ENCOUNTER — MEDICAL CORRESPONDENCE (OUTPATIENT)
Dept: HEALTH INFORMATION MANAGEMENT | Facility: CLINIC | Age: 70
End: 2025-06-17
Payer: COMMERCIAL

## 2025-06-17 PROCEDURE — A4222 INFUSION SUPPLIES WITH PUMP: HCPCS

## 2025-06-17 PROCEDURE — E1399 DURABLE MEDICAL EQUIPMENT MI: HCPCS

## 2025-06-17 PROCEDURE — S1015 IV TUBING EXTENSION SET: HCPCS

## 2025-06-17 PROCEDURE — A9270 NON-COVERED ITEM OR SERVICE: HCPCS

## 2025-06-17 NOTE — TELEPHONE ENCOUNTER
Forms/Letter Request    Type of form/letter: Home Health Certification      Do we have the form/letter: Yes: palced in Dr. Davis in box    Who is the form from? Home care    Where did/will the form come from? form was faxed in    When is form/letter needed by: when done    How would you like the form/letter returned: Fax : [phone]    Order details/form description: 8 pages    Order number (if applicable): [id number]

## 2025-06-17 NOTE — TELEPHONE ENCOUNTER
Caller: Krystal Banda      Provider: Jihan Mckay NP and Dr. Mau Izquierdo    Detailed reason for call: Requesting verbal orders to resume previous wound care orders (prior to hospital stay). HC started seeing patient again on 6/12. Aware patient has an appointment for tomorrow morning, but still needs verbal as she already started seeing patient again.    Best phone number to contact: [phone]      Best time to contact: Any time    Ok to leave a detailed message: Yes    Ok to speak to authorized person if needed: No      (Noted to patient if reason is related to wound or incision, to please send a photo via email or [employer].)

## 2025-06-17 NOTE — TELEPHONE ENCOUNTER
Updated Veronika on the wound care orders from S, but let her know they will likely change tomorrow morning after seeing Jihan Mckay.

## 2025-06-18 ENCOUNTER — OFFICE VISIT (OUTPATIENT)
Dept: VASCULAR SURGERY | Facility: CLINIC | Age: 70
End: 2025-06-18
Attending: NURSE PRACTITIONER
Payer: COMMERCIAL

## 2025-06-18 ENCOUNTER — TELEPHONE (OUTPATIENT)
Dept: FAMILY MEDICINE | Facility: CLINIC | Age: 70
End: 2025-06-18

## 2025-06-18 ENCOUNTER — TELEPHONE (OUTPATIENT)
Dept: INFECTIOUS DISEASES | Facility: CLINIC | Age: 70
End: 2025-06-18

## 2025-06-18 ENCOUNTER — INFUSION THERAPY VISIT (OUTPATIENT)
Dept: INFUSION THERAPY | Facility: HOSPITAL | Age: 70
End: 2025-06-18
Attending: STUDENT IN AN ORGANIZED HEALTH CARE EDUCATION/TRAINING PROGRAM
Payer: COMMERCIAL

## 2025-06-18 VITALS — DIASTOLIC BLOOD PRESSURE: 74 MMHG | HEART RATE: 94 BPM | SYSTOLIC BLOOD PRESSURE: 112 MMHG | OXYGEN SATURATION: 96 %

## 2025-06-18 DIAGNOSIS — S81.802A OPEN WOUND OF KNEE, LEG, AND ANKLE, LEFT, INITIAL ENCOUNTER: ICD-10-CM

## 2025-06-18 DIAGNOSIS — S91.002A OPEN WOUND OF KNEE, LEG, AND ANKLE, LEFT, INITIAL ENCOUNTER: ICD-10-CM

## 2025-06-18 DIAGNOSIS — T14.8XXA OPEN WOUND: Primary | ICD-10-CM

## 2025-06-18 DIAGNOSIS — E66.811 CLASS 1 OBESITY DUE TO EXCESS CALORIES WITH SERIOUS COMORBIDITY AND BODY MASS INDEX (BMI) OF 32.0 TO 32.9 IN ADULT: ICD-10-CM

## 2025-06-18 DIAGNOSIS — Z98.890 HISTORY OF FASCIOTOMY: ICD-10-CM

## 2025-06-18 DIAGNOSIS — L03.90 CELLULITIS: ICD-10-CM

## 2025-06-18 DIAGNOSIS — Z95.1 S/P CABG (CORONARY ARTERY BYPASS GRAFT): ICD-10-CM

## 2025-06-18 DIAGNOSIS — Z45.2 ENCOUNTER FOR CENTRAL LINE CARE: Primary | ICD-10-CM

## 2025-06-18 DIAGNOSIS — S81.002A OPEN WOUND OF KNEE, LEG, AND ANKLE, LEFT, INITIAL ENCOUNTER: ICD-10-CM

## 2025-06-18 DIAGNOSIS — E66.09 CLASS 1 OBESITY DUE TO EXCESS CALORIES WITH SERIOUS COMORBIDITY AND BODY MASS INDEX (BMI) OF 32.0 TO 32.9 IN ADULT: ICD-10-CM

## 2025-06-18 LAB
BASOPHILS # BLD AUTO: 0 10E3/UL (ref 0–0.2)
BASOPHILS NFR BLD AUTO: 0 %
CREAT SERPL-MCNC: 1.23 MG/DL (ref 0.67–1.17)
EGFRCR SERPLBLD CKD-EPI 2021: 64 ML/MIN/1.73M2
EOSINOPHIL # BLD AUTO: 0.7 10E3/UL (ref 0–0.7)
EOSINOPHIL NFR BLD AUTO: 6 %
ERYTHROCYTE [DISTWIDTH] IN BLOOD BY AUTOMATED COUNT: 14.1 % (ref 10–15)
HCT VFR BLD AUTO: 32.1 % (ref 40–53)
HGB BLD-MCNC: 10.2 G/DL (ref 13.3–17.7)
IMM GRANULOCYTES # BLD: 0 10E3/UL
IMM GRANULOCYTES NFR BLD: 0 %
LYMPHOCYTES # BLD AUTO: 2.3 10E3/UL (ref 0.8–5.3)
LYMPHOCYTES NFR BLD AUTO: 20 %
MCH RBC QN AUTO: 26.6 PG (ref 26.5–33)
MCHC RBC AUTO-ENTMCNC: 31.8 G/DL (ref 31.5–36.5)
MCV RBC AUTO: 84 FL (ref 78–100)
MONOCYTES # BLD AUTO: 1 10E3/UL (ref 0–1.3)
MONOCYTES NFR BLD AUTO: 9 %
NEUTROPHILS # BLD AUTO: 7.3 10E3/UL (ref 1.6–8.3)
NEUTROPHILS NFR BLD AUTO: 64 %
NRBC # BLD AUTO: 0 10E3/UL
NRBC BLD AUTO-RTO: 0 /100
PLATELET # BLD AUTO: 430 10E3/UL (ref 150–450)
RBC # BLD AUTO: 3.84 10E6/UL (ref 4.4–5.9)
VANCOMYCIN SERPL-MCNC: 38.1 UG/ML (ref ?–25)
WBC # BLD AUTO: 11.3 10E3/UL (ref 4–11)

## 2025-06-18 PROCEDURE — 80202 ASSAY OF VANCOMYCIN: CPT | Performed by: STUDENT IN AN ORGANIZED HEALTH CARE EDUCATION/TRAINING PROGRAM

## 2025-06-18 PROCEDURE — A4222 INFUSION SUPPLIES WITH PUMP: HCPCS

## 2025-06-18 PROCEDURE — 3078F DIAST BP <80 MM HG: CPT | Performed by: NURSE PRACTITIONER

## 2025-06-18 PROCEDURE — 82565 ASSAY OF CREATININE: CPT

## 2025-06-18 PROCEDURE — 36592 COLLECT BLOOD FROM PICC: CPT

## 2025-06-18 PROCEDURE — 3074F SYST BP LT 130 MM HG: CPT | Performed by: NURSE PRACTITIONER

## 2025-06-18 PROCEDURE — 11042 DBRDMT SUBQ TIS 1ST 20SQCM/<: CPT | Performed by: NURSE PRACTITIONER

## 2025-06-18 PROCEDURE — 85004 AUTOMATED DIFF WBC COUNT: CPT

## 2025-06-18 PROCEDURE — 1126F AMNT PAIN NOTED NONE PRSNT: CPT | Performed by: NURSE PRACTITIONER

## 2025-06-18 PROCEDURE — A4221 SUPP NON-INSULIN INF CATH/WK: HCPCS

## 2025-06-18 RX ORDER — DIPHENHYDRAMINE HYDROCHLORIDE 50 MG/ML
25 INJECTION, SOLUTION INTRAMUSCULAR; INTRAVENOUS
Start: 2025-06-18

## 2025-06-18 RX ORDER — LIDOCAINE 50 MG/G
OINTMENT TOPICAL DAILY PRN
Status: ACTIVE | OUTPATIENT
Start: 2025-06-18

## 2025-06-18 RX ORDER — DIPHENHYDRAMINE HYDROCHLORIDE 50 MG/ML
50 INJECTION, SOLUTION INTRAMUSCULAR; INTRAVENOUS
Start: 2025-06-18

## 2025-06-18 RX ORDER — ALBUTEROL SULFATE 90 UG/1
1-2 INHALANT RESPIRATORY (INHALATION)
Start: 2025-06-18

## 2025-06-18 RX ORDER — METHYLPREDNISOLONE SODIUM SUCCINATE 40 MG/ML
40 INJECTION INTRAMUSCULAR; INTRAVENOUS
Start: 2025-06-18

## 2025-06-18 RX ORDER — HEPARIN SODIUM,PORCINE 10 UNIT/ML
5-20 VIAL (ML) INTRAVENOUS DAILY PRN
OUTPATIENT
Start: 2025-06-18

## 2025-06-18 RX ORDER — ALBUTEROL SULFATE 0.83 MG/ML
2.5 SOLUTION RESPIRATORY (INHALATION)
OUTPATIENT
Start: 2025-06-18

## 2025-06-18 RX ORDER — HEPARIN SODIUM (PORCINE) LOCK FLUSH IV SOLN 100 UNIT/ML 100 UNIT/ML
5 SOLUTION INTRAVENOUS
OUTPATIENT
Start: 2025-06-18

## 2025-06-18 RX ORDER — EPINEPHRINE 1 MG/ML
0.3 INJECTION, SOLUTION INTRAMUSCULAR; SUBCUTANEOUS EVERY 5 MIN PRN
OUTPATIENT
Start: 2025-06-18

## 2025-06-18 RX ADMIN — LIDOCAINE: 50 OINTMENT TOPICAL at 07:26

## 2025-06-18 ASSESSMENT — PAIN SCALES - GENERAL: PAINLEVEL_OUTOF10: NO PAIN (0)

## 2025-06-18 NOTE — TELEPHONE ENCOUNTER
Call back received from Marce, still waiting to hear back from Dr. German's care team regarding critical lab value.

## 2025-06-18 NOTE — PATIENT INSTRUCTIONS
"    Apply aquaphor to the healed scar tissue on the left medial leg; can apply 1-2 times per day as able    Wound care supplies were not ordered or needed today.        Wound Care Instructions    2 TIMES PER WEEK home care and as needed, Cleanse your left leg wound(s) with Vashe or dilute hibiclens solution (30cc in 500cc NS).    Pat Dry with non-sterile gauze    Apply Lotion to the intact skin surrounding your wound and other dry skin locations. Some good lotions include: Remedy Skin Repair Cream, Sarna, Vanicream or Cetaphil    Primary Dressing: Apply adaptic then silvercel into/onto the wounds    Secondary dressing: Cover with ABD    Secure with non-sterile roll gauze (4\" x 75\" roll) and tape (1\" roll tape) as needed; avoid adhesive directly on the skin    Compression: tubular compression and elevation    Elevation    Avoid pressure over the wound areas; use pillows for positioning and extra padding    It is not ok to get your wound wet in the bath or shower      If for some reason you are not able to get your dressing(s) changed as outlined above (due to illness, lack of supplies, lack of help) please do the following: remove old, soiled dressings; wash the wounds with saline; pat dry; apply ABD pad or other absorbant pad and secure with rolled gauze; avoid tape directly on your skin; Call the clinic as soon as possible to let us know what the current issues are in receiving wound care [phone].      SEEK MEDICAL CARE IF:  You have an increase in swelling, pain, or redness around the wound.  You have an increase in the amount of pus coming from the wound.  There is a bad smell coming from the wound.  The wound appears to be worsening/enlarging  You have a fever greater than 101.5 F      It is ok to continue current wound care treatment/products for the next 2-3 days until new wound care supplies are ordered and arrive. If longer than this please contact our office at [phone].    If you have a 2 layer or " 4 layer compression wrap on these are safe to have on for ONLY 7 days. If for some reason you are not able to get the wrap(s) changed (due to illness; lack of supplies, lack of help, lack of transportation) please do the following: unwrap the old 2 or 4 layer compression wrap; avoid using scissors as you could cut your skin and cause wounds; use tubular compression when available. Call to reschedule your home care or clinic visit appointment as soon as possible.    Please NOTE: if you are 15 minutes late to your arrival time, you will have to be rescheduled. Please call our clinic as soon as possible if you know you will not be able to get to your appointment at [phone]. If you fail to show up to 3 scheduled clinic appointments you will be dismissed from our clinic.              We want to hear from you!  In the next few weeks, you should receive a call or email to complete a survey about your visit at [facility]. Please help us improve your appointment experience by letting us know how we did today. We strive to make your experience good and value any ways in which we could do better.      We value your input and suggestions.    Thank you for choosing the [facility]!           It is recommended that you do not get your ulcer wet when showering.  Listed below are several ways of keeping it dry when you shower.     1. Wrap it with Press and Seal plastic wrap.  It can be found in the stores where the plastic wraps or tin foil is kept.               2.  Some people take a bath and hang their leg/foot out of the tub.                        3  Put your leg in a plastic bag and tape it on.           4. You can purchase a shower cover for casts at some pharmacies and through the Internet.            5. Take a Bed Bath or wash up at the sink     High Protein Foods      Chicken  -Chicken breast, 3.5oz.-30 grams protein  -Chicken thigh-10 grams(average size)  -Drumstick-11  grams  -Wing- 6 grams  -Chicken meat, cooked, 4 oz.    Beef  -Hamburger sparkle, 4 oz-28 grams protein  -Steak, 6 oz-42 grams  -Most cuts of beef- 7 grams of protein per ounce    Fish  -Most fish fillets or steaks are about 22 grams of protein for 3 1/2 oz(100 grams) of cooked fish, or 6 grams per ounce  -Tuna, 6 oz can-40 grams of protein    Pork  -Pork chop, average-22 grams protein  -Pork loin or tenderloin, 4 oz.-29 grams  -Ham, 3oz serving- 19 grams  -Ground pork 3oz cooked-22 grams  -Hawkins, 1 slice-3 grams  -Egyptian-style hawkins(black hawkins), slice-5-6 grams    Eggs and Dairy  -Egg, large-7 grams  -Milk, 1 cup-8 grams  -Cottage cheese, 1/2 cup-15 grams  -Greek yogurt, 1 cup-usually 8-12 grams, check label  -Soft cheeses (Mozzarella, Brie, Camembert)- 6 grams  -Medium cheeses(cheddar, swiss)- 7 or 8 grams per oz  -Hard cheeses(parmesan)- 10 grams per oz    Beans  -Tofu, 1/2 cup 20 grams  -Tofu, 1 oz., 2.3 grams  -Soy milk, 1 cup-6-10 grams  -Most beans(black, alejandra, lentils, etc.) about 7-10 grams protein per half cup of cooked beans  -soy beans, 1/2 cup cooked-14 grams  -Split peas, 1/2 cup cooked- 8 grams    Nuts and Seeds  -Peanut butter, 2 Tablespoons- 8 grams protein  -Almonds, 1/4 cup- 8 grams  -Peanuts, 1/4 cup-9 grams  -Cashews, 1/4 cup- 5 grams  -Pecans, 1/4 cup- 2.5 grams  -Sunflower seeds, 1/4 cup- 6 grams  -Pumpkin seeds, 1/4 cup-8 grams  -Flax seeds- 1/4 cup- 8 grams    Protein Supplements  -Core Power Protein Shakes 26g  -Premier Protein Shakes 30g  -Ensure  -Boost  -Glucerna, if diabetic  When you have an open ulcer, your bodies protein needs are much higher, so it is recommended eat good sources of protein

## 2025-06-18 NOTE — PROGRESS NOTES
Compression Applied to Left  Tubigrip Size F

## 2025-06-18 NOTE — PROGRESS NOTES
Clinic Administered Medication Documentation    Patient was given lidocaine. Prior to medication administration, verified patient's identity using patient's name and date of birth.    Maryann Vanegas RN

## 2025-06-18 NOTE — PROGRESS NOTES
Infusion Nursing Note:  Dudley Kiran presents today for tpa.    Patient seen by provider today: No   present during visit today: Not Applicable.    Note: Removed IV extension from PICC line. Flushed with saline and good blood return noted. Jose G patient's ID labs (CBC,Vanco level and creat) that he was unable to get d/t line not working this week. No issue with lab collection. Line flushed with saline and IV extension set reapplied. No Cathflow needed today.     Premeds Given: NA    Intravenous Access:  PICC.    Treatment Conditions:  Not Applicable.      Post Infusion Assessment:  Not Applicable.       Discharge Plan:   Discharge instructions reviewed with: Patient.  Patient and/or family verbalized understanding of discharge instructions and all questions answered.  Patient discharged in stable condition accompanied by: self.  Departure Mode: Ambulatory.      Steven Banks RN

## 2025-06-18 NOTE — TELEPHONE ENCOUNTER
[facility] Call Center    Phone Message    May a detailed message be left on voicemail: yes     Reason for Call: Call received from Cyn with the [facility] lab regarding critical lab value for one of Dr. German's pt's, would like a call back at [phone].    Action Taken: Other: Infectious Disease    Travel Screening: Not Applicable

## 2025-06-18 NOTE — TELEPHONE ENCOUNTER
ID Cross Cover Note:  5:25 PM  6/18/2025    [facility] call  [phone]    Discussed with lab  Vancomycin Level 38    Called patient and recommended holding vancomycin level    Per patient, the vancomycin dose was increased last week.  Recommended:  Hold Vancomycin until ID clinic calls back  Repeat BMP, and vanco trough on 6/19 or 6/20  Will ask ID nurse to call [facility] for Pharm D to adjust dose  Detailed discussion with the patient and expressed understanding  Will route the note to ID Clinic, Dr. Christian (covering tomorrow) and Dr. German (off this afternoon)      Last Comprehensive Metabolic Panel:  Sodium   Date Value Ref Range Status   06/07/2025 135 135 - 145 mmol/L Final   02/15/2019 145.0 (H) 133.0 - 144.0 mmol/L Final     Potassium   Date Value Ref Range Status   06/07/2025 4.0 3.4 - 5.3 mmol/L Final   02/11/2022 4.6 3.5 - 5.0 mmol/L Final   02/15/2019 4.2 3.4 - 5.3 mmol/L Final     Chloride   Date Value Ref Range Status   06/07/2025 102 98 - 107 mmol/L Final   02/11/2022 105 98 - 107 mmol/L Final   02/15/2019 105.0 94.0 - 109.0 mmol/L Final     Carbon Dioxide   Date Value Ref Range Status   02/15/2019 29.0 20.0 - 32.0 mmol/L Final     Carbon Dioxide (CO2)   Date Value Ref Range Status   06/07/2025 23 22 - 29 mmol/L Final   02/11/2022 27 22 - 31 mmol/L Final     Anion Gap   Date Value Ref Range Status   06/07/2025 10 7 - 15 mmol/L Final   02/11/2022 9 5 - 18 mmol/L Final     Glucose   Date Value Ref Range Status   06/07/2025 145 (H) 70 - 99 mg/dL Final   02/11/2022 99 70 - 125 mg/dL Final   02/15/2019 100.0 60.0 - 109.0 mg/dL Final     GLUCOSE BY METER POCT   Date Value Ref Range Status   05/28/2025 105 (H) 70 - 99 mg/dL Final     Urea Nitrogen   Date Value Ref Range Status   06/07/2025 12.5 8.0 - 23.0 mg/dL Final   02/11/2022 9 8 - 22 mg/dL Final   02/15/2019 11.0 7.0 - 30.0 mg/dL Final     Creatinine   Date Value Ref Range Status   06/18/2025 1.23 (H) 0.67 - 1.17 mg/dL Final   02/15/2019 1.1 0.8 -  1.5 mg/dL Final     GFR Estimate   Date Value Ref Range Status   06/18/2025 64 >60 mL/min/1.73m2 Final     Comment:     eGFR calculated using 2021 CKD-EPI equation.   12/18/2019 >60 >60 mL/min/1.73m2 Final   07/30/2014 >60 >60 mL/min/1.73m2 Final     Calcium   Date Value Ref Range Status   06/07/2025 8.8 8.8 - 10.4 mg/dL Final   02/15/2019 9.3 8.5 - 10.4 mg/dL Final     Bilirubin Total   Date Value Ref Range Status   06/06/2025 0.9 <=1.2 mg/dL Final   07/11/2011 0.7 <1.1 mg/dL Final     Alkaline Phosphatase   Date Value Ref Range Status   06/06/2025 93 40 - 150 U/L Final     ALT   Date Value Ref Range Status   06/06/2025 26 0 - 70 U/L Final     AST   Date Value Ref Range Status   06/06/2025 21 0 - 45 U/L Final       Charlotte Larsen MD  [facility]  Answering Service: [phone]  On-Call ID provider: [phone], option: 9

## 2025-06-18 NOTE — TELEPHONE ENCOUNTER
Marce calling back regarding critical lab values. Marce is wanting to get a call back ASAP. Please advise.

## 2025-06-18 NOTE — TELEPHONE ENCOUNTER
Forms/Letter Request    Type of form/letter: Home Health Certification      Do we have the form/letter: Yes: in Dr. Davis's inbox/to do    Who is the form from? Home care    Where did/will the form come from? form was faxed in    When is form/letter needed by: asap, over due forms    How would you like the form/letter returned: Fax : [phone]    Order details/form description: missing EIGHT forms. Informed Jacklyn Rice that provider was on holiday for one week, and returned to office on 6/17/25.      Order number (if applicable):   [id number]  [id number]  [id number]  [id number]  [id number]  [id number]  [id number]  [id number]

## 2025-06-18 NOTE — PROGRESS NOTES
"            Follow up Vascular Visit       Date of Service:06/18/25      Chief Complaint: left leg fasciotomy sites      Pt returns to [facility] with regards to their left leg fasciotomy sites.  They arrive today alone; he was able to walk to the exam room with the assistance of a walker. They are currently using adaptic; silvercel; ABD; rolled gauze to the wounds. This is being done by home care 2 days per week. They are using nothing for compression. They are feeling well today. Denies fevers, chills. No shortness of breath.     Allergies:   Allergies   Allergen Reactions    Oxycodone Confusion    Codeine Nausea    No Known Allergies      Potentially cats       Medications:   Current Outpatient Medications:     acetaminophen (TYLENOL) 325 MG tablet, Take 325-650 mg by mouth every 6 hours as needed for mild pain., Disp: , Rfl:     aspirin 81 MG EC tablet, Take 81 mg by mouth daily., Disp: , Rfl:     Emergency Supply Kit, Central,, Patient use for emergency only. Contents: 3 sodium chloride 0.9% flushes, 1 dressing kit, 1 microclave ext set 14\", 4 nitrile gloves (med), 6 alcohol prep pads, 1 bacitracin, 1 syringe (10 cc 20 G 1\"). Call [phone] to reorder., Disp: 420126 kit, Rfl: 0    HYDROmorphone (DILAUDID) 2 MG tablet, Take 2 tablets (4 mg) by mouth every 6 hours as needed for pain., Disp: 10 tablet, Rfl: 0    ipratropium (ATROVENT) 0.06 % nasal spray, Spray 2 sprays into both nostrils 2 times daily as needed for rhinitis., Disp: , Rfl:     lisinopril (ZESTRIL) 5 MG tablet, Take 1 tablet (5 mg) by mouth daily., Disp: 90 tablet, Rfl: 3    metoprolol succinate ER (TOPROL XL) 25 MG 24 hr tablet, TAKE 0.5 TABLETS(12.5 MG) BY MOUTH DAILY, Disp: 45 tablet, Rfl: 3    ondansetron (ZOFRAN ODT) 4 MG ODT tab, Take 1 tablet (4 mg) by mouth every 8 hours as needed for nausea., Disp: 20 tablet, Rfl: 0    prochlorperazine (COMPAZINE) 5 MG tablet, Take 1 tablet (5 mg) by mouth every 6 hours as needed for " nausea or vomiting., Disp: , Rfl:     rivaroxaban ANTICOAGULANT (XARELTO) 20 MG TABS tablet, Take 1 tablet (20 mg) by mouth daily (with dinner)., Disp: , Rfl:     sodium chloride, PF, 0.9% PF flush, Inject 10 mLs into the vein as needed for line flush. Flush IV before and after medication administration as directed and/or at least every 24 hours., Disp: 200112 mL, Rfl: 0    tadalafil (CIALIS) 10 MG tablet, Take 1 tablet (10 mg) by mouth daily as needed (erectile difficulties)., Disp: 90 tablet, Rfl: 2    vancomycin (VANCOCIN) 1,250 mg in sodium chloride 0.9 % 150 mL via CADD pump, Infuse 1,250 mg at 100 mL/hr over 90 minutes into the vein every 12 hours for 26 days., Disp: [id number] mL, Rfl: 0    Current Facility-Administered Medications:     lidocaine (XYLOCAINE) 5 % ointment, , Topical, Daily PRN, Jihan Mckay, NP, Given at 06/18/25 0726    History:   Past Medical History:   Diagnosis Date    Acute lower limb ischemia 04/14/2025    Arthritis     Cellulitis 06/16/2025    Class 1 obesity due to excess calories with serious comorbidity and body mass index (BMI) of 32.0 to 32.9 in adult 08/10/2021    Coronary arteriosclerosis due to lipid rich plaque 12/28/2012    Problem list name updated by automated process. Provider to review      Coronary artery disease     ED (erectile dysfunction)     Elevated cholesterol 02/15/2019    Essential hypertension, benign 12/18/2019    Great toe pain, left 06/06/2025    History of prediabetes 02/01/2019    HTN (hypertension)     Hypercholesteremia     OA (osteoarthritis) 10/27/2021    Obesity     Prediabetes     Primary osteoarthritis of both hips     S/P CABG (coronary artery bypass graft) 10/04/2023    Status post coronary artery bypass graft 2010       Physical Exam:    /74 (BP Location: Left arm)   Pulse 94   SpO2 96%     General:  Patient presents to clinic in no apparent distress.  Head: normocephalic atraumatic  Psychiatric:  Alert and oriented x3.   Respiratory:  unlabored breathing; no cough  Integumentary:  Skin is uniformly warm, dry and pink.    Ulcer #1 Location: left medial leg  Size: 19.2L x 1W x 0.2depth.  no sinus tract present, Wound base: slough  no undermining present. Ulcer is full thickness. There is moderate drainage. Periwound: no denudement, erythema, induration, maceration or warmth.      Ulcer #2  Location: left lateral leg  Size: 0x0x0x depth.  no sinus tract present, Wound base: eschar; underneath healed  no undermining present. Ulcer is healed thickness. There is no drainage. Periwound: no denudement, erythema, induration, maceration or warmth.      PICC 06/11/25 Single Lumen Right Basilic Antibiotic(s) (Active)   Site Assessment WDL 06/11/25 1120   External Cath Length (cm) 2 cm 06/06/25 0001   Extremity Circumference (cm) 32 cm 06/06/25 0001   Dressing Chlorhexidine disk;Transparent;Securement device;Other (Comment) 06/11/25 1120   Dressing Status clean;dry;intact 06/11/25 1120   Dressing Change Due 06/18/25 06/06/25 0001   Line Necessity Yes, meets criteria 06/11/25 1120   Purple - Status blood return noted;saline locked 06/11/25 1120   Purple - Intervention Flushed 06/11/25 1120   Infiltration? no 06/11/25 1120   PICC Comment OK to USE 06/11/25 1120   Number of days: 7       VASC Wound Left lower leg (lateral) (Active)   Pre Size Length 23.5 05/20/25 0832   Pre Size Width 3 05/20/25 0832   Pre Size Depth 0.2 05/20/25 0832   Pre Total Sq cm 70.5 05/20/25 0832   Description scab 06/18/25 0723   Number of days: 57       VASC Wound Left lower leg (medial) (Active)   Pre Size Length 19.2 06/18/25 0723   Pre Size Width 1 06/18/25 0723   Pre Size Depth 0.2 06/18/25 0723   Pre Total Sq cm 19.2 06/18/25 0723   Number of days: 57       Incision/Surgical Site 03/27/25 Left;Medial Tibial (Active)   Incision Assessment UTV 06/11/25 1613   Dressing Per Plan of Care 06/10/25 0008   Isabella-Incision Assessment UTV 06/11/25 0000   Closure ROSA M 05/28/25 0900   Incision  "Drainage Amount None 06/10/25 0008   Drainage Description Sanguinous 05/28/25 0900   Dressing Intervention Clean, dry, intact 06/11/25 1613   Number of days: 83       Incision/Surgical Site 03/27/25 Left;Lateral Tibial (Active)   Incision Assessment UTV 06/11/25 1613   Dressing Per Plan of Care 06/10/25 0008   Isabella-Incision Assessment UTV 06/11/25 0000   Closure ROSA M 05/28/25 0900   Incision Drainage Amount Small 06/11/25 1613   Drainage Description Sanguinous 06/11/25 1613   Dressing Intervention Clean, dry, intact 06/10/25 0008   Number of days: 83            Circumferential volume measures:          5/20/2025     8:00 AM   Circumferential Measures   Left - just above MTP 23   Left Ankle 22.5   Left Widest Calf 36       Labs:    I personally reviewed the following lab results today and those on care everywhere    No results found for: \"CRP\"   Sed Rate   Date Value Ref Range Status   03/08/2019 12 0 - 15 mm/hr Final      Last Renal Panel:  Sodium   Date Value Ref Range Status   06/07/2025 135 135 - 145 mmol/L Final   02/15/2019 145.0 (H) 133.0 - 144.0 mmol/L Final     Potassium   Date Value Ref Range Status   06/07/2025 4.0 3.4 - 5.3 mmol/L Final   02/11/2022 4.6 3.5 - 5.0 mmol/L Final   02/15/2019 4.2 3.4 - 5.3 mmol/L Final     Chloride   Date Value Ref Range Status   06/07/2025 102 98 - 107 mmol/L Final   02/11/2022 105 98 - 107 mmol/L Final   02/15/2019 105.0 94.0 - 109.0 mmol/L Final     Carbon Dioxide   Date Value Ref Range Status   02/15/2019 29.0 20.0 - 32.0 mmol/L Final     Carbon Dioxide (CO2)   Date Value Ref Range Status   06/07/2025 23 22 - 29 mmol/L Final   02/11/2022 27 22 - 31 mmol/L Final     Anion Gap   Date Value Ref Range Status   06/07/2025 10 7 - 15 mmol/L Final   02/11/2022 9 5 - 18 mmol/L Final     Glucose   Date Value Ref Range Status   06/07/2025 145 (H) 70 - 99 mg/dL Final   02/11/2022 99 70 - 125 mg/dL Final   02/15/2019 100.0 60.0 - 109.0 mg/dL Final     GLUCOSE BY METER POCT   Date Value " "Ref Range Status   05/28/2025 105 (H) 70 - 99 mg/dL Final     Urea Nitrogen   Date Value Ref Range Status   06/07/2025 12.5 8.0 - 23.0 mg/dL Final   02/11/2022 9 8 - 22 mg/dL Final   02/15/2019 11.0 7.0 - 30.0 mg/dL Final     Creatinine   Date Value Ref Range Status   06/12/2025 0.82 0.67 - 1.17 mg/dL Final   02/15/2019 1.1 0.8 - 1.5 mg/dL Final     GFR Estimate   Date Value Ref Range Status   06/12/2025 >90 >60 mL/min/1.73m2 Final     Comment:     eGFR calculated using 2021 CKD-EPI equation.   12/18/2019 >60 >60 mL/min/1.73m2 Final   07/30/2014 >60 >60 mL/min/1.73m2 Final     Calcium   Date Value Ref Range Status   06/07/2025 8.8 8.8 - 10.4 mg/dL Final   02/15/2019 9.3 8.5 - 10.4 mg/dL Final     Phosphorus   Date Value Ref Range Status   04/14/2025 3.1 2.5 - 4.5 mg/dL Final     Albumin   Date Value Ref Range Status   06/06/2025 3.8 3.5 - 5.2 g/dL Final   07/11/2011 4.0 3.5 - 5.0 g/dl Final      Lab Results   Component Value Date    WBC 8.5 06/11/2025     Lab Results   Component Value Date    RBC 3.53 06/11/2025     Lab Results   Component Value Date    HGB 9.7 06/11/2025     Lab Results   Component Value Date    HCT 29.9 06/11/2025     No components found for: \"MCT\"  Lab Results   Component Value Date    MCV 85 06/11/2025     Lab Results   Component Value Date    MCH 27.5 06/11/2025     Lab Results   Component Value Date    MCHC 32.4 06/11/2025     Lab Results   Component Value Date    RDW 13.9 06/11/2025     Lab Results   Component Value Date     06/11/2025      Lab Results   Component Value Date    A1C 5.9 03/19/2025    A1C 5.9 09/13/2024    A1C 5.9 03/13/2024    A1C 5.8 09/08/2023    A1C 5.8 03/08/2023    A1C 5.7 02/01/2019    A1C 5.9 05/06/2016      No results found for: \"TSH\"   No results found for: \"VITDT\"                Impression:  Encounter Diagnoses   Name Primary?    Open wound Yes    History of fasciotomy     Open wound of knee, leg, and ankle, left, initial encounter     Class 1 obesity due to " excess calories with serious comorbidity and body mass index (BMI) of 32.0 to 32.9 in adult     S/P CABG (coronary artery bypass graft)                                       Are any of these ulcers new today: No; Location: na    Assessment/Plan:          1. Debridement: Nursing staff removed the old dressing and cleanse the wound(s) with specified solution. After discussion of risk factors and verbal consent was obtained 2% Lidocaine HCL jelly was applied, under clean conditions, the left leg ulceration(s) were debrided using currette. Devitalized and nonviable tissue, along with any fibrin and slough, was removed to improve granulation tissue formation, stimulate wound healing, decrease overall bacteria load, disrupt biofilm formation and decrease edge senescence.  Total excisional debridement was 19.2 sq cm from the epidermis/dermis area and into the subcutaneous tissue with a depth of 0.2 cm.   Ulcers were improved afterwards and .  Measures were unchanged after debridement.       2.  Ulcer treatment: ulcer treatment will include irrigation and dressings to promote autolytic debridement which will include:the medial wound is healed; apply aquaphor here 1-2 times per day; continue adaptic; silvercel; gauze; to the lateral leg; change twice per week by home care If for some reason the patient is not able to get their dressing(s) changed as outlined above (due to illness, lack of supplies, lack of help) please do the following: remove old, soiled dressings; wash the ulcers with saline; pat dry; apply ABD pad or other absorbant pad and secure with rolled gauze; avoid tape directly on your skin; patient instructed to call the clinic as soon as possible to let us know what the current issues are in receiving ulcer care. Stable            3. Edema: continue with elevation and tubular compression. The compression wraps were applied today in clinic.     Patient presents with moderate, bilateral  lower extremity  secondary lymphedema.      Patient requires a standard-fit knee high compression stocking and/or velcro wraps    Secondary Lymphedema &Edema: continue elevation and velcro wraps; needs to wear these consistently; replace every 6 months. The compression wraps were applied today in clinic. Patient presents with severe, bilateral secondary lymphedema. Patient requires daytime and nighttime compression garments; I have prescribed velcro wraps to accommodate and deliver gradient compression throughout the affected extremities. Quantity of 3 is needed for each leg for proper wash and wear.         If a 2 layer or 4 layer compression wrap is being used; these are safe to have on for ONLY 7 days. If for some reason the patient is not able to get the wrap(s) changed (due to illness; lack of supplies, lack of help, lack of transportation) please do the following: unwrap the old 2 or 4 layer compression wrap; avoid using scissors as you could cut your skin and cause ulcers; use tubular compression when available. Call to reschedule your home care or clinic visit appointment as soon as possible.  Stable            4. Nutrition: focus on protein; healthy weight           5. Offloading: position off wound; avoid pressure over the wound area          6. Wound Etiology: surgical; fasciotomy     Patient will follow up with me in 3 weeks for reevaluation. They were instructed to call the clinic sooner with any signs or symptoms of infection or any further questions/concerns. Answered all questions.          Jihan Mckay DNP, RN, CNP, CWOCN, CFCN, CLT  [facility]   [phone]        This note was electronically signed by Jihan Mckay NP

## 2025-06-19 ENCOUNTER — LAB REQUISITION (OUTPATIENT)
Dept: LAB | Facility: HOSPITAL | Age: 70
End: 2025-06-19
Payer: COMMERCIAL

## 2025-06-19 ENCOUNTER — TELEPHONE (OUTPATIENT)
Dept: FAMILY MEDICINE | Facility: CLINIC | Age: 70
End: 2025-06-19

## 2025-06-19 DIAGNOSIS — M00.9 SEPTIC ARTHRITIS OF LEFT FOOT, DUE TO UNSPECIFIED ORGANISM (H): ICD-10-CM

## 2025-06-19 DIAGNOSIS — M00.9 PYOGENIC ARTHRITIS, UNSPECIFIED (H): ICD-10-CM

## 2025-06-19 LAB
ANION GAP SERPL CALCULATED.3IONS-SCNC: 11 MMOL/L (ref 7–15)
BASOPHILS # BLD AUTO: 0 10E3/UL (ref 0–0.2)
BASOPHILS NFR BLD AUTO: 0 %
BUN SERPL-MCNC: 16.9 MG/DL (ref 8–23)
CALCIUM SERPL-MCNC: 9.3 MG/DL (ref 8.8–10.4)
CHLORIDE SERPL-SCNC: 104 MMOL/L (ref 98–107)
CREAT SERPL-MCNC: 1.39 MG/DL (ref 0.67–1.17)
CREAT SERPL-MCNC: 1.39 MG/DL (ref 0.67–1.17)
CRP SERPL-MCNC: 59.1 MG/L
EGFRCR SERPLBLD CKD-EPI 2021: 55 ML/MIN/1.73M2
EGFRCR SERPLBLD CKD-EPI 2021: 55 ML/MIN/1.73M2
EOSINOPHIL # BLD AUTO: 0.7 10E3/UL (ref 0–0.7)
EOSINOPHIL NFR BLD AUTO: 7 %
ERYTHROCYTE [DISTWIDTH] IN BLOOD BY AUTOMATED COUNT: 14.1 % (ref 10–15)
GLUCOSE SERPL-MCNC: 100 MG/DL (ref 70–99)
HCO3 SERPL-SCNC: 26 MMOL/L (ref 22–29)
HCT VFR BLD AUTO: 31.6 % (ref 40–53)
HGB BLD-MCNC: 9.8 G/DL (ref 13.3–17.7)
IMM GRANULOCYTES # BLD: 0 10E3/UL
IMM GRANULOCYTES NFR BLD: 0 %
LYMPHOCYTES # BLD AUTO: 1.8 10E3/UL (ref 0.8–5.3)
LYMPHOCYTES NFR BLD AUTO: 20 %
MCH RBC QN AUTO: 26 PG (ref 26.5–33)
MCHC RBC AUTO-ENTMCNC: 31 G/DL (ref 31.5–36.5)
MCV RBC AUTO: 84 FL (ref 78–100)
MONOCYTES # BLD AUTO: 0.9 10E3/UL (ref 0–1.3)
MONOCYTES NFR BLD AUTO: 10 %
NEUTROPHILS # BLD AUTO: 5.5 10E3/UL (ref 1.6–8.3)
NEUTROPHILS NFR BLD AUTO: 62 %
NRBC # BLD AUTO: 0 10E3/UL
NRBC BLD AUTO-RTO: 0 /100
PLATELET # BLD AUTO: 300 10E3/UL (ref 150–450)
POTASSIUM SERPL-SCNC: 3.6 MMOL/L (ref 3.4–5.3)
RBC # BLD AUTO: 3.77 10E6/UL (ref 4.4–5.9)
SODIUM SERPL-SCNC: 141 MMOL/L (ref 135–145)
VANCOMYCIN SERPL-MCNC: 18.5 UG/ML (ref ?–25)
WBC # BLD AUTO: 9 10E3/UL (ref 4–11)

## 2025-06-19 PROCEDURE — 80048 BASIC METABOLIC PNL TOTAL CA: CPT | Performed by: STUDENT IN AN ORGANIZED HEALTH CARE EDUCATION/TRAINING PROGRAM

## 2025-06-19 PROCEDURE — 80202 ASSAY OF VANCOMYCIN: CPT | Performed by: STUDENT IN AN ORGANIZED HEALTH CARE EDUCATION/TRAINING PROGRAM

## 2025-06-19 PROCEDURE — 82565 ASSAY OF CREATININE: CPT | Performed by: STUDENT IN AN ORGANIZED HEALTH CARE EDUCATION/TRAINING PROGRAM

## 2025-06-19 PROCEDURE — 85004 AUTOMATED DIFF WBC COUNT: CPT | Performed by: STUDENT IN AN ORGANIZED HEALTH CARE EDUCATION/TRAINING PROGRAM

## 2025-06-19 PROCEDURE — 86140 C-REACTIVE PROTEIN: CPT | Performed by: STUDENT IN AN ORGANIZED HEALTH CARE EDUCATION/TRAINING PROGRAM

## 2025-06-19 PROCEDURE — A4222 INFUSION SUPPLIES WITH PUMP: HCPCS

## 2025-06-19 NOTE — TELEPHONE ENCOUNTER
ID provider was paged see alternate TE.     ID Cross Cover Note:  5:25 PM  6/18/2025     [facility] call  [phone]     Discussed with lab  Vancomycin Level 38     Called patient and recommended holding vancomycin level     Per patient, the vancomycin dose was increased last week.  Recommended:  Hold Vancomycin until ID clinic calls back  Repeat BMP, and vanco trough on 6/19 or 6/20  Will ask ID nurse to call [facility] for Pharm D to adjust dose  Detailed discussion with the patient and expressed understanding  Will route the note to ID Clinic, Dr. Christian (covering tomorrow) and Dr. German (off this afternoon)

## 2025-06-19 NOTE — TELEPHONE ENCOUNTER
Forms/Letter Request    Type of form/letter: Home Health Certification      Do we have the form/letter: Yes: Placed in Dr. Davis in box    Who is the form from? Home care    Where did/will the form come from? form was faxed in    When is form/letter needed by: when done    How would you like the form/letter returned: Fax : [phone]        Order details/form description: 2 pages     Order number (if applicable): [id number]

## 2025-06-19 NOTE — TELEPHONE ENCOUNTER
Per HI  Peter doses at 8 am and 8 pm and the labs were drawn at 1331- so the level from yesterday was not a trough.  The nurse from the agency called this morning and they are drawing a true trough.  Pt will have labs done STAT today. To have them reviewed.

## 2025-06-20 ENCOUNTER — TELEPHONE (OUTPATIENT)
Dept: FAMILY MEDICINE | Facility: CLINIC | Age: 70
End: 2025-06-20

## 2025-06-20 ENCOUNTER — LAB REQUISITION (OUTPATIENT)
Dept: LAB | Facility: HOSPITAL | Age: 70
End: 2025-06-20
Payer: COMMERCIAL

## 2025-06-20 DIAGNOSIS — M00.9 PYOGENIC ARTHRITIS, UNSPECIFIED (H): ICD-10-CM

## 2025-06-20 LAB
CREAT SERPL-MCNC: 1.5 MG/DL (ref 0.67–1.17)
EGFRCR SERPLBLD CKD-EPI 2021: 50 ML/MIN/1.73M2

## 2025-06-20 PROCEDURE — 82565 ASSAY OF CREATININE: CPT | Performed by: STUDENT IN AN ORGANIZED HEALTH CARE EDUCATION/TRAINING PROGRAM

## 2025-06-20 PROCEDURE — A4222 INFUSION SUPPLIES WITH PUMP: HCPCS

## 2025-06-20 NOTE — TELEPHONE ENCOUNTER
Forms/Letter Request    Type of form/letter: Home Health Certification      Do we have the form/letter: Yes: Placed in Dr. Davis in box    Who is the form from? Home care    Where did/will the form come from? form was faxed in    When is form/letter needed by: when done    How would you like the form/letter returned: Fax : [phone]      Order details/form description: 2pages    Order number (if applicable): [id number]

## 2025-06-21 PROCEDURE — A4222 INFUSION SUPPLIES WITH PUMP: HCPCS

## 2025-06-23 ENCOUNTER — TELEPHONE (OUTPATIENT)
Dept: FAMILY MEDICINE | Facility: CLINIC | Age: 70
End: 2025-06-23

## 2025-06-23 ENCOUNTER — OFFICE VISIT (OUTPATIENT)
Dept: FAMILY MEDICINE | Facility: CLINIC | Age: 70
End: 2025-06-23
Payer: COMMERCIAL

## 2025-06-23 ENCOUNTER — LAB REQUISITION (OUTPATIENT)
Dept: LAB | Facility: HOSPITAL | Age: 70
End: 2025-06-23
Payer: COMMERCIAL

## 2025-06-23 VITALS
SYSTOLIC BLOOD PRESSURE: 115 MMHG | TEMPERATURE: 97.4 F | OXYGEN SATURATION: 95 % | HEIGHT: 67 IN | DIASTOLIC BLOOD PRESSURE: 75 MMHG | RESPIRATION RATE: 20 BRPM | BODY MASS INDEX: 29.82 KG/M2 | HEART RATE: 85 BPM | WEIGHT: 190 LBS

## 2025-06-23 DIAGNOSIS — M00.9 PYOGENIC ARTHRITIS, UNSPECIFIED (H): ICD-10-CM

## 2025-06-23 DIAGNOSIS — M00.9 SEPTIC ARTHRITIS, DUE TO UNSPECIFIED ORGANISM, SEPTIC ARTHRITIS OF UNSPECIFIED LOCATION (H): ICD-10-CM

## 2025-06-23 DIAGNOSIS — R21 RASH: ICD-10-CM

## 2025-06-23 DIAGNOSIS — Z09 HOSPITAL DISCHARGE FOLLOW-UP: Primary | ICD-10-CM

## 2025-06-23 DIAGNOSIS — I10 ESSENTIAL HYPERTENSION, BENIGN: ICD-10-CM

## 2025-06-23 LAB
ANION GAP SERPL CALCULATED.3IONS-SCNC: 9 MMOL/L (ref 7–15)
BASOPHILS # BLD AUTO: 0.1 10E3/UL (ref 0–0.2)
BASOPHILS NFR BLD AUTO: 1 %
BUN SERPL-MCNC: 18.9 MG/DL (ref 8–23)
CALCIUM SERPL-MCNC: 9.4 MG/DL (ref 8.8–10.4)
CHLORIDE SERPL-SCNC: 105 MMOL/L (ref 98–107)
CK SERPL-CCNC: 38 U/L (ref 39–308)
CREAT SERPL-MCNC: 1.39 MG/DL (ref 0.67–1.17)
CREAT SERPL-MCNC: 1.39 MG/DL (ref 0.67–1.17)
CRP SERPL-MCNC: 14.8 MG/L
EGFRCR SERPLBLD CKD-EPI 2021: 55 ML/MIN/1.73M2
EGFRCR SERPLBLD CKD-EPI 2021: 55 ML/MIN/1.73M2
EOSINOPHIL # BLD AUTO: 0.7 10E3/UL (ref 0–0.7)
EOSINOPHIL NFR BLD AUTO: 9 %
ERYTHROCYTE [DISTWIDTH] IN BLOOD BY AUTOMATED COUNT: 13.9 % (ref 10–15)
GLUCOSE SERPL-MCNC: 99 MG/DL (ref 70–99)
HCO3 SERPL-SCNC: 28 MMOL/L (ref 22–29)
HCT VFR BLD AUTO: 32 % (ref 40–53)
HGB BLD-MCNC: 10.4 G/DL (ref 13.3–17.7)
IMM GRANULOCYTES # BLD: 0 10E3/UL
IMM GRANULOCYTES NFR BLD: 0 %
LYMPHOCYTES # BLD AUTO: 2.1 10E3/UL (ref 0.8–5.3)
LYMPHOCYTES NFR BLD AUTO: 26 %
MCH RBC QN AUTO: 26.7 PG (ref 26.5–33)
MCHC RBC AUTO-ENTMCNC: 32.5 G/DL (ref 31.5–36.5)
MCV RBC AUTO: 82 FL (ref 78–100)
MONOCYTES # BLD AUTO: 0.7 10E3/UL (ref 0–1.3)
MONOCYTES NFR BLD AUTO: 9 %
NEUTROPHILS # BLD AUTO: 4.5 10E3/UL (ref 1.6–8.3)
NEUTROPHILS NFR BLD AUTO: 56 %
NRBC # BLD AUTO: 0 10E3/UL
NRBC BLD AUTO-RTO: 0 /100
PLATELET # BLD AUTO: 446 10E3/UL (ref 150–450)
POTASSIUM SERPL-SCNC: 3.8 MMOL/L (ref 3.4–5.3)
RBC # BLD AUTO: 3.89 10E6/UL (ref 4.4–5.9)
SODIUM SERPL-SCNC: 142 MMOL/L (ref 135–145)
WBC # BLD AUTO: 8.1 10E3/UL (ref 4–11)

## 2025-06-23 PROCEDURE — 85004 AUTOMATED DIFF WBC COUNT: CPT | Performed by: STUDENT IN AN ORGANIZED HEALTH CARE EDUCATION/TRAINING PROGRAM

## 2025-06-23 PROCEDURE — 86140 C-REACTIVE PROTEIN: CPT | Performed by: STUDENT IN AN ORGANIZED HEALTH CARE EDUCATION/TRAINING PROGRAM

## 2025-06-23 PROCEDURE — 82565 ASSAY OF CREATININE: CPT | Performed by: STUDENT IN AN ORGANIZED HEALTH CARE EDUCATION/TRAINING PROGRAM

## 2025-06-23 PROCEDURE — 80048 BASIC METABOLIC PNL TOTAL CA: CPT | Performed by: STUDENT IN AN ORGANIZED HEALTH CARE EDUCATION/TRAINING PROGRAM

## 2025-06-23 PROCEDURE — 99495 TRANSJ CARE MGMT MOD F2F 14D: CPT | Performed by: NURSE PRACTITIONER

## 2025-06-23 PROCEDURE — 1111F DSCHRG MED/CURRENT MED MERGE: CPT | Performed by: NURSE PRACTITIONER

## 2025-06-23 PROCEDURE — 82550 ASSAY OF CK (CPK): CPT | Performed by: STUDENT IN AN ORGANIZED HEALTH CARE EDUCATION/TRAINING PROGRAM

## 2025-06-23 PROCEDURE — 3078F DIAST BP <80 MM HG: CPT | Performed by: NURSE PRACTITIONER

## 2025-06-23 PROCEDURE — 3074F SYST BP LT 130 MM HG: CPT | Performed by: NURSE PRACTITIONER

## 2025-06-23 PROCEDURE — 1126F AMNT PAIN NOTED NONE PRSNT: CPT | Performed by: NURSE PRACTITIONER

## 2025-06-23 ASSESSMENT — PAIN SCALES - GENERAL: PAINLEVEL_OUTOF10: NO PAIN (0)

## 2025-06-23 NOTE — TELEPHONE ENCOUNTER
Forms/Letter Request    Type of form/letter: Home Health Certification      Do we have the form/letter: Yes: Placed in Dr. Davis in box    Who is the form from? Home care    Where did/will the form come from? form was faxed in    When is form/letter needed by: when done    How would you like the form/letter returned: Fax : [phone]        Order details/form description: 8 pages    Order number (if applicable): [id number]

## 2025-06-23 NOTE — TELEPHONE ENCOUNTER
Forms/Letter Request    Type of form/letter: Home Health Certification      Do we have the form/letter: Yes: Placed in Dr. Davis in box    Who is the form from? Home care    Where did/will the form come from? form was faxed in    When is form/letter needed by: when done    How would you like the form/letter returned: Fax : [phone]      Order details/form description: 3 pages    Order number (if applicable): [id number]

## 2025-06-23 NOTE — PROGRESS NOTES
Assessment & Plan     Hospital discharge follow-up  Septic arthritis, due to unspecified organism, septic arthritis of unspecified location (H)  Hospital discharge follow-up visit completed today.  I reviewed hospital summary, imaging and labs with the patient.  He is improving and his exam is without any concerning findings today.  I reviewed lab work completed earlier today which indicates improvement in his creatinine and blood count.  He will be following up with infectious disease tomorrow podiatry on Wednesday.  He continues home care for ST changes and therapy.  He is advised to follow-up with PCP next month or sooner with any new concerns.    Rash  Arm rash attributed to IV has resolved.    Essential hypertension, benign  Blood pressure is well-controlled on lisinopril and metoprolol.      MED REC REQUIRED  Post Medication Reconciliation Status:           Jason Diamond is a 69 year old, presenting for the following health issues:  Hospital F/U      6/23/2025     1:53 PM   Additional Questions   Roomed by va     HPI    The patient presents today for hospital discharge follow-up visit.  He was hospitalized from 6/6/2025 through 6/11/2025 with left MTP septic arthritis.  He presented to the ER with left great toe pain and swelling.  He was found to be tachycardic and had leukocytosis.  He underwent MRI and joint aspiration which indicated first MTP septic arthritis.  His symptoms improved  with IV vancomycin and Zosyn.  Infectious disease and podiatry were consulted and the patient was started on long-term IV vancomycin upon discharge. Due to decreased kidney function this was stopped and he was put on oral doxycycline.  The plan is to have him finish this course of p.o. antibiotics to ensure tolerance before starting IV daptomycin.  His kidney function and blood count from today indicate improvement.  He has a follow-up appointment scheduled with infectious disease tomorrow and follow-up with podiatry on  Wednesday.  He is receiving therapy at home once a week.  Nurse from wound/vascular is coming in his home to do dressing changes twice a week.  His wound is healing as expected.  Per patient his inner leg wound is completely healed and that odor is significantly smaller.  He denies any worsening pain.  No fevers, chills or bodyaches.  He is managing the stress of everything well and denies any worsening anxiety or panic attacks        6/12/2025   Post Discharge Outreach   How are you doing now that you are home? getting better. infusion home care has started.   How are your symptoms? (Red Flag symptoms escalate to triage hotline per guidelines) Improved   Does the patient have their discharge instructions?  Yes   Does the patient have questions regarding their discharge instructions?  No   Were you started on any new medications or were there changes to any of your previous medications?  Yes   Does the patient have all of their medications? Yes   Do you have questions regarding any of your medications?  No   Do you have all of your needed medical supplies or equipment (DME)?  (i.e. oxygen tank, CPAP, cane, etc.) Yes       Hospital Follow-up Visit:    Hospital/Nursing Home/IP Rehab Facility: [facility]  Most Recent Admission Date: 6/6/2025   Most Recent Admission Diagnosis: Great toe pain, left - M79.675     Most Recent Discharge Date: 6/11/2025   Most Recent Discharge Diagnosis: Great toe pain, left - M79.675  Other specified counseling - Z71.89  Open wound - T14.8XXA  Septic arthritis of left foot, due to unspecified organism (H) - M00.9   Do you have any other stressors you would like to discuss with your provider? No    Problems taking medications regularly:  None  Medication changes since discharge: None  Problems adhering to non-medication therapy:  None    Summary of hospitalization:  [facility] discharge summary reviewed  Diagnostic Tests/Treatments reviewed.  Follow  "up needed: podiatry, ID  Other Healthcare Providers Involved in Patient s Care:         None  Update since discharge: improved.         Plan of care communicated with patient             Review of Systems - pertinent positives noted in HPI, otherwise ROS is negative.        Objective    /75   Pulse 85   Temp 97.4  F (36.3  C)   Resp 20   Ht 1.702 m (5' 7\")   Wt 86.2 kg (190 lb)   SpO2 95%   BMI 29.76 kg/m    Body mass index is 29.76 kg/m .  Physical Exam   GENERAL: alert and no distress  RESP: lungs clear to auscultation - no rales, rhonchi or wheezes  CV: regular rate and rhythm, normal S1 S2, no S3 or S4, no murmur, click or rub, no peripheral edema  MS: Patient's left lower leg is in bandage.  No surrounding erythema.  Trace edema noted on left lower extremity.  Pulses intact.  No gross musculoskeletal defects noted otherwise.            Signed Electronically by: AMADA Walker CNP      "

## 2025-06-24 ENCOUNTER — TELEPHONE (OUTPATIENT)
Dept: FAMILY MEDICINE | Facility: CLINIC | Age: 70
End: 2025-06-24

## 2025-06-24 ENCOUNTER — VIRTUAL VISIT (OUTPATIENT)
Dept: INFECTIOUS DISEASES | Facility: CLINIC | Age: 70
End: 2025-06-24
Payer: COMMERCIAL

## 2025-06-24 DIAGNOSIS — M00.9 SEPTIC ARTHRITIS OF LEFT FOOT, DUE TO UNSPECIFIED ORGANISM (H): Primary | ICD-10-CM

## 2025-06-24 DIAGNOSIS — N17.9 AKI (ACUTE KIDNEY INJURY): ICD-10-CM

## 2025-06-24 PROCEDURE — 98006 SYNCH AUDIO-VIDEO EST MOD 30: CPT | Performed by: STUDENT IN AN ORGANIZED HEALTH CARE EDUCATION/TRAINING PROGRAM

## 2025-06-24 NOTE — TELEPHONE ENCOUNTER
Forms/Letter Request    Type of form/letter: Home Health Certification      Do we have the form/letter: Yes: placed in Dr. Davis in box    Who is the form from? Home care    Where did/will the form come from? form was faxed in    When is form/letter needed by: when done    How would you like the form/letter returned: Fax : [phone]      Order details/form description: 2 pages    Order number (if applicable): [id number]

## 2025-06-24 NOTE — PROGRESS NOTES
Lobo is a 69 year old who is being evaluated via a billable video visit.    How would you like to obtain your AVS? MyChart  If the video visit is dropped, the invitation should be resent by: Text to cell phone: [phone]  Will anyone else be joining your video visit? No    Video-Visit Details    Type of service:  Video Visit   Video End Time:9:35 AM  Originating Location (pt. Location): Home    Distant Location (provider location):  On-site  Platform used for Video Visit: [facility] post-hospital stay follow up.    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 He was also diagnosed with cellulitis associated with wound infection with cultures positive with MRSA and Streptococcus dysgalactiae, and Corynebacterium striatum. 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 Clinical picture was not classic for septic arthritis. The joint was aspirated, although after 72 hours of antibiotic. Cultures all came back negative. Was discharged on IV Vanco. He improved clinically. He however developed an BENJY and as a result the Vanco changed to Doxy recently. His CRP has decreased from 204 down to 14.8. Clinically today, he feels very good with no pain at rest. He is able to ambulate with worse pain at 3 with ambulation from 10/10 inpatient.     ===========================  Past Medical History:  Past Medical History:   Diagnosis Date    Acute lower limb ischemia 2025    Arthritis     Cellulitis 2025    Class 1 obesity due to excess calories with serious comorbidity and body mass index (BMI) of 32.0 to 32.9 in adult 08/10/2021     Coronary arteriosclerosis due to lipid rich plaque 12/28/2012    Problem list name updated by automated process. Provider to review      Coronary artery disease     ED (erectile dysfunction)     Elevated cholesterol 02/15/2019    Essential hypertension, benign 12/18/2019    Great toe pain, left 06/06/2025    History of prediabetes 02/01/2019    HTN (hypertension)     Hypercholesteremia     OA (osteoarthritis) 10/27/2021    Obesity     Prediabetes     Primary osteoarthritis of both hips     S/P CABG (coronary artery bypass graft) 10/04/2023    Status post coronary artery bypass graft 2010       Past Surgical History:  Past Surgical History:   Procedure Laterality Date    ARTHROPLASTY, HIP, TOTAL, DIRECT ANTERIOR APPROACH, USING HANA TABLE Left 10/27/2021    Procedure: LEFT TOTAL HIP ARTHROPLASTY DIRECT ANTERIOR APPROACH;  Surgeon: Bon Little MD;  Location: [facility] OR    BYPASS GRAFT ARTERY CORONARY  2010    FASCIOTOMY LOWER EXTREMITY Left 3/27/2025    Procedure: FASCIOTOMY, LEFT LOWER EXTREMITY;  Surgeon: Wanda Coelho DO;  Location: [facility] [place] OR    PICC SINGLE LUMEN PLACEMENT  6/11/2025       Social History:  Social History     Socioeconomic History    Marital status:      Spouse name: Not on file    Number of children: Not on file    Years of education: Not on file    Highest education level: Not on file   Occupational History    Not on file   Tobacco Use    Smoking status: Never    Smokeless tobacco: Never   Vaping Use    Vaping status: Never Used   Substance and Sexual Activity    Alcohol use: No     Alcohol/week: 0.0 standard drinks of alcohol    Drug use: Never    Sexual activity: Not on file   Other Topics Concern    Not on file   Social History Narrative    Single lives with 2 cousins 3 grown children from his marriage they are in their 30s; significant friend to accompany to the emergency room at [facility] (May 2025 at [facility])    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     Social Drivers of Health     Financial Resource Strain: Low Risk  (6/6/2025)    Financial Resource Strain     Within the past 12 months, have you or your family members you live with been unable to get utilities (heat, electricity) when it was really needed?: No   Food Insecurity: Low Risk  (6/6/2025)    Food Insecurity     Within the past 12 months, did you worry that your food would run out before you got money to buy more?: No     Within the past 12 months, did the food you bought just not last and you didn t have money to get more?: No   Transportation Needs: Low Risk  (6/6/2025)    Transportation Needs     Within the past 12 months, has lack of transportation kept you from medical appointments, getting your medicines, non-medical meetings or appointments, work, or from getting things that you need?: No   Physical Activity: Insufficiently Active (3/17/2025)    Exercise Vital Sign     Days of Exercise per Week: 1 day     Minutes of Exercise per Session: 10 min   Stress: No Stress Concern Present (3/17/2025)    [employer] [employer] - Occupational Stress Questionnaire     Feeling of Stress : Not at all   Social Connections: Unknown (3/17/2025)    Social Connection and Isolation Panel [NHANES]     Frequency of Communication with Friends and Family: Not on file     Frequency of Social Gatherings with Friends and Family: Once a week     Attends Christianity Services: Not on file     Active Member of Clubs or Organizations: Not on file     Attends Club or Organization Meetings: Not on file     Marital Status: Not on file   Interpersonal Safety: Low Risk  (6/6/2025)    Interpersonal Safety     Do you feel physically and emotionally safe where you currently live?: Yes     Within the past 12 months, have you been hit, slapped, kicked or otherwise physically hurt by someone?: No     Within the past 12 months, have you been humiliated or emotionally abused in other ways by your  "partner or ex-partner?: No   Housing Stability: Low Risk  (6/6/2025)    Housing Stability     Do you have housing? : Yes     Are you worried about losing your housing?: No       Family Medical History:  Family History   Problem Relation Age of Onset    Diabetes No family hx of     Diabetes No family hx of     Breast Cancer No family hx of     Colon Cancer No family hx of     Prostate Cancer No family hx of     Hypertension No family hx of        Allergies:     Allergies   Allergen Reactions    Oxycodone Confusion    Codeine Nausea    No Known Allergies      Potentially cats       Medications:  Current Outpatient Medications   Medication Sig Dispense Refill    aspirin 81 MG EC tablet Take 81 mg by mouth daily.      doxycycline hyclate (VIBRAMYCIN) 100 MG capsule Take 1 capsule (100 mg) by mouth 2 times daily for 10 days. 20 capsule 0    Emergency Supply Kit, Central, Patient use for emergency only. Contents: 3 sodium chloride 0.9% flushes, 1 dressing kit, 1 microclave ext set 14\", 4 nitrile gloves (med), 6 alcohol prep pads, 1 bacitracin, 1 syringe (10 cc 20 G 1\"). Call [phone] to reorder. 562101 kit 0    HYDROmorphone (DILAUDID) 2 MG tablet Take 2 tablets (4 mg) by mouth every 6 hours as needed for pain. 10 tablet 0    ipratropium (ATROVENT) 0.06 % nasal spray Spray 2 sprays into both nostrils 2 times daily as needed for rhinitis.      metoprolol succinate ER (TOPROL XL) 25 MG 24 hr tablet TAKE 0.5 TABLETS(12.5 MG) BY MOUTH DAILY 45 tablet 3    sodium chloride, PF, 0.9% PF flush Inject 10 mLs into the vein as needed for line flush. Flush IV before and after medication administration as directed and/or at least every 24 hours. 834791 mL 0    tadalafil (CIALIS) 10 MG tablet Take 1 tablet (10 mg) by mouth daily as needed (erectile difficulties). 90 tablet 2    acetaminophen (TYLENOL) 325 MG tablet Take 325-650 mg by mouth every 6 hours as needed for mild pain. (Patient not taking: Reported on 6/24/2025)      " DAPTOmycin (CUBICIN) 500 mg in sodium chloride 0.9 % 65 mL via CADD pump Infuse 500 mg over 30 minutes into the vein every 24 hours for 19 days. 476254 mL 0    doxycycline hyclate (VIBRAMYCIN) 100 MG capsule Take 1 capsule (100 mg) by mouth 2 times daily. (Patient not taking: Reported on 6/24/2025) 10 capsule 0    lisinopril (ZESTRIL) 5 MG tablet Take 1 tablet (5 mg) by mouth daily. (Patient not taking: Reported on 6/24/2025) 90 tablet 3    ondansetron (ZOFRAN ODT) 4 MG ODT tab Take 1 tablet (4 mg) by mouth every 8 hours as needed for nausea. (Patient not taking: Reported on 6/24/2025) 20 tablet 0    prochlorperazine (COMPAZINE) 5 MG tablet Take 1 tablet (5 mg) by mouth every 6 hours as needed for nausea or vomiting. (Patient not taking: Reported on 6/24/2025)      rivaroxaban ANTICOAGULANT (XARELTO) 20 MG TABS tablet Take 1 tablet (20 mg) by mouth daily (with dinner). (Patient not taking: Reported on 6/24/2025)         Immunizations:  Immunization History   Administered Date(s) Administered    COVID-19 12+ (Pfizer) 03/13/2024, 03/19/2025    COVID-19 Bivalent 12+ (Pfizer) 10/05/2022    COVID-19 MONOVALENT 12+ (Pfizer) 03/04/2021, 03/25/2021, 10/14/2021    Pneumo Conj 13-V (2010&after) 02/11/2022    Pneumococcal 23 valent 03/07/2023    TD,PF 7+ (Tenivac) 12/18/2019    TDAP (Adacel,Boostrix) 05/18/2010    Zoster recombinant adjuvanted (Shingrix) 10/01/2023, 02/27/2024       ==================    Review of System:  12 points review of system is negative except for findings in the HPI.    Exam  VS: There were no vitals taken for this visit.    Gen: Pleasant in no acute distress.  HEENT: NCAT.   Lungs: breathing comfortably  Ext: No edema  Skin: No rash  Neuro: Alert and oriented to place time and person.     Labs:  Reviewed     Imaging:  Reviewed     Assessment:  Dudley Kiran is a 69 year old old male with left 1st MTP arthropathy presumed septic. Delayed aspiration for service availability reasons while patient was  on antibiotic for 4 days. Crystal analysis was not done. Clinically improved on IV Vanco but unfortunately developed mild cyn. Creatinine up to 1.50, Vanco changed to PO Doxy. CRP down to 14.8 from 204. Patient has about 1 weeks of Doxy left.       Recommendations:  Complete the 7 days of Doxy left.   Good hydration   Remove PICC line  Repeat BMP this coming Friday.   Gout is not ruled out in this patient. If he has recurrent arthropathy in the same location, gout should be on the differential diagnosis.   Follow up as needed.       Maria Elena German MD  [facility]  [facility]  Office Telephone [phone].  Fax [phone]  [facility] paging

## 2025-06-24 NOTE — PROGRESS NOTES
[facility] Jose Kee notified of Pic line removal anytime. Labs needed Friday. Per [place] Nurse will be going out before then. Patient needs to be notified to go into [facility] to get labs done. Patient notified. Appointment made for 6/27/2025 at 1:15 PM for labs.

## 2025-06-25 ENCOUNTER — APPOINTMENT (OUTPATIENT)
Dept: RADIOLOGY | Facility: HOSPITAL | Age: 70
End: 2025-06-25
Payer: COMMERCIAL

## 2025-06-25 ENCOUNTER — HOSPITAL ENCOUNTER (EMERGENCY)
Facility: HOSPITAL | Age: 70
Discharge: HOME | End: 2025-06-25
Attending: FAMILY MEDICINE
Payer: COMMERCIAL

## 2025-06-25 ENCOUNTER — TELEPHONE (OUTPATIENT)
Dept: INFECTIOUS DISEASES | Facility: CLINIC | Age: 70
End: 2025-06-25

## 2025-06-25 ENCOUNTER — APPOINTMENT (OUTPATIENT)
Dept: ULTRASOUND IMAGING | Facility: HOSPITAL | Age: 70
End: 2025-06-25
Payer: COMMERCIAL

## 2025-06-25 ENCOUNTER — OFFICE VISIT (OUTPATIENT)
Dept: PODIATRY | Facility: CLINIC | Age: 70
End: 2025-06-25
Payer: COMMERCIAL

## 2025-06-25 VITALS
WEIGHT: 190 LBS | BODY MASS INDEX: 29.82 KG/M2 | RESPIRATION RATE: 18 BRPM | HEART RATE: 78 BPM | TEMPERATURE: 98.6 F | OXYGEN SATURATION: 97 % | HEIGHT: 67 IN | SYSTOLIC BLOOD PRESSURE: 113 MMHG | DIASTOLIC BLOOD PRESSURE: 73 MMHG

## 2025-06-25 DIAGNOSIS — M20.5X2 HALLUX LIMITUS, LEFT: Primary | ICD-10-CM

## 2025-06-25 DIAGNOSIS — I82.611 ACUTE THROMBOSIS OF RIGHT BASILIC VEIN: ICD-10-CM

## 2025-06-25 DIAGNOSIS — I99.8 ACUTE LOWER LIMB ISCHEMIA: ICD-10-CM

## 2025-06-25 DIAGNOSIS — Z45.2 PICC (PERIPHERALLY INSERTED CENTRAL CATHETER) IN PLACE: ICD-10-CM

## 2025-06-25 DIAGNOSIS — M00.9 SEPTIC ARTHRITIS OF LEFT FOOT, DUE TO UNSPECIFIED ORGANISM (H): ICD-10-CM

## 2025-06-25 PROCEDURE — 99284 EMERGENCY DEPT VISIT MOD MDM: CPT | Mod: 25

## 2025-06-25 PROCEDURE — 93971 EXTREMITY STUDY: CPT | Mod: RT

## 2025-06-25 PROCEDURE — 250N000013 HC RX MED GY IP 250 OP 250 PS 637

## 2025-06-25 PROCEDURE — 71046 X-RAY EXAM CHEST 2 VIEWS: CPT

## 2025-06-25 RX ADMIN — RIVAROXABAN 20 MG: 10 TABLET, FILM COATED ORAL at 17:58

## 2025-06-25 ASSESSMENT — ACTIVITIES OF DAILY LIVING (ADL)
ADLS_ACUITY_SCORE: 58

## 2025-06-25 ASSESSMENT — COLUMBIA-SUICIDE SEVERITY RATING SCALE - C-SSRS
6. HAVE YOU EVER DONE ANYTHING, STARTED TO DO ANYTHING, OR PREPARED TO DO ANYTHING TO END YOUR LIFE?: NO
2. HAVE YOU ACTUALLY HAD ANY THOUGHTS OF KILLING YOURSELF IN THE PAST MONTH?: NO
1. IN THE PAST MONTH, HAVE YOU WISHED YOU WERE DEAD OR WISHED YOU COULD GO TO SLEEP AND NOT WAKE UP?: NO

## 2025-06-25 NOTE — DISCHARGE INSTRUCTIONS
You were seen here in the ER for an issue with your PICC.     We found that you have a blood clot on/near your PICC measuring 4cm. This is why we cannot remove it. We spoke to our interventional radiologist who recommended 24+ hours of anticoagulants and return to the ER for removal. Since 24 hours from now is Thursday evening, which is outside of the normal hours for IR, please come back to [place] ER on Friday morning and say that you are here for a PICC removal. They will have you re-check in and reach out to IR that morning to let them know you are here. I will write my note letting them know the plan. I will refill your xarelto. We gave you a dose here. Take a dose tomorrow evening and there onward. Call your PCP to discuss if you should continue the Xarelto after that. I would imagine so, but I will defer to them for longer anticoagulant management.

## 2025-06-25 NOTE — ED PROVIDER NOTES
Emergency Department Midlevel Supervisory Note     I had a face to face encounter with this patient seen by the Advanced Practice Provider (ELENA). I personally made/approved the management plan and take responsibility for the patient management. I personally saw patient and performed a substantive portion of the visit including all aspects of the medical decision making.     ED Course:  12:50PM Ayesha Franklin PA-C staffed patient with me. I agree with their assessment and plan of management, and I will see the patient.  4:45 PM  I met with the patient to introduce myself, gather additional history, perform my initial exam, and discuss the plan.     Procedures:  I was present for the key portions of procedures documented in ELENA/midlevel note, see midlevel note for further details.    MDM:  Patient presents today with concern for PICC complication.  He has had this line in for anti-B antibiotics and is now done.  Attempted to pull in clinic and patient had pain and resistance was felt.  Subsequently sent to the emergency department, tried to pull it here but again pain and resistance.  Multiple therapies including warm packing and IV fluid hydration attempted.  Ultrasound ordered which shows a superficial clot along the path of the PICC line at its insertion site measuring 4 cm.  Discussed with interventional radiology who recommends starting anticoagulation and attempt to remove in 24 hours.  As this would put the patient tomorrow evening, we will have him come back in 36 hours so that if needed, IR should be in house    1. Acute thrombosis of right basilic vein    2. PICC (peripherally inserted central catheter) in place          Brief HPI:     Dudley Kiran is a 69 year old male who presents for evaluation of PICC concern.  PICC was to be removed today but it was not able to be pulled in clinic.  Review of chart shows that patient had previously been on anticoagulation for history of arterial embolism and DVT  "but not currently anticoagulated    Brief Physical Exam: /68   Pulse 77   Temp 98.6  F (37  C)   Resp 18   Ht 1.702 m (5' 7\")   Wt 86.2 kg (190 lb)   SpO2 97%   BMI 29.76 kg/m    Physical Exam  Vitals and nursing note reviewed.   Constitutional:       Appearance: Normal appearance.   HENT:      Head: Normocephalic and atraumatic.      Right Ear: External ear normal.      Left Ear: External ear normal.      Nose: Nose normal.   Eyes:      Extraocular Movements: Extraocular movements intact.      Conjunctiva/sclera: Conjunctivae normal.      Pupils: Pupils are equal, round, and reactive to light.   Pulmonary:      Effort: Pulmonary effort is normal.   Musculoskeletal:         General: No swelling or deformity. Normal range of motion.      Cervical back: Normal range of motion.      Comments: PICC in right upper arm, no surrounding erythema   Neurological:      General: No focal deficit present.      Mental Status: He is alert and oriented to person, place, and time. Mental status is at baseline.   Psychiatric:         Mood and Affect: Mood normal.         Behavior: Behavior normal.         Thought Content: Thought content normal.           Labs and Imaging:  Results for orders placed or performed during the hospital encounter of 06/25/25   US Upper Extremity Venous Duplex Right    Impression    IMPRESSION:  1.  There is nonocclusive clot in the right basilic vein surrounding the PICC line extending from its entrance in the mid arm for a length of approximately 4 cm.  2.  Thrombus does not extend into the axillary vein (deep vein)  where the PICC line is again noted to terminate.   Chest XR,  PA & LAT    Impression    IMPRESSION: Right PICC tip is projected over the upper medial right axilla and there is no visible kinking of the catheter tubing. Lungs are clear. No pleural effusion. No pneumothorax. Heart size and pulmonary vascularity normal. Sternotomy. CABG.         Kane Lopez M.D.  [facility] " [facility] EMERGENCY DEPARTMENT  [address] [place] [id number]  [phone]     Kane Lopez MD  06/25/25 3227

## 2025-06-25 NOTE — ED TRIAGE NOTES
Home care nurse tried to pull his PICC line and met some resistance and sent him to ED for eval.     Triage Assessment (Adult)       Row Name 06/25/25 1216          Triage Assessment    Airway WDL WDL        Respiratory WDL    Respiratory WDL WDL        Skin Circulation/Temperature WDL    Skin Circulation/Temperature WDL WDL        Cardiac WDL    Cardiac WDL WDL        Peripheral/Neurovascular WDL    Peripheral Neurovascular WDL WDL        Cognitive/Neuro/Behavioral WDL    Cognitive/Neuro/Behavioral WDL WDL        [place] Coma Scale    Best Eye Response 4-->(E4) spontaneous     Best Motor Response 6-->(M6) obeys commands     Best Verbal Response 5-->(V5) oriented     [place] Coma Scale Score 15

## 2025-06-25 NOTE — LETTER
6/25/2025      Dudley Kiran  [address] [address]      Dear Colleague,    Thank you for referring your patient, Dudley Kiran, to the [facility]. Please see a copy of my visit note below.          FOOT AND ANKLE SURGERY/PODIATRY CONSULT NOTE         ASSESSMENT:   Septic arthritis first MPJ left foot  Hallux limitus left foot  Gout left foot        TREATMENT:  - I discussed with the patient that overall symptoms have significantly improved along the first MPJ left foot with no pain palpation on exam today.  However, he does have mild pain with dorsiflexion of the left hallux which appears to be limited at the first MPJ.    -I discussed with the patient that at minimum he does have advanced arthritic changes of the first MPJ consistent with hallux limitus.    -Recommend he finish current course of doxycycline per infectious disease.  Patient to have PICC line removed today.    -We discussed that if symptoms persist he would likely require arthrotomy of the first MPJ with bone biopsy for pathology and also for aerobic/anaerobic culture with concern for osteomyelitis of the first MPJ per recent MRI.    -We also discussed that his symptoms may be somewhat related to advanced arthritic changes.  Briefly discussed first MPJ implant versus arthrodesis if necessary at a future date.    -I have asked him to closely monitor symptoms going forward and return to see me if symptoms persist or worsen.    -Patient's questions invited and answered. He was encouraged to call my office with any further questions or concerns.     30 minutes spent on the day of encounter doing chart review, history and exam, documentation, and further activities as noted.     Mau Izquierdo DPM  [facility] Podiatry/Foot & Ankle Surgery      HPI: I was asked to see Dudley Kiran today for follow-up septic arthritis first MPJ left foot.  Since I last saw this patient during his  recent hospitalization he notes significant improvement in left foot pain.  He is currently on antibiotics per infectious disease.  He is scheduled to continue with current course of doxycycline per his infectious disease appointment yesterday.    Past Medical History:   Diagnosis Date     Acute lower limb ischemia 04/14/2025     Arthritis      Cellulitis 06/16/2025     Class 1 obesity due to excess calories with serious comorbidity and body mass index (BMI) of 32.0 to 32.9 in adult 08/10/2021     Coronary arteriosclerosis due to lipid rich plaque 12/28/2012    Problem list name updated by automated process. Provider to review       Coronary artery disease      ED (erectile dysfunction)      Elevated cholesterol 02/15/2019     Essential hypertension, benign 12/18/2019     Great toe pain, left 06/06/2025     History of prediabetes 02/01/2019     HTN (hypertension)      Hypercholesteremia      OA (osteoarthritis) 10/27/2021     Obesity      Prediabetes      Primary osteoarthritis of both hips      S/P CABG (coronary artery bypass graft) 10/04/2023     Status post coronary artery bypass graft 2010         Social History     Socioeconomic History     Marital status:      Spouse name: Not on file     Number of children: Not on file     Years of education: Not on file     Highest education level: Not on file   Occupational History     Not on file   Tobacco Use     Smoking status: Never     Smokeless tobacco: Never   Vaping Use     Vaping status: Never Used   Substance and Sexual Activity     Alcohol use: No     Alcohol/week: 0.0 standard drinks of alcohol     Drug use: Never     Sexual activity: Not on file   Other Topics Concern     Not on file   Social History Narrative    Single lives with 2 cousins 3 grown children from his marriage they are in their 30s; significant friend to accompany to the emergency room at [facility] (May 2025 at 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      Social Drivers of Health     Financial Resource Strain: Low Risk  (6/6/2025)    Financial Resource Strain      Within the past 12 months, have you or your family members you live with been unable to get utilities (heat, electricity) when it was really needed?: No   Food Insecurity: Low Risk  (6/6/2025)    Food Insecurity      Within the past 12 months, did you worry that your food would run out before you got money to buy more?: No      Within the past 12 months, did the food you bought just not last and you didn t have money to get more?: No   Transportation Needs: Low Risk  (6/6/2025)    Transportation Needs      Within the past 12 months, has lack of transportation kept you from medical appointments, getting your medicines, non-medical meetings or appointments, work, or from getting things that you need?: No   Physical Activity: Insufficiently Active (3/17/2025)    Exercise Vital Sign      Days of Exercise per Week: 1 day      Minutes of Exercise per Session: 10 min   Stress: No Stress Concern Present (3/17/2025)    [employer] [place] [employer] - Occupational Stress Questionnaire      Feeling of Stress : Not at all   Social Connections: Unknown (3/17/2025)    Social Connection and Isolation Panel [NHANES]      Frequency of Communication with Friends and Family: Not on file      Frequency of Social Gatherings with Friends and Family: Once a week      Attends Yazidism Services: Not on file      Active Member of Clubs or Organizations: Not on file      Attends Club or Organization Meetings: Not on file      Marital Status: Not on file   Interpersonal Safety: Low Risk  (6/6/2025)    Interpersonal Safety      Do you feel physically and emotionally safe where you currently live?: Yes      Within the past 12 months, have you been hit, slapped, kicked or otherwise physically hurt by someone?: No      Within the past 12 months, have you been humiliated or emotionally abused in other ways by your partner  "or ex-partner?: No   Housing Stability: Low Risk  (6/6/2025)    Housing Stability      Do you have housing? : Yes      Are you worried about losing your housing?: No            Allergies   Allergen Reactions     Oxycodone Confusion     Codeine Nausea     No Known Allergies      Potentially cats         MEDICATIONS:   Current Outpatient Medications   Medication Sig Dispense Refill     aspirin 81 MG EC tablet Take 81 mg by mouth daily.       DAPTOmycin (CUBICIN) 500 mg in sodium chloride 0.9 % 65 mL via CADD pump Infuse 500 mg over 30 minutes into the vein every 24 hours for 19 days. 880038 mL 0     doxycycline hyclate (VIBRAMYCIN) 100 MG capsule Take 1 capsule (100 mg) by mouth 2 times daily for 10 days. 20 capsule 0     Emergency Supply Kit, Central, Patient use for emergency only. Contents: 3 sodium chloride 0.9% flushes, 1 dressing kit, 1 microclave ext set 14\", 4 nitrile gloves (med), 6 alcohol prep pads, 1 bacitracin, 1 syringe (10 cc 20 G 1\"). Call [phone] to reorder. [id number] kit 0     HYDROmorphone (DILAUDID) 2 MG tablet Take 2 tablets (4 mg) by mouth every 6 hours as needed for pain. 10 tablet 0     ipratropium (ATROVENT) 0.06 % nasal spray Spray 2 sprays into both nostrils 2 times daily as needed for rhinitis.       metoprolol succinate ER (TOPROL XL) 25 MG 24 hr tablet TAKE 0.5 TABLETS(12.5 MG) BY MOUTH DAILY 45 tablet 3     sodium chloride, PF, 0.9% PF flush Inject 10 mLs into the vein as needed for line flush. Flush IV before and after medication administration as directed and/or at least every 24 hours. 238503 mL 0     tadalafil (CIALIS) 10 MG tablet Take 1 tablet (10 mg) by mouth daily as needed (erectile difficulties). 90 tablet 2     acetaminophen (TYLENOL) 325 MG tablet Take 325-650 mg by mouth every 6 hours as needed for mild pain. (Patient not taking: Reported on 6/25/2025)       doxycycline hyclate (VIBRAMYCIN) 100 MG capsule Take 1 capsule (100 mg) by mouth 2 times daily. (Patient not " taking: Reported on 6/25/2025) 10 capsule 0     lisinopril (ZESTRIL) 5 MG tablet Take 1 tablet (5 mg) by mouth daily. (Patient not taking: Reported on 6/25/2025) 90 tablet 3     ondansetron (ZOFRAN ODT) 4 MG ODT tab Take 1 tablet (4 mg) by mouth every 8 hours as needed for nausea. (Patient not taking: Reported on 6/25/2025) 20 tablet 0     prochlorperazine (COMPAZINE) 5 MG tablet Take 1 tablet (5 mg) by mouth every 6 hours as needed for nausea or vomiting. (Patient not taking: Reported on 6/25/2025)       rivaroxaban ANTICOAGULANT (XARELTO) 20 MG TABS tablet Take 1 tablet (20 mg) by mouth daily (with dinner). (Patient not taking: Reported on 6/25/2025)       Current Facility-Administered Medications   Medication Dose Route Frequency Provider Last Rate Last Admin     lidocaine (XYLOCAINE) 5 % ointment   Topical Daily PRN Jihan Mckay, NP   Given at 06/18/25 1307        Family History   Problem Relation Age of Onset     Diabetes No family hx of      Diabetes No family hx of      Breast Cancer No family hx of      Colon Cancer No family hx of      Prostate Cancer No family hx of      Hypertension No family hx of           Review of Systems - 10 point Review of Systems is negative except for left foot pain which is noted in HPI.    OBJECTIVE:  Appearance: alert, well appearing, and in no distress.    VITAL SIGNS: There were no vitals taken for this visit.      General appearance: Patient is alert and fully cooperative with history & exam.  No sign of distress is noted during the visit.     Psychiatric: Affect is pleasant & appropriate.  Patient appears motivated to improve health.     Respiratory: Breathing is regular & unlabored while sitting.     HEENT: Hearing is intact to spoken word.  Speech is clear.  No gross evidence of visual impairment that would impact ambulation.      Vascular: Dorsalis pedis left foot.  Mild edema first MPJ left foot.  Dermatologic: Turgor and texture are within normal limits. No  coloration or temperature changes. No primary or secondary lesions noted.  Neurologic: All epicritic and proprioceptive sensations are grossly intact left.  Musculoskeletal: No pain to palpation first MPJ left foot, however there is mild pain with dorsiflexion of the left hallux.  Limited range of motion first MPJ left foot.          Again, thank you for allowing me to participate in the care of your patient.        Sincerely,        Mau Izquierdo DPM    Electronically signed

## 2025-06-25 NOTE — ED NOTES
Attempted to remove PICC line with RN who is trained to do so with recommendations given by PICC staff. Catheter would come about 1 inch out, then suck right back into the Pt's arm. Trained RN states there is much resistance and that we should call PICC back.

## 2025-06-25 NOTE — PROGRESS NOTES
FOOT AND ANKLE SURGERY/PODIATRY CONSULT NOTE         ASSESSMENT:   Septic arthritis first MPJ left foot  Hallux limitus left foot  Gout left foot        TREATMENT:  - I discussed with the patient that overall symptoms have significantly improved along the first MPJ left foot with no pain palpation on exam today.  However, he does have mild pain with dorsiflexion of the left hallux which appears to be limited at the first MPJ.    -I discussed with the patient that at minimum he does have advanced arthritic changes of the first MPJ consistent with hallux limitus.    -Recommend he finish current course of doxycycline per infectious disease.  Patient to have PICC line removed today.    -We discussed that if symptoms persist he would likely require arthrotomy of the first MPJ with bone biopsy for pathology and also for aerobic/anaerobic culture with concern for osteomyelitis of the first MPJ per recent MRI.    -We also discussed that his symptoms may be somewhat related to advanced arthritic changes.  Briefly discussed first MPJ implant versus arthrodesis if necessary at a future date.    -I have asked him to closely monitor symptoms going forward and return to see me if symptoms persist or worsen.    -Patient's questions invited and answered. He was encouraged to call my office with any further questions or concerns.     30 minutes spent on the day of encounter doing chart review, history and exam, documentation, and further activities as noted.     Mau Izquierdo DPM  [facility] Podiatry/Foot & Ankle Surgery      HPI: I was asked to see Dudley Kiran today for follow-up septic arthritis first MPJ left foot.  Since I last saw this patient during his recent hospitalization he notes significant improvement in left foot pain.  He is currently on antibiotics per infectious disease.  He is scheduled to continue with current course of doxycycline per his infectious disease appointment yesterday.    Past  Medical History:   Diagnosis Date    Acute lower limb ischemia 04/14/2025    Arthritis     Cellulitis 06/16/2025    Class 1 obesity due to excess calories with serious comorbidity and body mass index (BMI) of 32.0 to 32.9 in adult 08/10/2021    Coronary arteriosclerosis due to lipid rich plaque 12/28/2012    Problem list name updated by automated process. Provider to review      Coronary artery disease     ED (erectile dysfunction)     Elevated cholesterol 02/15/2019    Essential hypertension, benign 12/18/2019    Great toe pain, left 06/06/2025    History of prediabetes 02/01/2019    HTN (hypertension)     Hypercholesteremia     OA (osteoarthritis) 10/27/2021    Obesity     Prediabetes     Primary osteoarthritis of both hips     S/P CABG (coronary artery bypass graft) 10/04/2023    Status post coronary artery bypass graft 2010         Social History     Socioeconomic History    Marital status:      Spouse name: Not on file    Number of children: Not on file    Years of education: Not on file    Highest education level: Not on file   Occupational History    Not on file   Tobacco Use    Smoking status: Never    Smokeless tobacco: Never   Vaping Use    Vaping status: Never Used   Substance and Sexual Activity    Alcohol use: No     Alcohol/week: 0.0 standard drinks of alcohol    Drug use: Never    Sexual activity: Not on file   Other Topics Concern    Not on file   Social History Narrative    Single lives with 2 cousins 3 grown children from his marriage they are in their 30s; significant friend to accompany to the emergency room at [facility] (May 2025 at 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     Social Drivers of Health     Financial Resource Strain: Low Risk  (6/6/2025)    Financial Resource Strain     Within the past 12 months, have you or your family members you live with been unable to get utilities (heat, electricity) when it was really needed?: No   Food Insecurity: Low  Risk  (6/6/2025)    Food Insecurity     Within the past 12 months, did you worry that your food would run out before you got money to buy more?: No     Within the past 12 months, did the food you bought just not last and you didn t have money to get more?: No   Transportation Needs: Low Risk  (6/6/2025)    Transportation Needs     Within the past 12 months, has lack of transportation kept you from medical appointments, getting your medicines, non-medical meetings or appointments, work, or from getting things that you need?: No   Physical Activity: Insufficiently Active (3/17/2025)    Exercise Vital Sign     Days of Exercise per Week: 1 day     Minutes of Exercise per Session: 10 min   Stress: No Stress Concern Present (3/17/2025)    [employer] [employer] [employer] - Occupational Stress Questionnaire     Feeling of Stress : Not at all   Social Connections: Unknown (3/17/2025)    Social Connection and Isolation Panel [NHANES]     Frequency of Communication with Friends and Family: Not on file     Frequency of Social Gatherings with Friends and Family: Once a week     Attends Yarsani Services: Not on file     Active Member of Clubs or Organizations: Not on file     Attends Club or Organization Meetings: Not on file     Marital Status: Not on file   Interpersonal Safety: Low Risk  (6/6/2025)    Interpersonal Safety     Do you feel physically and emotionally safe where you currently live?: Yes     Within the past 12 months, have you been hit, slapped, kicked or otherwise physically hurt by someone?: No     Within the past 12 months, have you been humiliated or emotionally abused in other ways by your partner or ex-partner?: No   Housing Stability: Low Risk  (6/6/2025)    Housing Stability     Do you have housing? : Yes     Are you worried about losing your housing?: No            Allergies   Allergen Reactions    Oxycodone Confusion    Codeine Nausea    No Known Allergies      Potentially cats  "        MEDICATIONS:   Current Outpatient Medications   Medication Sig Dispense Refill    aspirin 81 MG EC tablet Take 81 mg by mouth daily.      DAPTOmycin (CUBICIN) 500 mg in sodium chloride 0.9 % 65 mL via CADD pump Infuse 500 mg over 30 minutes into the vein every 24 hours for 19 days. [id number] mL 0    doxycycline hyclate (VIBRAMYCIN) 100 MG capsule Take 1 capsule (100 mg) by mouth 2 times daily for 10 days. 20 capsule 0    Emergency Supply Kit, Central, Patient use for emergency only. Contents: 3 sodium chloride 0.9% flushes, 1 dressing kit, 1 microclave ext set 14\", 4 nitrile gloves (med), 6 alcohol prep pads, 1 bacitracin, 1 syringe (10 cc 20 G 1\"). Call [phone] to reorder. [id number] kit 0    HYDROmorphone (DILAUDID) 2 MG tablet Take 2 tablets (4 mg) by mouth every 6 hours as needed for pain. 10 tablet 0    ipratropium (ATROVENT) 0.06 % nasal spray Spray 2 sprays into both nostrils 2 times daily as needed for rhinitis.      metoprolol succinate ER (TOPROL XL) 25 MG 24 hr tablet TAKE 0.5 TABLETS(12.5 MG) BY MOUTH DAILY 45 tablet 3    sodium chloride, PF, 0.9% PF flush Inject 10 mLs into the vein as needed for line flush. Flush IV before and after medication administration as directed and/or at least every 24 hours. [id number] mL 0    tadalafil (CIALIS) 10 MG tablet Take 1 tablet (10 mg) by mouth daily as needed (erectile difficulties). 90 tablet 2    acetaminophen (TYLENOL) 325 MG tablet Take 325-650 mg by mouth every 6 hours as needed for mild pain. (Patient not taking: Reported on 6/25/2025)      doxycycline hyclate (VIBRAMYCIN) 100 MG capsule Take 1 capsule (100 mg) by mouth 2 times daily. (Patient not taking: Reported on 6/25/2025) 10 capsule 0    lisinopril (ZESTRIL) 5 MG tablet Take 1 tablet (5 mg) by mouth daily. (Patient not taking: Reported on 6/25/2025) 90 tablet 3    ondansetron (ZOFRAN ODT) 4 MG ODT tab Take 1 tablet (4 mg) by mouth every 8 hours as needed for nausea. (Patient not taking: Reported on " 6/25/2025) 20 tablet 0    prochlorperazine (COMPAZINE) 5 MG tablet Take 1 tablet (5 mg) by mouth every 6 hours as needed for nausea or vomiting. (Patient not taking: Reported on 6/25/2025)      rivaroxaban ANTICOAGULANT (XARELTO) 20 MG TABS tablet Take 1 tablet (20 mg) by mouth daily (with dinner). (Patient not taking: Reported on 6/25/2025)       Current Facility-Administered Medications   Medication Dose Route Frequency Provider Last Rate Last Admin    lidocaine (XYLOCAINE) 5 % ointment   Topical Daily PRN Jihan Mckay NP   Given at 06/18/25 9245        Family History   Problem Relation Age of Onset    Diabetes No family hx of     Diabetes No family hx of     Breast Cancer No family hx of     Colon Cancer No family hx of     Prostate Cancer No family hx of     Hypertension No family hx of           Review of Systems - 10 point Review of Systems is negative except for left foot pain which is noted in HPI.    OBJECTIVE:  Appearance: alert, well appearing, and in no distress.    VITAL SIGNS: There were no vitals taken for this visit.      General appearance: Patient is alert and fully cooperative with history & exam.  No sign of distress is noted during the visit.     Psychiatric: Affect is pleasant & appropriate.  Patient appears motivated to improve health.     Respiratory: Breathing is regular & unlabored while sitting.     HEENT: Hearing is intact to spoken word.  Speech is clear.  No gross evidence of visual impairment that would impact ambulation.      Vascular: Dorsalis pedis left foot.  Mild edema first MPJ left foot.  Dermatologic: Turgor and texture are within normal limits. No coloration or temperature changes. No primary or secondary lesions noted.  Neurologic: All epicritic and proprioceptive sensations are grossly intact left.  Musculoskeletal: No pain to palpation first MPJ left foot, however there is mild pain with dorsiflexion of the left hallux.  Limited range of motion first MPJ left  foot.

## 2025-06-25 NOTE — ED PROVIDER NOTES
EMERGENCY DEPARTMENT ENCOUNTER      NAME: Dudley Kiran  AGE: 69 year old male  YOB: 1955  MRN: [id number]  EVALUATION DATE & TIME: 6/25/2025 12:18 PM    PCP: Tima Davis    ED PROVIDER: Ayesha Franklin PA-C    CHIEF COMPLAINT  Vascular Access Problem      FINAL IMPRESSION:  Acute thrombus of the basilic vein  PICC problem    MEDICAL DECISION MAKING AND ED COURSE:  Pertinent Labs & Imaging studies reviewed (See chart for details)  ED Course as of 06/25/25 1730   Wed Jun 25, 2025   1234 Dudley Kiran is a 69 year old male with a pertinent history of prediabetes, HTN, CAD s/p CABG, septic arthritis of left foot on IV abx via PICC, VTE on Xarelto who presents to this ED by walk in for evaluation of PICC line problem. Has been receiving IV vanco for left leg infection, now plan to switch to oral abx. They attemtped to pull PICC today outpatient and were met with resistance and he felt some discomfort so they sent him here. Otherwise no complaints, skin changes, fevers.  Reports that he has not been taking his Xarelto for the past 2 weeks as he was being given Lovenox while in the hospital and when discharged home, he was not given instructions to continue his Xarelto.      Vitals reviewed, mildly hypertensive at 167/78 and tachycardic at 104. On recheck 15 minutes later, no longer hypertensive or tachycardic. On exam, no tenderness, erythema, warmth, or obvious abnormalities when inspecting the right upper arm PICC.   1300 Spoke to PICC RN who recommended warm towel for 30 mins, drink water so he's hydrated, and retry with him holding his breath as they pull. Discussed with ED RN and will try in 30 minutes.    1422 RN attempted with a lot of resistance and could not pull it out more than an inch. Recalled PICC RN who recommended CXR to assess for a kink and US to assess for clots that are obstructing the PICC from removing.    1630 Staffed with Dr. Lopez.    1700 CXR with Right PICC tip  projected over the upper medial right axilla and there is no visible kinking of the catheter tubing. RUE US revealed a nonocclusive clot in the right basilic vein surrounding the PICC line extending from its entrance in the mid arm for a length of approximately 4 cm. Thrombus does not extend into the axillary vein (deep vein)  where the PICC line is again noted to terminate.   1715 Dr. Lopez spoke to IR physician who recommended 24+ hours of anticoagulants and to return to have it removed. Given that 24 hours from now is Thursday evening, outside of their window of hours, plan to have him return Friday morning for PICC removal. He was given a dose of xarelto here in the department and a refill was sent to the pharmacy.      MEDICATIONS GIVEN IN THE EMERGENCY:  Medications   rivaroxaban ANTICOAGULANT (XARELTO) tablet 20 mg (has no administration in time range)       NEW PRESCRIPTIONS STARTED AT TODAY'S ER VISIT  Current Discharge Medication List        Current Discharge Medication List        CONTINUE these medications which have CHANGED    Details   rivaroxaban ANTICOAGULANT (XARELTO) 20 MG TABS tablet Take 1 tablet (20 mg) by mouth daily (with dinner).  Qty: 30 tablet, Refills: 0    Associated Diagnoses: Acute lower limb ischemia             =================================================================    HPI    Patient information was obtained from: patient   Use of : N/A     Dudley Kiran is a 69 year old male with a pertinent history of prediabetes, HTN, CAD s/p CABG, septic arthritis of left foot on IV abx via PICC who presents to this ED by walk in for evaluation of PICC line problem.     Diagnosed with septic arthritis of the first MPJ left foot with questionable osteomyelitis. Followed by Dr. Izquierdo with podiatry. Has been on IV vancomycin for the past few weeks. Switched to oral doxycycline and attempted to remove the PICC outpatient today when he reported discomfort with this and the  "nurse attempting was met with a lot of resistance so they sent him to the ER for removal. Denies any pain now, fevers, SOB, or any other complaints.     Chart review:   Seen by Dr. Izquierdo with podiatry today (6/25/25) for outpatient follow up visit.  Overall symptoms have significantly improved along the first PJ left foot with no pain on palpation on exam today.  However, he does have mild pain with dorsiflexion of the left hallux which appears to be limited at the first MPJ.  Recommended he finish a course of doxycycline per infectious disease.  Patient to have PICC line removed today.    PHYSICAL EXAM    /68   Pulse 77   Temp 98.6  F (37  C)   Resp 18   Ht 1.702 m (5' 7\")   Wt 86.2 kg (190 lb)   SpO2 97%   BMI 29.76 kg/m    Constitutional: Well developed, Well nourished, NAD, GCS 15  HENT: Normocephalic, Atraumatic, Bilateral external ears normal, Oropharynx normal, mucous membranes moist, Nose normal. Neck- Normal range of motion  Eyes: EOMI, Conjunctiva normal, No discharge.   Respiratory:  No respiratory distress, Speaks full sentences easily. No cough.    Musculoskeletal: Good range of motion in all major joints.   Integument: Warm, Dry.  PICC line in right upper arm with no surrounding erythema, drainage, or tenderness.  Neurologic: Alert & oriented x 3, Normal motor function, Normal sensory function, No focal deficits noted.   Psychiatric: Affect normal, Judgment normal, Mood normal. Cooperative.      LAB:  All pertinent labs reviewed and interpreted.  Results for orders placed or performed during the hospital encounter of 06/25/25   US Upper Extremity Venous Duplex Right    Impression    IMPRESSION:  1.  There is nonocclusive clot in the right basilic vein surrounding the PICC line extending from its entrance in the mid arm for a length of approximately 4 cm.  2.  Thrombus does not extend into the axillary vein (deep vein)  where the PICC line is again noted to terminate.   Chest XR,  PA & LAT "    Impression    IMPRESSION: Right PICC tip is projected over the upper medial right axilla and there is no visible kinking of the catheter tubing. Lungs are clear. No pleural effusion. No pneumothorax. Heart size and pulmonary vascularity normal. Sternotomy. CABG.       RADIOLOGY:  Reviewed all pertinent imaging. Please see official radiology report.  US Upper Extremity Venous Duplex Right   Final Result   IMPRESSION:   1.  There is nonocclusive clot in the right basilic vein surrounding the PICC line extending from its entrance in the mid arm for a length of approximately 4 cm.   2.  Thrombus does not extend into the axillary vein (deep vein)  where the PICC line is again noted to terminate.      Chest XR,  PA & LAT   Final Result   IMPRESSION: Right PICC tip is projected over the upper medial right axilla and there is no visible kinking of the catheter tubing. Lungs are clear. No pleural effusion. No pneumothorax. Heart size and pulmonary vascularity normal. Sternotomy. CABG.        Medical Decision Making  I discussed the care with another health care provider: Dr. Lopez, IR  Discharge. I recommended the patient continue their current prescription strength medication(s): Xarelto. See documentation for any additional details.    MIPS (CTPE, Dental pain, Capps, Sinusitis, Asthma/COPD, Head Trauma):     SEPSIS: None      Ayesha Franklin PA-C  [facility] EMERGENCY DEPARTMENT  [facility] [facility] [id number] [phone]        Ayesha Franklin PA-C  06/25/25 1446

## 2025-06-25 NOTE — TELEPHONE ENCOUNTER
[facility] Call Center    Phone Message    May a detailed message be left on voicemail: yes     Reason for Call: Medication Question or concern regarding medication   Prescription Clarification  Name of Medication: doxycycline hyclate (VIBRAMYCIN) 100 MG   Prescribing Provider: Prem Christian MD   Pharmacy: [facility]    What on the order needs clarification? Currently taking doxycycline hyclate (VIBRAMYCIN) 100 MG I cap 2x daily. Veronika with [facility] is questioning if pt should be taking this. Pt took this am, please call back to consult  [phone] thank you       Action Taken: Message routed to:  [facility]): ID    Travel Screening: Not Applicable     Date of Service:

## 2025-06-26 ENCOUNTER — PATIENT OUTREACH (OUTPATIENT)
Dept: CARE COORDINATION | Facility: CLINIC | Age: 70
End: 2025-06-26
Payer: COMMERCIAL

## 2025-06-26 NOTE — TELEPHONE ENCOUNTER
Clinic Care Coordination Chart Review     Situation: Patient chart reviewed by care coordination leader.     Background: Patient identified on Recently Discharged Report-[facility] following recent ED visit 6/25/25.     Assessment: Per chart review, patient is closely supported by their specialty care teams as well as home infusion. [facility] had performed outreach within past 30 days and patient declined need for additional support from Care Coordination.      Plan/Recommendations: Patient can be encouraged to follow discharge instructions as outlined on AVS. No intervention planned by Primary Care Care Coordination team at this time.     Brionna Alexander, RN  Manager of Ambulatory Care Management  [facility]

## 2025-06-26 NOTE — TELEPHONE ENCOUNTER
This message was routed to the the team of Dr Howie Awad in colorectal surgery. Pt not an ID pt.   Yokasta Patel RN

## 2025-06-27 ENCOUNTER — MEDICAL CORRESPONDENCE (OUTPATIENT)
Dept: HEALTH INFORMATION MANAGEMENT | Facility: CLINIC | Age: 70
End: 2025-06-27

## 2025-06-27 ENCOUNTER — HOSPITAL ENCOUNTER (EMERGENCY)
Facility: HOSPITAL | Age: 70
Discharge: HOME | End: 2025-06-27
Attending: STUDENT IN AN ORGANIZED HEALTH CARE EDUCATION/TRAINING PROGRAM | Admitting: STUDENT IN AN ORGANIZED HEALTH CARE EDUCATION/TRAINING PROGRAM
Payer: COMMERCIAL

## 2025-06-27 VITALS
HEIGHT: 67 IN | BODY MASS INDEX: 29.82 KG/M2 | RESPIRATION RATE: 18 BRPM | SYSTOLIC BLOOD PRESSURE: 119 MMHG | OXYGEN SATURATION: 95 % | HEART RATE: 95 BPM | TEMPERATURE: 98.2 F | DIASTOLIC BLOOD PRESSURE: 74 MMHG | WEIGHT: 190 LBS

## 2025-06-27 DIAGNOSIS — Z45.2 PIC LINE (PERIPHERALLY INSERTED CENTRAL CATHETER) REMOVAL: ICD-10-CM

## 2025-06-27 PROCEDURE — 99283 EMERGENCY DEPT VISIT LOW MDM: CPT

## 2025-06-27 ASSESSMENT — ACTIVITIES OF DAILY LIVING (ADL)
ADLS_ACUITY_SCORE: 58
ADLS_ACUITY_SCORE: 58

## 2025-06-27 NOTE — ED TRIAGE NOTES
Patient presents for follow up to remove PICC line. Patient here Wednesday and sent home with blood thinner to dissolve clot around end of PICC line and then come back to ED Friday.     Triage Assessment (Adult)       Row Name 06/27/25 0905          Triage Assessment    Airway WDL WDL        Respiratory WDL    Respiratory WDL WDL        Skin Circulation/Temperature WDL    Skin Circulation/Temperature WDL WDL        Cardiac WDL    Cardiac WDL X     Cardiac Rhythm ST        Peripheral/Neurovascular WDL    Peripheral Neurovascular WDL WDL        Cognitive/Neuro/Behavioral WDL    Cognitive/Neuro/Behavioral WDL WDL

## 2025-06-27 NOTE — ED PROVIDER NOTES
EMERGENCY DEPARTMENT ENCOUNTER      NAME: Dudley Kiran  AGE: 69 year old male  YOB: 1955  MRN: [id number]  EVALUATION DATE & TIME: No admission date for patient encounter.    PCP: Tima Davis    ED PROVIDER: Tristan Jack MD      Chief Complaint   Patient presents with    Vascular Access Problem         FINAL IMPRESSION:  1. PIC line (peripherally inserted central catheter) removal          ED COURSE & MEDICAL DECISION MAKING:    Pertinent Labs & Imaging studies reviewed. (See chart for details)  69 year old male presents to the Emergency Department for evaluation of PICC line issue.    ED Course as of 06/27/25 1243   Fri Jun 27, 2025   0999 Patient is a 69-year-old male with history of acute limb ischemia and resulting compartment syndrome of the left lower leg now status post fasciectomy  complicated with cellulitis and recently finished course of IV antibiotics after transition to oral antibiotics.  Has a PICC line in place in the right upper extremity which was attempted to be removed in clinic 2 days ago but have difficulty with removing the PICC line.  Present to the ED at that time was found to have a superficial clot along the PICC line without any deep vein extension.  Was started on Xarelto with plan to return in approxi-1 day for attempted repeat removal of the PICC line.  Since last ED presentation patient denies any significant symptoms.  Denies any chest pain or shortness of breath.  No fevers.  No worsening pain in the lower extremities.  No nausea or vomiting.    Exam here patient is well-appearing in no acute distress.  Hemodynamically stable although slightly tachycardic.  Lungs are clear without any wheezes rales.  Warm well-perfused extremities.  Is a PICC line in the right medial upper arm without any surrounding skin changes.  No tenderness palpation of the upper arm or significant extremity edema.    Will attempt to remove the PICC line today.  Before having difficulty  will discuss again with IR.     PICC line removed by nursing without any difficulty.  Appeared fully intact.  Patient denies any symptoms and feels well.  His tachycardia resolved.  Discussed anticoagulation going forward with the patient and per review of prior hospital records it does seem that he should continue to remain on Xarelto.  He has a 30-day course which was filled after his last ED visit and I will continue to take this as prescribed and has a chance to follow-up with his  primary care team.  Patient comfortable discharge and was discharged stable condition      9:18 AM I met and evaluated the patient.         Medical Decision Making  I reviewed the EMR: Outpatient Record: Most recent ED visit  Care impacted by Anticoagulated State and Hypertension  Discharge. I recommended the patient continue their current prescription strength medication(s): Xarelto. I considered admission, but discharged patient after significant clinical improvement.    MIPS (CTPE, Dental pain, Capps, Sinusitis, Asthma/COPD, Head Trauma): Not Applicable    SEPSIS: None           At the conclusion of the encounter I discussed the results of all of the tests and the disposition. The questions were answered. The patient or family acknowledged understanding and was agreeable with the care plan.     0 minutes of critical care time     MEDICATIONS GIVEN IN THE EMERGENCY:  Medications - No data to display    NEW PRESCRIPTIONS STARTED AT TODAY'S ER VISIT  Discharge Medication List as of 6/27/2025 11:12 AM             =================================================================    HPI    Patient information was obtained from: Patient     Use of : N/A        Dudley Kiran is a 69 year old male with a pertinent history of hypertension, obesity, coronary arthrosclerosis, and CAD who presents to this ED by private vehicle for evaluation of vascular access problem.     Patient was seen 2 days ago for removal of his PICC line  through which he was receiving antibiotics for an infection to his left lower extremity, but due to a 4 cm clot, the PICC line was unable to be removed. Patient was started on Xarelto with plan to return to the ED for PICC line removal today. Patient reports feeling fatigued, but denies any other complaints. No pain around the PICC line site, just noting some slight irritation. He is not currently using the PICC line, as he has been switched to oral antibiotics about 1 week ago. No new pain or swelling noted to his left lower extremity, and he has an appointment with wound care later today for a dressing change.     Patient denies chest pain, shortness of breath, fever, or any other complaints at this time.      Chart Review:  06/25/2025: Patient was seen at [facility] ED for evaluation of vascular access problem. Patient with concern of a concern with his PICC line. Patient had this in place for antibiotics, and when the PICC line was attempted to be removed in clinic, patient reported pain and resistance, so was sent to the ED for further evaluation. Multiple therapies including warm packing and IV fluid hydration attempted. Ultrasound ordered which shows a superficial clot along the path of the PICC line at its insertion site measuring 4 cm. Discussed with interventional radiology who recommends starting anticoagulation and attempt to remove in 24 hours. Patient to return to ED in 36 hours for removal, so that IR should be in house.       REVIEW OF SYSTEMS   Refer to the [facility]    PAST MEDICAL HISTORY:  Past Medical History:   Diagnosis Date    Acute lower limb ischemia 04/14/2025    Arthritis     Cellulitis 06/16/2025    Class 1 obesity due to excess calories with serious comorbidity and body mass index (BMI) of 32.0 to 32.9 in adult 08/10/2021    Coronary arteriosclerosis due to lipid rich plaque 12/28/2012    Problem list name updated by automated process. Provider to review       Coronary artery disease     ED (erectile dysfunction)     Elevated cholesterol 02/15/2019    Essential hypertension, benign 12/18/2019    Great toe pain, left 06/06/2025    History of prediabetes 02/01/2019    HTN (hypertension)     Hypercholesteremia     OA (osteoarthritis) 10/27/2021    Obesity     Prediabetes     Primary osteoarthritis of both hips     S/P CABG (coronary artery bypass graft) 10/04/2023    Status post coronary artery bypass graft 2010       PAST SURGICAL HISTORY:  Past Surgical History:   Procedure Laterality Date    ARTHROPLASTY, HIP, TOTAL, DIRECT ANTERIOR APPROACH, USING HANA TABLE Left 10/27/2021    Procedure: LEFT TOTAL HIP ARTHROPLASTY DIRECT ANTERIOR APPROACH;  Surgeon: Bon Little MD;  Location: [facility] Main OR    BYPASS GRAFT ARTERY CORONARY  2010    FASCIOTOMY LOWER EXTREMITY Left 3/27/2025    Procedure: FASCIOTOMY, LEFT LOWER EXTREMITY;  Surgeon: Wanda Coelho DO;  Location: [facility] [place] OR    PICC SINGLE LUMEN PLACEMENT  6/11/2025           CURRENT MEDICATIONS:    acetaminophen (TYLENOL) 325 MG tablet  aspirin 81 MG EC tablet  DAPTOmycin (CUBICIN) 500 mg in sodium chloride 0.9 % 65 mL via CADD pump  doxycycline hyclate (VIBRAMYCIN) 100 MG capsule  doxycycline hyclate (VIBRAMYCIN) 100 MG capsule  Emergency Supply Kit, Central,  HYDROmorphone (DILAUDID) 2 MG tablet  ipratropium (ATROVENT) 0.06 % nasal spray  lisinopril (ZESTRIL) 5 MG tablet  metoprolol succinate ER (TOPROL XL) 25 MG 24 hr tablet  ondansetron (ZOFRAN ODT) 4 MG ODT tab  prochlorperazine (COMPAZINE) 5 MG tablet  rivaroxaban ANTICOAGULANT (XARELTO) 20 MG TABS tablet  sodium chloride, PF, 0.9% PF flush  tadalafil (CIALIS) 10 MG tablet        ALLERGIES:  Allergies   Allergen Reactions    Oxycodone Confusion    Codeine Nausea    No Known Allergies      Potentially cats       FAMILY HISTORY:  Family History   Problem Relation Age of Onset    Diabetes No family hx of     Diabetes No family hx of     Breast  Cancer No family hx of     Colon Cancer No family hx of     Prostate Cancer No family hx of     Hypertension No family hx of        SOCIAL HISTORY:   Social History     Socioeconomic History    Marital status:    Tobacco Use    Smoking status: Never    Smokeless tobacco: Never   Vaping Use    Vaping status: Never Used   Substance and Sexual Activity    Alcohol use: No     Alcohol/week: 0.0 standard drinks of alcohol    Drug use: Never   Social History Narrative    Single lives with 2 cousins 3 grown children from his marriage they are in their 30s; significant friend to accompany to the emergency room at [facility] (May 2025 at [facility])    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     Social Drivers of Health     Financial Resource Strain: Low Risk  (6/6/2025)    Financial Resource Strain     Within the past 12 months, have you or your family members you live with been unable to get utilities (heat, electricity) when it was really needed?: No   Food Insecurity: Low Risk  (6/6/2025)    Food Insecurity     Within the past 12 months, did you worry that your food would run out before you got money to buy more?: No     Within the past 12 months, did the food you bought just not last and you didn t have money to get more?: No   Transportation Needs: Low Risk  (6/6/2025)    Transportation Needs     Within the past 12 months, has lack of transportation kept you from medical appointments, getting your medicines, non-medical meetings or appointments, work, or from getting things that you need?: No   Physical Activity: Insufficiently Active (3/17/2025)    Exercise Vital Sign     Days of Exercise per Week: 1 day     Minutes of Exercise per Session: 10 min   Stress: No Stress Concern Present (3/17/2025)    [place] [employer] - Occupational Stress Questionnaire     Feeling of Stress : Not at all   Social Connections: Unknown (3/17/2025)    Social Connection and Isolation Panel  "[NHANES]     Frequency of Social Gatherings with Friends and Family: Once a week   Interpersonal Safety: Low Risk  (6/6/2025)    Interpersonal Safety     Do you feel physically and emotionally safe where you currently live?: Yes     Within the past 12 months, have you been hit, slapped, kicked or otherwise physically hurt by someone?: No     Within the past 12 months, have you been humiliated or emotionally abused in other ways by your partner or ex-partner?: No   Housing Stability: Low Risk  (6/6/2025)    Housing Stability     Do you have housing? : Yes     Are you worried about losing your housing?: No       VITALS:  /74   Pulse 95   Temp 98.2  F (36.8  C) (Oral)   Resp 18   Ht 1.702 m (5' 7\")   Wt 86.2 kg (190 lb)   SpO2 95%   BMI 29.76 kg/m      PHYSICAL EXAM    Constitutional: Well developed, Well nourished, NAD  HENT: Normocephalic, Atraumatic,  mucous membranes moist,   Neck- trachea midline, No stridor.    Eyes:EOMI, Conjunctiva normal, No discharge.   Respiratory: Normal breath sounds, No respiratory distress, No wheezing.    Cardiovascular: Normal heart rate, Regular rhythm No murmurs   Abdominal: Soft, No tenderness, No rebound or guarding.     Musculoskeletal: no deformity or malalignment, PICC line to right medial upper arm, No erythema.   Integument: Warm, Dry, No erythema, No skin breakdown.  Neurologic: Alert & oriented x 3   Psychiatric: Affect normal, Cooperative.      LAB:  All pertinent labs reviewed and interpreted.       RADIOLOGY:  Reviewed all pertinent imaging. Please see official radiology report.  No orders to display       EKG:        PROCEDURES:         [employer] System Documentation:   CMS Diagnoses:              I, Lucas Salamanca, am serving as a scribe to document services personally performed by Tristan Jack MD based on my observation and the provider's statements to me. I, Tristan Jack MD, attest that Lucas Salamanca is acting in a scribe capacity, has observed " my performance of the services and has documented them in accordance with my direction.    Tristan Jack MD  [facility] EMERGENCY DEPARTMENT  [address] [place] [id number] [phone]      Tristan Jack MD  06/27/25 9928

## 2025-06-27 NOTE — DISCHARGE INSTRUCTIONS
I would continue to take your Xarelto as prescribed and follow-up closely with your wound care doctors as well as your primary care doctor.  At this point in time I will defer discontinuation of your Xarelto to your primary care doctors and other doctors in your care team

## 2025-06-27 NOTE — ED NOTES
PICC line removed without resistance. Awaiting measuring tape from store room to confirm complete removal.   
VIEW ALL

## 2025-06-30 ENCOUNTER — PATIENT OUTREACH (OUTPATIENT)
Dept: CARE COORDINATION | Facility: CLINIC | Age: 70
End: 2025-06-30
Payer: COMMERCIAL

## 2025-06-30 NOTE — PROGRESS NOTES
[facility]    Background: Primary Care-Care Coordination initial outreach identified per system criteria and reviewed by [facility] team.    Assessment: Upon chart review, [facility] Team member will not proceed with patient outreach related to this episode of Primary Care-Care Coordination program due to reason below:    Primary Care Clinic Care Coordination will defer follow-up outreach to specialty care team who are already closely following patient.     Plan: Primary Care-Care Coordination episode addressed appropriately per reason noted above.      Yi Deal MA  [facility] Care Resource [place], [facility]    *Connected Care Resource Team does NOT follow patient ongoing. Referrals are identified based on internal discharge reports and the outreach is to ensure patient has an understanding of their discharge instructions.

## 2025-07-05 ENCOUNTER — TRANSFERRED RECORDS (OUTPATIENT)
Dept: HEALTH INFORMATION MANAGEMENT | Facility: CLINIC | Age: 70
End: 2025-07-05
Payer: COMMERCIAL

## 2025-07-07 ENCOUNTER — TELEPHONE (OUTPATIENT)
Dept: FAMILY MEDICINE | Facility: CLINIC | Age: 70
End: 2025-07-07
Payer: COMMERCIAL

## 2025-07-07 NOTE — TELEPHONE ENCOUNTER
Forms/Letter Request    Type of form/letter: Home Health Certification      Do we have the form/letter: Yes: Placed in Dr. Davis in box    Who is the form from? Home care    Where did/will the form come from? form was faxed in    When is form/letter needed by: when done    How would you like the form/letter returned: Fax : [phone]    Order details/form description: 3 pages    Order number (if applicable): [id number]

## 2025-07-08 ENCOUNTER — TELEPHONE (OUTPATIENT)
Dept: FAMILY MEDICINE | Facility: CLINIC | Age: 70
End: 2025-07-08
Payer: COMMERCIAL

## 2025-07-08 ENCOUNTER — MEDICAL CORRESPONDENCE (OUTPATIENT)
Dept: HEALTH INFORMATION MANAGEMENT | Facility: CLINIC | Age: 70
End: 2025-07-08
Payer: COMMERCIAL

## 2025-07-08 NOTE — TELEPHONE ENCOUNTER
Forms/Letter Request    Type of form/letter: Home Health Certification      Do we have the form/letter: Yes: Placed in Dr. Davis in box    Who is the form from? Home care    Where did/will the form come from? form was faxed in    When is form/letter needed by: when done    How would you like the form/letter returned: Fax : [phone]      Order details/form description: 2 pages    Order number (if applicable): [id number]

## 2025-07-08 NOTE — TELEPHONE ENCOUNTER
Forms/Letter Request    Type of form/letter: Home Health Certification      Do we have the form/letter: Yes: placed in Dr. Davis in box    Who is the form from? Home care    Where did/will the form come from? form was faxed in    When is form/letter needed by: when done    How would you like the form/letter returned: Fax : [phone]      Order details/form description: 6 pages    Order number (if applicable): [id number]

## 2025-07-09 ENCOUNTER — OFFICE VISIT (OUTPATIENT)
Dept: VASCULAR SURGERY | Facility: CLINIC | Age: 70
End: 2025-07-09
Attending: NURSE PRACTITIONER
Payer: COMMERCIAL

## 2025-07-09 VITALS
OXYGEN SATURATION: 97 % | DIASTOLIC BLOOD PRESSURE: 86 MMHG | SYSTOLIC BLOOD PRESSURE: 126 MMHG | HEART RATE: 88 BPM | TEMPERATURE: 98.1 F

## 2025-07-09 DIAGNOSIS — S81.002A OPEN WOUND OF KNEE, LEG, AND ANKLE, LEFT, INITIAL ENCOUNTER: Primary | ICD-10-CM

## 2025-07-09 DIAGNOSIS — S91.002A OPEN WOUND OF KNEE, LEG, AND ANKLE, LEFT, INITIAL ENCOUNTER: Primary | ICD-10-CM

## 2025-07-09 DIAGNOSIS — S81.802A OPEN WOUND OF KNEE, LEG, AND ANKLE, LEFT, INITIAL ENCOUNTER: Primary | ICD-10-CM

## 2025-07-09 DIAGNOSIS — T14.8XXA OPEN WOUND: ICD-10-CM

## 2025-07-09 DIAGNOSIS — Z95.1 S/P CABG (CORONARY ARTERY BYPASS GRAFT): ICD-10-CM

## 2025-07-09 DIAGNOSIS — E66.811 CLASS 1 OBESITY DUE TO EXCESS CALORIES WITH SERIOUS COMORBIDITY AND BODY MASS INDEX (BMI) OF 32.0 TO 32.9 IN ADULT: ICD-10-CM

## 2025-07-09 DIAGNOSIS — E66.09 CLASS 1 OBESITY DUE TO EXCESS CALORIES WITH SERIOUS COMORBIDITY AND BODY MASS INDEX (BMI) OF 32.0 TO 32.9 IN ADULT: ICD-10-CM

## 2025-07-09 DIAGNOSIS — Z98.890 HISTORY OF FASCIOTOMY: ICD-10-CM

## 2025-07-09 PROCEDURE — 11042 DBRDMT SUBQ TIS 1ST 20SQCM/<: CPT | Performed by: NURSE PRACTITIONER

## 2025-07-09 ASSESSMENT — PAIN SCALES - GENERAL: PAINLEVEL_OUTOF10: NO PAIN (0)

## 2025-07-09 NOTE — PROGRESS NOTES
Follow up Vascular Visit       Date of Service:07/09/25      Chief Complaint: LLE fasciotomy wounds      Pt returns to [facility] with regards to their LLE fasciotomy wounds.  They arrive today alone; was able to walk unassisted to the room today. They are currently using adaptic; silvercel; ABD; rolled gauze to the wounds. This is being done by home care twice per week. They are using tubular compression for compression. They are feeling well today. Denies fevers, chills. No shortness of breath.     Allergies:   Allergies   Allergen Reactions    Oxycodone Confusion    Codeine Nausea    No Known Allergies      Potentially cats       Medications:   Current Outpatient Medications:     acetaminophen (TYLENOL) 325 MG tablet, Take 325-650 mg by mouth every 6 hours as needed for mild pain., Disp: , Rfl:     aspirin 81 MG EC tablet, Take 81 mg by mouth daily., Disp: , Rfl:     HYDROmorphone (DILAUDID) 2 MG tablet, Take 2 tablets (4 mg) by mouth every 6 hours as needed for pain., Disp: 10 tablet, Rfl: 0    ipratropium (ATROVENT) 0.06 % nasal spray, Spray 2 sprays into both nostrils 2 times daily as needed for rhinitis., Disp: , Rfl:     lisinopril (ZESTRIL) 5 MG tablet, Take 1 tablet (5 mg) by mouth daily., Disp: 90 tablet, Rfl: 3    metoprolol succinate ER (TOPROL XL) 25 MG 24 hr tablet, TAKE 0.5 TABLETS(12.5 MG) BY MOUTH DAILY, Disp: 45 tablet, Rfl: 3    ondansetron (ZOFRAN ODT) 4 MG ODT tab, Take 1 tablet (4 mg) by mouth every 8 hours as needed for nausea., Disp: 20 tablet, Rfl: 0    prochlorperazine (COMPAZINE) 5 MG tablet, Take 1 tablet (5 mg) by mouth every 6 hours as needed for nausea or vomiting., Disp: , Rfl:     rivaroxaban ANTICOAGULANT (XARELTO) 20 MG TABS tablet, Take 1 tablet (20 mg) by mouth daily (with dinner)., Disp: 30 tablet, Rfl: 0    doxycycline hyclate (VIBRAMYCIN) 100 MG capsule, Take 1 capsule (100 mg) by mouth 2 times daily. (Patient not taking: Reported on 7/9/2025),  Disp: 10 capsule, Rfl: 0    tadalafil (CIALIS) 10 MG tablet, Take 1 tablet (10 mg) by mouth daily as needed (erectile difficulties)., Disp: 90 tablet, Rfl: 2    Current Facility-Administered Medications:     lidocaine (XYLOCAINE) 5 % ointment, , Topical, Daily PRN, Jihan Mckay NP, Given at 06/18/25 0724    History:   Past Medical History:   Diagnosis Date    Acute lower limb ischemia 04/14/2025    Arthritis     Cellulitis 06/16/2025    Class 1 obesity due to excess calories with serious comorbidity and body mass index (BMI) of 32.0 to 32.9 in adult 08/10/2021    Coronary arteriosclerosis due to lipid rich plaque 12/28/2012    Problem list name updated by automated process. Provider to review      Coronary artery disease     ED (erectile dysfunction)     Elevated cholesterol 02/15/2019    Essential hypertension, benign 12/18/2019    Great toe pain, left 06/06/2025    History of prediabetes 02/01/2019    HTN (hypertension)     Hypercholesteremia     OA (osteoarthritis) 10/27/2021    Obesity     Prediabetes     Primary osteoarthritis of both hips     S/P CABG (coronary artery bypass graft) 10/04/2023    Status post coronary artery bypass graft 2010       Physical Exam:    /86   Pulse 88   Temp 98.1  F (36.7  C) (Oral)   SpO2 97%     General:  Patient presents to clinic in no apparent distress.  Head: normocephalic atraumatic  Psychiatric:  Alert and oriented x3.   Respiratory: unlabored breathing; no cough  Integumentary:  Skin is uniformly warm, dry and pink.    Ulcer #1 Location: left lateral leg  Size: 4L x 0.2W x 0.1depth.  no sinus tract present, Wound base: initially covered with eschar; this was removed and red viable wound bed underneath  no undermining present. Ulcer is full thickness. There is small drainage. Periwound: no denudement, erythema, induration, maceration or warmth.      VASC Wound Left lower leg (lateral) (Active)   Pre Size Length 23.5 05/20/25 0832   Pre Size Width 3 05/20/25 0832  "  Pre Size Depth 0.2 05/20/25 0832   Pre Total Sq cm 70.5 05/20/25 0832   Description scab 06/18/25 0723   Number of days: 78       VASC Wound Left lower leg (medial) (Active)   Pre Size Length 19.2 06/18/25 0723   Pre Size Width 1 06/18/25 0723   Pre Size Depth 0.2 06/18/25 0723   Pre Total Sq cm 19.2 06/18/25 0723   Number of days: 78       Incision/Surgical Site 03/27/25 Left;Medial Tibial (Active)   Incision Assessment UT 06/11/25 1613   Dressing Per Plan of Care 06/10/25 0008   Isabella-Incision Assessment UTV 06/11/25 0000   Incision Drainage Amount None 06/10/25 0008   Dressing Intervention Clean, dry, intact 06/11/25 1613   Number of days: 104       Incision/Surgical Site 03/27/25 Left;Lateral Tibial (Active)   Incision Assessment UT 06/11/25 1613   Dressing Per Plan of Care 06/10/25 0008   Isabella-Incision Assessment UTV 06/11/25 0000   Incision Drainage Amount Small 06/11/25 1613   Drainage Description Sanguinous 06/11/25 1613   Dressing Intervention Clean, dry, intact 06/10/25 0008   Number of days: 104            Circumferential volume measures:          5/20/2025     8:00 AM 6/18/2025     7:00 AM 7/9/2025    10:00 AM   Circumferential Measures   Left - just above MTP 23 23.2 23.2   Left Ankle 22.5 23 22   Left Widest Calf 36 36.8 36.6       Labs:    I personally reviewed the following lab results today and those on care everywhere    No results found for: \"CRP\"   Sed Rate   Date Value Ref Range Status   03/08/2019 12 0 - 15 mm/hr Final      Last Renal Panel:  Sodium   Date Value Ref Range Status   06/27/2025 141 135 - 145 mmol/L Final   02/15/2019 145.0 (H) 133.0 - 144.0 mmol/L Final     Potassium   Date Value Ref Range Status   06/27/2025 3.8 3.4 - 5.3 mmol/L Final   02/11/2022 4.6 3.5 - 5.0 mmol/L Final   02/15/2019 4.2 3.4 - 5.3 mmol/L Final     Chloride   Date Value Ref Range Status   06/27/2025 103 98 - 107 mmol/L Final   02/11/2022 105 98 - 107 mmol/L Final   02/15/2019 105.0 94.0 - 109.0 mmol/L Final " "    Carbon Dioxide   Date Value Ref Range Status   02/15/2019 29.0 20.0 - 32.0 mmol/L Final     Carbon Dioxide (CO2)   Date Value Ref Range Status   06/27/2025 25 22 - 29 mmol/L Final   02/11/2022 27 22 - 31 mmol/L Final     Anion Gap   Date Value Ref Range Status   06/27/2025 13 7 - 15 mmol/L Final   02/11/2022 9 5 - 18 mmol/L Final     Glucose   Date Value Ref Range Status   06/27/2025 130 (H) 70 - 99 mg/dL Final   02/11/2022 99 70 - 125 mg/dL Final   02/15/2019 100.0 60.0 - 109.0 mg/dL Final     GLUCOSE BY METER POCT   Date Value Ref Range Status   05/28/2025 105 (H) 70 - 99 mg/dL Final     Urea Nitrogen   Date Value Ref Range Status   06/27/2025 24.7 (H) 8.0 - 23.0 mg/dL Final   02/11/2022 9 8 - 22 mg/dL Final   02/15/2019 11.0 7.0 - 30.0 mg/dL Final     Creatinine   Date Value Ref Range Status   06/27/2025 1.37 (H) 0.67 - 1.17 mg/dL Final   02/15/2019 1.1 0.8 - 1.5 mg/dL Final     GFR Estimate   Date Value Ref Range Status   06/27/2025 56 (L) >60 mL/min/1.73m2 Final     Comment:     eGFR calculated using 2021 CKD-EPI equation.   12/18/2019 >60 >60 mL/min/1.73m2 Final   07/30/2014 >60 >60 mL/min/1.73m2 Final     Calcium   Date Value Ref Range Status   06/27/2025 9.7 8.8 - 10.4 mg/dL Final   02/15/2019 9.3 8.5 - 10.4 mg/dL Final     Phosphorus   Date Value Ref Range Status   04/14/2025 3.1 2.5 - 4.5 mg/dL Final     Albumin   Date Value Ref Range Status   06/06/2025 3.8 3.5 - 5.2 g/dL Final   07/11/2011 4.0 3.5 - 5.0 g/dl Final      Lab Results   Component Value Date    WBC 8.1 06/23/2025     Lab Results   Component Value Date    RBC 3.89 06/23/2025     Lab Results   Component Value Date    HGB 10.4 06/23/2025     Lab Results   Component Value Date    HCT 32.0 06/23/2025     No components found for: \"MCT\"  Lab Results   Component Value Date    MCV 82 06/23/2025     Lab Results   Component Value Date    MCH 26.7 06/23/2025     Lab Results   Component Value Date    MCHC 32.5 06/23/2025     Lab Results   Component " "Value Date    RDW 13.9 06/23/2025     Lab Results   Component Value Date     06/23/2025      Lab Results   Component Value Date    A1C 5.9 03/19/2025    A1C 5.9 09/13/2024    A1C 5.9 03/13/2024    A1C 5.8 09/08/2023    A1C 5.8 03/08/2023    A1C 5.7 02/01/2019    A1C 5.9 05/06/2016      No results found for: \"TSH\"   No results found for: \"VITDT\"                Impression:  Encounter Diagnoses   Name Primary?    History of fasciotomy Yes    Open wound of knee, leg, and ankle, left, initial encounter     Class 1 obesity due to excess calories with serious comorbidity and body mass index (BMI) of 32.0 to 32.9 in adult     S/P CABG (coronary artery bypass graft)     Open wound                              Are any of these ulcers new today: No; Location: na    Assessment/Plan:          1. Debridement: Nursing staff removed the old dressing and cleanse the wound(s) with specified solution. After discussion of risk factors and verbal consent was obtained 2% Lidocaine HCL jelly was applied, under clean conditions, the LLE ulceration(s) were debrided using currette. Devitalized and nonviable tissue, along with any fibrin and slough, was removed to improve granulation tissue formation, stimulate wound healing, decrease overall bacteria load, disrupt biofilm formation and decrease edge senescence.  Total excisional debridement was 0.8 sq cm from the epidermis/dermis area and into the subcutaneous tissue with a depth of 0.1 cm.   Ulcers were improved afterwards and .  Measures were as noted on the flow sheet.       2.  Ulcer treatment: ulcer treatment will include irrigation and dressings to promote autolytic debridement which will include:will use adaptic; gauze; rolled gauze; change twice per week with home care; continue with aquaphor to scar tissue If for some reason the patient is not able to get their dressing(s) changed as outlined above (due to illness, lack of supplies, lack of help) please do the " following: remove old, soiled dressings; wash the ulcers with saline; pat dry; apply ABD pad or other absorbant pad and secure with rolled gauze; avoid tape directly on your skin; patient instructed to call the clinic as soon as possible to let us know what the current issues are in receiving ulcer care. Stable            3. Edema: will use tubular compression and elevation. The compression wraps were applied today in clinic.     Patient presents with moderate, bilateral  lower extremity secondary lymphedema.      Patient requires a standard-fit knee high compression stocking and/or velcro wraps    Secondary Lymphedema &Edema: continue elevation and velcro wraps; needs to wear these consistently; replace every 6 months. The compression wraps were applied today in clinic. Patient presents with severe, bilateral secondary lymphedema. Patient requires daytime and nighttime compression garments; I have prescribed velcro wraps to accommodate and deliver gradient compression throughout the affected extremities. Quantity of 3 is needed for each leg for proper wash and wear.         If a 2 layer or 4 layer compression wrap is being used; these are safe to have on for ONLY 7 days. If for some reason the patient is not able to get the wrap(s) changed (due to illness; lack of supplies, lack of help, lack of transportation) please do the following: unwrap the old 2 or 4 layer compression wrap; avoid using scissors as you could cut your skin and cause ulcers; use tubular compression when available. Call to reschedule your home care or clinic visit appointment as soon as possible.  Stable            4. Nutrition: focus on protein           5. Offloading: position off the wound          6. Wound Etiology: surgical     Patient will follow up with me in 3 weeks for reevaluation. They were instructed to call the clinic sooner with any signs or symptoms of infection or any further questions/concerns. Answered all  questions.          Jihan Mckay DNP, RN, CNP, CWOCN, CFCN, CLT  [facility]   [phone]        This note was electronically signed by Jihan Mckay, NP

## 2025-07-09 NOTE — PATIENT INSTRUCTIONS
"    Apply aquaphor to the healed scar tissue on the left medial leg; can apply 1-2 times per day as able    Wound care supplies were not ordered or needed today.        Wound Care Instructions    2 TIMES PER WEEK home care and as needed, Cleanse your left leg wound(s) with Vashe or dilute hibiclens solution (30cc in 500cc NS).    Pat Dry with non-sterile gauze    Apply aquaphor to healed areas    Primary Dressing: Apply adaptic into/onto the wounds    Secondary dressing: Cover with ABD    Secure with non-sterile roll gauze (4\" x 75\" roll) and tape (1\" roll tape) as needed; avoid adhesive directly on the skin    Compression: tubular compression and elevation    Elevation    Avoid pressure over the wound areas; use pillows for positioning and extra padding    It is not ok to get your wound wet in the bath or shower      If for some reason you are not able to get your dressing(s) changed as outlined above (due to illness, lack of supplies, lack of help) please do the following: remove old, soiled dressings; wash the wounds with saline; pat dry; apply ABD pad or other absorbant pad and secure with rolled gauze; avoid tape directly on your skin; Call the clinic as soon as possible to let us know what the current issues are in receiving wound care [phone].      SEEK MEDICAL CARE IF:  You have an increase in swelling, pain, or redness around the wound.  You have an increase in the amount of pus coming from the wound.  There is a bad smell coming from the wound.  The wound appears to be worsening/enlarging  You have a fever greater than 101.5 F      It is ok to continue current wound care treatment/products for the next 2-3 days until new wound care supplies are ordered and arrive. If longer than this please contact our office at [phone].    If you have a 2 layer or 4 layer compression wrap on these are safe to have on for ONLY 7 days. If for some reason you are not able to get the wrap(s) changed (due to illness; " lack of supplies, lack of help, lack of transportation) please do the following: unwrap the old 2 or 4 layer compression wrap; avoid using scissors as you could cut your skin and cause wounds; use tubular compression when available. Call to reschedule your home care or clinic visit appointment as soon as possible.    Please NOTE: if you are 15 minutes late to your arrival time, you will have to be rescheduled. Please call our clinic as soon as possible if you know you will not be able to get to your appointment at [phone]. If you fail to show up to 3 scheduled clinic appointments you will be dismissed from our clinic.              We want to hear from you!  In the next few weeks, you should receive a call or email to complete a survey about your visit at [facility]. Please help us improve your appointment experience by letting us know how we did today. We strive to make your experience good and value any ways in which we could do better.      We value your input and suggestions.    Thank you for choosing the [facility]!           It is recommended that you do not get your ulcer wet when showering.  Listed below are several ways of keeping it dry when you shower.     1. Wrap it with Press and Seal plastic wrap.  It can be found in the stores where the plastic wraps or tin foil is kept.               2.  Some people take a bath and hang their leg/foot out of the tub.                        3  Put your leg in a plastic bag and tape it on.           4. You can purchase a shower cover for casts at some pharmacies and through the Internet.            5. Take a Bed Bath or wash up at the sink     High Protein Foods      Chicken  -Chicken breast, 3.5oz.-30 grams protein  -Chicken thigh-10 grams(average size)  -Drumstick-11 grams  -Wing- 6 grams  -Chicken meat, cooked, 4 oz.    Beef  -Hamburger sparkle, 4 oz-28 grams protein  -Steak, 6 oz-42 grams  -Most cuts of beef- 7 grams of  protein per ounce    Fish  -Most fish fillets or steaks are about 22 grams of protein for 3 1/2 oz(100 grams) of cooked fish, or 6 grams per ounce  -Tuna, 6 oz can-40 grams of protein    Pork  -Pork chop, average-22 grams protein  -Pork loin or tenderloin, 4 oz.-29 grams  -Ham, 3oz serving- 19 grams  -Ground pork 3oz cooked-22 grams  -Hawkins, 1 slice-3 grams  -[place]-style hawkins(black hawkins), slice-5-6 grams    Eggs and Dairy  -Egg, large-7 grams  -Milk, 1 cup-8 grams  -Cottage cheese, 1/2 cup-15 grams  -Greek yogurt, 1 cup-usually 8-12 grams, check label  -Soft cheeses (Mozzarella, Brie, Camembert)- 6 grams  -Medium cheeses(cheddar, swiss)- 7 or 8 grams per oz  -Hard cheeses(parmesan)- 10 grams per oz    Beans  -Tofu, 1/2 cup 20 grams  -Tofu, 1 oz., 2.3 grams  -Soy milk, 1 cup-6-10 grams  -Most beans(black, alejandra, lentils, etc.) about 7-10 grams protein per half cup of cooked beans  -soy beans, 1/2 cup cooked-14 grams  -Split peas, 1/2 cup cooked- 8 grams    Nuts and Seeds  -Peanut butter, 2 Tablespoons- 8 grams protein  -Almonds, 1/4 cup- 8 grams  -Peanuts, 1/4 cup-9 grams  -Cashews, 1/4 cup- 5 grams  -Pecans, 1/4 cup- 2.5 grams  -Sunflower seeds, 1/4 cup- 6 grams  -Pumpkin seeds, 1/4 cup-8 grams  -Flax seeds- 1/4 cup- 8 grams    Protein Supplements  -Core Power Protein Shakes 26g  -Premier Protein Shakes 30g  -Ensure  -Boost  -Glucerna, if diabetic  When you have an open ulcer, your bodies protein needs are much higher, so it is recommended eat good sources of protein

## 2025-07-10 ENCOUNTER — TELEPHONE (OUTPATIENT)
Dept: VASCULAR SURGERY | Facility: CLINIC | Age: 70
End: 2025-07-10
Payer: COMMERCIAL

## 2025-07-10 DIAGNOSIS — I99.8 ACUTE LOWER LIMB ISCHEMIA: ICD-10-CM

## 2025-07-10 NOTE — TELEPHONE ENCOUNTER
----- Message from Jihan Mckay sent at 7/9/2025 10:58 AM CDT -----  Regarding: xarelto  I saw this patient today for his fasciotomy wounds. He is a patient's of [facility]. He is wondering if he needs to be on his blood thinners life long; for a year, or when the refills are out? He only has a week left or something. Can  you call and let him know the answer and send refills as appropriate.      Thanks so much!    Jihan Mckay DNP, RN, CNP, CWOCN, CFCN, CLT  [facility]  [phone]

## 2025-07-16 ENCOUNTER — MEDICAL CORRESPONDENCE (OUTPATIENT)
Dept: HEALTH INFORMATION MANAGEMENT | Facility: CLINIC | Age: 70
End: 2025-07-16
Payer: COMMERCIAL

## 2025-07-16 ENCOUNTER — TELEPHONE (OUTPATIENT)
Dept: FAMILY MEDICINE | Facility: CLINIC | Age: 70
End: 2025-07-16
Payer: COMMERCIAL

## 2025-07-16 NOTE — TELEPHONE ENCOUNTER
Forms/Letter Request    Type of form/letter: Home Health Certification      Do we have the form/letter: Yes: Placed in Dr. Davis in box    Who is the form from? Home care    Where did/will the form come from? form was faxed in    When is form/letter needed by: when done    How would you like the form/letter returned: Fax : [phone]      Order details/form description: 2 pages    Order number (if applicable): [id number]

## (undated) DEVICE — GLOVE UNDER INDICATOR PI SZ 8.5 LF 41685

## (undated) DEVICE — PREP CHLORAPREP 26ML TINTED HI-LITE ORANGE 930815

## (undated) DEVICE — SUTURE SILK 2-0 TIES 30IN SA85H

## (undated) DEVICE — CUSTOM PACK ANTERIOR HIP SOP5BAHHED

## (undated) DEVICE — Device

## (undated) DEVICE — PLATE GROUNDING ADULT W/CORD 9165L

## (undated) DEVICE — KIT PATIENT CARE HANA TABLE PROFX SUPINE 6855

## (undated) DEVICE — HOOD FLYTE SURGICOOL WITH PEEL AWAY

## (undated) DEVICE — CAST PADDING 4" STERILE 9044S

## (undated) DEVICE — DRSG KERLIX FLUFFS X5

## (undated) DEVICE — GLOVE BIOGEL PI INDICATOR 8.0 LF 41680

## (undated) DEVICE — SURGICEL POWDER ABSORBABLE HEMOSTAT 3GM 3013SP

## (undated) DEVICE — DRAPE SHEET REV FOLD 3/4 9349

## (undated) DEVICE — DRSG AQUACEL AG HYDROFIBER  3.5X10" 422605

## (undated) DEVICE — GOWN IMPERVIOUS BREATHABLE SMART XLG 89045

## (undated) DEVICE — DRSG ABD TNDRSRB WET PRUF 8IN X 10IN STRL  9194A

## (undated) DEVICE — DRSG GAUZE 4X4" 3033

## (undated) DEVICE — SUCTION MANIFOLD NEPTUNE 2 SYS 4 PORT 0702-020-000

## (undated) DEVICE — DRESSING XEROFORM PETROLATUM 5X9 33605

## (undated) DEVICE — SOL NACL 0.9% IRRIG 1000ML BOTTLE 2F7124

## (undated) DEVICE — SUCTION IRR SYSTEM W/O TIP INTERPULSE HANDPIECE 0210-100-000

## (undated) DEVICE — NEEDLE HYPO 21GA X 1-1/2 SAFETY 305917

## (undated) DEVICE — HOOD FLYTE SURGICOOL

## (undated) DEVICE — SUTURE SILK 3-0 TIES 30IN SA84H

## (undated) DEVICE — SU PDO 1 STRATAFIX 36X36CM CTX TAPERPOINT SXPD2B405

## (undated) DEVICE — BLADE SAW SAGITTAL STRK WIDE 25.4X85X1.2MM 2108-151-000

## (undated) DEVICE — BNDG ELASTIC 6"X5YDS UNSTERILE 6611-60

## (undated) DEVICE — SU VICRYL+ 4-0 UNDYED PS-2 VCP496ZH

## (undated) DEVICE — SOL WATER IRRIG 1000ML BOTTLE 2F7114

## (undated) DEVICE — SU VICRYL+ 3-0 27IN SH UND VCP416H

## (undated) DEVICE — GLOVE SURG PI ULTRA TOUCH M SZ 7-1/2 LF

## (undated) DEVICE — SU MONOCRYL 3-0 PS-2 18" UND MCP497G

## (undated) DEVICE — SUCTION MANIFOLD NEPTUNE 2 SYS 1 PORT 702-025-000

## (undated) DEVICE — DRAPE ISOLATION BAG 1003

## (undated) DEVICE — DECANTER VIAL 2006S

## (undated) DEVICE — DRAPE IOBAN 2 C-SECTION 3M 6697

## (undated) DEVICE — ESU GROUND PAD ADULT REM W/15' CORD E7507DB

## (undated) DEVICE — CUSTOM PACK GEN MAJOR SBA5BGMHEA

## (undated) DEVICE — SUTURE VICRYL+ 2-0 27IN CT-1 UND VCP259H

## (undated) DEVICE — DRSG KERLIX 4 1/2"X4YDS ROLL 6715

## (undated) DEVICE — ADH SKIN CLOSURE PREMIERPRO EXOFIN 1.0ML 3470

## (undated) DEVICE — DRAPE C-ARM 60X42" 1013

## (undated) DEVICE — CLIP HORIZON MULTI MED BLUE 002204

## (undated) DEVICE — SU FIBERWIRE 2 38" T-8 NDL  AR-7206

## (undated) DEVICE — GLOVE BIOGEL INDICATOR 7.5 LF 41675

## (undated) DEVICE — BONE CLEANING TIP INTERPULSE  0210-010-000

## (undated) DEVICE — DRAPE U SPLIT 74X120" 29440

## (undated) DEVICE — ESU PENCIL SMOKE EVAC W/ROCKER SWITCH 0703-047-000

## (undated) DEVICE — SU SILK 3-0 SH 30" K832H

## (undated) DEVICE — GLOVE SURG PI ULTRA TOUCH M SZ 8-1/2 LF

## (undated) DEVICE — BLADE KNIFE SURG 15 371115

## (undated) DEVICE — SOL NACL 0.9% INJ 1000ML BAG 2B1324X

## (undated) RX ORDER — PROPOFOL 10 MG/ML
INJECTION, EMULSION INTRAVENOUS
Status: DISPENSED
Start: 2025-03-27

## (undated) RX ORDER — FENTANYL CITRATE 50 UG/ML
INJECTION, SOLUTION INTRAMUSCULAR; INTRAVENOUS
Status: DISPENSED
Start: 2025-03-27